# Patient Record
Sex: FEMALE | Race: WHITE | NOT HISPANIC OR LATINO | Employment: FULL TIME | URBAN - METROPOLITAN AREA
[De-identification: names, ages, dates, MRNs, and addresses within clinical notes are randomized per-mention and may not be internally consistent; named-entity substitution may affect disease eponyms.]

---

## 2018-01-12 NOTE — MISCELLANEOUS
Message  We have received a few refill requests for pt, however pt is due to come in the office  Will have Dr  send a refill to the pharmacy but needs a f/u apt  LMOM x2 Thanks CF      Active Problems    1  Esophageal reflux (530 81) (K21 9)    Current Meds   1  Pantoprazole Sodium 40 MG Oral Tablet Delayed Release; TAKE 1 TABLET TWICE   DAILY 30 MINUTES BEFORE BREAKFAST AND DINNER; Therapy: [id number] to (Lani Monet)  Requested for: [id number];  Last   Rx:[id number] Ordered    Plan  Esophageal reflux    · Pantoprazole Sodium 40 MG Oral Tablet Delayed Release; TAKE 1 TABLET  TWICE DAILY 30 MINUTES BEFORE BREAKFAST AND DINNER    Signatures   Electronically signed by : Jorge Cassidy, ; Mar 14 2016 11:08AM EST                       (Author)

## 2021-07-11 ENCOUNTER — HOSPITAL ENCOUNTER (EMERGENCY)
Facility: HOSPITAL | Age: 71
Discharge: HOME | End: 2021-07-11
Attending: EMERGENCY MEDICINE
Payer: COMMERCIAL

## 2021-07-11 ENCOUNTER — APPOINTMENT (EMERGENCY)
Dept: RADIOLOGY | Facility: HOSPITAL | Age: 71
End: 2021-07-11
Payer: COMMERCIAL

## 2021-07-11 VITALS
RESPIRATION RATE: 18 BRPM | HEART RATE: 88 BPM | WEIGHT: 184 LBS | OXYGEN SATURATION: 98 % | SYSTOLIC BLOOD PRESSURE: 136 MMHG | TEMPERATURE: 97.8 F | DIASTOLIC BLOOD PRESSURE: 63 MMHG

## 2021-07-11 DIAGNOSIS — R10.32 LEFT LOWER QUADRANT ABDOMINAL PAIN: Primary | ICD-10-CM

## 2021-07-11 LAB
ALBUMIN SERPL BCP-MCNC: 2.6 G/DL (ref 3.5–5)
ALP SERPL-CCNC: 115 U/L (ref 46–116)
ALT SERPL W P-5'-P-CCNC: 22 U/L (ref 12–78)
ANION GAP SERPL CALCULATED.3IONS-SCNC: 11 MMOL/L (ref 4–13)
APTT PPP: 44 SECONDS (ref 23–37)
AST SERPL W P-5'-P-CCNC: 64 U/L (ref 5–45)
BACTERIA UR QL AUTO: ABNORMAL /HPF
BASOPHILS # BLD AUTO: 0.01 THOUSANDS/ΜL (ref 0–0.1)
BASOPHILS NFR BLD AUTO: 0 % (ref 0–1)
BILIRUB SERPL-MCNC: 0.22 MG/DL (ref 0.2–1)
BILIRUB UR QL STRIP: NEGATIVE
BUN SERPL-MCNC: 13 MG/DL (ref 5–25)
CALCIUM ALBUM COR SERPL-MCNC: 9.4 MG/DL (ref 8.3–10.1)
CALCIUM SERPL-MCNC: 8.3 MG/DL (ref 8.3–10.1)
CHLORIDE SERPL-SCNC: 103 MMOL/L (ref 100–108)
CLARITY UR: CLEAR
CO2 SERPL-SCNC: 25 MMOL/L (ref 21–32)
COLOR UR: ABNORMAL
CREAT SERPL-MCNC: 0.64 MG/DL (ref 0.6–1.3)
EOSINOPHIL # BLD AUTO: 0.12 THOUSAND/ΜL (ref 0–0.61)
EOSINOPHIL NFR BLD AUTO: 2 % (ref 0–6)
ERYTHROCYTE [DISTWIDTH] IN BLOOD BY AUTOMATED COUNT: 12.5 % (ref 11.6–15.1)
GFR SERPL CREATININE-BSD FRML MDRD: 91 ML/MIN/1.73SQ M
GLUCOSE SERPL-MCNC: 116 MG/DL (ref 65–140)
GLUCOSE UR STRIP-MCNC: NEGATIVE MG/DL
HCT VFR BLD AUTO: 29.7 % (ref 34.8–46.1)
HGB BLD-MCNC: 9.1 G/DL (ref 11.5–15.4)
HGB UR QL STRIP.AUTO: NEGATIVE
IMM GRANULOCYTES # BLD AUTO: 0.01 THOUSAND/UL (ref 0–0.2)
IMM GRANULOCYTES NFR BLD AUTO: 0 % (ref 0–2)
INR PPP: 1.15 (ref 0.84–1.19)
KETONES UR STRIP-MCNC: NEGATIVE MG/DL
LEUKOCYTE ESTERASE UR QL STRIP: ABNORMAL
LIPASE SERPL-CCNC: 113 U/L (ref 73–393)
LYMPHOCYTES # BLD AUTO: 1.06 THOUSANDS/ΜL (ref 0.6–4.47)
LYMPHOCYTES NFR BLD AUTO: 18 % (ref 14–44)
MCH RBC QN AUTO: 26.1 PG (ref 26.8–34.3)
MCHC RBC AUTO-ENTMCNC: 30.6 G/DL (ref 31.4–37.4)
MCV RBC AUTO: 85 FL (ref 82–98)
MONOCYTES # BLD AUTO: 0.75 THOUSAND/ΜL (ref 0.17–1.22)
MONOCYTES NFR BLD AUTO: 13 % (ref 4–12)
NEUTROPHILS # BLD AUTO: 3.88 THOUSANDS/ΜL (ref 1.85–7.62)
NEUTS SEG NFR BLD AUTO: 67 % (ref 43–75)
NITRITE UR QL STRIP: NEGATIVE
NON-SQ EPI CELLS URNS QL MICRO: ABNORMAL /HPF
NRBC BLD AUTO-RTO: 0 /100 WBCS
PH UR STRIP.AUTO: 6.5 [PH]
PLATELET # BLD AUTO: 386 THOUSANDS/UL (ref 149–390)
PMV BLD AUTO: 9.7 FL (ref 8.9–12.7)
POTASSIUM SERPL-SCNC: 3.4 MMOL/L (ref 3.5–5.3)
PROT SERPL-MCNC: 6.9 G/DL (ref 6.4–8.2)
PROT UR STRIP-MCNC: NEGATIVE MG/DL
PROTHROMBIN TIME: 14.6 SECONDS (ref 11.6–14.5)
RBC # BLD AUTO: 3.48 MILLION/UL (ref 3.81–5.12)
RBC #/AREA URNS AUTO: ABNORMAL /HPF
SODIUM SERPL-SCNC: 139 MMOL/L (ref 136–145)
SP GR UR STRIP.AUTO: 1.02 (ref 1–1.03)
UROBILINOGEN UR QL STRIP.AUTO: 1 E.U./DL
WBC # BLD AUTO: 5.83 THOUSAND/UL (ref 4.31–10.16)
WBC #/AREA URNS AUTO: ABNORMAL /HPF

## 2021-07-11 PROCEDURE — 85025 COMPLETE CBC W/AUTO DIFF WBC: CPT | Performed by: EMERGENCY MEDICINE

## 2021-07-11 PROCEDURE — G1004 CDSM NDSC: HCPCS

## 2021-07-11 PROCEDURE — 74177 CT ABD & PELVIS W/CONTRAST: CPT

## 2021-07-11 PROCEDURE — 96361 HYDRATE IV INFUSION ADD-ON: CPT

## 2021-07-11 PROCEDURE — 99284 EMERGENCY DEPT VISIT MOD MDM: CPT | Performed by: EMERGENCY MEDICINE

## 2021-07-11 PROCEDURE — 85730 THROMBOPLASTIN TIME PARTIAL: CPT | Performed by: EMERGENCY MEDICINE

## 2021-07-11 PROCEDURE — 81001 URINALYSIS AUTO W/SCOPE: CPT | Performed by: EMERGENCY MEDICINE

## 2021-07-11 PROCEDURE — 80053 COMPREHEN METABOLIC PANEL: CPT | Performed by: EMERGENCY MEDICINE

## 2021-07-11 PROCEDURE — 96374 THER/PROPH/DIAG INJ IV PUSH: CPT

## 2021-07-11 PROCEDURE — 85610 PROTHROMBIN TIME: CPT | Performed by: EMERGENCY MEDICINE

## 2021-07-11 PROCEDURE — 83690 ASSAY OF LIPASE: CPT | Performed by: EMERGENCY MEDICINE

## 2021-07-11 PROCEDURE — 99284 EMERGENCY DEPT VISIT MOD MDM: CPT

## 2021-07-11 PROCEDURE — 36415 COLL VENOUS BLD VENIPUNCTURE: CPT | Performed by: EMERGENCY MEDICINE

## 2021-07-11 RX ORDER — AMLODIPINE BESYLATE AND BENAZEPRIL HYDROCHLORIDE 10; 20 MG/1; MG/1
1 CAPSULE ORAL DAILY
COMMUNITY

## 2021-07-11 RX ORDER — BUTALBITAL, ASPIRIN, AND CAFFEINE 50; 325; 40 MG/1; MG/1; MG/1
1 CAPSULE ORAL EVERY 4 HOURS PRN
COMMUNITY
End: 2022-06-28 | Stop reason: SDUPTHER

## 2021-07-11 RX ORDER — KETOROLAC TROMETHAMINE 30 MG/ML
15 INJECTION, SOLUTION INTRAMUSCULAR; INTRAVENOUS ONCE
Status: COMPLETED | OUTPATIENT
Start: 2021-07-11 | End: 2021-07-11

## 2021-07-11 RX ORDER — PRAVASTATIN SODIUM 10 MG
10 TABLET ORAL DAILY
COMMUNITY

## 2021-07-11 RX ORDER — ALBUTEROL SULFATE 90 UG/1
2 AEROSOL, METERED RESPIRATORY (INHALATION) EVERY 6 HOURS PRN
COMMUNITY
End: 2022-07-15 | Stop reason: SDUPTHER

## 2021-07-11 RX ORDER — KETOROLAC TROMETHAMINE 10 MG/1
10 TABLET, FILM COATED ORAL EVERY 6 HOURS PRN
Qty: 6 TABLET | Refills: 0 | Status: SHIPPED | OUTPATIENT
Start: 2021-07-11 | End: 2021-07-22 | Stop reason: ALTCHOICE

## 2021-07-11 RX ORDER — OMEPRAZOLE 20 MG/1
20 CAPSULE, DELAYED RELEASE ORAL 2 TIMES DAILY
COMMUNITY

## 2021-07-11 RX ADMIN — KETOROLAC TROMETHAMINE 15 MG: 30 INJECTION, SOLUTION INTRAMUSCULAR at 23:01

## 2021-07-11 RX ADMIN — SODIUM CHLORIDE 500 ML: 0.9 INJECTION, SOLUTION INTRAVENOUS at 19:56

## 2021-07-11 RX ADMIN — IOHEXOL 100 ML: 350 INJECTION, SOLUTION INTRAVENOUS at 21:07

## 2021-07-12 NOTE — ED PROVIDER NOTES
History  Chief Complaint   Patient presents with    Groin Pain     L groin pain for about a week  worse tonight   worse with standing or movement no urinary or bowel problems     73-year-old female presents the ED with left groin pain for about 1 week which is worse tonight  Patient states it is worse with standing and movement and she has use cream on her abdomen underneath the skin fold but when I look that was not irritated  No fevers no chills no nausea vomiting or diarrhea she states she is urinating and moving her bowels well  Patient is awake and alert      History provided by:  Patient   used: No        Prior to Admission Medications   Prescriptions Last Dose Informant Patient Reported? Taking? AMLODIPINE BENZOATE PO   Yes Yes   Sig: Take by mouth   albuterol (PROVENTIL HFA,VENTOLIN HFA) 90 mcg/act inhaler   Yes Yes   Sig: Inhale 2 puffs every 6 (six) hours as needed for wheezing   amLODIPine-benazepril (LOTREL) 10-20 MG per capsule   Yes Yes   Sig: Take 1 capsule by mouth daily   butalbital-aspirin-caffeine (FIORINAL) -40 mg per capsule   Yes Yes   Sig: Take 1 capsule by mouth every 4 (four) hours as needed for headaches   fluticasone (FLOVENT DISKUS) 50 MCG/BLIST diskus inhaler   Yes Yes   Sig: Inhale 1 puff 2 (two) times a day Rinse mouth after use  omeprazole (PriLOSEC) 20 mg delayed release capsule   Yes Yes   Sig: Take 20 mg by mouth 2 (two) times a day   pravastatin (PRAVACHOL) 10 mg tablet   Yes Yes   Sig: Take 10 mg by mouth daily      Facility-Administered Medications: None       Past Medical History:   Diagnosis Date    Acid reflux     Asthma     Hypercholesteremia     Hypertension     Migraine        Past Surgical History:   Procedure Laterality Date    GALLBLADDER SURGERY         History reviewed  No pertinent family history  I have reviewed and agree with the history as documented      E-Cigarette/Vaping    E-Cigarette Use Never User E-Cigarette/Vaping Substances     Social History     Tobacco Use    Smoking status: Never Smoker    Smokeless tobacco: Never Used   Vaping Use    Vaping Use: Never used   Substance Use Topics    Alcohol use: Yes     Comment: socially    Drug use: Not Currently       Review of Systems   Constitutional: Negative for activity change, chills, diaphoresis and fever  HENT: Negative for congestion, ear pain, nosebleeds, sore throat, trouble swallowing and voice change  Eyes: Negative for pain, discharge and redness  Respiratory: Negative for apnea, cough, choking, shortness of breath, wheezing and stridor  Cardiovascular: Negative for chest pain and palpitations  Gastrointestinal: Positive for abdominal pain  Negative for abdominal distention, constipation, diarrhea, nausea and vomiting  Endocrine: Negative for polydipsia  Genitourinary: Negative for difficulty urinating, dysuria, flank pain, frequency, hematuria and urgency  Musculoskeletal: Negative for back pain, gait problem, joint swelling, myalgias, neck pain and neck stiffness  Skin: Negative for pallor and rash  Neurological: Negative for dizziness, tremors, syncope, speech difficulty, weakness, numbness and headaches  Hematological: Negative for adenopathy  Psychiatric/Behavioral: Negative for confusion, hallucinations, self-injury and suicidal ideas  The patient is not nervous/anxious  Physical Exam  Physical Exam  Vitals and nursing note reviewed  Constitutional:       General: She is not in acute distress  Appearance: She is well-developed  She is not diaphoretic  HENT:      Head: Normocephalic and atraumatic  Right Ear: External ear normal       Left Ear: External ear normal       Nose: Nose normal    Eyes:      Conjunctiva/sclera: Conjunctivae normal       Pupils: Pupils are equal, round, and reactive to light  Cardiovascular:      Rate and Rhythm: Normal rate and regular rhythm        Heart sounds: Normal heart sounds  Pulmonary:      Effort: Pulmonary effort is normal       Breath sounds: Normal breath sounds  Abdominal:      General: Bowel sounds are normal       Palpations: Abdomen is soft  Tenderness: There is abdominal tenderness  Musculoskeletal:         General: Normal range of motion  Cervical back: Normal range of motion and neck supple  Skin:     General: Skin is warm and dry  Neurological:      Mental Status: She is alert and oriented to person, place, and time  Deep Tendon Reflexes: Reflexes are normal and symmetric  Psychiatric:         Behavior: Behavior is cooperative           Vital Signs  ED Triage Vitals [07/11/21 1923]   Temperature Pulse Respirations Blood Pressure SpO2   98 1 °F (36 7 °C) 97 20 145/67 97 %      Temp Source Heart Rate Source Patient Position - Orthostatic VS BP Location FiO2 (%)   Tympanic Monitor Sitting Right arm --      Pain Score       5           Vitals:    07/11/21 1923 07/11/21 2217   BP: 145/67 136/63   Pulse: 97 88   Patient Position - Orthostatic VS: Sitting Lying         Visual Acuity      ED Medications  Medications   sodium chloride 0 9 % bolus 500 mL (0 mL Intravenous Stopped 7/11/21 2215)   iohexol (OMNIPAQUE) 350 MG/ML injection (SINGLE-DOSE) 100 mL (100 mL Intravenous Given 7/11/21 2107)   ketorolac (TORADOL) injection 15 mg (15 mg Intravenous Given 7/11/21 2301)       Diagnostic Studies  Results Reviewed     Procedure Component Value Units Date/Time    Urine Microscopic [[id number]]  (Abnormal) Collected: 07/11/21 2215    Lab Status: Final result Specimen: Urine, Clean Catch Updated: 07/11/21 2226     RBC, UA 0-1 /hpf      WBC, UA 4-10 /hpf      Epithelial Cells Occasional /hpf      Bacteria, UA Occasional /hpf     UA w Reflex to Microscopic w Reflex to Culture [[id number]]  (Abnormal) Collected: 07/11/21 2215    Lab Status: Final result Specimen: Urine, Clean Catch Updated: 07/11/21 2220     Color, UA Light Yellow     Clarity, UA Clear     Specific [place], UA 1 020     pH, UA 6 5     Leukocytes, UA Trace     Nitrite, UA Negative     Protein, UA Negative mg/dl      Glucose, UA Negative mg/dl      Ketones, UA Negative mg/dl      Urobilinogen, UA 1 0 E U /dl      Bilirubin, UA Negative     Blood, UA Negative    Comprehensive metabolic panel [[id number]]  (Abnormal) Collected: 07/11/21 1957    Lab Status: Final result Specimen: Blood from Arm, Left Updated: 07/11/21 2017     Sodium 139 mmol/L      Potassium 3 4 mmol/L      Chloride 103 mmol/L      CO2 25 mmol/L      ANION GAP 11 mmol/L      BUN 13 mg/dL      Creatinine 0 64 mg/dL      Glucose 116 mg/dL      Calcium 8 3 mg/dL      Corrected Calcium 9 4 mg/dL      AST 64 U/L      ALT 22 U/L      Alkaline Phosphatase 115 U/L      Total Protein 6 9 g/dL      Albumin 2 6 g/dL      Total Bilirubin 0 22 mg/dL      eGFR 91 ml/min/1 73sq m     Narrative:      [facility] guidelines for Chronic Kidney Disease (CKD):     Stage 1 with normal or high GFR (GFR > 90 mL/min/1 73 square meters)    Stage 2 Mild CKD (GFR = 60-89 mL/min/1 73 square meters)    Stage 3A Moderate CKD (GFR = 45-59 mL/min/1 73 square meters)    Stage 3B Moderate CKD (GFR = 30-44 mL/min/1 73 square meters)    Stage 4 Severe CKD (GFR = 15-29 mL/min/1 73 square meters)    Stage 5 End Stage CKD (GFR <15 mL/min/1 73 square meters)  Note: GFR calculation is accurate only with a steady state creatinine    Lipase [[id number]]  (Normal) Collected: 07/11/21 1957    Lab Status: Final result Specimen: Blood from Arm, Left Updated: 07/11/21 2017     Lipase 113 u/L     Protime-INR [[id number]]  (Abnormal) Collected: 07/11/21 1957    Lab Status: Final result Specimen: Blood from Arm, Left Updated: 07/11/21 2013     Protime 14 6 seconds      INR 1 15    APTT [[id number]]  (Abnormal) Collected: 07/11/21 1957    Lab Status: Final result Specimen: Blood from Arm, Left Updated: 07/11/21 2013     PTT 44 seconds     CBC and differential [[id number]]  (Abnormal) Collected: 07/11/21 1957    Lab Status: Final result Specimen: Blood from Arm, Left Updated: 07/11/21 2001     WBC 5 83 Thousand/uL      RBC 3 48 Million/uL      Hemoglobin 9 1 g/dL      Hematocrit 29 7 %      MCV 85 fL      MCH 26 1 pg      MCHC 30 6 g/dL      RDW 12 5 %      MPV 9 7 fL      Platelets 408 Thousands/uL      nRBC 0 /100 WBCs      Neutrophils Relative 67 %      Immat GRANS % 0 %      Lymphocytes Relative 18 %      Monocytes Relative 13 %      Eosinophils Relative 2 %      Basophils Relative 0 %      Neutrophils Absolute 3 88 Thousands/µL      Immature Grans Absolute 0 01 Thousand/uL      Lymphocytes Absolute 1 06 Thousands/µL      Monocytes Absolute 0 75 Thousand/µL      Eosinophils Absolute 0 12 Thousand/µL      Basophils Absolute 0 01 Thousands/µL                  CT abdomen pelvis with contrast   Final Result by Merari Marie MD (07/11 2128)      1   5 9 x 2 7 x 4 8 cm left adnexal mass likely due to ovarian cancer with associated moderate ascites and marked nodularity and large soft tissue density within the omentum and peritoneal deposits in keeping with carcinomatosis  2   Small left pleural effusion  3   Moderate hiatal hernia  The study was marked in [facility] for immediate notification  Workstation performed: [id number]                    Procedures  Procedures         ED Course                             SBIRT 20yo+      Most Recent Value   SBIRT (24 yo +)   In order to provide better care to our patients, we are screening all of our patients for alcohol and drug use  Would it be okay to ask you these screening questions? No Filed at: 07/11/2021 2000                    MDM  Number of Diagnoses or Management Options  Left lower quadrant abdominal pain  Diagnosis management comments: I had a long discussion with the patient about the possibility of ovarian cancer with metastasis    She understands the severity of the CT scan and gave me the number of her OBGYN which I did call the service and the physician on-call did state that she would have the office call the patient tomorrow morning and they will get her in to [facility] early this week  I relayed this to the patient so if they do not call she can notify them  Disposition  Final diagnoses:   Left lower quadrant abdominal pain     Time reflects when diagnosis was documented in both MDM as applicable and the Disposition within this note     Time User Action Codes Description Comment    7/11/2021 10:52 PM Asher Subramanian Add [R10 32] Left lower quadrant abdominal pain       ED Disposition     ED Disposition Condition Date/Time Comment    Discharge Stable Sun Jul 11, 2021 10:52 PM Jaron Menon discharge to home/self care  Follow-up Information     Follow up With Specialties Details Why Contact Info Additional [address] Emergency Department Emergency Medicine Schedule an appointment as soon as possible for a visit  As needed [address] [address] [address] [address] Emergency Department, [facility], [address]          Patient's Medications   Discharge Prescriptions    No medications on file     No discharge procedures on file      PDMP Review     None          ED Provider  Electronically Signed by           Haley Rock DO  07/11/21 0960

## 2021-07-13 ENCOUNTER — TELEPHONE (OUTPATIENT)
Dept: HEMATOLOGY ONCOLOGY | Facility: CLINIC | Age: 71
End: 2021-07-13

## 2021-07-13 NOTE — TELEPHONE ENCOUNTER
New Patient Request   Patient Details:     Awilda Arreaga      1950      [id number]      Reason for Appointment   Who is calling to schedule? Patient   If not Patient, what is their name? Tonia Domínguez    What is the diagnosis? Left lower quadrant abdominal pain   Who is the referring doctor? Dr Laurie Melissa are you scheduling with ? [address] Number to call back on? If calling from the [facility], use the Nurse number  Cell PH: [phone]   Miscellaneous Information: Dr Lima Perry  is at [facility]: [phone]  Pt would like to see Dr Pablo Castano   *Radha was asked to make an appt as soon as possible*   Please advise the patient, a new patient  will be calling them back within 1 business day

## 2021-07-15 ENCOUNTER — TELEPHONE (OUTPATIENT)
Dept: SURGICAL ONCOLOGY | Facility: CLINIC | Age: 71
End: 2021-07-15

## 2021-07-15 NOTE — TELEPHONE ENCOUNTER
New Patient Encounter    New Patient Intake Form   Patient Details:  Grace Finney  1950  [id number]    Background Information:  [facility] [place] starts by opening a telephone encounter and gathering the following information   Who is calling to schedule? If not self, relationship to patient? Patient   Referring Provider ED   What is the diagnosis? Adnexal mass   Is this Cancer or Non-Cancer? Non-Cancer   Is this diagnosis confirmed? No   When was the diagnosis? 7/2021   Is there a confirmed diagnosis from a biopsy/tissue reviewed by pathology? No   Were outside slides requested? No   Is patient aware of diagnosis? yes   Is there a personal history and what kind? 35 yrs ago right ovary & fallopian tube removed -non cancerous   Is there a family history and what kind? Yes  Dad had lung  Mom had colon   Reason for visit? New Diagnosis   Have you had any imaging or labs done? If so: when, where? yes  [facility]   Are records in [facility]? yes   If patient has a prior history of cancer were old records obtained? NA   Was the patient told to bring a disk? No   Does the patient smoke or Vape? No   If yes, how many packs or cartridges per day? Scheduling Information:   Preferred [place]:  Any     Are there any dates/time the patient cannot be seen? Miscellaneous:   After completing the above information, please route to Financial Counselor and the appropriate Nurse Navigator for review  [Follow-Up Visit] : a follow-up visit for [CLL] : chronic lymphocytic leukemia

## 2021-07-16 PROBLEM — C78.6 PERITONEAL CARCINOMATOSIS (HCC): Status: ACTIVE | Noted: 2021-07-16

## 2021-07-16 PROBLEM — I10 HYPERTENSION: Status: ACTIVE | Noted: 2021-07-16

## 2021-07-16 PROBLEM — J45.909 ASTHMA: Status: ACTIVE | Noted: 2021-07-16

## 2021-07-16 NOTE — TELEPHONE ENCOUNTER
Spoke with Malachi Floyd from [address] [place] [address]  Call ref# [id number]  Malachi Floyd stated that patient is covered under a PPO plan, and is able to cross into [place]  Practice confirmed to be in network

## 2021-07-16 NOTE — H&P (VIEW-ONLY)
Assessment/Plan:    Problem List Items Addressed This Visit        Respiratory    Asthma       Cardiovascular and Mediastinum    Hypertension       Other    Peritoneal carcinomatosis ([facility] [address] ) - Primary     72yo with new pelvic mass concerning for gyn cancer presents for consultation  I have reviewed CT images which show ascites, omental cake and peritoneal nodularity  We reviewed that the most likely diagnosis given the above findings is ovarian cancer  We discussed the two approaches to treatment for her disease will include a combination of surgery and chemotherapy  Given her extent of disease, especially with the potential for involvement of multiple portions of bowel and peritoneum, we discussed the potential for neoadjuvant chemotherapy with carboplatin and paclitaxel for approximately 3 cycles with the hope of shrinking her tumor prior to go to the operating room for a debulking surgery with the goal of removal of all gross disease  We reviewed that randomized trials in this setting have shown no difference in survival if proceeding with neoadjuvant chemotherapy compared to primary debulking surgery  We reviewed that chemotherapy would not be started until pathology confirmed a gynecologic malignancy  However, we also reviewed her health and excellent performance status make her a good candidate for an upfront debulking surgery  Because imaging is not perfect at assessing true disease extent and patients who have a debulking to no gross residual disease upfront tend to do best in terms of overall survival, we also discussed the role of diagnostic laparoscopy followed by debulking surgery if optimal debulking is feasible at that time  If optimal resection not feasible, will perform biopsy and begin neoadjuvant chemo on diagnosis  Pt agrees to proceed to OR at this time     Surgical risks were reviewed with the patient including infection, blood loss, transfusion, damage to other organs and possible need for additional procedures including bowel or bladder surgery  I reviewed in detail the risk of bowel and urinary tract injury  Intraoperative and wound infection was reviewed  Postoperative recovery was reviewed, including the risk of venous thrombosis, bowel adhesions, ileus and incisional hernia formation  The patient was given time to ask pertinent questions and would like to proceed  Questions answered  Reviewed postoperative expectations and instructions, including pelvic rest for 8 weeks, no heavy lifting for 4 weeks  PATs  PCP clearance               Relevant Medications    oxyCODONE (ROXICODONE) 5 mg immediate release tablet    polyethylene glycol (MiraLax) 17 GM/SCOOP powder    Other Relevant Orders    Case request operating room: LAPAROSCOPY DIAGNOSTIC (Completed)    Type and screen    Comprehensive metabolic panel    CBC and Platelet    HEMOGLOBIN A1C W/ EAG ESTIMATION        EKG 12 lead    XR chest pa & lateral    Cancer related pain     Oxycodone rx sent to pharmacy                    CHIEF COMPLAINT: pelvic mass    Oncology Problem:  Cancer Staging  No matching staging information was found for the patient  Previous therapy:  Oncology History    No history exists  Most recent imaging:  CT abdomen pelvis with contrast  Narrative: CT ABDOMEN AND PELVIS WITH IV CONTRAST    INDICATION:   LLQ abdominal pain, diverticulitis suspected    COMPARISON:  None  TECHNIQUE:  CT examination of the abdomen and pelvis was performed  Axial, sagittal, and coronal 2D reformatted images were created from the source data and submitted for interpretation  Radiation dose length product (DLP) for this visit:  1294 mGy-cm   This examination, like all CT scans performed in the [facility], was performed utilizing techniques to minimize radiation dose exposure, including the use of iterative   reconstruction and automated exposure control      IV Contrast:  100 mL of iohexol (OMNIPAQUE)  was administered intravenously without immediate adverse reaction  Enteric Contrast:  Enteric contrast was not administered  FINDINGS:    ABDOMEN    LOWER CHEST:  Small left pleural effusion with adjacent compressive atelectasis  LIVER/BILIARY TREE:  Unremarkable  GALLBLADDER:  Gallbladder is surgically absent  SPLEEN:  Unremarkable  PANCREAS:  Unremarkable  ADRENAL GLANDS:  Unremarkable  KIDNEYS/URETERS:  Unremarkable  No hydronephrosis  STOMACH AND BOWEL:  Moderate hiatal hernia  No bowel obstruction  APPENDIX:  A normal appendix was visualized  ABDOMINOPELVIC CAVITY:  There is moderate ascites  There is marked nodularity and large soft tissue density within the omentum in keeping with carcinomatosis  Metastatic deposits are also seen along the peritoneum in the paracolic gutters and within   the pelvis  No pneumoperitoneum  No lymphadenopathy  VESSELS:  Unremarkable for patient's age  PELVIS    REPRODUCTIVE ORGANS:  There is a 5 9 x 2 7 x 4 8 cm left adnexal mass (series 2, image 66)  The uterus appears nodular which may demonstrate small fibroids versus metastatic deposits  URINARY BLADDER:  Unremarkable  ABDOMINAL WALL/INGUINAL REGIONS:  Unremarkable  OSSEOUS STRUCTURES:  No acute fracture or destructive osseous lesion  Impression: 1   5 9 x 2 7 x 4 8 cm left adnexal mass likely due to ovarian cancer with associated moderate ascites and marked nodularity and large soft tissue density within the omentum and peritoneal deposits in keeping with carcinomatosis  2   Small left pleural effusion  3   Moderate hiatal hernia  The study was marked in [facility] for immediate notification  Workstation performed: [id number]        Patient ID: Bebo Beltran is a 80 y o  female  70yo with new pelvic mass concerning for gyn cancer presents for consultation  Pt was seen in the ED for LLQ pain starting beginning of July  Worse with movement    Patient reports new onset abdominal pain the last few weeks  She denied any other symptoms prior to this  She now is experiencing decrease in appetite, abdominal bloating, persistent left lower quadrant pain  She denies any bowel or urinary symptoms  She has a family history of colon cancer in her mother that was resected without adjuvant therapy  She also reports she had a history of a right benign ovarian cyst for which she underwent oophorectomy over 30 years ago  Review of Systems   Constitutional: Positive for appetite change and fatigue  Negative for chills and fever  Respiratory: Negative for chest tightness and shortness of breath  Gastrointestinal: Positive for abdominal distention and abdominal pain  Negative for constipation, diarrhea and nausea  Genitourinary: Negative for difficulty urinating, flank pain, frequency, urgency, vaginal bleeding, vaginal discharge and vaginal pain  Musculoskeletal: Negative for back pain, joint swelling and myalgias  Skin: Negative for rash  Neurological: Negative for dizziness, light-headedness, numbness and headaches  Psychiatric/Behavioral: The patient is not nervous/anxious  Current Outpatient Medications   Medication Sig Dispense Refill    albuterol (PROVENTIL HFA,VENTOLIN HFA) 90 mcg/act inhaler Inhale 2 puffs every 6 (six) hours as needed for wheezing      AMLODIPINE BENZOATE PO Take by mouth      amLODIPine-benazepril (LOTREL) 10-20 MG per capsule Take 1 capsule by mouth daily      butalbital-aspirin-caffeine (FIORINAL) -40 mg per capsule Take 1 capsule by mouth every 4 (four) hours as needed for headaches      fluticasone (FLOVENT DISKUS) 50 MCG/BLIST diskus inhaler Inhale 1 puff 2 (two) times a day Rinse mouth after use        ketorolac (TORADOL) 10 mg tablet Take 1 tablet (10 mg total) by mouth every 6 (six) hours as needed for moderate pain 6 tablet 0    omeprazole (PriLOSEC) 20 mg delayed release capsule Take 20 mg by mouth 2 (two) times a day      pravastatin (PRAVACHOL) 10 mg tablet Take 10 mg by mouth daily      oxyCODONE (ROXICODONE) 5 mg immediate release tablet Take 1 tablet (5 mg total) by mouth every 4 (four) hours as needed for moderate pain for up to 10 daysMax Daily Amount: 30 mg 15 tablet 0    polyethylene glycol (MiraLax) 17 GM/SCOOP powder Mix with 64 oz Gatorade, begin 4 PM day before surgery per bowel prep instructions  238 g 0     No current facility-administered medications for this visit  Allergies   Allergen Reactions    Erythromycin Nausea Only    Morphine Headache       Past Medical History:   Diagnosis Date    Acid reflux     Asthma     Hypercholesteremia     Hypertension     Migraine        Past Surgical History:   Procedure Laterality Date    GALLBLADDER SURGERY      RIGHT OOPHORECTOMY  1986       OB History    No obstetric history on file  History reviewed  No pertinent family history  The following portions of the patient's history were reviewed and updated as appropriate: allergies, current medications, past family history, past medical history, past social history, past surgical history and problem list       Objective:    Blood pressure 122/84, pulse 98, temperature 98 8 °F (37 1 °C), resp  rate 18, height 4' 11" (1 499 m), weight 82 1 kg (181 lb)  Body mass index is 36 56 kg/m²  Physical Exam  HENT:      Head: Normocephalic and atraumatic  Cardiovascular:      Rate and Rhythm: Normal rate and regular rhythm  Heart sounds: Normal heart sounds  Pulmonary:      Effort: Pulmonary effort is normal       Breath sounds: Normal breath sounds  Abdominal:      General: There is distension  Palpations: Abdomen is soft  There is mass  Comments: Omental cake palpable   Genitourinary:     Comments: The external female genitalia is normal  The bartholin's, uretheral and skenes glands are normal  The urethral meatus is normal (midline with no lesions)   Anus without fissure or lesion  Speculum exam reveals vagina without lesion or discharge  Cervix is normal appearing without visible lesions  Scant blood in vault  No significant cystocele or rectocele noted  Bimanual exam notes a uterus with mobility and posterior tenderness and fullness  Bladder is without fullness, mass or tenderness  Musculoskeletal:         General: Normal range of motion  Cervical back: Normal range of motion  Skin:     General: Skin is warm and dry  Neurological:      Mental Status: She is alert and oriented to person, place, and time             No results found for:   Lab Results   Component Value Date    WBC 5 83 07/11/2021    HGB 9 1 (L) 07/11/2021    HCT 29 7 (L) 07/11/2021    MCV 85 07/11/2021     07/11/2021     Lab Results   Component Value Date    K 3 4 (L) 07/11/2021     07/11/2021    CO2 25 07/11/2021    BUN 13 07/11/2021    CREATININE 0 64 07/11/2021    CALCIUM 8 3 07/11/2021    CORRECTEDCA 9 4 07/11/2021    AST 64 (H) 07/11/2021    ALT 22 07/11/2021    ALKPHOS 115 07/11/2021    EGFR 91 07/11/2021        Trend:  No results found for:

## 2021-07-16 NOTE — PROGRESS NOTES
Assessment/Plan:    Problem List Items Addressed This Visit        Respiratory    Asthma       Cardiovascular and Mediastinum    Hypertension       Other    Peritoneal carcinomatosis ([facility] [address] ) - Primary     72yo with new pelvic mass concerning for gyn cancer presents for consultation  I have reviewed CT images which show ascites, omental cake and peritoneal nodularity  We reviewed that the most likely diagnosis given the above findings is ovarian cancer  We discussed the two approaches to treatment for her disease will include a combination of surgery and chemotherapy  Given her extent of disease, especially with the potential for involvement of multiple portions of bowel and peritoneum, we discussed the potential for neoadjuvant chemotherapy with carboplatin and paclitaxel for approximately 3 cycles with the hope of shrinking her tumor prior to go to the operating room for a debulking surgery with the goal of removal of all gross disease  We reviewed that randomized trials in this setting have shown no difference in survival if proceeding with neoadjuvant chemotherapy compared to primary debulking surgery  We reviewed that chemotherapy would not be started until pathology confirmed a gynecologic malignancy  However, we also reviewed her health and excellent performance status make her a good candidate for an upfront debulking surgery  Because imaging is not perfect at assessing true disease extent and patients who have a debulking to no gross residual disease upfront tend to do best in terms of overall survival, we also discussed the role of diagnostic laparoscopy followed by debulking surgery if optimal debulking is feasible at that time  If optimal resection not feasible, will perform biopsy and begin neoadjuvant chemo on diagnosis  Pt agrees to proceed to OR at this time     Surgical risks were reviewed with the patient including infection, blood loss, transfusion, damage to other organs and possible need for additional procedures including bowel or bladder surgery  I reviewed in detail the risk of bowel and urinary tract injury  Intraoperative and wound infection was reviewed  Postoperative recovery was reviewed, including the risk of venous thrombosis, bowel adhesions, ileus and incisional hernia formation  The patient was given time to ask pertinent questions and would like to proceed  Questions answered  Reviewed postoperative expectations and instructions, including pelvic rest for 8 weeks, no heavy lifting for 4 weeks  PATs  PCP clearance               Relevant Medications    oxyCODONE (ROXICODONE) 5 mg immediate release tablet    polyethylene glycol (MiraLax) 17 GM/SCOOP powder    Other Relevant Orders    Case request operating room: LAPAROSCOPY DIAGNOSTIC (Completed)    Type and screen    Comprehensive metabolic panel    CBC and Platelet    HEMOGLOBIN A1C W/ EAG ESTIMATION        EKG 12 lead    XR chest pa & lateral    Cancer related pain     Oxycodone rx sent to pharmacy                    CHIEF COMPLAINT: pelvic mass    Oncology Problem:  Cancer Staging  No matching staging information was found for the patient  Previous therapy:  Oncology History    No history exists  Most recent imaging:  CT abdomen pelvis with contrast  Narrative: CT ABDOMEN AND PELVIS WITH IV CONTRAST    INDICATION:   LLQ abdominal pain, diverticulitis suspected    COMPARISON:  None  TECHNIQUE:  CT examination of the abdomen and pelvis was performed  Axial, sagittal, and coronal 2D reformatted images were created from the source data and submitted for interpretation  Radiation dose length product (DLP) for this visit:  1294 mGy-cm   This examination, like all CT scans performed in the [facility], was performed utilizing techniques to minimize radiation dose exposure, including the use of iterative   reconstruction and automated exposure control      IV Contrast:  100 mL of iohexol (OMNIPAQUE)  was administered intravenously without immediate adverse reaction  Enteric Contrast:  Enteric contrast was not administered  FINDINGS:    ABDOMEN    LOWER CHEST:  Small left pleural effusion with adjacent compressive atelectasis  LIVER/BILIARY TREE:  Unremarkable  GALLBLADDER:  Gallbladder is surgically absent  SPLEEN:  Unremarkable  PANCREAS:  Unremarkable  ADRENAL GLANDS:  Unremarkable  KIDNEYS/URETERS:  Unremarkable  No hydronephrosis  STOMACH AND BOWEL:  Moderate hiatal hernia  No bowel obstruction  APPENDIX:  A normal appendix was visualized  ABDOMINOPELVIC CAVITY:  There is moderate ascites  There is marked nodularity and large soft tissue density within the omentum in keeping with carcinomatosis  Metastatic deposits are also seen along the peritoneum in the paracolic gutters and within   the pelvis  No pneumoperitoneum  No lymphadenopathy  VESSELS:  Unremarkable for patient's age  PELVIS    REPRODUCTIVE ORGANS:  There is a 5 9 x 2 7 x 4 8 cm left adnexal mass (series 2, image 66)  The uterus appears nodular which may demonstrate small fibroids versus metastatic deposits  URINARY BLADDER:  Unremarkable  ABDOMINAL WALL/INGUINAL REGIONS:  Unremarkable  OSSEOUS STRUCTURES:  No acute fracture or destructive osseous lesion  Impression: 1   5 9 x 2 7 x 4 8 cm left adnexal mass likely due to ovarian cancer with associated moderate ascites and marked nodularity and large soft tissue density within the omentum and peritoneal deposits in keeping with carcinomatosis  2   Small left pleural effusion  3   Moderate hiatal hernia  The study was marked in [facility] for immediate notification  Workstation performed: [id number]        Patient ID: John Ramirez is a 80 y o  female  68yo with new pelvic mass concerning for gyn cancer presents for consultation  Pt was seen in the ED for LLQ pain starting beginning of July  Worse with movement    Patient reports new onset abdominal pain the last few weeks  She denied any other symptoms prior to this  She now is experiencing decrease in appetite, abdominal bloating, persistent left lower quadrant pain  She denies any bowel or urinary symptoms  She has a family history of colon cancer in her mother that was resected without adjuvant therapy  She also reports she had a history of a right benign ovarian cyst for which she underwent oophorectomy over 30 years ago  Review of Systems   Constitutional: Positive for appetite change and fatigue  Negative for chills and fever  Respiratory: Negative for chest tightness and shortness of breath  Gastrointestinal: Positive for abdominal distention and abdominal pain  Negative for constipation, diarrhea and nausea  Genitourinary: Negative for difficulty urinating, flank pain, frequency, urgency, vaginal bleeding, vaginal discharge and vaginal pain  Musculoskeletal: Negative for back pain, joint swelling and myalgias  Skin: Negative for rash  Neurological: Negative for dizziness, light-headedness, numbness and headaches  Psychiatric/Behavioral: The patient is not nervous/anxious  Current Outpatient Medications   Medication Sig Dispense Refill    albuterol (PROVENTIL HFA,VENTOLIN HFA) 90 mcg/act inhaler Inhale 2 puffs every 6 (six) hours as needed for wheezing      AMLODIPINE BENZOATE PO Take by mouth      amLODIPine-benazepril (LOTREL) 10-20 MG per capsule Take 1 capsule by mouth daily      butalbital-aspirin-caffeine (FIORINAL) -40 mg per capsule Take 1 capsule by mouth every 4 (four) hours as needed for headaches      fluticasone (FLOVENT DISKUS) 50 MCG/BLIST diskus inhaler Inhale 1 puff 2 (two) times a day Rinse mouth after use        ketorolac (TORADOL) 10 mg tablet Take 1 tablet (10 mg total) by mouth every 6 (six) hours as needed for moderate pain 6 tablet 0    omeprazole (PriLOSEC) 20 mg delayed release capsule Take 20 mg by mouth 2 (two) times a day      pravastatin (PRAVACHOL) 10 mg tablet Take 10 mg by mouth daily      oxyCODONE (ROXICODONE) 5 mg immediate release tablet Take 1 tablet (5 mg total) by mouth every 4 (four) hours as needed for moderate pain for up to 10 daysMax Daily Amount: 30 mg 15 tablet 0    polyethylene glycol (MiraLax) 17 GM/SCOOP powder Mix with 64 oz Gatorade, begin 4 PM day before surgery per bowel prep instructions  238 g 0     No current facility-administered medications for this visit  Allergies   Allergen Reactions    Erythromycin Nausea Only    Morphine Headache       Past Medical History:   Diagnosis Date    Acid reflux     Asthma     Hypercholesteremia     Hypertension     Migraine        Past Surgical History:   Procedure Laterality Date    GALLBLADDER SURGERY      RIGHT OOPHORECTOMY  1986       OB History    No obstetric history on file  History reviewed  No pertinent family history  The following portions of the patient's history were reviewed and updated as appropriate: allergies, current medications, past family history, past medical history, past social history, past surgical history and problem list       Objective:    Blood pressure 122/84, pulse 98, temperature 98 8 °F (37 1 °C), resp  rate 18, height 4' 11" (1 499 m), weight 82 1 kg (181 lb)  Body mass index is 36 56 kg/m²  Physical Exam  HENT:      Head: Normocephalic and atraumatic  Cardiovascular:      Rate and Rhythm: Normal rate and regular rhythm  Heart sounds: Normal heart sounds  Pulmonary:      Effort: Pulmonary effort is normal       Breath sounds: Normal breath sounds  Abdominal:      General: There is distension  Palpations: Abdomen is soft  There is mass  Comments: Omental cake palpable   Genitourinary:     Comments: The external female genitalia is normal  The bartholin's, uretheral and skenes glands are normal  The urethral meatus is normal (midline with no lesions)   Anus without fissure or lesion  Speculum exam reveals vagina without lesion or discharge  Cervix is normal appearing without visible lesions  Scant blood in vault  No significant cystocele or rectocele noted  Bimanual exam notes a uterus with mobility and posterior tenderness and fullness  Bladder is without fullness, mass or tenderness  Musculoskeletal:         General: Normal range of motion  Cervical back: Normal range of motion  Skin:     General: Skin is warm and dry  Neurological:      Mental Status: She is alert and oriented to person, place, and time             No results found for:   Lab Results   Component Value Date    WBC 5 83 07/11/2021    HGB 9 1 (L) 07/11/2021    HCT 29 7 (L) 07/11/2021    MCV 85 07/11/2021     07/11/2021     Lab Results   Component Value Date    K 3 4 (L) 07/11/2021     07/11/2021    CO2 25 07/11/2021    BUN 13 07/11/2021    CREATININE 0 64 07/11/2021    CALCIUM 8 3 07/11/2021    CORRECTEDCA 9 4 07/11/2021    AST 64 (H) 07/11/2021    ALT 22 07/11/2021    ALKPHOS 115 07/11/2021    EGFR 91 07/11/2021        Trend:  No results found for:

## 2021-07-19 ENCOUNTER — CONSULT (OUTPATIENT)
Dept: GYNECOLOGIC ONCOLOGY | Facility: CLINIC | Age: 71
End: 2021-07-19
Payer: COMMERCIAL

## 2021-07-19 VITALS
HEART RATE: 98 BPM | HEIGHT: 59 IN | DIASTOLIC BLOOD PRESSURE: 84 MMHG | TEMPERATURE: 98.8 F | WEIGHT: 181 LBS | RESPIRATION RATE: 18 BRPM | SYSTOLIC BLOOD PRESSURE: 122 MMHG | BODY MASS INDEX: 36.49 KG/M2

## 2021-07-19 DIAGNOSIS — I10 ESSENTIAL HYPERTENSION: ICD-10-CM

## 2021-07-19 DIAGNOSIS — C78.6 PERITONEAL CARCINOMATOSIS (HCC): Primary | ICD-10-CM

## 2021-07-19 DIAGNOSIS — J45.20 MILD INTERMITTENT ASTHMA WITHOUT COMPLICATION: ICD-10-CM

## 2021-07-19 DIAGNOSIS — G89.3 CANCER RELATED PAIN: ICD-10-CM

## 2021-07-19 PROBLEM — G43.909 MIGRAINE: Status: ACTIVE | Noted: 2021-07-19

## 2021-07-19 PROCEDURE — 99244 OFF/OP CNSLTJ NEW/EST MOD 40: CPT | Performed by: OBSTETRICS & GYNECOLOGY

## 2021-07-19 RX ORDER — OXYCODONE HYDROCHLORIDE 5 MG/1
5 TABLET ORAL EVERY 4 HOURS PRN
Qty: 15 TABLET | Refills: 0 | Status: SHIPPED | OUTPATIENT
Start: 2021-07-19 | End: 2021-07-29

## 2021-07-19 RX ORDER — POLYETHYLENE GLYCOL 3350 17 G/17G
POWDER, FOR SOLUTION ORAL
Qty: 238 G | Refills: 0 | Status: SHIPPED | OUTPATIENT
Start: 2021-07-19

## 2021-07-19 RX ORDER — HEPARIN SODIUM 5000 [USP'U]/ML
5000 INJECTION, SOLUTION INTRAVENOUS; SUBCUTANEOUS
Status: CANCELLED | OUTPATIENT
Start: 2021-07-20 | End: 2021-07-21

## 2021-07-19 RX ORDER — GABAPENTIN 100 MG/1
100 CAPSULE ORAL ONCE
Status: CANCELLED | OUTPATIENT
Start: 2021-07-19 | End: 2021-07-19

## 2021-07-19 RX ORDER — CEFAZOLIN SODIUM 2 G/50ML
2000 SOLUTION INTRAVENOUS ONCE
Status: CANCELLED | OUTPATIENT
Start: 2021-07-19 | End: 2021-07-19

## 2021-07-19 RX ORDER — ACETAMINOPHEN 325 MG/1
975 TABLET ORAL ONCE
Status: CANCELLED | OUTPATIENT
Start: 2021-07-19 | End: 2021-07-19

## 2021-07-19 NOTE — ASSESSMENT & PLAN NOTE
68yo with new pelvic mass concerning for gyn cancer presents for consultation  I have reviewed CT images which show ascites, omental cake and peritoneal nodularity  We reviewed that the most likely diagnosis given the above findings is ovarian cancer  We discussed the two approaches to treatment for her disease will include a combination of surgery and chemotherapy  Given her extent of disease, especially with the potential for involvement of multiple portions of bowel and peritoneum, we discussed the potential for neoadjuvant chemotherapy with carboplatin and paclitaxel for approximately 3 cycles with the hope of shrinking her tumor prior to go to the operating room for a debulking surgery with the goal of removal of all gross disease  We reviewed that randomized trials in this setting have shown no difference in survival if proceeding with neoadjuvant chemotherapy compared to primary debulking surgery  We reviewed that chemotherapy would not be started until pathology confirmed a gynecologic malignancy  However, we also reviewed her health and excellent performance status make her a good candidate for an upfront debulking surgery  Because imaging is not perfect at assessing true disease extent and patients who have a debulking to no gross residual disease upfront tend to do best in terms of overall survival, we also discussed the role of diagnostic laparoscopy followed by debulking surgery if optimal debulking is feasible at that time  If optimal resection not feasible, will perform biopsy and begin neoadjuvant chemo on diagnosis  Pt agrees to proceed to OR at this time  Surgical risks were reviewed with the patient including infection, blood loss, transfusion, damage to other organs and possible need for additional procedures including bowel or bladder surgery  I reviewed in detail the risk of bowel and urinary tract injury  Intraoperative and wound infection was reviewed   Postoperative recovery was reviewed, including the risk of venous thrombosis, bowel adhesions, ileus and incisional hernia formation  The patient was given time to ask pertinent questions and would like to proceed  Questions answered  Reviewed postoperative expectations and instructions, including pelvic rest for 8 weeks, no heavy lifting for 4 weeks      PATs  PCP clearance

## 2021-07-20 ENCOUNTER — LAB REQUISITION (OUTPATIENT)
Dept: LAB | Facility: HOSPITAL | Age: 71
End: 2021-07-20
Payer: COMMERCIAL

## 2021-07-20 ENCOUNTER — HOSPITAL ENCOUNTER (OUTPATIENT)
Dept: RADIOLOGY | Facility: HOSPITAL | Age: 71
Discharge: HOME | End: 2021-07-20
Payer: COMMERCIAL

## 2021-07-20 ENCOUNTER — LAB (OUTPATIENT)
Dept: LAB | Facility: HOSPITAL | Age: 71
End: 2021-07-20
Payer: COMMERCIAL

## 2021-07-20 DIAGNOSIS — C78.6 PERITONEAL CARCINOMATOSIS (HCC): ICD-10-CM

## 2021-07-20 DIAGNOSIS — C78.6 SECONDARY MALIGNANT NEOPLASM OF RETROPERITONEUM AND PERITONEUM (HCC): ICD-10-CM

## 2021-07-20 LAB
ABO GROUP BLD: NORMAL
ALBUMIN SERPL BCP-MCNC: 2.6 G/DL (ref 3.5–5)
ALP SERPL-CCNC: 123 U/L (ref 46–116)
ALT SERPL W P-5'-P-CCNC: 24 U/L (ref 12–78)
ANION GAP SERPL CALCULATED.3IONS-SCNC: 12 MMOL/L (ref 4–13)
AST SERPL W P-5'-P-CCNC: 74 U/L (ref 5–45)
BILIRUB SERPL-MCNC: 0.3 MG/DL (ref 0.2–1)
BLD GP AB SCN SERPL QL: NEGATIVE
BUN SERPL-MCNC: 13 MG/DL (ref 5–25)
CALCIUM ALBUM COR SERPL-MCNC: 9.8 MG/DL (ref 8.3–10.1)
CALCIUM SERPL-MCNC: 8.7 MG/DL (ref 8.3–10.1)
CANCER AG125 SERPL-ACNC: 543.7 U/ML (ref 0–30)
CHLORIDE SERPL-SCNC: 97 MMOL/L (ref 100–108)
CO2 SERPL-SCNC: 27 MMOL/L (ref 21–32)
CREAT SERPL-MCNC: 0.63 MG/DL (ref 0.6–1.3)
ERYTHROCYTE [DISTWIDTH] IN BLOOD BY AUTOMATED COUNT: 12.6 % (ref 11.6–15.1)
EST. AVERAGE GLUCOSE BLD GHB EST-MCNC: 123 MG/DL
GFR SERPL CREATININE-BSD FRML MDRD: 91 ML/MIN/1.73SQ M
GLUCOSE P FAST SERPL-MCNC: 102 MG/DL (ref 65–99)
HBA1C MFR BLD: 5.9 %
HCT VFR BLD AUTO: 30.9 % (ref 34.8–46.1)
HGB BLD-MCNC: 9.5 G/DL (ref 11.5–15.4)
MCH RBC QN AUTO: 25.8 PG (ref 26.8–34.3)
MCHC RBC AUTO-ENTMCNC: 30.7 G/DL (ref 31.4–37.4)
MCV RBC AUTO: 84 FL (ref 82–98)
PLATELET # BLD AUTO: 491 THOUSANDS/UL (ref 149–390)
PMV BLD AUTO: 10.5 FL (ref 8.9–12.7)
POTASSIUM SERPL-SCNC: 3.6 MMOL/L (ref 3.5–5.3)
PROT SERPL-MCNC: 7.8 G/DL (ref 6.4–8.2)
RBC # BLD AUTO: 3.68 MILLION/UL (ref 3.81–5.12)
RH BLD: POSITIVE
SODIUM SERPL-SCNC: 136 MMOL/L (ref 136–145)
SPECIMEN EXPIRATION DATE: NORMAL
WBC # BLD AUTO: 7.9 THOUSAND/UL (ref 4.31–10.16)

## 2021-07-20 PROCEDURE — 71046 X-RAY EXAM CHEST 2 VIEWS: CPT

## 2021-07-20 PROCEDURE — 86901 BLOOD TYPING SEROLOGIC RH(D): CPT | Performed by: OBSTETRICS & GYNECOLOGY

## 2021-07-20 PROCEDURE — 85027 COMPLETE CBC AUTOMATED: CPT

## 2021-07-20 PROCEDURE — 83036 HEMOGLOBIN GLYCOSYLATED A1C: CPT

## 2021-07-20 PROCEDURE — 86850 RBC ANTIBODY SCREEN: CPT | Performed by: OBSTETRICS & GYNECOLOGY

## 2021-07-20 PROCEDURE — 86304 IMMUNOASSAY TUMOR CA 125: CPT

## 2021-07-20 PROCEDURE — 86900 BLOOD TYPING SEROLOGIC ABO: CPT | Performed by: OBSTETRICS & GYNECOLOGY

## 2021-07-20 PROCEDURE — 80053 COMPREHEN METABOLIC PANEL: CPT

## 2021-07-20 PROCEDURE — 36415 COLL VENOUS BLD VENIPUNCTURE: CPT

## 2021-07-21 ENCOUNTER — APPOINTMENT (OUTPATIENT)
Dept: LAB | Facility: HOSPITAL | Age: 71
End: 2021-07-21
Payer: COMMERCIAL

## 2021-07-21 DIAGNOSIS — C78.6 PERITONEAL CARCINOMATOSIS (HCC): ICD-10-CM

## 2021-07-21 PROCEDURE — 93005 ELECTROCARDIOGRAM TRACING: CPT

## 2021-07-22 ENCOUNTER — OFFICE VISIT (OUTPATIENT)
Dept: URGENT CARE | Facility: CLINIC | Age: 71
End: 2021-07-22
Payer: COMMERCIAL

## 2021-07-22 VITALS
BODY MASS INDEX: 36.29 KG/M2 | WEIGHT: 180 LBS | DIASTOLIC BLOOD PRESSURE: 70 MMHG | RESPIRATION RATE: 18 BRPM | HEIGHT: 59 IN | SYSTOLIC BLOOD PRESSURE: 132 MMHG | TEMPERATURE: 100.5 F | HEART RATE: 98 BPM

## 2021-07-22 DIAGNOSIS — J20.9 ACUTE BRONCHITIS, UNSPECIFIED ORGANISM: Primary | ICD-10-CM

## 2021-07-22 PROCEDURE — 99213 OFFICE O/P EST LOW 20 MIN: CPT | Performed by: PHYSICIAN ASSISTANT

## 2021-07-22 RX ORDER — BENZONATATE 200 MG/1
200 CAPSULE ORAL 3 TIMES DAILY PRN
Qty: 20 CAPSULE | Refills: 0 | Status: SHIPPED | OUTPATIENT
Start: 2021-07-22 | End: 2022-07-15 | Stop reason: SDUPTHER

## 2021-07-22 NOTE — PROGRESS NOTES
[address] [facility]        NAME: Byron Young is a 80 y o  female  : 1950    MRN: [id number]  DATE: 2021  TIME: 11:47 AM    Assessment and Plan   Acute bronchitis, unspecified organism [J20 9]  1  Acute bronchitis, unspecified organism  cefuroxime (CEFTIN) 250 mg/5 mL suspension    benzonatate (TESSALON) 200 MG capsule         Patient Instructions     Patient Instructions   Take antibiotic as prescribed and cough drops as needed for suspected bronchitis  Follow up with PCP  Return or be seen in ER with any progressing or worsening symptoms including SOB, fever, lightheaded or dizziness  Follow up with PCP in 3-5 days  Proceed to  ER if symptoms worsen  Chief Complaint     Chief Complaint   Patient presents with    Cold Like Symptoms     W COUGH FOR 1 WEEK         History of Present Illness       Patient is a 70yo F presenting today with cough and congestion x 1 week  Patient notes a long standing history of bronchitis in which she has been treated with antibiotics in past  She presents today with progressively worsening dry cough, post nasal drip, and occasional wheezing  She admits to a history of asthma in which she has a PRN inhaler, she notes she has had to use the inhaler a couple times over last week, moderate resolution of symptoms  She denies SOB, chest tightness, lightheaded/dizziness, fever, chills or rash  Review of Systems   Review of Systems   Constitutional: Negative for chills and fever  HENT: Positive for postnasal drip  Negative for ear pain, sinus pressure, sinus pain and sore throat  Eyes: Negative for pain  Respiratory: Positive for cough and wheezing (intermittent expiratory wheezing, resolves with use of PRN inhaler)  Negative for chest tightness and shortness of breath  Cardiovascular: Negative for chest pain  Gastrointestinal: Negative for abdominal pain  Musculoskeletal: Negative for arthralgias and myalgias  Skin: Negative for rash  Neurological: Negative for dizziness, syncope, light-headedness and headaches  Current Medications       Current Outpatient Medications:     albuterol (PROVENTIL HFA,VENTOLIN HFA) 90 mcg/act inhaler, Inhale 2 puffs every 6 (six) hours as needed for wheezing, Disp: , Rfl:     AMLODIPINE BENZOATE PO, Take by mouth, Disp: , Rfl:     amLODIPine-benazepril (LOTREL) 10-20 MG per capsule, Take 1 capsule by mouth daily, Disp: , Rfl:     benzonatate (TESSALON) 200 MG capsule, Take 1 capsule (200 mg total) by mouth 3 (three) times a day as needed for cough, Disp: 20 capsule, Rfl: 0    butalbital-aspirin-caffeine (FIORINAL) -40 mg per capsule, Take 1 capsule by mouth every 4 (four) hours as needed for headaches, Disp: , Rfl:     cefuroxime (CEFTIN) 250 mg/5 mL suspension, Take 5 mL (250 mg total) by mouth 2 (two) times a day for 10 days, Disp: 100 mL, Rfl: 0    fluticasone (FLOVENT DISKUS) 50 MCG/BLIST diskus inhaler, Inhale 1 puff 2 (two) times a day Rinse mouth after use , Disp: , Rfl:     omeprazole (PriLOSEC) 20 mg delayed release capsule, Take 20 mg by mouth 2 (two) times a day, Disp: , Rfl:     oxyCODONE (ROXICODONE) 5 mg immediate release tablet, Take 1 tablet (5 mg total) by mouth every 4 (four) hours as needed for moderate pain for up to 10 daysMax Daily Amount: 30 mg, Disp: 15 tablet, Rfl: 0    polyethylene glycol (MiraLax) 17 GM/SCOOP powder, Mix with 64 oz Gatorade, begin 4 PM day before surgery per bowel prep instructions  , Disp: 238 g, Rfl: 0    pravastatin (PRAVACHOL) 10 mg tablet, Take 10 mg by mouth daily, Disp: , Rfl:     Current Allergies     Allergies as of 07/22/2021 - Reviewed 07/22/2021   Allergen Reaction Noted    Erythromycin Nausea Only 07/11/2021    Morphine Headache 07/11/2021            The following portions of the patient's history were reviewed and updated as appropriate: allergies, current medications, past family history, past medical history, past social history, past surgical history and problem list      Past Medical History:   Diagnosis Date    Acid reflux     Asthma     Hypercholesteremia     Hypertension     Migraine        Past Surgical History:   Procedure Laterality Date    GALLBLADDER SURGERY      RIGHT OOPHORECTOMY  1986       No family history on file  Medications have been verified  Objective   /70   Pulse 98   Temp 100 5 °F (38 1 °C)   Resp 18   Ht 4' 11" (1 499 m)   Wt 81 6 kg (180 lb)   BMI 36 36 kg/m²        Physical Exam     Physical Exam  Vitals reviewed  Constitutional:       General: She is not in acute distress  Appearance: Normal appearance  HENT:      Head: Normocephalic and atraumatic  Right Ear: Tympanic membrane, ear canal and external ear normal       Left Ear: Tympanic membrane, ear canal and external ear normal       Nose: Congestion present  Mouth/Throat:      Comments: Mild erythema and cobblestoning of posterior oropharynx, consistent with post nasal drip  No tonsillar swelling, exudate, or uvular deviation  Eyes:      Conjunctiva/sclera: Conjunctivae normal       Pupils: Pupils are equal, round, and reactive to light  Cardiovascular:      Rate and Rhythm: Normal rate and regular rhythm  Pulses: Normal pulses  Heart sounds: Normal heart sounds  Pulmonary:      Comments: Normal effort of breathing, no respiratory distress  Mild diffuse rhonchi of posterior lung fields bilaterally, moderate resolution upon coughing  Musculoskeletal:      Cervical back: Normal range of motion  No tenderness  Lymphadenopathy:      Cervical: No cervical adenopathy  Skin:     General: Skin is warm and dry  Capillary Refill: Capillary refill takes less than 2 seconds  Neurological:      General: No focal deficit present  Mental Status: She is alert and oriented to person, place, and time

## 2021-07-22 NOTE — PATIENT INSTRUCTIONS
Take antibiotic as prescribed and cough drops as needed for suspected bronchitis  Follow up with PCP  Return or be seen in ER with any progressing or worsening symptoms including SOB, fever, lightheaded or dizziness

## 2021-07-23 LAB
ATRIAL RATE: 0 BPM
ATRIAL RATE: 95 BPM
P AXIS: 32 DEGREES
PR INTERVAL: 136 MS
QRS AXIS: 0 DEGREES
QRS AXIS: 65 DEGREES
QRSD INTERVAL: 0 MS
QRSD INTERVAL: 68 MS
QT INTERVAL: 0 MS
QT INTERVAL: 338 MS
QTC INTERVAL: 0 MS
QTC INTERVAL: 424 MS
T WAVE AXIS: 0 DEGREES
T WAVE AXIS: 54 DEGREES
VENTRICULAR RATE: 0 BPM
VENTRICULAR RATE: 95 BPM

## 2021-07-23 PROCEDURE — 93010 ELECTROCARDIOGRAM REPORT: CPT | Performed by: INTERNAL MEDICINE

## 2021-07-26 ENCOUNTER — TELEPHONE (OUTPATIENT)
Dept: GYNECOLOGIC ONCOLOGY | Facility: CLINIC | Age: 71
End: 2021-07-26

## 2021-07-26 NOTE — TELEPHONE ENCOUNTER
Patient could not see PCP today for clearance    she needs a call back  She wanted to know if early next week will be too late? Patient can be reached at [phone]

## 2021-07-27 ENCOUNTER — TELEPHONE (OUTPATIENT)
Dept: GYNECOLOGIC ONCOLOGY | Facility: CLINIC | Age: 71
End: 2021-07-27

## 2021-07-30 ENCOUNTER — ANESTHESIA EVENT (OUTPATIENT)
Dept: PERIOP | Facility: HOSPITAL | Age: 71
DRG: 329 | End: 2021-07-30
Payer: COMMERCIAL

## 2021-07-30 RX ORDER — FLUTICASONE PROPIONATE 50 MCG
1 SPRAY, SUSPENSION (ML) NASAL DAILY
COMMUNITY

## 2021-07-30 RX ORDER — COVID-19 ANTIGEN TEST
220 KIT MISCELLANEOUS DAILY
COMMUNITY

## 2021-07-30 NOTE — PRE-PROCEDURE INSTRUCTIONS
Pre-Surgery Instructions:   Medication Instructions    albuterol (PROVENTIL HFA,VENTOLIN HFA) 90 mcg/act inhaler Instructed patient per Anesthesia Guidelines  Take as directed    amLODIPine-benazepril (LOTREL) 10-20 MG per capsule Instructed patient per Anesthesia Guidelines  Do not take morning of surgery    butalbital-aspirin-caffeine (FIORINAL) -40 mg per capsule Instructed patient per Anesthesia Guidelines  Take morning of surgery with sip water if needed    cefuroxime (CEFTIN) 250 mg/5 mL suspension Therapy completed by 8/1    fluticasone (FLONASE) 50 mcg/act nasal spray take as directed    Naproxen Sodium (Aleve) 220 MG CAPS Instructed patient per Anesthesia Guidelines  Stop 8/3    omeprazole (PriLOSEC) 20 mg delayed release capsule Instructed patient per Anesthesia Guidelines  Take morning of surgery with sip water     polyethylene glycol (MiraLax) 17 GM/SCOOP powder Instructed patient per Anesthesia Guidelines  Take day prior to surgery as directed   pravastatin (PRAVACHOL) 10 mg tablet Instructed patient per Anesthesia Guidelines  Takes nightly-take as directed   Covid screening negative as per patient  Fully vaccinated  Reviewed pre op medicine and showering instructions with patient via phone call, verbalizes understanding  Advised patient to stop taking vitamins,herbal meds, ASA pre op as of 7/30 but may take Tylenol products if needed  Advised to stop taking Aleve 8/3  Advised NPO after MN (8/5) and ASC will call (8/5) with surgical scheduled time  Pt verbalized understanding  Patient aware of 2 carb drinks to be taken 8/5 and will be given 1 carb drink in hospital as per surgeon's instructions  Pt aware of bowel cleansing  Pt not interested in referral to sleep medicine

## 2021-08-03 ENCOUNTER — TELEPHONE (OUTPATIENT)
Dept: GYNECOLOGIC ONCOLOGY | Facility: CLINIC | Age: 71
End: 2021-08-03

## 2021-08-03 NOTE — TELEPHONE ENCOUNTER
Patient's  dropped by office and gave disability and family leave act papers  Emailed a copy to [employer]

## 2021-08-06 ENCOUNTER — ANESTHESIA (OUTPATIENT)
Dept: PERIOP | Facility: HOSPITAL | Age: 71
DRG: 329 | End: 2021-08-06
Payer: COMMERCIAL

## 2021-08-06 ENCOUNTER — HOSPITAL ENCOUNTER (INPATIENT)
Facility: HOSPITAL | Age: 71
LOS: 35 days | Discharge: HOME HEALTH | DRG: 329 | End: 2021-09-10
Attending: OBSTETRICS & GYNECOLOGY | Admitting: OBSTETRICS & GYNECOLOGY
Payer: COMMERCIAL

## 2021-08-06 ENCOUNTER — APPOINTMENT (INPATIENT)
Dept: RADIOLOGY | Facility: HOSPITAL | Age: 71
DRG: 329 | End: 2021-08-06
Payer: COMMERCIAL

## 2021-08-06 DIAGNOSIS — K65.1 ABSCESS OF ABDOMINAL CAVITY (HCC): ICD-10-CM

## 2021-08-06 DIAGNOSIS — Z98.890 S/P BLADDER REPAIR: ICD-10-CM

## 2021-08-06 DIAGNOSIS — Z90.81 THROMBOCYTOSIS AFTER SPLENECTOMY: ICD-10-CM

## 2021-08-06 DIAGNOSIS — R09.02 HYPOXEMIA: ICD-10-CM

## 2021-08-06 DIAGNOSIS — K86.89 PANCREATIC FLUID LEAK: ICD-10-CM

## 2021-08-06 DIAGNOSIS — D72.829 LEUKOCYTOSIS, UNSPECIFIED TYPE: ICD-10-CM

## 2021-08-06 DIAGNOSIS — C56.9 MALIGNANT NEOPLASM OF OVARY, UNSPECIFIED LATERALITY (HCC): ICD-10-CM

## 2021-08-06 DIAGNOSIS — C80.1: ICD-10-CM

## 2021-08-06 DIAGNOSIS — Z84.2: ICD-10-CM

## 2021-08-06 DIAGNOSIS — Z90.49 STATUS POST SMALL BOWEL RESECTION: ICD-10-CM

## 2021-08-06 DIAGNOSIS — C78.6 PERITONEAL CARCINOMATOSIS (HCC): Primary | ICD-10-CM

## 2021-08-06 DIAGNOSIS — D75.838 THROMBOCYTOSIS AFTER SPLENECTOMY: ICD-10-CM

## 2021-08-06 DIAGNOSIS — E87.1 HYPONATREMIA: ICD-10-CM

## 2021-08-06 DIAGNOSIS — R58 BLEEDING: ICD-10-CM

## 2021-08-06 LAB
ABO GROUP BLD BPU: NORMAL
ABO GROUP BLD BPU: NORMAL
ABO GROUP BLD: NORMAL
ANION GAP SERPL CALCULATED.3IONS-SCNC: 8 MMOL/L (ref 4–13)
APTT PPP: 31 SECONDS (ref 23–37)
BASE EXCESS BLDA CALC-SCNC: -11 MMOL/L (ref -2–3)
BASE EXCESS BLDA CALC-SCNC: -3 MMOL/L (ref -2–3)
BASE EXCESS BLDA CALC-SCNC: -4 MMOL/L (ref -2–3)
BASE EXCESS BLDA CALC-SCNC: -5.7 MMOL/L
BASOPHILS # BLD AUTO: 0.01 THOUSANDS/ΜL (ref 0–0.1)
BASOPHILS NFR BLD AUTO: 0 % (ref 0–1)
BPU ID: NORMAL
BPU ID: NORMAL
BUN SERPL-MCNC: 7 MG/DL (ref 5–25)
CA-I BLD-SCNC: 0.96 MMOL/L (ref 1.12–1.32)
CA-I BLD-SCNC: 1 MMOL/L (ref 1.12–1.32)
CA-I BLD-SCNC: 1.2 MMOL/L (ref 1.12–1.32)
CALCIUM SERPL-MCNC: 7.6 MG/DL (ref 8.3–10.1)
CHLORIDE SERPL-SCNC: 111 MMOL/L (ref 100–108)
CO2 SERPL-SCNC: 19 MMOL/L (ref 21–32)
CREAT SERPL-MCNC: 0.39 MG/DL (ref 0.6–1.3)
CROSSMATCH: NORMAL
CROSSMATCH: NORMAL
EOSINOPHIL # BLD AUTO: 0 THOUSAND/ΜL (ref 0–0.61)
EOSINOPHIL NFR BLD AUTO: 0 % (ref 0–6)
ERYTHROCYTE [DISTWIDTH] IN BLOOD BY AUTOMATED COUNT: 14.1 % (ref 11.6–15.1)
GFR SERPL CREATININE-BSD FRML MDRD: 107 ML/MIN/1.73SQ M
GLUCOSE SERPL-MCNC: 102 MG/DL (ref 65–140)
GLUCOSE SERPL-MCNC: 156 MG/DL (ref 65–140)
GLUCOSE SERPL-MCNC: 160 MG/DL (ref 65–140)
GLUCOSE SERPL-MCNC: 201 MG/DL (ref 65–140)
GLUCOSE SERPL-MCNC: 228 MG/DL (ref 65–140)
GLUCOSE SERPL-MCNC: 243 MG/DL (ref 65–140)
HCO3 BLDA-SCNC: 14.2 MMOL/L (ref 22–28)
HCO3 BLDA-SCNC: 19.4 MMOL/L (ref 22–28)
HCO3 BLDA-SCNC: 20.2 MMOL/L (ref 22–28)
HCO3 BLDA-SCNC: 21.6 MMOL/L (ref 22–28)
HCT VFR BLD AUTO: 29.2 % (ref 34.8–46.1)
HCT VFR BLD CALC: 17 % (ref 34.8–46.1)
HCT VFR BLD CALC: 25 % (ref 34.8–46.1)
HCT VFR BLD CALC: 26 % (ref 34.8–46.1)
HGB BLD-MCNC: 9.7 G/DL (ref 11.5–15.4)
HGB BLDA-MCNC: 5.8 G/DL (ref 11.5–15.4)
HGB BLDA-MCNC: 8.5 G/DL (ref 11.5–15.4)
HGB BLDA-MCNC: 8.8 G/DL (ref 11.5–15.4)
HOROWITZ INDEX BLDA+IHG-RTO: 80 MM[HG]
IMM GRANULOCYTES # BLD AUTO: 0.13 THOUSAND/UL (ref 0–0.2)
IMM GRANULOCYTES NFR BLD AUTO: 1 % (ref 0–2)
INR PPP: 1.47 (ref 0.84–1.19)
LACTATE SERPL-SCNC: 1.5 MMOL/L (ref 0.5–2)
LYMPHOCYTES # BLD AUTO: 0.71 THOUSANDS/ΜL (ref 0.6–4.47)
LYMPHOCYTES NFR BLD AUTO: 4 % (ref 14–44)
MCH RBC QN AUTO: 28.4 PG (ref 26.8–34.3)
MCHC RBC AUTO-ENTMCNC: 33.2 G/DL (ref 31.4–37.4)
MCV RBC AUTO: 86 FL (ref 82–98)
MONOCYTES # BLD AUTO: 1.13 THOUSAND/ΜL (ref 0.17–1.22)
MONOCYTES NFR BLD AUTO: 7 % (ref 4–12)
NEUTROPHILS # BLD AUTO: 15.42 THOUSANDS/ΜL (ref 1.85–7.62)
NEUTS SEG NFR BLD AUTO: 88 % (ref 43–75)
NRBC BLD AUTO-RTO: 0 /100 WBCS
O2 CT BLDA-SCNC: 15 ML/DL (ref 16–23)
OXYHGB MFR BLDA: 97.6 % (ref 94–97)
PCO2 BLD: 15 MMOL/L (ref 21–32)
PCO2 BLD: 21 MMOL/L (ref 21–32)
PCO2 BLD: 23 MMOL/L (ref 21–32)
PCO2 BLD: 28.6 MM HG (ref 36–44)
PCO2 BLD: 33.7 MM HG (ref 36–44)
PCO2 BLD: 36.3 MM HG (ref 36–44)
PCO2 BLDA: 36.4 MM HG (ref 36–44)
PEEP RESPIRATORY: 6 CM[H2O]
PH BLD: 7.3 [PH] (ref 7.35–7.45)
PH BLD: 7.38 [PH] (ref 7.35–7.45)
PH BLD: 7.38 [PH] (ref 7.35–7.45)
PH BLDA: 7.34 [PH] (ref 7.35–7.45)
PLATELET # BLD AUTO: 182 THOUSANDS/UL (ref 149–390)
PMV BLD AUTO: 9.9 FL (ref 8.9–12.7)
PO2 BLD: 113 MM HG (ref 75–129)
PO2 BLD: 84 MM HG (ref 75–129)
PO2 BLD: 92 MM HG (ref 75–129)
PO2 BLDA: 249.5 MM HG (ref 75–129)
POTASSIUM BLD-SCNC: 2.7 MMOL/L (ref 3.5–5.3)
POTASSIUM BLD-SCNC: 2.9 MMOL/L (ref 3.5–5.3)
POTASSIUM BLD-SCNC: 3.8 MMOL/L (ref 3.5–5.3)
POTASSIUM SERPL-SCNC: 3.9 MMOL/L (ref 3.5–5.3)
PROTHROMBIN TIME: 17.8 SECONDS (ref 11.6–14.5)
RBC # BLD AUTO: 3.41 MILLION/UL (ref 3.81–5.12)
RH BLD: POSITIVE
SAO2 % BLD FROM PO2: 96 % (ref 60–85)
SAO2 % BLD FROM PO2: 97 % (ref 60–85)
SAO2 % BLD FROM PO2: 98 % (ref 60–85)
SODIUM BLD-SCNC: 138 MMOL/L (ref 136–145)
SODIUM BLD-SCNC: 141 MMOL/L (ref 136–145)
SODIUM BLD-SCNC: 143 MMOL/L (ref 136–145)
SODIUM SERPL-SCNC: 138 MMOL/L (ref 136–145)
SPECIMEN SOURCE: ABNORMAL
UNIT DISPENSE STATUS: NORMAL
UNIT DISPENSE STATUS: NORMAL
UNIT PRODUCT CODE: NORMAL
UNIT PRODUCT CODE: NORMAL
UNIT PRODUCT VOLUME: 350 ML
UNIT PRODUCT VOLUME: 350 ML
UNIT RH: NORMAL
UNIT RH: NORMAL
VENT AC: 14
VENT- AC: AC
VT SETTING VENT: 420 ML
WBC # BLD AUTO: 17.4 THOUSAND/UL (ref 4.31–10.16)

## 2021-08-06 PROCEDURE — 80048 BASIC METABOLIC PNL TOTAL CA: CPT | Performed by: SURGERY

## 2021-08-06 PROCEDURE — 88307 TISSUE EXAM BY PATHOLOGIST: CPT | Performed by: PATHOLOGY

## 2021-08-06 PROCEDURE — 82947 ASSAY GLUCOSE BLOOD QUANT: CPT

## 2021-08-06 PROCEDURE — P9017 PLASMA 1 DONOR FRZ W/IN 8 HR: HCPCS

## 2021-08-06 PROCEDURE — 88305 TISSUE EXAM BY PATHOLOGIST: CPT | Performed by: PATHOLOGY

## 2021-08-06 PROCEDURE — 84295 ASSAY OF SERUM SODIUM: CPT

## 2021-08-06 PROCEDURE — 82330 ASSAY OF CALCIUM: CPT

## 2021-08-06 PROCEDURE — 82948 REAGENT STRIP/BLOOD GLUCOSE: CPT

## 2021-08-06 PROCEDURE — 88342 IMHCHEM/IMCYTCHM 1ST ANTB: CPT | Performed by: PATHOLOGY

## 2021-08-06 PROCEDURE — 88302 TISSUE EXAM BY PATHOLOGIST: CPT | Performed by: PATHOLOGY

## 2021-08-06 PROCEDURE — 85730 THROMBOPLASTIN TIME PARTIAL: CPT | Performed by: SURGERY

## 2021-08-06 PROCEDURE — 38102 REMOVAL OF SPLEEN TOTAL: CPT | Performed by: OBSTETRICS & GYNECOLOGY

## 2021-08-06 PROCEDURE — 86920 COMPATIBILITY TEST SPIN: CPT

## 2021-08-06 PROCEDURE — 0WJG4ZZ INSPECTION OF PERITONEAL CAVITY, PERCUTANEOUS ENDOSCOPIC APPROACH: ICD-10-PCS | Performed by: OBSTETRICS & GYNECOLOGY

## 2021-08-06 PROCEDURE — 88361 TUMOR IMMUNOHISTOCHEM/COMPUT: CPT | Performed by: PATHOLOGY

## 2021-08-06 PROCEDURE — P9016 RBC LEUKOCYTES REDUCED: HCPCS

## 2021-08-06 PROCEDURE — 88112 CYTOPATH CELL ENHANCE TECH: CPT | Performed by: PATHOLOGY

## 2021-08-06 PROCEDURE — 82805 BLOOD GASES W/O2 SATURATION: CPT | Performed by: SURGERY

## 2021-08-06 PROCEDURE — 84132 ASSAY OF SERUM POTASSIUM: CPT

## 2021-08-06 PROCEDURE — 0UT70ZZ RESECTION OF BILATERAL FALLOPIAN TUBES, OPEN APPROACH: ICD-10-PCS | Performed by: OBSTETRICS & GYNECOLOGY

## 2021-08-06 PROCEDURE — 85025 COMPLETE CBC W/AUTO DIFF WBC: CPT | Performed by: SURGERY

## 2021-08-06 PROCEDURE — 0UT20ZZ RESECTION OF BILATERAL OVARIES, OPEN APPROACH: ICD-10-PCS | Performed by: OBSTETRICS & GYNECOLOGY

## 2021-08-06 PROCEDURE — 07TP0ZZ RESECTION OF SPLEEN, OPEN APPROACH: ICD-10-PCS | Performed by: OBSTETRICS & GYNECOLOGY

## 2021-08-06 PROCEDURE — 0BBT0ZZ EXCISION OF DIAPHRAGM, OPEN APPROACH: ICD-10-PCS | Performed by: OBSTETRICS & GYNECOLOGY

## 2021-08-06 PROCEDURE — 49900 REPAIR OF ABDOMINAL WALL: CPT | Performed by: SURGERY

## 2021-08-06 PROCEDURE — 44140 PARTIAL REMOVAL OF COLON: CPT | Performed by: OBSTETRICS & GYNECOLOGY

## 2021-08-06 PROCEDURE — 0UT90ZZ RESECTION OF UTERUS, OPEN APPROACH: ICD-10-PCS | Performed by: OBSTETRICS & GYNECOLOGY

## 2021-08-06 PROCEDURE — 88331 PATH CONSLTJ SURG 1 BLK 1SPC: CPT | Performed by: PATHOLOGY

## 2021-08-06 PROCEDURE — 71045 X-RAY EXAM CHEST 1 VIEW: CPT

## 2021-08-06 PROCEDURE — 0W3P0ZZ CONTROL BLEEDING IN GASTROINTESTINAL TRACT, OPEN APPROACH: ICD-10-PCS | Performed by: SURGERY

## 2021-08-06 PROCEDURE — 0DTJ0ZZ RESECTION OF APPENDIX, OPEN APPROACH: ICD-10-PCS | Performed by: OBSTETRICS & GYNECOLOGY

## 2021-08-06 PROCEDURE — 85610 PROTHROMBIN TIME: CPT | Performed by: SURGERY

## 2021-08-06 PROCEDURE — 0DTU0ZZ RESECTION OF OMENTUM, OPEN APPROACH: ICD-10-PCS | Performed by: OBSTETRICS & GYNECOLOGY

## 2021-08-06 PROCEDURE — 94760 N-INVAS EAR/PLS OXIMETRY 1: CPT

## 2021-08-06 PROCEDURE — 30233K1 TRANSFUSION OF NONAUTOLOGOUS FROZEN PLASMA INTO PERIPHERAL VEIN, PERCUTANEOUS APPROACH: ICD-10-PCS | Performed by: ANESTHESIOLOGY

## 2021-08-06 PROCEDURE — 0DBN0ZZ EXCISION OF SIGMOID COLON, OPEN APPROACH: ICD-10-PCS | Performed by: OBSTETRICS & GYNECOLOGY

## 2021-08-06 PROCEDURE — 99291 CRITICAL CARE FIRST HOUR: CPT | Performed by: SURGERY

## 2021-08-06 PROCEDURE — 83605 ASSAY OF LACTIC ACID: CPT | Performed by: SURGERY

## 2021-08-06 PROCEDURE — 0DB80ZZ EXCISION OF SMALL INTESTINE, OPEN APPROACH: ICD-10-PCS | Performed by: OBSTETRICS & GYNECOLOGY

## 2021-08-06 PROCEDURE — 82803 BLOOD GASES ANY COMBINATION: CPT

## 2021-08-06 PROCEDURE — 88309 TISSUE EXAM BY PATHOLOGIST: CPT | Performed by: PATHOLOGY

## 2021-08-06 PROCEDURE — 99024 POSTOP FOLLOW-UP VISIT: CPT | Performed by: PHYSICIAN ASSISTANT

## 2021-08-06 PROCEDURE — 44955 APPENDECTOMY ADD-ON: CPT | Performed by: OBSTETRICS & GYNECOLOGY

## 2021-08-06 PROCEDURE — 94760 N-INVAS EAR/PLS OXIMETRY 1: CPT | Performed by: SOCIAL WORKER

## 2021-08-06 PROCEDURE — 85014 HEMATOCRIT: CPT

## 2021-08-06 PROCEDURE — 94002 VENT MGMT INPAT INIT DAY: CPT | Performed by: SOCIAL WORKER

## 2021-08-06 PROCEDURE — 58953 TAH RAD DISSECT FOR DEBULK: CPT | Performed by: OBSTETRICS & GYNECOLOGY

## 2021-08-06 PROCEDURE — 88341 IMHCHEM/IMCYTCHM EA ADD ANTB: CPT | Performed by: PATHOLOGY

## 2021-08-06 PROCEDURE — 44120 REMOVAL OF SMALL INTESTINE: CPT | Performed by: OBSTETRICS & GYNECOLOGY

## 2021-08-06 PROCEDURE — 44139 MOBILIZATION OF COLON: CPT | Performed by: OBSTETRICS & GYNECOLOGY

## 2021-08-06 PROCEDURE — 0TQB0ZZ REPAIR BLADDER, OPEN APPROACH: ICD-10-PCS | Performed by: OBSTETRICS & GYNECOLOGY

## 2021-08-06 RX ORDER — ALBUTEROL SULFATE 2.5 MG/3ML
2.5 SOLUTION RESPIRATORY (INHALATION) EVERY 6 HOURS PRN
Status: DISCONTINUED | OUTPATIENT
Start: 2021-08-06 | End: 2021-08-11

## 2021-08-06 RX ORDER — DEXAMETHASONE SODIUM PHOSPHATE 10 MG/ML
INJECTION, SOLUTION INTRAMUSCULAR; INTRAVENOUS AS NEEDED
Status: DISCONTINUED | OUTPATIENT
Start: 2021-08-06 | End: 2021-08-06

## 2021-08-06 RX ORDER — HYDROMORPHONE HCL 110MG/55ML
PATIENT CONTROLLED ANALGESIA SYRINGE INTRAVENOUS AS NEEDED
Status: DISCONTINUED | OUTPATIENT
Start: 2021-08-06 | End: 2021-08-06

## 2021-08-06 RX ORDER — MAGNESIUM HYDROXIDE 1200 MG/15ML
LIQUID ORAL AS NEEDED
Status: DISCONTINUED | OUTPATIENT
Start: 2021-08-06 | End: 2021-08-06 | Stop reason: HOSPADM

## 2021-08-06 RX ORDER — FENTANYL CITRATE-0.9 % NACL/PF 10 MCG/ML
50 PLASTIC BAG, INJECTION (ML) INTRAVENOUS CONTINUOUS
Status: DISCONTINUED | OUTPATIENT
Start: 2021-08-06 | End: 2021-08-07

## 2021-08-06 RX ORDER — FENTANYL CITRATE 50 UG/ML
INJECTION, SOLUTION INTRAMUSCULAR; INTRAVENOUS AS NEEDED
Status: DISCONTINUED | OUTPATIENT
Start: 2021-08-06 | End: 2021-08-06

## 2021-08-06 RX ORDER — SODIUM CHLORIDE, SODIUM LACTATE, POTASSIUM CHLORIDE, CALCIUM CHLORIDE 600; 310; 30; 20 MG/100ML; MG/100ML; MG/100ML; MG/100ML
INJECTION, SOLUTION INTRAVENOUS CONTINUOUS PRN
Status: DISCONTINUED | OUTPATIENT
Start: 2021-08-06 | End: 2021-08-06

## 2021-08-06 RX ORDER — CHLORHEXIDINE GLUCONATE 0.12 MG/ML
15 RINSE ORAL EVERY 12 HOURS SCHEDULED
Status: DISCONTINUED | OUTPATIENT
Start: 2021-08-06 | End: 2021-08-07

## 2021-08-06 RX ORDER — HYDROMORPHONE HCL IN WATER/PF 6 MG/30 ML
0.2 PATIENT CONTROLLED ANALGESIA SYRINGE INTRAVENOUS
Status: DISCONTINUED | OUTPATIENT
Start: 2021-08-06 | End: 2021-08-06

## 2021-08-06 RX ORDER — CALCIUM CHLORIDE 100 MG/ML
INJECTION INTRAVENOUS; INTRAVENTRICULAR AS NEEDED
Status: DISCONTINUED | OUTPATIENT
Start: 2021-08-06 | End: 2021-08-06

## 2021-08-06 RX ORDER — FENTANYL CITRATE/PF 50 MCG/ML
25 SYRINGE (ML) INJECTION
Status: DISCONTINUED | OUTPATIENT
Start: 2021-08-06 | End: 2021-08-06

## 2021-08-06 RX ORDER — CEFAZOLIN SODIUM 2 G/50ML
2000 SOLUTION INTRAVENOUS ONCE
Status: COMPLETED | OUTPATIENT
Start: 2021-08-06 | End: 2021-08-06

## 2021-08-06 RX ORDER — PROPOFOL 10 MG/ML
INJECTION, EMULSION INTRAVENOUS AS NEEDED
Status: DISCONTINUED | OUTPATIENT
Start: 2021-08-06 | End: 2021-08-06

## 2021-08-06 RX ORDER — LIDOCAINE HYDROCHLORIDE AND EPINEPHRINE 15; 5 MG/ML; UG/ML
INJECTION, SOLUTION EPIDURAL
Status: DISCONTINUED | OUTPATIENT
Start: 2021-08-06 | End: 2021-08-07

## 2021-08-06 RX ORDER — ALBUMIN, HUMAN INJ 5% 5 %
SOLUTION INTRAVENOUS CONTINUOUS PRN
Status: DISCONTINUED | OUTPATIENT
Start: 2021-08-06 | End: 2021-08-06

## 2021-08-06 RX ORDER — MIDAZOLAM HYDROCHLORIDE 2 MG/2ML
INJECTION, SOLUTION INTRAMUSCULAR; INTRAVENOUS AS NEEDED
Status: DISCONTINUED | OUTPATIENT
Start: 2021-08-06 | End: 2021-08-06

## 2021-08-06 RX ORDER — SODIUM CHLORIDE, SODIUM LACTATE, POTASSIUM CHLORIDE, CALCIUM CHLORIDE 600; 310; 30; 20 MG/100ML; MG/100ML; MG/100ML; MG/100ML
50 INJECTION, SOLUTION INTRAVENOUS CONTINUOUS
Status: DISCONTINUED | OUTPATIENT
Start: 2021-08-06 | End: 2021-08-06

## 2021-08-06 RX ORDER — ACETAMINOPHEN 325 MG/1
975 TABLET ORAL ONCE
Status: COMPLETED | OUTPATIENT
Start: 2021-08-06 | End: 2021-08-06

## 2021-08-06 RX ORDER — ONDANSETRON 2 MG/ML
4 INJECTION INTRAMUSCULAR; INTRAVENOUS EVERY 6 HOURS PRN
Status: DISCONTINUED | OUTPATIENT
Start: 2021-08-06 | End: 2021-09-10 | Stop reason: HOSPADM

## 2021-08-06 RX ORDER — PROPOFOL 10 MG/ML
5-50 INJECTION, EMULSION INTRAVENOUS
Status: DISCONTINUED | OUTPATIENT
Start: 2021-08-06 | End: 2021-08-07

## 2021-08-06 RX ORDER — LIDOCAINE HYDROCHLORIDE 10 MG/ML
0.5 INJECTION, SOLUTION EPIDURAL; INFILTRATION; INTRACAUDAL; PERINEURAL ONCE AS NEEDED
Status: COMPLETED | OUTPATIENT
Start: 2021-08-06 | End: 2021-08-06

## 2021-08-06 RX ORDER — PRAVASTATIN SODIUM 10 MG
10 TABLET ORAL
Status: DISCONTINUED | OUTPATIENT
Start: 2021-08-07 | End: 2021-09-10 | Stop reason: HOSPADM

## 2021-08-06 RX ORDER — SODIUM CHLORIDE, SODIUM GLUCONATE, SODIUM ACETATE, POTASSIUM CHLORIDE, MAGNESIUM CHLORIDE, SODIUM PHOSPHATE, DIBASIC, AND POTASSIUM PHOSPHATE .53; .5; .37; .037; .03; .012; .00082 G/100ML; G/100ML; G/100ML; G/100ML; G/100ML; G/100ML; G/100ML
500 INJECTION, SOLUTION INTRAVENOUS ONCE
Status: COMPLETED | OUTPATIENT
Start: 2021-08-06 | End: 2021-08-07

## 2021-08-06 RX ORDER — HEPARIN SODIUM 5000 [USP'U]/ML
5000 INJECTION, SOLUTION INTRAVENOUS; SUBCUTANEOUS EVERY 8 HOURS SCHEDULED
Status: DISCONTINUED | OUTPATIENT
Start: 2021-08-06 | End: 2021-08-10

## 2021-08-06 RX ORDER — HYDROMORPHONE HCL/PF 1 MG/ML
0.5 SYRINGE (ML) INJECTION EVERY 2 HOUR PRN
Status: DISCONTINUED | OUTPATIENT
Start: 2021-08-06 | End: 2021-09-10 | Stop reason: HOSPADM

## 2021-08-06 RX ORDER — PANTOPRAZOLE SODIUM 40 MG/1
40 INJECTION, POWDER, FOR SOLUTION INTRAVENOUS
Status: DISCONTINUED | OUTPATIENT
Start: 2021-08-07 | End: 2021-08-11 | Stop reason: SDUPTHER

## 2021-08-06 RX ORDER — GABAPENTIN 100 MG/1
100 CAPSULE ORAL ONCE
Status: COMPLETED | OUTPATIENT
Start: 2021-08-06 | End: 2021-08-06

## 2021-08-06 RX ORDER — SODIUM CHLORIDE, SODIUM GLUCONATE, SODIUM ACETATE, POTASSIUM CHLORIDE, MAGNESIUM CHLORIDE, SODIUM PHOSPHATE, DIBASIC, AND POTASSIUM PHOSPHATE .53; .5; .37; .037; .03; .012; .00082 G/100ML; G/100ML; G/100ML; G/100ML; G/100ML; G/100ML; G/100ML
75 INJECTION, SOLUTION INTRAVENOUS CONTINUOUS
Status: DISCONTINUED | OUTPATIENT
Start: 2021-08-06 | End: 2021-08-11

## 2021-08-06 RX ORDER — SODIUM CHLORIDE 9 MG/ML
INJECTION, SOLUTION INTRAVENOUS CONTINUOUS PRN
Status: DISCONTINUED | OUTPATIENT
Start: 2021-08-06 | End: 2021-08-06

## 2021-08-06 RX ORDER — ROCURONIUM BROMIDE 10 MG/ML
INJECTION, SOLUTION INTRAVENOUS AS NEEDED
Status: DISCONTINUED | OUTPATIENT
Start: 2021-08-06 | End: 2021-08-06

## 2021-08-06 RX ORDER — HEPARIN SODIUM 5000 [USP'U]/ML
5000 INJECTION, SOLUTION INTRAVENOUS; SUBCUTANEOUS
Status: COMPLETED | OUTPATIENT
Start: 2021-08-06 | End: 2021-08-06

## 2021-08-06 RX ADMIN — SODIUM CHLORIDE, SODIUM GLUCONATE, SODIUM ACETATE, POTASSIUM CHLORIDE, MAGNESIUM CHLORIDE, SODIUM PHOSPHATE, DIBASIC, AND POTASSIUM PHOSPHATE 500 ML: .53; .5; .37; .037; .03; .012; .00082 INJECTION, SOLUTION INTRAVENOUS at 22:20

## 2021-08-06 RX ADMIN — SODIUM CHLORIDE, SODIUM GLUCONATE, SODIUM ACETATE, POTASSIUM CHLORIDE, MAGNESIUM CHLORIDE, SODIUM PHOSPHATE, DIBASIC, AND POTASSIUM PHOSPHATE 125 ML/HR: .53; .5; .37; .037; .03; .012; .00082 INJECTION, SOLUTION INTRAVENOUS at 22:55

## 2021-08-06 RX ADMIN — CEFAZOLIN SODIUM 2000 MG: 2 SOLUTION INTRAVENOUS at 15:20

## 2021-08-06 RX ADMIN — CHLORHEXIDINE GLUCONATE 15 ML: 1.2 SOLUTION ORAL at 21:31

## 2021-08-06 RX ADMIN — MIDAZOLAM 2 MG: 1 INJECTION INTRAMUSCULAR; INTRAVENOUS at 16:16

## 2021-08-06 RX ADMIN — HEPARIN SODIUM 5000 UNITS: 5000 INJECTION INTRAVENOUS; SUBCUTANEOUS at 12:15

## 2021-08-06 RX ADMIN — SODIUM CHLORIDE, SODIUM LACTATE, POTASSIUM CHLORIDE, AND CALCIUM CHLORIDE: .6; .31; .03; .02 INJECTION, SOLUTION INTRAVENOUS at 11:16

## 2021-08-06 RX ADMIN — FENTANYL CITRATE 100 MCG: 50 INJECTION INTRAMUSCULAR; INTRAVENOUS at 11:22

## 2021-08-06 RX ADMIN — ROCURONIUM BROMIDE 20 MG: 50 INJECTION, SOLUTION INTRAVENOUS at 12:40

## 2021-08-06 RX ADMIN — ALBUMIN (HUMAN): 12.5 INJECTION, SOLUTION INTRAVENOUS at 12:15

## 2021-08-06 RX ADMIN — MIDAZOLAM 2 MG: 1 INJECTION INTRAMUSCULAR; INTRAVENOUS at 11:05

## 2021-08-06 RX ADMIN — PHENYLEPHRINE HYDROCHLORIDE 20 MCG/MIN: 10 INJECTION INTRAVENOUS at 12:43

## 2021-08-06 RX ADMIN — LIDOCAINE HYDROCHLORIDE 0.5 ML: 10 INJECTION, SOLUTION EPIDURAL; INFILTRATION; INTRACAUDAL; PERINEURAL at 10:42

## 2021-08-06 RX ADMIN — INSULIN LISPRO 1 UNITS: 100 INJECTION, SOLUTION INTRAVENOUS; SUBCUTANEOUS at 23:30

## 2021-08-06 RX ADMIN — ROCURONIUM BROMIDE 20 MG: 50 INJECTION, SOLUTION INTRAVENOUS at 16:51

## 2021-08-06 RX ADMIN — CALCIUM CHLORIDE 0.5 G: 100 INJECTION INTRAVENOUS; INTRAVENTRICULAR at 12:34

## 2021-08-06 RX ADMIN — SODIUM CHLORIDE, SODIUM LACTATE, POTASSIUM CHLORIDE, AND CALCIUM CHLORIDE: .6; .31; .03; .02 INJECTION, SOLUTION INTRAVENOUS at 17:35

## 2021-08-06 RX ADMIN — SODIUM CHLORIDE: 0.9 INJECTION, SOLUTION INTRAVENOUS at 12:31

## 2021-08-06 RX ADMIN — SODIUM CHLORIDE: 0.9 INJECTION, SOLUTION INTRAVENOUS at 14:47

## 2021-08-06 RX ADMIN — Medication 500 MG: at 15:51

## 2021-08-06 RX ADMIN — ALBUMIN (HUMAN): 12.5 INJECTION, SOLUTION INTRAVENOUS at 16:44

## 2021-08-06 RX ADMIN — ROCURONIUM BROMIDE 30 MG: 50 INJECTION, SOLUTION INTRAVENOUS at 15:44

## 2021-08-06 RX ADMIN — SODIUM CHLORIDE: 0.9 INJECTION, SOLUTION INTRAVENOUS at 13:38

## 2021-08-06 RX ADMIN — ROCURONIUM BROMIDE 10 MG: 50 INJECTION, SOLUTION INTRAVENOUS at 14:45

## 2021-08-06 RX ADMIN — SODIUM CHLORIDE, SODIUM LACTATE, POTASSIUM CHLORIDE, AND CALCIUM CHLORIDE: .6; .31; .03; .02 INJECTION, SOLUTION INTRAVENOUS at 14:35

## 2021-08-06 RX ADMIN — PROPOFOL 30 MCG/KG/MIN: 10 INJECTION, EMULSION INTRAVENOUS at 23:23

## 2021-08-06 RX ADMIN — HEPARIN SODIUM 5000 UNITS: 5000 INJECTION INTRAVENOUS; SUBCUTANEOUS at 21:31

## 2021-08-06 RX ADMIN — SODIUM CHLORIDE, SODIUM GLUCONATE, SODIUM ACETATE, POTASSIUM CHLORIDE, MAGNESIUM CHLORIDE, SODIUM PHOSPHATE, DIBASIC, AND POTASSIUM PHOSPHATE 125 ML/HR: .53; .5; .37; .037; .03; .012; .00082 INJECTION, SOLUTION INTRAVENOUS at 19:35

## 2021-08-06 RX ADMIN — NOREPINEPHRINE BITARTRATE 4 MCG/KG/MIN: 1 INJECTION, SOLUTION, CONCENTRATE INTRAVENOUS at 13:56

## 2021-08-06 RX ADMIN — HYDROMORPHONE HYDROCHLORIDE 0.5 MG: 1 INJECTION, SOLUTION INTRAMUSCULAR; INTRAVENOUS; SUBCUTANEOUS at 23:19

## 2021-08-06 RX ADMIN — ACETAMINOPHEN 975 MG: 325 TABLET, FILM COATED ORAL at 09:50

## 2021-08-06 RX ADMIN — HYDROMORPHONE HYDROCHLORIDE 0.5 MG: 1 INJECTION, SOLUTION INTRAMUSCULAR; INTRAVENOUS; SUBCUTANEOUS at 20:11

## 2021-08-06 RX ADMIN — SODIUM CHLORIDE, SODIUM LACTATE, POTASSIUM CHLORIDE, AND CALCIUM CHLORIDE: .6; .31; .03; .02 INJECTION, SOLUTION INTRAVENOUS at 12:25

## 2021-08-06 RX ADMIN — PROPOFOL 140 MG: 10 INJECTION, EMULSION INTRAVENOUS at 11:22

## 2021-08-06 RX ADMIN — CEFAZOLIN SODIUM 2000 MG: 2 SOLUTION INTRAVENOUS at 11:20

## 2021-08-06 RX ADMIN — GABAPENTIN 100 MG: 100 CAPSULE ORAL at 09:50

## 2021-08-06 RX ADMIN — HYDROMORPHONE HYDROCHLORIDE 0.5 MG: 2 INJECTION, SOLUTION INTRAMUSCULAR; INTRAVENOUS; SUBCUTANEOUS at 11:35

## 2021-08-06 RX ADMIN — Medication 50 MCG/HR: at 19:52

## 2021-08-06 RX ADMIN — SODIUM CHLORIDE: 0.9 INJECTION, SOLUTION INTRAVENOUS at 12:30

## 2021-08-06 RX ADMIN — DEXAMETHASONE SODIUM PHOSPHATE 10 MG: 10 INJECTION, SOLUTION INTRAMUSCULAR; INTRAVENOUS at 11:37

## 2021-08-06 RX ADMIN — ALBUMIN (HUMAN): 12.5 INJECTION, SOLUTION INTRAVENOUS at 12:18

## 2021-08-06 RX ADMIN — NOREPINEPHRINE BITARTRATE 10 MCG/MIN: 1 INJECTION, SOLUTION, CONCENTRATE INTRAVENOUS at 20:42

## 2021-08-06 RX ADMIN — HYDROMORPHONE HYDROCHLORIDE 0.5 MG: 2 INJECTION, SOLUTION INTRAMUSCULAR; INTRAVENOUS; SUBCUTANEOUS at 15:10

## 2021-08-06 RX ADMIN — SODIUM CHLORIDE: 0.9 INJECTION, SOLUTION INTRAVENOUS at 15:12

## 2021-08-06 RX ADMIN — SODIUM CHLORIDE, SODIUM LACTATE, POTASSIUM CHLORIDE, AND CALCIUM CHLORIDE: .6; .31; .03; .02 INJECTION, SOLUTION INTRAVENOUS at 15:39

## 2021-08-06 RX ADMIN — CALCIUM CHLORIDE 0.5 G: 100 INJECTION INTRAVENOUS; INTRAVENTRICULAR at 12:43

## 2021-08-06 RX ADMIN — ROCURONIUM BROMIDE 50 MG: 50 INJECTION, SOLUTION INTRAVENOUS at 11:23

## 2021-08-06 RX ADMIN — CALCIUM CHLORIDE 0.5 G: 100 INJECTION INTRAVENOUS; INTRAVENTRICULAR at 14:46

## 2021-08-06 RX ADMIN — CALCIUM CHLORIDE 0.5 G: 100 INJECTION INTRAVENOUS; INTRAVENTRICULAR at 15:00

## 2021-08-06 RX ADMIN — ALBUMIN (HUMAN): 12.5 INJECTION, SOLUTION INTRAVENOUS at 12:12

## 2021-08-06 RX ADMIN — SODIUM CHLORIDE: 0.9 INJECTION, SOLUTION INTRAVENOUS at 11:29

## 2021-08-06 RX ADMIN — ROCURONIUM BROMIDE 20 MG: 50 INJECTION, SOLUTION INTRAVENOUS at 13:46

## 2021-08-06 NOTE — OP NOTE
OPERATIVE REPORT  PATIENT NAME: Alfonso Kraus    :  1950  MRN: [id number]  Pt Location: [facility] OR ROOM 06    SURGERY DATE: 2021    Surgeon(s) and Role:     * Joan German MD - Primary     * Juan Jose Mojica DO - Assisting     * King Estrella MD - Assisting    Preop Diagnosis:  Peritoneal carcinomatosis ([address] ) [C78 6]    Post-Op Diagnosis Codes:     * Peritoneal carcinomatosis ([address] ) [C78 6]    Procedure(s) (LRB):  LAPAROSCOPY DIAGNOSTIC (N/A)  LAPAROTOMY EXPLORATORY; OVER SEW PANCREAS TAIL (N/A)  HYSTERECTOMY LAPAROSCOPIC TOTAL ([address]), BILATERAL SALPINGO-OOPHORECTOMY, TUMOR DEBULKING (N/A)    Specimen(s):  ID Type Source Tests Collected by Time Destination   1 : pelvic fluid Washing Pelvic Washing NON-GYNECOLOGIC CYTOLOGY Joan German MD 2021 1156    2 : and spleen Tissue Omentum TISSUE EXAM Joan German MD 2021 1231    3 : nodule Tissue Large Intestine, Transverse Colon TISSUE EXAM Joan German MD 2021 2804    4 : PARA-COLIC GUTTER Tissue Abdominal TISSUE EXAM Joan German MD 2021 1247    5 :  Tissue Large Intestine, Hepatic Flexure TISSUE EXAM Joan German MD 2021 1247    6 : LESSER CURVATURE Tissue Stomach TISSUE EXAM Joan German MD 2021 1251    7 : FALCIFORM LIGAMENT Tissue Abdominal TISSUE EXAM Joan German MD 2021 1257    8 : LEFT Tissue Diaphragm TISSUE EXAM Joan German MD 2021 1303    9 : RIGHT Tissue Diaphragm TISSUE EXAM Joan German MD 2021 1313        Estimated Blood Loss:   10ml    Drains:  Urethral Catheter Non-latex 16 Fr  (Active)   Number of days: 0       Anesthesia Type:   General    Operative Indications:  Control hemorrhage pancreatic tail      Operative Findings:  Bleeding along stable and of pancreatic tail  Controlled with 3-0 silk running suture  Complications:   None    Procedure and Technique:  Called intraoperatively for consultation by primary surgeon    Found to have 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 for tumor debulking surgery  Concern for continued bleeding from pancreatic tail  No visible pancreatic duct identified  Bleeding from long entire length the staple line  3-0 silk suture was placed and run along the entire length of the staple line to reinforcing control hemorrhage  This area was then packed the retroperitoneum and larger curvature of the stomach were inspected  No bleeding was noted from any short gastric arteries  The the pack was removed the staple line was oversewn was hemostatic  A pack was placed here well further dissection was carried out by the primary surgeon  Please see primary surgeon note for remainder of operative procedure  I performed my portion of procedure    Patient Disposition:  Remained in OR for further debulking surgery      SIGNATURE: Ernestina Bentley DO  DATE: August 6, 2021  TIME: 1:24 PM

## 2021-08-06 NOTE — ANESTHESIA POSTPROCEDURE EVALUATION
Post-Op Assessment Note    CV Status:  Stable  Pain scale: sedated  Post-procedure mental status: sedated  Hydration Status:  Stable   PONV Controlled:  None   Airway Patency:  Patent  Airway: intubated      Post Op Vitals Reviewed: Yes      Staff: CRNA     Post-op block assessment: catheter intact      No complications documented      BP   93/66   Temp      Pulse  94   Resp   14   SpO2 100 % (08/06/21 1840)

## 2021-08-06 NOTE — ANESTHESIA PROCEDURE NOTES
Epidural Block    Patient location during procedure: pre-op  Start time: 8/6/2021 11:10 AM  Reason for block: at surgeon's request and post-op pain management  Staffing  Performed: anesthesiologist   Anesthesiologist: Joni Campbell MD  Preanesthetic Checklist  Completed: patient identified, IV checked, risks and benefits discussed, surgical consent, monitors and equipment checked, pre-op evaluation and timeout performed  Epidural  Patient position: sitting  Prep: Betadine, site prepped and draped and swab x3  Patient monitoring: heart rate, continuous pulse ox and frequent blood pressure checks  Approach: midline  Location: thoracic  Injection technique: BRISEYDA air  Needle  Needle type: Tuohy   Needle gauge: 18 G  Catheter type: side hole  Catheter size: 20 G  Catheter at skin depth: 8 cm  Catheter securement method: clear occlusive dressing  Test dose: negative  Assessment  Number of attempts: 1negative aspiration for CSF, negative aspiration for heme and no paresthesia on injection  patient tolerated the procedure well with no immediate complications

## 2021-08-06 NOTE — INTERVAL H&P NOTE
H&P reviewed  After examining the patient I find no changes in the patients condition since the H&P had been written      Vitals:    08/06/21 0951   BP: 146/72   Pulse: 102   Resp: 16   Temp: 98 6 °F (37 °C)   SpO2: 94%

## 2021-08-06 NOTE — ANESTHESIA PROCEDURE NOTES
Arterial Line Insertion  Performed by: Jean-Paul Self MD  Authorized by: Jean-Paul Self MD   Consent: Verbal consent obtained  Written consent obtained  Risks and benefits: risks, benefits and alternatives were discussed  Consent given by: patient  Patient understanding: patient states understanding of the procedure being performed  Patient consent: the patient's understanding of the procedure matches consent given  Required items: required blood products, implants, devices, and special equipment available  Patient identity confirmed: arm band  Preparation: Patient was prepped and draped in the usual sterile fashion  Indications: multiple ABGs and hemodynamic monitoring  Orientation:  Right  Location: radial arteryMedication group details: ga    Procedure Details:  Needle gauge: 20  Seldinger technique: Seldinger technique used  Number of attempts: 1    Post-procedure:  Post-procedure: dressing applied  Waveform: good waveform and waveform confirmed  Patient tolerance: patient tolerated the procedure well with no immediate complications  Comments: Post-induction

## 2021-08-06 NOTE — OP NOTE
OPERATIVE REPORT  PATIENT NAME: Poncho Griffith    :  1950  MRN: [id number]  Pt Location: [facility] OR ROOM 06    SURGERY DATE: 2021    Surgeon(s) and Role:     * Casilda Brittle, MD - Primary     * Nicolette Perea MD - Assisting     * Omer Hong MD - Belén Cortes DO - Maegan Quintanilla MD - Assisting     * Zamzam Gipson MD - Assisting    Preop Diagnosis:  Peritoneal carcinomatosis [facility]) [C78 6]    Post-Op Diagnosis Codes:     * Peritoneal carcinomatosis ([facility] [address] ) [C78 6]    Procedure(s) (LRB):  LAPAROSCOPY DIAGNOSTIC (N/A)  LAPAROTOMY EXPLORATORY; OVER SEW PANCREAS TAIL (N/A)  ENBLOCK HYSTERECTOMY, BILATERAL SALPINGO-OOPHORECTOMY, SIGMOIDECTOMY WITH LOW RECTAL REANASTAMOSIS, BLADDER PERITONEAL STRIPPING AND CYSTOTOMY REPAIR, DIAPHRAGM STRIPPING, SMALL BOWEL RESECTION WITH RE-ANASTAMOSIS (N/A)  RADICAL OMENTECTOMY (N/A)  SPLENECTOMY (N/A)  APPENDECTOMY (N/A)    Specimen(s):  ID Type Source Tests Collected by Time Destination   1 : pelvic fluid Washing Pelvic Washing NON-GYNECOLOGIC CYTOLOGY Casilda Brittle, MD 2021 11:56 AM    2 : and spleen Tissue Omentum TISSUE EXAM Casilda Brittle, MD 2021 12:31 PM    3 : nodule Tissue Large Intestine, Transverse Colon TISSUE EXAM Casilda Brittle, MD 2021 12:46 PM    4 : PARA-COLIC GUTTER Tissue Abdominal TISSUE EXAM Casilda Brittle, MD 2021 12:47 PM    5 :  Tissue Large Intestine, Hepatic Flexure TISSUE EXAM Casilda Brittle, MD 2021 12:47 PM    6 : Rolley Mark TISSUE EXAM Casilda Brittle, MD 2021 12:51 PM    7 : FALCIFORM LIGAMENT Tissue Abdominal TISSUE EXAM Casilda Brittle, MD 2021 12:57 PM    8 : LEFT Tissue Diaphragm TISSUE EXAM Casilda Brittle, MD 2021  1:03 PM    9 : RIGHT Tissue Diaphragm TISSUE EXAM Casilda Brittle, MD 2021  1:13 PM    10 : bladder Tissue Peritoneum TISSUE EXAM Casilda Brittle, MD 2021  1:53 PM    11 : with cervix, sigmoid and posterior cul-de-sac Tissue Uterus w/Bilateral Ovaries and Fallopian Tubes TISSUE EXAM Ester Oquendo MD 8/6/2021  2:20 PM    12 : pelvic anterior wall Tissue Abdominal TISSUE EXAM Ester Oquendo MD 8/6/2021  2:36 PM    13 : LEFT PARA-COLIC GUTTER Tissue Abdominal TISSUE EXAM Ester Oquendo MD 8/6/2021  2:54 PM    14 :  Tissue Appendix TISSUE EXAM Ester Oquendo MD 8/6/2021  3:03 PM    15 : SMALL BOWEL TUMOR Tissue Small Bowel, NOS TISSUE EXAM Ester Oquendo MD 8/6/2021  3:04 PM    16 : small bowel Tissue Small Bowel, NOS TISSUE EXAM Ester Oquendo MD 8/6/2021  3:22 PM    17 : right gerota fascia Tissue Kidney, Right TISSUE EXAM Ester Oquendo MD 8/6/2021  3:39 PM        Estimated Blood Loss:   1600 mL    Drains:  Closed/Suction Drain LLQ 19 Fr  (Active)   Number of days: 0       Closed/Suction Drain RLQ Bulb 19 Fr  (Active)   Number of days: 0       Urethral Catheter Non-latex 16 Fr  (Active)   Number of days: 0       Anesthesia Type:   General    Operative Indications:  Peritoneal carcinomatosis ([address] ) [C78 6]    Operative findings  On diagnostic laparoscopy there was Minimal ascites is noted, large omental cake as well as sigmoid implants  Diaphragm appears clean  There was no peritoneal nodularity noted in decision was made to proceed with ex lap  On exploratory laparotomy a 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 was present  Spleen with surface nodularity  Bilateral diaphragms with approximately 1 cm nodules at the posterior aspect  Falciform ligament with tumor extending into liver  4 cm mass along the introitus fascia extending into the perirenal fat  Appendix with tumor at the base  Minimal nodules throughout the small bowel except a 4 cm mass which was adherent to the omentum requiring resection  Small uterus was noted densely adherent to the bladder with tumor nodularity with an obliterated posterior cul-de-sac and tumor along the lower sigmoid  1 cm cystotomy was created during resection and repaired primarily    Multiple air leak test were noted to be negative as well as a colonoscopy performed by dr Erick Howe with intact anastomosis  The anastomosis was noted to be tension free with healthy-appearing edges  There was small residual disease noted at the monalisa hepatis measuring less than 1 cm, no other gross residual disease was noted  Complications:   None    Procedure and Technique: On the day of surgery the patient and her family were met in the pre-operative area, the procedure and consents were reviewed and all questions were answered  She was then taken to the operating room where general anesthesia was found to be adequate  A safety time out was performed with all members of the team present, confirming the patient and the procedure  Preoperative antibiotics and subcutaneous heparin were administered, and intermittent compression devices were placed  The vagina and perineum were prepped with betadine, sterile drapes were placed, and a irving catheter placed in the bladder draining to gravity      A 5mm umbilical incision/stab incision in the umbilicus was made and the Veress needle was used to insufflate the abdomen to 15mmHg using CO2 gas  Opening pressure was 2 mmHg  A 5mm laparoscopic port was placed under direct visualization into the abdomen  Atraumatic entry was confirmed  Survey of the abdomen and pelvis was undertaken with the above findings  The patient was placed in Trendelenburg position  Given minimal nodularity noted and only pelvic disease, decision was made to proceed with debulking  A vertical skin incision was made with the scalpel and carried down to the fascia with bovie cautery  The fascia was excised and extended to the xphyoid  The peritoneum was identified and entered sharply, then extended taking care to avoid the bladder  Exploration of the abdomen and pelvis revealed the above noted findings       A Book Anthony retractor was placed and the bowels were carefully packed away with moist sponges  Attention was paid to avoid the injury to neurovascular components or surrounding bowel  Adhesions of the small bowel to the omentum were released  Adhesions then of the omental cake to the transverse colon were taken down using Metzenbaum scissor  The omentum was reflected cephalad and the lesser sac was entered taking care not to damage the mesocolon  The omentum was then cauterized and divided laterally to medially using the EnSeal device  The gastrosplenic ligament was opened through an avascular area and then the short gastric vessels were ligated and divided with EnSeal  During dissection of the splenic attachments care was taken to stay close to the spleen in order toavoid injury to the greater curvature of the stomach, the pancreas and the adrenal gland  The spleen was then gently and progressively retracted medially and the peritoneal attachments were divided proceeding from the inferior pole to the superior pole and then dividing the splenorenal ligament  Once all of the splenic attachments had been divided the splenic hilum could be addressed  The splenic vessels were identified and skeletonized  The splenic artery and vein were divided using a vascular load articulating stapler  Omentum and spleen within passed off to pathology  It was noted at this time that the distal portion of the pancreas was close to the staple line and Dr Fabian Lao was consulted for inspection  He also assisted in fulguration of the falciform attachment to the liver  The falciform ligament was divided at base using an EnSeal device and carried up over the liver for mobilization  Falciform was then divided using the EnSeal off the anterior abdominal wall and sent to pathology  The lesser curvature of the stomach was noted to have small implants which were resected and ablated using argon beam were were feasible  There is small residual disease within the monalisa hepatis was not able to be removed  Bilateral diaphragm stripping was performed  The right coronary and triangular ligament were divided  The liver was fully mobilized  The diaphragm peritoneum was dissected from the diaphragm muscle were tumor was present  The base of the lesions were ablated using argon beam      Large mass along gerotas fascia was grasped with a [place] clamp and elevated  Peritoneum was incised and mass was carefully dissected off of the kidney including some of the sejal renal fat  Attention was then turned to the pelvis  T he procedure was completed bilaterally in identical fashion except where noted otherwise  Juany clamps were placed in the bilateral cornuae of the uterus  The retroperitoneum was opened along the pelvic side wall to reveal the psoas and external iliacs  The gonadal vessels were identified and elevated above and away from the ureter which was identified retroperitoneally  The gonadal vessels were isolated, clamped, cut and ligated x 2 for good hemostasis  Right round ligament was coagulated and divided  Superior vesical artery was identified  Right paravesical space was developed bluntly medial to the superior vesical artery  Posteriorly, the peritoneal edge above the ureter was grasped  The right pararectal space was developed while retracting the right ureter medially and developing avascular space between the ureter and the rectum  Ureters were then dissected to the level of the uterine artery  Anteriorly, the Bladder flap was created and dissected below the cervico-vaginal junction  Bladder peritoneum containing all gross tumor was removed by carefully dissected the bladder away from the diseased peritoneum  A in inherent 1 cm bladder cystotomy was created during dissection  This was repaired in 2 layers in a running fashion  The sigmoid was then moblized by dividing the white line of Toldt which fully mobilized the tumor and medialized the sigmoid colon   At the base of the mesentery, the ureter was identified and this was swept laterally  Once the tumor was mobile, the proximal sigmoid was divided with a single firing of a HOLDEN blue load stapler  Next, at the base of the mesentery, the OLEG was identified and dissected free  This was divided between clamps and tied with 0 Vicryl ties  Dissection was carried out over the presacral space and avascular space  The lateral ligaments were taken down using electrocautery  The bilateral uterine vessels were then divided using an EnSeal with hemostasis bilaterally  Descending bites were taken bilaterally of the cardinal ligaments in a similar fashion with cautery  Once below the level of the external cervical os, anterior colpotomy was made  Cautery was used to divide remaining vaginal mucosa  Recto-vaginal septum was divided  Rectum was cleared of mesenteric fat  Once below the level of the tumor a contour TIA stapler was fired across the distal rectum  Specimen was handed off the field en bloc  Further dissection along the white line of Toldt was carried out up to the previously dissected splenic flexure  It was noted that the  Sigmoid reached the rectal stump without tension  Appendectomy was performed  The appendix was then cauterized along the mesoappendix to the level of the vasculature which was then cauterized using the enseal  At the level of the cecum a single firing of the HOLDEN was completed with hemostasis  The bowel was then run from the terminal ileum to the ligament of treitz and larger nodules were removed using metzenbaum scissors and smaller lesions ablated using argon beam      Portion of small bowel that was adherent to the omentum was noted to have large lesion  Once the small bowel was fully mobilized the small bowel so that the distal and proximal limbs were adequately freed of surrounding adhesions, a side-to-side functional   end-to-end anastomosis was created   The involved portion of proximal ileum was resected with two firings of the HOLDEN blue load stapler  The attached mesentery was resected with the enseal device  The specimen was handed off and sent to pathology for permanent evaluation  The antimesenteric corners of the distal and proximal bowel segments were excised to allow the forks of the HOLDEN 75 blue load stapler to be inserted, one into each lumen  To ensure maximal stomal size, the forks were fully inserted  The instrument halves were joined, the bowel ends were aligned, the antimesenteric bowel walls were compressed and the instrument fired  Staple lines were offset and a TA 60 blue loaded stapler was placed across, closing the common enterotomy site  This specimen was also passed off the field  Staple line was oversewning using 2-0 vicryl  A small defect in the mesentery from were the segment of bowel was resected was noted and closed with 2-0 Vicryl sutures  Bowel was noted to be pink and well perfused, hemostatic, and not on tension  Abdomen was then copiously irrigated  Upper abdomen was found to be hemostatic  On the proximal end of the sigmoid, pericolic fat and mesentery immediately around the bowel wall was freed up using electrocautery  The pursestring device was then placed across the colon  The bowel was then opened with allis clamps EEA anvil was then placed within the bowel and the pursestring was tied  EEA device was inserted into the rectal stump and it was noted that the previously fired contour miss fired in no staple line was present  Rectal walls were grasped with William clamps and a running PDS was used to stabilize the serosa and mucosa  And new contour device was placed and a single fire was noted intact  The EEA device was then inserted into the rectal stump and  anastomasis was created  The doughnuts were then examined and found to be complete x2  The anastomosis was under no tension and was then leak tested under water and there was a posterior leak noted    Given previous misfiring Dr Jacobo was called to assist in examining the anastomotic site    Anastamosis was reenforced with 3-0 PDS interrupted sutures along the staple line  Colonoscopy was performed an air leak test was noted to be negative  Donny drain was placed in the pelvis and splenic bed  All sponges and instruments were removed from the abdomen and pelvis  The bowels were then returned to their normal anatomic position  Gowns and gloves were changed and wound closure tray was utilized  The fascia was then closed with #1 PDS in a running fashion  The subcutaneous fat was reapproximated with 3-0 Vicryl  The skin was closed using 2-0 stratafix and sterile dressings were applied  The patient tolerated the procedure well  All sponge, needle and instrument counts were correct x2  Given significant fluid shifts and products given decision was made for patient to remain intubated overnight  I was present for the entire procedure    Patient Disposition:  intubated and hemodynamically stable      SIGNATURE: Velia Galloway MD  DATE: August 6, 2021  TIME: 5:44 PM

## 2021-08-06 NOTE — PROGRESS NOTES
Progress Note - Surgical Critical Care   Susan Maya 80 y o  female MRN: [id number]  Unit/Bed#: [facility] 610-50 Encounter: [id number]    Chief Complaint: Ovarian carcinomatosis    HPI/24hr events:  Patient is a healthy 60-year-old female with asthma, HTN, no significant past medical history who presented with abdominal pain to emergency department last month  She was found to have CT findings suspicious for carcinomatosis  She was worked up by the Gynecologic Oncology team, and was diagnosed with likely ovarian cancer  Today, she underwent elective open debridement  The surgery consisted of total hysterectomy, bilateral salpingectomy, left oophorectomy (right ovary removed 35 years ago for cyst), sigmoidectomy with low rectal anastomosis (initially failed leak test prompting intraop colorectal surgery consultation with over-sewing and colonoscopy), small-bowel resection with anastomosis, diaphragm debridement, bladder debridement with cystotomy over-sewing, omentectomy, splenectomy, appendectomy, over-sewing of pancreas tail  Per sign-out from gynecologic oncology team, patient received 8 L of crystalloid, 1 L of albumin, 4 units of packed red blood cells, 2 units of FFP intraoperatively  Patient cannot of the operating room on norepinephrine drip at 18, which was weaned down to 8 within the 1st hour of ICU admission  Patient came out of the operating room intubated, with an arterial line, NGT epidural, 2 abdominal OSCAR drains, Paulino, no central line      Assessment: POD 0 s/p extensive gyn onc debridement, intubated on decreasing pressors    Plan:     Neuro:   Sedation: Propofol at 40  o SAT tonight, hopefully shut prop off   Analgesia: Epidural capped tonight per anesthesia  o APS will see tomorrow and likely start epidural  o Fent gtt @ 50 tonight + PRN dilaudid   Delirium precautions: Sleep hygiene, daily CAM-ICU assessment    CV:   Last lactate: Checking now, will trend if elevated   Hypotension  o Likely due to anesthesia, blood loss  o Wean norepi  o Check endpoints now   HTN  o Home meds when appropriate  o PRN antihypertensives once hypotension resolves     Pulm:   Intubated due to extent of surgery  o ABG now  o Ween vent (14/420/80/8 now)  o Goal pressure support by tomorrow morning and extubate on rounds   Asthma  o PRN albuterol nebs  o Home fluticasone neb when extubated     GI:   Stress ulcer prophylaxis: PPI IV   Bowel regimen: Not indicated, s/p enteric anastomosis x2   S/p SBR with anastomosis, sigmoidectomy with anastomosis  o NPO with NGT now  o Diet advancement per primary team  o Await return of bowel function     FEN:   F: Isolyte 125   E: Check labs and replete PRN   N: NPO now   Monitor daily BMP     :   S/p extensive pelvic debridement including PROMISE/BSO, cystotomy oversew  o Paulino x7d per gyn onc (stays until at least )      ID:   S/p splenectomy  o Spleen vacs this admission   Monitor WBC & temp     Heme:   S/p 1 6L EBL, 4 RBC, 2 FFP intraop  o CBC now   DVT prophylaxis: SCD, SQH   Monitor daily CBC    Endo:   No active issues    Msk/Skin:   Abdominal surgical incision  o Monitor    LDA:  ETT hopefully comes out tomorrow  Paulino until at least   A-line to be removed once off pressors  NGT per primary team  OSCAR drains per primary team    Disposition:  SICU tonight, hopefully downgrade tomorrow if doing very well    Vitals  Trends:  Temp:Afebrile  HR:   BP: MAP 90 at 7pm postop  SpO2: 100% on 80% / PEEP 8  RR: Not overbreathing vent    Vitals:    21 0951 21 1016 21 1840 21 1900   BP: 146/72      Pulse: 102   88   Resp: 16   14   Temp: 98 6 °F (37 °C)      TempSrc: Temporal      SpO2: 94%  100% 100%   Weight:  77 1 kg (170 lb)     Height:  4' 11" (1 499 m)       Arterial Line BP: 98/88  Arterial Line MAP (mmHg): 90 mmHg    Temperature:   Temp (24hrs), Av 6 °F (37 °C), Min:98 6 °F (37 °C), Max:98 6 °F (37 °C)    Current: Temperature: 98 6 °F (37 °C)    Non-Invasive/Invasive Ventilation Settings:  Respiratory    Lab Data (Last 4 hours)    None         O2/Vent Data (Last 4 hours)      08/06 1840           Vent Mode AC/VC       Resp Rate (BPM) (BPM) 14       Vt (mL) (mL) 420       FIO2 (%) (%) 100       PEEP (cmH2O) (cmH2O) 6       Patient safety screen outcome: Failed       MV 5 91                   Intake and Outputs:       Diet Orders   (From admission, onward)             Start     Ordered    08/06/21 1906  Diet NPO  Diet effective now     Question Answer Comment   Diet Type NPO    RD to adjust diet per protocol? No        08/06/21 1905                  I/O       08/05 0701 - 08/06 0700 08/06 0701 - 08/07 0700    I V  (mL/kg)  8000 (103 8)    Blood  1900    IV Piggyback  850    Total Intake(mL/kg)  10604 (139 4)    Urine (mL/kg/hr)  600    Drains  140    Blood  1600    Total Output  2340    Net  +8410                I/O last 24 hours: In: 15036 [I V :8000; Blood:1900; IV Piggyback:850]  Out: 8992 [Urine:600; Drains:140; Blood:1600]     UOP: 600 total today     Labs:   Results from last 7 days   Lab Units 08/06/21  1419 08/06/21  1329 08/06/21  1231   I STAT HEMOGLOBIN g/dl 8 8* 8 5* 5 8*      Results from last 7 days   Lab Units 08/06/21  1419 08/06/21  1329 08/06/21  1231   CO2, I-STAT mmol/L 15* 23 21   GLUCOSE, ISTAT mg/dl 160* 156* 102              Results from last 7 days   Lab Units 08/06/21  1419 08/06/21  1329 08/06/21  1231   GLUCOSE, ISTAT mg/dl 160* 156* 102       No results found for: PHART, BCA5LEL, PO2ART, XNY2AYN, Z2NAJUHB, BEART, SOURCE                Micro:        Imaging:  I have personally reviewed pertinent reports  XR chest pa & lateral    Result Date: 7/23/2021  Impression: Small pleural effusions  Workstation performed: [id number]       Physical Exam:  Physical Exam  Constitutional:       General: She is not in acute distress  HENT:      Head: Normocephalic and atraumatic        Right Ear: External ear normal       Left Ear: External ear normal       Nose: Nose normal       Mouth/Throat:      Mouth: Mucous membranes are moist       Pharynx: Oropharynx is clear  Eyes:      General:         Right eye: No discharge  Left eye: No discharge  Extraocular Movements: Extraocular movements intact  Conjunctiva/sclera: Conjunctivae normal       Pupils: Pupils are equal, round, and reactive to light  Cardiovascular:      Rate and Rhythm: Normal rate  Pulses: Normal pulses  Heart sounds: Normal heart sounds  Pulmonary:      Effort: No respiratory distress  Comments: Breathing comfortably on vent    Abdominal:      General: Abdomen is flat  There is no distension  Tenderness: There is abdominal tenderness (Mild, although very sedated on propofol)  There is no guarding or rebound  Comments: Abdominal wound dressed without ST, OSCAR drains bloody x2   Musculoskeletal:         General: No swelling, tenderness or signs of injury  Cervical back: Normal range of motion and neck supple  No rigidity or tenderness  Skin:     Coloration: Skin is not jaundiced  Findings: No lesion  Neurological:      Comments: Minimal response to painful stimuli, sedated on prop at 40           ---------------------------------------------------------------------------  ICU CORE MEASURES    Prophylaxis   VTE Pharmacologic Prophylaxis: Heparin  VTE Mechanical Prophylaxis: sequential compression device  Stress Ulcer Prophylaxis: Pantoprazole IV     ABCDE Protocol (if indicated)  Plan to perform spontaneous awakening trial today? Yes  Plan to perform spontaneous breathing trial today? Yes  CAM-ICU: Negative  Obvious barriers to extubation?  no    Invasive Devices Review  Invasive Devices     Peripheral Intravenous Line            Peripheral IV 08/06/21 Dorsal (posterior) <1 day    Peripheral IV 08/06/21 Right Arm <1 day          Epidural Line            Epidural Catheter 08/06/21 <1 day          Arterial Line            Arterial Line 08/06/21 Right Radial <1 day          Drain            Closed/Suction Drain LLQ 19 Fr  <1 day    Closed/Suction Drain RLQ Bulb 19 Fr  <1 day    Urethral Catheter Non-latex 16 Fr  <1 day          Airway            ETT  Cuffed;Oral 7 mm <1 day                 Can any invasive devices be discontinued today? No (provide explanation): Will plan for extubation tomorrow  ---------------------------------------------------------------------------    EKG:     Weights:   IBW (Ideal Body Weight): 43 2 kg    Body mass index is 34 34 kg/m²  Weight (last 2 days)     Date/Time   Weight    08/06/21 1016   77 1 (170)              Allergies: Allergies   Allergen Reactions    Erythromycin Nausea Only    Morphine Headache       Medications:   Scheduled Meds:   Current Facility-Administered Medications   Medication Dose Route Frequency Provider Last Rate    heparin (porcine)  5,000 Units Subcutaneous [facility] Chris Ma MD      HYDROmorphone  0 5 mg Intravenous Q2H PRN Jose Goddard MD      multi-electrolyte  125 mL/hr Intravenous Continuous Chris Ma MD      ondansetron  4 mg Intravenous Q6H PRN Chris Ma MD      ropivacaine 0 1% and fentaNYL 2 mcg/mL   Epidural Continuous Jose Goddard MD       Continuous Infusions: multi-electrolyte, 125 mL/hr  ropivacaine 0 1% and fentaNYL 2 mcg/mL,       PRN Meds: HYDROmorphone, 0 5 mg, Q2H PRN  ondansetron, 4 mg, Q6H PRN        Counseling / Coordination of Care  Total time spent today 25 minutes  Greater than 50% of total time was spent with the patient and / or family counseling and / or coordination of care  A description of the counseling / coordination of care: Chart review, examining the patient, discussing care plan with the ICU team and consultants, coordinating with nurses/staff     Code Status: No Order     Portions of the record may have been created with voice recognition software    Occasional wrong word or "sound a like" substitutions may have occurred due to the inherent limitations of voice recognition software  Read the chart carefully and recognize, using context, where substitutions have occurred       Ileana Tolentino MD

## 2021-08-06 NOTE — ANESTHESIA PREPROCEDURE EVALUATION
Procedure:  LAPAROSCOPY DIAGNOSTIC (N/A Abdomen)  LAPAROTOMY EXPLORATORY (N/A Abdomen)  HYSTERECTOMY LAPAROSCOPIC TOTAL (LTH), BILATERAL SALPINGO-OOPHORECTOMY, TUMOR DEBULKING (N/A Abdomen)    Relevant Problems   CARDIO   (+) Hypertension      PULMONARY   (+) Asthma      hgb 9 5, plt 491  Cr 0 63  A+, antibody NEG       Anesthesia Plan  ASA Score- 3     Anesthesia Type- general with ASA Monitors  Additional Monitors: arterial line  Airway Plan: ETT  Comment: General anesthesia, endotracheal tube; standard ASA monitors  Risks and benefits discussed with patient; patient consented and agrees to proceed  I saw and evaluated the patient  If seen with CRNA, we have discussed the anesthetic plan and I am in agreement that the plan is appropriate for the patient  Low thoracic epidural for post-operative pain control, requested by surgeon  Risks discussed, including bleeding, infection, PDPH, neurological compromise; no blood thinners, no known bleeding disorder  Plan Factors-    Chart reviewed  Existing labs reviewed  Induction- intravenous  Postoperative Plan- Plan for postoperative opioid use  Planned trial extubation    Informed Consent- Anesthetic plan and risks discussed with patient  I personally reviewed this patient with the CRNA  Discussed and agreed on the Anesthesia Plan with the CRNA  Lucia Leija

## 2021-08-07 LAB
ABO GROUP BLD BPU: NORMAL
ALBUMIN SERPL BCP-MCNC: 1.8 G/DL (ref 3.5–5)
ALP SERPL-CCNC: 45 U/L (ref 46–116)
ALT SERPL W P-5'-P-CCNC: 26 U/L (ref 12–78)
ANION GAP SERPL CALCULATED.3IONS-SCNC: 5 MMOL/L (ref 4–13)
APTT PPP: 30 SECONDS (ref 23–37)
AST SERPL W P-5'-P-CCNC: 46 U/L (ref 5–45)
BILIRUB SERPL-MCNC: 1.22 MG/DL (ref 0.2–1)
BPU ID: NORMAL
BUN SERPL-MCNC: 7 MG/DL (ref 5–25)
CA-I BLD-SCNC: 1.02 MMOL/L (ref 1.12–1.32)
CALCIUM ALBUM COR SERPL-MCNC: 9.4 MG/DL (ref 8.3–10.1)
CALCIUM SERPL-MCNC: 7.6 MG/DL (ref 8.3–10.1)
CHLORIDE SERPL-SCNC: 111 MMOL/L (ref 100–108)
CO2 SERPL-SCNC: 21 MMOL/L (ref 21–32)
CREAT SERPL-MCNC: 0.5 MG/DL (ref 0.6–1.3)
CROSSMATCH: NORMAL
ERYTHROCYTE [DISTWIDTH] IN BLOOD BY AUTOMATED COUNT: 14.5 % (ref 11.6–15.1)
GFR SERPL CREATININE-BSD FRML MDRD: 98 ML/MIN/1.73SQ M
GLUCOSE SERPL-MCNC: 139 MG/DL (ref 65–140)
GLUCOSE SERPL-MCNC: 147 MG/DL (ref 65–140)
GLUCOSE SERPL-MCNC: 172 MG/DL (ref 65–140)
GLUCOSE SERPL-MCNC: 174 MG/DL (ref 65–140)
HCT VFR BLD AUTO: 28.8 % (ref 34.8–46.1)
HGB BLD-MCNC: 9.8 G/DL (ref 11.5–15.4)
INR PPP: 1.23 (ref 0.84–1.19)
MAGNESIUM SERPL-MCNC: 1.3 MG/DL (ref 1.6–2.6)
MCH RBC QN AUTO: 28.7 PG (ref 26.8–34.3)
MCHC RBC AUTO-ENTMCNC: 34 G/DL (ref 31.4–37.4)
MCV RBC AUTO: 85 FL (ref 82–98)
PHOSPHATE SERPL-MCNC: 3.5 MG/DL (ref 2.3–4.1)
PLATELET # BLD AUTO: 224 THOUSANDS/UL (ref 149–390)
PMV BLD AUTO: 11 FL (ref 8.9–12.7)
POTASSIUM SERPL-SCNC: 4.3 MMOL/L (ref 3.5–5.3)
PROT SERPL-MCNC: 4.6 G/DL (ref 6.4–8.2)
PROTHROMBIN TIME: 15.5 SECONDS (ref 11.6–14.5)
RBC # BLD AUTO: 3.41 MILLION/UL (ref 3.81–5.12)
SODIUM SERPL-SCNC: 137 MMOL/L (ref 136–145)
UNIT DISPENSE STATUS: NORMAL
UNIT PRODUCT CODE: NORMAL
UNIT PRODUCT VOLUME: 192 ML
UNIT PRODUCT VOLUME: 280 ML
UNIT PRODUCT VOLUME: 300 ML
UNIT PRODUCT VOLUME: 350 ML
UNIT RH: NORMAL
WBC # BLD AUTO: 27.32 THOUSAND/UL (ref 4.31–10.16)

## 2021-08-07 PROCEDURE — 84100 ASSAY OF PHOSPHORUS: CPT | Performed by: OBSTETRICS & GYNECOLOGY

## 2021-08-07 PROCEDURE — 82330 ASSAY OF CALCIUM: CPT | Performed by: PHYSICIAN ASSISTANT

## 2021-08-07 PROCEDURE — 80053 COMPREHEN METABOLIC PANEL: CPT | Performed by: OBSTETRICS & GYNECOLOGY

## 2021-08-07 PROCEDURE — 83735 ASSAY OF MAGNESIUM: CPT | Performed by: OBSTETRICS & GYNECOLOGY

## 2021-08-07 PROCEDURE — 99291 CRITICAL CARE FIRST HOUR: CPT | Performed by: STUDENT IN AN ORGANIZED HEALTH CARE EDUCATION/TRAINING PROGRAM

## 2021-08-07 PROCEDURE — 94760 N-INVAS EAR/PLS OXIMETRY 1: CPT

## 2021-08-07 PROCEDURE — 90648 HIB PRP-T VACCINE 4 DOSE IM: CPT | Performed by: PHYSICIAN ASSISTANT

## 2021-08-07 PROCEDURE — 99024 POSTOP FOLLOW-UP VISIT: CPT | Performed by: OBSTETRICS & GYNECOLOGY

## 2021-08-07 PROCEDURE — 85730 THROMBOPLASTIN TIME PARTIAL: CPT | Performed by: OBSTETRICS & GYNECOLOGY

## 2021-08-07 PROCEDURE — 85610 PROTHROMBIN TIME: CPT | Performed by: OBSTETRICS & GYNECOLOGY

## 2021-08-07 PROCEDURE — 90734 MENACWYD/MENACWYCRM VACC IM: CPT | Performed by: PHYSICIAN ASSISTANT

## 2021-08-07 PROCEDURE — C9113 INJ PANTOPRAZOLE SODIUM, VIA: HCPCS | Performed by: SURGERY

## 2021-08-07 PROCEDURE — 82948 REAGENT STRIP/BLOOD GLUCOSE: CPT

## 2021-08-07 PROCEDURE — 85027 COMPLETE CBC AUTOMATED: CPT | Performed by: SURGERY

## 2021-08-07 PROCEDURE — 94003 VENT MGMT INPAT SUBQ DAY: CPT

## 2021-08-07 RX ORDER — MAGNESIUM SULFATE HEPTAHYDRATE 40 MG/ML
2 INJECTION, SOLUTION INTRAVENOUS ONCE
Status: COMPLETED | OUTPATIENT
Start: 2021-08-07 | End: 2021-08-07

## 2021-08-07 RX ORDER — ALBUMIN, HUMAN INJ 5% 5 %
12.5 SOLUTION INTRAVENOUS ONCE
Status: COMPLETED | OUTPATIENT
Start: 2021-08-07 | End: 2021-08-07

## 2021-08-07 RX ORDER — CALCIUM GLUCONATE 20 MG/ML
2 INJECTION, SOLUTION INTRAVENOUS ONCE
Status: COMPLETED | OUTPATIENT
Start: 2021-08-07 | End: 2021-08-07

## 2021-08-07 RX ORDER — SODIUM CHLORIDE, SODIUM GLUCONATE, SODIUM ACETATE, POTASSIUM CHLORIDE, MAGNESIUM CHLORIDE, SODIUM PHOSPHATE, DIBASIC, AND POTASSIUM PHOSPHATE .53; .5; .37; .037; .03; .012; .00082 G/100ML; G/100ML; G/100ML; G/100ML; G/100ML; G/100ML; G/100ML
1000 INJECTION, SOLUTION INTRAVENOUS ONCE
Status: COMPLETED | OUTPATIENT
Start: 2021-08-07 | End: 2021-08-07

## 2021-08-07 RX ORDER — HYDROMORPHONE HCL/PF 1 MG/ML
1 SYRINGE (ML) INJECTION ONCE
Status: DISCONTINUED | OUTPATIENT
Start: 2021-08-07 | End: 2021-08-07

## 2021-08-07 RX ADMIN — HYDROMORPHONE HYDROCHLORIDE 0.5 MG: 1 INJECTION, SOLUTION INTRAMUSCULAR; INTRAVENOUS; SUBCUTANEOUS at 07:19

## 2021-08-07 RX ADMIN — HYDROMORPHONE HYDROCHLORIDE 0.5 MG: 1 INJECTION, SOLUTION INTRAMUSCULAR; INTRAVENOUS; SUBCUTANEOUS at 16:21

## 2021-08-07 RX ADMIN — CALCIUM GLUCONATE 2 G: 20 INJECTION, SOLUTION INTRAVENOUS at 10:17

## 2021-08-07 RX ADMIN — SODIUM CHLORIDE, SODIUM GLUCONATE, SODIUM ACETATE, POTASSIUM CHLORIDE, MAGNESIUM CHLORIDE, SODIUM PHOSPHATE, DIBASIC, AND POTASSIUM PHOSPHATE 125 ML/HR: .53; .5; .37; .037; .03; .012; .00082 INJECTION, SOLUTION INTRAVENOUS at 13:56

## 2021-08-07 RX ADMIN — HAEMOPHILUS B POLYSACCHARIDE CONJUGATE VACCINE FOR INJ 0.5 ML: RECON SOLN at 22:07

## 2021-08-07 RX ADMIN — CALCIUM GLUCONATE 2 G: 20 INJECTION, SOLUTION INTRAVENOUS at 11:30

## 2021-08-07 RX ADMIN — HYDROMORPHONE HYDROCHLORIDE 0.5 MG: 1 INJECTION, SOLUTION INTRAMUSCULAR; INTRAVENOUS; SUBCUTANEOUS at 23:45

## 2021-08-07 RX ADMIN — NOREPINEPHRINE BITARTRATE 10 MCG/MIN: 1 INJECTION, SOLUTION, CONCENTRATE INTRAVENOUS at 03:35

## 2021-08-07 RX ADMIN — CHLORHEXIDINE GLUCONATE 15 ML: 1.2 SOLUTION ORAL at 09:32

## 2021-08-07 RX ADMIN — PANTOPRAZOLE SODIUM 40 MG: 40 INJECTION, POWDER, FOR SOLUTION INTRAVENOUS at 09:32

## 2021-08-07 RX ADMIN — PROPOFOL 30 MCG/KG/MIN: 10 INJECTION, EMULSION INTRAVENOUS at 06:19

## 2021-08-07 RX ADMIN — NEISSERIA MENINGITIDIS GROUP A CAPSULAR POLYSACCHARIDE DIPHTHERIA TOXOID CONJUGATE ANTIGEN, NEISSERIA MENINGITIDIS GROUP C CAPSULAR POLYSACCHARIDE DIPHTHERIA TOXOID CONJUGATE ANTIGEN, NEISSERIA MENINGITIDIS GROUP Y CAPSULAR POLYSACCHARIDE DIPHTHERIA TOXOID CONJUGATE ANTIGEN, AND NEISSERIA MENINGITIDIS GROUP W-135 CAPSULAR POLYSACCHARIDE DIPHTHERIA TOXOID CONJUGATE ANTIGEN 0.5 ML: 4; 4; 4; 4 INJECTION, SOLUTION INTRAMUSCULAR at 22:10

## 2021-08-07 RX ADMIN — HEPARIN SODIUM 5000 UNITS: 5000 INJECTION INTRAVENOUS; SUBCUTANEOUS at 22:13

## 2021-08-07 RX ADMIN — HYDROMORPHONE HYDROCHLORIDE 0.5 MG: 1 INJECTION, SOLUTION INTRAMUSCULAR; INTRAVENOUS; SUBCUTANEOUS at 11:23

## 2021-08-07 RX ADMIN — Medication: at 12:15

## 2021-08-07 RX ADMIN — SODIUM CHLORIDE, SODIUM GLUCONATE, SODIUM ACETATE, POTASSIUM CHLORIDE, MAGNESIUM CHLORIDE, SODIUM PHOSPHATE, DIBASIC, AND POTASSIUM PHOSPHATE 125 ML/HR: .53; .5; .37; .037; .03; .012; .00082 INJECTION, SOLUTION INTRAVENOUS at 22:17

## 2021-08-07 RX ADMIN — MAGNESIUM SULFATE HEPTAHYDRATE 2 G: 40 INJECTION, SOLUTION INTRAVENOUS at 05:38

## 2021-08-07 RX ADMIN — HEPARIN SODIUM 5000 UNITS: 5000 INJECTION INTRAVENOUS; SUBCUTANEOUS at 05:37

## 2021-08-07 RX ADMIN — Medication 50 MCG/HR: at 09:33

## 2021-08-07 RX ADMIN — NOREPINEPHRINE BITARTRATE 8 MCG/MIN: 1 INJECTION, SOLUTION, CONCENTRATE INTRAVENOUS at 10:11

## 2021-08-07 RX ADMIN — INSULIN LISPRO 1 UNITS: 100 INJECTION, SOLUTION INTRAVENOUS; SUBCUTANEOUS at 05:42

## 2021-08-07 RX ADMIN — ALBUMIN (HUMAN) 12.5 G: 12.5 INJECTION, SOLUTION INTRAVENOUS at 04:21

## 2021-08-07 RX ADMIN — SODIUM CHLORIDE, SODIUM GLUCONATE, SODIUM ACETATE, POTASSIUM CHLORIDE, MAGNESIUM CHLORIDE, SODIUM PHOSPHATE, DIBASIC, AND POTASSIUM PHOSPHATE 125 ML/HR: .53; .5; .37; .037; .03; .012; .00082 INJECTION, SOLUTION INTRAVENOUS at 06:18

## 2021-08-07 RX ADMIN — HYDROMORPHONE HYDROCHLORIDE 0.5 MG: 1 INJECTION, SOLUTION INTRAMUSCULAR; INTRAVENOUS; SUBCUTANEOUS at 20:23

## 2021-08-07 RX ADMIN — SODIUM CHLORIDE, SODIUM GLUCONATE, SODIUM ACETATE, POTASSIUM CHLORIDE, MAGNESIUM CHLORIDE, SODIUM PHOSPHATE, DIBASIC, AND POTASSIUM PHOSPHATE 1000 ML: .53; .5; .37; .037; .03; .012; .00082 INJECTION, SOLUTION INTRAVENOUS at 05:37

## 2021-08-07 RX ADMIN — HEPARIN SODIUM 5000 UNITS: 5000 INJECTION INTRAVENOUS; SUBCUTANEOUS at 13:55

## 2021-08-07 NOTE — PROGRESS NOTES
Gyn Onc POC:     Patient intubated and presently unarousable but stable  While on Propofol and Fentanyl her BP running a little soft with good MAPs while also on Levophed  Exam: BP 98/57   Pulse 86   Temp 98 6 °F (37 °C) (Temporal)   Resp 13   Ht 4' 11" (1 499 m)   Wt 77 1 kg (170 lb)   SpO2 100%   BMI 34 34 kg/m²      Lungs- intubated with ventilator, breath sounds audible bilaterally with some rhonchi  Heart- regular rate & rhythm, S1 and S2, no murmurs     Abdomen- flat, nondistended, few to rare BS, abdominal dressing intact, right OSCAR with high serosanguinous output, left OSCAR drain with less output  -  Irving catheter intact and draining person clear urine  Extrem- athrombic SCD's intact and now running  IMP/ PLAN-  S/P LAPAROSCOPY DIAGNOSTIC (N/A)  LAPAROTOMY EXPLORATORY; OVER SEW PANCREAS TAIL (N/A)  ENBLOCK HYSTERECTOMY, BILATERAL SALPINGO-OOPHORECTOMY, SIGMOIDECTOMY WITH LOW RECTAL REANASTAMOSIS, BLADDER PERITONEAL STRIPPING AND CYSTOTOMY REPAIR, DIAPHRAGM STRIPPING, SMALL BOWEL RESECTION WITH RE-ANASTAMOSIS (N/A)  RADICAL OMENTECTOMY (N/A)  SPLENECTOMY (N/A)  APPENDECTOMY (N/A) for peritoneal carcinomatosis  Continue Surgical critical care per ICU, wean off ventilator per protocol, monitor OSCAR output and labs as ordered, epidural for pain, NGT in place, irving intact/ draining and to be left in place for 7 days post-op        [facility]  8/6/21  Patient encounter [id number]

## 2021-08-07 NOTE — RESPIRATORY THERAPY NOTE
RT Ventilator Management Note  Sammie Tipton 80 y o  female MRN: [id number]  Unit/Bed#: [facility] 516-01 Encounter: [id number]      Daily Screen       8/6/2021  1840 8/7/2021  0748          Patient safety screen outcome[de-identified]  Failed  Passed      Not Ready for Weaning due to[de-identified]  Underline problem not resolved  --      Spont breathing trial % for 30 min:  --  Yes              Physical Exam:   Assessment Type: Assess only  General Appearance: Sleeping  Respiratory Pattern: Normal  Chest Assessment: Chest expansion symmetrical  Bilateral Breath Sounds: Clear  Cough: None  Suction: ET Tube      Resp Comments: Pt continues to wean on CPAP/PS per the wean to extubate and she is tolerating this well  Pt has clear breath sounds, no prn tx needed at this time  Will continue to monitor per resp protocol

## 2021-08-07 NOTE — OP NOTE
Operative Note:    Diagnosis: Stapled Anastomotic Air Leak    Surgeon: Morelia Gutierres MD    Procedure: assess anastomotic defect with seromuscular suture repair, Colonoscopic anastomotic evaluation    Received Colorectal Consult to assess stapled coloproctostomy after misfired stapling episode with subsequent failed air insufflation bubble test     Upon inspection of anastomosis, no obvious anastomotic defect was initially appreciated however a leak was ultimately evident when the rectum was insufflated with air having bubbles egressing from left posterior staple line    Using 3-0 PDS suture, several interrupted seromuscular sutures were applied to the left posterior aspect of the anastomosis  Thereafter, using the colonoscope the anastomosis was checked by insufflating the segment with air while the anastomosis was  inundated with sterile water  No further air leak was evident  The colonoscope was removed  At this point the procedure was returned to Dr Etheleen Schwab

## 2021-08-07 NOTE — PROGRESS NOTES
Daily Progress Note - Critical Care   Jaron Menon 80 y o  female MRN: [id number]  Unit/Bed#: [facility] 516-01 Encounter: [id number]  ----------------------------------------------------------------------------------------  HPI/24hr events:  · Patient underwent laparotomy with over-sew pancreatic tail, emblock hysterectomy, bilateral salpingo-oophorectomy, sigmoidectomy with low rectal reanastomosis, bladder peritoneal stripping and cystostomy repair, diaphragm stripping, small-bowel resection with reanastomosis, radical omentectomy, splenectomy, and appendectomy yesterday  · The patient received 250 cc albumin 1 5 L intravenous fluids postoperatively  · Patient remained intubated overnight; patient remains on propofol for sedation resulting in sedation related hypotension  ---------------------------------------------------------------------------------------  Review of Systems   Unable to perform ROS: Intubated     Review of systems was reviewed and negative unless stated above in HPI/24-hour events   ---------------------------------------------------------------------------------------  Assessment and Plan:    Neuro:    Diagnosis:  Sedation and analgesia  o Plan:  Current sedation regimen  o Patient has epidural in place; cap that this time; plan for anesthesia to start today or per other anesthesia recommendations  o APS is following   Diagnosis:  Delirium precautions  o Plan:  Maintain regular sleep-wake cycle  o Cam ICU      CV:    Diagnosis:  Hypotension  o Plan:  Likely in the setting of major surgery, blood loss, and sedation regimen  o Continue to wean Levophed as applicable; can consider Precedex in light of hypotension  o Lactic cleared; most recent base excess -5 7  o Continue monitoring and points as applicable   Diagnosis:  History of hypertension  o Plan:  Home medications when appropriate  o Continue monitoring blood pressure and rhythm on telemetry      Pulm:   Diagnosis:  Intubation  o Plan: Ventilator settings  o Continue weaning ventilator settings as appropriate; goal is pressure support and extubation if indicated  o Most recent ABG pH 7 344, pCO2 36 4, pO2 249 5, HCO3 19 4   Diagnosis:  Asthma  o Plan:  P r n  Albuterol nebs when extubated  o Home fluticasone nebulizer when extubated      GI:    Diagnosis:  Recent exploratory and diagnostic laparotomy  o Plan:  The patient is postop day 1 from a diagnostic laparotomy consisting of the following:  - Over so pancreatic tail  - Enblock hysterectomy  - Bilateral salpingo-oophorectomy  - Sigmoidectomy with low rectal reanastomosis  - Bladder peritoneal stripping and cystostomy repair  - Diaphragm stripping  - Small-bowel resection with reanastomosis  - Radical omentectomy  - Splenectomy  - Appendectomy for peritoneal carcinomatosis  o Stress ulcer prophylaxis with intravenous pantoprazole  o Bowel regimen not indicated at this time  o Appreciate surgical recommendations; currently NPO with nasogastric tube  o Diet advancement per primary team  o Await return of bowel function  o Continue monitoring serial abdominal examinations  o Post splenectomy vaccinations when tolerated      :    Diagnosis: S/P PORMISE-BSO and Cystostomy Oversew  o Plan:   Paulino x7 days per gynecology Oncology   Diagnosis: Renal Function  o Plan:  Creatinine is stable at 0 5  o Patient made 1 L of urine yesterday but was net positive 10 L status post OR      F/E/N:    Plan:  Replete electrolytes as indicated   Patient received 250 cc albumin 1 5 L of intravenous fluids yesterday postoperatively   Isolate at 125 cc/hour   NPO at this time   Calcium supplementation given recent blood infusions      Heme/Onc:    Diagnosis:  Postoperative anemia  o Plan:  Estimated blood loss is 1 6 L yesterday  o Patient received 4 units red blood cells and 2 units FFP intraoperatively  o Hemoglobin currently stable at 9 8; continue monitoring   Diagnosis:  DVT prophylaxis  o Plan:  SCDs and subcutaneous heparin       Endo:    Diagnosis:  Hyperglycemia  o Plan:  Patient's blood glucose has been consistently in the 200s; patient initiated on insulin sliding scale algorithm 1 yesterday  o Continue monitoring blood glucose is; if patient remains hyperglycemic greater than 180, will escalate insulin regimen  o Maintain blood glucose between 140s and 180s      ID:    Diagnosis:  Leukocytosis  o Plan:  27 32 from 17 4; likely in the setting of recent abdominal surgery  o Patient remains afebrile at this time; continue monitoring for signs of infection  o Continue monitoring white blood cell count and fever curve  o Maintain off antibiotics for now   Diagnosis:  Status post splenectomy  o Plan:  Patient will require splenectomy vaccinations on this admission      MSK/Skin:   o Local wound care  o Turning and repositioning frequently  o Monitor for signs of skin breakdown  o PT OT when applicable    No  Disposition: Continue Critical Care   Code Status: No Order  ---------------------------------------------------------------------------------------  ICU CORE MEASURES    Prophylaxis   VTE Pharmacologic Prophylaxis: Heparin  VTE Mechanical Prophylaxis: sequential compression device  Stress Ulcer Prophylaxis: Pantoprazole IV     Invasive Devices Review  Invasive Devices     Peripheral Intravenous Line            Peripheral IV 08/06/21 Left Wrist 1 day    Peripheral IV 08/06/21 Right Arm 1 day          Epidural Line            Epidural Catheter 08/06/21 1 day          Arterial Line            Arterial Line 08/06/21 Right Radial 1 day          Drain            Urethral Catheter Non-latex 16 Fr  1 day    Closed/Suction Drain LLQ 19 Fr  <1 day    Closed/Suction Drain RLQ Bulb 19 Fr  <1 day              OBJECTIVE    Vitals   Vitals:    08/07/21 0748 08/07/21 0800 08/07/21 0900 08/07/21 1000   BP:  99/55 105/57 92/54   Pulse:  82 82 82   Resp:  (!) 11 12 12   Temp:       TempSrc:       SpO2: 96% 95% 95% 95% Weight:       Height:         No data recorded  Current: Temperature: 98 6 °F (37 °C)      Respiratory:  SpO2: SpO2: 95 %, SpO2 Activity: SpO2 Activity: At Rest, SpO2 Device: O2 Device: Nasal cannula, Capnography:    Nasal Cannula O2 Flow Rate (L/min): 6 L/min    Invasive/non-invasive ventilation settings   Respiratory    Lab Data (Last 4 hours)    None         O2/Vent Data (Last 4 hours)    None                Physical Exam  Vitals and nursing note reviewed  Constitutional:       General: She is not in acute distress  Appearance: She is obese  She is ill-appearing and toxic-appearing  She is not diaphoretic  HENT:      Head: Normocephalic and atraumatic  Nose: Nose normal       Mouth/Throat:      Mouth: Mucous membranes are moist    Eyes:      General: No scleral icterus  Right eye: No discharge  Left eye: No discharge  Extraocular Movements: Extraocular movements intact  Conjunctiva/sclera: Conjunctivae normal       Comments: Patient tracked me throughout the room with her eyes   Cardiovascular:      Rate and Rhythm: Normal rate and regular rhythm  Pulses: Normal pulses  Heart sounds: Normal heart sounds  No murmur heard  No friction rub  No gallop  Comments: 2+ radial and DP pulses  Pulmonary:      Effort: Pulmonary effort is normal  No respiratory distress  Breath sounds: Normal breath sounds  No stridor  No wheezing, rhonchi or rales  Comments: Mechanical breath sounds bilaterally  Chest:      Chest wall: No tenderness  Abdominal:      Tenderness: There is no right CVA tenderness or left CVA tenderness  Comments: Incision and dressing clean, dry, and intact  Anterior abdominal drains in place    Musculoskeletal:      Right lower leg: No edema  Left lower leg: No edema  Skin:     General: Skin is warm and dry  Capillary Refill: Capillary refill takes less than 2 seconds  Coloration: Skin is not jaundiced or pale  Neurological:      General: No focal deficit present        Comments: Patient followed commands in bilateral upper and lower extremities without difficulty  No stroke-like symptoms          Laboratory and Diagnostics:  Results from last 7 days   Lab Units 08/07/21 0422 08/06/21 1936 08/06/21 1419 08/06/21  1329 08/06/21  1231   WBC Thousand/uL 27 32* 17 40*  --   --   --    HEMOGLOBIN g/dL 9 8* 9 7*  --   --   --    I STAT HEMOGLOBIN g/dl  --   --  8 8* 8 5* 5 8*   HEMATOCRIT % 28 8* 29 2*  --   --   --    HEMATOCRIT, ISTAT %  --   --  26* 25* 17*   PLATELETS Thousands/uL 224 182  --   --   --    NEUTROS PCT %  --  88*  --   --   --    MONOS PCT %  --  7  --   --   --      Results from last 7 days   Lab Units 08/07/21 0422 08/06/21 1936 08/06/21 1419 08/06/21  1329 08/06/21  1231   SODIUM mmol/L 137 138  --   --   --    POTASSIUM mmol/L 4 3 3 9  --   --   --    CHLORIDE mmol/L 111* 111*  --   --   --    CO2 mmol/L 21 19*  --   --   --    CO2, I-STAT mmol/L  --   --  15* 23 21   ANION GAP mmol/L 5 8  --   --   --    BUN mg/dL 7 7  --   --   --    CREATININE mg/dL 0 50* 0 39*  --   --   --    CALCIUM mg/dL 7 6* 7 6*  --   --   --    GLUCOSE RANDOM mg/dL 172* 243*  --   --   --    ALT U/L 26  --   --   --   --    AST U/L 46*  --   --   --   --    ALK PHOS U/L 45*  --   --   --   --    ALBUMIN g/dL 1 8*  --   --   --   --    TOTAL BILIRUBIN mg/dL 1 22*  --   --   --   --      Results from last 7 days   Lab Units 08/07/21 0422   MAGNESIUM mg/dL 1 3*   PHOSPHORUS mg/dL 3 5      Results from last 7 days   Lab Units 08/07/21 0454 08/06/21 1936   INR  1 23* 1 47*   PTT seconds 30 31          Results from last 7 days   Lab Units 08/06/21  1936   LACTIC ACID mmol/L 1 5     ABG:  Results from last 7 days   Lab Units 08/06/21  1937   PH ART  7 344*   PCO2 ART mm Hg 36 4   PO2 ART mm Hg 249 5*   HCO3 ART mmol/L 19 4*   BASE EXC ART mmol/L -5 7   ABG SOURCE  Line, Arterial     VBG:  Results from last 7 days   Lab Units 08/06/21  1937   ABG SOURCE  Line, Arterial           Micro        Imaging:  I have personally reviewed pertinent reports  Intake and Output  I/O       08/05 0701 - 08/06 0700 08/06 0701 - 08/07 0700    I V  (mL/kg)  20296 8 (139 8)    Blood  1900    IV Piggyback  1150    Total Intake(mL/kg)  85155 8 (179 4)    Urine (mL/kg/hr)  1085    Drains  930    Blood  1600    Total Output  3615    Net  +41855 8                Height and Weights   Height: 4' 11" (149 9 cm)  IBW (Ideal Body Weight): 43 2 kg  Body mass index is 34 34 kg/m²  Weight (last 2 days)     Date/Time   Weight    08/06/21 1016   77 1 (170)                Nutrition       Diet Orders   (From admission, onward)             Start     Ordered    08/06/21 1906  Diet NPO  Diet effective now     Question Answer Comment   Diet Type NPO    RD to adjust diet per protocol?  No        08/06/21 1905              Active Medications  Scheduled Meds:  Current Facility-Administered Medications   Medication Dose Route Frequency Provider Last Rate    albuterol  2 5 mg Nebulization Q6H PRN Keyona Bush MD      calcium gluconate  2 g Intravenous Once Juan Reddy PA-C 2 g (08/07/21 1130)    heparin (porcine)  5,000 Units Subcutaneous [facility] Cristina Rascon MD      HYDROmorphone  0 5 mg Intravenous Q2H PRN Dmitriy Cruz MD      insulin lispro  1-5 Units Subcutaneous Q6H [address] Keyona Bush MD      multi-electrolyte  125 mL/hr Intravenous Continuous Cristina Rascon  mL/hr (08/07/21 0618)    norepinephrine  1-30 mcg/min Intravenous Titrated Keyona Bush MD 2 mcg/min (08/07/21 1158)    ondansetron  4 mg Intravenous Q6H PRN Cristina Rascon MD      pantoprazole  40 mg Intravenous Q24H [address] Keyona Bush MD      pravastatin  10 mg Oral Daily With Dinner Lazarus Glenn, PA-C       Continuous Infusions:  multi-electrolyte, 125 mL/hr, Last Rate: 125 mL/hr (08/07/21 0618)  norepinephrine, 1-30 mcg/min, Last Rate: 2 mcg/min (08/07/21 1158)      PRN Meds:   albuterol, 2 5 mg, Q6H PRN  HYDROmorphone, 0 5 mg, Q2H PRN  ondansetron, 4 mg, Q6H PRN        Allergies   Allergies   Allergen Reactions    Erythromycin Nausea Only    Morphine Headache     ---------------------------------------------------------------------------------------  Advance Directive and Living Will:      Power of :    POLST:    ---------------------------------------------------------------------------------------  Care Time Delivered:   Upon my evaluation, this patient had a high probability of imminent or life-threatening deterioration due to recent major surgery , which required my direct attention, intervention, and personal management  I have personally provided 20 minutes (2243 to 0645) of critical care time, exclusive of procedures, teaching, family meetings, and any prior time recorded by providers other than myself  Eddy Chilel PA-C      Portions of the record may have been created with voice recognition software  Occasional wrong word or "sound a like" substitutions may have occurred due to the inherent limitations of voice recognition software    Read the chart carefully and recognize, using context, where substitutions have occurred

## 2021-08-07 NOTE — RESPIRATORY THERAPY NOTE
RT Protocol Note  Bebo Beltran 80 y o  female MRN: [id number]  Unit/Bed#: [facility] 516-01 Encounter: [id number]    Assessment    Principal Problem:    [facility]      Home Pulmonary Medications:  Albuterol inhaler PRN  Flovent inhaler         Past Medical History:   Diagnosis Date    Acid reflux     Asthma     Cancer ([facility] )     ovarian, peritoneal carcinomatosis    GERD (gastroesophageal reflux disease)     Hypercholesteremia     Hypertension     Migraine      Social History     Socioeconomic History    Marital status: /Civil Union     Spouse name: None    Number of children: None    Years of education: None    Highest education level: None   Occupational History    None   Tobacco Use    Smoking status: Never Smoker    Smokeless tobacco: Never Used   Vaping Use    Vaping Use: Never used   Substance and Sexual Activity    Alcohol use: Yes     Comment: socially    Drug use: Not Currently    Sexual activity: Not Currently   Other Topics Concern    None   Social History Narrative    None     Social Determinants of Health     Financial Resource Strain:     Difficulty of Paying Living Expenses:    Food Insecurity:     Worried About Running Out of Food in the Last Year:     Ran Out of Food in the Last Year:    Transportation Needs:     Lack of Transportation (Medical):      Lack of Transportation (Non-Medical):    Physical Activity:     Days of Exercise per Week:     Minutes of Exercise per Session:    Stress:     Feeling of Stress :    Social Connections:     Frequency of Communication with Friends and Family:     Frequency of Social Gatherings with Friends and Family:     Attends Nondenominational Services:     Active Member of Clubs or Organizations:     Attends Club or Organization Meetings:     Marital Status:    Intimate Partner Violence:     Fear of Current or Ex-Partner:     Emotionally Abused:     Physically Abused:     Sexually Abused:        Subjective Objective    Physical Exam:   Assessment Type: Assess only  General Appearance: Sedated  Respiratory Pattern: Normal  Chest Assessment: Chest expansion symmetrical, Trachea deviates left  Bilateral Breath Sounds: Clear  Cough: None  Suction: ET Tube    Vitals:  Blood pressure 146/72, pulse 88, temperature 98 6 °F (37 °C), temperature source Temporal, resp  rate 14, height 4' 11" (1 499 m), weight 77 1 kg (170 lb), SpO2 98 %  Results from last 7 days   Lab Units 08/06/21  1937   PH ART  7 344*   PCO2 ART mm Hg 36 4   PO2 ART mm Hg 249 5*   HCO3 ART mmol/L 19 4*   BASE EXC ART mmol/L -5 7   O2 CONTENT ART mL/dL 15 0*   O2 HGB, ARTERIAL % 97 6*   ABG SOURCE  Line, Arterial       Imaging and other studies: I have personally reviewed pertinent reports  Plan    Respiratory Plan: Vent/NIV/HFNC (intubated)        Resp Comments: pt arrived intubated and placed on 840 ventilator  pt on 14/420/100/6 of peep  pt sating 100% will wean as able

## 2021-08-07 NOTE — PLAN OF CARE
Problem: MOBILITY - ADULT  Goal: Maintain or return to baseline ADL function  Description: INTERVENTIONS:  -  Assess patient's ability to carry out ADLs; assess patient's baseline for ADL function and identify physical deficits which impact ability to perform ADLs (bathing, care of mouth/teeth, toileting, grooming, dressing, etc )  - Assess/evaluate cause of self-care deficits   - Assess range of motion  - Assess patient's mobility; develop plan if impaired  - Assess patient's need for assistive devices and provide as appropriate  - Encourage maximum independence but intervene and supervise when necessary  - Involve family in performance of ADLs  - Assess for home care needs following discharge   - Consider OT consult to assist with ADL evaluation and planning for discharge  - Provide patient education as appropriate  Outcome: Progressing  Goal: Maintains/Returns to pre admission functional level  Description: INTERVENTIONS:  - Perform BMAT or MOVE assessment daily    - Set and communicate daily mobility goal to care team and patient/family/caregiver     - Collaborate with rehabilitation services on mobility goals if consulted  Problem: MOBILITY - ADULT  Goal: Maintain or return to baseline ADL function  Description: INTERVENTIONS:  -  Assess patient's ability to carry out ADLs; assess patient's baseline for ADL function and identify physical deficits which impact ability to perform ADLs (bathing, care of mouth/teeth, toileting, grooming, dressing, etc )  - Assess/evaluate cause of self-care deficits   - Assess range of motion  - Assess patient's mobility; develop plan if impaired  - Assess patient's need for assistive devices and provide as appropriate  - Encourage maximum independence but intervene and supervise when necessary  - Involve family in performance of ADLs  - Assess for home care needs following discharge   - Consider OT consult to assist with ADL evaluation and planning for discharge  - Provide patient education as appropriate  8/6/2021 2123 by Delilah Swanson RN  Outcome: Progressing  8/6/2021 2122 by Delilah Swanson RN  Outcome: Progressing  Goal: Maintains/Returns to pre admission functional level  Description: INTERVENTIONS:  - Perform BMAT or MOVE assessment daily    - Set and communicate daily mobility goal to care team and patient/family/caregiver     - Collaborate with rehabilitation services on mobility goals if consulted- Out of bed for toileting  - Record patient progress and toleration of activity level   8/6/2021 2123 by Delilah Swanson RN  Outcome: Progressing  8/6/2021 2122 by Delilah Swanson RN  Outcome: Progressing     Problem: Potential for Falls  Goal: Patient will remain free of falls  Description: INTERVENTIONS:  - Educate patient/family on patient safety including physical limitations  - Instruct patient to call for assistance with activity   - Consult OT/PT to assist with strengthening/mobility   - Keep Call bell within reach  - Keep bed low and locked with side rails adjusted as appropriate  - Keep care items and personal belongings within reach  - Initiate and maintain comfort rounds  - Make Fall Risk Sign visible to staff  - Apply yellow socks and bracelet for high fall risk patients  - Consider moving patient to room near nurses station  8/6/2021 2123 by Delilah Swanson RN  Outcome: Progressing  8/6/2021 2122 by Delilah Swanson RN  Outcome: Progressing     Problem: CARDIOVASCULAR - ADULT  Goal: Maintains optimal cardiac output and hemodynamic stability  Description: INTERVENTIONS:  - Monitor I/O, vital signs and rhythm  - Monitor for S/S and trends of decreased cardiac output  - Administer and titrate ordered vasoactive medications to optimize hemodynamic stability  - Assess quality of pulses, skin color and temperature  - Assess for signs of decreased coronary artery perfusion  - Instruct patient to report change in severity of symptoms  8/6/2021 2123 by Delilah Swanson RN  Outcome: Progressing  8/6/2021 2122 by Ariadne Willis RN  Outcome: Progressing  Goal: Absence of cardiac dysrhythmias or at baseline rhythm  Description: INTERVENTIONS:  - Continuous cardiac monitoring, vital signs, obtain 12 lead EKG if ordered  - Administer antiarrhythmic and heart rate control medications as ordered  - Monitor electrolytes and administer replacement therapy as ordered  8/6/2021 2123 by Ariadne Willis RN  Outcome: Progressing  8/6/2021 2122 by Ariadne Willis RN  Outcome: Progressing     Problem: GASTROINTESTINAL - ADULT  Goal: Minimal or absence of nausea and/or vomiting  Description: INTERVENTIONS:  - Administer IV fluids if ordered to ensure adequate hydration  - Maintain NPO status until nausea and vomiting are resolved  - Nasogastric tube if ordered  - Administer ordered antiemetic medications as needed  - Provide nonpharmacologic comfort measures as appropriate  - Advance diet as tolerated, if ordered  - Consider nutrition services referral to assist patient with adequate nutrition and appropriate food choices  8/6/2021 2123 by Ariadne Willis RN  Outcome: Progressing  8/6/2021 2122 by Ariadne Willis RN  Outcome: Progressing  Goal: Maintains or returns to baseline bowel function  Description: INTERVENTIONS:  - Assess bowel function  - Encourage oral fluids to ensure adequate hydration  - Administer IV fluids if ordered to ensure adequate hydration  - Administer ordered medications as needed  - Encourage mobilization and activity  - Consider nutritional services referral to assist patient with adequate nutrition and appropriate food choices  8/6/2021 2123 by Ariadne Willis RN  Outcome: Progressing  8/6/2021 2122 by Ariadne Willis RN  Outcome: Progressing  Goal: Maintains adequate nutritional intake  Description: INTERVENTIONS:  - Monitor percentage of each meal consumed  - Identify factors contributing to decreased intake, treat as appropriate  - Assist with meals as needed  - Monitor I&O, weight, and lab values if indicated  - Obtain nutrition services referral as needed  8/6/2021 2123 by Mirela Mccoy RN  Outcome: Progressing  8/6/2021 2122 by Mirela Mccoy RN  Outcome: Progressing  Goal: Establish and maintain optimal ostomy function  Description: INTERVENTIONS:  - Assess bowel function  - Encourage oral fluids to ensure adequate hydration  - Administer IV fluids if ordered to ensure adequate hydration   - Administer ordered medications as needed  - Encourage mobilization and activity  - Nutrition services referral to assist patient with appropriate food choices  - Assess stoma site  - Consider wound care consult   8/6/2021 2123 by Mirela Mccoy RN  Outcome: Progressing  8/6/2021 2122 by Mirela Mccoy RN  Outcome: Progressing  Goal: Oral mucous membranes remain intact  Description: INTERVENTIONS  - Assess oral mucosa and hygiene practices  - Implement preventative oral hygiene regimen  - Implement oral medicated treatments as ordered  - Initiate Nutrition services referral as needed  8/6/2021 2123 by Mirela Mccoy RN  Outcome: Progressing  8/6/2021 2122 by Mirela Mccoy RN  Outcome: Progressing     Problem: METABOLIC, FLUID AND ELECTROLYTES - ADULT  Goal: Electrolytes maintained within normal limits  Description: INTERVENTIONS:  - Monitor labs and assess patient for signs and symptoms of electrolyte imbalances  - Administer electrolyte replacement as ordered  - Monitor response to electrolyte replacements, including repeat lab results as appropriate  - Instruct patient on fluid and nutrition as appropriate  8/6/2021 2123 by Mirela Mccoy RN  Outcome: Progressing  8/6/2021 2122 by Mirela Mccoy RN  Outcome: Progressing  Goal: Fluid balance maintained  Description: INTERVENTIONS:  - Monitor labs   - Monitor I/O and WT  - Instruct patient on fluid and nutrition as appropriate  - Assess for signs & symptoms of volume excess or deficit  8/6/2021 2123 by Mirela Mccoy RN  Outcome: Progressing  8/6/2021 2122 by Micaela Dillon Flori Perez RN  Outcome: Progressing  Goal: Glucose maintained within target range  Description: INTERVENTIONS:  - Monitor Blood Glucose as ordered  - Assess for signs and symptoms of hyperglycemia and hypoglycemia  - Administer ordered medications to maintain glucose within target range  - Assess nutritional intake and initiate nutrition service referral as needed  8/6/2021 2123 by Arsh Live RN  Outcome: Progressing  8/6/2021 2122 by Arsh Live RN  Outcome: Progressing     Problem: HEMATOLOGIC - ADULT  Goal: Maintains hematologic stability  Description: INTERVENTIONS  - Assess for signs and symptoms of bleeding or hemorrhage  - Monitor labs  - Administer supportive blood products/factors as ordered and appropriate  8/6/2021 2123 by Arsh Live RN  Outcome: Progressing  8/6/2021 2122 by Arsh Live RN  Outcome: Progressing     Problem: SAFETY,RESTRAINT: NV/NON-SELF DESTRUCTIVE BEHAVIOR  Goal: Remains free of harm/injury (restraint for non violent/non self-detsructive behavior)  Description: INTERVENTIONS:  - Instruct patient/family regarding restraint use   - Assess and monitor physiologic and psychological status   - Provide interventions and comfort measures to meet assessed patient needs   - Identify and implement measures to help patient regain control  - Assess readiness for release of restraint   Outcome: Progressing  Goal: Returns to optimal restraint-free functioning  Description: INTERVENTIONS:  - Assess the patient's behavior and symptoms that indicate continued need for restraint  - Identify and implement measures to help patient regain control  - Assess readiness for release of restraint   Outcome: Progressing     - Out of bed for toileting  - Record patient progress and toleration of activity level   Outcome: Progressing     Problem: Potential for Falls  Goal: Patient will remain free of falls  Description: INTERVENTIONS:  - Educate patient/family on patient safety including physical limitations  - Instruct patient to call for assistance with activity   - Consult OT/PT to assist with strengthening/mobility   - Keep Call bell within reach  - Keep bed low and locked with side rails adjusted as appropriate  - Keep care items and personal belongings within reach  - Initiate and maintain comfort rounds  - Make Fall Risk Sign visible to staff  - Apply yellow socks and bracelet for high fall risk patients  - Consider moving patient to room near nurses station  Outcome: Progressing     Problem: CARDIOVASCULAR - ADULT  Goal: Maintains optimal cardiac output and hemodynamic stability  Description: INTERVENTIONS:  - Monitor I/O, vital signs and rhythm  - Monitor for S/S and trends of decreased cardiac output  - Administer and titrate ordered vasoactive medications to optimize hemodynamic stability  - Assess quality of pulses, skin color and temperature  - Assess for signs of decreased coronary artery perfusion  - Instruct patient to report change in severity of symptoms  Outcome: Progressing  Goal: Absence of cardiac dysrhythmias or at baseline rhythm  Description: INTERVENTIONS:  - Continuous cardiac monitoring, vital signs, obtain 12 lead EKG if ordered  - Administer antiarrhythmic and heart rate control medications as ordered  - Monitor electrolytes and administer replacement therapy as ordered  Outcome: Progressing     Problem: GASTROINTESTINAL - ADULT  Goal: Minimal or absence of nausea and/or vomiting  Description: INTERVENTIONS:  - Administer IV fluids if ordered to ensure adequate hydration  - Maintain NPO status until nausea and vomiting are resolved  - Nasogastric tube if ordered  - Administer ordered antiemetic medications as needed  - Provide nonpharmacologic comfort measures as appropriate  - Advance diet as tolerated, if ordered  - Consider nutrition services referral to assist patient with adequate nutrition and appropriate food choices  Outcome: Progressing  Goal: Maintains or returns to baseline bowel function  Description: INTERVENTIONS:  - Assess bowel function  - Encourage oral fluids to ensure adequate hydration  - Administer IV fluids if ordered to ensure adequate hydration  - Administer ordered medications as needed  - Encourage mobilization and activity  - Consider nutritional services referral to assist patient with adequate nutrition and appropriate food choices  Outcome: Progressing  Goal: Maintains adequate nutritional intake  Description: INTERVENTIONS:  - Monitor percentage of each meal consumed  - Identify factors contributing to decreased intake, treat as appropriate  - Assist with meals as needed  - Monitor I&O, weight, and lab values if indicated  - Obtain nutrition services referral as needed  Outcome: Progressing  Goal: Establish and maintain optimal ostomy function  Description: INTERVENTIONS:  - Assess bowel function  - Encourage oral fluids to ensure adequate hydration  - Administer IV fluids if ordered to ensure adequate hydration   - Administer ordered medications as needed  - Encourage mobilization and activity  - Nutrition services referral to assist patient with appropriate food choices  - Assess stoma site  - Consider wound care consult   Outcome: Progressing  Goal: Oral mucous membranes remain intact  Description: INTERVENTIONS  - Assess oral mucosa and hygiene practices  - Implement preventative oral hygiene regimen  - Implement oral medicated treatments as ordered  - Initiate Nutrition services referral as needed  Outcome: Progressing     Problem: METABOLIC, FLUID AND ELECTROLYTES - ADULT  Goal: Electrolytes maintained within normal limits  Description: INTERVENTIONS:  - Monitor labs and assess patient for signs and symptoms of electrolyte imbalances  - Administer electrolyte replacement as ordered  - Monitor response to electrolyte replacements, including repeat lab results as appropriate  - Instruct patient on fluid and nutrition as appropriate  Outcome: Progressing  Goal: Fluid balance maintained  Description: INTERVENTIONS:  - Monitor labs   - Monitor I/O and WT  - Instruct patient on fluid and nutrition as appropriate  - Assess for signs & symptoms of volume excess or deficit  Outcome: Progressing  Goal: Glucose maintained within target range  Description: INTERVENTIONS:  - Monitor Blood Glucose as ordered  - Assess for signs and symptoms of hyperglycemia and hypoglycemia  - Administer ordered medications to maintain glucose within target range  - Assess nutritional intake and initiate nutrition service referral as needed  Outcome: Progressing     Problem: HEMATOLOGIC - ADULT  Goal: Maintains hematologic stability  Description: INTERVENTIONS  - Assess for signs and symptoms of bleeding or hemorrhage  - Monitor labs  - Administer supportive blood products/factors as ordered and appropriate  Outcome: Progressing

## 2021-08-07 NOTE — PROGRESS NOTES
Progress Note - General Surgery   Carlito Cordova 80 y o  female MRN: [id number]  Unit/Bed#: [facility] 516-01 Encounter: [id number]    Assessment:  PATIENT IS STABLE HER LYSED IN INTUBATED POSTOP DAY 1 STATUS POST DIAGNOSTIC LAPAROSCOPY EXPLORATORY LAPAROTOMY, ON BLOCK HYSTERECTOMY BILATERAL SALPINGO-OOPHORECTOMY SIGMOIDECTOMY WITH LOW RECTAL REANASTOMOSIS AND OVER-SEWING BLADDER PERITONEAL STRIPPING CYSTOTOMY AND REPAIR DIAPHRAGM SCRIPT BEING SMALL-BOWEL RESECTION WITH REANASTOMOSIS RADICAL OMENTECTOMY SPLENECTOMY APPENDECTOMY    Plan:  PATIENT IS STABLE POSTOPERATIVELY  BLOOD COUNTS REMAIN STABLE VITAL SIGNS REMAINED STABLE CONTINUE CARDIOPULMONARY SUPPORT AS PER ICU WEAN VENTILATOR END CONTINUE REMAINDER OF PRESENT THERAPY  Subjective/Objective   Chief Complaint:  POSTOP DAY 1 DEBULKING    Subjective:  PATIENT IS SEDATED AND VENTILATED      Blood pressure 122/62, pulse 86, temperature 98 6 °F (37 °C), temperature source Temporal, resp  rate 13, height 4' 11" (1 499 m), weight 77 1 kg (170 lb), SpO2 96 %  ,Body mass index is 34 34 kg/m²        Intake/Output Summary (Last 24 hours) at 8/7/2021 0944  Last data filed at 8/7/2021 0601  Gross per 24 hour   Intake 62637 77 ml   Output 3615 ml   Net 86149 77 ml       Invasive Devices     Peripheral Intravenous Line            Peripheral IV 08/06/21 Left Wrist <1 day    Peripheral IV 08/06/21 Right Arm <1 day          Epidural Line            Epidural Catheter 08/06/21 <1 day          Arterial Line            Arterial Line 08/06/21 Right Radial <1 day          Drain            Closed/Suction Drain LLQ 19 Fr  <1 day    Closed/Suction Drain RLQ Bulb 19 Fr  <1 day    Urethral Catheter Non-latex 16 Fr  <1 day          Airway            ETT  Cuffed;Oral 7 mm <1 day                Physical Exam: /62   Pulse 86   Temp 98 6 °F (37 °C) (Temporal)   Resp 13   Ht 4' 11" (1 499 m)   Wt 77 1 kg (170 lb)   SpO2 96%   BMI 34 34 kg/m²     General Appearance:    SEDATED PARALYZED, appears stated age   Head:    Normocephalic, without obvious abnormality, atraumatic                   Neck:   Supple, symmetrical, trachea midline, no adenopathy;     thyroid:  no enlargement/tenderness/nodules; no carotid    bruit or JVD   Back:     Symmetric, no curvature, ROM normal, no CVA tenderness   Lungs:     Clear to auscultation bilaterally, respirations unlabored   Chest Wall:    No tenderness or deformity    Heart:    Regular rate and rhythm, S1 and S2 normal, no murmur, rub   or gallop       Abdomen:     Soft, non-tender, bowel sounds active all four quadrants,     no masses, no organomegaly INCISION CLEAN DRY INTACT           Extremities:   Extremities normal, atraumatic, no cyanosis or edema   Pulses:   2+ and symmetric all extremities   Skin:   Skin color, texture, turgor normal, no rashes or lesions   Lymph nodes:   Cervical, supraclavicular, and axillary nodes normal   Neurologic:   CNII-XII intact, normal strength, sensation and reflexes     throughout       Lab, Imaging and other studies:  CBC:   Lab Results   Component Value Date    WBC 27 32 (H) 08/07/2021    HGB 9 8 (L) 08/07/2021    HCT 28 8 (L) 08/07/2021    MCV 85 08/07/2021     08/07/2021    MCH 28 7 08/07/2021    MCHC 34 0 08/07/2021    RDW 14 5 08/07/2021    MPV 11 0 08/07/2021    NRBC 0 08/06/2021   , CMP:   Lab Results   Component Value Date    SODIUM 137 08/07/2021    K 4 3 08/07/2021     (H) 08/07/2021    CO2 21 08/07/2021    CO2 15 (L) 08/06/2021    BUN 7 08/07/2021    CREATININE 0 50 (L) 08/07/2021    GLUCOSE 160 (H) 08/06/2021    CALCIUM 7 6 (L) 08/07/2021    AST 46 (H) 08/07/2021    ALT 26 08/07/2021    ALKPHOS 45 (L) 08/07/2021    EGFR 98 08/07/2021     VTE Pharmacologic Prophylaxis: Heparin  VTE Mechanical Prophylaxis: sequential compression device

## 2021-08-07 NOTE — CONSULTS
Epidural Follow-up Note - Acute Pain Service    John Ramirez 80 y o  female MRN: [id number]  Unit/Bed#: [facility] 385-55 Encounter: [id number]      Assessment:   Principal Problem:    Peritoneal carcinomatosis ([address] )      John Ramirez is a 80y o  year old female POD1 s/p ex lap, en-bloc hysterectomy, BSO, sigmoidectomy with low rectal anastomosis, SBR, radical omentectomy, splenectomy, appendectomy, oversewing of tail of pancreas, repair of cystotomy, bilateral peritoneal stripping    Pt remained intubated overnight requiring vasopressor support with levophed  This morning, she has since been extubated, remains on low dose levo  Asked by ICU team to start pt's epidural as she is having severe pain post extubation  Will start epidural at low dose  Can titrate up as tolerated  Plan:  Analgesia:  - Continue Thoracic epidural infusion of Ropivacaine 0 1% with fentanyl 2 mcg/mL at 5/4/15/3  - Continue Dilaudid 0 5 mg IV q2hr PRN for breakthrough pain  - Anticipate epidural removal and transition to primarily PO regimen 3-4 days    Bowel Regimen:  - Bowel regimen when appropriate from surgical perspective    APS will continue to follow  Please contact Acute Pain Service - [facility] via [employer] from [phone] with additional questions or concerns  See [email or web address] or [facility] for additional contacts and after hours information      Pain History  Current pain location(s): abdomen  Pain Scale:   10/10  Quality: sharp  24 hour history: as above    PCEA use: none  Opioid requirement previous 24 hours: fentanyl drip, 2mg dilaudid    Meds/Allergies   all current active meds have been reviewed and current meds:   Current Facility-Administered Medications   Medication Dose Route Frequency    albuterol inhalation solution 2 5 mg  2 5 mg Nebulization Q6H PRN    heparin (porcine) subcutaneous injection 5,000 Units  5,000 Units Subcutaneous Q8H [address]    HYDROmorphone (DILAUDID) injection 0 5 mg  0 5 mg Intravenous Q2H PRN    insulin lispro (HumaLOG) 100 units/mL subcutaneous injection 1-5 Units  1-5 Units Subcutaneous Q6H [address]    multi-electrolyte (PLASMALYTE-A/ISOLYTE-S PH 7 4) IV solution  125 mL/hr Intravenous Continuous    norepinephrine (LEVOPHED) 4 mg (STANDARD CONCENTRATION) IV in sodium chloride 0 9% 250 mL  1-30 mcg/min Intravenous Titrated    ondansetron (ZOFRAN) injection 4 mg  4 mg Intravenous Q6H PRN    pantoprazole (PROTONIX) injection 40 mg  40 mg Intravenous Q24H ANA MARIA    pravastatin (PRAVACHOL) tablet 10 mg  10 mg Oral Daily With Dinner    ropivacaine 0 1% and fentaNYL 2 mcg/mL PCEA   Epidural Continuous       Allergies   Allergen Reactions    Erythromycin Nausea Only    Morphine Headache       Objective     HR:  [] 82  Resp:  [11-25] 12  BP: ()/(47-77) 92/54  Arterial Line BP: ()/(44-88) 68/56    Physical Exam  Vitals reviewed  Constitutional:       General: She is in acute distress  Appearance: Normal appearance  HENT:      Head: Normocephalic  Mouth/Throat:      Mouth: Mucous membranes are moist    Eyes:      Extraocular Movements: Extraocular movements intact  Cardiovascular:      Rate and Rhythm: Normal rate  Pulmonary:      Effort: Pulmonary effort is normal  No respiratory distress  Musculoskeletal:         General: Normal range of motion  Skin:     General: Skin is warm  Neurological:      General: No focal deficit present  Mental Status: She is alert and oriented to person, place, and time     Psychiatric:         Mood and Affect: Mood normal          Behavior: Behavior normal        Epidural: Site clean/dry/intact, no surrounding erythema/edema/induration, infusion functioning appropriately    Lab Results:   Results from last 7 days   Lab Units 08/07/21  0422   WBC Thousand/uL 27 32*   HEMOGLOBIN g/dL 9 8*   HEMATOCRIT % 28 8*   PLATELETS Thousands/uL 224      Results from last 7 days   Lab Units 08/07/21  0422 08/06/21  1419   POTASSIUM mmol/L 4 3  --    CHLORIDE mmol/L 111*  --    CO2 mmol/L 21  --    CO2, I-STAT mmol/L  --  15*   BUN mg/dL 7  --    CREATININE mg/dL 0 50*  --    CALCIUM mg/dL 7 6*  --    ALK PHOS U/L 45*  --    ALT U/L 26  --    AST U/L 46*  --    GLUCOSE, ISTAT mg/dl  --  160*      Results from last 7 days   Lab Units 08/07/21  0454   PTT seconds 30   INR  1 23*       Imaging Studies: I have personally reviewed pertinent reports  EKG, Pathology, and Other Studies: I have personally reviewed pertinent reports  Counseling / Coordination of Care  Total floor / unit time spent today 20 minutes  Greater than 50% of total time was spent with the patient and / or family counseling and / or coordination of care  A description of the counseling / coordination of care: focused H&P, epidural management, communication with primary team    Please note that the APS provides consultative services regarding pain management only  With the exception of ketamine, peripheral nerve catheters, and epidural infusions (and except when indicated), final decisions regarding starting or changing doses of analgesic medications are at the discretion of the consulting service  Off hours consultation and/or medication management is generally not available      Gayatri Silva MD  Acute Pain Service

## 2021-08-08 PROBLEM — D72.829 LEUKOCYTOSIS: Status: ACTIVE | Noted: 2021-08-08

## 2021-08-08 PROBLEM — D62 ACUTE BLOOD LOSS ANEMIA: Status: ACTIVE | Noted: 2021-08-08

## 2021-08-08 LAB
ANION GAP SERPL CALCULATED.3IONS-SCNC: 5 MMOL/L (ref 4–13)
BASOPHILS # BLD AUTO: 0.05 THOUSANDS/ΜL (ref 0–0.1)
BASOPHILS NFR BLD AUTO: 0 % (ref 0–1)
BUN SERPL-MCNC: 9 MG/DL (ref 5–25)
CA-I BLD-SCNC: 1.09 MMOL/L (ref 1.12–1.32)
CALCIUM SERPL-MCNC: 7.9 MG/DL (ref 8.3–10.1)
CHLORIDE SERPL-SCNC: 109 MMOL/L (ref 100–108)
CO2 SERPL-SCNC: 26 MMOL/L (ref 21–32)
CREAT SERPL-MCNC: 0.39 MG/DL (ref 0.6–1.3)
EOSINOPHIL # BLD AUTO: 0 THOUSAND/ΜL (ref 0–0.61)
EOSINOPHIL NFR BLD AUTO: 0 % (ref 0–6)
ERYTHROCYTE [DISTWIDTH] IN BLOOD BY AUTOMATED COUNT: 15.7 % (ref 11.6–15.1)
GFR SERPL CREATININE-BSD FRML MDRD: 107 ML/MIN/1.73SQ M
GLUCOSE SERPL-MCNC: 111 MG/DL (ref 65–140)
GLUCOSE SERPL-MCNC: 121 MG/DL (ref 65–140)
GLUCOSE SERPL-MCNC: 129 MG/DL (ref 65–140)
GLUCOSE SERPL-MCNC: 137 MG/DL (ref 65–140)
GLUCOSE SERPL-MCNC: 139 MG/DL (ref 65–140)
HCT VFR BLD AUTO: 22.3 % (ref 34.8–46.1)
HGB BLD-MCNC: 6.9 G/DL (ref 11.5–15.4)
HGB BLD-MCNC: 7.5 G/DL (ref 11.5–15.4)
HGB BLD-MCNC: 8.5 G/DL (ref 11.5–15.4)
IMM GRANULOCYTES # BLD AUTO: 0.33 THOUSAND/UL (ref 0–0.2)
IMM GRANULOCYTES NFR BLD AUTO: 1 % (ref 0–2)
LYMPHOCYTES # BLD AUTO: 1.24 THOUSANDS/ΜL (ref 0.6–4.47)
LYMPHOCYTES NFR BLD AUTO: 4 % (ref 14–44)
MAGNESIUM SERPL-MCNC: 1.9 MG/DL (ref 1.6–2.6)
MCH RBC QN AUTO: 28.8 PG (ref 26.8–34.3)
MCHC RBC AUTO-ENTMCNC: 33.6 G/DL (ref 31.4–37.4)
MCV RBC AUTO: 86 FL (ref 82–98)
MONOCYTES # BLD AUTO: 2.68 THOUSAND/ΜL (ref 0.17–1.22)
MONOCYTES NFR BLD AUTO: 8 % (ref 4–12)
NEUTROPHILS # BLD AUTO: 28.98 THOUSANDS/ΜL (ref 1.85–7.62)
NEUTS SEG NFR BLD AUTO: 87 % (ref 43–75)
NRBC BLD AUTO-RTO: 0 /100 WBCS
PHOSPHATE SERPL-MCNC: 2.2 MG/DL (ref 2.3–4.1)
PLATELET # BLD AUTO: 268 THOUSANDS/UL (ref 149–390)
PMV BLD AUTO: 10.7 FL (ref 8.9–12.7)
POTASSIUM SERPL-SCNC: 4 MMOL/L (ref 3.5–5.3)
RBC # BLD AUTO: 2.6 MILLION/UL (ref 3.81–5.12)
SODIUM SERPL-SCNC: 140 MMOL/L (ref 136–145)
WBC # BLD AUTO: 33.28 THOUSAND/UL (ref 4.31–10.16)

## 2021-08-08 PROCEDURE — 85025 COMPLETE CBC W/AUTO DIFF WBC: CPT | Performed by: PHYSICIAN ASSISTANT

## 2021-08-08 PROCEDURE — 80048 BASIC METABOLIC PNL TOTAL CA: CPT | Performed by: PHYSICIAN ASSISTANT

## 2021-08-08 PROCEDURE — 30233N1 TRANSFUSION OF NONAUTOLOGOUS RED BLOOD CELLS INTO PERIPHERAL VEIN, PERCUTANEOUS APPROACH: ICD-10-PCS | Performed by: STUDENT IN AN ORGANIZED HEALTH CARE EDUCATION/TRAINING PROGRAM

## 2021-08-08 PROCEDURE — 85018 HEMOGLOBIN: CPT | Performed by: STUDENT IN AN ORGANIZED HEALTH CARE EDUCATION/TRAINING PROGRAM

## 2021-08-08 PROCEDURE — 84100 ASSAY OF PHOSPHORUS: CPT | Performed by: PHYSICIAN ASSISTANT

## 2021-08-08 PROCEDURE — 94760 N-INVAS EAR/PLS OXIMETRY 1: CPT

## 2021-08-08 PROCEDURE — 82330 ASSAY OF CALCIUM: CPT | Performed by: PHYSICIAN ASSISTANT

## 2021-08-08 PROCEDURE — P9016 RBC LEUKOCYTES REDUCED: HCPCS

## 2021-08-08 PROCEDURE — 99232 SBSQ HOSP IP/OBS MODERATE 35: CPT | Performed by: ANESTHESIOLOGY

## 2021-08-08 PROCEDURE — 83735 ASSAY OF MAGNESIUM: CPT | Performed by: PHYSICIAN ASSISTANT

## 2021-08-08 PROCEDURE — 82948 REAGENT STRIP/BLOOD GLUCOSE: CPT

## 2021-08-08 PROCEDURE — 99291 CRITICAL CARE FIRST HOUR: CPT | Performed by: STUDENT IN AN ORGANIZED HEALTH CARE EDUCATION/TRAINING PROGRAM

## 2021-08-08 PROCEDURE — 94640 AIRWAY INHALATION TREATMENT: CPT

## 2021-08-08 PROCEDURE — 99024 POSTOP FOLLOW-UP VISIT: CPT | Performed by: OBSTETRICS & GYNECOLOGY

## 2021-08-08 PROCEDURE — C9113 INJ PANTOPRAZOLE SODIUM, VIA: HCPCS | Performed by: SURGERY

## 2021-08-08 RX ORDER — MAGNESIUM SULFATE HEPTAHYDRATE 40 MG/ML
2 INJECTION, SOLUTION INTRAVENOUS ONCE
Status: COMPLETED | OUTPATIENT
Start: 2021-08-08 | End: 2021-08-08

## 2021-08-08 RX ORDER — CALCIUM GLUCONATE 20 MG/ML
2 INJECTION, SOLUTION INTRAVENOUS ONCE
Status: COMPLETED | OUTPATIENT
Start: 2021-08-08 | End: 2021-08-08

## 2021-08-08 RX ADMIN — SODIUM CHLORIDE, SODIUM GLUCONATE, SODIUM ACETATE, POTASSIUM CHLORIDE, MAGNESIUM CHLORIDE, SODIUM PHOSPHATE, DIBASIC, AND POTASSIUM PHOSPHATE 75 ML/HR: .53; .5; .37; .037; .03; .012; .00082 INJECTION, SOLUTION INTRAVENOUS at 19:24

## 2021-08-08 RX ADMIN — HEPARIN SODIUM 5000 UNITS: 5000 INJECTION INTRAVENOUS; SUBCUTANEOUS at 21:19

## 2021-08-08 RX ADMIN — HYDROMORPHONE HYDROCHLORIDE 0.5 MG: 1 INJECTION, SOLUTION INTRAMUSCULAR; INTRAVENOUS; SUBCUTANEOUS at 05:09

## 2021-08-08 RX ADMIN — MAGNESIUM SULFATE HEPTAHYDRATE 2 G: 40 INJECTION, SOLUTION INTRAVENOUS at 09:51

## 2021-08-08 RX ADMIN — HEPARIN SODIUM 5000 UNITS: 5000 INJECTION INTRAVENOUS; SUBCUTANEOUS at 06:09

## 2021-08-08 RX ADMIN — HYDROMORPHONE HYDROCHLORIDE 0.5 MG: 1 INJECTION, SOLUTION INTRAMUSCULAR; INTRAVENOUS; SUBCUTANEOUS at 09:51

## 2021-08-08 RX ADMIN — FENTANYL CITRATE: 50 INJECTION INTRAMUSCULAR; INTRAVENOUS at 14:16

## 2021-08-08 RX ADMIN — MAGNESIUM SULFATE HEPTAHYDRATE 2 G: 40 INJECTION, SOLUTION INTRAVENOUS at 06:16

## 2021-08-08 RX ADMIN — PANTOPRAZOLE SODIUM 40 MG: 40 INJECTION, POWDER, FOR SOLUTION INTRAVENOUS at 09:52

## 2021-08-08 RX ADMIN — CALCIUM GLUCONATE 2 G: 20 INJECTION, SOLUTION INTRAVENOUS at 09:51

## 2021-08-08 RX ADMIN — SODIUM CHLORIDE, SODIUM GLUCONATE, SODIUM ACETATE, POTASSIUM CHLORIDE, MAGNESIUM CHLORIDE, SODIUM PHOSPHATE, DIBASIC, AND POTASSIUM PHOSPHATE 125 ML/HR: .53; .5; .37; .037; .03; .012; .00082 INJECTION, SOLUTION INTRAVENOUS at 06:09

## 2021-08-08 RX ADMIN — ALBUTEROL SULFATE 2.5 MG: 2.5 SOLUTION RESPIRATORY (INHALATION) at 08:24

## 2021-08-08 NOTE — UTILIZATION REVIEW
Initial Clinical Review    Elective OP surgical procedure ---> CONVERT TO INPATIENT  Age/Sex: 80 y o  female  Surgery Date: 8/6/2021  Surgeon(s) and Role:     * Melonie Garcia MD - Primary     * Park Ariza DO - Assisting     * Yvonne Jones MD - Assisting  Procedure:   LAPAROSCOPY DIAGNOSTIC (N/A)  LAPAROTOMY EXPLORATORY; OVER SEW PANCREAS TAIL (N/A)  ENBLOCK HYSTERECTOMY, BILATERAL SALPINGO-OOPHORECTOMY, SIGMOIDECTOMY WITH LOW RECTAL REANASTAMOSIS, BLADDER PERITONEAL STRIPPING AND CYSTOTOMY REPAIR, DIAPHRAGM STRIPPING, SMALL BOWEL RESECTION WITH RE-ANASTAMOSIS (N/A)  RADICAL OMENTECTOMY (N/A)  SPLENECTOMY (N/A)  APPENDECTOMY (N/A)     Anesthesia: General    Operative Findings: On diagnostic laparoscopy there was Minimal ascites is noted, large omental cake as well as sigmoid implants  Diaphragm appears clean  There was no peritoneal nodularity noted in decision was made to proceed with ex lap  On exploratory laparotomy a 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 was present  Spleen with surface nodularity  Bilateral diaphragms with approximately 1 cm nodules at the posterior aspect  Falciform ligament with tumor extending into liver  4 cm mass along the introitus fascia extending into the perirenal fat  Appendix with tumor at the base  Minimal nodules throughout the small bowel except a 4 cm mass which was adherent to the omentum requiring resection  Small uterus was noted densely adherent to the bladder with tumor nodularity with an obliterated posterior cul-de-sac and tumor along the lower sigmoid  1 cm cystotomy was created during resection and repaired primarily  Multiple air leak test were noted to be negative as well as a colonoscopy performed by dr Yajaira Jean with intact anastomosis  The anastomosis was noted to be tension free with healthy-appearing edges    There was small residual disease noted at the monalisa hepatis measuring less than 1 cm, no other gross residual disease was noted  Marifer Dennison DO:  Procedure and Technique:  Called intraoperatively for consultation by primary surgeon  Found to have 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 for tumor debulking surgery          POD#1 Progress Note:  In SICU POD #1 s/p diag lap converted to ex-lap, en-bloc hysterectomy, BSO, sigmoidectomy with low rectal anastomosis, SBR, radical omentectomy, splenectomy, appendectomy, oversewing of tail of pancreas, repair of cystotomy, bilateral peritoneal stripping  Pt received both crystalloid and colloid overnight for volume resuscitation  When seen this am she is intubated, on levophed 6mcg/min  She awakes and follows commands despite sedative infusions  Epidural in place  Continue SICU level care -- IVF's  SCDs/SQH  Npo  PT/OT    Admission Orders: Date/Time/Statement:   Admission Orders (From admission, onward)     Ordered        08/06/21 1905  Inpatient Admission  Once                   Orders Placed This Encounter   Procedures    Inpatient Admission     Standing Status:   Standing     Number of Occurrences:   1     Order Specific Question:   Level of Care     Answer:   Critical Care [15]     Order Specific Question:   Estimated length of stay     Answer:   More than 2 Midnights     Order Specific Question:   Certification     Answer:   I certify that inpatient services are medically necessary for this patient for a duration of greater than two midnights  See H&P and MD Progress Notes for additional information about the patient's course of treatment       Vital Signs:   /Time  Temp  Pulse  Resp  BP  MAP (mmHg)  Arterial Line BP  MAP  SpO2  Calculated FIO2 (%) - Nasal Cannula  Nasal Cannula O2 Flow Rate (L/min)  O2 Device   08/07/21 2200  --  112Abnormal   24Abnormal   117/59  84  106/60  78 mmHg  96 %  --  --  --   08/07/21 2100  --  106Abnormal   19  115/58  85  100/56  72 mmHg  96 %  --  --  --   08/07/21 2000  --  104  24Abnormal   108/53  69  -- --  95 %  28  2 L/min  Nasal cannula   08/07/21 1800  --  98  19  107/60  85  98/54  70 mmHg  95 %  --  --  --   08/07/21 1200  --  106Abnormal   21  117/60  78  94/60  76 mmHg  95 %  --  --  --   08/07/21 1117  --  --  --  --  --  --  --  --  44  6 L/min  Nasal cannula   08/07/21 0800  --  82  11Abnormal   99/55  69  74/60  68 mmHg  95 %  --  --  --   08/07/21 0600  --  84  13  117/58  83  122/58  82 mmHg  97 %  --  --  --   08/07/21 0500  --  102  18  122/77  98  98/74  86 mmHg  97 %  --  --  --   08/07/21 0400  --  80  14  82/52Abnormal   62  78/66  72 mmHg  99 %  --  --  --   08/07/21 0349  --  --  --  --  --  --  --  99 %  --  --  --   08/07/21 0300  --  82  13  83/47Abnormal   54  76/66  72 mmHg  98 %  --  --  --   08/07/21 0200  --  78  13  67/47Abnormal   53  64/54  60 mmHg  99 %  --  --  --   08/07/21 0000  --  78  12  83/61Abnormal   67  86/56  68 mmHg  100 %  --  --  --   08/06/21 2200  --  82  14  83/60Abnormal   73  90/76  84 mmHg  100 %  --  --  --   08/06/21 2100  --  84  13  87/57Abnormal   66  90/56  70 mmHg  99 %  --  --  --   08/06/21 2020  --  96  25Abnormal   103/55  65  80/52  66 mmHg  99 %  --  --  --   08/06/21 1906  --  --  --  --  --  --  --  100 %  --  --  Ventilator   08/06/21 1900  --  88  14  --  --  98/88  90 mmHg  100 %  --  --  --   08/06/21 0951  98 6 °F (37 °C)  102  16  146/72  --  --  --  94 %  --  --  None (Room air       Pertinent Labs/Diagnostic Test Results:   CXR 8/7 -- Moderate left effusion and left base atelectasis         Results from last 7 days   Lab Units 08/07/21  0422 08/06/21  1936 08/06/21  1419 08/06/21  1329   WBC Thousand/uL 27 32* 17 40*  --   --    HEMOGLOBIN g/dL 9 8* 9 7*  --   --    I STAT HEMOGLOBIN g/dl  --   --  8 8* 8 5*   HEMATOCRIT % 28 8* 29 2*  --   --    HEMATOCRIT, ISTAT %  --   --  26* 25*   PLATELETS Thousands/uL 224 182  --   --    NEUTROS ABS Thousands/µL  --  15 42*  --   --      Results from last 7 days   Lab Units 08/07/21  0959 08/07/21  0422 08/06/21  1936 08/06/21  1419 08/06/21  1329 08/06/21  1231   SODIUM mmol/L  --  137 138  --   --   --    POTASSIUM mmol/L  --  4 3 3 9  --   --   --    CHLORIDE mmol/L  --  111* 111*  --   --   --    CO2 mmol/L  --  21 19*  --   --   --    CO2, I-STAT mmol/L  --   --   --  15* 23 21   ANION GAP mmol/L  --  5 8  --   --   --    BUN mg/dL  --  7 7  --   --   --    CREATININE mg/dL  --  0 50* 0 39*  --   --   --    EGFR ml/min/1 73sq m  --  98 107  --   --   --    CALCIUM mg/dL  --  7 6* 7 6*  --   --   --    CALCIUM, IONIZED mmol/L 1 02*  --   --   --   --   --    CALCIUM, IONIZED, ISTAT mmol/L  --   --   --  0 96* 1 20 1 00*   MAGNESIUM mg/dL  --  1 3*  --   --   --   --    PHOSPHORUS mg/dL  --  3 5  --   --   --   --      Results from last 7 days   Lab Units 08/07/21 0422   AST U/L 46*   ALT U/L 26   ALK PHOS U/L 45*   TOTAL PROTEIN g/dL 4 6*   ALBUMIN g/dL 1 8*   TOTAL BILIRUBIN mg/dL 1 22*     Results from last 7 days   Lab Units 08/07/21  1743 08/07/21  1334 08/07/21  0541 08/06/21  2326 08/06/21 2014   POC GLUCOSE mg/dl 139 147* 174* 201* 228*     Results from last 7 days   Lab Units 08/07/21  0422 08/06/21  1936   GLUCOSE RANDOM mg/dL 172* 243*     Results from last 7 days   Lab Units 08/06/21 1937   PH ART  7 344*   PCO2 ART mm Hg 36 4   PO2 ART mm Hg 249 5*   HCO3 ART mmol/L 19 4*   BASE EXC ART mmol/L -5 7   O2 CONTENT ART mL/dL 15 0*   O2 HGB, ARTERIAL % 97 6*   ABG SOURCE  Line, Arterial     Results from last 7 days   Lab Units 08/06/21  1419 08/06/21  1329 08/06/21  1231   I STAT BASE EXC mmol/L -11* -3* -4*   I STAT O2 SAT % 98* 96* 97*   ISTAT PH ART  7 305* 7 383 7 385   I STAT ART PCO2 mm HG 28 6* 36 3 33 7*   I STAT ART PO2 mm  0 84 0 92 0   I STAT ART HCO3 mmol/L 14 2* 21 6* 20 2*     Results from last 7 days   Lab Units 08/07/21  0454 08/06/21  1936   PROTIME seconds 15 5* 17 8*   INR  1 23* 1 47*   PTT seconds 30 31     Results from last 7 days   Lab Units 08/06/21 1936 LACTIC ACID mmol/L 1 5           Scheduled Medications:  heparin (porcine), 5,000 Units, Subcutaneous, Q8H [address]  insulin lispro, 1-5 Units, Subcutaneous, Q6H ANA MARIA  pantoprazole, 40 mg, Intravenous, Q24H ANA MARIA  pravastatin, 10 mg, Oral, Daily With Dinner  sodium phosphate, 21 mmol, Intravenous, Once    Continuous IV Infusions:  multi-electrolyte, 75 mL/hr, Intravenous, Continuous  ropivacaine 0 1% and fentaNYL 2 mcg/mL, , Epidural, Continuous    PRN Meds:  albuterol, 2 5 mg, Nebulization, Q6H PRN  HYDROmorphone, 0 5 mg, Intravenous, Q2H PRN 8/6 x2, 8/7 x5  ondansetron, 4 mg, Intravenous, Q6H PRN  pneumococcal 13-valent conjugate vaccine, 0 5 mL, Intramuscular, Prior to discharge        Network Utilization Review Department  ATTENTION: Please call with any questions or concerns to [phone] and carefully listen to the prompts so that you are directed to the right person  All voicemails are confidential   [facility] all requests for admission clinical reviews, approved or denied determinations and any other requests to dedicated fax number below belonging to the campus where the patient is receiving treatment   List of dedicated fax numbers for the Facilities:  [address] DENIALS (Administrative/Medical Necessity) [phone]   [address] (Maternity/NICU/Pediatrics) [phone]   [address] [place] [address] [address] [place] [address] [address] [place] [place] [address] [address] [place] [address] Harjinder S[address] [place] [address] [phone]

## 2021-08-08 NOTE — PROGRESS NOTES
Daily Progress Note - Critical Care   Kayli Felder 80 y o  female MRN: [id number]  Unit/Bed#: [facility] 516-01 Encounter: [id number]        ----------------------------------------------------------------------------------------  HPI/24hr events:   · Extubated yesterday  · Weaned off vasopressors  ---------------------------------------------------------------------------------------  SUBJECTIVE  "Everything hurts"    Review of Systems   Constitutional: Positive for fatigue  Respiratory: Negative for shortness of breath  Difficulty taking deep breath   Cardiovascular: Negative for chest pain and leg swelling  Gastrointestinal: Positive for abdominal pain  Negative for nausea  Review of systems was reviewed and negative unless stated above in HPI/24-hour events   ---------------------------------------------------------------------------------------  Assessment and Plan:    Neuro: Acute pain   Sleep/wake cycle regulation   CAM-ICU BID   Neuro checks q4h   APS following  o Ropivacaine/Fentanyl PCEA in place  o Dilaudid 0 5 mg IV q2h PRN (6 doses/24h)    CV: Hypotension - resolved, Hx HTN, HLD   Pravastatin when able to take PO   Maintain MAP >65   Home antihypertensives currently on hold - restart when appropriate    Pulm: Asthma, acute hypoxic pulmonary insuffiency   Intubated for OR, extubated 8/7   Currently on 2L NC   Maintain SpO2 >92%   PRN albuterol   Continue pulmonary hygiene  Incentive spirometer q1h while awake, encourage coughing and deep breathing   Upright positioning    GI: Peritoneal carcinomatosis POD #2 s/p diag lap to ex-lap, en-bloc hysterectomy, BSO, sigmoidectomy with low rectal anastomosis, SBR, radical omentectomy, splenectomy, appendectomy, oversewing of tail of pancreas, bilateral peritoneal stripping; GERD   Monitor OSCAR drain output   Serial abdominal exams   NGT/NPO - advance per primary team   Stress ulcer prophylaxis: Protonix 40 mg IV daily    : S/p PROMISE/BSO, cystostomy over sow   Paulino to remain in place for 7d   Strict q2h I/O monitoring   Continue to follow renal function tests    F/E/N:    Maintenance fluids: Isolyte 125 ml/hr - decrease to 75 ml/hr   Replete electrolytes with as needed to maintain K >4 0, Mag >2 0, Phos >3 0   Nutrition: NPO, consider ice chips/clears    Heme/Onc: Acute blood loss anemia   Received 4 PRBCs, 2 FFP intraoperatively   Transfuse for hemoglobin <7 0   VTE prophylaxis: SQH, SCD's to BLE    Endo/Rheum: Hyperglycemia   SSI algorithm 1 with q6h accuchecks   Adjust insulin regimen as needed to maintain goal -180    ID: S/p splenectomy   Needs post-splenectomy vaccines prior to discharge    Continue to monitor fever and WBC curve     MSK/Skin:    Consult PT/OT   Reposition q2h, eliminate pressure points while in bed   Close skin surveillance       Patient does not meet criteria for ICU Follow-up Clinic; referral has not been made  Disposition: Transfer to Stepdown Level 2, d/c arterial line  Code Status: No Order  ---------------------------------------------------------------------------------------  ICU CORE MEASURES    Prophylaxis   VTE Pharmacologic Prophylaxis: Heparin  VTE Mechanical Prophylaxis: sequential compression device  Stress Ulcer Prophylaxis: Pantoprazole IV     Invasive Devices Review  Invasive Devices     Peripheral Intravenous Line            Peripheral IV 08/06/21 Left Wrist 1 day    Peripheral IV 08/06/21 Right Arm 1 day          Epidural Line            Epidural Catheter 08/06/21 1 day          Arterial Line            Arterial Line 08/06/21 Right Radial 1 day          Drain            NG/OG/Enteral Tube Nasogastric Left nares 2 days    Closed/Suction Drain LLQ 19 Fr  1 day    Closed/Suction Drain RLQ Bulb 19 Fr  1 day    Urethral Catheter Non-latex 16 Fr  1 day              Can any invasive devices be discontinued today?  Yes - discontinue arterial line  ---------------------------------------------------------------------------------------  OBJECTIVE    Vitals   Vitals:    21 2200 21 2300 21 0000 21 0100   BP: 117/59 103/59 114/53 109/57   Pulse: (!) 112 (!) 108 (!) 116 (!) 110   Resp: (!) 24 22 (!) 23 18   Temp:       TempSrc:       SpO2: 96% 96% 93% 94%   Weight:       Height:         Temp (24hrs), Av 1 °F (37 3 °C), Min:99 1 °F (37 3 °C), Max:99 1 °F (37 3 °C)  Current: Temperature: 99 1 °F (37 3 °C)    Respiratory:  SpO2: SpO2: 94 %, SpO2 Device: O2 Device: Nasal cannula  Nasal Cannula O2 Flow Rate (L/min): 2 L/min    Physical Exam  Constitutional:       General: She is not in acute distress  HENT:      Head: Normocephalic and atraumatic  Nose:      Comments: NGT in place     Mouth/Throat:      Mouth: Mucous membranes are dry  Cardiovascular:      Rate and Rhythm: Regular rhythm  Tachycardia present  Abdominal:      General: Bowel sounds are normal  There is distension  Tenderness: There is generalized abdominal tenderness (mild)  Comments: Midline abdominal dressing in place  OSCAR drain x 2 with serosanguinous drainage   Genitourinary:     Comments: + irving  Musculoskeletal:      Right lower le+ Pitting Edema present  Left lower le+ Pitting Edema present  Skin:     General: Skin is warm and dry  Capillary Refill: Capillary refill takes less than 2 seconds  Neurological:      General: No focal deficit present  Mental Status: She is alert and oriented to person, place, and time           Laboratory and Diagnostics:  Results from last 7 days   Lab Units 21  0425 21  0422 21  1936 21  1419 21  1329 21  1231   WBC Thousand/uL 33 28* 27 32* 17 40*  --   --   --    HEMOGLOBIN g/dL 7 5* 9 8* 9 7*  --   --   --    I STAT HEMOGLOBIN g/dl  --   --   --  8 8* 8 5* 5 8*   HEMATOCRIT % 22 3* 28 8* 29 2*  --   --   --    HEMATOCRIT, ISTAT %  --   --   --  26* 25* 17*   PLATELETS Thousands/uL 268 224 182  --   --   --    NEUTROS PCT % 87*  --  88*  --   --   --    MONOS PCT % 8  --  7  --   --   --      Results from last 7 days   Lab Units 08/08/21 0425 08/07/21  0422 08/06/21  1936 08/06/21  1419 08/06/21  1329 08/06/21  1231   SODIUM mmol/L 140 137 138  --   --   --    POTASSIUM mmol/L 4 0 4 3 3 9  --   --   --    CHLORIDE mmol/L 109* 111* 111*  --   --   --    CO2 mmol/L 26 21 19*  --   --   --    CO2, I-STAT mmol/L  --   --   --  15* 23 21   ANION GAP mmol/L 5 5 8  --   --   --    BUN mg/dL 9 7 7  --   --   --    CREATININE mg/dL 0 39* 0 50* 0 39*  --   --   --    CALCIUM mg/dL 7 9* 7 6* 7 6*  --   --   --    GLUCOSE RANDOM mg/dL 129 172* 243*  --   --   --    ALT U/L  --  26  --   --   --   --    AST U/L  --  46*  --   --   --   --    ALK PHOS U/L  --  45*  --   --   --   --    ALBUMIN g/dL  --  1 8*  --   --   --   --    TOTAL BILIRUBIN mg/dL  --  1 22*  --   --   --   --      Results from last 7 days   Lab Units 08/08/21 0425 08/07/21  0422   MAGNESIUM mg/dL 1 9 1 3*   PHOSPHORUS mg/dL 2 2* 3 5      Imaging:  I have personally reviewed pertinent reports  and I have personally reviewed pertinent films in PACS    Intake and Output  I/O       08/06 0701 - 08/07 0700 08/07 0701 - 08/08 0700    I V  (mL/kg) 02655 8 (139 8) 2912 3 (37 8)    Blood 1900     NG/GT  0    IV Piggyback 1150 200    Total Intake(mL/kg) 45398 8 (179 4) 3112 3 (40 4)    Urine (mL/kg/hr) 1085 1205 (0 7)    Emesis/NG output  0    Drains 930 435    Blood 1600     Total Output 3615 1640    Net +29533 8 +1472 3              UOP: 50 ml/hr     Height and Weights   Height: 4' 11" (149 9 cm)  IBW (Ideal Body Weight): 43 2 kg  Body mass index is 34 34 kg/m²    Weight (last 2 days)     Date/Time   Weight    08/06/21 1016   77 1 (170)                Nutrition       Diet Orders   (From admission, onward)             Start     Ordered    08/06/21 1906  Diet NPO  Diet effective now     Question Answer Comment Diet Type NPO    RD to adjust diet per protocol?  No        08/06/21 1905              Active Medications  Scheduled Meds:  Current Facility-Administered Medications   Medication Dose Route Frequency Provider Last Rate    albuterol  2 5 mg Nebulization Q6H PRN Faizan Lane MD      heparin (porcine)  5,000 Units Subcutaneous [facility] Matt Calderón MD      HYDROmorphone  0 5 mg Intravenous Q2H PRN Chace Merchant MD      insulin lispro  1-5 Units Subcutaneous Q6H [facility] Faizan Lane MD      magnesium sulfate  2 g Intravenous Once Riley Chan PA-C      multi-electrolyte  125 mL/hr Intravenous Continuous Matt Calderón  mL/hr (08/07/21 2217)    norepinephrine  1-30 mcg/min Intravenous Titrated Faizan Lane MD Stopped (08/07/21 1615)    ondansetron  4 mg Intravenous Q6H PRN Matt Calderón MD      pantoprazole  40 mg Intravenous Q24H [facility] Faizan Lane MD      pneumococcal 13-valent conjugate vaccine  0 5 mL Intramuscular Prior to discharge Loreto Wade PA-C      pravastatin  10 mg Oral Daily With [employer]      ropivacaine 0 1% and fentaNYL 2 mcg/mL   Epidural Continuous Addison Duncan MD      sodium phosphate  21 mmol Intravenous Once Riley Chan PA-C       Continuous Infusions:  multi-electrolyte, 125 mL/hr, Last Rate: 125 mL/hr (08/07/21 2217)  norepinephrine, 1-30 mcg/min, Last Rate: Stopped (08/07/21 1615)  ropivacaine 0 1% and fentaNYL 2 mcg/mL,       PRN Meds:   albuterol, 2 5 mg, Q6H PRN  HYDROmorphone, 0 5 mg, Q2H PRN  ondansetron, 4 mg, Q6H PRN  pneumococcal 13-valent conjugate vaccine, 0 5 mL, Prior to discharge        Allergies   Allergies   Allergen Reactions    Erythromycin Nausea Only    Morphine Headache     ---------------------------------------------------------------------------------------  Advance Directive and Living Will:      Power of :    POLST: ---------------------------------------------------------------------------------------  Care Time Delivered:   No Critical Care time spent     Orcrys Briseno PA-C      Portions of the record may have been created with voice recognition software  Occasional wrong word or "sound a like" substitutions may have occurred due to the inherent limitations of voice recognition software    Read the chart carefully and recognize, using context, where substitutions have occurred

## 2021-08-08 NOTE — CASE MANAGEMENT
PT IS NOT A 30 DAYS READMISSION  PT IS NOT A BUNDLE  CM met with pt to discuss DCP and cm role  Pt lives with spouse in a ranch home with no antonio  Prior to admission pt was independent with ambulation and with ADLS  No prior hx of VNA  Pt's preference for pharmacy is [facility]  Pt has no POA/LW documents  Denies any hx of drugs/ETOh or inpt psych stays  CM reviewed d/c planning process including the following: identifying help at home, patient preference for d/c planning needs, Discharge Lounge, [facility] Meds to Bed program, availability of treatment team to discuss questions or concerns patient and/or family may have regarding understanding medications and recognizing signs and symptoms once discharged  CM also encouraged patient to follow up with all recommended appointments after discharge  Patient advised of importance for patient and family to participate in managing patients medical well being

## 2021-08-08 NOTE — PROGRESS NOTES
Progress Note - Acute Pain Service    Tiana Meier 80 y o  female MRN: [id number]  Unit/Bed#: [facility] 516-01 Encounter: [id number]      Assessment:   Principal Problem:    Peritoneal carcinomatosis ([address] )  Active Problems:    Asthma    Hypertension    Cancer related pain    Acute blood loss anemia    Leukocytosis    79 yo femal POD #2 s/p ex lap, hysterectomy, placement of epidural  Her hypotension resolved overnight and she had significant pain  Her epidural rate was increased this AM and she has had significant relief  She has only used the PCEA since  Epidural site assessed and is fine  Plan:   1  Continue epidural at new rate 10/5/10/3   2  Continue dilaudid 0 5 mg IV q2h PRN breakthrough  3  Anticipate epidural continuing as the NG tube is still in place  APS will continue to follow; please contact APS ( btwn [phone]) with any further questions    Pain History  Current pain location(s): abdomen  Pain Scale:   5/10  Quality: burning  24 hour history: resolved hypotension    Opioid requirement previous 24 hours: 6 doses of dilaudid 0 5 mg IV    Meds/Allergies   all current active meds have been reviewed    Allergies   Allergen Reactions    Erythromycin Nausea Only    Morphine Headache       Objective     Temp:  [98 6 °F (37 °C)-99 1 °F (37 3 °C)] 98 8 °F (37 1 °C)  HR:  [] 104  Resp:  [18-26] 23  BP: (103-120)/(53-67) 113/54  Arterial Line BP: ()/(46-82) 92/46    Physical Exam  Constitutional:       Appearance: Normal appearance  HENT:      Head: Normocephalic  Nose:      Comments: NG tube in situ     Mouth/Throat:      Mouth: Mucous membranes are moist    Pulmonary:      Effort: Pulmonary effort is normal    Abdominal:      General: Abdomen is flat  Musculoskeletal:      Cervical back: Normal range of motion  Skin:     General: Skin is warm  Neurological:      General: No focal deficit present  Mental Status: She is alert     Psychiatric:         Mood and Affect: Mood normal          Behavior: Behavior normal          Thought Content: Thought content normal          Judgment: Judgment normal          Lab Results:   Results from last 7 days   Lab Units 08/08/21  1241 08/08/21  0425   WBC Thousand/uL  --  33 28*   HEMOGLOBIN g/dL 6 9* 7 5*   HEMATOCRIT %  --  22 3*   PLATELETS Thousands/uL  --  268      Results from last 7 days   Lab Units 08/08/21  0425 08/07/21  0422 08/06/21  1419   POTASSIUM mmol/L 4 0 4 3  --    CHLORIDE mmol/L 109* 111*  --    CO2 mmol/L 26 21  --    CO2, I-STAT mmol/L  --   --  15*   BUN mg/dL 9 7  --    CREATININE mg/dL 0 39* 0 50*  --    CALCIUM mg/dL 7 9* 7 6*  --    ALK PHOS U/L  --  45*  --    ALT U/L  --  26  --    AST U/L  --  46*  --    GLUCOSE, ISTAT mg/dl  --   --  160*       Imaging Studies: I have personally reviewed pertinent reports  EKG, Pathology, and Other Studies: I have personally reviewed pertinent reports  Counseling / Coordination of Care  Total floor / unit time spent today 20 minutes  Greater than 50% of total time was spent with the patient and / or family counseling and / or coordination of care  A description of the counseling / coordination of care: Expectations of post operative pain      Gladys Toledo MD

## 2021-08-08 NOTE — PROGRESS NOTES
Progress Note - General Surgery   Tom Blackwell 80 y o  female MRN: [id number]  Unit/Bed#: [facility] 163-00 Encounter: [id number]    Assessment:  Patient is postop from primary ovarian cancer debulking status post diagnostic laparoscopy, exploratory laparotomy, on block hysterectomy bilateral salpingo-oophorectomy sigmoid ectomy with low rectal reanastomosis and over-sewing of bladder peritoneal stripping, cystotomy, and diaphragm stripping small bowel resection and reanastomosis radical omentectomy splenectomy appendectomy    Patient is now postoperative day 2  She is off pressors  Her pain is better controlled with epidural    Plan:  One  Ovarian cancer  Patient is status post optimal debulking  Await final pathology reports then likely plan chemotherapy    2  Postoperative care  Patient's hemoglobin remained stable  Her pain is well controlled with epidural will adjust  Continue NG tube will start sips and ice chips for comfort  Continue the Paulino catheter for 2 weeks secondary to cystotomy  Consider out of ICU    Subjective/Objective   Chief Complaint:  Postoperative day 2 primary ovarian cancer debulking    Subjective:  Patient is alert interactive and understanding of surgery and postoperative care  She had pain last night which was improved with initiation of her epidural   This is again to be adjusted this morning  She feels very dry and thirsty  Blood pressure 120/65, pulse (!) 108, temperature 98 8 °F (37 1 °C), temperature source Oral, resp  rate (!) 26, height 4' 11" (1 499 m), weight 77 1 kg (170 lb), SpO2 95 %  ,Body mass index is 34 34 kg/m²        Intake/Output Summary (Last 24 hours) at 8/8/2021 1045  Last data filed at 8/8/2021 0935  Gross per 24 hour   Intake 3462 46 ml   Output 1630 ml   Net 1832 46 ml       Invasive Devices     Peripheral Intravenous Line            Peripheral IV 08/06/21 Left Wrist 2 days    Peripheral IV 08/06/21 Right Arm 1 day          Epidural Line Epidural Catheter 08/06/21 1 day          Arterial Line            Arterial Line 08/06/21 Right Radial 1 day          Drain            NG/OG/Enteral Tube Nasogastric Left nares 2 days    Closed/Suction Drain LLQ 19 Fr  1 day    Closed/Suction Drain RLQ Bulb 19 Fr  1 day    Urethral Catheter Non-latex 16 Fr  1 day                Physical Exam: /65   Pulse (!) 108   Temp 98 8 °F (37 1 °C) (Oral)   Resp (!) 26   Ht 4' 11" (1 499 m)   Wt 77 1 kg (170 lb)   SpO2 95%   BMI 34 34 kg/m²     General Appearance:    Alert, cooperative, no distress, appears stated age   Head:    Normocephalic, without obvious abnormality, atraumatic   Eyes:    PERRL, conjunctiva/corneas clear, EOM's intact, fundi     benign, both eyes   Ears:    Normal TM's and external ear canals, both ears   Nose:   Nares normal, septum midline, mucosa normal, no drainage    or sinus tenderness   Throat:   Lips, mucosa, and tongue normal; teeth and gums normal   Neck:   Supple, symmetrical, trachea midline, no adenopathy;     thyroid:  no enlargement/tenderness/nodules; no carotid    bruit or JVD   Back:     Symmetric, no curvature, ROM normal, no CVA tenderness   Lungs:     Clear to auscultation bilaterally, respirations unlabored   Chest Wall:    No tenderness or deformity    Heart:    Regular rate and rhythm, S1 and S2 normal, no murmur, rub   or gallop       Abdomen:     Soft, non-tender, bowel sounds active all four quadrants,     no masses, no organomegaly incision clean dry intact           Extremities:   Extremities normal, atraumatic, no cyanosis or edema   Pulses:   2+ and symmetric all extremities   Skin:   Skin color, texture, turgor normal, no rashes or lesions   Lymph nodes:   Cervical, supraclavicular, and axillary nodes normal   Neurologic:   CNII-XII intact, normal strength, sensation and reflexes     throughout       Lab, Imaging and other studies:  CBC:   Lab Results   Component Value Date    WBC 33 28 (HH) 08/08/2021    HGB 7 5 (L) 08/08/2021    HCT 22 3 (L) 08/08/2021    MCV 86 08/08/2021     08/08/2021    MCH 28 8 08/08/2021    MCHC 33 6 08/08/2021    RDW 15 7 (H) 08/08/2021    MPV 10 7 08/08/2021    NRBC 0 08/08/2021   , CMP:   Lab Results   Component Value Date    SODIUM 140 08/08/2021    K 4 0 08/08/2021     (H) 08/08/2021    CO2 26 08/08/2021    BUN 9 08/08/2021    CREATININE 0 39 (L) 08/08/2021    CALCIUM 7 9 (L) 08/08/2021    EGFR 107 08/08/2021     VTE Pharmacologic Prophylaxis: Heparin  VTE Mechanical Prophylaxis: sequential compression device

## 2021-08-09 LAB
ABO GROUP BLD BPU: NORMAL
ANION GAP SERPL CALCULATED.3IONS-SCNC: 6 MMOL/L (ref 4–13)
BASE EXCESS BLDA CALC-SCNC: -5 MMOL/L (ref -2–3)
BASOPHILS # BLD AUTO: 0.04 THOUSANDS/ΜL (ref 0–0.1)
BASOPHILS NFR BLD AUTO: 0 % (ref 0–1)
BPU ID: NORMAL
BUN SERPL-MCNC: 8 MG/DL (ref 5–25)
CA-I BLD-SCNC: 1.36 MMOL/L (ref 1.12–1.32)
CALCIUM SERPL-MCNC: 8.2 MG/DL (ref 8.3–10.1)
CHLORIDE SERPL-SCNC: 106 MMOL/L (ref 100–108)
CO2 SERPL-SCNC: 29 MMOL/L (ref 21–32)
CREAT SERPL-MCNC: 0.25 MG/DL (ref 0.6–1.3)
CROSSMATCH: NORMAL
EOSINOPHIL # BLD AUTO: 0.08 THOUSAND/ΜL (ref 0–0.61)
EOSINOPHIL NFR BLD AUTO: 0 % (ref 0–6)
ERYTHROCYTE [DISTWIDTH] IN BLOOD BY AUTOMATED COUNT: 15.5 % (ref 11.6–15.1)
FIO2 GAS DIL.REBREATH: 60 L
GFR SERPL CREATININE-BSD FRML MDRD: 124 ML/MIN/1.73SQ M
GLUCOSE SERPL-MCNC: 195 MG/DL (ref 65–140)
GLUCOSE SERPL-MCNC: 77 MG/DL (ref 65–140)
GLUCOSE SERPL-MCNC: 83 MG/DL (ref 65–140)
GLUCOSE SERPL-MCNC: 88 MG/DL (ref 65–140)
GLUCOSE SERPL-MCNC: 93 MG/DL (ref 65–140)
GLUCOSE SERPL-MCNC: 96 MG/DL (ref 65–140)
HCO3 BLDA-SCNC: 20.6 MMOL/L (ref 22–28)
HCT VFR BLD AUTO: 25.4 % (ref 34.8–46.1)
HCT VFR BLD CALC: 27 % (ref 34.8–46.1)
HGB BLD-MCNC: 8.3 G/DL (ref 11.5–15.4)
HGB BLDA-MCNC: 9.2 G/DL (ref 11.5–15.4)
IMM GRANULOCYTES # BLD AUTO: 0.37 THOUSAND/UL (ref 0–0.2)
IMM GRANULOCYTES NFR BLD AUTO: 1 % (ref 0–2)
LYMPHOCYTES # BLD AUTO: 1.18 THOUSANDS/ΜL (ref 0.6–4.47)
LYMPHOCYTES NFR BLD AUTO: 4 % (ref 14–44)
MAGNESIUM SERPL-MCNC: 2.1 MG/DL (ref 1.6–2.6)
MCH RBC QN AUTO: 28.7 PG (ref 26.8–34.3)
MCHC RBC AUTO-ENTMCNC: 32.7 G/DL (ref 31.4–37.4)
MCV RBC AUTO: 88 FL (ref 82–98)
MONOCYTES # BLD AUTO: 2.02 THOUSAND/ΜL (ref 0.17–1.22)
MONOCYTES NFR BLD AUTO: 7 % (ref 4–12)
NEUTROPHILS # BLD AUTO: 24.66 THOUSANDS/ΜL (ref 1.85–7.62)
NEUTS SEG NFR BLD AUTO: 88 % (ref 43–75)
NRBC BLD AUTO-RTO: 0 /100 WBCS
PCO2 BLD: 117 MM HG
PCO2 BLD: 22 MMOL/L (ref 21–32)
PCO2 BLD: 37.3 MM HG (ref 36–44)
PCO2 BLDA: 35.7 MM HG
PH BLD: 7.35 [PH] (ref 7.35–7.45)
PH BLD: 7.36 [PH]
PHOSPHATE SERPL-MCNC: 2.3 MG/DL (ref 2.3–4.1)
PLATELET # BLD AUTO: 345 THOUSANDS/UL (ref 149–390)
PMV BLD AUTO: 10.4 FL (ref 8.9–12.7)
PO2 BLD: 124 MM HG (ref 75–129)
POTASSIUM BLD-SCNC: 3.9 MMOL/L (ref 3.5–5.3)
POTASSIUM SERPL-SCNC: 3.3 MMOL/L (ref 3.5–5.3)
RBC # BLD AUTO: 2.89 MILLION/UL (ref 3.81–5.12)
SAO2 % BLD FROM PO2: 99 % (ref 60–85)
SODIUM BLD-SCNC: 137 MMOL/L (ref 136–145)
SODIUM SERPL-SCNC: 141 MMOL/L (ref 136–145)
SPECIMEN SOURCE: ABNORMAL
UNIT DISPENSE STATUS: NORMAL
UNIT PRODUCT CODE: NORMAL
UNIT PRODUCT VOLUME: 350 ML
UNIT RH: NORMAL
WBC # BLD AUTO: 28.35 THOUSAND/UL (ref 4.31–10.16)

## 2021-08-09 PROCEDURE — 99232 SBSQ HOSP IP/OBS MODERATE 35: CPT | Performed by: ANESTHESIOLOGY

## 2021-08-09 PROCEDURE — 97163 PT EVAL HIGH COMPLEX 45 MIN: CPT

## 2021-08-09 PROCEDURE — C9113 INJ PANTOPRAZOLE SODIUM, VIA: HCPCS | Performed by: SURGERY

## 2021-08-09 PROCEDURE — 94760 N-INVAS EAR/PLS OXIMETRY 1: CPT

## 2021-08-09 PROCEDURE — 83735 ASSAY OF MAGNESIUM: CPT | Performed by: PHYSICIAN ASSISTANT

## 2021-08-09 PROCEDURE — 99232 SBSQ HOSP IP/OBS MODERATE 35: CPT | Performed by: SURGERY

## 2021-08-09 PROCEDURE — 84100 ASSAY OF PHOSPHORUS: CPT | Performed by: PHYSICIAN ASSISTANT

## 2021-08-09 PROCEDURE — 99024 POSTOP FOLLOW-UP VISIT: CPT | Performed by: OBSTETRICS & GYNECOLOGY

## 2021-08-09 PROCEDURE — 80048 BASIC METABOLIC PNL TOTAL CA: CPT | Performed by: PHYSICIAN ASSISTANT

## 2021-08-09 PROCEDURE — 97167 OT EVAL HIGH COMPLEX 60 MIN: CPT

## 2021-08-09 PROCEDURE — 82948 REAGENT STRIP/BLOOD GLUCOSE: CPT

## 2021-08-09 PROCEDURE — 85025 COMPLETE CBC W/AUTO DIFF WBC: CPT | Performed by: PHYSICIAN ASSISTANT

## 2021-08-09 PROCEDURE — 94668 MNPJ CHEST WALL SBSQ: CPT

## 2021-08-09 RX ORDER — POTASSIUM CHLORIDE 14.9 MG/ML
40 INJECTION INTRAVENOUS
Status: COMPLETED | OUTPATIENT
Start: 2021-08-09 | End: 2021-08-09

## 2021-08-09 RX ADMIN — POTASSIUM PHOSPHATE, MONOBASIC AND POTASSIUM PHOSPHATE, DIBASIC 21 MMOL: 224; 236 INJECTION, SOLUTION, CONCENTRATE INTRAVENOUS at 08:29

## 2021-08-09 RX ADMIN — POTASSIUM CHLORIDE 20 MEQ: 14.9 INJECTION, SOLUTION INTRAVENOUS at 08:21

## 2021-08-09 RX ADMIN — Medication 1 SPRAY: at 01:57

## 2021-08-09 RX ADMIN — PANTOPRAZOLE SODIUM 40 MG: 40 INJECTION, POWDER, FOR SOLUTION INTRAVENOUS at 08:22

## 2021-08-09 RX ADMIN — FENTANYL CITRATE: 50 INJECTION INTRAMUSCULAR; INTRAVENOUS at 16:16

## 2021-08-09 RX ADMIN — SODIUM CHLORIDE, SODIUM GLUCONATE, SODIUM ACETATE, POTASSIUM CHLORIDE, MAGNESIUM CHLORIDE, SODIUM PHOSPHATE, DIBASIC, AND POTASSIUM PHOSPHATE 75 ML/HR: .53; .5; .37; .037; .03; .012; .00082 INJECTION, SOLUTION INTRAVENOUS at 22:29

## 2021-08-09 RX ADMIN — HEPARIN SODIUM 5000 UNITS: 5000 INJECTION INTRAVENOUS; SUBCUTANEOUS at 22:25

## 2021-08-09 RX ADMIN — HEPARIN SODIUM 5000 UNITS: 5000 INJECTION INTRAVENOUS; SUBCUTANEOUS at 14:18

## 2021-08-09 RX ADMIN — POTASSIUM CHLORIDE 20 MEQ: 14.9 INJECTION, SOLUTION INTRAVENOUS at 11:14

## 2021-08-09 RX ADMIN — FENTANYL CITRATE: 50 INJECTION INTRAMUSCULAR; INTRAVENOUS at 03:27

## 2021-08-09 RX ADMIN — HEPARIN SODIUM 5000 UNITS: 5000 INJECTION INTRAVENOUS; SUBCUTANEOUS at 05:47

## 2021-08-09 NOTE — PLAN OF CARE
Problem: OCCUPATIONAL THERAPY ADULT  Goal: Performs self-care activities at highest level of function for planned discharge setting  See evaluation for individualized goals  Description: Treatment Interventions: ADL retraining, Functional transfer training, UE strengthening/ROM, Endurance training, Patient/family training, Equipment evaluation/education, Compensatory technique education, Activityengagement, Energy conservation          See flowsheet documentation for full assessment, interventions and recommendations  Note: Limitation: Decreased ADL status, Decreased Safe judgement during ADL, Decreased endurance, Decreased high-level ADLs  Prognosis: Good  Assessment: Pt is a 79 yo female seen for OT eval s/p adm to [facility] on 8/6/21 dx'd w/ peritoneal carcinomatosis  Pt now s/p lap to ex-lap, en-bloc hysterectomy, BSO, sigmoidectomy with low rectal anastomosis, SBR, radical omentectomy, splenectomy, appendectomy, oversewing of tail of pancreas, bilateral peritoneal stripping 8/6/21  Pt  has a past medical history of Acid reflux, Asthma, Cancer ([facility] [address] ), GERD (gastroesophageal reflux disease), Hypercholesteremia, Hypertension, and Migraine  Pt with active OT orders and activity as tolerated orders  Pt works FT and resides w/ spouse in [place] w/ 0STE  Pt's spouse will be home to provide A as needed  Pt was I w/ ADLS and IADLS, drove, & required no use of DME PTA  Pt is currently demonstrating the following occupational deficits: Min A UB bathing/dressing, Mod A LB bathing/dressing and toileting, Mod A bed mobility, Min A functional transfers and mobility EOB>chair w/ RW  These deficits that are impacting pt's baseline areas of occupation are a result of the following impairments: pain, endurance, activity tolerance, functional mobility, forward functional reach, balance, functional standing tolerance, unsupportive home environment, decreased I w/ ADLS/IADLS and strength   The following Occupational Performance Areas to address include: grooming, bathing/shower, toilet hygiene, dressing, health maintenance, functional mobility and clothing management  Based on the aforementioned OT evaluation, functional performance deficits, and assessments, pt has been identified as a high complexity evaluation  Recommend home with family support and home OT pending progress upon D/C  The patient's raw score on the AM-PAC Daily Activity inpatient short form is 16, standardized score is 35 96, less than 39 4  Patients at this level are likely to benefit from discharge to post-acute rehabilitation services  Please refer to the recommendation of the Occupational Therapist for safe discharge planning   Pt to continue to benefit from acute immediate OT services to address the following goals 3-5x/week to  w/in 7-10 days:      OT Discharge Recommendation: Home with home health rehabilitation (home OT pending progress)  OT - OK to Discharge: Yes (when medically cleared)

## 2021-08-09 NOTE — PROGRESS NOTES
Gyn Oncology Progress note   Carlito Cordova 80 y o  female MRN: [id number]  Unit/Bed#: [facility] 516-01 Encounter: [id number]    Assessment: 80 y o  POD# 3 from Primary ovarian cancer debulking status post diagnostic laparoscopy, exploratory laparotomy, on block hysterectomy bilateral salpingo-oophorectomy sigmoid ectomy with low rectal reanastomosis and over-sewing of bladder peritoneal stripping, cystotomy, and diaphragm stripping small bowel resection and reanastomosis radical omentectomy splenectomy appendectomy    Plan:    1) Postsurgical Management after primary debulking due to adenocarcinoma, unknown primary   - Hgb 8 3 today   Yesterday 6 9--> 1 u RBC  - Urinary output 0 8 cc/kg/h  - Vasopressors  discontinued   - NGT output 300 cc, clear, pink fluid, denies nausea  - OSCAR drain bilateral 210 cc clear serosanguinous fluid   - Toleration ice chips/no passing gas yet  - No peritoneal irritative signs  - Surgical incision without peripheral irritation or oozing   - Pending final  pathology reports     2) Pain control   - Epidural , dilaudid BT pain     3) DVT prophylaxis   - SCDs, heparin      4) Acute loss anemia   - S/P 4 U RBC + 2 FFP intraop, Hg yesterday 6 9, received 1 u RBC  - HR  100s  -160/60-70     5) S/p  splenectomy  - Pending  pneumococcal 13 valent vaccine   - S/P HI and menigoccoccal vaccine  - Afebrile     6) Hx HTN , HLD  - Holding home medication     7) Hx Asthma  - Extubated 8/7, saturations >92% with NC 4 L    8) S/p cystostomy  - Irving to be in place for 2 weeks        Subjective:    Carlito Cordova has no current complaints  Pain is well controlled  Patient has a irving in place  She is not ambulating  Patient is not currently passing flatus and has had no bowel movement  She is tolerating ice chips, and denies nausea or vomitting  Patient denies fever, chills, chest pain, shortness of breath, or calf tenderness       /68   Pulse 104   Temp 99 2 °F (37 3 °C) (Oral)   Resp 17  4' 11" (1 499 m)   Wt 77 1 kg (170 lb)   SpO2 96%   BMI 34 34 kg/m²     I/O last 3 completed shifts: In: 5344 1 [I V :4644 1; Blood:350; IV Piggyback:350]  Out: 3827 [Urine:2030; Emesis/NG output:400; Drains:665]  I/O this shift: In: 741 3 [I V :741 3]  Out: 355 [Urine:325; Drains:30]    Lab Results   Component Value Date    WBC 28 35 (H) 08/09/2021    HGB 8 3 (L) 08/09/2021    HCT 25 4 (L) 08/09/2021    MCV 88 08/09/2021     08/09/2021       Lab Results   Component Value Date    GLUCOSE 160 (H) 08/06/2021    CALCIUM 7 9 (L) 08/08/2021    K 4 0 08/08/2021    CO2 26 08/08/2021     (H) 08/08/2021    BUN 9 08/08/2021    CREATININE 0 39 (L) 08/08/2021           Physical Exam  Constitutional:       Appearance: She is well-developed  HENT:      Head: Normocephalic and atraumatic  Comments: NGT in place , on suction   Eyes:      Pupils: Pupils are equal, round, and reactive to light  Cardiovascular:      Rate and Rhythm: Normal rate  Pulmonary:      Effort: Pulmonary effort is normal    Abdominal:      Palpations: Abdomen is soft  Comments: Surgical incision covered, dressing is dry, incision with adequate healing, no ozzing or peripheral irritation  Bilateral OSCAR drains functional with serosanguinous fluid  No irritative peritoneal signs  Tenderness with deep palpation but without peritoneal signs  Genitourinary:     Vagina: Normal    Musculoskeletal:      Cervical back: Normal range of motion  Neurological:      Mental Status: She is alert and oriented to person, place, and time           Kane Dutton MD  8/9/2021  6:34 AM

## 2021-08-09 NOTE — OCCUPATIONAL THERAPY NOTE
Occupational Therapy Evaluation      Julien Blackburncesar    8/9/2021    Principal Problem:    Peritoneal carcinomatosis ([facility] [address] )  Active Problems:    Asthma    Hypertension    Cancer related pain    Acute blood loss anemia    Leukocytosis      Past Medical History:   Diagnosis Date    Acid reflux     Asthma     [facility]     ovarian, peritoneal carcinomatosis    GERD (gastroesophageal reflux disease)     Hypercholesteremia     Hypertension     Migraine        Past Surgical History:   Procedure Laterality Date    APPENDECTOMY N/A 8/6/2021    Procedure: APPENDECTOMY;  Surgeon: Mecca Grullon MD;  [facility];  Service: Gynecology Oncology    CHOLECYSTECTOMY      COLONOSCOPY      GALLBLADDER SURGERY      HYSTERECTOMY N/A 8/6/2021    Procedure: ENBLOCK HYSTERECTOMY, BILATERAL SALPINGO-OOPHORECTOMY, SIGMOIDECTOMY WITH LOW RECTAL REANASTAMOSIS, BLADDER PERITONEAL STRIPPING AND CYSTOTOMY REPAIR, DIAPHRAGM STRIPPING, SMALL BOWEL RESECTION WITH RE-ANASTAMOSIS;  Surgeon: Mecca Grullon MD;  [facility];  Service: Gynecology Oncology    LAPAROTOMY N/A 8/6/2021    Procedure: LAPAROTOMY EXPLORATORY; OVER SEW PANCREAS TAIL;  Surgeon: Mecca Grullon MD;  [facility];  Service: Gynecology Oncology    OMENTECTOMY N/A 8/6/2021    Procedure: RADICAL OMENTECTOMY;  Surgeon: Mecca Grullon MD;  [facility];  Service: Gynecology Oncology    MT LAP,DIAGNOSTIC ABDOMEN N/A 8/6/2021    Procedure: LAPAROSCOPY DIAGNOSTIC;  Surgeon: Mecca Grullon MD;  [facility];  Service: Gynecology Oncology    RIGHT OOPHORECTOMY  1986    SPLENECTOMY, TOTAL N/A 8/6/2021    Procedure: SPLENECTOMY;  Surgeon: Mecca Grullon MD;  [facility];  Service: Gynecology Oncology    TONSILLECTOMY          08/09/21 1058   OT Last Visit   OT Visit Date 08/09/21   Note Type   Note type Evaluation   Restrictions/Precautions   Weight Bearing Precautions Per Order No   Other Precautions Cognitive; Chair Alarm; Bed Alarm;Multiple lines;Telemetry;O2;Fall Risk;Pain  (PCA epidural; x2 OSCAR drains; 6L NC O2)   Pain Assessment   Pain Assessment Tool 0-10   Pain Score 5   Pain Location/Orientation Location: Abdomen   Effect of Pain on Daily Activities limits participation in meaningful daily activities   Patient's Stated Pain Goal No pain   Hospital Pain Intervention(s) Repositioned; Ambulation/increased activity; Emotional support   Home Living   Type of [address] One level;Performs ADLs on one level  (0STE)   Bathroom Shower/Tub Walk-in shower  (and tub/shower)   Bathroom Toilet Standard   Bathroom Equipment   (denies DME)   [address]   (denies DME)   Prior Function   Level of [place] Independent with ADLs and functional mobility   Lives With Spouse   Receives Help From Family   ADL Assistance Independent   IADLs Independent   Falls in the last 6 months 0   Vocational Full time employment   Lifestyle   Autonomy Pt reports being IND w/ all ADLS and IADLS; (+) drives PTA   Reciprocal Relationships Pt lives w/ spouse who is taking time off of work to be home to provide A as needed   Service to Others Pt works FT at a  at [employer]   Intrinsic Gratification Pt reports enjoying working   Psychosocial   Psychosocial ([address]) WDL   ADL   Where Assessed Chair   Eating Assistance 7  3 \[facility] [place] [facility]\"" 5  [address] 4  Minimal Assistance   LB [address] 3  Moderate Assistance   [address] 4  [address] 3  Moderate Assistance   [address]  3  Moderate Assistance   Bed Mobility   Supine to Sit 4  Minimal assistance   Additional items Assist x 1; Increased time required;LE management;Verbal cues; Bedrails;HOB elevated   Sit to Supine Unable to assess   Additional Comments Pt laying supine in bed upon OT arrival  Pt seated OOB in chair w/ all needs in reach s/p OT session  Transfers   Sit to Stand 4  Minimal assistance   Additional items Assist x 1; Increased time required;Verbal cues;Armrests   Stand to Sit 4  Minimal assistance   Additional items Assist x 1; Increased time required;Verbal cues;Armrests   Additional Comments Transfers w/ RW  VC required for safety and hand placement  Functional Mobility   Functional Mobility 4  Minimal assistance   Additional Comments Pt demonstrated ability to take few steps EOB>chair w/ RW at Min A level  Additional items Rolling walker   Balance   Static Sitting Fair   Dynamic Sitting Fair -   Static Standing Poor +   Dynamic Standing Poor +   Ambulatory Poor +   Activity Tolerance   Activity Tolerance Patient limited by fatigue;Patient limited by pain   Medical Staff Made Aware PT Felix Hiral; Pt seen as a co-eval due to the patient's co-morbidities, clinically unstable presentation, and present impairments which are a regression from the patient's baseline  Nurse Made Aware RN cleared Pt for OT session   RUE Assessment   RUE Assessment WFL   LUE Assessment   LUE Assessment WFL   Hand Function   Gross Motor Coordination Functional   Fine Motor Coordination Functional   Sensation   Light Touch No apparent deficits   Cognition   Overall Cognitive Status WFL   Arousal/Participation Alert; Cooperative   Attention Within functional limits   Orientation Level Oriented X4   Memory Within functional limits   Following Commands Follows all commands and directions without difficulty   Comments Pt is pleasant and cooperative to participate in therapy this day  Assessment   Limitation Decreased ADL status; Decreased Safe judgement during ADL;Decreased endurance;Decreased high-level ADLs   Prognosis Good   Assessment Pt is a 79 yo female seen for OT eval s/p adm to [facility] on 8/6/21 dx'd w/ peritoneal carcinomatosis   Pt now s/p lap to ex-lap, en-bloc hysterectomy, BSO, sigmoidectomy with low rectal anastomosis, SBR, radical omentectomy, splenectomy, appendectomy, oversewing of tail of pancreas, bilateral peritoneal stripping 21  Pt  has a past medical history of Acid reflux, Asthma, Cancer ([address] ), GERD (gastroesophageal reflux disease), Hypercholesteremia, Hypertension, and Migraine  Pt with active OT orders and activity as tolerated orders  Pt works FT and resides w/ spouse in [facility] w/ 0STE  Pt's spouse will be home to provide A as needed  Pt was I w/ ADLS and IADLS, drove, & required no use of DME PTA  Pt is currently demonstrating the following occupational deficits: Min A UB bathing/dressing, Mod A LB bathing/dressing and toileting, Mod A bed mobility, Min A functional transfers and mobility EOB>chair w/ RW  These deficits that are impacting pt's baseline areas of occupation are a result of the following impairments: pain, endurance, activity tolerance, functional mobility, forward functional reach, balance, functional standing tolerance, unsupportive home environment, decreased I w/ ADLS/IADLS and strength  The following Occupational Performance Areas to address include: grooming, bathing/shower, toilet hygiene, dressing, health maintenance, functional mobility and clothing management  Based on the aforementioned OT evaluation, functional performance deficits, and assessments, pt has been identified as a high complexity evaluation  Recommend home with family support and home OT pending progress upon D/C  The patient's raw score on the AM-PAC Daily Activity inpatient short form is 16, standardized score is 35 96, less than 39 4  Patients at this level are likely to benefit from discharge to post-acute rehabilitation services  Please refer to the recommendation of the Occupational Therapist for safe discharge planning   Pt to continue to benefit from acute immediate OT services to address the following goals 3-5x/week to  w/in 7-10 days:    Goals   Patient Goals To decrease pain   LTG Time Frame 7-10   Long Term Goal #1 Refer to goals below   Plan   Treatment Interventions ADL retraining;Functional transfer training;UE strengthening/ROM; Endurance training;Patient/family training;Equipment evaluation/education; Compensatory technique education; Activityengagement; Energy conservation   Goal Expiration Date 08/19/21   OT Frequency 3-5x/wk   Recommendation   OT Discharge Recommendation Home with home health rehabilitation  (home OT pending progress)   OT - OK to Discharge Yes  (when medically cleared)   AM-PAC Daily Activity Inpatient   Lower Body Dressing 2   Bathing 2   Toileting 2   Upper Body Dressing 3   Grooming 3   Eating 4   Daily Activity Raw Score 16   Daily Activity Standardized Score (Calc for Raw Score >=11) 35 96   AM-PAC Applied Cognition Inpatient   Following a Speech/Presentation 4   Understanding Ordinary Conversation 4   Taking Medications 4   Remembering Where Things Are Placed or Put Away 4   Remembering List of 4-5 Errands 4   Taking Care of Complicated Tasks 4   Applied Cognition Raw Score 24   Applied Cognition Standardized Score 62 21   Modified Henderson Scale   Modified Henderson Scale 4         GOALS    1) Pt will improve activity tolerance to G for min 30 min txment sessions for increase engagement in functional tasks    2) Pt will complete UB/LB dressing/self care w/ mod I using adaptive device and DME as needed    3) Pt will complete bathing w/ Mod I w/ use of AE and DME as needed    4) Pt will complete toileting w/ mod I w/ G hygiene/thoroughness using DME as needed    5) Pt will improve functional transfers to Mod I on/off all surfaces using DME as needed w/ G balance/safety     6) Pt will improve functional mobility during ADL/IADL/leisure tasks to Mod I using DME as needed w/ G balance/safety     7) Pt will participate in simulated IADL management task to increase independence to Mod I w/ G safety and endurance    8) Pt will be attentive 100% of the time during ongoing cognitive assessment w/ G participation to assist w/ safe d/c planning/recommendations    9) Pt will demonstrate G carryover of pt/caregiver education and training as appropriate w/o cues w/ good tolerance to increase safety during functional tasks    10) Pt will demonstrate 100% carryover of energy conservation techniques t/o functional I/ADL/leisure tasks w/o cues s/p skilled education to increase endurance during functional tasks             Taz Yap MS, OTR/L

## 2021-08-09 NOTE — PROGRESS NOTES
Daily Progress Note - Critical Care   Ed Fletcher 80 y o  female MRN: [id number]  Unit/Bed#: [facility] 516-01 Encounter: [id number]        ----------------------------------------------------------------------------------------  HPI/24hr events:   · PCEA rate adjusted by APS and pain better controlled  · Received 1 PRBCs for Hgb 6 9 with appropriate response   ---------------------------------------------------------------------------------------  SUBJECTIVE  "I'm doing okay"  Pain better controlled  Has not passed flatus    Review of Systems   Respiratory: Positive for cough  Negative for shortness of breath  Cardiovascular: Negative for chest pain  Gastrointestinal: Positive for constipation  Negative for abdominal pain and nausea  Review of systems was reviewed and negative unless stated above in HPI/24-hour events   ---------------------------------------------------------------------------------------  Assessment and Plan:    Neuro: Acute pain  · Sleep/wake cycle regulation  · CAM-ICU BID  · Neuro checks q4h  · APS following  ? Ropivacaine/Fentanyl PCEA in place  ? Dilaudid 0 5 mg IV q2h PRN (1 dose/24h)     CV: Hypotension - resolved, Hx HTN, HLD  · Pravastatin when able to take PO  · Maintain MAP >65  · Home amlodipine-benazepril 10/20 currently on hold - restart when appropriate     Pulm: Asthma, acute hypoxic pulmonary insuffiency  · Intubated for OR, extubated 8/7  · Currently on 4L NC  · Maintain SpO2 >92%  · PRN albuterol  · Continue pulmonary hygiene  Incentive spirometer q1h while awake, encourage coughing and deep breathing   Upright positioning     GI: Peritoneal carcinomatosis POD #2 s/p diag lap to ex-lap, en-bloc hysterectomy, BSO, sigmoidectomy with low rectal anastomosis, SBR, radical omentectomy, splenectomy, appendectomy, oversewing of tail of pancreas, bilateral peritoneal stripping; GERD  · Monitor OSCAR drain output  · Serial abdominal exams  · NGT/NPO - advance per primary team  · Protonix 40 mg IV daily     : S/p PROMISE/BSO, cystostomy over sow  · Paulino to remain in place for 7d  · Strict q2h I/O monitoring  · Continue to follow renal function tests     F/E/N:   · Maintenance fluids: Isolyte 75 ml/hr  · Replete electrolytes with as needed to maintain K >4 0, Mag >2 0, Phos >3 0  · Nutrition: NPO with ice chips, consider advancing to clears     Heme/Onc: Ovarian cancer, Acute blood loss anemia  · Received 4 PRBCs, 2 FFP intraoperatively  · 8/8 1 PRBCs  · Transfuse for hemoglobin <7 0  · VTE prophylaxis: SQH, SCD's to BLE  · Awaiting pathology report to plan chemotherapy     Endo/Rheum: Hyperglycemia  · SSI algorithm 1 with q6h accuchecks - no coverage needed  · Adjust insulin regimen as needed to maintain goal -180     ID: S/p splenectomy, leukocytosis  · Received HIB/Tetanus/Meningococcal post-splenectomy vaccines  · Continue to monitor fever and WBC curve      MSK/Skin:   · Consult PT/OT, OOB to chair  · Reposition q2h, eliminate pressure points while in bed  · Close skin surveillance     Disposition: Transfer to Stepdown Level 2  Code Status: No Order  ---------------------------------------------------------------------------------------  ICU CORE MEASURES    Prophylaxis   VTE Pharmacologic Prophylaxis: Heparin  VTE Mechanical Prophylaxis: sequential compression device  Stress Ulcer Prophylaxis: Pantoprazole IV     Invasive Devices Review  Invasive Devices     Peripheral Intravenous Line            Peripheral IV 08/06/21 Left Wrist 2 days          Epidural Line            Epidural Catheter 08/06/21 2 days          Drain            NG/OG/Enteral Tube Nasogastric Left nares 3 days    Closed/Suction Drain LLQ 19 Fr  2 days    Closed/Suction Drain RLQ Bulb 19 Fr  2 days    Urethral Catheter Non-latex 16 Fr  2 days              Can any invasive devices be discontinued today? No  ---------------------------------------------------------------------------------------  OBJECTIVE    Vitals   Vitals:    21 1900 21 0135 21 0200   BP: 135/61 122/84 168/76 125/68   Pulse: 102 102 (!) 110 104   Resp: 19 22 (!) 23 17   Temp:       TempSrc:       SpO2: 94% 93% 94% 96%   Weight:       Height:         Temp (24hrs), Av 9 °F (37 2 °C), Min:98 6 °F (37 °C), Max:99 2 °F (37 3 °C)  Current: Temperature: 99 2 °F (37 3 °C)    Respiratory:  SpO2: SpO2: 96 %, SpO2 Device: O2 Device: Nasal cannula  Nasal Cannula O2 Flow Rate (L/min): 4 L/min    Physical Exam  HENT:      Head: Atraumatic  Nose:      Comments: NGT     Mouth/Throat:      Mouth: Mucous membranes are dry  Cardiovascular:      Rate and Rhythm: Regular rhythm  Tachycardia present  Pulses:           Radial pulses are 2+ on the right side and 2+ on the left side  Dorsalis pedis pulses are 2+ on the right side and 2+ on the left side  Pulmonary:      Breath sounds: Decreased breath sounds (bases bilaterally) present  No wheezing, rhonchi or rales  Abdominal:      General: Bowel sounds are decreased  There is distension  Palpations: Abdomen is soft  Tenderness: There is no abdominal tenderness  Comments: Midline abdominal dressing in place  OSCAR drain x 2 with serosanguinous drainage   Genitourinary:     Comments: + irving  Musculoskeletal:      Right lower le+ Pitting Edema present  Left lower le+ Pitting Edema present  Skin:     General: Skin is warm and dry  Capillary Refill: Capillary refill takes less than 2 seconds  Neurological:      General: No focal deficit present  Mental Status: She is alert and oriented to person, place, and time          Laboratory and Diagnostics:  Results from last 7 days   Lab Units 21  1241 21  0425 21  0422 21  1936 21  1419 21  1329 21  1231   WBC Thousand/uL  -- --  33 28* 27 32* 17 40*  --   --   --    HEMOGLOBIN g/dL 8 5* 6 9* 7 5* 9 8* 9 7*  --   --   --    I STAT HEMOGLOBIN g/dl  --   --   --   --   --  8 8* 8 5* 5 8*   HEMATOCRIT %  --   --  22 3* 28 8* 29 2*  --   --   --    HEMATOCRIT, ISTAT %  --   --   --   --   --  26* 25* 17*   PLATELETS Thousands/uL  --   --  268 224 182  --   --   --    NEUTROS PCT %  --   --  87*  --  88*  --   --   --    MONOS PCT %  --   --  8  --  7  --   --   --      Results from last 7 days   Lab Units 08/08/21 0425 08/07/21  0422 08/06/21  1936 08/06/21  1419 08/06/21  1329 08/06/21  1231   SODIUM mmol/L 140 137 138  --   --   --    POTASSIUM mmol/L 4 0 4 3 3 9  --   --   --    CHLORIDE mmol/L 109* 111* 111*  --   --   --    CO2 mmol/L 26 21 19*  --   --   --    CO2, I-STAT mmol/L  --   --   --  15* 23 21   ANION GAP mmol/L 5 5 8  --   --   --    BUN mg/dL 9 7 7  --   --   --    CREATININE mg/dL 0 39* 0 50* 0 39*  --   --   --    CALCIUM mg/dL 7 9* 7 6* 7 6*  --   --   --    GLUCOSE RANDOM mg/dL 129 172* 243*  --   --   --    ALT U/L  --  26  --   --   --   --    AST U/L  --  46*  --   --   --   --    ALK PHOS U/L  --  45*  --   --   --   --    ALBUMIN g/dL  --  1 8*  --   --   --   --    TOTAL BILIRUBIN mg/dL  --  1 22*  --   --   --   --      Results from last 7 days   Lab Units 08/08/21 0425 08/07/21 0422   MAGNESIUM mg/dL 1 9 1 3*   PHOSPHORUS mg/dL 2 2* 3 5     Intake and Output  I/O       08/07 0701 - 08/08 0700 08/08 0701 - 08/09 0700    I V  (mL/kg) 3485 3 (45 2) 1900 1 (24 6)    Blood  350    NG/GT 0 0    IV Piggyback 200 150    Total Intake(mL/kg) 3685 3 (47 8) 2400 1 (31 1)    Urine (mL/kg/hr) 1305 (0 7) 1050 (0 6)    Emesis/NG output 100 300    Drains 485 210    Total Output 1890 1560    Net +1795 3 +840 1                UOP: 50 ml/hr     Height and Weights   Height: 4' 11" (149 9 cm)  IBW (Ideal Body Weight): 43 2 kg  Body mass index is 34 34 kg/m²    Weight (last 2 days)     None            Nutrition       Diet Orders (From admission, onward)             Start     Ordered    08/06/21 1906  Diet NPO  Diet effective now     Question Answer Comment   Diet Type NPO    RD to adjust diet per protocol?  No        08/06/21 1905              Active Medications  Scheduled Meds:  Current Facility-Administered Medications   Medication Dose Route Frequency Provider Last Rate    albuterol  2 5 mg Nebulization Q6H PRN Maye Bae MD      heparin (porcine)  5,000 Units Subcutaneous [facility] Zamzam Gipson MD      HYDROmorphone  0 5 mg Intravenous Q2H PRN Shiva Florence MD      insulin lispro  1-5 Units Subcutaneous Q6H [address] Maye Bae MD      multi-electrolyte  75 mL/hr Intravenous Continuous Jeralene Terry Cerni, DO 75 mL/hr (08/08/21 1924)    ondansetron  4 mg Intravenous Q6H PRN Zamzam Gipson MD      pantoprazole  40 mg Intravenous Q24H [address] Maye Bae MD      phenol  1 spray Mouth/Throat Q2H PRN Olivia Appiah PA-C      pneumococcal 13-valent conjugate vaccine  0 5 mL Intramuscular Prior to discharge Cheryle Barns, PA-C      pravastatin  10 mg Oral Daily With [employer]      ropivacaine 0 1% and fentaNYL 2 mcg/mL   Epidural Continuous Bandar Samuel MD      sodium phosphate  21 mmol Intravenous Once Olivia Appiah PA-C Stopped (08/08/21 7250)     Continuous Infusions:  multi-electrolyte, 75 mL/hr, Last Rate: 75 mL/hr (08/08/21 1924)  ropivacaine 0 1% and fentaNYL 2 mcg/mL,       PRN Meds:   albuterol, 2 5 mg, Q6H PRN  HYDROmorphone, 0 5 mg, Q2H PRN  ondansetron, 4 mg, Q6H PRN  phenol, 1 spray, Q2H PRN  pneumococcal 13-valent conjugate vaccine, 0 5 mL, Prior to discharge        Allergies   Allergies   Allergen Reactions    Erythromycin Nausea Only    Morphine Headache     ---------------------------------------------------------------------------------------  Advance Directive and Living Will:      Power of :    POLST: ---------------------------------------------------------------------------------------  Care Time Delivered:   No Critical Care time spent     Dolmee Blanton PA-C      Portions of the record may have been created with voice recognition software  Occasional wrong word or "sound a like" substitutions may have occurred due to the inherent limitations of voice recognition software    Read the chart carefully and recognize, using context, where substitutions have occurred

## 2021-08-09 NOTE — PROGRESS NOTES
Attempted to visit patient  Patient was not available for visit at this time       08/09/21 1500   Clinical Encounter Type   Visited With Patient not available   Crisis Visit Critical Care

## 2021-08-09 NOTE — PLAN OF CARE
Problem: PHYSICAL THERAPY ADULT  Goal: Performs mobility at highest level of function for planned discharge setting  See evaluation for individualized goals  Description: Treatment/Interventions: Functional transfer training, LE strengthening/ROM, Therapeutic exercise, Endurance training, Patient/family training, Equipment eval/education, Bed mobility, Gait training, Spoke to nursing  Equipment Recommended: Mariam Severino       See flowsheet documentation for full assessment, interventions and recommendations  Note: Prognosis: Good  Problem List: Decreased strength, Decreased endurance, Impaired balance, Decreased mobility, Pain  Assessment: Pt seen for high complexity PT evaluation due to decrease in functional mobility status compared to baseline  Pt with active PT eval/treat and OOB orders at this time  Pt is a 80 y o  F who presented to [facility] with peritoneal carcinomatosis on 8/6/21  Pt is s/p "diagnostic laparoscopy, exploratory laparotomy, on block hysterectomy bilateral salpingo-oophorectomy sigmoid ectomy with low rectal reanastomosis and over-sewing of bladder peritoneal stripping, cystotomy, and diaphragm stripping small bowel resection and reanastomosis radical omentectomy splenectomy appendectomy"  Pt  has a past medical history of Acid reflux, Asthma, Cancer ([facility] [address] ), GERD (gastroesophageal reflux disease), Hypercholesteremia, Hypertension, and Migraine  Pt resides with spouse in [facility] with 0STE  Pt presents with decreased strength, balance, endurance, pain that contribute to limitations in bed mobility, functional transfers, functional mobility  Pt requires Min A for all mobility at this time  Pt left upright in bedside chair with all needs in reach  Pt will benefit from skilled therapy in order to address current impairments and functional limitations  PT to follow pt and recommending HHPT pending progress    The patient's AM-PAC Basic Mobility Inpatient Short Form Raw Score is 18, Standardized Score is 41 05  A standardized score less than 42 9 suggests the patient may benefit from discharge to post-acute rehabilitation services  Please also refer to the recommendation of the Physical Therapist for safe discharge planning  Anticipate pt to progress to PT  Barriers to Discharge: Inaccessible home environment        PT Discharge Recommendation: Home with home health rehabilitation (pending progress)     PT - OK to Discharge: No (pending progress)    See flowsheet documentation for full assessment

## 2021-08-09 NOTE — PHYSICAL THERAPY NOTE
Physical Therapy Evaluation     Patient's Name: Tiana Meier    Admitting Diagnosis  Peritoneal carcinomatosis [facility]) [C78 6]    Problem List  Patient Active Problem List   Diagnosis    [facility]    Asthma    Hypertension    Migraine    Cancer related pain    Acute blood loss anemia    Leukocytosis       Past Medical History  Past Medical History:   Diagnosis Date    Acid reflux     Asthma     Cancer ([facility] )     ovarian, peritoneal carcinomatosis    GERD (gastroesophageal reflux disease)     Hypercholesteremia     Hypertension     Migraine        Past Surgical History  Past Surgical History:   Procedure Laterality Date    CHOLECYSTECTOMY      COLONOSCOPY      GALLBLADDER SURGERY      RIGHT OOPHORECTOMY  1986    TONSILLECTOMY            08/09/21 1042   PT Last Visit   PT Visit Date 08/09/21   Note Type   Note type Evaluation   Pain Assessment   Pain Assessment Tool 0-10   Pain Score 5   Pain Location/Orientation Location: Abdomen   Home Living   Type of [address] [place] [address] One level  (0STE)   Bathroom Shower/Tub Walk-in shower  (and tub)   Bathroom Toilet Standard   Bathroom Equipment   (denies)   Home Equipment   (denies)   Prior Function   Level of [place] Independent with ADLs and functional mobility   Lives With Spouse   ADL Assistance Independent   IADLs Independent   Falls in the last 6 months 0   Vocational Full time employment   Comments +  Spouse works, but planning to take time off on pt DC  Locals family able to assist    Restrictions/Precautions   Weight Bearing Precautions Per Order No   Other Precautions Multiple lines;Telemetry; Fall Risk;Pain;O2  (epidural, OSCAR drains x2, 6L O2 NC)   General   Family/Caregiver Present No   Cognition   Overall Cognitive Status WFL   Arousal/Participation Alert   Orientation Level Oriented X4   Memory Within functional limits   Following Commands Follows all commands and directions without difficulty   RLE Assessment   RLE Assessment   (functionally 4-/5)   LLE Assessment   LLE Assessment   (functionally 4-/5)   Bed Mobility   Supine to Sit 4  Minimal assistance   Additional items Assist x 1;HOB elevated; Increased time required;LE management;Verbal cues   Additional Comments Supine in bed upon PT arrival   Pt left upright in bedside chair with all needs in reach  Transfers   Sit to Stand 4  Minimal assistance   Additional items Assist x 1; Increased time required;Verbal cues   Stand to Sit 4  Minimal assistance   Additional items Assist x 1; Increased time required;Verbal cues   Additional Comments Transfers with RW   VC for hand placement and safety  Ambulation/Elevation   Gait pattern Excessively slow; Short stride; Foward flexed;Decreased foot clearance  (guarded 2/2 pain)   Gait Assistance 4  Minimal assist   Additional items Assist x 1;Verbal cues; Tactile cues   Assistive Device Rolling walker   Distance 3 ft from bed to chair  (limited by pain and some dizziness)   Balance   Static Sitting Fair   Dynamic Sitting Fair   Static Standing Fair -   Dynamic Standing Fair -   Ambulatory Fair -   Endurance Deficit   Endurance Deficit Yes   Endurance Deficit Description pain, fatigue, SOB   Activity Tolerance   Activity Tolerance Patient limited by fatigue;Patient limited by pain   Medical Staff [address]   Nurse Made Aware RN cleared pt to be seen by PT   Assessment   Prognosis Good   Problem List Decreased strength;Decreased endurance; Impaired balance;Decreased mobility;Pain   Assessment Pt seen for high complexity PT evaluation due to decrease in functional mobility status compared to baseline  Pt with active PT eval/treat and OOB orders at this time  Pt is a 80 y o  F who presented to [facility] with peritoneal carcinomatosis on 8/6/21    Pt is s/p "diagnostic laparoscopy, exploratory laparotomy, on block hysterectomy bilateral salpingo-oophorectomy sigmoid ectomy with low rectal reanastomosis and over-sewing of bladder peritoneal stripping, cystotomy, and diaphragm stripping small bowel resection and reanastomosis radical omentectomy splenectomy appendectomy"  Pt  has a past medical history of Acid reflux, Asthma, Cancer ([address] ), GERD (gastroesophageal reflux disease), Hypercholesteremia, Hypertension, and Migraine  Pt resides with spouse in [facility] with 0STE  Pt presents with decreased strength, balance, endurance, pain that contribute to limitations in bed mobility, functional transfers, functional mobility  Pt requires Min A for all mobility at this time  Pt left upright in bedside chair with all needs in reach  Pt will benefit from skilled therapy in order to address current impairments and functional limitations  PT to follow pt and recommending HHPT pending progress  The patient's AM-PAC Basic Mobility Inpatient Short Form Raw Score is 18, Standardized Score is 41 05  A standardized score less than 42 9 suggests the patient may benefit from discharge to post-acute rehabilitation services  Please also refer to the recommendation of the Physical Therapist for safe discharge planning  Anticipate pt to progress to HHPT  Barriers to Discharge Inaccessible home environment   Goals   Patient Goals to have less pain   STG Expiration Date 08/23/21   Short Term Goal #1 1  Pt will demonstrate ability to perform all aspects of bed mobility with I in order to increase independence and decrease burden on caregivers  2  Pt will demonstrate ability to perform functional transfers with Mod I in order to increase independence and decrease burden on caregivers  3  Pt will demonstrate ability to ambulate 200 ft with least restrictive AD with Mod I in order to return to mobility safely  4  Pt will demonstrate ability to negotiate 1 curb steps with/without HR and Mod I in order to return to household/community mobility safely  5  Pt will demonstrate improved  balance by one grade order to decrease risk of falls  6  Pt will increase b/l LE strength by 1 grade in order to increase ease of functional mobility and transfers  Plan   Treatment/Interventions Functional transfer training;LE strengthening/ROM; Therapeutic exercise; Endurance training;Patient/family training;Equipment eval/education; Bed mobility;Gait training;Spoke to nursing   PT Frequency Other (Comment)  (3-5x/wk)   Recommendation   PT Discharge Recommendation Home with home health rehabilitation  (pending progress)   Equipment Recommended [facility] Recommended Wheeled walker   Change/add to Eligible?  No   PT - OK to Discharge No  (pending progress)   AM-PAC Basic Mobility Inpatient   Turning in Bed Without Bedrails 3   Lying on Back to Sitting on Edge of Flat Bed 3   Moving Bed to Chair 3   Standing Up From Chair 3   Walk in Room 3   Climb 3-5 Stairs 3   Basic Mobility Inpatient Raw Score 18   Basic Mobility Standardized Score 41 05   Modified Paron Scale   Modified Saleem Scale 4         Mel Alvares, PT, DPT

## 2021-08-09 NOTE — UTILIZATION REVIEW
Inpatient Admission Authorization Request   NOTIFICATION OF INPATIENT ADMISSION/INPATIENT AUTHORIZATION REQUEST   SERVICING FACILITY:   [facility]  Address: [address] [place] [address], [address] [place] [address]  Tax ID: [id number]  NPI: [id number]  Place of Service: Inpatient [address] [facility] Code: 24     ATTENDING PROVIDER:  Attending Name and NPI#: James West [[id number]]  Address: [address] [place] [address], [address] [place] [id number]  Phone: [phone]     UTILIZATION REVIEW CONTACT:  Joann Powell Utilization   Network Utilization Review Department  Phone: [phone]  Fax: [phone]  Email: [email or web address]     PHYSICIAN ADVISORY SERVICES:  FOR CVFT-QA-RCWM REVIEW - MEDICAL NECESSITY DENIAL  Phone: [phone]  Fax: [phone]  Email: [email or web address]     TYPE OF REQUEST:  Inpatient Status     ADMISSION INFORMATION:  ADMISSION DATE/TIME: 8/6/21  7:05 PM  PATIENT DIAGNOSIS CODE/DESCRIPTION:  Peritoneal carcinomatosis ([facility] [address] ) [C78 6]  DISCHARGE DATE/TIME: No discharge date for patient encounter  DISCHARGE DISPOSITION (IF DISCHARGED): Home/Self Care     IMPORTANT INFORMATION:  Please contact the Joann Powell directly with any questions or concerns regarding this request  Department voicemails are confidential     Send requests for admission clinical reviews, concurrent reviews, approvals, and administrative denials due to lack of clinical to fax [phone]  [facility]     Emergency Department     Faculty Attestation    I performed a history and physical examination of the patient and discussed management with the resident. I have reviewed and agree with the residents findings including all diagnostic interpretations, and treatment plans as written. Any areas of disagreement are noted on the chart. I was personally present for the key portions of any procedures. I have documented in the chart those procedures where I was not present during the key portions. I have reviewed the emergency nurses triage note. I agree with the chief complaint, past medical history, past surgical history, allergies, medications, social and family history as documented unless otherwise noted below. Documentation of the HPI, Physical Exam and Medical Decision Making performed by glenn is based on my personal performance of the HPI, PE and MDM. For Physician Assistant/ Nurse Practitioner cases/documentation I have personally evaluated this patient and have completed at least one if not all key elements of the E/M (history, physical exam, and MDM). Additional findings are as noted. 64 yo M c/o suicidal ideation, pt denies having plan to harm himself, pt denies cp or sob,   Denies ingestion, or self injury,  no injury, no fever,no vomiting,   PE Vital signs stable, flat affect, no cervical tenderness crepitus or deformity, chest nontender, abdomen nontender, no distention, no rigidity, extremities x4 atraumatic good muscle tone,    Will be transferred to [facility],     EKG Interpretation    Interpreted by me      CRITICAL CARE: There was a high probability of clinically significant/life threatening deterioration in this patient's condition which required my urgent intervention. Total critical care time was 0 minutes. This excludes any time for separately reportable procedures.        Jana 24, DO  03/26/21 [address], DO  03/26/21 5721

## 2021-08-09 NOTE — PROGRESS NOTES
Epidural Follow-up Note - Acute Pain Service    Kayli Felder 80 y o  female MRN: [id number]  Unit/Bed#: [facility] 163-44 Encounter: [id number]      Assessment:   Principal Problem:    Peritoneal carcinomatosis ([address] )  Active Problems:    Asthma    Hypertension    Cancer related pain    Acute blood loss anemia    Leukocytosis      Kayli Felder is a 80y o  year old female with primary ovarian cancer POD#3 s/p exploratory laparotomy, hysterectomy, BSO, sigmoid colectomy, splenectomy, appendectomy, and radical omentectomy  Patient has a thoracic epidural in place which continues to function well with some sparing at the left upper margin of her incision  We discussed a small increase in her basal rate in an attempt to cover this area  As she is using her PCEA button somewhat frequently we discussed this conservative adjustment given the mid thoracic level of her epidural insertion and possibility of lumbar involvement with overly aggressive uptitration  Plan:  Analgesia:  - Increase Thoracic epidural infusion to Ropivacaine 0 1% with fentanyl 2 mcg/mL to 12 mL/hr continuous with 5 mL demand dose [id number] max 3 doses/hr  - Continue Dilaudid 0 5 mg IV q2hr PRN for breakthrough pain  - Anticipate epidural removal and transition to primarily PO regimen possibly tomorrow with anticipated NGT removal this morning    Bowel Regimen:  - Bowel regimen when appropriate from surgical perspective    APS will continue to follow  Please contact Acute Pain Service - [facility] via [employer] from [phone] with additional questions or concerns  See Jax or Umm for additional contacts and after hours information  Pain History  Current pain location(s): Diffuse abdominal tenderness, worst in LUQ  Pain Scale:  6/10  Quality: Sore  24 hour history: Slept intermittently overnight, waking occasionally to press PCEA button  Feels gas moving but has not passed flatus or BM yet  NGT remains in place though with plan to remove   Paulino catheter in place and draining  Denies N/V  Denies pruritis    PCEA use: 39/39  Opioid requirement previous 24 hours: None    Meds/Allergies   all current active meds have been reviewed    Allergies   Allergen Reactions    Erythromycin Nausea Only    Morphine Headache       Objective     Temp:  [98 7 °F (37 1 °C)-99 2 °F (37 3 °C)] 98 7 °F (37 1 °C)  HR:  [] 98  Resp:  [14-30] 25  BP: (108-168)/(50-84) 138/69    Physical Exam  Vitals and nursing note reviewed  Constitutional:       General: She is not in acute distress  Appearance: Normal appearance  She is obese  HENT:      Mouth/Throat:      Mouth: Mucous membranes are dry  Eyes:      Pupils: Pupils are equal, round, and reactive to light  Cardiovascular:      Rate and Rhythm: Normal rate  Pulmonary:      Effort: Pulmonary effort is normal  No respiratory distress  Abdominal:      General: Abdomen is flat  There is no distension  Tenderness: There is abdominal tenderness  There is no guarding  Musculoskeletal:      Comments: Bilateral LE motor intact   Skin:     General: Skin is warm and dry  Neurological:      Mental Status: She is alert and oriented to person, place, and time  Mental status is at baseline     Psychiatric:         Mood and Affect: Mood normal          Behavior: Behavior normal      Epidural: Site clean/dry/intact, no surrounding erythema/edema/induration, infusion functioning appropriately    Lab Results:   Results from last 7 days   Lab Units 08/09/21  0603   WBC Thousand/uL 28 35*   HEMOGLOBIN g/dL 8 3*   HEMATOCRIT % 25 4*   PLATELETS Thousands/uL 345      Results from last 7 days   Lab Units 08/09/21  0603 08/07/21  0422 08/06/21  1557   POTASSIUM mmol/L 3 3* 4 3  --    CHLORIDE mmol/L 106 111*  --    CO2 mmol/L 29 21  --    CO2, I-STAT mmol/L  --   --  22   BUN mg/dL 8 7  --    CREATININE mg/dL 0 25* 0 50*  --    CALCIUM mg/dL 8 2* 7 6*  --    ALK PHOS U/L  --  45*  --    ALT U/L  --  26  --    AST U/L  --  46*  -- GLUCOSE, ISTAT mg/dl  --   --  195*      Results from last 7 days   Lab Units 08/07/21  0454   PTT seconds 30   INR  1 23*       Imaging Studies: I have personally reviewed pertinent reports  EKG, Pathology, and Other Studies: I have personally reviewed pertinent reports  Counseling / Coordination of Care  Total floor / unit time spent today 20 minutes  Greater than 50% of total time was spent with the patient and / or family counseling and / or coordination of care  Please note that the APS provides consultative services regarding pain management only  With the exception of ketamine, peripheral nerve catheters, and epidural infusions (and except when indicated), final decisions regarding starting or changing doses of analgesic medications are at the discretion of the consulting service  Off hours consultation and/or medication management is generally not available      Juan Alberto Braun MD  Acute Pain Service

## 2021-08-10 ENCOUNTER — APPOINTMENT (INPATIENT)
Dept: RADIOLOGY | Facility: HOSPITAL | Age: 71
DRG: 329 | End: 2021-08-10
Payer: COMMERCIAL

## 2021-08-10 PROBLEM — E43 SEVERE PROTEIN-CALORIE MALNUTRITION (HCC): Status: ACTIVE | Noted: 2021-08-10

## 2021-08-10 LAB
ANION GAP SERPL CALCULATED.3IONS-SCNC: 10 MMOL/L (ref 4–13)
BASOPHILS # BLD AUTO: 0.03 THOUSANDS/ΜL (ref 0–0.1)
BASOPHILS NFR BLD AUTO: 0 % (ref 0–1)
BUN SERPL-MCNC: 7 MG/DL (ref 5–25)
CALCIUM SERPL-MCNC: 8.2 MG/DL (ref 8.3–10.1)
CHLORIDE SERPL-SCNC: 105 MMOL/L (ref 100–108)
CO2 SERPL-SCNC: 25 MMOL/L (ref 21–32)
CREAT SERPL-MCNC: 0.22 MG/DL (ref 0.6–1.3)
EOSINOPHIL # BLD AUTO: 0.21 THOUSAND/ΜL (ref 0–0.61)
EOSINOPHIL NFR BLD AUTO: 1 % (ref 0–6)
ERYTHROCYTE [DISTWIDTH] IN BLOOD BY AUTOMATED COUNT: 15.9 % (ref 11.6–15.1)
GFR SERPL CREATININE-BSD FRML MDRD: 129 ML/MIN/1.73SQ M
GLUCOSE SERPL-MCNC: 146 MG/DL (ref 65–140)
GLUCOSE SERPL-MCNC: 193 MG/DL (ref 65–140)
GLUCOSE SERPL-MCNC: 73 MG/DL (ref 65–140)
GLUCOSE SERPL-MCNC: 77 MG/DL (ref 65–140)
GLUCOSE SERPL-MCNC: 78 MG/DL (ref 65–140)
GLUCOSE SERPL-MCNC: 83 MG/DL (ref 65–140)
HCT VFR BLD AUTO: 26.8 % (ref 34.8–46.1)
HGB BLD-MCNC: 8.7 G/DL (ref 11.5–15.4)
IMM GRANULOCYTES # BLD AUTO: 0.21 THOUSAND/UL (ref 0–0.2)
IMM GRANULOCYTES NFR BLD AUTO: 1 % (ref 0–2)
LYMPHOCYTES # BLD AUTO: 1.13 THOUSANDS/ΜL (ref 0.6–4.47)
LYMPHOCYTES NFR BLD AUTO: 5 % (ref 14–44)
MAGNESIUM SERPL-MCNC: 1.9 MG/DL (ref 1.6–2.6)
MCH RBC QN AUTO: 28.6 PG (ref 26.8–34.3)
MCHC RBC AUTO-ENTMCNC: 32.5 G/DL (ref 31.4–37.4)
MCV RBC AUTO: 88 FL (ref 82–98)
MONOCYTES # BLD AUTO: 1.87 THOUSAND/ΜL (ref 0.17–1.22)
MONOCYTES NFR BLD AUTO: 9 % (ref 4–12)
NEUTROPHILS # BLD AUTO: 18.66 THOUSANDS/ΜL (ref 1.85–7.62)
NEUTS SEG NFR BLD AUTO: 84 % (ref 43–75)
NRBC BLD AUTO-RTO: 1 /100 WBCS
PHOSPHATE SERPL-MCNC: 2.6 MG/DL (ref 2.3–4.1)
PLATELET # BLD AUTO: 468 THOUSANDS/UL (ref 149–390)
PMV BLD AUTO: 10.7 FL (ref 8.9–12.7)
POTASSIUM SERPL-SCNC: 3 MMOL/L (ref 3.5–5.3)
RBC # BLD AUTO: 3.04 MILLION/UL (ref 3.81–5.12)
SODIUM SERPL-SCNC: 140 MMOL/L (ref 136–145)
WBC # BLD AUTO: 22.11 THOUSAND/UL (ref 4.31–10.16)

## 2021-08-10 PROCEDURE — 71250 CT THORAX DX C-: CPT

## 2021-08-10 PROCEDURE — G1004 CDSM NDSC: HCPCS

## 2021-08-10 PROCEDURE — 82948 REAGENT STRIP/BLOOD GLUCOSE: CPT

## 2021-08-10 PROCEDURE — 94760 N-INVAS EAR/PLS OXIMETRY 1: CPT

## 2021-08-10 PROCEDURE — 83735 ASSAY OF MAGNESIUM: CPT | Performed by: OBSTETRICS & GYNECOLOGY

## 2021-08-10 PROCEDURE — 71046 X-RAY EXAM CHEST 2 VIEWS: CPT

## 2021-08-10 PROCEDURE — 80048 BASIC METABOLIC PNL TOTAL CA: CPT | Performed by: OBSTETRICS & GYNECOLOGY

## 2021-08-10 PROCEDURE — 99024 POSTOP FOLLOW-UP VISIT: CPT | Performed by: OBSTETRICS & GYNECOLOGY

## 2021-08-10 PROCEDURE — 84100 ASSAY OF PHOSPHORUS: CPT | Performed by: OBSTETRICS & GYNECOLOGY

## 2021-08-10 PROCEDURE — 85025 COMPLETE CBC W/AUTO DIFF WBC: CPT | Performed by: OBSTETRICS & GYNECOLOGY

## 2021-08-10 PROCEDURE — C9113 INJ PANTOPRAZOLE SODIUM, VIA: HCPCS | Performed by: PHYSICIAN ASSISTANT

## 2021-08-10 RX ORDER — POTASSIUM CHLORIDE 14.9 MG/ML
20 INJECTION INTRAVENOUS
Status: COMPLETED | OUTPATIENT
Start: 2021-08-10 | End: 2021-08-10

## 2021-08-10 RX ORDER — OXYCODONE HYDROCHLORIDE 5 MG/1
5 TABLET ORAL EVERY 4 HOURS PRN
Status: DISCONTINUED | OUTPATIENT
Start: 2021-08-10 | End: 2021-08-23

## 2021-08-10 RX ORDER — OXYCODONE HYDROCHLORIDE 10 MG/1
10 TABLET ORAL EVERY 4 HOURS PRN
Status: DISCONTINUED | OUTPATIENT
Start: 2021-08-10 | End: 2021-08-24

## 2021-08-10 RX ORDER — ACETAMINOPHEN 325 MG/1
975 TABLET ORAL EVERY 6 HOURS PRN
Status: DISCONTINUED | OUTPATIENT
Start: 2021-08-10 | End: 2021-08-11

## 2021-08-10 RX ORDER — FUROSEMIDE 10 MG/ML
10 INJECTION INTRAMUSCULAR; INTRAVENOUS ONCE
Status: COMPLETED | OUTPATIENT
Start: 2021-08-10 | End: 2021-08-10

## 2021-08-10 RX ORDER — HEPARIN SODIUM 5000 [USP'U]/ML
5000 INJECTION, SOLUTION INTRAVENOUS; SUBCUTANEOUS EVERY 8 HOURS SCHEDULED
Status: DISCONTINUED | OUTPATIENT
Start: 2021-08-10 | End: 2021-08-12

## 2021-08-10 RX ADMIN — POTASSIUM CHLORIDE 20 MEQ: 14.9 INJECTION, SOLUTION INTRAVENOUS at 11:57

## 2021-08-10 RX ADMIN — PRAVASTATIN SODIUM 10 MG: 10 TABLET ORAL at 17:40

## 2021-08-10 RX ADMIN — POTASSIUM CHLORIDE 20 MEQ: 14.9 INJECTION, SOLUTION INTRAVENOUS at 08:33

## 2021-08-10 RX ADMIN — FENTANYL CITRATE: 50 INJECTION INTRAMUSCULAR; INTRAVENOUS at 04:46

## 2021-08-10 RX ADMIN — OXYCODONE HYDROCHLORIDE 10 MG: 10 TABLET ORAL at 17:40

## 2021-08-10 RX ADMIN — PANTOPRAZOLE SODIUM 40 MG: 40 INJECTION, POWDER, FOR SOLUTION INTRAVENOUS at 08:34

## 2021-08-10 RX ADMIN — OXYCODONE HYDROCHLORIDE 10 MG: 10 TABLET ORAL at 23:24

## 2021-08-10 RX ADMIN — HEPARIN SODIUM 5000 UNITS: 5000 INJECTION INTRAVENOUS; SUBCUTANEOUS at 17:41

## 2021-08-10 RX ADMIN — HYDROMORPHONE HYDROCHLORIDE 0.5 MG: 1 INJECTION, SOLUTION INTRAMUSCULAR; INTRAVENOUS; SUBCUTANEOUS at 18:37

## 2021-08-10 RX ADMIN — FUROSEMIDE 10 MG: 10 INJECTION, SOLUTION INTRAMUSCULAR; INTRAVENOUS at 20:46

## 2021-08-10 RX ADMIN — HYDROMORPHONE HYDROCHLORIDE 0.5 MG: 1 INJECTION, SOLUTION INTRAMUSCULAR; INTRAVENOUS; SUBCUTANEOUS at 05:25

## 2021-08-10 RX ADMIN — HYDROMORPHONE HYDROCHLORIDE 0.5 MG: 1 INJECTION, SOLUTION INTRAMUSCULAR; INTRAVENOUS; SUBCUTANEOUS at 00:10

## 2021-08-10 RX ADMIN — HEPARIN SODIUM 5000 UNITS: 5000 INJECTION INTRAVENOUS; SUBCUTANEOUS at 05:25

## 2021-08-10 RX ADMIN — INSULIN LISPRO 1 UNITS: 100 INJECTION, SOLUTION INTRAVENOUS; SUBCUTANEOUS at 18:30

## 2021-08-10 NOTE — PROGRESS NOTES
Epidural Follow-up Note - Acute Pain Service    Orquidea Lopez 80 y o  female MRN: [id number]  Unit/Bed#: [facility] 528-01 Encounter: [id number]      Assessment:   Principal Problem:    Peritoneal carcinomatosis ([address] )  Active Problems:    Asthma    Hypertension    Cancer related pain    Acute blood loss anemia    Leukocytosis      Orquidea Lopez is a 80y o  year old female with primary ovarian cancer POD#3 s/p exploratory laparotomy, hysterectomy, BSO, sigmoid colectomy, splenectomy, appendectomy, and radical omentectomy  Patient has a thoracic epidural in place which is working well for her pain  Pt's NGT is now removed and she is tolerating sips of clears without n/v  Discussed with primary team, will remove epidural this afternoon and start oral pain medications  Plan:  Analgesia:  - Remove Thoracic epidural at 1430 pm with tip intact  Lab Results   Component Value Date     (H) 08/10/2021     SQH 8/10 at 530am  - Continue Dilaudid 0 5 mg IV q2hr PRN for breakthrough pain  - Add oxycodone 5/10mg q4hr prn mod/severe pain  - Would schedule PO tylenol      Bowel Regimen:  - Bowel regimen when appropriate from surgical perspective    APS will continue to follow  Please contact Acute Pain Service - [facility] via [employer] from [phone] with additional questions or concerns  See Jax or Umm for additional contacts and after hours information  Pain History  Current pain location(s): none  Pain Scale: none  Quality: none  24 hour history: occasional episode of sharp abdominal pain but this am on interview, having minimal pain, can roll to epidural evaluation without issue  Denies N/V  Denies pruritis   Denies LE weakness    PCEA use: mod  Opioid requirement previous 24 hours: IV dilaudid 0 5mg    Meds/Allergies   all current active meds have been reviewed    Allergies   Allergen Reactions    Erythromycin Nausea Only    Morphine Headache       Objective     Temp:  [98 5 °F (36 9 °C)-100 6 °F (38 1 °C)] 100 6 °F (38 1 °C)  HR:  [] 112  Resp:  [18-25] 18  BP: (142)/(82) 142/82    Physical Exam  Vitals and nursing note reviewed  Constitutional:       General: She is not in acute distress  Appearance: Normal appearance  HENT:      Head: Normocephalic  Nose:      Comments: NC in place     Mouth/Throat:      Mouth: Mucous membranes are moist    Eyes:      Extraocular Movements: Extraocular movements intact  Cardiovascular:      Rate and Rhythm: Normal rate  Pulmonary:      Effort: Pulmonary effort is normal  No respiratory distress  Abdominal:      General: Abdomen is flat  Musculoskeletal:         General: Normal range of motion  Comments: Bilateral LE motor intact   Skin:     General: Skin is warm and dry  Neurological:      General: No focal deficit present  Mental Status: She is alert and oriented to person, place, and time  Mental status is at baseline  Psychiatric:         Mood and Affect: Mood normal          Behavior: Behavior normal      Epidural: Site clean/dry/intact, no surrounding erythema/edema/induration, infusion functioning appropriately  Removed with tip intact    Lab Results:   Results from last 7 days   Lab Units 08/10/21  0452   WBC Thousand/uL 22 11*   HEMOGLOBIN g/dL 8 7*   HEMATOCRIT % 26 8*   PLATELETS Thousands/uL 468*      Results from last 7 days   Lab Units 08/10/21  0452 08/07/21  0422 08/06/21  1557   POTASSIUM mmol/L 3 0* 4 3  --    CHLORIDE mmol/L 105 111*  --    CO2 mmol/L 25 21  --    CO2, I-STAT mmol/L  --   --  22   BUN mg/dL 7 7  --    CREATININE mg/dL 0 22* 0 50*  --    CALCIUM mg/dL 8 2* 7 6*  --    ALK PHOS U/L  --  45*  --    ALT U/L  --  26  --    AST U/L  --  46*  --    GLUCOSE, ISTAT mg/dl  --   --  195*      Results from last 7 days   Lab Units 08/07/21  0454   PTT seconds 30   INR  1 23*       Imaging Studies: I have personally reviewed pertinent reports  EKG, Pathology, and Other Studies: I have personally reviewed pertinent reports  Counseling / Coordination of Care  Total floor / unit time spent today 20 minutes  Greater than 50% of total time was spent with the patient and / or family counseling and / or coordination of care  Please note that the APS provides consultative services regarding pain management only  With the exception of ketamine, peripheral nerve catheters, and epidural infusions (and except when indicated), final decisions regarding starting or changing doses of analgesic medications are at the discretion of the consulting service  Off hours consultation and/or medication management is generally not available      Britney Dumont MD  Acute Pain Service

## 2021-08-10 NOTE — PHYSICAL THERAPY NOTE
PHYSICAL THERAPY CANCELLATION NOTE    ATTEMPTED TO SEE PATIENT  OFF FLOOR AT XRAY, WILL CONTINUE TO FOLLOW PER PT POC AS APPROPRIATE      Blanche Gale, PT

## 2021-08-10 NOTE — PROGRESS NOTES
Gyn Oncology Progress note   Caty Carvalho 80 y o  female MRN: [id number]  Unit/Bed#: [facility] 528-01 Encounter: [id number]    Assessment: 80 y o  POD# 4 from Primary ovarian cancer debulking status post diagnostic laparoscopy, exploratory laparotomy, on block hysterectomy bilateral salpingo-oophorectomy sigmoid ectomy with low rectal reanastomosis and over-sewing of bladder peritoneal stripping, cystotomy, and diaphragm stripping small bowel resection and reanastomosis radical omentectomy splenectomy appendectomy     Plan:     1) Postsurgical Management after primary debulking due to adenocarcinoma, unknown primary   - Hgb 8 7 today   Stable   - Urinary output 1 3 cc/kg/h  - Vasopressors  discontinued   - NGT removed yesterday, denies nausea, tolerating ice chips ans sips of water  - OSCAR drain bilateral 125 cc clear serosanguinous fluid   - No passing gas yet  - No peritoneal irritative signs  - Surgical incision without peripheral irritation or oozing   - up to chair  - Pending final  pathology reports     2) Pain control   - Epidural , dilaudid BT pain (requiered x2 last night)     3) DVT prophylaxis   - SCDs, heparin      4) Acute loss anemia   - S/P 4 U RBC + 2 FFP intraop, Hg 8/8 6 9, received 1 u RBC, 8 3--> 8 7   - HR  110s  -150/60-70     5) S/p  splenectomy  - Pending  pneumococcal 13 valent vaccine   - S/P HI and menigoccoccal vaccine  - Afebrile      6) Hx HTN , HLD  - Holding home medication      7) Hx Asthma  - Extubated 8/7, saturations >92% with NC 4 L  - albuterol PRN  - incentive spirometry      8) S/p cystostomy  - Irving to be in place for 2 weeks     9) Hypokalemia   - 3 0 today, in replacement             Subjective:    Caty Carvalho pain is stable , little more than before but not excrutiating pain, she was on the chair yesterday for 12h   Patient is currently voiding with irving in place  She is not ambulating  Patient is not currently passing flatus and has had no bowel movement   She is tolerating PO, just sips for now, and denies nausea or vomitting  Patient denies fever, chills, chest pain, shortness of breath, or calf tenderness  /69   Pulse (!) 113   Temp 98 5 °F (36 9 °C)   Resp (!) 25   Ht 4' 11" (1 499 m)   Wt 77 1 kg (170 lb)   SpO2 95%   BMI 34 34 kg/m²     I/O last 3 completed shifts: In: 4447 2 [I V :3497 2; Blood:350; IV [id number]:700]  Out: 6410 [Urine:2520; Emesis/NG output:320; Drains:305]  I/O this shift: In: 982 4 [I V :982 4]  Out: 8197 [Urine:1325; Drains:50]    Lab Results   Component Value Date    WBC 22 11 (H) 08/10/2021    HGB 8 7 (L) 08/10/2021    HCT 26 8 (L) 08/10/2021    MCV 88 08/10/2021     (H) 08/10/2021       Lab Results   Component Value Date    GLUCOSE 195 (H) 08/06/2021    CALCIUM 8 2 (L) 08/10/2021    K 3 0 (L) 08/10/2021    CO2 25 08/10/2021     08/10/2021    BUN 7 08/10/2021    CREATININE 0 22 (L) 08/10/2021           Physical Exam  Constitutional:       Appearance: She is well-developed  HENT:      Head: Normocephalic and atraumatic  Eyes:      Conjunctiva/sclera: Conjunctivae normal       Pupils: Pupils are equal, round, and reactive to light  Cardiovascular:      Rate and Rhythm: Normal rate and regular rhythm  Heart sounds: Normal heart sounds  Pulmonary:      Effort: Pulmonary effort is normal       Breath sounds: Normal breath sounds  Abdominal:      General: Bowel sounds are normal       Palpations: Abdomen is soft  Comments: Surgical incision covered, no irritative signs, no evidence of oozing  No peritoneal irritative signs, bilateral OSCAR tuber functional  Serosanguinous fluid  Genitourinary:     Vagina: Normal    Musculoskeletal:         General: Normal range of motion  Cervical back: Normal range of motion and neck supple  Skin:     General: Skin is warm  Neurological:      Mental Status: She is alert and oriented to person, place, and time             King Estrella MD  8/10/2021  6:49 AM

## 2021-08-11 LAB
ALBUMIN SERPL BCP-MCNC: 1.3 G/DL (ref 3.5–5)
ALP SERPL-CCNC: 132 U/L (ref 46–116)
ALT SERPL W P-5'-P-CCNC: 16 U/L (ref 12–78)
ANION GAP SERPL CALCULATED.3IONS-SCNC: 6 MMOL/L (ref 4–13)
AST SERPL W P-5'-P-CCNC: 28 U/L (ref 5–45)
BASOPHILS # BLD AUTO: 0.04 THOUSANDS/ΜL (ref 0–0.1)
BASOPHILS NFR BLD AUTO: 0 % (ref 0–1)
BILIRUB SERPL-MCNC: 0.74 MG/DL (ref 0.2–1)
BUN SERPL-MCNC: 5 MG/DL (ref 5–25)
CALCIUM ALBUM COR SERPL-MCNC: 9.7 MG/DL (ref 8.3–10.1)
CALCIUM SERPL-MCNC: 7.5 MG/DL (ref 8.3–10.1)
CHLORIDE SERPL-SCNC: 100 MMOL/L (ref 100–108)
CO2 SERPL-SCNC: 32 MMOL/L (ref 21–32)
CREAT SERPL-MCNC: 0.34 MG/DL (ref 0.6–1.3)
EOSINOPHIL # BLD AUTO: 0.48 THOUSAND/ΜL (ref 0–0.61)
EOSINOPHIL NFR BLD AUTO: 3 % (ref 0–6)
ERYTHROCYTE [DISTWIDTH] IN BLOOD BY AUTOMATED COUNT: 15.8 % (ref 11.6–15.1)
GFR SERPL CREATININE-BSD FRML MDRD: 112 ML/MIN/1.73SQ M
GLUCOSE SERPL-MCNC: 111 MG/DL (ref 65–140)
GLUCOSE SERPL-MCNC: 127 MG/DL (ref 65–140)
GLUCOSE SERPL-MCNC: 157 MG/DL (ref 65–140)
GLUCOSE SERPL-MCNC: 169 MG/DL (ref 65–140)
GLUCOSE SERPL-MCNC: 221 MG/DL (ref 65–140)
HCT VFR BLD AUTO: 25 % (ref 34.8–46.1)
HGB BLD-MCNC: 8 G/DL (ref 11.5–15.4)
IMM GRANULOCYTES # BLD AUTO: 0.34 THOUSAND/UL (ref 0–0.2)
IMM GRANULOCYTES NFR BLD AUTO: 2 % (ref 0–2)
LYMPHOCYTES # BLD AUTO: 1.79 THOUSANDS/ΜL (ref 0.6–4.47)
LYMPHOCYTES NFR BLD AUTO: 10 % (ref 14–44)
MCH RBC QN AUTO: 28.1 PG (ref 26.8–34.3)
MCHC RBC AUTO-ENTMCNC: 32 G/DL (ref 31.4–37.4)
MCV RBC AUTO: 88 FL (ref 82–98)
MONOCYTES # BLD AUTO: 1.8 THOUSAND/ΜL (ref 0.17–1.22)
MONOCYTES NFR BLD AUTO: 10 % (ref 4–12)
NEUTROPHILS # BLD AUTO: 13.65 THOUSANDS/ΜL (ref 1.85–7.62)
NEUTS SEG NFR BLD AUTO: 75 % (ref 43–75)
NRBC BLD AUTO-RTO: 2 /100 WBCS
PLATELET # BLD AUTO: 492 THOUSANDS/UL (ref 149–390)
PMV BLD AUTO: 10.6 FL (ref 8.9–12.7)
POTASSIUM SERPL-SCNC: 2.8 MMOL/L (ref 3.5–5.3)
PROT SERPL-MCNC: 5.3 G/DL (ref 6.4–8.2)
RBC # BLD AUTO: 2.85 MILLION/UL (ref 3.81–5.12)
SODIUM SERPL-SCNC: 138 MMOL/L (ref 136–145)
WBC # BLD AUTO: 18.1 THOUSAND/UL (ref 4.31–10.16)

## 2021-08-11 PROCEDURE — 82948 REAGENT STRIP/BLOOD GLUCOSE: CPT

## 2021-08-11 PROCEDURE — 99024 POSTOP FOLLOW-UP VISIT: CPT | Performed by: OBSTETRICS & GYNECOLOGY

## 2021-08-11 PROCEDURE — 80053 COMPREHEN METABOLIC PANEL: CPT | Performed by: OBSTETRICS & GYNECOLOGY

## 2021-08-11 PROCEDURE — 99222 1ST HOSP IP/OBS MODERATE 55: CPT | Performed by: INTERNAL MEDICINE

## 2021-08-11 PROCEDURE — 85025 COMPLETE CBC W/AUTO DIFF WBC: CPT | Performed by: OBSTETRICS & GYNECOLOGY

## 2021-08-11 RX ORDER — ALBUTEROL SULFATE 90 UG/1
2 AEROSOL, METERED RESPIRATORY (INHALATION) EVERY 4 HOURS PRN
Status: DISCONTINUED | OUTPATIENT
Start: 2021-08-11 | End: 2021-09-10 | Stop reason: HOSPADM

## 2021-08-11 RX ORDER — ACETAMINOPHEN 325 MG/1
975 TABLET ORAL EVERY 6 HOURS SCHEDULED
Status: DISCONTINUED | OUTPATIENT
Start: 2021-08-11 | End: 2021-08-17

## 2021-08-11 RX ORDER — PANTOPRAZOLE SODIUM 40 MG/1
40 TABLET, DELAYED RELEASE ORAL
Status: DISCONTINUED | OUTPATIENT
Start: 2021-08-11 | End: 2021-09-10 | Stop reason: HOSPADM

## 2021-08-11 RX ORDER — POTASSIUM CHLORIDE 20 MEQ/1
40 TABLET, EXTENDED RELEASE ORAL EVERY 4 HOURS
Status: COMPLETED | OUTPATIENT
Start: 2021-08-11 | End: 2021-08-11

## 2021-08-11 RX ADMIN — PANTOPRAZOLE SODIUM 40 MG: 40 TABLET, DELAYED RELEASE ORAL at 08:55

## 2021-08-11 RX ADMIN — OXYCODONE HYDROCHLORIDE 5 MG: 5 TABLET ORAL at 04:21

## 2021-08-11 RX ADMIN — POTASSIUM CHLORIDE 40 MEQ: 1500 TABLET, EXTENDED RELEASE ORAL at 12:03

## 2021-08-11 RX ADMIN — HEPARIN SODIUM 5000 UNITS: 5000 INJECTION INTRAVENOUS; SUBCUTANEOUS at 21:20

## 2021-08-11 RX ADMIN — ACETAMINOPHEN 975 MG: 325 TABLET, FILM COATED ORAL at 17:13

## 2021-08-11 RX ADMIN — OXYCODONE HYDROCHLORIDE 10 MG: 10 TABLET ORAL at 17:45

## 2021-08-11 RX ADMIN — ACETAMINOPHEN 975 MG: 325 TABLET, FILM COATED ORAL at 23:03

## 2021-08-11 RX ADMIN — POTASSIUM CHLORIDE 40 MEQ: 1500 TABLET, EXTENDED RELEASE ORAL at 08:55

## 2021-08-11 RX ADMIN — HEPARIN SODIUM 5000 UNITS: 5000 INJECTION INTRAVENOUS; SUBCUTANEOUS at 05:55

## 2021-08-11 RX ADMIN — INSULIN LISPRO 1 UNITS: 100 INJECTION, SOLUTION INTRAVENOUS; SUBCUTANEOUS at 12:09

## 2021-08-11 RX ADMIN — HYDROMORPHONE HYDROCHLORIDE 0.5 MG: 1 INJECTION, SOLUTION INTRAMUSCULAR; INTRAVENOUS; SUBCUTANEOUS at 07:40

## 2021-08-11 RX ADMIN — INSULIN LISPRO 1 UNITS: 100 INJECTION, SOLUTION INTRAVENOUS; SUBCUTANEOUS at 21:20

## 2021-08-11 RX ADMIN — HEPARIN SODIUM 5000 UNITS: 5000 INJECTION INTRAVENOUS; SUBCUTANEOUS at 15:42

## 2021-08-11 RX ADMIN — OXYCODONE HYDROCHLORIDE 10 MG: 10 TABLET ORAL at 23:03

## 2021-08-11 RX ADMIN — SODIUM CHLORIDE, SODIUM GLUCONATE, SODIUM ACETATE, POTASSIUM CHLORIDE, MAGNESIUM CHLORIDE, SODIUM PHOSPHATE, DIBASIC, AND POTASSIUM PHOSPHATE 75 ML/HR: .53; .5; .37; .037; .03; .012; .00082 INJECTION, SOLUTION INTRAVENOUS at 01:52

## 2021-08-11 RX ADMIN — INSULIN LISPRO 1 UNITS: 100 INJECTION, SOLUTION INTRAVENOUS; SUBCUTANEOUS at 17:14

## 2021-08-11 RX ADMIN — POTASSIUM CHLORIDE 40 MEQ: 1500 TABLET, EXTENDED RELEASE ORAL at 15:42

## 2021-08-11 RX ADMIN — OXYCODONE HYDROCHLORIDE 10 MG: 10 TABLET ORAL at 12:03

## 2021-08-11 RX ADMIN — PRAVASTATIN SODIUM 10 MG: 10 TABLET ORAL at 15:42

## 2021-08-11 NOTE — RESPIRATORY THERAPY NOTE
resp care      08/11/21 0731   Respiratory Assessment   Assessment Type Assess only   General Appearance Alert; Awake   Respiratory Pattern Normal   Chest Assessment Chest expansion symmetrical   Bilateral Breath Sounds Clear   Cough None   Resp Comments pt takes albuterol mdi prn at home, no indication for prn udn tx, breath sounds clear bilat, no sob/distress, udn prn d/c'd, changed to an albuterol mdi q 4prn for sob/wheezing, d/c'd resp protocol   O2 Device nc

## 2021-08-11 NOTE — CONSULTS
PULMONOLOGY CONSULT NOTE     Name: Carlito Cordova   Age & Sex: 80 y o  female   MRN: [id number]  Unit/Bed#: [facility] 528-01   Encounter: [id number]    Reason for consultation: acute hypoxemia/abnormal CT    Requesting physician: Dr Dusty Branham MD    Assessment:     1  Acute hypoxemia secondary to large b/l pleural effusions  - Patient is POD5 extensive elective surgical procedures for peritoneal carcinomatosis, including hysterectomy, oophorectomy, b/l diaphragm stripping, SBR, splenectomy, appendectomy and sigmoidectomy  - intubated on 8/6, extubated on 8/7 and placed on 2L NC  - O2 needs increased to 6L and now back down to 4L w/ O2 sats in the 80s to low 90s  - had fever of 100 6F and desated to 89% on 8/10  - Imaging was performed, showed new large b/l pleural effusions, scattered airspaces w/i the lungs and LLL bronchus obstruction  - afebrile since, surgical sites appears grossly clean w/ absent oozing  - b/l OSCAR drains draining serosang fluid  - acute hypoxemia most likely secondary to atelectasis caused by large b/l pleural effusions   - despite hypoxemia, tachycardia, fever on 8/10, there is low suspicion for PE/DVT since patient is now asymptomatic, ambulating and on anticoagulation  - low suspicion for infection due to decreasing leukocytosis, clean surgical sites and lines, and patient continues to be afebrile    2  Abnormal CT chest showing LLL obstruction, large b/l pleural effusions  - patient is s/p extensive surgery for peritoneal carcinomatosis   - previous CXR showed absent pleural effusions  - not likely to be malignant, however cannot completely r/o in the setting of surgery and peritoneal carcinomatosis/ovarian cancer    3   Peritoneal carcinomatosis  - in the setting of ovarian cancer found on CTAP 7/11/2021,  on 7/20/2021 was 543 7  - 8/6/2021 had extensive debulking surgery  - POD5    Plan:  - thoracentesis for pleural effusion drainage, send for cytology, Light's criteria, culture and diff  - continue supplemental O2 to maintain O2 sats > 90%  - continue incentive spirometry  - albuterol PRN  - continue DVT prophylaxis  - monitor vitals    History of Present Illness   HPI:  Cami Henriquez is a 80 y o  female who was consulted for worsening hypoxemia/abnormal CT chest   Patient presented to the ED on 7/11 due to L groin pain for one week  CT imaging showed ascites, omental cake, peritoneal nodularity and 5 9 x 2 7 x 4 8cm L adnexal mass  OBGYN was consulted and determine patient had likely ovarian cancer   performed on 7/20/2021 was elevated at 543 7  On 8/6/2021 patient underwent elective debulking for peritoneal carcinomatosis  This included total hysterectomy, bilateral salpingectomy, left oophorectomy (R ovary removed 35 years ago for cyst), sigmoidectomy with low rectal anastomosis (initially failed leak test prompting intraop colorectal surgery consultation with over-sewing and colonoscopy), SBR w/  anastomosis, b/l diaphragm stripping/debridement, bladder debridement with cystotomy over-sewing, omentectomy, splenectomy, appendectomy, over-sewing of pancreas tail  Patient was admitted to SICU s/p surgery  She was extubated on 8/7 and was placed on 2L of NC, which increased to 6L and now 4L of O2  Attempts to wean her off of O2 were made, however patient would desat to 80s and would be placed back on oxygen again  She was transferred to the ICU to the floor on 8/9/2021 on 4L of O2 while sating at low 90s  Patient had a fever of 100 6F and O2 sat 89% on 8/10  CXR performed on 8/10 which showed moderate bilateral pleural effusions, new compared to prior CXR, and CT chest 8/10, which showed scattered airspace opacities within the lungs, obstruction of the LLL bronchus, and large b/l pleural effusions  She was given one dose of Lasix 10mg on 8/10/2021  Patient is POD5 today   She showed no signs of volume overload and has been making urine output via irving catheter placed in since her surgery on 8/6  She was having bowel movement this AM  OSCAR drains are draining serosanguinous and surgical sites appear clean w/o oozing or dehiscence  Had some difficulty in taking a deep breathe  Denied any chest pain or palpitations  Denied any UE or LE pain  Denied any fevers or chills  Review of systems:  12 point review of systems was completed and was otherwise negative except as listed in HPI      Historical Information   Past Medical History:   Diagnosis Date    Acid reflux     Asthma     Cancer ([facility] [address] )     ovarian, peritoneal carcinomatosis    GERD (gastroesophageal reflux disease)     Hypercholesteremia     Hypertension     Migraine      Past Surgical History:   Procedure Laterality Date    APPENDECTOMY N/A 8/6/2021    Procedure: APPENDECTOMY;  Surgeon: Juan Romero MD;  [facility];  Service: Gynecology Oncology    CHOLECYSTECTOMY      COLONOSCOPY      GALLBLADDER SURGERY      HYSTERECTOMY N/A 8/6/2021    Procedure: ENBLOCK HYSTERECTOMY, BILATERAL SALPINGO-OOPHORECTOMY, SIGMOIDECTOMY WITH LOW RECTAL REANASTAMOSIS, BLADDER PERITONEAL STRIPPING AND CYSTOTOMY REPAIR, DIAPHRAGM STRIPPING, SMALL BOWEL RESECTION WITH RE-ANASTAMOSIS;  Surgeon: Juan Romero MD;  [facility];  Service: Gynecology Oncology    LAPAROTOMY N/A 8/6/2021    Procedure: LAPAROTOMY EXPLORATORY; OVER SEW PANCREAS TAIL;  Surgeon: Juan Romero MD;  [facility];  Service: Gynecology Oncology    OMENTECTOMY N/A 8/6/2021    Procedure: RADICAL OMENTECTOMY;  Surgeon: Juan Romero MD;  [facility];  Service: Gynecology Oncology    PA LAP,DIAGNOSTIC ABDOMEN N/A 8/6/2021    Procedure: LAPAROSCOPY DIAGNOSTIC;  Surgeon: Juan Romero MD;  [facility];  Service: Gynecology Oncology    RIGHT OOPHORECTOMY  1986    SPLENECTOMY, TOTAL N/A 8/6/2021    Procedure: SPLENECTOMY;  Surgeon: Juan Romero MD;  [facility];  Service: Gynecology Oncology    TONSILLECTOMY History reviewed  No pertinent family history      Occupational History:  in [employer] History:     Lives at [address] care of her daughter's two dogs most of the week  Never smoker  No drug use  Social drinker    Meds/Allergies   Current Facility-Administered Medications   Medication Dose Route Frequency    acetaminophen (TYLENOL) tablet 975 mg  975 mg Oral Q6H PRN    albuterol (PROVENTIL HFA,VENTOLIN HFA) inhaler 2 puff  2 puff Inhalation Q4H PRN    heparin (porcine) subcutaneous injection 5,000 Units  5,000 Units Subcutaneous Q8H [address]    HYDROmorphone (DILAUDID) injection 0 5 mg  0 5 mg Intravenous Q2H PRN    insulin lispro (HumaLOG) 100 units/mL subcutaneous injection 1-5 Units  1-5 Units Subcutaneous Q6H [address]    ondansetron (ZOFRAN) injection 4 mg  4 mg Intravenous Q6H PRN    oxyCODONE (ROXICODONE) immediate release tablet 10 mg  10 mg Oral Q4H PRN    oxyCODONE (ROXICODONE) IR tablet 5 mg  5 mg Oral Q4H PRN    pantoprazole (PROTONIX) EC tablet 40 mg  40 mg Oral Early Morning    phenol (CHLORASEPTIC) 1 4 % mucosal liquid 1 spray  1 spray Mouth/Throat Q2H PRN    pneumococcal 13-valent conjugate vaccine (PREVNAR-13) IM injection 0 5 mL  0 5 mL Intramuscular Prior to discharge    potassium chloride (K-DUR,KLOR-CON) CR tablet 40 mEq  40 mEq Oral Q4H    pravastatin (PRAVACHOL) tablet 10 mg  10 mg Oral Daily With Dinner     Medications Prior to Admission   Medication    albuterol (PROVENTIL HFA,VENTOLIN HFA) 90 mcg/act inhaler    amLODIPine-benazepril (LOTREL) 10-20 MG per capsule    benzonatate (TESSALON) 200 MG capsule    butalbital-aspirin-caffeine (FIORINAL) -40 mg per capsule    fluticasone (FLONASE) 50 mcg/act nasal spray    Naproxen Sodium (Aleve) 220 MG CAPS    omeprazole (PriLOSEC) 20 mg delayed release capsule    polyethylene glycol (MiraLax) 17 GM/SCOOP powder    pravastatin (PRAVACHOL) 10 mg tablet    AMLODIPINE BENZOATE PO    fluticasone (FLOVENT DISKUS) 50 MCG/BLIST diskus inhaler     Allergies   Allergen Reactions    Erythromycin Nausea Only    Morphine Headache       Vitals: Blood pressure 159/72, pulse 99, temperature 98 7 °F (37 1 °C), temperature source Oral, resp  rate 17, height 4' 11" (1 499 m), weight 77 1 kg (170 lb), SpO2 90 %  , on 4L of NC, Body mass index is 34 34 kg/m²  Intake/Output Summary (Last 24 hours) at 8/11/2021 0920  Last data filed at 8/11/2021 0612  Gross per 24 hour   Intake 1851 25 ml   Output 565 ml   Net 1286 25 ml       Physical Exam  Constitutional:       Appearance: Normal appearance  HENT:      Head: Normocephalic and atraumatic  Right Ear: External ear normal       Left Ear: External ear normal       Nose: Nose normal       Mouth/Throat:      Pharynx: Oropharynx is clear  Eyes:      Extraocular Movements: Extraocular movements intact  Conjunctiva/sclera: Conjunctivae normal    Cardiovascular:      Rate and Rhythm: Normal rate and regular rhythm  Heart sounds: Normal heart sounds  Pulmonary:      Effort: Pulmonary effort is normal  No accessory muscle usage or retractions  Breath sounds: Examination of the right-lower field reveals decreased breath sounds  Examination of the left-lower field reveals decreased breath sounds  Decreased breath sounds present  Abdominal:      General: A surgical scar is present  Musculoskeletal:         General: Normal range of motion  Right upper arm: No edema  Left upper arm: No edema  Cervical back: Normal range of motion  Right lower leg: No edema  Left lower leg: No edema  Skin:     General: Skin is warm  Findings: No erythema or rash  Neurological:      General: No focal deficit present  Mental Status: She is alert  Mental status is at baseline  Psychiatric:         Mood and Affect: Mood normal          Behavior: Behavior normal          Thought Content:  Thought content normal  Judgment: Judgment normal        Labs: I have personally reviewed pertinent lab results    Laboratory and Diagnostics  Results from last 7 days   Lab Units 08/11/21  0457 08/10/21  0452 08/09/21  0603 08/08/21  2010 08/08/21  1241 08/08/21 0425 08/07/21 0422 08/06/21 1936 08/06/21  1557   WBC Thousand/uL 18 10* 22 11* 28 35*  --   --  33 28* 27 32* 17 40*  --    HEMOGLOBIN g/dL 8 0* 8 7* 8 3* 8 5* 6 9* 7 5* 9 8* 9 7*  --    I STAT HEMOGLOBIN g/dl  --   --   --   --   --   --   --   --  9 2*   HEMATOCRIT % 25 0* 26 8* 25 4*  --   --  22 3* 28 8* 29 2*  --    HEMATOCRIT, ISTAT %  --   --   --   --   --   --   --   --  27*   PLATELETS Thousands/uL 492* 468* 345  --   --  268 224 182  --    NEUTROS PCT % 75 84* 88*  --   --  87*  --  88*  --    MONOS PCT % 10 9 7  --   --  8  --  7  --      Results from last 7 days   Lab Units 08/11/21  0457 08/10/21  0452 08/09/21  0603 08/08/21  0425 08/07/21  0422 08/06/21  1936 08/06/21  1557   SODIUM mmol/L 138 140 141 140 137 138  --    POTASSIUM mmol/L 2 8* 3 0* 3 3* 4 0 4 3 3 9  --    CHLORIDE mmol/L 100 105 106 109* 111* 111*  --    CO2 mmol/L 32 25 29 26 21 19*  --    CO2, I-STAT mmol/L  --   --   --   --   --   --  22   ANION GAP mmol/L 6 10 6 5 5 8  --    BUN mg/dL 5 7 8 9 7 7  --    CREATININE mg/dL 0 34* 0 22* 0 25* 0 39* 0 50* 0 39*  --    CALCIUM mg/dL 7 5* 8 2* 8 2* 7 9* 7 6* 7 6*  --    GLUCOSE RANDOM mg/dL 111 73 88 129 172* 243*  --    ALT U/L 16  --   --   --  26  --   --    AST U/L 28  --   --   --  46*  --   --    ALK PHOS U/L 132*  --   --   --  45*  --   --    ALBUMIN g/dL 1 3*  --   --   --  1 8*  --   --    TOTAL BILIRUBIN mg/dL 0 74  --   --   --  1 22*  --   --      Results from last 7 days   Lab Units 08/10/21  0452 08/09/21  0603 08/08/21  0425 08/07/21  0422   MAGNESIUM mg/dL 1 9 2 1 1 9 1 3*   PHOSPHORUS mg/dL 2 6 2 3 2 2* 3 5      Results from last 7 days   Lab Units 08/07/21  0454 08/06/21  1936   INR  1 23* 1 47*   PTT seconds 30 31          Results from last 7 days   Lab Units 08/06/21  1936   LACTIC ACID mmol/L 1 5                         Results from last 7 days   Lab Units 08/06/21  1937   PH ART  7 344*   PCO2 ART mm Hg 36 4   PO2 ART mm Hg 249 5*   HCO3 ART mmol/L 19 4*   BASE EXC ART mmol/L -5 7   ABG SOURCE  Line, Arterial       Imaging and other studies: I have personally reviewed pertinent reports  Pulmonary function testing: N/A    EKG, Pathology, and Other Studies: I have personally reviewed pertinent reports  Code Status: No Order    VTE Pharmacologic Prophylaxis: Heparin  VTE Mechanical Prophylaxis: N/A    Disclaimer: Portions of the record may have been created with voice recognition software  Occasional wrong word or "sound a like" substitutions may have occurred due to the inherent limitations of voice recognition software  Careful consideration should be taken to recognize, using context, where substitutions have occurred      [id number] [facility] Student  Pulmonary/Critical Care  [facility]

## 2021-08-11 NOTE — PROGRESS NOTES
The pantoprazole has / have been converted to Oral per [facility] IV-to-PO Auto-Conversion Protocol for Adults as approved by the Pharmacy and Therapeutics Committee  The patient met all eligible criteria:  3 Age = 26years old   2) Received at least one dose of the IV form   3) Receiving at least one other scheduled oral/enteral medication   4) Tolerating an oral/enteral diet   and did not have any exclusions:   1) Critical care patient   2) Active GI bleed (IF assessing H2RAs or PPIs)   3) Continuous tube feeding (IF assessing cipro, doxycycline, levofloxacin, minocycline, rifampin, or voriconazole)   4) Receiving PO vancomycin (IF assessing metronidazole)   5) Persistent nausea and/or vomiting   6) Ileus or gastrointestinal obstruction   7) Nish/nasogastric tube set for continuous suction   8) Specific order not to automatically convert to PO (in the order's comments or if discussed in the most recent Infectious Disease or primary team's progress notes)     Thanks  Louisa Camarillo, Pharmacist

## 2021-08-11 NOTE — PLAN OF CARE
Problem: MOBILITY - ADULT  Goal: Maintain or return to baseline ADL function  Description: INTERVENTIONS:  -  Assess patient's ability to carry out ADLs; assess patient's baseline for ADL function and identify physical deficits which impact ability to perform ADLs (bathing, care of mouth/teeth, toileting, grooming, dressing, etc )  - Assess/evaluate cause of self-care deficits   - Assess range of motion  - Assess patient's mobility; develop plan if impaired  - Assess patient's need for assistive devices and provide as appropriate  - Encourage maximum independence but intervene and supervise when necessary  - Involve family in performance of ADLs  - Assess for home care needs following discharge   - Consider OT consult to assist with ADL evaluation and planning for discharge  - Provide patient education as appropriate  Outcome: Progressing  Goal: Maintains/Returns to pre admission functional level  Description: INTERVENTIONS:  - Perform BMAT or MOVE assessment daily    - Set and communicate daily mobility goal to care team and patient/family/caregiver  - Collaborate with rehabilitation services on mobility goals if consulted  - Perform Range of Motion  times a day  - Reposition patient every  hours    - Dangle patient  times a day  - Stand patient  times a day  - Ambulate patient  times a day  - Out of bed to chair  times a day   - Out of bed for meals  times a day  - Out of bed for toileting  - Record patient progress and toleration of activity level   Outcome: Progressing     Problem: Potential for Falls  Goal: Patient will remain free of falls  Description: INTERVENTIONS:  - Educate patient/family on patient safety including physical limitations  - Instruct patient to call for assistance with activity   - Consult OT/PT to assist with strengthening/mobility   - Keep Call bell within reach  - Keep bed low and locked with side rails adjusted as appropriate  - Keep care items and personal belongings within reach  - Initiate and maintain comfort rounds  - Make Fall Risk Sign visible to staff  - Offer Toileting every  Hours, in advance of need  - Initiate/Maintain alarm  - Obtain necessary fall risk management equipment:  - Apply yellow socks and bracelet for high fall risk patients  - Consider moving patient to room near nurses station  Outcome: Progressing     Problem: CARDIOVASCULAR - ADULT  Goal: Maintains optimal cardiac output and hemodynamic stability  Description: INTERVENTIONS:  - Monitor I/O, vital signs and rhythm  - Monitor for S/S and trends of decreased cardiac output  - Administer and titrate ordered vasoactive medications to optimize hemodynamic stability  - Assess quality of pulses, skin color and temperature  - Assess for signs of decreased coronary artery perfusion  - Instruct patient to report change in severity of symptoms  Outcome: Progressing  Goal: Absence of cardiac dysrhythmias or at baseline rhythm  Description: INTERVENTIONS:  - Continuous cardiac monitoring, vital signs, obtain 12 lead EKG if ordered  - Administer antiarrhythmic and heart rate control medications as ordered  - Monitor electrolytes and administer replacement therapy as ordered  Outcome: Progressing     Problem: GASTROINTESTINAL - ADULT  Goal: Minimal or absence of nausea and/or vomiting  Description: INTERVENTIONS:  - Administer IV fluids if ordered to ensure adequate hydration  - Maintain NPO status until nausea and vomiting are resolved  - Nasogastric tube if ordered  - Administer ordered antiemetic medications as needed  - Provide nonpharmacologic comfort measures as appropriate  - Advance diet as tolerated, if ordered  - Consider nutrition services referral to assist patient with adequate nutrition and appropriate food choices  Outcome: Progressing  Goal: Maintains or returns to baseline bowel function  Description: INTERVENTIONS:  - Assess bowel function  - Encourage oral fluids to ensure adequate hydration  - Administer IV fluids if ordered to ensure adequate hydration  - Administer ordered medications as needed  - Encourage mobilization and activity  - Consider nutritional services referral to assist patient with adequate nutrition and appropriate food choices  Outcome: Progressing  Goal: Maintains adequate nutritional intake  Description: INTERVENTIONS:  - Monitor percentage of each meal consumed  - Identify factors contributing to decreased intake, treat as appropriate  - Assist with meals as needed  - Monitor I&O, weight, and lab values if indicated  - Obtain nutrition services referral as needed  Outcome: Progressing  Goal: Establish and maintain optimal ostomy function  Description: INTERVENTIONS:  - Assess bowel function  - Encourage oral fluids to ensure adequate hydration  - Administer IV fluids if ordered to ensure adequate hydration   - Administer ordered medications as needed  - Encourage mobilization and activity  - Nutrition services referral to assist patient with appropriate food choices  - Assess stoma site  - Consider wound care consult   Outcome: Progressing  Goal: Oral mucous membranes remain intact  Description: INTERVENTIONS  - Assess oral mucosa and hygiene practices  - Implement preventative oral hygiene regimen  - Implement oral medicated treatments as ordered  - Initiate Nutrition services referral as needed  Outcome: Progressing     Problem: METABOLIC, FLUID AND ELECTROLYTES - ADULT  Goal: Electrolytes maintained within normal limits  Description: INTERVENTIONS:  - Monitor labs and assess patient for signs and symptoms of electrolyte imbalances  - Administer electrolyte replacement as ordered  - Monitor response to electrolyte replacements, including repeat lab results as appropriate  - Instruct patient on fluid and nutrition as appropriate  Outcome: Progressing  Goal: Fluid balance maintained  Description: INTERVENTIONS:  - Monitor labs   - Monitor I/O and WT  - Instruct patient on fluid and nutrition as appropriate  - Assess for signs & symptoms of volume excess or deficit  Outcome: Progressing  Goal: Glucose maintained within target range  Description: INTERVENTIONS:  - Monitor Blood Glucose as ordered  - Assess for signs and symptoms of hyperglycemia and hypoglycemia  - Administer ordered medications to maintain glucose within target range  - Assess nutritional intake and initiate nutrition service referral as needed  Outcome: Progressing     Problem: HEMATOLOGIC - ADULT  Goal: Maintains hematologic stability  Description: INTERVENTIONS  - Assess for signs and symptoms of bleeding or hemorrhage  - Monitor labs  - Administer supportive blood products/factors as ordered and appropriate  Outcome: Progressing     Problem: SAFETY,RESTRAINT: NV/NON-SELF DESTRUCTIVE BEHAVIOR  Goal: Remains free of harm/injury (restraint for non violent/non self-detsructive behavior)  Description: INTERVENTIONS:  - Instruct patient/family regarding restraint use   - Assess and monitor physiologic and psychological status   - Provide interventions and comfort measures to meet assessed patient needs   - Identify and implement measures to help patient regain control  - Assess readiness for release of restraint   Outcome: Progressing  Goal: Returns to optimal restraint-free functioning  Description: INTERVENTIONS:  - Assess the patient's behavior and symptoms that indicate continued need for restraint  - Identify and implement measures to help patient regain control  - Assess readiness for release of restraint   Outcome: Progressing     Problem: Prexisting or High Potential for Compromised Skin Integrity  Goal: Skin integrity is maintained or improved  Description: INTERVENTIONS:  - Identify patients at risk for skin breakdown  - Assess and monitor skin integrity  - Assess and monitor nutrition and hydration status  - Monitor labs   - Assess for incontinence   - Turn and reposition patient  - Assist with mobility/ambulation  - Relieve pressure over bony prominences  - Avoid friction and shearing  - Provide appropriate hygiene as needed including keeping skin clean and dry  - Evaluate need for skin moisturizer/barrier cream  - Collaborate with interdisciplinary team   - Patient/family teaching  - Consider wound care consult   Outcome: Progressing     Problem: Nutrition/Hydration-ADULT  Goal: Nutrient/Hydration intake appropriate for improving, restoring or maintaining nutritional needs  Description: Monitor and assess patient's nutrition/hydration status for malnutrition  Collaborate with interdisciplinary team and initiate plan and interventions as ordered  Monitor patient's weight and dietary intake as ordered or per policy  Utilize nutrition screening tool and intervene as necessary  Determine patient's food preferences and provide high-protein, high-caloric foods as appropriate       INTERVENTIONS:  - Monitor oral intake, urinary output, labs, and treatment plans  - Assess nutrition and hydration status and recommend course of action  - Evaluate amount of meals eaten  - Assist patient with eating if necessary   - Allow adequate time for meals  - Recommend/ encourage appropriate diets, oral nutritional supplements, and vitamin/mineral supplements  - Order, calculate, and assess calorie counts as needed  - Recommend, monitor, and adjust tube feedings and TPN/PPN based on assessed needs  - Assess need for intravenous fluids  - Provide specific nutrition/hydration education as appropriate  - Include patient/family/caregiver in decisions related to nutrition  Outcome: Progressing

## 2021-08-11 NOTE — PROGRESS NOTES
Gyn Oncology Progress note   Grace Finney 80 y o  female MRN: [id number]  Unit/Bed#: [facility] 528-01 Encounter: [id number]    Assessment: 80 y o   POD# 5 from Primary ovarian cancer debulking status post diagnostic laparoscopy, exploratory laparotomy, on block hysterectomy bilateral salpingo-oophorectomy sigmoid ectomy with low rectal reanastomosis and over-sewing of bladder peritoneal stripping, cystotomy, and diaphragm stripping small bowel resection and reanastomosis radical omentectomy splenectomy appendectomy     Plan:     1) Postsurgical Management after primary debulking due to adenocarcinoma, unknown primary   - Hgb 8 0 today   Stable   - Urinary output 1 3 cc/kg/h  - Tolerating liquid diet on clear ensure  - 5 bowel movement yesterday, no hematochezia    - OSCAR drain bilateral 30 cc   clear serosanguinous fluid on right drain, turbid yellowish fluid on left   - No peritoneal irritative signs  - Surgical incision without peripheral irritation or oozing   - Up to chair  - Pending final  pathology reports     2) Pain control   - Epidural removed yesteray, tylenol , bubba 5/10, dilaudid BT pain      3) DVT prophylaxis   - SCDs, heparin      4) Acute loss anemia   - S/P 4 U RBC + 2 FFP intraop, Hg 8/8 6 9-->  1 u RBC, 8 3--> 8 0 today     - HR  110s   -150/60-70     5) S/p  splenectomy  - Pending  pneumococcal 13 valent vaccine   - S/P HI and menigoccoccal vaccine  - WBC trending down     6) Hx HTN , HLD  - Consider restarting home medication today      7) S/p cystostomy  - Paulino to be in place for 2 weeks      8) Hypokalemia   - 3 0 Yesterday     S/p 36 Meq , pending labs this am     10) Hypoxemia/bilateral pleural efusions/left lower bronchus consolidation Vs Atelectasis/ Hx Asthma :   - O2 saturation on room air 82% Last night, RR 20  - Titrate oxygen for nasal canula for O2 saturations above 90%, Currently on 4 liters  - CRX yesterday with bilateral opacity, s/p lasix 10 mg IV  - IV fluids discontinued   - Chest CT scan 8/11 Obstruction of the left lower lobe bronchus  Consolidation versus atelectasis in the lung bases    Scattered airspace opacities within the lungs which may represent infection   Bilateral large pleural effusions  - Incentive spirometry   - WBC trending down since splenectomy, today 18k  No evidence of neutrophilia   Afebrile  Isolated fever yesterday 7 am  No new fevers  - Albuterol PRN       Subjective:    Fay Reyes has mild abdominal soreness mainly at level of left upper quadrant  Pain is well controlled  Patient is currently voiding on foly  She is not ambulating  Patient is currently passing flatus and has had bowel movement  She is tolerating PO, and denies nausea or vomitting  Patient denies fever, chills, chest pain, shortness of breath, or calf tenderness  /90   Pulse 104   Temp 99 2 °F (37 3 °C)   Resp 20   Ht 4' 11" (1 499 m)   Wt 77 1 kg (170 lb)   SpO2 (!) 82%   BMI 34 34 kg/m²     I/O last 3 completed shifts: In: 2677 8 [P O :100; I V :2127 8; IV Piggyback:450]  Out: 2333 [TCBMN:3739; Drains:155]  I/O this shift:  In: 1526 3 [I V :1526 3]  Out: -     Lab Results   Component Value Date    WBC 18 10 (H) 08/11/2021    HGB 8 0 (L) 08/11/2021    HCT 25 0 (L) 08/11/2021    MCV 88 08/11/2021     (H) 08/11/2021       Lab Results   Component Value Date    GLUCOSE 195 (H) 08/06/2021    CALCIUM 8 2 (L) 08/10/2021    K 3 0 (L) 08/10/2021    CO2 25 08/10/2021     08/10/2021    BUN 7 08/10/2021    CREATININE 0 22 (L) 08/10/2021           Physical Exam  Constitutional:       Appearance: She is well-developed  HENT:      Head: Normocephalic and atraumatic  Eyes:      Conjunctiva/sclera: Conjunctivae normal       Pupils: Pupils are equal, round, and reactive to light  Cardiovascular:      Rate and Rhythm: Normal rate and regular rhythm  Heart sounds: Normal heart sounds     Pulmonary:      Effort: Pulmonary effort is normal       Breath sounds: Normal breath sounds  Abdominal:      General: Bowel sounds are normal       Palpations: Abdomen is soft  Comments: OSCAR drain bilateral 30 cc   clear serosanguinous fluid on right drain, turbid yellowish fluid on left  Drain   Surgical incision clean, no peripheral erythema     Genitourinary:     Vagina: Normal    Musculoskeletal:         General: Normal range of motion  Cervical back: Normal range of motion and neck supple  Skin:     General: Skin is warm  Neurological:      Mental Status: She is alert and oriented to person, place, and time             Magaly Siddiqui MD  8/11/2021  5:58 AM

## 2021-08-11 NOTE — RESTORATIVE TECHNICIAN NOTE
Restorative Technician Note      Patient Name: Carlito Cordova     Note Type: Mobility  Patient Position Upon Consult: Seated edge of bed  Activity Performed: Ambulated  Assistive Device: Standard walker  Patient Position at End of Consult: Bedside chair

## 2021-08-11 NOTE — PROGRESS NOTES
Epidural Follow-up Note - Acute Pain Service    Judit Ritchie 80 y o  female MRN: [id number]  Unit/Bed#: [facility] 528-01 Encounter: [id number]      Assessment:   Principal Problem:    Peritoneal carcinomatosis ([facility] [address] )  Active Problems:    Asthma    Hypertension    Cancer related pain    Acute blood loss anemia    Leukocytosis    Severe protein-calorie malnutrition ([facility] [address] )      Judit Ritchie is a 80y o  year old female with primary ovarian cancer POD#3 s/p exploratory laparotomy, hysterectomy, BSO, sigmoid colectomy, splenectomy, appendectomy, and radical omentectomy  Pt's thoracic epidural was removed yesterday and she was transitioned to IV/PO pain medications  Pt states that they do work well for her pain however sometimes she falls behind and it's hard to catch up  Discussed with pt that since the orals take some time to take effect, she should request them before her pain becomes significant  Plan:  Analgesia:  - Continue Dilaudid 0 5 mg IV q2hr PRN for breakthrough pain, oxycodone 5/10mg q4hr prn mod/severe pain  - Would schedule PO tylenol      Bowel Regimen:  - Bowel regimen when appropriate from surgical perspective    APS will sign off at this time  Thank you for the consult  All opioids and other analgesics to be written at discretion of primary team  Please contact Acute Pain Service - [facility] via [employer] from [phone] with additional questions or concerns  See Jax or Umm for additional contacts and after hours information  Pain History  Current pain location(s): none  Pain Scale: none  Quality: none  24 hour history: pain is controlled when she receives a dose of her oral pain medications before her pain level has become too elevated  No n/v, moving her bowels  CT chest overnight showed BL pleural effusions  pulm consulted and plans on a thora          Opioid requirement previous 24 hours: IV dilaudid 0 5mg, oxycodone 25mg    Meds/Allergies   all current active meds have been reviewed    Allergies   Allergen Reactions    Erythromycin Nausea Only    Morphine Headache       Objective     Temp:  [98 7 °F (37 1 °C)-99 4 °F (37 4 °C)] 98 7 °F (37 1 °C)  HR:  [] 99  Resp:  [17-20] 17  BP: (142-166)/(72-90) 159/72    Physical Exam  Vitals reviewed  Constitutional:       General: She is not in acute distress  Appearance: Normal appearance  Comments: OOB, sitting up   HENT:      Head: Normocephalic  Nose:      Comments: NC in place     Mouth/Throat:      Mouth: Mucous membranes are moist    Eyes:      Extraocular Movements: Extraocular movements intact  Cardiovascular:      Rate and Rhythm: Normal rate  Pulmonary:      Effort: Pulmonary effort is normal  No respiratory distress  Abdominal:      General: Abdomen is flat  Musculoskeletal:      Comments: Bilateral LE motor intact   Skin:     General: Skin is warm and dry  Neurological:      General: No focal deficit present  Mental Status: She is alert and oriented to person, place, and time  Psychiatric:         Mood and Affect: Mood normal          Behavior: Behavior normal          Lab Results:   Results from last 7 days   Lab Units 08/11/21 0457   WBC Thousand/uL 18 10*   HEMOGLOBIN g/dL 8 0*   HEMATOCRIT % 25 0*   PLATELETS Thousands/uL 492*      Results from last 7 days   Lab Units 08/11/21  0457 08/06/21  1936 08/06/21  1557   POTASSIUM mmol/L 2 8*  --   --    CHLORIDE mmol/L 100  --   --    CO2 mmol/L 32  --   --    CO2, I-STAT mmol/L  --   --  22   BUN mg/dL 5  --   --    CREATININE mg/dL 0 34*  --   --    CALCIUM mg/dL 7 5*  --   --    ALK PHOS U/L 132*   < >  --    ALT U/L 16   < >  --    AST U/L 28   < >  --    GLUCOSE, ISTAT mg/dl  --   --  195*    < > = values in this interval not displayed  Results from last 7 days   Lab Units 08/07/21  0454   PTT seconds 30   INR  1 23*       Imaging Studies: I have personally reviewed pertinent reports      EKG, Pathology, and Other Studies: I have personally reviewed pertinent reports  Counseling / Coordination of Care  Total floor / unit time spent today 15 minutes  Greater than 50% of total time was spent with the patient and / or family counseling and / or coordination of care  Please note that the APS provides consultative services regarding pain management only  With the exception of ketamine, peripheral nerve catheters, and epidural infusions (and except when indicated), final decisions regarding starting or changing doses of analgesic medications are at the discretion of the consulting service  Off hours consultation and/or medication management is generally not available      Angelica Jiménez MD  Acute Pain Service

## 2021-08-12 LAB
ALBUMIN SERPL BCP-MCNC: 1.2 G/DL (ref 3.5–5)
ALP SERPL-CCNC: 173 U/L (ref 46–116)
ALT SERPL W P-5'-P-CCNC: 16 U/L (ref 12–78)
AMYLASE FLD QL: NORMAL U/L
ANION GAP SERPL CALCULATED.3IONS-SCNC: 5 MMOL/L (ref 4–13)
AST SERPL W P-5'-P-CCNC: 32 U/L (ref 5–45)
BASOPHILS # BLD AUTO: 0.06 THOUSANDS/ΜL (ref 0–0.1)
BASOPHILS NFR BLD AUTO: 0 % (ref 0–1)
BILIRUB SERPL-MCNC: 0.61 MG/DL (ref 0.2–1)
BUN SERPL-MCNC: 5 MG/DL (ref 5–25)
CALCIUM ALBUM COR SERPL-MCNC: 9.8 MG/DL (ref 8.3–10.1)
CALCIUM SERPL-MCNC: 7.6 MG/DL (ref 8.3–10.1)
CHLORIDE SERPL-SCNC: 101 MMOL/L (ref 100–108)
CO2 SERPL-SCNC: 32 MMOL/L (ref 21–32)
CREAT SERPL-MCNC: 0.21 MG/DL (ref 0.6–1.3)
EOSINOPHIL # BLD AUTO: 1.26 THOUSAND/ΜL (ref 0–0.61)
EOSINOPHIL NFR BLD AUTO: 7 % (ref 0–6)
ERYTHROCYTE [DISTWIDTH] IN BLOOD BY AUTOMATED COUNT: 15.9 % (ref 11.6–15.1)
GFR SERPL CREATININE-BSD FRML MDRD: 131 ML/MIN/1.73SQ M
GLUCOSE SERPL-MCNC: 114 MG/DL (ref 65–140)
GLUCOSE SERPL-MCNC: 118 MG/DL (ref 65–140)
GLUCOSE SERPL-MCNC: 132 MG/DL (ref 65–140)
GLUCOSE SERPL-MCNC: 152 MG/DL (ref 65–140)
GLUCOSE SERPL-MCNC: 83 MG/DL (ref 65–140)
HCT VFR BLD AUTO: 23.7 % (ref 34.8–46.1)
HGB BLD-MCNC: 7.5 G/DL (ref 11.5–15.4)
IMM GRANULOCYTES # BLD AUTO: 0.42 THOUSAND/UL (ref 0–0.2)
IMM GRANULOCYTES NFR BLD AUTO: 2 % (ref 0–2)
LYMPHOCYTES # BLD AUTO: 2.09 THOUSANDS/ΜL (ref 0.6–4.47)
LYMPHOCYTES NFR BLD AUTO: 12 % (ref 14–44)
MAGNESIUM SERPL-MCNC: 1.6 MG/DL (ref 1.6–2.6)
MCH RBC QN AUTO: 28.1 PG (ref 26.8–34.3)
MCHC RBC AUTO-ENTMCNC: 31.6 G/DL (ref 31.4–37.4)
MCV RBC AUTO: 89 FL (ref 82–98)
MONOCYTES # BLD AUTO: 1.63 THOUSAND/ΜL (ref 0.17–1.22)
MONOCYTES NFR BLD AUTO: 9 % (ref 4–12)
NEUTROPHILS # BLD AUTO: 12.65 THOUSANDS/ΜL (ref 1.85–7.62)
NEUTS SEG NFR BLD AUTO: 70 % (ref 43–75)
NRBC BLD AUTO-RTO: 1 /100 WBCS
PLATELET # BLD AUTO: 557 THOUSANDS/UL (ref 149–390)
PMV BLD AUTO: 10.9 FL (ref 8.9–12.7)
POTASSIUM SERPL-SCNC: 3.1 MMOL/L (ref 3.5–5.3)
PROT SERPL-MCNC: 4.9 G/DL (ref 6.4–8.2)
RBC # BLD AUTO: 2.67 MILLION/UL (ref 3.81–5.12)
SODIUM SERPL-SCNC: 138 MMOL/L (ref 136–145)
WBC # BLD AUTO: 18.11 THOUSAND/UL (ref 4.31–10.16)

## 2021-08-12 PROCEDURE — 82948 REAGENT STRIP/BLOOD GLUCOSE: CPT

## 2021-08-12 PROCEDURE — 82150 ASSAY OF AMYLASE: CPT | Performed by: OBSTETRICS & GYNECOLOGY

## 2021-08-12 PROCEDURE — 99232 SBSQ HOSP IP/OBS MODERATE 35: CPT | Performed by: INTERNAL MEDICINE

## 2021-08-12 PROCEDURE — 80053 COMPREHEN METABOLIC PANEL: CPT | Performed by: OBSTETRICS & GYNECOLOGY

## 2021-08-12 PROCEDURE — 85025 COMPLETE CBC W/AUTO DIFF WBC: CPT | Performed by: OBSTETRICS & GYNECOLOGY

## 2021-08-12 PROCEDURE — 83735 ASSAY OF MAGNESIUM: CPT | Performed by: OBSTETRICS & GYNECOLOGY

## 2021-08-12 PROCEDURE — 99024 POSTOP FOLLOW-UP VISIT: CPT | Performed by: OBSTETRICS & GYNECOLOGY

## 2021-08-12 PROCEDURE — NC001 PR NO CHARGE: Performed by: INTERNAL MEDICINE

## 2021-08-12 RX ORDER — LISINOPRIL 20 MG/1
20 TABLET ORAL DAILY
Status: DISCONTINUED | OUTPATIENT
Start: 2021-08-13 | End: 2021-08-14

## 2021-08-12 RX ORDER — AMLODIPINE BESYLATE 10 MG/1
10 TABLET ORAL DAILY
Status: DISCONTINUED | OUTPATIENT
Start: 2021-08-13 | End: 2021-09-10 | Stop reason: HOSPADM

## 2021-08-12 RX ORDER — POTASSIUM CHLORIDE 20 MEQ/1
40 TABLET, EXTENDED RELEASE ORAL EVERY 4 HOURS
Status: COMPLETED | OUTPATIENT
Start: 2021-08-12 | End: 2021-08-12

## 2021-08-12 RX ADMIN — OXYCODONE HYDROCHLORIDE 5 MG: 5 TABLET ORAL at 22:08

## 2021-08-12 RX ADMIN — HYDROMORPHONE HYDROCHLORIDE 0.5 MG: 1 INJECTION, SOLUTION INTRAMUSCULAR; INTRAVENOUS; SUBCUTANEOUS at 08:35

## 2021-08-12 RX ADMIN — ACETAMINOPHEN 975 MG: 325 TABLET, FILM COATED ORAL at 05:37

## 2021-08-12 RX ADMIN — HYDROMORPHONE HYDROCHLORIDE 0.5 MG: 1 INJECTION, SOLUTION INTRAMUSCULAR; INTRAVENOUS; SUBCUTANEOUS at 18:06

## 2021-08-12 RX ADMIN — POTASSIUM CHLORIDE 40 MEQ: 1500 TABLET, EXTENDED RELEASE ORAL at 08:12

## 2021-08-12 RX ADMIN — INSULIN LISPRO 1 UNITS: 100 INJECTION, SOLUTION INTRAVENOUS; SUBCUTANEOUS at 18:09

## 2021-08-12 RX ADMIN — PANTOPRAZOLE SODIUM 40 MG: 40 TABLET, DELAYED RELEASE ORAL at 05:38

## 2021-08-12 RX ADMIN — ENOXAPARIN SODIUM 40 MG: 40 INJECTION SUBCUTANEOUS at 11:41

## 2021-08-12 RX ADMIN — PRAVASTATIN SODIUM 10 MG: 10 TABLET ORAL at 18:06

## 2021-08-12 RX ADMIN — OXYCODONE HYDROCHLORIDE 10 MG: 10 TABLET ORAL at 05:38

## 2021-08-12 RX ADMIN — ACETAMINOPHEN 975 MG: 325 TABLET, FILM COATED ORAL at 11:41

## 2021-08-12 RX ADMIN — POTASSIUM CHLORIDE 40 MEQ: 1500 TABLET, EXTENDED RELEASE ORAL at 14:31

## 2021-08-12 RX ADMIN — HEPARIN SODIUM 5000 UNITS: 5000 INJECTION INTRAVENOUS; SUBCUTANEOUS at 05:38

## 2021-08-12 RX ADMIN — OXYCODONE HYDROCHLORIDE 10 MG: 10 TABLET ORAL at 14:29

## 2021-08-12 RX ADMIN — ACETAMINOPHEN 975 MG: 325 TABLET, FILM COATED ORAL at 18:06

## 2021-08-12 RX ADMIN — POTASSIUM CHLORIDE 40 MEQ: 1500 TABLET, EXTENDED RELEASE ORAL at 11:41

## 2021-08-12 NOTE — PROGRESS NOTES
PULMONOLOGY PROGRESS NOTE     Name: Poncho Griffith   Age & Sex: 80 y o  female   MRN: [id number]  Unit/Bed#: [facility] 528-01   Encounter: [id number]    PATIENT INFORMATION     Name: Poncho Griffith   Age & Sex: 80 y o  female   MRN: [id number]  Hospital Stay Days: 6    ASSESSMENT/PLAN     Principal Problem:    Peritoneal carcinomatosis [facility])  Active Problems:    Asthma    Hypertension    Cancer related pain    Acute blood loss anemia    Leukocytosis    Severe protein-calorie malnutrition ([address] )    Assessment:  1  Acute hypoxic respiratory failure secondary to large b/l pleural effusions  - Patient is POD5 extensive elective surgical procedures for peritoneal carcinomatosis, including hysterectomy, oophorectomy, b/l diaphragm stripping, SBR, splenectomy, appendectomy and sigmoidectomy  - intubated on 8/6, extubated on 8/7 and placed on 2L NC  - O2 needs increased to 6L and now back down to 4L w/ O2 sats in the 80s to low 90s  - had fever of 100 6F and desated to 89% on 8/10  - Imaging was performed, showed new large b/l pleural effusions, scattered airspaces w/i the lungs and LLL bronchus obstruction  - afebrile since, surgical sites appears grossly clean w/ absent oozing  - b/l OSCAR drains draining serosang fluid  - acute hypoxemia most likely secondary to atelectasis caused by large b/l pleural effusions   - despite hypoxemia, tachycardia, fever on 8/10, there is low suspicion for PE/DVT since patient is now asymptomatic, ambulating and on anticoagulation  - low suspicion for infection due to decreasing leukocytosis, clean surgical sites and lines, and patient continues to be afebrile     2   Abnormal CT chest showing LLL obstruction, large b/l pleural effusions, atelectasis  - patient is s/p extensive surgery for peritoneal carcinomatosis   - previous CXR showed absent pleural effusions  - not likely to be malignant, however cannot completely r/o in the setting of surgery and peritoneal carcinomatosis/ovarian cancer     3  Peritoneal carcinomatosis  - in the setting of ovarian cancer found on CTAP 2021,  on 2021 was 543 7  - 2021 had extensive debulking surgery  - POD6     Plan:  - Lasix once electrolyte abnormalities are improved   - K+ and Mg+2 supplements   - consider thoracentesis if not diuresising well  - continue supplemental O2 to maintain O2 sats > 88%  - continue incentive spirometry  - albuterol PRN  - continue DVT prophylaxis (lovenox)   - monitor vitals       Disposition: continue inpatient care    SUBJECTIVE     Patient had no acute events OVN  POD6  Patient is feeling well this morning  She is now sating in mid 90s on 3L NC  She has been using her incentive spirometry, went up from 750 to 1000  She noticed some mild swelling in b/l LE, which was not there yesterday  She walked around the hallway yesterday, and plans to walk more today  She continues to have BM  OSCAR drains are draining serosang, abdominal incision shows no signs of dehiscence, although patient mentioned some mild d/c  Denied any fevers or chills  Denied any chest pain, chest palpitations  Denied SOB or increased WOB  OBJECTIVE     Vitals:    21 0656 21 1546 21 2346 21 0713   BP: 159/72 157/76 157/79 149/71   BP Location: Left arm Left arm  Left arm   Pulse: 99 98  91   Resp: 17 18 20 17   Temp: 98 7 °F (37 1 °C) 97 9 °F (36 6 °C) 98 6 °F (37 °C) 98 5 °F (36 9 °C)   TempSrc: Oral Oral  Oral   SpO2: 90% 90%  94%   Weight:       Height:          Temperature:   Temp (24hrs), Av 3 °F (36 8 °C), Min:97 9 °F (36 6 °C), Max:98 6 °F (37 °C)    Temperature: 98 5 °F (36 9 °C)  Intake & Output:  I/O       08/10 07 -  0700  07 -  0700  07 -  0700    P  O  100      I V  (mL/kg) 1851 3 (24)      IV Piggyback       Total Intake(mL/kg) 1951 3 (25 3)      Urine (mL/kg/hr) 1750 (0 9) 1150 (0 6)     Emesis/NG output       Drains 65 60     Total Output 1815 1210     Net +136 3 -1210 Weights:   IBW (Ideal Body Weight): 43 2 kg    Body mass index is 34 34 kg/m²  Weight (last 2 days)     None        Physical Exam  Constitutional:       Appearance: Normal appearance  HENT:      Head: Normocephalic and atraumatic  Right Ear: External ear normal       Left Ear: External ear normal       Nose: Nose normal       Mouth/Throat:      Pharynx: Oropharynx is clear  Eyes:      Extraocular Movements: Extraocular movements intact  Conjunctiva/sclera: Conjunctivae normal    Cardiovascular:      Rate and Rhythm: Normal rate and regular rhythm  Pulses: Normal pulses  Heart sounds: Normal heart sounds  Pulmonary:      Effort: Pulmonary effort is normal       Breath sounds: Examination of the left-lower field reveals decreased breath sounds  Decreased breath sounds present  Abdominal:      General: Bowel sounds are normal    Musculoskeletal:      Cervical back: Normal range of motion  Right lower le+ Edema present  Left lower le+ Edema present  Skin:     General: Skin is warm  Neurological:      General: No focal deficit present  Mental Status: She is alert  Psychiatric:         Mood and Affect: Mood normal          Behavior: Behavior normal          Thought Content: Thought content normal          Judgment: Judgment normal        LABORATORY DATA     Labs: I have personally reviewed pertinent reports    Results from last 7 days   Lab Units 08/12/21  0448 08/11/21  0457 08/10/21  0452   WBC Thousand/uL 18 11* 18 10* 22 11*   HEMOGLOBIN g/dL 7 5* 8 0* 8 7*   HEMATOCRIT % 23 7* 25 0* 26 8*   PLATELETS Thousands/uL 557* 492* 468*   NEUTROS PCT % 70 75 84*   MONOS PCT % 9 10 9      Results from last 7 days   Lab Units 21  0457 08/10/21  0452 21  0422 21  1557 21  1419 21  1419 21  1329   POTASSIUM mmol/L 3 1* 2 8* 3 0* 4 3  --    < >  --   --    CHLORIDE mmol/L 101 100 105 111*  --    < >  --   -- CO2 mmol/L 32 32 25 21  --    < >  --   --    CO2, I-STAT mmol/L  --   --   --   --  22  --  15* 23   BUN mg/dL 5 5 7 7  --    < >  --   --    CREATININE mg/dL 0 21* 0 34* 0 22* 0 50*  --    < >  --   --    CALCIUM mg/dL 7 6* 7 5* 8 2* 7 6*  --    < >  --   --    ALK PHOS U/L 173* 132*  --  45*  --   --   --   --    ALT U/L 16 16  --  26  --   --   --   --    AST U/L 32 28  --  46*  --   --   --   --    GLUCOSE, ISTAT mg/dl  --   --   --   --  195*  --  160* 156*    < > = values in this interval not displayed  Results from last 7 days   Lab Units 08/12/21  0448 08/10/21  0452 08/09/21  0603   MAGNESIUM mg/dL 1 6 1 9 2 1     Results from last 7 days   Lab Units 08/10/21  0452 08/09/21  0603 08/08/21  0425   PHOSPHORUS mg/dL 2 6 2 3 2 2*      Results from last 7 days   Lab Units 08/07/21  0454 08/06/21  1936   INR  1 23* 1 47*   PTT seconds 30 31     Results from last 7 days   Lab Units 08/06/21  1936   LACTIC ACID mmol/L 1 5             ABG:   Results from last 7 days   Lab Units 08/06/21  1937   PH ART  7 344*   PCO2 ART mm Hg 36 4   PO2 ART mm Hg 249 5*   HCO3 ART mmol/L 19 4*   BASE EXC ART mmol/L -5 7   ABG SOURCE  Line, Arterial       IMAGING & DIAGNOSTIC TESTING     Radiology Results: I have personally reviewed pertinent reports  XR chest pa & lateral    Result Date: 7/23/2021  Impression: Small pleural effusions  Workstation performed: [id number]     Other Diagnostic Testing: I have personally reviewed pertinent reports      ACTIVE MEDICATIONS     Current Facility-Administered Medications   Medication Dose Route Frequency    acetaminophen (TYLENOL) tablet 975 mg  975 mg Oral Q6H [facility]    albuterol (PROVENTIL HFA,VENTOLIN HFA) inhaler 2 puff  2 puff Inhalation Q4H PRN    enoxaparin (LOVENOX) subcutaneous injection 40 mg  40 mg Subcutaneous Q24H ANA MARIA    HYDROmorphone (DILAUDID) injection 0 5 mg  0 5 mg Intravenous Q2H PRN    insulin lispro (HumaLOG) 100 units/mL subcutaneous injection 1-5 Units  1-5 Units Subcutaneous HS    insulin lispro (HumaLOG) 100 units/mL subcutaneous injection 1-5 Units  1-5 Units Subcutaneous TID AC    ondansetron (ZOFRAN) injection 4 mg  4 mg Intravenous Q6H PRN    oxyCODONE (ROXICODONE) immediate release tablet 10 mg  10 mg Oral Q4H PRN    oxyCODONE (ROXICODONE) IR tablet 5 mg  5 mg Oral Q4H PRN    pantoprazole (PROTONIX) EC tablet 40 mg  40 mg Oral Early Morning    phenol (CHLORASEPTIC) 1 4 % mucosal liquid 1 spray  1 spray Mouth/Throat Q2H PRN    pneumococcal 13-valent conjugate vaccine (PREVNAR-13) IM injection 0 5 mL  0 5 mL Intramuscular Prior to discharge    potassium chloride (K-DUR,KLOR-CON) CR tablet 40 mEq  40 mEq Oral Q4H    pravastatin (PRAVACHOL) tablet 10 mg  10 mg Oral Daily With Dinner       VTE Pharmacologic Prophylaxis: Heparin  VTE Mechanical Prophylaxis: N/A      Disclaimer: Portions of the record may have been created with voice recognition software  Occasional wrong word or "sound a like" substitutions may have occurred due to the inherent limitations of voice recognition software  Careful consideration should be taken to recognize, using context, where substitutions have occurred      [address] [facility] Student  Pulmonary/Critical Care  [facility] Pulmonary & Critical Care [facility]

## 2021-08-12 NOTE — RESTORATIVE TECHNICIAN NOTE
Restorative Technician Note      Patient Name: Massimo Alejandro     Note Type: Mobility  Activity Performed: Ambulated  Assistive Device: Standard walker  Patient Position at End of Consult: Supine; All needs within reach

## 2021-08-12 NOTE — PROGRESS NOTES
Gyn Oncology Progress note   Cami Henriquez 80 y o  female MRN: [id number]  Unit/Bed#: [facility] 528-01 Encounter: [id number]    Assessment: 80 y o   POD#6 from Primary ovarian cancer debulking status post diagnostic laparoscopy, exploratory laparotomy, on block hysterectomy bilateral salpingo-oophorectomy sigmoid ectomy with low rectal reanastomosis and over-sewing of bladder peritoneal stripping, cystotomy, and diaphragm stripping small bowel resection and reanastomosis radical omentectomy splenectomy appendectomy     Plan:     1) Postsurgical Management after primary debulking due to adenocarcinoma, unknown primary   - Hgb 8 0 yesterday,7 5 today, continue to trend, will transfuse if below 7  - Urinary output 0 6 cc/kg/h  - Tolerating liquid diet and clear ensure  - 5 bowel movement yesterday, no hematochezia    - Bilateral OSCAR drains, RLQ 20 cc and LUQ 0 cc, clear serosanguinous fluid on right drain, turbid grey fluid on the left drain --> will send OSCAR to lab  - No peritoneal irritative signs  - Surgical incision without peripheral irritation, some oozing from the lower end with an abdominal pad in place  - Up to chair and down the hallway  - Pending final pathology reports     2) Pain control   - S/p epidural, tylenol scheduled , bubba 5/10 and dilaudid prn      3) DVT prophylaxis   - SCDs, heparin      4) Acute loss anemia   - S/P 4 U RBC + 2 FFP intraop, Hg 8/8 6 9-->  1 u RBC, 8 3--> 8 0 --> 7 5   - HR  90s    BP 150s/70s     5) S/p  splenectomy  - Pending  pneumococcal 13 valent vaccine   - S/P HI and menigoccoccal vaccine  - WBC trending down     6) Hx HTN , HLD  - Consider restarting home medication today, Lotrel 10-20mg qd   - BP 150s/70s overnight    7) S/p cystostomy  - Paulino to remainin place for 2 weeks      8) Hypokalemia   - 3 0 --> 2 8, s/p 40 Meq x3 yesterday --> 3 1 --> will re order 40 meq x3 today    10) Acute hypoxic respiratory failure and mild intermittent asthma    - O2 saturation 90% overnight on 4L NC, RR 20  - CRX yesterday with bilateral opacity, s/p lasix 10 mg IV  - IV fluids discontinued   - Chest CT scan 8/11 Obstruction of the left lower lobe bronchus  Consolidation versus atelectasis in the lung bases    Scattered airspace opacities within the lungs which may represent infection   Bilateral large pleural effusions  - Pulmonology consulted, appreciate recs:   · Titrate O2 to keep SpO2 > 88%  · Aggressive IS, OOB-Chair, flutter valve  · Once electrolytes are repleted, attempt further diuresis   · She desires to avoid thoracentesis if possible, will monitor closely  · If not improving, consider thoracentesis/bronchoscopy but hopeful to avoid this  · PRN PATRIC, resume flovent at d/c  - WBC trending down since splenectomy, today 18k  No evidence of neutrophilia   Afebrile  Isolated fever yesterday 7 am  No new fevers  Subjective:    Diana Kiserist is doing well this morning  She complains of some oozing at the incision but denies any pain  She continues to pass gas and have bowel movements  She thinks that they are starting to harden, from her prior liquid diarrhea  She ambulated down the hallway yesterday and plans on walking more today  She continues to void via her irving  She is tolerating PO and is craving scrambled eggs  She denies any nausea or vomitting  Patient denies fever, chills, chest pain, shortness of breath, or calf tenderness  /79   Pulse 98   Temp 98 6 °F (37 °C)   Resp 20   Ht 4' 11" (1 499 m)   Wt 77 1 kg (170 lb)   SpO2 90%   BMI 34 34 kg/m²     I/O last 3 completed shifts: In: 1951 3 [P O :100;  I V :1851 3]  Out: 2385 [Urine:2300; Drains:85]  I/O this shift:  In: -   Out: 300 [Urine:300]    Lab Results   Component Value Date    WBC 18 10 (H) 08/11/2021    HGB 8 0 (L) 08/11/2021    HCT 25 0 (L) 08/11/2021    MCV 88 08/11/2021     (H) 08/11/2021       Lab Results   Component Value Date    GLUCOSE 195 (H) 08/06/2021    CALCIUM 7 5 (L) 08/11/2021    K 2 8 (L) 08/11/2021    CO2 32 08/11/2021     08/11/2021    BUN 5 08/11/2021    CREATININE 0 34 (L) 08/11/2021           Physical Exam  Constitutional:       Appearance: She is well-developed  HENT:      Head: Normocephalic and atraumatic  Eyes:      Conjunctiva/sclera: Conjunctivae normal       Pupils: Pupils are equal, round, and reactive to light  Cardiovascular:      Rate and Rhythm: Normal rate and regular rhythm  Heart sounds: Normal heart sounds  Pulmonary:      Effort: Pulmonary effort is normal       Breath sounds: Normal breath sounds  Abdominal:      General: There is no distension  Palpations: Abdomen is soft  Tenderness: There is no abdominal tenderness  There is no guarding  Comments: Bilateral OSCAR drains in place, right serosanguinous and left turbid grey  Surgical incision without any erythema, some oozing from the lowest end of the incision, abdominal pad in place     Genitourinary:     Vagina: Normal    Musculoskeletal:         General: Normal range of motion  Cervical back: Normal range of motion and neck supple  Skin:     General: Skin is warm  Neurological:      Mental Status: She is alert and oriented to person, place, and time             Daija Ceballos MD  8/12/2021  6:14 AM

## 2021-08-13 ENCOUNTER — TELEPHONE (OUTPATIENT)
Dept: HEMATOLOGY ONCOLOGY | Facility: CLINIC | Age: 71
End: 2021-08-13

## 2021-08-13 ENCOUNTER — TELEPHONE (OUTPATIENT)
Dept: GYNECOLOGIC ONCOLOGY | Facility: CLINIC | Age: 71
End: 2021-08-13

## 2021-08-13 ENCOUNTER — APPOINTMENT (INPATIENT)
Dept: RADIOLOGY | Facility: HOSPITAL | Age: 71
DRG: 329 | End: 2021-08-13
Payer: COMMERCIAL

## 2021-08-13 LAB
ALBUMIN SERPL BCP-MCNC: 1.5 G/DL (ref 3.5–5)
ALP SERPL-CCNC: 307 U/L (ref 46–116)
ALT SERPL W P-5'-P-CCNC: 26 U/L (ref 12–78)
AMYLASE FLD QL: 30 U/L
ANION GAP SERPL CALCULATED.3IONS-SCNC: 5 MMOL/L (ref 4–13)
ANISOCYTOSIS BLD QL SMEAR: PRESENT
AST SERPL W P-5'-P-CCNC: 47 U/L (ref 5–45)
BASOPHILS # BLD MANUAL: 0.39 THOUSAND/UL (ref 0–0.1)
BASOPHILS NFR MAR MANUAL: 2 % (ref 0–1)
BILIRUB SERPL-MCNC: 0.8 MG/DL (ref 0.2–1)
BUN SERPL-MCNC: 3 MG/DL (ref 5–25)
CALCIUM ALBUM COR SERPL-MCNC: 10.5 MG/DL (ref 8.3–10.1)
CALCIUM SERPL-MCNC: 8.5 MG/DL (ref 8.3–10.1)
CHLORIDE SERPL-SCNC: 99 MMOL/L (ref 100–108)
CO2 SERPL-SCNC: 29 MMOL/L (ref 21–32)
CREAT SERPL-MCNC: 0.27 MG/DL (ref 0.6–1.3)
EOSINOPHIL # BLD MANUAL: 1.57 THOUSAND/UL (ref 0–0.4)
EOSINOPHIL NFR BLD MANUAL: 8 % (ref 0–6)
ERYTHROCYTE [DISTWIDTH] IN BLOOD BY AUTOMATED COUNT: 16.4 % (ref 11.6–15.1)
GFR SERPL CREATININE-BSD FRML MDRD: 120 ML/MIN/1.73SQ M
GLUCOSE SERPL-MCNC: 128 MG/DL (ref 65–140)
GLUCOSE SERPL-MCNC: 135 MG/DL (ref 65–140)
GLUCOSE SERPL-MCNC: 86 MG/DL (ref 65–140)
GLUCOSE SERPL-MCNC: 92 MG/DL (ref 65–140)
HCT VFR BLD AUTO: 30.2 % (ref 34.8–46.1)
HGB BLD-MCNC: 9.5 G/DL (ref 11.5–15.4)
LYMPHOCYTES # BLD AUTO: 1.18 THOUSAND/UL (ref 0.6–4.47)
LYMPHOCYTES # BLD AUTO: 6 % (ref 14–44)
MAGNESIUM SERPL-MCNC: 1.5 MG/DL (ref 1.6–2.6)
MCH RBC QN AUTO: 27.9 PG (ref 26.8–34.3)
MCHC RBC AUTO-ENTMCNC: 31.5 G/DL (ref 31.4–37.4)
MCV RBC AUTO: 89 FL (ref 82–98)
MONOCYTES # BLD AUTO: 0.79 THOUSAND/UL (ref 0–1.22)
MONOCYTES NFR BLD: 4 % (ref 4–12)
MYELOCYTES NFR BLD MANUAL: 1 % (ref 0–1)
NEUTROPHILS # BLD MANUAL: 15.51 THOUSAND/UL (ref 1.85–7.62)
NEUTS BAND NFR BLD MANUAL: 2 % (ref 0–8)
NEUTS SEG NFR BLD AUTO: 77 % (ref 43–75)
NRBC BLD AUTO-RTO: 2 /100 WBCS
NRBC BLD AUTO-RTO: 3 /100 WBC (ref 0–2)
PLATELET # BLD AUTO: 817 THOUSANDS/UL (ref 149–390)
PLATELET BLD QL SMEAR: ABNORMAL
PMV BLD AUTO: 10.6 FL (ref 8.9–12.7)
POIKILOCYTOSIS BLD QL SMEAR: PRESENT
POLYCHROMASIA BLD QL SMEAR: PRESENT
POTASSIUM SERPL-SCNC: 3.8 MMOL/L (ref 3.5–5.3)
PROT SERPL-MCNC: 6.4 G/DL (ref 6.4–8.2)
RBC # BLD AUTO: 3.4 MILLION/UL (ref 3.81–5.12)
RBC MORPH BLD: PRESENT
SODIUM SERPL-SCNC: 133 MMOL/L (ref 136–145)
WBC # BLD AUTO: 19.63 THOUSAND/UL (ref 4.31–10.16)

## 2021-08-13 PROCEDURE — 80053 COMPREHEN METABOLIC PANEL: CPT | Performed by: OBSTETRICS & GYNECOLOGY

## 2021-08-13 PROCEDURE — 71046 X-RAY EXAM CHEST 2 VIEWS: CPT

## 2021-08-13 PROCEDURE — 82948 REAGENT STRIP/BLOOD GLUCOSE: CPT

## 2021-08-13 PROCEDURE — 97530 THERAPEUTIC ACTIVITIES: CPT

## 2021-08-13 PROCEDURE — 82150 ASSAY OF AMYLASE: CPT | Performed by: OBSTETRICS & GYNECOLOGY

## 2021-08-13 PROCEDURE — 99232 SBSQ HOSP IP/OBS MODERATE 35: CPT | Performed by: INTERNAL MEDICINE

## 2021-08-13 PROCEDURE — 83735 ASSAY OF MAGNESIUM: CPT | Performed by: OBSTETRICS & GYNECOLOGY

## 2021-08-13 PROCEDURE — 85007 BL SMEAR W/DIFF WBC COUNT: CPT | Performed by: OBSTETRICS & GYNECOLOGY

## 2021-08-13 PROCEDURE — 85027 COMPLETE CBC AUTOMATED: CPT | Performed by: OBSTETRICS & GYNECOLOGY

## 2021-08-13 PROCEDURE — 97116 GAIT TRAINING THERAPY: CPT

## 2021-08-13 PROCEDURE — 99024 POSTOP FOLLOW-UP VISIT: CPT | Performed by: OBSTETRICS & GYNECOLOGY

## 2021-08-13 RX ORDER — ASPIRIN 81 MG/1
81 TABLET ORAL DAILY
Status: DISCONTINUED | OUTPATIENT
Start: 2021-08-13 | End: 2021-09-01

## 2021-08-13 RX ORDER — MAGNESIUM SULFATE HEPTAHYDRATE 40 MG/ML
2 INJECTION, SOLUTION INTRAVENOUS ONCE
Status: COMPLETED | OUTPATIENT
Start: 2021-08-13 | End: 2021-08-13

## 2021-08-13 RX ORDER — POTASSIUM CHLORIDE 20 MEQ/1
40 TABLET, EXTENDED RELEASE ORAL ONCE
Status: COMPLETED | OUTPATIENT
Start: 2021-08-13 | End: 2021-08-13

## 2021-08-13 RX ORDER — AMLODIPINE BESYLATE 10 MG/1
10 TABLET ORAL DAILY
Status: DISCONTINUED | OUTPATIENT
Start: 2021-08-13 | End: 2021-08-14

## 2021-08-13 RX ORDER — FUROSEMIDE 10 MG/ML
20 INJECTION INTRAMUSCULAR; INTRAVENOUS ONCE
Status: COMPLETED | OUTPATIENT
Start: 2021-08-13 | End: 2021-08-13

## 2021-08-13 RX ADMIN — HYDROMORPHONE HYDROCHLORIDE 0.5 MG: 1 INJECTION, SOLUTION INTRAMUSCULAR; INTRAVENOUS; SUBCUTANEOUS at 22:55

## 2021-08-13 RX ADMIN — PANTOPRAZOLE SODIUM 40 MG: 40 TABLET, DELAYED RELEASE ORAL at 05:56

## 2021-08-13 RX ADMIN — OXYCODONE HYDROCHLORIDE 5 MG: 5 TABLET ORAL at 02:42

## 2021-08-13 RX ADMIN — MAGNESIUM SULFATE HEPTAHYDRATE 2 G: 40 INJECTION, SOLUTION INTRAVENOUS at 14:36

## 2021-08-13 RX ADMIN — LISINOPRIL 20 MG: 20 TABLET ORAL at 08:11

## 2021-08-13 RX ADMIN — MAGNESIUM OXIDE TAB 400 MG (241.3 MG ELEMENTAL MG) 400 MG: 400 (241.3 MG) TAB at 11:56

## 2021-08-13 RX ADMIN — POTASSIUM CHLORIDE 40 MEQ: 1500 TABLET, EXTENDED RELEASE ORAL at 14:36

## 2021-08-13 RX ADMIN — ACETAMINOPHEN 975 MG: 325 TABLET, FILM COATED ORAL at 11:53

## 2021-08-13 RX ADMIN — AMLODIPINE BESYLATE 10 MG: 10 TABLET ORAL at 08:11

## 2021-08-13 RX ADMIN — ASPIRIN 81 MG: 81 TABLET, COATED ORAL at 14:36

## 2021-08-13 RX ADMIN — MAGNESIUM OXIDE TAB 400 MG (241.3 MG ELEMENTAL MG) 400 MG: 400 (241.3 MG) TAB at 08:11

## 2021-08-13 RX ADMIN — OXYCODONE HYDROCHLORIDE 10 MG: 10 TABLET ORAL at 11:54

## 2021-08-13 RX ADMIN — PRAVASTATIN SODIUM 10 MG: 10 TABLET ORAL at 17:17

## 2021-08-13 RX ADMIN — ACETAMINOPHEN 975 MG: 325 TABLET, FILM COATED ORAL at 05:55

## 2021-08-13 RX ADMIN — ENOXAPARIN SODIUM 40 MG: 40 INJECTION SUBCUTANEOUS at 08:11

## 2021-08-13 RX ADMIN — OXYCODONE HYDROCHLORIDE 10 MG: 10 TABLET ORAL at 07:39

## 2021-08-13 RX ADMIN — ACETAMINOPHEN 975 MG: 325 TABLET, FILM COATED ORAL at 17:15

## 2021-08-13 RX ADMIN — FUROSEMIDE 20 MG: 10 INJECTION, SOLUTION INTRAMUSCULAR; INTRAVENOUS at 14:36

## 2021-08-13 RX ADMIN — OXYCODONE HYDROCHLORIDE 10 MG: 10 TABLET ORAL at 19:31

## 2021-08-13 NOTE — PHYSICAL THERAPY NOTE
PHYSICAL THERAPY NOTE          Patient Name: Cami NG Date: 8/13/2021 08/13/21 1107   PT Last Visit   PT Visit Date 08/13/21   Note Type   Note Type Treatment   Pain Assessment   Pain Assessment Tool 0-10   Pain Score 8   Pain Location/Orientation Location: Abdomen   Restrictions/Precautions   Weight Bearing Precautions Per Order No   Other Precautions Chair Alarm; Bed Alarm;Multiple lines; Fall Risk;Pain   General   Chart Reviewed Yes   Response to Previous Treatment Patient with no complaints from previous session  Family/Caregiver Present No   Cognition   Overall Cognitive Status WFL   Arousal/Participation Alert   Attention Within functional limits   Orientation Level Oriented X4   Memory Decreased recall of precautions   Following Commands Follows multistep commands with increased time or repetition   Subjective   Subjective Pt requires some education and encouragement to participate  Bed Mobility   Additional Comments OOB in chair upon PT arrival   Pt left upright in bedside chair with all needs in reach  Transfers   Sit to Stand 5  Supervision   Stand to Sit 5  Supervision   Additional Comments Transfers with RW   VC for hand placement and safety  Ambulation/Elevation   Gait pattern Excessively slow; Short stride; Foward flexed   Gait Assistance 5  Supervision   Assistive Device Rolling walker   Distance 100 ft x 2   Balance   Static Sitting Fair +   Dynamic Sitting Fair   Static Standing Fair -   Dynamic Standing Fair -   Ambulatory Fair -   Endurance Deficit   Endurance Deficit Yes   Endurance Deficit Description pain, fatigue   Activity Tolerance   Activity Tolerance Patient limited by fatigue;Patient limited by pain   Nurse Made Aware RN cleared pt to be seen by PT   Assessment   Prognosis Good   Problem List Decreased endurance; Impaired balance;Decreased mobility   Assessment Pt seen for PT treatment session with focus on functional transfers, functional mobility    Pt making good progress toward goals this session  Pt able to perform transfers and ambulate household+ distances without hands on assist   Pt with no LOB during ambulation  Pt continues to present deconditioned compared to baseline but is functioning at a level safe for DC home  Pt left upright in bedside chair with all needs in reach  Pt denies any concerns regarding mobility upon DC  PT to DC pt as pt has no further acute skilled PT needs and recommending HHPT once medically cleared  The patient's AM-PAC Basic Mobility Inpatient Short Form Raw Score is 20, Standardized Score is 43 99  A standardized score greater than 42 9 suggests the patient may benefit from discharge to home  Please also refer to the recommendation of the Physical Therapist for safe discharge planning  Barriers to Discharge None   Goals   Patient Goals to go home   Plan   Progress Discontinue PT   Recommendation   PT Discharge Recommendation Home with home health rehabilitation   Equipment Recommended [place] walker   Change/add to [employer]?  No   PT - OK to Discharge Yes  (once medically cleared)   AM-PAC Basic Mobility Inpatient   Turning in Bed Without Bedrails 4   Lying on Back to Sitting on Edge of Flat Bed 4   Moving Bed to Chair 3   Standing Up From Chair 3   Walk in Room 3   Climb 3-5 Stairs 3   Basic Mobility Inpatient Raw Score 20   Basic Mobility Standardized Score 43 99     Felix Alvares, PT, DPT

## 2021-08-13 NOTE — CASE MANAGEMENT
A post acute care recommendation was made by your care team for Ashutosh 79  Discussed Freedom of Choice with patient  Pt requested cm follow-up this afternoon when her  is present  Cm met with pt and she requested referrals to any accepting agencies  Referrals sent in [facility] and cm to f/u with response

## 2021-08-13 NOTE — TELEPHONE ENCOUNTER
Patient returned call regarding fmla and [place] disability   Patient can be reached at [phone]

## 2021-08-13 NOTE — PROGRESS NOTES
PULMONOLOGY PROGRESS NOTE     Name: Lars Samuel   Age & Sex: 80 y o  female   MRN: [id number]  Unit/Bed#: [facility] 528-01   Encounter: [id number]    PATIENT INFORMATION     Name: Lars Samuel   Age & Sex: 80 y o  female   MRN: [id number]  Hospital Stay Days: 7    ASSESSMENT/PLAN     Principal Problem:    Peritoneal carcinomatosis [facility])  Active Problems:    Asthma    Hypertension    Cancer related pain    Acute blood loss anemia    Leukocytosis    Severe protein-calorie malnutrition (HCC)    Assessment  1  Acute hypoxic respiratory failure secondary to large b/l pleural effusions  - Patient is s/p extensive elective surgical procedures for peritoneal carcinomatosis secondary to ovarian cancer, including hysterectomy, oophorectomy, b/l diaphragm stripping, SBR, splenectomy, appendectomy and sigmoidectomy  - intubated on 8/6, extubated on 8/7 and placed on 2L NC  - O2 needs increased to 6L and now back down to 4L w/ O2 sats in the 80s to low 90s     - had fever of 100 6F and desated to 89% on 8/10  - Imaging was performed, showed new large b/l pleural effusions, scattered airspaces w/i the lungs and LLL bronchus obstruction    - afebrile since, surgical sites appears grossly clean w/ absent oozing  - b/l OSCAR drains draining serosang fluid  - acute hypoxemia most likely secondary to atelectasis caused by large b/l pleural effusions    - patient is clinically improving and is now breathing well on room air  - despite hypoxemia, tachycardia, fever on 8/10, there is low suspicion for PE/DVT since patient is now asymptomatic, ambulating and on anticoagulation  - low suspicion for infection due to decreasing leukocytosis, clean surgical sites and lines, and patient continues to be afebrile     2   Abnormal CT chest showing LLL obstruction, large b/l pleural effusions, atelectasis  - patient is s/p extensive surgery for peritoneal carcinomatosis   - previous CXR showed absent pleural effusions  - not likely to be malignant, patient is clinically improving and no longer using O2     3  Peritoneal carcinomatosis  - in the setting of ovarian cancer found on CTAP 2021,  on 2021 was 543 7  - 2021 had extensive debulking surgery  - 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, cytology showed malignant epithelioid cells compatible w/ high grade carcinoma     Plan:  - Lasix once electrolyte abnormalities are improved              - replete Mg+2              - consider thoracentesis if not diuresising well  - continue supplemental O2 to maintain O2 sats > 88%  - continue incentive spirometry  - albuterol PRN  - continue DVT prophylaxis (lovenox)   - monitor vitals    Disposition: d/c to acute rehab    SUBJECTIVE     POD7  Patient seen and examined  No acute events OVN  She is no longer on O2 and sating in the low 90s  She has no acute complaints  She said that she was given a water pill earlier this morning  She has been ambulating well  Denied any SOB, chest pain or palpitations  OBJECTIVE     Vitals:    21 1850 21 2024 21 2318 21 0736   BP:   152/77 152/76   BP Location:   Left arm    Pulse: 99  96 99   Resp:   18    Temp: 98 7 °F (37 1 °C)  98 8 °F (37 1 °C) 99 2 °F (37 3 °C)   TempSrc:   Oral    SpO2: 94% 92% 94% 92%   Weight:       Height:          Temperature:   Temp (24hrs), Av 7 °F (37 1 °C), Min:98 1 °F (36 7 °C), Max:99 2 °F (37 3 °C)    Temperature: 99 2 °F (37 3 °C)  Intake & Output:  I/O        07 -  0700  07 -  0700  07 -  0700    P  O   120     I V  (mL/kg)       Total Intake(mL/kg)  120 (1 6)     Urine (mL/kg/hr) 1150 (0 6) 1025 (0 6) 1050 (9 7)    Drains 60 65 45    Total Output 1210 1090 1095    Net -1210 -970 -1098               Weights:   IBW (Ideal Body Weight): 43 2 kg    Body mass index is 34 34 kg/m²  Weight (last 2 days)     None        Physical Exam  Vitals reviewed  Constitutional:       General: She is not in acute distress  Appearance: She is well-developed  HENT:      Head: Normocephalic and atraumatic  Nose: Nose normal       Mouth/Throat:      Pharynx: Oropharynx is clear  Eyes:      Extraocular Movements: Extraocular movements intact  Conjunctiva/sclera: Conjunctivae normal    Cardiovascular:      Rate and Rhythm: Normal rate and regular rhythm  Heart sounds: Normal heart sounds  No murmur heard  Pulmonary:      Effort: Pulmonary effort is normal  No respiratory distress  Breath sounds: Wheezing (R middle lobe) present  Abdominal:      General: Bowel sounds are normal       Tenderness: There is abdominal tenderness  Musculoskeletal:         General: Normal range of motion  Cervical back: Normal range of motion  Right lower leg: Edema (+1) present  Left lower leg: Edema (+1) present  Skin:     General: Skin is warm and dry  Neurological:      General: No focal deficit present  Mental Status: She is alert  Mental status is at baseline  Psychiatric:         Mood and Affect: Mood normal          Behavior: Behavior normal          Thought Content: Thought content normal          Judgment: Judgment normal        LABORATORY DATA     Labs: I have personally reviewed pertinent reports    Results from last 7 days   Lab Units 08/13/21  0620 08/12/21  0448 08/11/21  0457 08/10/21  0452   WBC Thousand/uL 19 63* 18 11* 18 10* 22 11*   HEMOGLOBIN g/dL 9 5* 7 5* 8 0* 8 7*   HEMATOCRIT % 30 2* 23 7* 25 0* 26 8*   PLATELETS Thousands/uL 817* 557* 492* 468*   NEUTROS PCT %  --  70 75 84*   MONOS PCT %  --  9 10 9   MONO PCT % 4  --   --   --       Results from last 7 days   Lab Units 08/13/21  0620 08/12/21 0448 08/11/21 0457 08/06/21  1936 08/06/21  1557 08/06/21  1419 08/06/21  1419   POTASSIUM mmol/L 3 8 3 1* 2 8*  --   --    < >  --    CHLORIDE mmol/L 99* 101 100  --   --    < >  --    CO2 mmol/L 29 32 32  --   --    < >  --    CO2, I-STAT mmol/L  --   --   --   --  22  --  15*   BUN mg/dL 3* 5 5  --   --    < >  --    CREATININE mg/dL 0 27* 0 21* 0 34*  --   --    < >  --    CALCIUM mg/dL 8 5 7 6* 7 5*  --   --    < >  --    ALK PHOS U/L 307* 173* 132*   < >  --   --   --    ALT U/L 26 16 16   < >  --   --   --    AST U/L 47* 32 28   < >  --   --   --    GLUCOSE, ISTAT mg/dl  --   --   --   --  195*  --  160*    < > = values in this interval not displayed  Results from last 7 days   Lab Units 08/13/21  0620 08/12/21  0448 08/10/21  0452   MAGNESIUM mg/dL 1 5* 1 6 1 9     Results from last 7 days   Lab Units 08/10/21  0452 08/09/21  0603 08/08/21  0425   PHOSPHORUS mg/dL 2 6 2 3 2 2*      Results from last 7 days   Lab Units 08/07/21  0454 08/06/21  1936   INR  1 23* 1 47*   PTT seconds 30 31     Results from last 7 days   Lab Units 08/06/21  1936   LACTIC ACID mmol/L 1 5             ABG:   Results from last 7 days   Lab Units 08/06/21  1937   PH ART  7 344*   PCO2 ART mm Hg 36 4   PO2 ART mm Hg 249 5*   HCO3 ART mmol/L 19 4*   BASE EXC ART mmol/L -5 7   ABG SOURCE  Line, Arterial       Tissue exam 8/6/2021: high grade serous carcinoma  Cytology (pelvic washing) 8/6/2021: malignant epithelioid cells compatible w/ high grade carcinoma     IMAGING & DIAGNOSTIC TESTING     Radiology Results: I have personally reviewed pertinent reports  XR chest pa & lateral    Result Date: 7/23/2021  Impression: Small pleural effusions  Workstation performed: [id number]     Other Diagnostic Testing: I have personally reviewed pertinent reports      ACTIVE MEDICATIONS     Current Facility-Administered Medications   Medication Dose Route Frequency    acetaminophen (TYLENOL) tablet 975 mg  975 mg Oral Q6H [address]    albuterol (PROVENTIL HFA,VENTOLIN HFA) inhaler 2 puff  2 puff Inhalation Q4H PRN    amLODIPine (NORVASC) tablet 10 mg  10 mg Oral Daily    And    lisinopril (ZESTRIL) tablet 20 mg  20 mg Oral Daily    amLODIPine (NORVASC) tablet 10 mg  10 mg Oral Daily    aspirin (ECOTRIN LOW STRENGTH) EC tablet 81 mg  81 mg Oral Daily    enoxaparin (LOVENOX) subcutaneous injection 40 mg  40 mg Subcutaneous Q24H ANA MARIA    furosemide (LASIX) injection 20 mg  20 mg Intravenous Once    HYDROmorphone (DILAUDID) injection 0 5 mg  0 5 mg Intravenous Q2H PRN    insulin lispro (HumaLOG) 100 units/mL subcutaneous injection 1-5 Units  1-5 Units Subcutaneous HS    insulin lispro (HumaLOG) 100 units/mL subcutaneous injection 1-5 Units  1-5 Units Subcutaneous TID AC    magnesium oxide (MAG-OX) tablet 400 mg  400 mg Oral BID    magnesium sulfate 2 g/50 mL IVPB (premix) 2 g  2 g Intravenous Once    ondansetron (ZOFRAN) injection 4 mg  4 mg Intravenous Q6H PRN    oxyCODONE (ROXICODONE) immediate release tablet 10 mg  10 mg Oral Q4H PRN    oxyCODONE (ROXICODONE) IR tablet 5 mg  5 mg Oral Q4H PRN    pantoprazole (PROTONIX) EC tablet 40 mg  40 mg Oral Early Morning    phenol (CHLORASEPTIC) 1 4 % mucosal liquid 1 spray  1 spray Mouth/Throat Q2H PRN    pneumococcal 13-valent conjugate vaccine (PREVNAR-13) IM injection 0 5 mL  0 5 mL Intramuscular Prior to discharge    potassium chloride (K-DUR,KLOR-CON) CR tablet 40 mEq  40 mEq Oral Once    pravastatin (PRAVACHOL) tablet 10 mg  10 mg Oral Daily With Dinner       VTE Pharmacologic Prophylaxis: Enoxaparin (Lovenox)  VTE Mechanical Prophylaxis: reason for no mechanical VTE prophylaxis patient is ambulating    Disclaimer: Portions of the record may have been created with voice recognition software  Occasional wrong word or "sound a like" substitutions may have occurred due to the inherent limitations of voice recognition software  Careful consideration should be taken to recognize, using context, where substitutions have occurred      [address] [facility]  Pulmonary/Critical Care  [facility]

## 2021-08-13 NOTE — UTILIZATION REVIEW
Continued Stay Review    Date: 8/13                          Current Patient Class: inpatient  Current Level of Care: med surg    HPI:71 y o  female initially admitted on 8/6/21 as inpatient due to exploratory laparotomy with tumor debulking/extensive other procedures as listed in the initial review  Assessment/Plan: 8/13 POD#7  abd OSCAR drains in place, irving draining yellow urine  tr BLE edema  abd soft, incision CDI  Platelets continue to rise 2nd to splenectomy  Adding baby asa  bilat postop pleural effusions are likely reactive, repeat CXR today shows bilat pleural effusions, additional IV lasix given  Bowel function resumed, tolerating regular diet, ambulating  Will need VNA for drain and irving care, which must remain in place for another week  Per pulm: on room air, feels improved, no accessory muscle use, lungs diminished at bases  Has no cough, sputum, or signs of pleurisy       Vital Signs:   Date/Time  Temp  Pulse  Resp  BP  MAP (mmHg)  SpO2  Calculated FIO2 (%) - Nasal Cannula  Nasal Cannula O2 Flow Rate (L/min)  O2 Device  Patient Position - Orthostatic VS   08/13/21 15:13:32  98 4 °F (36 9 °C)  94  14  106/61  76  92 %  --  --  --  --   08/13/21 0900  --  --  --  --  --  --  28  2 L/min  Nasal cannula  --   08/13/21 07:36:50  99 2 °F (37 3 °C)  99  --  152/76  101  92 %  --  --  --  --   08/12/21 23:18:56  98 8 °F (37 1 °C)  96  18  152/77  102  94 %  --  --  --  Lying   08/12/21 2024  --  --  --  --  --  92 %  28  2 L/min  Nasal cannula  --   08/12/21 18:50:37  98 7 °F (37 1 °C)  99  --  --  --  94 %  --  --  --  --   08/12/21 1500  98 1 °F (36 7 °C)  86  14  141/69  93  97 %  --  --  --  --   08/12/21 07:13:39  98 5 °F (36 9 °C)  91  17  149/71  97  94 %  --  --  Nasal cannula  Lying   08/11/21 23:46:55  98 6 °F (37 °C)  --  20  157/79  105  --  --  --  --  --   08/11/21 1944  --  --  --  --  --  --  36  4 L/min  Nasal cannula  --   08/11/21 15:46:40  97 9 °F (36 6 °C)  98  18  157/76  103  90 % --  --  --  Lying   08/11/21 06:56:42  98 7 °F (37 1 °C)  99  17  159/72  101  90 %  --  --  Nasal cannula  Lying   08/11/21 0600  --  --  --  --  --  91 %  36  4 L/min  Nasal cannula  --         Pertinent Labs/Diagnostic Results:        XR chest pa & lateral   Final Result by Ariel Garcia MD (08/13 1241)      Bilateral pleural effusions, small on the right and moderate on the left, decreased in size since 8/10/2021  Atelectasis and/or consolidation in the left lower lobe cannot be excluded  Workstation performed: [id number]         CT chest wo contrast   Final Result by Brittney Coles DO (08/11 5850)      Obstruction of the left lower lobe bronchus  Consolidation versus atelectasis in the lung bases  Scattered airspace opacities within the lungs which may represent infection  Recommend short-term follow-up chest scan and 3 months to evaluate for resolution  Bilateral large pleural effusions  Drainage catheter in the left upper quadrant by the fluid and inflammatory changes  Workstation performed: [id number]         XR chest pa & lateral   Final Result by Armida Sands MD (08/11 1102)      Moderate bilateral pleural effusions, new compared to prior chest x-ray  Workstation performed: [id number]         XR chest portable   Final Result by Sohail Lozada MD (08/07 0752)      Moderate left effusion and left base atelectasis  ET tube 4 cm above the valentin                    Workstation performed: [id number]             Results from last 7 days   Lab Units 08/13/21  0620 08/12/21  0448 08/11/21  0457 08/10/21  0452 08/09/21  0603   WBC Thousand/uL 19 63* 18 11* 18 10* 22 11* 28 35*   HEMOGLOBIN g/dL 9 5* 7 5* 8 0* 8 7* 8 3*   HEMATOCRIT % 30 2* 23 7* 25 0* 26 8* 25 4*   PLATELETS Thousands/uL 817* 557* 492* 468* 345   NEUTROS ABS Thousands/µL  --  12 65* 13 65* 18 66* 24 66*   BANDS PCT % 2  --   --   --   --          Results from last 7 days   Lab Units 08/13/21  0620 08/12/21  0448 08/11/21  0457 08/10/21  0452 08/09/21  0603 08/08/21  0425 08/07/21  0959 08/07/21  0422   SODIUM mmol/L 133* 138 138 140 141 140  --  137   POTASSIUM mmol/L 3 8 3 1* 2 8* 3 0* 3 3* 4 0  --  4 3   CHLORIDE mmol/L 99* 101 100 105 106 109*  --  111*   CO2 mmol/L 29 32 32 25 29 26  --  21   ANION GAP mmol/L 5 5 6 10 6 5  --  5   BUN mg/dL 3* 5 5 7 8 9  --  7   CREATININE mg/dL 0 27* 0 21* 0 34* 0 22* 0 25* 0 39*  --  0 50*   EGFR ml/min/1 73sq m 120 131 112 129 124 107  --  98   CALCIUM mg/dL 8 5 7 6* 7 5* 8 2* 8 2* 7 9*  --  7 6*   CALCIUM, IONIZED mmol/L  --   --   --   --   --  1 09* 1 02*  --    MAGNESIUM mg/dL 1 5* 1 6  --  1 9 2 1 1 9  --  1 3*   PHOSPHORUS mg/dL  --   --   --  2 6 2 3 2 2*  --  3 5     Results from last 7 days   Lab Units 08/13/21  0620 08/12/21  0448 08/11/21  0457 08/07/21  0422   AST U/L 47* 32 28 46*   ALT U/L 26 16 16 26   ALK PHOS U/L 307* 173* 132* 45*   TOTAL PROTEIN g/dL 6 4 4 9* 5 3* 4 6*   ALBUMIN g/dL 1 5* 1 2* 1 3* 1 8*   TOTAL BILIRUBIN mg/dL 0 80 0 61 0 74 1 22*     Results from last 7 days   Lab Units 08/13/21  1043 08/13/21  0614 08/12/21  2129 08/12/21  1634 08/12/21  1118 08/12/21  0553 08/11/21  2102 08/11/21  1611 08/11/21  1208 08/11/21  0553 08/10/21  2326 08/10/21  1826   POC GLUCOSE mg/dl 128 92 118 152* 132 114 221* 157* 169* 127 146* 193*     Results from last 7 days   Lab Units 08/13/21  0620 08/12/21  0448 08/11/21  0457 08/10/21  0452 08/09/21  0603 08/08/21  0425 08/07/21  0422 08/06/21 1936   GLUCOSE RANDOM mg/dL 86 83 111 73 88 129 172* 243*     Results from last 7 days   Lab Units 08/06/21 1937   PH ART  7 344*   PCO2 ART mm Hg 36 4   PO2 ART mm Hg 249 5*   HCO3 ART mmol/L 19 4*   BASE EXC ART mmol/L -5 7   O2 CONTENT ART mL/dL 15 0*   O2 HGB, ARTERIAL % 97 6*   ABG SOURCE  Line, Arterial     Results from last 7 days   Lab Units 08/07/21  0454 08/06/21 1936   PROTIME seconds 15 5* 17 8*   INR  1 23* 1 47*   PTT seconds 30 31             Results from last 7 days   Lab Units 08/06/21  1936   LACTIC ACID mmol/L 1 5       Medications:   Scheduled Medications:  acetaminophen, 975 mg, Oral, Q6H [facility]  amLODIPine, 10 mg, Oral, Daily   And  lisinopril, 20 mg, Oral, Daily  amLODIPine, 10 mg, Oral, Daily  aspirin, 81 mg, Oral, Daily  enoxaparin, 40 mg, Subcutaneous, Q24H ANA MARIA  insulin lispro, 1-5 Units, Subcutaneous, HS  insulin lispro, 1-5 Units, Subcutaneous, TID AC  magnesium oxide, 400 mg, Oral, BID  magnesium sulfate, 2 g, Intravenous, Once  pantoprazole, 40 mg, Oral, Early Morning  pravastatin, 10 mg, Oral, Daily With Dinner  IV lasix x 1 8/10, x 1 8/13  heparin (porcine) subcutaneous injection 5,000 Units   Dose: 5,000 Units  Freq: Every 8 hours scheduled Route: SC  Start: 08/10/21 1715 End: 08/12/21 1030    PO kdur tid    Continuous IV Infusions:multi-electrolyte (PLASMALYTE-A/ISOLYTE-S PH 7 4) IV solution   Rate: 75 mL/hr Dose: 75 mL/hr  Freq: Continuous Route: IV  Last Dose: Stopped (08/11/21 0612)  Start: 08/06/21 1915 End: 08/11/21 0605      Epidural dc on 8/10    PRN Meds:  albuterol, 2 puff, Inhalation, Q4H PRN  HYDROmorphone, 0 5 mg, Intravenous, Q2H PRN x 3 on 8/10, x 1 8/11, x 2 8/12  ondansetron, 4 mg, Intravenous, Q6H PRN  oxyCODONE, 10 mg, Oral, Q4H PRN using 2-3 daily doses  oxyCODONE, 5 mg, Oral, Q4H PRN using 1 daily dose  phenol, 1 spray, Mouth/Throat, Q2H PRN  pneumococcal 13-valent conjugate vaccine, 0 5 mL, Intramuscular, Prior to discharge    Discharge Plan: TBD    Network Utilization Review Department  ATTENTION: Please call with any questions or concerns to [phone] and carefully listen to the prompts so that you are directed to the right person  All voicemails are confidential   [employer] all requests for admission clinical reviews, approved or denied determinations and any other requests to dedicated fax number below belonging to the campus where the patient is receiving treatment   List of dedicated fax numbers for the Facilities:  FACILITY NAME UR FAX NUMBER   ADMISSION DENIALS (Administrative/Medical Necessity) [phone]   [address]  (Maternity/NICU/Pediatrics) [address] [facility],[address]win [address] [id number] [address] [facility] [address] [phone]

## 2021-08-13 NOTE — PROGRESS NOTES
Gyn Oncology Progress note   Robert Cardona 80 y o  female MRN: [id number]  Unit/Bed#: [facility] 528-01 Encounter: [id number]    Assessment: 80 y o   POD#7 from Primary ovarian cancer debulking status post diagnostic laparoscopy, exploratory laparotomy, on block hysterectomy bilateral salpingo-oophorectomy sigmoid ectomy with low rectal reanastomosis and over-sewing of bladder peritoneal stripping, cystotomy, and diaphragm stripping small bowel resection and reanastomosis radical omentectomy splenectomy appendectomy     Plan:     1) Postsurgical Management after primary debulking due to adenocarcinoma, unknown primary   - Hgb 8 0 --> 7 5 --> 9 5; continue to trend, will transfuse if below 7  - Urinary output 0 55 cc/kg/h  - Tolerating regular diet  - Bilateral OSCAR drains, RLQ 15 cc and LUQ 50 cc overnight, clear serosanguinous fluid on right drain, turbid grey fluid on the left drain   - No peritoneal irritative signs  - Surgical incision without peripheral irritation, some minimal oozing from the lower end with an abdominal pad in place, improved from yesterday  - Up to chair and down the hallway  - 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, Stage 3    2) Pancreatic leak  - LUQ OSCAR drain with amylase >13,000  - Maintain drain in place for at least a month  - Will plan for imaging to rule out any loculated areas     3) Pain control   - S/p epidural, tylenol scheduled , bubba 5/10 and dilaudid prn      4) DVT prophylaxis   - SCDs, Lovenox (transitioned from Heparin yesterday)     5) Acute loss anemia   - S/P 4 U RBC + 2 FFP intraop, Hg 8/8 6 9-->  1 u RBC, 8 3--> 8 0 --> 7 5 --> 9 5     6) S/p  splenectomy  - Pending  pneumococcal 13 valent vaccine   - S/P HI and menigoccoccal vaccine  - WBC trending down     7) Hx HTN , HLD  - Consider restarting home medication today, Lotrel 10-20mg qd   - BP 1540s-150s/70s overnight    8) S/p cystostomy  - Paulino to remainin place for 2 weeks      9) Hypokalemia   - 3 0 --> 2 8, s/p 40 Meq x3 --> 3 1 --> s/p 40 Meq x3 --> 3 8    10) Acute hypoxic respiratory failure and mild intermittent asthma    - O2 saturation 90% overnight on 4L NC, RR 20  - CRX yesterday with bilateral opacity, s/p lasix 10 mg IV  - IV fluids discontinued   - Chest CT scan 8/11 Obstruction of the left lower lobe bronchus  Consolidation versus atelectasis in the lung bases    Scattered airspace opacities within the lungs which may represent infection   Bilateral large pleural effusions  - Pulmonology consulted, appreciate recs:   · Titrate O2 to keep SpO2 > 88%  · Aggressive IS, OOB-Chair, flutter valve  · Once electrolytes are repleted, attempt further diuresis   · She desires to avoid thoracentesis if possible, will monitor closely  · If not improving, consider thoracentesis/bronchoscopy but hopeful to avoid this  · PRN PATRIC, resume flovent at d/c  - Will replete magnesium and plan to diurese today  - Oxygen turned off this AM and pt satting 93%, will continue to monitor closely  - WBC trending down since splenectomy, today 18k, stable from yesterday  Afebrile  Asx  Subjective:    Massimo Alejandro is doing well this morning  She reports some discomfort at the incision and took some Tylenol recently for it  She is tolerating a regular diet and is very happy she finally ate the scrambled eggs she was craving  She denies nausea or vomiting  She had a bowel movement yesterday  It was formed and soft  She has not had one yet today  She continues to void via irving and continues to walk down the grover  Patient denies fever, chills, chest pain, shortness of breath, or calf tenderness  /77 (BP Location: Left arm)   Pulse 96   Temp 98 8 °F (37 1 °C) (Oral)   Resp 18   Ht 4' 11" (1 499 m)   Wt 77 1 kg (170 lb)   SpO2 94%   BMI 34 34 kg/m²     I/O last 3 completed shifts:   In: 120 [P O :120]  Out: 1805 [Urine:1700; Drains:105]  I/O this shift:  In: -   Out: 495 [Urine:475; Drains:20]    Lab Results Component Value Date    WBC 18 11 (H) 08/12/2021    HGB 7 5 (L) 08/12/2021    HCT 23 7 (L) 08/12/2021    MCV 89 08/12/2021     (H) 08/12/2021       Lab Results   Component Value Date    GLUCOSE 195 (H) 08/06/2021    CALCIUM 7 6 (L) 08/12/2021    K 3 1 (L) 08/12/2021    CO2 32 08/12/2021     08/12/2021    BUN 5 08/12/2021    CREATININE 0 21 (L) 08/12/2021           Physical Exam  Constitutional:       Appearance: She is well-developed  Comments: NC in place   HENT:      Head: Normocephalic and atraumatic  Eyes:      Conjunctiva/sclera: Conjunctivae normal       Pupils: Pupils are equal, round, and reactive to light  Cardiovascular:      Rate and Rhythm: Normal rate and regular rhythm  Heart sounds: Normal heart sounds  Pulmonary:      Effort: Pulmonary effort is normal       Breath sounds: Normal breath sounds  Abdominal:      General: There is no distension  Palpations: Abdomen is soft  Tenderness: There is no abdominal tenderness  There is no guarding  Comments: Bilateral OSCAR drains in place, right serosanguinous and left turbid grey  Surgical incision without any erythema, mild oozing from the lowest end of the incision, abdominal pad in place  Genitourinary:     Vagina: Normal    Musculoskeletal:         General: Normal range of motion  Cervical back: Normal range of motion and neck supple  Comments: Bilateral pitting edema in LE   Skin:     General: Skin is warm  Neurological:      Mental Status: She is alert and oriented to person, place, and time             Wei Villa MD  8/13/2021  6:10 AM

## 2021-08-13 NOTE — PLAN OF CARE
Problem: PHYSICAL THERAPY ADULT  Goal: Performs mobility at highest level of function for planned discharge setting  See evaluation for individualized goals  Description: Treatment/Interventions: Functional transfer training, LE strengthening/ROM, Therapeutic exercise, Endurance training, Patient/family training, Equipment eval/education, Bed mobility, Gait training, Spoke to nursing  Equipment Recommended: Harvey Phoenix       See flowsheet documentation for full assessment, interventions and recommendations  Outcome: Adequate for Discharge  Note: Prognosis: Good  Problem List: Decreased endurance, Impaired balance, Decreased mobility  Assessment: Pt seen for PT treatment session with focus on functional transfers, functional mobility  Pt making good progress toward goals this session  Pt able to perform transfers and ambulate household+ distances without hands on assist   Pt with no LOB during ambulation  Pt continues to present deconditioned compared to baseline but is functioning at a level safe for DC home  Pt left upright in bedside chair with all needs in reach  Pt denies any concerns regarding mobility upon DC  PT to DC pt as pt has no further acute skilled PT needs and recommending HHPT once medically cleared  The patient's AM-PAC Basic Mobility Inpatient Short Form Raw Score is 20, Standardized Score is 43 99  A standardized score greater than 42 9 suggests the patient may benefit from discharge to home  Please also refer to the recommendation of the Physical Therapist for safe discharge planning  Barriers to Discharge: None        PT Discharge Recommendation: Home with home health rehabilitation     PT - OK to Discharge: Yes (once medically cleared)    See flowsheet documentation for full assessment

## 2021-08-14 LAB
ANION GAP SERPL CALCULATED.3IONS-SCNC: 4 MMOL/L (ref 4–13)
BUN SERPL-MCNC: 7 MG/DL (ref 5–25)
CALCIUM SERPL-MCNC: 7.8 MG/DL (ref 8.3–10.1)
CHLORIDE SERPL-SCNC: 99 MMOL/L (ref 100–108)
CO2 SERPL-SCNC: 29 MMOL/L (ref 21–32)
CREAT SERPL-MCNC: 0.4 MG/DL (ref 0.6–1.3)
ERYTHROCYTE [DISTWIDTH] IN BLOOD BY AUTOMATED COUNT: 17.2 % (ref 11.6–15.1)
GFR SERPL CREATININE-BSD FRML MDRD: 106 ML/MIN/1.73SQ M
GLUCOSE SERPL-MCNC: 118 MG/DL (ref 65–140)
GLUCOSE SERPL-MCNC: 119 MG/DL (ref 65–140)
GLUCOSE SERPL-MCNC: 119 MG/DL (ref 65–140)
GLUCOSE SERPL-MCNC: 122 MG/DL (ref 65–140)
GLUCOSE SERPL-MCNC: 125 MG/DL (ref 65–140)
GLUCOSE SERPL-MCNC: 137 MG/DL (ref 65–140)
HCT VFR BLD AUTO: 24.2 % (ref 34.8–46.1)
HGB BLD-MCNC: 7.7 G/DL (ref 11.5–15.4)
MAGNESIUM SERPL-MCNC: 1.8 MG/DL (ref 1.6–2.6)
MCH RBC QN AUTO: 28 PG (ref 26.8–34.3)
MCHC RBC AUTO-ENTMCNC: 31.8 G/DL (ref 31.4–37.4)
MCV RBC AUTO: 88 FL (ref 82–98)
NRBC BLD AUTO-RTO: 3 /100 WBCS
PLATELET # BLD AUTO: 772 THOUSANDS/UL (ref 149–390)
PMV BLD AUTO: 10 FL (ref 8.9–12.7)
POTASSIUM SERPL-SCNC: 3.7 MMOL/L (ref 3.5–5.3)
RBC # BLD AUTO: 2.75 MILLION/UL (ref 3.81–5.12)
SODIUM SERPL-SCNC: 132 MMOL/L (ref 136–145)
WBC # BLD AUTO: 23.85 THOUSAND/UL (ref 4.31–10.16)

## 2021-08-14 PROCEDURE — 82948 REAGENT STRIP/BLOOD GLUCOSE: CPT

## 2021-08-14 PROCEDURE — 85027 COMPLETE CBC AUTOMATED: CPT | Performed by: OBSTETRICS & GYNECOLOGY

## 2021-08-14 PROCEDURE — 80048 BASIC METABOLIC PNL TOTAL CA: CPT | Performed by: OBSTETRICS & GYNECOLOGY

## 2021-08-14 PROCEDURE — 99024 POSTOP FOLLOW-UP VISIT: CPT | Performed by: OBSTETRICS & GYNECOLOGY

## 2021-08-14 PROCEDURE — 83735 ASSAY OF MAGNESIUM: CPT | Performed by: OBSTETRICS & GYNECOLOGY

## 2021-08-14 RX ORDER — POTASSIUM CHLORIDE 20 MEQ/1
40 TABLET, EXTENDED RELEASE ORAL ONCE
Status: COMPLETED | OUTPATIENT
Start: 2021-08-14 | End: 2021-08-14

## 2021-08-14 RX ORDER — AMLODIPINE BESYLATE 10 MG/1
10 TABLET ORAL DAILY
Status: DISCONTINUED | OUTPATIENT
Start: 2021-08-15 | End: 2021-08-14

## 2021-08-14 RX ORDER — MAGNESIUM SULFATE HEPTAHYDRATE 40 MG/ML
2 INJECTION, SOLUTION INTRAVENOUS ONCE
Status: COMPLETED | OUTPATIENT
Start: 2021-08-14 | End: 2021-08-14

## 2021-08-14 RX ADMIN — OXYCODONE HYDROCHLORIDE 10 MG: 10 TABLET ORAL at 15:19

## 2021-08-14 RX ADMIN — ACETAMINOPHEN 975 MG: 325 TABLET, FILM COATED ORAL at 23:55

## 2021-08-14 RX ADMIN — AMLODIPINE BESYLATE 10 MG: 10 TABLET ORAL at 08:42

## 2021-08-14 RX ADMIN — ASPIRIN 81 MG: 81 TABLET, COATED ORAL at 08:42

## 2021-08-14 RX ADMIN — OXYCODONE HYDROCHLORIDE 10 MG: 10 TABLET ORAL at 20:47

## 2021-08-14 RX ADMIN — ACETAMINOPHEN 975 MG: 325 TABLET, FILM COATED ORAL at 17:24

## 2021-08-14 RX ADMIN — ACETAMINOPHEN 975 MG: 325 TABLET, FILM COATED ORAL at 00:01

## 2021-08-14 RX ADMIN — MAGNESIUM OXIDE TAB 400 MG (241.3 MG ELEMENTAL MG) 400 MG: 400 (241.3 MG) TAB at 11:48

## 2021-08-14 RX ADMIN — LISINOPRIL 20 MG: 20 TABLET ORAL at 08:42

## 2021-08-14 RX ADMIN — OXYCODONE HYDROCHLORIDE 10 MG: 10 TABLET ORAL at 09:27

## 2021-08-14 RX ADMIN — ENOXAPARIN SODIUM 40 MG: 40 INJECTION SUBCUTANEOUS at 08:42

## 2021-08-14 RX ADMIN — OXYCODONE HYDROCHLORIDE 5 MG: 5 TABLET ORAL at 05:32

## 2021-08-14 RX ADMIN — PRAVASTATIN SODIUM 10 MG: 10 TABLET ORAL at 17:24

## 2021-08-14 RX ADMIN — ACETAMINOPHEN 975 MG: 325 TABLET, FILM COATED ORAL at 05:31

## 2021-08-14 RX ADMIN — ACETAMINOPHEN 975 MG: 325 TABLET, FILM COATED ORAL at 11:48

## 2021-08-14 RX ADMIN — MAGNESIUM SULFATE HEPTAHYDRATE 2 G: 40 INJECTION, SOLUTION INTRAVENOUS at 09:27

## 2021-08-14 RX ADMIN — PANTOPRAZOLE SODIUM 40 MG: 40 TABLET, DELAYED RELEASE ORAL at 05:33

## 2021-08-14 RX ADMIN — MAGNESIUM OXIDE TAB 400 MG (241.3 MG ELEMENTAL MG) 400 MG: 400 (241.3 MG) TAB at 08:42

## 2021-08-14 RX ADMIN — POTASSIUM CHLORIDE 40 MEQ: 1500 TABLET, EXTENDED RELEASE ORAL at 09:27

## 2021-08-14 NOTE — PROGRESS NOTES
Progress Note - Gynecologic Oncology   Kerry Banks 80 y o  female MRN: [id number]  Unit/Bed#: [facility] 528-01 Encounter: [id number]    Assessment / Plan:    80-year-old postoperative day 8 status post extensive tumor debulking surgery  1  Anemia secondary to acute blood loss from surgery  Hemoglobin fluctuating  Today at 7 8 grams/deciliter  No evidence of symptomatic anemia or ongoing blood loss  Continue to monitor  2  Thrombocytosis, leukocytosis likely secondary to splenectomy  Baby aspirin added yesterday by Dr Florentino Peralta  Continue to monitor  3  Bilateral pleural effusions likely postoperative/reactive in nature  No longer requiring oxygen supplementation  92% on room air  Continue to monitor  Appreciate Pulmonary Service expertise  They had considered to continue diuresis  I would await further recommendations  However, her creatinine has trended up some today and I see no further indication for active diuresis at this time  Patient is receiving no additional IV fluid/fluid support  4  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  Continue drainage  Continue left upper quadrant and pelvic drains  Consider removal of pelvic drain prior to discharge  5  Bowel function has recovered  She is on a regular diet  She is ambulating  PT has assessed her and she can go home with home physical therapy  6  Case management consult for VNA for drain and Paulino catheter evaluation  She will continue the Paulino catheter for an additional 7 days  Will set up cystogram and removal as outpatient  7  Hypokalemia/hypomagnesemia:  Finally resolved  Will supplement daily as needed to a magnesium goal of 2 and potassium goal of 4      8  Up trend in creatinine, no clinical evidence of RAYMOND    However, given plan for possible ongoing diuresis, etc and normal blood pressures I will discontinue lisinopril and put hold parameters for amlodipine so it 1B administered with blood pressures less than 105 mmHg systolic        Subjective/Objective       Subjective:  Some discomfort at left upper quadrant drain site  Otherwise no significant pain  Tolerates regular diet  Normal bowel function  Voiding through Paulino  Objective:     Blood pressure 125/62, pulse 104, temperature 99 °F (37 2 °C), temperature source Oral, resp  rate 16, height 4' 11" (1 499 m), weight 77 1 kg (170 lb), SpO2 91 %  ,Body mass index is 34 34 kg/m²  Intake/Output Summary (Last 24 hours) at 8/14/2021 0902  Last data filed at 8/13/2021 2306  Gross per 24 hour   Intake --   Output 1740 ml   Net -1740 ml       Invasive Devices     Peripheral Intravenous Line            Peripheral IV 08/13/21 Left Antecubital 1 day          Drain            Closed/Suction Drain LLQ 19 Fr  7 days    Closed/Suction Drain RLQ Bulb 19 Fr  7 days    Urethral Catheter Non-latex 16 Fr  7 days                Physical Exam: /62 (BP Location: Left arm)   Pulse 104   Temp 99 °F (37 2 °C) (Oral)   Resp 16   Ht 4' 11" (1 499 m)   Wt 77 1 kg (170 lb)   SpO2 91%   BMI 34 34 kg/m²     General Appearance:    Alert, cooperative, no distress, appears stated age   Lungs:     Clear to auscultation bilaterally, breath sounds decreased at bilateral bases    Heart:    Regular rate and rhythm, S1 and S2 normal, no murmur, rub   or gallop   Abdomen:     Appropriately tender  Incision clean, dry and intact  Left upper quadrant drain site with minimal erythema  Left upper quadrant drain with cloudy yellowish abundant output  Right lower quadrant pelvic drain with scant serous output  Bowel sounds present throughout     Extremities:   Extremities normal, atraumatic, no cyanosis or edema        Recent Results (from the past 24 hour(s))   Fingerstick Glucose (POCT)    Collection Time: 08/13/21 10:43 AM   Result Value Ref Range    POC Glucose 128 65 - 140 mg/dl   Amylase, body fluid    Collection Time: 08/13/21  3:37 PM   Result Value Ref Range    Amylase, Fluid 30 U/L   Fingerstick Glucose (POCT)    Collection Time: 08/13/21  4:46 PM   Result Value Ref Range    POC Glucose 135 65 - 140 mg/dl   Fingerstick Glucose (POCT)    Collection Time: 08/13/21 10:57 PM   Result Value Ref Range    POC Glucose 122 65 - 140 mg/dl   Fingerstick Glucose (POCT)    Collection Time: 08/14/21  6:54 AM   Result Value Ref Range    POC Glucose 125 65 - 140 mg/dl   CBC and differential    Collection Time: 08/14/21  7:18 AM   Result Value Ref Range    WBC 23 85 (H) 4 31 - 10 16 Thousand/uL    RBC 2 75 (L) 3 81 - 5 12 Million/uL    Hemoglobin 7 7 (L) 11 5 - 15 4 g/dL    Hematocrit 24 2 (L) 34 8 - 46 1 %    MCV 88 82 - 98 fL    MCH 28 0 26 8 - 34 3 pg    MCHC 31 8 31 4 - 37 4 g/dL    RDW 17 2 (H) 11 6 - 15 1 %    MPV 10 0 8 9 - 12 7 fL    Platelets 530 (H) 721 - 390 Thousands/uL    nRBC 3 /100 WBCs   Magnesium    Collection Time: 08/14/21  7:18 AM   Result Value Ref Range    Magnesium 1 8 1 6 - 2 6 mg/dL   Basic metabolic panel    Collection Time: 08/14/21  7:18 AM   Result Value Ref Range    Sodium 132 (L) 136 - 145 mmol/L    Potassium 3 7 3 5 - 5 3 mmol/L    Chloride 99 (L) 100 - 108 mmol/L    CO2 29 21 - 32 mmol/L    ANION GAP 4 4 - 13 mmol/L    BUN 7 5 - 25 mg/dL    Creatinine 0 40 (L) 0 60 - 1 30 mg/dL    Glucose 119 65 - 140 mg/dL    Calcium 7 8 (L) 8 3 - 10 1 mg/dL    eGFR 106 ml/min/1 73sq m       Lab, Imaging and other studies:I have personally reviewed pertinent lab results      VTE Pharmacologic Prophylaxis: Enoxaparin (Lovenox)     Bhumi Lou MD, Tana Yanez  8/14/2021  9:07 AM

## 2021-08-15 LAB
ANION GAP SERPL CALCULATED.3IONS-SCNC: 9 MMOL/L (ref 4–13)
BUN SERPL-MCNC: 9 MG/DL (ref 5–25)
CALCIUM SERPL-MCNC: 7.9 MG/DL (ref 8.3–10.1)
CHLORIDE SERPL-SCNC: 98 MMOL/L (ref 100–108)
CO2 SERPL-SCNC: 24 MMOL/L (ref 21–32)
CREAT SERPL-MCNC: 0.56 MG/DL (ref 0.6–1.3)
ERYTHROCYTE [DISTWIDTH] IN BLOOD BY AUTOMATED COUNT: 17.6 % (ref 11.6–15.1)
GFR SERPL CREATININE-BSD FRML MDRD: 95 ML/MIN/1.73SQ M
GLUCOSE SERPL-MCNC: 102 MG/DL (ref 65–140)
GLUCOSE SERPL-MCNC: 121 MG/DL (ref 65–140)
GLUCOSE SERPL-MCNC: 125 MG/DL (ref 65–140)
GLUCOSE SERPL-MCNC: 137 MG/DL (ref 65–140)
GLUCOSE SERPL-MCNC: 148 MG/DL (ref 65–140)
HCT VFR BLD AUTO: 27.8 % (ref 34.8–46.1)
HGB BLD-MCNC: 8.9 G/DL (ref 11.5–15.4)
MAGNESIUM SERPL-MCNC: 2 MG/DL (ref 1.6–2.6)
MCH RBC QN AUTO: 28.2 PG (ref 26.8–34.3)
MCHC RBC AUTO-ENTMCNC: 32 G/DL (ref 31.4–37.4)
MCV RBC AUTO: 88 FL (ref 82–98)
PLATELET # BLD AUTO: 975 THOUSANDS/UL (ref 149–390)
PMV BLD AUTO: 9.8 FL (ref 8.9–12.7)
POTASSIUM SERPL-SCNC: 4.1 MMOL/L (ref 3.5–5.3)
RBC # BLD AUTO: 3.16 MILLION/UL (ref 3.81–5.12)
SODIUM SERPL-SCNC: 131 MMOL/L (ref 136–145)
WBC # BLD AUTO: 23.79 THOUSAND/UL (ref 4.31–10.16)

## 2021-08-15 PROCEDURE — 80048 BASIC METABOLIC PNL TOTAL CA: CPT | Performed by: OBSTETRICS & GYNECOLOGY

## 2021-08-15 PROCEDURE — 82948 REAGENT STRIP/BLOOD GLUCOSE: CPT

## 2021-08-15 PROCEDURE — 83735 ASSAY OF MAGNESIUM: CPT | Performed by: OBSTETRICS & GYNECOLOGY

## 2021-08-15 PROCEDURE — 99024 POSTOP FOLLOW-UP VISIT: CPT | Performed by: OBSTETRICS & GYNECOLOGY

## 2021-08-15 PROCEDURE — 85027 COMPLETE CBC AUTOMATED: CPT | Performed by: OBSTETRICS & GYNECOLOGY

## 2021-08-15 RX ADMIN — OXYCODONE HYDROCHLORIDE 5 MG: 5 TABLET ORAL at 02:04

## 2021-08-15 RX ADMIN — ACETAMINOPHEN 975 MG: 325 TABLET, FILM COATED ORAL at 11:45

## 2021-08-15 RX ADMIN — ENOXAPARIN SODIUM 40 MG: 40 INJECTION SUBCUTANEOUS at 08:22

## 2021-08-15 RX ADMIN — ASPIRIN 81 MG: 81 TABLET, COATED ORAL at 08:22

## 2021-08-15 RX ADMIN — MAGNESIUM OXIDE TAB 400 MG (241.3 MG ELEMENTAL MG) 400 MG: 400 (241.3 MG) TAB at 08:22

## 2021-08-15 RX ADMIN — ACETAMINOPHEN 975 MG: 325 TABLET, FILM COATED ORAL at 06:34

## 2021-08-15 RX ADMIN — MAGNESIUM OXIDE TAB 400 MG (241.3 MG ELEMENTAL MG) 400 MG: 400 (241.3 MG) TAB at 11:46

## 2021-08-15 RX ADMIN — AMLODIPINE BESYLATE 10 MG: 10 TABLET ORAL at 08:22

## 2021-08-15 RX ADMIN — PANTOPRAZOLE SODIUM 40 MG: 40 TABLET, DELAYED RELEASE ORAL at 06:35

## 2021-08-15 RX ADMIN — PRAVASTATIN SODIUM 10 MG: 10 TABLET ORAL at 16:02

## 2021-08-15 RX ADMIN — ACETAMINOPHEN 975 MG: 325 TABLET, FILM COATED ORAL at 16:03

## 2021-08-15 RX ADMIN — OXYCODONE HYDROCHLORIDE 5 MG: 5 TABLET ORAL at 13:44

## 2021-08-15 RX ADMIN — OXYCODONE HYDROCHLORIDE 10 MG: 10 TABLET ORAL at 08:33

## 2021-08-15 RX ADMIN — OXYCODONE HYDROCHLORIDE 5 MG: 5 TABLET ORAL at 20:11

## 2021-08-15 RX ADMIN — ACETAMINOPHEN 975 MG: 325 TABLET, FILM COATED ORAL at 23:47

## 2021-08-15 NOTE — PROGRESS NOTES
Progress Note - Gynecologic Oncology   Ed Fletcher 80 y o  female MRN: [id number]  Unit/Bed#: [facility] 528-01 Encounter: [id number]    Assessment / Plan:    Assessment / Plan:     80year-old postoperative day 9 status post extensive tumor debulking surgery      1  Anemia secondary to acute blood loss from surgery   Hemoglobin fluctuating  will check a m  Labs  Patient is asymptomatic      2  Thrombocytosis, leukocytosis likely secondary to splenectomy  a m  Labs ordered  Patient on baby aspirin        3  Bilateral pleural effusions likely postoperative/reactive in nature   No longer requiring oxygen supplementation  92% on room air  Continue to monitor  status post diuresis      4  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   Continue drainage  Continue left upper quadrant and pelvic drains  Now with more pain in left upper quadrant  Check laboratory results from this morning  If worsens or pain does not resolve over the course of a consider CT scan to rule out fluid collection      5  Bowel function has recovered  Bernardo Haywood is on a regular diet   She is ambulating   PT has assessed her and she can go home with home physical therapy  Anticipate discharge home tomorrow for been other changes in medical condition      6  Case management consult for VNA for drain and Paulino catheter evaluation   She will continue the Paulino catheter until postoperative day 10-14   Will set up cystogram and removal as outpatient      7  Hypokalemia/hypomagnesemia:  A m  Labs pending      8  Up trend in creatinine, no clinical evidence of RAYMOND  Blood pressure not requiring antihypertensives at this time  Continue to hold ACE inhibitors  Check a m  Labs and consider resuming Ace inhibitors at the time of discharge           Subjective/Objective       Subjective:  Complains of worsening pain and the left upper quadrant/left hemiabdomen  Denies nausea vomiting  Normal appetite    Normal bowel function  Paulino catheter in place  Ambulating  Denies dyspnea, cough  Objective: *    Blood pressure 127/69, pulse 97, temperature 98 7 °F (37 1 °C), temperature source Oral, resp  rate 20, height 4' 11" (1 499 m), weight 77 1 kg (170 lb), SpO2 92 %  ,Body mass index is 34 34 kg/m²  Intake/Output Summary (Last 24 hours) at 8/15/2021 0806  Last data filed at 8/14/2021 2100  Gross per 24 hour   Intake 118 ml   Output 1240 ml   Net -1122 ml       Invasive Devices     Peripheral Intravenous Line            Peripheral IV 08/13/21 Left Antecubital 2 days          Drain            Closed/Suction Drain LLQ 19 Fr  8 days    Closed/Suction Drain RLQ Bulb 19 Fr  8 days    Urethral Catheter Non-latex 16 Fr  8 days                Physical Exam: /69   Pulse 97   Temp 98 7 °F (37 1 °C) (Oral)   Resp 20   Ht 4' 11" (1 499 m)   Wt 77 1 kg (170 lb)   SpO2 92%   BMI 34 34 kg/m²     General Appearance:    Alert, cooperative, no distress, appears stated age   Lungs:     Clear to auscultation bilaterally, breath sounds decreased at bases, respirations unlabored    Heart:    Regular rate and rhythm, S1 and S2 normal, no murmur, rub   or gallop   Abdomen:     Incision is clean, dry and intact  Minimal erythema around left OSCAR drain remains stable  Left OSCAR with cloudy output  Pelvic OSCAR with serous output  No evidence of wound dehiscence or drainage  No wound erythema  Bowel sounds present throughout     Extremities:   Extremities normal, atraumatic, no cyanosis or edema       Recent Results (from the past 24 hour(s))   Fingerstick Glucose (POCT)    Collection Time: 08/14/21 10:40 AM   Result Value Ref Range    POC Glucose 137 65 - 140 mg/dl   Fingerstick Glucose (POCT)    Collection Time: 08/14/21  4:29 PM   Result Value Ref Range    POC Glucose 119 65 - 140 mg/dl   Fingerstick Glucose (POCT)    Collection Time: 08/14/21  9:37 PM   Result Value Ref Range    POC Glucose 118 65 - 140 mg/dl   Fingerstick Glucose (POCT)    Collection Time: 08/15/21  6:50 AM   Result Value Ref Range    POC Glucose 102 65 - 140 mg/dl     Lab, Imaging and other studies:I have personally reviewed pertinent lab results      VTE Pharmacologic Prophylaxis: Enoxaparin (Lovenox)     Deepti Crawford MD, [place], [facility]  8/15/2021  8:11 AM

## 2021-08-15 NOTE — PROGRESS NOTES
Pt transferred from chair to bed, meds given, no complaints at this time, will continue to monitor pt

## 2021-08-16 ENCOUNTER — APPOINTMENT (INPATIENT)
Dept: RADIOLOGY | Facility: HOSPITAL | Age: 71
DRG: 329 | End: 2021-08-16
Payer: COMMERCIAL

## 2021-08-16 PROBLEM — C80.1: Status: ACTIVE | Noted: 2021-08-16

## 2021-08-16 PROBLEM — Z84.2 FHX: TAH-BSO (TOTAL ABDOMINAL HYSTERECTOMY AND BILATERAL SALPINGO-OOPHORECTOMY): Status: ACTIVE | Noted: 2021-08-16

## 2021-08-16 PROBLEM — Z98.890 S/P BLADDER REPAIR: Status: ACTIVE | Noted: 2021-08-16

## 2021-08-16 PROBLEM — Z90.49 STATUS POST SMALL BOWEL RESECTION: Status: ACTIVE | Noted: 2021-08-16

## 2021-08-16 PROBLEM — K86.89 PANCREATIC FLUID LEAK: Status: ACTIVE | Noted: 2021-08-16

## 2021-08-16 PROBLEM — Z90.81 S/P SPLENECTOMY: Status: ACTIVE | Noted: 2021-08-16

## 2021-08-16 LAB
ABO GROUP BLD: NORMAL
ANION GAP SERPL CALCULATED.3IONS-SCNC: 8 MMOL/L (ref 4–13)
ANISOCYTOSIS BLD QL SMEAR: PRESENT
BASOPHILS # BLD MANUAL: 0 THOUSAND/UL (ref 0–0.1)
BASOPHILS NFR MAR MANUAL: 0 % (ref 0–1)
BLD GP AB SCN SERPL QL: NEGATIVE
BUN SERPL-MCNC: 8 MG/DL (ref 5–25)
CALCIUM SERPL-MCNC: 7.9 MG/DL (ref 8.3–10.1)
CHLORIDE SERPL-SCNC: 98 MMOL/L (ref 100–108)
CO2 SERPL-SCNC: 24 MMOL/L (ref 21–32)
CREAT SERPL-MCNC: 0.38 MG/DL (ref 0.6–1.3)
EOSINOPHIL # BLD MANUAL: 0.57 THOUSAND/UL (ref 0–0.4)
EOSINOPHIL NFR BLD MANUAL: 3 % (ref 0–6)
ERYTHROCYTE [DISTWIDTH] IN BLOOD BY AUTOMATED COUNT: 17.9 % (ref 11.6–15.1)
GFR SERPL CREATININE-BSD FRML MDRD: 108 ML/MIN/1.73SQ M
GIANT PLATELETS BLD QL SMEAR: PRESENT
GLUCOSE SERPL-MCNC: 104 MG/DL (ref 65–140)
GLUCOSE SERPL-MCNC: 114 MG/DL (ref 65–140)
GLUCOSE SERPL-MCNC: 117 MG/DL (ref 65–140)
GLUCOSE SERPL-MCNC: 119 MG/DL (ref 65–140)
GLUCOSE SERPL-MCNC: 120 MG/DL (ref 65–140)
HCT VFR BLD AUTO: 23 % (ref 34.8–46.1)
HGB BLD-MCNC: 7.3 G/DL (ref 11.5–15.4)
LYMPHOCYTES # BLD AUTO: 0.76 THOUSAND/UL (ref 0.6–4.47)
LYMPHOCYTES # BLD AUTO: 4 % (ref 14–44)
MACROCYTES BLD QL AUTO: PRESENT
MCH RBC QN AUTO: 28.1 PG (ref 26.8–34.3)
MCHC RBC AUTO-ENTMCNC: 31.7 G/DL (ref 31.4–37.4)
MCV RBC AUTO: 89 FL (ref 82–98)
METAMYELOCYTES NFR BLD MANUAL: 1 % (ref 0–1)
MONOCYTES # BLD AUTO: 1.14 THOUSAND/UL (ref 0–1.22)
MONOCYTES NFR BLD: 6 % (ref 4–12)
MYELOCYTES NFR BLD MANUAL: 1 % (ref 0–1)
NEUTROPHILS # BLD MANUAL: 15.35 THOUSAND/UL (ref 1.85–7.62)
NEUTS BAND NFR BLD MANUAL: 4 % (ref 0–8)
NEUTS SEG NFR BLD AUTO: 77 % (ref 43–75)
NRBC BLD AUTO-RTO: 3 /100 WBCS
NRBC BLD AUTO-RTO: 6 /100 WBC (ref 0–2)
PLASMA CELLS NFR BLD: 1 % (ref 0–0)
PLATELET # BLD AUTO: 956 THOUSANDS/UL (ref 149–390)
PLATELET BLD QL SMEAR: ABNORMAL
PMV BLD AUTO: 9.9 FL (ref 8.9–12.7)
POLYCHROMASIA BLD QL SMEAR: PRESENT
POTASSIUM SERPL-SCNC: 3.9 MMOL/L (ref 3.5–5.3)
RBC # BLD AUTO: 2.6 MILLION/UL (ref 3.81–5.12)
RBC MORPH BLD: PRESENT
RH BLD: POSITIVE
SMUDGE CELLS BLD QL SMEAR: PRESENT
SODIUM SERPL-SCNC: 130 MMOL/L (ref 136–145)
SPECIMEN EXPIRATION DATE: NORMAL
STOMATOCYTES BLD QL SMEAR: PRESENT
VARIANT LYMPHS # BLD AUTO: 3 %
WBC # BLD AUTO: 18.95 THOUSAND/UL (ref 4.31–10.16)

## 2021-08-16 PROCEDURE — 86850 RBC ANTIBODY SCREEN: CPT | Performed by: OBSTETRICS & GYNECOLOGY

## 2021-08-16 PROCEDURE — 74177 CT ABD & PELVIS W/CONTRAST: CPT

## 2021-08-16 PROCEDURE — 71260 CT THORAX DX C+: CPT

## 2021-08-16 PROCEDURE — P9016 RBC LEUKOCYTES REDUCED: HCPCS

## 2021-08-16 PROCEDURE — G1004 CDSM NDSC: HCPCS

## 2021-08-16 PROCEDURE — 86900 BLOOD TYPING SEROLOGIC ABO: CPT | Performed by: OBSTETRICS & GYNECOLOGY

## 2021-08-16 PROCEDURE — 99024 POSTOP FOLLOW-UP VISIT: CPT | Performed by: OBSTETRICS & GYNECOLOGY

## 2021-08-16 PROCEDURE — 85027 COMPLETE CBC AUTOMATED: CPT | Performed by: OBSTETRICS & GYNECOLOGY

## 2021-08-16 PROCEDURE — 85007 BL SMEAR W/DIFF WBC COUNT: CPT | Performed by: OBSTETRICS & GYNECOLOGY

## 2021-08-16 PROCEDURE — 80048 BASIC METABOLIC PNL TOTAL CA: CPT | Performed by: OBSTETRICS & GYNECOLOGY

## 2021-08-16 PROCEDURE — 86923 COMPATIBILITY TEST ELECTRIC: CPT

## 2021-08-16 PROCEDURE — 82948 REAGENT STRIP/BLOOD GLUCOSE: CPT

## 2021-08-16 PROCEDURE — 86901 BLOOD TYPING SEROLOGIC RH(D): CPT | Performed by: OBSTETRICS & GYNECOLOGY

## 2021-08-16 RX ORDER — IBUPROFEN 600 MG/1
600 TABLET ORAL EVERY 6 HOURS PRN
Status: DISCONTINUED | OUTPATIENT
Start: 2021-08-16 | End: 2021-09-01

## 2021-08-16 RX ADMIN — IBUPROFEN 600 MG: 600 TABLET, FILM COATED ORAL at 16:29

## 2021-08-16 RX ADMIN — HYDROMORPHONE HYDROCHLORIDE 0.5 MG: 1 INJECTION, SOLUTION INTRAMUSCULAR; INTRAVENOUS; SUBCUTANEOUS at 09:29

## 2021-08-16 RX ADMIN — ACETAMINOPHEN 975 MG: 325 TABLET, FILM COATED ORAL at 17:32

## 2021-08-16 RX ADMIN — MAGNESIUM OXIDE TAB 400 MG (241.3 MG ELEMENTAL MG) 400 MG: 400 (241.3 MG) TAB at 14:58

## 2021-08-16 RX ADMIN — OXYCODONE HYDROCHLORIDE 10 MG: 10 TABLET ORAL at 02:45

## 2021-08-16 RX ADMIN — PRAVASTATIN SODIUM 10 MG: 10 TABLET ORAL at 16:29

## 2021-08-16 RX ADMIN — ENOXAPARIN SODIUM 40 MG: 40 INJECTION SUBCUTANEOUS at 09:29

## 2021-08-16 RX ADMIN — PANTOPRAZOLE SODIUM 40 MG: 40 TABLET, DELAYED RELEASE ORAL at 06:28

## 2021-08-16 RX ADMIN — AMLODIPINE BESYLATE 10 MG: 10 TABLET ORAL at 09:29

## 2021-08-16 RX ADMIN — ACETAMINOPHEN 975 MG: 325 TABLET, FILM COATED ORAL at 14:58

## 2021-08-16 RX ADMIN — ASPIRIN 81 MG: 81 TABLET, COATED ORAL at 09:30

## 2021-08-16 RX ADMIN — OXYCODONE HYDROCHLORIDE 10 MG: 10 TABLET ORAL at 09:29

## 2021-08-16 RX ADMIN — OXYCODONE HYDROCHLORIDE 5 MG: 5 TABLET ORAL at 22:18

## 2021-08-16 RX ADMIN — OXYCODONE HYDROCHLORIDE 10 MG: 10 TABLET ORAL at 16:29

## 2021-08-16 RX ADMIN — IOHEXOL 100 ML: 350 INJECTION, SOLUTION INTRAVENOUS at 18:52

## 2021-08-16 RX ADMIN — MAGNESIUM OXIDE TAB 400 MG (241.3 MG ELEMENTAL MG) 400 MG: 400 (241.3 MG) TAB at 09:29

## 2021-08-16 RX ADMIN — ACETAMINOPHEN 975 MG: 325 TABLET, FILM COATED ORAL at 23:30

## 2021-08-16 NOTE — QUICK NOTE
Right lower drain has been removed without complication  25 cc of serosanguinous fluid on drain by moment of removal  Patient tolerated without complication  In addition we have signed consent fr one 1 RBCs today      Shanique Citizen, MD

## 2021-08-16 NOTE — PROGRESS NOTES
Gyn Oncology Progress note   Fay Reyes 80 y o  female MRN: [id number]  Unit/Bed#: [facility] 528-01 Encounter: [id number]    Assessment: 80 y o   POD#10 from Primary ovarian cancer debulking status post diagnostic laparoscopy, exploratory laparotomy, on block hysterectomy bilateral salpingo-oophorectomy sigmoid ectomy with low rectal reanastomosis and over-sewing of bladder peritoneal stripping, cystotomy, and diaphragm stripping small bowel resection and reanastomosis radical omentectomy splenectomy appendectomy     Plan:     1) Postsurgical Management after primary debulking due to high grade ovarian serous carcinoma, Stage 3  - Hgb 8 9; continue to trend, will transfuse if below 7  - Urinary output 1 1 cc/kg/h  - Tolerating regular diet/ passing gas and with normal bowel function   - Bilateral OSCAR drains, 70 cc in last 24 h clear serosanguinous fluid on right drain, turbid milky fluid on the left drain   - No peritoneal irritative signs  - Surgical incision without peripheral irritation  - Up to chair and down the hallway  - 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, Stage 3     2) 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   - LUQ OSCAR drain with amylase >13,000  - Maintain drain in place for at least a month  - Case management following for VNA for drain and irving      3) Pain control   - S/p epidural, tylenol scheduled , bubba 5/10 and dilaudid prn      4) DVT prophylaxis   - SCDs, Lovenox (transitioned from Heparin yesterday)     5) Acute loss anemia   - S/P 5 U RBC + 2 FFP intraop, last Hgb yesterday 8 9  -Patient is asymptomatic      6) S/p  splenectomy  - Pending  pneumococcal 13 valent vaccine   - S/P HI and menigoccoccal vaccine  - WBC trending down, 23 79 yesteray      7) Hx HTN , HLD  - on amlodipine and pravastatin  - BP normoatensive     8) S/p cystostomy  - Irving to remainin place for 2 weeks      9) Hypokalemia, hypomagnesemia  (resolved)  - K 4 1 today, Mg 2 0     10) Acute hypoxic respiratory failure and mild intermittent asthma    - Chest CT scan 8/11 Obstruction of the left lower lobe bronchus   Consolidation versus atelectasis in the lung bases  Scattered airspace opacities within the lungs which may represent infection   Bilateral large pleural effusions  - S/P diuresis   - O2 Saturations a room air 94-95%    11) Trombocytosis,  Leukocytosis   - WBC 23 79, plat 975  - On aspirine  - Possible in relation to splenectomy  - Afebrile, normal vital signs   - Pendin labs this am       Subjective:    Byron Young has no current complaints  Pain is well controlled  Patient is currently with a irving in place  She is ambulating  Patient is currently passing flatus and has had bowel movement  She is tolerating PO, and denies nausea or vomitting  Patient denies fever, chills, chest pain, shortness of breath, or calf tenderness  /86   Pulse 97   Temp 98 7 °F (37 1 °C)   Resp 18   Ht 4' 11" (1 499 m)   Wt 77 1 kg (170 lb)   SpO2 94%   BMI 34 34 kg/m²     I/O last 3 completed shifts: In: 236 [P O :236]  Out: 2290 [Urine:2250; Drains:40]  I/O this shift: In: 480 [P O :480]  Out: 1820 [Urine:1750; Drains:70]    Lab Results   Component Value Date    WBC 23 79 (H) 08/15/2021    HGB 8 9 (L) 08/15/2021    HCT 27 8 (L) 08/15/2021    MCV 88 08/15/2021     (H) 08/15/2021       Lab Results   Component Value Date    GLUCOSE 195 (H) 08/06/2021    CALCIUM 7 9 (L) 08/15/2021    K 4 1 08/15/2021    CO2 24 08/15/2021    CL 98 (L) 08/15/2021    BUN 9 08/15/2021    CREATININE 0 56 (L) 08/15/2021           Physical Exam  Constitutional:       Appearance: She is well-developed  HENT:      Head: Normocephalic and atraumatic  Eyes:      Pupils: Pupils are equal, round, and reactive to light  Cardiovascular:      Rate and Rhythm: Normal rate  Pulmonary:      Effort: Pulmonary effort is normal    Abdominal:      Palpations: Abdomen is soft        Comments: No peritoneal irritative signs, bilateral OSCAR drains functional  tubid white discharge on the left, clear serosanguinous on the left    Genitourinary:     Vagina: Normal    Musculoskeletal:      Cervical back: Normal range of motion  Neurological:      Mental Status: She is alert and oriented to person, place, and time             Kane Dutton MD  8/16/2021  6:38 AM

## 2021-08-17 ENCOUNTER — TELEPHONE (OUTPATIENT)
Dept: HEMATOLOGY ONCOLOGY | Facility: CLINIC | Age: 71
End: 2021-08-17

## 2021-08-17 ENCOUNTER — APPOINTMENT (INPATIENT)
Dept: RADIOLOGY | Facility: HOSPITAL | Age: 71
DRG: 329 | End: 2021-08-17
Payer: COMMERCIAL

## 2021-08-17 LAB
ABO GROUP BLD BPU: NORMAL
ANION GAP SERPL CALCULATED.3IONS-SCNC: 8 MMOL/L (ref 4–13)
BACTERIA UR QL AUTO: NORMAL /HPF
BILIRUB UR QL STRIP: NEGATIVE
BPU ID: NORMAL
BUN SERPL-MCNC: 10 MG/DL (ref 5–25)
CALCIUM SERPL-MCNC: 7.9 MG/DL (ref 8.3–10.1)
CHLORIDE SERPL-SCNC: 101 MMOL/L (ref 100–108)
CLARITY UR: CLEAR
CO2 SERPL-SCNC: 22 MMOL/L (ref 21–32)
COLOR UR: YELLOW
CREAT SERPL-MCNC: 0.42 MG/DL (ref 0.6–1.3)
CROSSMATCH: NORMAL
ERYTHROCYTE [DISTWIDTH] IN BLOOD BY AUTOMATED COUNT: 17.4 % (ref 11.6–15.1)
GFR SERPL CREATININE-BSD FRML MDRD: 104 ML/MIN/1.73SQ M
GLUCOSE SERPL-MCNC: 113 MG/DL (ref 65–140)
GLUCOSE SERPL-MCNC: 117 MG/DL (ref 65–140)
GLUCOSE SERPL-MCNC: 123 MG/DL (ref 65–140)
GLUCOSE SERPL-MCNC: 134 MG/DL (ref 65–140)
GLUCOSE SERPL-MCNC: 158 MG/DL (ref 65–140)
GLUCOSE UR STRIP-MCNC: NEGATIVE MG/DL
HCT VFR BLD AUTO: 27.8 % (ref 34.8–46.1)
HGB BLD-MCNC: 8.9 G/DL (ref 11.5–15.4)
HGB UR QL STRIP.AUTO: NEGATIVE
HYALINE CASTS #/AREA URNS LPF: NORMAL /LPF
KETONES UR STRIP-MCNC: NEGATIVE MG/DL
LACTATE SERPL-SCNC: 1.1 MMOL/L (ref 0.5–2)
LACTATE SERPL-SCNC: 2 MMOL/L (ref 0.5–2)
LACTATE SERPL-SCNC: 2.8 MMOL/L (ref 0.5–2)
LEUKOCYTE ESTERASE UR QL STRIP: ABNORMAL
MAGNESIUM SERPL-MCNC: 1.9 MG/DL (ref 1.6–2.6)
MCH RBC QN AUTO: 28.3 PG (ref 26.8–34.3)
MCHC RBC AUTO-ENTMCNC: 32 G/DL (ref 31.4–37.4)
MCV RBC AUTO: 88 FL (ref 82–98)
NITRITE UR QL STRIP: NEGATIVE
NON-SQ EPI CELLS URNS QL MICRO: NORMAL /HPF
NRBC BLD AUTO-RTO: 3 /100 WBCS
PH UR STRIP.AUTO: 6.5 [PH]
PLATELET # BLD AUTO: 958 THOUSANDS/UL (ref 149–390)
PMV BLD AUTO: 10.7 FL (ref 8.9–12.7)
POTASSIUM SERPL-SCNC: 4 MMOL/L (ref 3.5–5.3)
PROCALCITONIN SERPL-MCNC: 1.01 NG/ML
PROT UR STRIP-MCNC: NEGATIVE MG/DL
RBC # BLD AUTO: 3.15 MILLION/UL (ref 3.81–5.12)
RBC #/AREA URNS AUTO: NORMAL /HPF
SODIUM SERPL-SCNC: 131 MMOL/L (ref 136–145)
SP GR UR STRIP.AUTO: 1.02 (ref 1–1.03)
UNIT DISPENSE STATUS: NORMAL
UNIT PRODUCT CODE: NORMAL
UNIT PRODUCT VOLUME: 350 ML
UNIT RH: NORMAL
UROBILINOGEN UR QL STRIP.AUTO: 1 E.U./DL
WBC # BLD AUTO: 15.73 THOUSAND/UL (ref 4.31–10.16)
WBC #/AREA URNS AUTO: NORMAL /HPF

## 2021-08-17 PROCEDURE — 99024 POSTOP FOLLOW-UP VISIT: CPT | Performed by: OBSTETRICS & GYNECOLOGY

## 2021-08-17 PROCEDURE — 83735 ASSAY OF MAGNESIUM: CPT | Performed by: OBSTETRICS & GYNECOLOGY

## 2021-08-17 PROCEDURE — 71046 X-RAY EXAM CHEST 2 VIEWS: CPT

## 2021-08-17 PROCEDURE — NC001 PR NO CHARGE: Performed by: PHYSICIAN ASSISTANT

## 2021-08-17 PROCEDURE — 82948 REAGENT STRIP/BLOOD GLUCOSE: CPT

## 2021-08-17 PROCEDURE — 85027 COMPLETE CBC AUTOMATED: CPT | Performed by: OBSTETRICS & GYNECOLOGY

## 2021-08-17 PROCEDURE — 83605 ASSAY OF LACTIC ACID: CPT | Performed by: OBSTETRICS & GYNECOLOGY

## 2021-08-17 PROCEDURE — 84145 PROCALCITONIN (PCT): CPT | Performed by: OBSTETRICS & GYNECOLOGY

## 2021-08-17 PROCEDURE — 81001 URINALYSIS AUTO W/SCOPE: CPT | Performed by: OBSTETRICS & GYNECOLOGY

## 2021-08-17 PROCEDURE — 87040 BLOOD CULTURE FOR BACTERIA: CPT | Performed by: OBSTETRICS & GYNECOLOGY

## 2021-08-17 PROCEDURE — 80048 BASIC METABOLIC PNL TOTAL CA: CPT | Performed by: OBSTETRICS & GYNECOLOGY

## 2021-08-17 RX ORDER — SODIUM CHLORIDE, SODIUM LACTATE, POTASSIUM CHLORIDE, CALCIUM CHLORIDE 600; 310; 30; 20 MG/100ML; MG/100ML; MG/100ML; MG/100ML
150 INJECTION, SOLUTION INTRAVENOUS CONTINUOUS
Status: DISCONTINUED | OUTPATIENT
Start: 2021-08-17 | End: 2021-08-19

## 2021-08-17 RX ORDER — ACETAMINOPHEN 325 MG/1
975 TABLET ORAL EVERY 6 HOURS PRN
Status: DISCONTINUED | OUTPATIENT
Start: 2021-08-17 | End: 2021-08-28

## 2021-08-17 RX ADMIN — OXYCODONE HYDROCHLORIDE 10 MG: 10 TABLET ORAL at 02:49

## 2021-08-17 RX ADMIN — IBUPROFEN 600 MG: 600 TABLET, FILM COATED ORAL at 14:35

## 2021-08-17 RX ADMIN — METRONIDAZOLE 500 MG: 500 INJECTION, SOLUTION INTRAVENOUS at 15:05

## 2021-08-17 RX ADMIN — MAGNESIUM OXIDE TAB 400 MG (241.3 MG ELEMENTAL MG) 400 MG: 400 (241.3 MG) TAB at 07:27

## 2021-08-17 RX ADMIN — ACETAMINOPHEN 975 MG: 325 TABLET, FILM COATED ORAL at 06:27

## 2021-08-17 RX ADMIN — OXYCODONE HYDROCHLORIDE 10 MG: 10 TABLET ORAL at 07:27

## 2021-08-17 RX ADMIN — PANTOPRAZOLE SODIUM 40 MG: 40 TABLET, DELAYED RELEASE ORAL at 06:27

## 2021-08-17 RX ADMIN — ENOXAPARIN SODIUM 40 MG: 40 INJECTION SUBCUTANEOUS at 09:23

## 2021-08-17 RX ADMIN — AMLODIPINE BESYLATE 10 MG: 10 TABLET ORAL at 09:23

## 2021-08-17 RX ADMIN — ASPIRIN 81 MG: 81 TABLET, COATED ORAL at 09:23

## 2021-08-17 RX ADMIN — MAGNESIUM OXIDE TAB 400 MG (241.3 MG ELEMENTAL MG) 400 MG: 400 (241.3 MG) TAB at 12:23

## 2021-08-17 RX ADMIN — CEFEPIME HYDROCHLORIDE 2000 MG: 2 INJECTION, POWDER, FOR SOLUTION INTRAVENOUS at 16:48

## 2021-08-17 RX ADMIN — SODIUM CHLORIDE, SODIUM LACTATE, POTASSIUM CHLORIDE, AND CALCIUM CHLORIDE 150 ML/HR: .6; .31; .03; .02 INJECTION, SOLUTION INTRAVENOUS at 21:31

## 2021-08-17 RX ADMIN — OXYCODONE HYDROCHLORIDE 10 MG: 10 TABLET ORAL at 16:51

## 2021-08-17 RX ADMIN — OXYCODONE HYDROCHLORIDE 10 MG: 10 TABLET ORAL at 21:14

## 2021-08-17 RX ADMIN — PRAVASTATIN SODIUM 10 MG: 10 TABLET ORAL at 16:51

## 2021-08-17 RX ADMIN — SODIUM CHLORIDE, SODIUM LACTATE, POTASSIUM CHLORIDE, AND CALCIUM CHLORIDE 150 ML/HR: .6; .31; .03; .02 INJECTION, SOLUTION INTRAVENOUS at 15:00

## 2021-08-17 RX ADMIN — OXYCODONE HYDROCHLORIDE 5 MG: 5 TABLET ORAL at 12:25

## 2021-08-17 RX ADMIN — METRONIDAZOLE 500 MG: 500 INJECTION, SOLUTION INTRAVENOUS at 23:28

## 2021-08-17 NOTE — CASE MANAGEMENT
Informed by Dr Tony Severino that patient is anticipated for discharge home later today  She will send prescription for Eliquis to [place], [place]  She will dc with drain and irving according to her RN  Sent ECIN message to [facility] and they will make [facility] visit on 8/18/21  Met with patient to discuss discharge  She request her  be called with update and MD was informed

## 2021-08-17 NOTE — CASE MANAGEMENT
Informed by Dr Laz Lester that patient is not cleared for dc today due to temp and sepsis workup  Sent ECIN message to [facility]  MD called [facility], [place] and Eliquis needs precert through patient's insurance

## 2021-08-17 NOTE — QUICK NOTE
Patient in postoperative day 11 after primary ovarian cancer debulking,   febrile 102 4 , /66 RR 18 with o2 Saturations 91% on room air  Patient is asymptomatic  Abdomen is soft without irritative peritoneal signs  We have started the sepsis protocol , in addition to cefepime and flagyl  Will continue close follow up of vital signs and labs      Pending urine culture  Blood culture  Lactate   Procalcitonin  CXR  Labs in AM   Continue fluid hydration    Will follow up results     Vitals:    08/17/21 0733 08/17/21 0739 08/17/21 1429 08/17/21 1532   BP: 133/71 133/71 138/66 118/56   BP Location: Right arm      Pulse: 95 93 (!) 110 98   Resp: 18  18 18   Temp: 98 °F (36 7 °C)  (!) 102 4 °F (39 1 °C) 99 9 °F (37 7 °C)   TempSrc: Oral      SpO2: 94% 95% 91% 92%   Weight:       Height:           Dr Lilian Grande MD

## 2021-08-17 NOTE — TELEMEDICINE
e-Consult (IPC)  - Interventional Radiology  Holly Orourke 80 y o  female MRN: [id number]  Unit/Bed#: [facility] 528-01 Encounter: [id number]    IR has been consulted to evaluate the patient, determine the appropriate procedure, and whether or not a procedure can and should be performed regarding the care of Holly Orourke  We were consulted by gyn onc concerning peripancreatic fluid collection, and to possibly perform a drain placement/thoracentesis if medically appropriate for the patient  IP Consult to IR  Consult performed by: Naveed Villarreal PA-C  Consult ordered by: Sita Dunbar MD        08/17/21      Assessment/Recommendation:     80year old female POD 11 s/p ovarian cancer debulking surgery , splenectomy with partial removal of pancreatic tail, surgical drain placed  Patient with fever 102 4F this afternoon    - will plan for LUQ drain placement tomorrow  - drain will most likely be long term (months)  - please keep npo after midnight  - patient would also benefit from thoracentesis  Will plan for left thoracentesis, most likely to follow her drain placement  - Dr Mason Diego discussed with Dr Jose Raul Hooper  Would appreciate if IR remove LUQ surgical drain tomorrow      Total time spent in review of data, discussion with requesting provider and rendering advice was 30 minutes       Patient or appropriate family member was verbally informed by gyn onc of this consultative service on their behalf to provide more timely access to specialty care in lieu of an in person consultation  Verbal consent was obtained  Thank you for allowing Interventional Radiology to participate in the care of Holly Orourke  Please don't hesitate to call or TigerText us with any questions       Naveed Villarreal PA-C

## 2021-08-17 NOTE — PROGRESS NOTES
Gyn Oncology Progress note   Lucy Oms 80 y o  female MRN: [id number]  Unit/Bed#: [facility] 528-01 Encounter: [id number]    Assessment: 80 y o   POD#11 from Primary ovarian cancer debulking status post diagnostic laparoscopy, exploratory laparotomy, on block hysterectomy bilateral salpingo-oophorectomy sigmoid ectomy with low rectal reanastomosis and over-sewing of bladder peritoneal stripping, cystotomy, and diaphragm stripping small bowel resection and reanastomosis radical omentectomy splenectomy appendectomy     Plan:     1) Postsurgical Management after primary debulking due to high grade ovarian serous carcinoma, Stage 3  - Hgb 7 3 yesterday, s/p 1u pRBC, f/u am labs   - Urinary output 1 5 cc/kg/h  - Tolerating regular diet/ passing gas and with normal bowel function   - LUQ drain, output in last 24h is 85cc  - Up to chair and down the hallway  - 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, Stage 3  - Disposition: home PT     2) 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   - LUQ OSCAR drain with amylase >13,000  - Maintain drain in place for at least a month  - Case management following for VNA for drain and irving      3) Pain control   - S/p epidural, tylenol scheduled , bubba 5/10 and dilaudid prn      4) DVT prophylaxis   - SCDs, Lovenox (transitioned from Heparin)     5) Acute loss anemia   - S/P 5 U RBC + 2 FFP intraop, Hgb 7 3 yesterday, s/p 1u pRBC, f/u AM CBC   -Patient is asymptomatic      6) S/p  splenectomy  - Pending  pneumococcal 13 valent vaccine   - S/P HI and menigoccoccal vaccine  - WBC trending down, 23 79 yesteray      7) Hx HTN , HLD  - on amlodipine and pravastatin  - BP normoatensive     8) S/p cystostomy  - Irving to remainin place for 2 weeks      9) Hypokalemia, hypomagnesemia  (resolved), hyponatremia  - K 3 9, magnesium 2 0   - Na 130, continue to trend     10) Acute hypoxic respiratory failure and mild intermittent asthma    - Chest CT scan 8/11 Obstruction of the left lower lobe bronchus   Consolidation versus atelectasis in the lung bases  Scattered airspace opacities within the lungs which may represent infection   Bilateral large pleural effusions  - CT scan 8/16 continues to show bilateral effusions, consider more diuresis today pending AM electrolytes  - S/P diuresis   - O2 Saturations a room air 95-96%    11) Trombocytosis, Leukocytosis, Febrile  - WBC 23, plat 956K  - On aspirin  - Possibly in relation to splenectomy  - 101 5 temp yesterday @1417, afebrile since  - CT continues to show bilateral effusions, new lesions in liver suspicious for metastases, peritonitis, fluid surrounding the pancreatic tail and mildly dilated small bowel loops related to postoperative ileus  - No concern for UTI at this time  - Continue to follow temperature closely today     Subjective:    John Ramirez is currently using the bathroom  She reports feeling well overall  She continues suzie tolerate PO and have bowel movements  She is able to ambulate  She has no dysuria and has the irving in place  She received a unit of pRBC last night  She had one temperature of 101 5 yesterday and would like to know if the Tylenol she is regularly taking is masking her fever  Otherwise, she feels well and does not have any complaints  /65   Pulse 83   Temp 98 4 °F (36 9 °C) (Oral)   Resp 21   Ht 4' 11" (1 499 m)   Wt 77 1 kg (170 lb)   SpO2 96%   BMI 34 34 kg/m²     I/O last 3 completed shifts: In: 5204 [P O :1078]  Out: 3400 [Urine:3250; Drains:150]  I/O this shift:   In: 704 3 [Blood:704 3]  Out: 2305 [Urine:2300; Drains:5]    Lab Results   Component Value Date    WBC 18 95 (H) 08/16/2021    HGB 7 3 (L) 08/16/2021    HCT 23 0 (L) 08/16/2021    MCV 89 08/16/2021     (H) 08/16/2021       Lab Results   Component Value Date    GLUCOSE 195 (H) 08/06/2021    CALCIUM 7 9 (L) 08/16/2021    K 3 9 08/16/2021    CO2 24 08/16/2021    CL 98 (L) 08/16/2021    BUN 8 08/16/2021    CREATININE 0 38 (L) 08/16/2021           Physical Exam  Constitutional:       Appearance: She is well-developed  Comments: Patient sitting on the toilet   HENT:      Head: Normocephalic and atraumatic  Eyes:      Extraocular Movements: Extraocular movements intact  Pulmonary:      Effort: Pulmonary effort is normal    Genitourinary:     Vagina: Normal    Musculoskeletal:      Cervical back: Normal range of motion  Neurological:      Mental Status: She is alert and oriented to person, place, and time              Matt Calderón MD  8/17/2021  6:27 AM

## 2021-08-17 NOTE — TELEPHONE ENCOUNTER
[facility] pharmacy in [address] calling regarding patient Loretta Nacho [address] Du can be reached at [phone] patient see Dr Campos Peter

## 2021-08-18 ENCOUNTER — APPOINTMENT (INPATIENT)
Dept: RADIOLOGY | Facility: HOSPITAL | Age: 71
DRG: 329 | End: 2021-08-18
Payer: COMMERCIAL

## 2021-08-18 LAB
AMYLASE FLD QL: 160 U/L
ANION GAP SERPL CALCULATED.3IONS-SCNC: 6 MMOL/L (ref 4–13)
BUN SERPL-MCNC: 7 MG/DL (ref 5–25)
CALCIUM SERPL-MCNC: 7.7 MG/DL (ref 8.3–10.1)
CHLORIDE SERPL-SCNC: 102 MMOL/L (ref 100–108)
CO2 SERPL-SCNC: 26 MMOL/L (ref 21–32)
CREAT SERPL-MCNC: 0.33 MG/DL (ref 0.6–1.3)
EOSINOPHIL NFR FLD MANUAL: 5 %
ERYTHROCYTE [DISTWIDTH] IN BLOOD BY AUTOMATED COUNT: 17.6 % (ref 11.6–15.1)
GFR SERPL CREATININE-BSD FRML MDRD: 113 ML/MIN/1.73SQ M
GLUCOSE FLD-MCNC: 95 MG/DL
GLUCOSE SERPL-MCNC: 100 MG/DL (ref 65–140)
GLUCOSE SERPL-MCNC: 158 MG/DL (ref 65–140)
GLUCOSE SERPL-MCNC: 171 MG/DL (ref 65–140)
GLUCOSE SERPL-MCNC: 92 MG/DL (ref 65–140)
GLUCOSE SERPL-MCNC: 99 MG/DL (ref 65–140)
HCT VFR BLD AUTO: 25.6 % (ref 34.8–46.1)
HGB BLD-MCNC: 8.2 G/DL (ref 11.5–15.4)
LDH FLD L TO P-CCNC: 249 U/L
LYMPHOCYTES NFR BLD AUTO: 60 %
MAGNESIUM SERPL-MCNC: 1.8 MG/DL (ref 1.6–2.6)
MCH RBC QN AUTO: 28.7 PG (ref 26.8–34.3)
MCHC RBC AUTO-ENTMCNC: 32 G/DL (ref 31.4–37.4)
MCV RBC AUTO: 90 FL (ref 82–98)
MONO+MESO NFR FLD MANUAL: 1 %
MONOCYTES NFR BLD AUTO: 1 %
MONONUC CELLS NFR FLD MANUAL: 0 %
NEUTS SEG NFR BLD AUTO: 33 %
NRBC BLD AUTO-RTO: 3 /100 WBCS
PH BODY FLUID: 8
PLATELET # BLD AUTO: 1021 THOUSANDS/UL (ref 149–390)
PMV BLD AUTO: 10.8 FL (ref 8.9–12.7)
POTASSIUM SERPL-SCNC: 3.6 MMOL/L (ref 3.5–5.3)
PROCALCITONIN SERPL-MCNC: 1.12 NG/ML
PROT FLD-MCNC: 2.5 G/DL
RBC # BLD AUTO: 2.86 MILLION/UL (ref 3.81–5.12)
SODIUM SERPL-SCNC: 134 MMOL/L (ref 136–145)
TOTAL CELLS COUNTED SPEC: 100
WBC # BLD AUTO: 13.76 THOUSAND/UL (ref 4.31–10.16)
WBC # FLD MANUAL: 941 /UL

## 2021-08-18 PROCEDURE — 80048 BASIC METABOLIC PNL TOTAL CA: CPT | Performed by: OBSTETRICS & GYNECOLOGY

## 2021-08-18 PROCEDURE — 88112 CYTOPATH CELL ENHANCE TECH: CPT | Performed by: PATHOLOGY

## 2021-08-18 PROCEDURE — 87186 SC STD MICRODIL/AGAR DIL: CPT | Performed by: INTERNAL MEDICINE

## 2021-08-18 PROCEDURE — 87075 CULTR BACTERIA EXCEPT BLOOD: CPT | Performed by: INTERNAL MEDICINE

## 2021-08-18 PROCEDURE — 84157 ASSAY OF PROTEIN OTHER: CPT | Performed by: PHYSICIAN ASSISTANT

## 2021-08-18 PROCEDURE — 49406 IMAGE CATH FLUID PERI/RETRO: CPT

## 2021-08-18 PROCEDURE — 87205 SMEAR GRAM STAIN: CPT | Performed by: PHYSICIAN ASSISTANT

## 2021-08-18 PROCEDURE — 87205 SMEAR GRAM STAIN: CPT | Performed by: INTERNAL MEDICINE

## 2021-08-18 PROCEDURE — 32555 ASPIRATE PLEURA W/ IMAGING: CPT

## 2021-08-18 PROCEDURE — 88305 TISSUE EXAM BY PATHOLOGIST: CPT | Performed by: PATHOLOGY

## 2021-08-18 PROCEDURE — 8E0WXBG COMPUTER ASSISTED PROCEDURE OF TRUNK REGION, WITH COMPUTERIZED TOMOGRAPHY: ICD-10-PCS | Performed by: INTERNAL MEDICINE

## 2021-08-18 PROCEDURE — 82150 ASSAY OF AMYLASE: CPT | Performed by: PHYSICIAN ASSISTANT

## 2021-08-18 PROCEDURE — 85027 COMPLETE CBC AUTOMATED: CPT | Performed by: OBSTETRICS & GYNECOLOGY

## 2021-08-18 PROCEDURE — 0W9G30Z DRAINAGE OF PERITONEAL CAVITY WITH DRAINAGE DEVICE, PERCUTANEOUS APPROACH: ICD-10-PCS | Performed by: INTERNAL MEDICINE

## 2021-08-18 PROCEDURE — 87185 SC STD ENZYME DETCJ PER NZM: CPT | Performed by: INTERNAL MEDICINE

## 2021-08-18 PROCEDURE — 74430 CONTRAST X-RAY BLADDER: CPT

## 2021-08-18 PROCEDURE — C1729 CATH, DRAINAGE: HCPCS

## 2021-08-18 PROCEDURE — 32555 ASPIRATE PLEURA W/ IMAGING: CPT | Performed by: PHYSICIAN ASSISTANT

## 2021-08-18 PROCEDURE — 82948 REAGENT STRIP/BLOOD GLUCOSE: CPT

## 2021-08-18 PROCEDURE — 84145 PROCALCITONIN (PCT): CPT | Performed by: OBSTETRICS & GYNECOLOGY

## 2021-08-18 PROCEDURE — 99152 MOD SED SAME PHYS/QHP 5/>YRS: CPT

## 2021-08-18 PROCEDURE — 71045 X-RAY EXAM CHEST 1 VIEW: CPT

## 2021-08-18 PROCEDURE — 87070 CULTURE OTHR SPECIMN AEROBIC: CPT | Performed by: INTERNAL MEDICINE

## 2021-08-18 PROCEDURE — 99152 MOD SED SAME PHYS/QHP 5/>YRS: CPT | Performed by: INTERNAL MEDICINE

## 2021-08-18 PROCEDURE — 49406 IMAGE CATH FLUID PERI/RETRO: CPT | Performed by: INTERNAL MEDICINE

## 2021-08-18 PROCEDURE — 0W9B3ZZ DRAINAGE OF LEFT PLEURAL CAVITY, PERCUTANEOUS APPROACH: ICD-10-PCS | Performed by: INTERNAL MEDICINE

## 2021-08-18 PROCEDURE — 82945 GLUCOSE OTHER FLUID: CPT | Performed by: PHYSICIAN ASSISTANT

## 2021-08-18 PROCEDURE — C1769 GUIDE WIRE: HCPCS

## 2021-08-18 PROCEDURE — 83986 ASSAY PH BODY FLUID NOS: CPT | Performed by: PHYSICIAN ASSISTANT

## 2021-08-18 PROCEDURE — 93005 ELECTROCARDIOGRAM TRACING: CPT

## 2021-08-18 PROCEDURE — 99024 POSTOP FOLLOW-UP VISIT: CPT | Performed by: OBSTETRICS & GYNECOLOGY

## 2021-08-18 PROCEDURE — 87076 CULTURE ANAEROBE IDENT EACH: CPT | Performed by: INTERNAL MEDICINE

## 2021-08-18 PROCEDURE — 87077 CULTURE AEROBIC IDENTIFY: CPT | Performed by: INTERNAL MEDICINE

## 2021-08-18 PROCEDURE — 99153 MOD SED SAME PHYS/QHP EA: CPT

## 2021-08-18 PROCEDURE — 89051 BODY FLUID CELL COUNT: CPT | Performed by: PHYSICIAN ASSISTANT

## 2021-08-18 PROCEDURE — 83615 LACTATE (LD) (LDH) ENZYME: CPT | Performed by: PHYSICIAN ASSISTANT

## 2021-08-18 PROCEDURE — 87070 CULTURE OTHR SPECIMN AEROBIC: CPT | Performed by: PHYSICIAN ASSISTANT

## 2021-08-18 PROCEDURE — 83735 ASSAY OF MAGNESIUM: CPT | Performed by: OBSTETRICS & GYNECOLOGY

## 2021-08-18 RX ORDER — MIDAZOLAM HYDROCHLORIDE 2 MG/2ML
INJECTION, SOLUTION INTRAMUSCULAR; INTRAVENOUS CODE/TRAUMA/SEDATION MEDICATION
Status: COMPLETED | OUTPATIENT
Start: 2021-08-18 | End: 2021-08-18

## 2021-08-18 RX ORDER — FENTANYL CITRATE 50 UG/ML
INJECTION, SOLUTION INTRAMUSCULAR; INTRAVENOUS CODE/TRAUMA/SEDATION MEDICATION
Status: COMPLETED | OUTPATIENT
Start: 2021-08-18 | End: 2021-08-18

## 2021-08-18 RX ORDER — LIDOCAINE WITH 8.4% SOD BICARB 0.9%(10ML)
SYRINGE (ML) INJECTION CODE/TRAUMA/SEDATION MEDICATION
Status: COMPLETED | OUTPATIENT
Start: 2021-08-18 | End: 2021-08-18

## 2021-08-18 RX ORDER — SODIUM CHLORIDE 9 MG/ML
10 INJECTION INTRAVENOUS DAILY
Qty: 300 ML | Refills: 3 | Status: SHIPPED | OUTPATIENT
Start: 2021-08-18 | End: 2021-08-19 | Stop reason: HOSPADM

## 2021-08-18 RX ORDER — HYDROMORPHONE HCL/PF 1 MG/ML
0.5 SYRINGE (ML) INJECTION ONCE
Status: DISCONTINUED | OUTPATIENT
Start: 2021-08-18 | End: 2021-08-25

## 2021-08-18 RX ORDER — SODIUM CHLORIDE 9 MG/ML
500 INJECTION INTRAVENOUS
Status: DISCONTINUED | OUTPATIENT
Start: 2021-08-18 | End: 2021-09-10 | Stop reason: HOSPADM

## 2021-08-18 RX ADMIN — OXYCODONE HYDROCHLORIDE 5 MG: 5 TABLET ORAL at 01:35

## 2021-08-18 RX ADMIN — OXYCODONE HYDROCHLORIDE 5 MG: 5 TABLET ORAL at 05:47

## 2021-08-18 RX ADMIN — IBUPROFEN 600 MG: 600 TABLET, FILM COATED ORAL at 23:45

## 2021-08-18 RX ADMIN — FENTANYL CITRATE 50 MCG: 50 INJECTION INTRAMUSCULAR; INTRAVENOUS at 08:43

## 2021-08-18 RX ADMIN — CEFEPIME HYDROCHLORIDE 2000 MG: 2 INJECTION, POWDER, FOR SOLUTION INTRAVENOUS at 15:28

## 2021-08-18 RX ADMIN — FENTANYL CITRATE 50 MCG: 50 INJECTION INTRAMUSCULAR; INTRAVENOUS at 08:31

## 2021-08-18 RX ADMIN — METRONIDAZOLE 500 MG: 500 INJECTION, SOLUTION INTRAVENOUS at 06:47

## 2021-08-18 RX ADMIN — HYDROMORPHONE HYDROCHLORIDE 0.5 MG: 1 INJECTION, SOLUTION INTRAMUSCULAR; INTRAVENOUS; SUBCUTANEOUS at 11:47

## 2021-08-18 RX ADMIN — MIDAZOLAM 1 MG: 1 INJECTION INTRAMUSCULAR; INTRAVENOUS at 08:58

## 2021-08-18 RX ADMIN — METRONIDAZOLE 500 MG: 500 INJECTION, SOLUTION INTRAVENOUS at 22:44

## 2021-08-18 RX ADMIN — ASPIRIN 81 MG: 81 TABLET, COATED ORAL at 12:03

## 2021-08-18 RX ADMIN — AMLODIPINE BESYLATE 10 MG: 10 TABLET ORAL at 12:03

## 2021-08-18 RX ADMIN — OXYCODONE HYDROCHLORIDE 10 MG: 10 TABLET ORAL at 23:26

## 2021-08-18 RX ADMIN — CEFEPIME HYDROCHLORIDE 2000 MG: 2 INJECTION, POWDER, FOR SOLUTION INTRAVENOUS at 04:50

## 2021-08-18 RX ADMIN — INSULIN LISPRO 1 UNITS: 100 INJECTION, SOLUTION INTRAVENOUS; SUBCUTANEOUS at 22:45

## 2021-08-18 RX ADMIN — SODIUM CHLORIDE, SODIUM LACTATE, POTASSIUM CHLORIDE, AND CALCIUM CHLORIDE 150 ML/HR: .6; .31; .03; .02 INJECTION, SOLUTION INTRAVENOUS at 18:28

## 2021-08-18 RX ADMIN — SODIUM CHLORIDE, SODIUM LACTATE, POTASSIUM CHLORIDE, AND CALCIUM CHLORIDE 150 ML/HR: .6; .31; .03; .02 INJECTION, SOLUTION INTRAVENOUS at 04:13

## 2021-08-18 RX ADMIN — FENTANYL CITRATE 50 MCG: 50 INJECTION INTRAMUSCULAR; INTRAVENOUS at 08:58

## 2021-08-18 RX ADMIN — IBUPROFEN 600 MG: 600 TABLET, FILM COATED ORAL at 03:25

## 2021-08-18 RX ADMIN — PANTOPRAZOLE SODIUM 40 MG: 40 TABLET, DELAYED RELEASE ORAL at 05:47

## 2021-08-18 RX ADMIN — METRONIDAZOLE 500 MG: 500 INJECTION, SOLUTION INTRAVENOUS at 14:19

## 2021-08-18 RX ADMIN — IOHEXOL 100 ML: 350 INJECTION, SOLUTION INTRAVENOUS at 11:44

## 2021-08-18 RX ADMIN — Medication 10 ML: at 08:45

## 2021-08-18 RX ADMIN — ENOXAPARIN SODIUM 40 MG: 40 INJECTION SUBCUTANEOUS at 12:04

## 2021-08-18 RX ADMIN — OXYCODONE HYDROCHLORIDE 5 MG: 5 TABLET ORAL at 19:22

## 2021-08-18 RX ADMIN — Medication 10 ML: at 09:52

## 2021-08-18 RX ADMIN — PRAVASTATIN SODIUM 10 MG: 10 TABLET ORAL at 17:00

## 2021-08-18 RX ADMIN — MAGNESIUM OXIDE TAB 400 MG (241.3 MG ELEMENTAL MG) 400 MG: 400 (241.3 MG) TAB at 12:03

## 2021-08-18 RX ADMIN — MIDAZOLAM 1 MG: 1 INJECTION INTRAMUSCULAR; INTRAVENOUS at 08:43

## 2021-08-18 RX ADMIN — OXYCODONE HYDROCHLORIDE 10 MG: 10 TABLET ORAL at 11:32

## 2021-08-18 RX ADMIN — INSULIN LISPRO 1 UNITS: 100 INJECTION, SOLUTION INTRAVENOUS; SUBCUTANEOUS at 17:01

## 2021-08-18 RX ADMIN — OXYCODONE HYDROCHLORIDE 5 MG: 5 TABLET ORAL at 15:23

## 2021-08-18 RX ADMIN — MIDAZOLAM 1 MG: 1 INJECTION INTRAMUSCULAR; INTRAVENOUS at 08:31

## 2021-08-18 NOTE — SEDATION DOCUMENTATION
Left thoracentesis performed by Tiffanie Nicolas PA-C  230ml of serosanguinous fluid removed  Tolerated well by patient, VSS

## 2021-08-18 NOTE — BRIEF OP NOTE (RAD/CATH)
Left IR THORACENTESIS Procedure Note    PATIENT NAME: Shelby Barrientos  : 1950  MRN: [id number]    Pre-op Diagnosis:   1  Peritoneal carcinomatosis ([address] )    2  Hypoxemia    3  Pancreatic fluid leak      Post-op Diagnosis:   1  Peritoneal carcinomatosis ([address] )    2  Hypoxemia    3  Pancreatic fluid leak        Provider:  Tiffanie Crespo PA-C    Supervising physician:  Dr Rupal Marsh    No qualified resident was available, Resident is only observing    Estimated Blood Loss: minimal    Findings: patient with moderate left pleural effusion  First attempt met with resistance with debris clogging catheter  Approximately 50cc rigoberto pleural effusion with debris able to be removed  Second attempt also with minimal output due to clogging of catheter  Light colored debris floating in pleural effusion that was able to be aspirated  Third more lateral puncture with serosanguinous fluid removed, also with poor return of fluid  Total 230cc pleural effusion removed    Specimens: sent    Complications:  Patient with left spasmatic chest discomfort after thoracentesis  O2 sats were stable 95%  Sent for CXR without evidence of pneumothorax       Anesthesia: local    Tiffanie Crespo PA-C     Date: 2021  Time: 1:20 PM

## 2021-08-18 NOTE — SEDATION DOCUMENTATION
Drainage tube placed to left flank by Dr Rufino Rebolledo without complications  Tolerated well by patient, VSS throughout  50ml of milky drainage removed, specimens sent to lab

## 2021-08-18 NOTE — QUICK NOTE
Patient evaluated after nursing called describing intense pain after our procedure    Patient now states that the majority of her pain is resolved and rates it a 5/10  Described as a spasming pain    Resolved after 10 mg oxycodone and 1 mg IV Dilaudid    O2 sat the time evaluation 95% on 2 L, previously 90%  Persistent tachycardia although patient is asymptomatic  Last documented blood pressure 150/84 which is the same as time resident notified of patient increased pain    Patient appears recovered and not acutely in pain    Susan Rankin MD  OBGYN PGY-4  8/18/2021 1:51 PM

## 2021-08-18 NOTE — BRIEF OP NOTE (RAD/CATH)
INTERVENTIONAL RADIOLOGY PROCEDURE NOTE    Date: 8/18/2021    Procedure: LAPAROSCOPY DIAGNOSTIC (N/A Abdomen)  LAPAROTOMY EXPLORATORY; OVER SEW PANCREAS TAIL (N/A Abdomen)  ENBLOCK HYSTERECTOMY, BILATERAL SALPINGO-OOPHORECTOMY, SIGMOIDECTOMY WITH LOW RECTAL REANASTAMOSIS, BLADDER PERITONEAL STRIPPING AND CYSTOTOMY REPAIR, DIAPHRAGM STRIPPING, SMALL BOWEL RESECTION WITH RE-ANASTAMOSIS (N/A Abdomen)  RADICAL OMENTECTOMY (N/A Abdomen)  SPLENECTOMY (N/A Abdomen)  APPENDECTOMY (N/A Abdomen)    Preoperative diagnosis:   1  Peritoneal carcinomatosis ([address] )    2  Hypoxemia         Postoperative diagnosis: Same  Surgeon: Charu Ferrell MD     Assistant: None  No qualified resident was available  Blood loss: minimal    Specimens: purulent aspirate obtained for analysis  Findings: left abdominal fluid collection  CT guided 10F drain placement  Complications: None immediate      Anesthesia: conscious sedation

## 2021-08-18 NOTE — OCCUPATIONAL THERAPY NOTE
Occupational Therapy Cancellation Note       08/18/21 0755   Note Type   Note Type Treatment   Cancel Reasons Patient off floor/test       Orders received and chart reviewed  OT attempted to see, however Pt currently off of the unit at   Will continue to follow to be seen for OT treatment as appropriate/when medically cleared         Antonieta Chisholm MS, OTR/L

## 2021-08-18 NOTE — PROGRESS NOTES
Gyn Oncology Progress note   Judit Ritchie 80 y o  female MRN: [id number]  Unit/Bed#: [facility] 528-01 Encounter: [id number]    Assessment: 80 y o   POD#12 from primary ovarian cancer debulking status post diagnostic laparoscopy, exploratory laparotomy, on block hysterectomy bilateral salpingo-oophorectomy sigmoid ectomy with low rectal reanastomosis and over-sewing of bladder peritoneal stripping, cystotomy, and diaphragm stripping small bowel resection and reanastomosis radical omentectomy splenectomy appendectomy now with pancreatic leak     Plan:     1) Postsurgical Management after primary debulking due to high grade ovarian serous carcinoma, Stage 3  - Hgb 8 3 after 1u pRBC, f/u am labs   - Urinary output 1 1 cc/kg/h  - Tolerating regular diet/ passing gas and with normal bowel function, currently NPO for drain exchange   - LUQ drain, output in last 24h is 75cc  - Up to chair and down the hallway  - 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, Stage 3  - Disposition: home PT     2) 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   - LUQ OSCAR drain with amylase >13,000  - Maintain drain in place for at least a month  - Case management following for VNA for drain and irving   - IR to exchange drain today for pyretic control     3) Pain control   - S/p epidural, tylenol scheduled , bubba 5/10 and dilaudid prn      4) DVT prophylaxis   - SCDs, Lovenox (transitioned from Heparin)     5) Acute loss anemia   - S/P total 5 U RBC + 2 FFP intraop,   -Patient is asymptomatic      6) S/p  splenectomy  - Pending  pneumococcal 13 valent vaccine on discharge  - S/P HI and menigoccoccal vaccine  - WBC trending down, 23 79->15 7      7) Hx HTN , HLD  - on amlodipine and pravastatin  - BP normotensive     8) S/p cystostomy  - Irving to remainin place for 2 more days     9) Hypokalemia, hypomagnesemia  (resolved), hyponatremia  - K 3 9, magnesium 2 0   - Na 131, continue to trend     10) Acute hypoxic respiratory failure and mild intermittent asthma    - Chest CT scan 8/11 Obstruction of the left lower lobe bronchus   Consolidation versus atelectasis in the lung bases  Scattered airspace opacities within the lungs which may represent infection   Bilateral large pleural effusions  - CT scan 8/16 continues to show bilateral effusions, for thoracocentesis  - S/P diuresis   - O2 Saturations a room air 95-96%    11) Post op fever  - On aspirin  - Tmax 102 4 yesterday, last fever 101 6 at 3 am  - CT continues to show bilateral effusions, new lesions in liver suspicious for metastases, peritonitis, fluid surrounding the pancreatic tail and mildly dilated small bowel loops related to postoperative ileus  - Blood cultures drawn yesterday in process    Subjective:    Susan Maya is currently without complaint  She does feel restricted by her multiple V lines  She reports feeling well overall  She continues to tolerate PO and have bowel movements  She is able to ambulate  She has no dysuria and has the irving in place  She is NPO for IR today  /81   Pulse (!) 108   Temp 98 6 °F (37 °C) (Oral)   Resp 17   Ht 4' 11" (1 499 m)   Wt 77 1 kg (170 lb)   SpO2 93%   BMI 34 34 kg/m²     I/O last 3 completed shifts: In: 1420 3 [P O :716; Blood:704 3]  Out: 3905 [Urine:3790; Drains:115]  I/O this shift:  In: 1977 5 [I V :1977 5]  Out: 795 [Urine:750; Drains:45]    Lab Results   Component Value Date    WBC 15 73 (H) 08/17/2021    HGB 8 9 (L) 08/17/2021    HCT 27 8 (L) 08/17/2021    MCV 88 08/17/2021     (H) 08/17/2021       Lab Results   Component Value Date    GLUCOSE 195 (H) 08/06/2021    CALCIUM 7 9 (L) 08/17/2021    K 4 0 08/17/2021    CO2 22 08/17/2021     08/17/2021    BUN 10 08/17/2021    CREATININE 0 42 (L) 08/17/2021           Physical Exam  Constitutional:       General: She is not in acute distress  Appearance: She is well-developed  She is not diaphoretic  HENT:      Head: Normocephalic and atraumatic  Eyes:      Pupils: Pupils are equal, round, and reactive to light  Neck:      Trachea: No tracheal deviation  Cardiovascular:      Rate and Rhythm: Normal rate and regular rhythm  Heart sounds: Normal heart sounds  No murmur heard  No friction rub  No gallop  Pulmonary:      Effort: Pulmonary effort is normal  No respiratory distress  Breath sounds: Normal breath sounds  No stridor  No wheezing or rales  Abdominal:      General: Bowel sounds are normal  There is no distension  Palpations: Abdomen is soft  There is no mass  Tenderness: There is no abdominal tenderness  There is no guarding or rebound  Musculoskeletal:         General: No tenderness or deformity  Normal range of motion  Cervical back: Normal range of motion  Skin:     General: Skin is warm and dry  Coloration: Skin is not pale  Findings: No erythema or rash  Neurological:      Mental Status: She is alert and oriented to person, place, and time        Coordination: Coordination normal    Psychiatric:         Mood and Affect: Mood normal             Shannen Watts MD  8/18/2021  6:32 AM

## 2021-08-19 LAB
ANION GAP SERPL CALCULATED.3IONS-SCNC: 3 MMOL/L (ref 4–13)
ATRIAL RATE: 118 BPM
BASOPHILS # BLD AUTO: 0.04 THOUSANDS/ΜL (ref 0–0.1)
BASOPHILS NFR BLD AUTO: 0 % (ref 0–1)
BUN SERPL-MCNC: 7 MG/DL (ref 5–25)
CALCIUM SERPL-MCNC: 7.6 MG/DL (ref 8.3–10.1)
CHLORIDE SERPL-SCNC: 102 MMOL/L (ref 100–108)
CO2 SERPL-SCNC: 27 MMOL/L (ref 21–32)
CREAT SERPL-MCNC: 0.29 MG/DL (ref 0.6–1.3)
EOSINOPHIL # BLD AUTO: 0.14 THOUSAND/ΜL (ref 0–0.61)
EOSINOPHIL NFR BLD AUTO: 1 % (ref 0–6)
ERYTHROCYTE [DISTWIDTH] IN BLOOD BY AUTOMATED COUNT: 16.9 % (ref 11.6–15.1)
GFR SERPL CREATININE-BSD FRML MDRD: 118 ML/MIN/1.73SQ M
GLUCOSE SERPL-MCNC: 129 MG/DL (ref 65–140)
GLUCOSE SERPL-MCNC: 154 MG/DL (ref 65–140)
GLUCOSE SERPL-MCNC: 161 MG/DL (ref 65–140)
HCT VFR BLD AUTO: 24.3 % (ref 34.8–46.1)
HGB BLD-MCNC: 7.7 G/DL (ref 11.5–15.4)
IMM GRANULOCYTES # BLD AUTO: 0.33 THOUSAND/UL (ref 0–0.2)
IMM GRANULOCYTES NFR BLD AUTO: 2 % (ref 0–2)
LYMPHOCYTES # BLD AUTO: 1.83 THOUSANDS/ΜL (ref 0.6–4.47)
LYMPHOCYTES NFR BLD AUTO: 10 % (ref 14–44)
MCH RBC QN AUTO: 27.8 PG (ref 26.8–34.3)
MCHC RBC AUTO-ENTMCNC: 31.7 G/DL (ref 31.4–37.4)
MCV RBC AUTO: 88 FL (ref 82–98)
MONOCYTES # BLD AUTO: 1.53 THOUSAND/ΜL (ref 0.17–1.22)
MONOCYTES NFR BLD AUTO: 9 % (ref 4–12)
NEUTROPHILS # BLD AUTO: 13.76 THOUSANDS/ΜL (ref 1.85–7.62)
NEUTS SEG NFR BLD AUTO: 78 % (ref 43–75)
NRBC BLD AUTO-RTO: 1 /100 WBCS
P AXIS: 23 DEGREES
PLATELET # BLD AUTO: 1094 THOUSANDS/UL (ref 149–390)
PMV BLD AUTO: 9.3 FL (ref 8.9–12.7)
POTASSIUM SERPL-SCNC: 3.2 MMOL/L (ref 3.5–5.3)
PR INTERVAL: 134 MS
QRS AXIS: 35 DEGREES
QRSD INTERVAL: 68 MS
QT INTERVAL: 300 MS
QTC INTERVAL: 420 MS
RBC # BLD AUTO: 2.77 MILLION/UL (ref 3.81–5.12)
SODIUM SERPL-SCNC: 132 MMOL/L (ref 136–145)
T WAVE AXIS: 48 DEGREES
VENTRICULAR RATE: 118 BPM
WBC # BLD AUTO: 17.63 THOUSAND/UL (ref 4.31–10.16)

## 2021-08-19 PROCEDURE — 93010 ELECTROCARDIOGRAM REPORT: CPT | Performed by: INTERNAL MEDICINE

## 2021-08-19 PROCEDURE — NC001 PR NO CHARGE: Performed by: PHYSICIAN ASSISTANT

## 2021-08-19 PROCEDURE — 82948 REAGENT STRIP/BLOOD GLUCOSE: CPT

## 2021-08-19 PROCEDURE — 85025 COMPLETE CBC W/AUTO DIFF WBC: CPT | Performed by: OBSTETRICS & GYNECOLOGY

## 2021-08-19 PROCEDURE — 99024 POSTOP FOLLOW-UP VISIT: CPT | Performed by: OBSTETRICS & GYNECOLOGY

## 2021-08-19 PROCEDURE — 80048 BASIC METABOLIC PNL TOTAL CA: CPT | Performed by: OBSTETRICS & GYNECOLOGY

## 2021-08-19 RX ORDER — POTASSIUM CHLORIDE 20 MEQ/1
20 TABLET, EXTENDED RELEASE ORAL 2 TIMES DAILY
Status: COMPLETED | OUTPATIENT
Start: 2021-08-19 | End: 2021-08-19

## 2021-08-19 RX ORDER — POTASSIUM CHLORIDE 20 MEQ/1
20 TABLET, EXTENDED RELEASE ORAL 2 TIMES DAILY
Status: DISCONTINUED | OUTPATIENT
Start: 2021-08-19 | End: 2021-08-19

## 2021-08-19 RX ORDER — SODIUM CHLORIDE 9 MG/ML
10 INJECTION INTRAVENOUS DAILY
Qty: 300 ML | Refills: 3 | Status: SHIPPED | OUTPATIENT
Start: 2021-08-19 | End: 2022-04-26

## 2021-08-19 RX ADMIN — POTASSIUM CHLORIDE 20 MEQ: 1500 TABLET, EXTENDED RELEASE ORAL at 17:40

## 2021-08-19 RX ADMIN — METRONIDAZOLE 500 MG: 500 INJECTION, SOLUTION INTRAVENOUS at 06:28

## 2021-08-19 RX ADMIN — INSULIN LISPRO 1 UNITS: 100 INJECTION, SOLUTION INTRAVENOUS; SUBCUTANEOUS at 13:19

## 2021-08-19 RX ADMIN — METRONIDAZOLE 500 MG: 500 INJECTION, SOLUTION INTRAVENOUS at 14:28

## 2021-08-19 RX ADMIN — OXYCODONE HYDROCHLORIDE 10 MG: 10 TABLET ORAL at 23:09

## 2021-08-19 RX ADMIN — CEFEPIME HYDROCHLORIDE 2000 MG: 2 INJECTION, POWDER, FOR SOLUTION INTRAVENOUS at 05:33

## 2021-08-19 RX ADMIN — INSULIN LISPRO 1 UNITS: 100 INJECTION, SOLUTION INTRAVENOUS; SUBCUTANEOUS at 09:29

## 2021-08-19 RX ADMIN — AMLODIPINE BESYLATE 10 MG: 10 TABLET ORAL at 09:28

## 2021-08-19 RX ADMIN — OXYCODONE HYDROCHLORIDE 10 MG: 10 TABLET ORAL at 18:54

## 2021-08-19 RX ADMIN — IBUPROFEN 600 MG: 600 TABLET, FILM COATED ORAL at 17:40

## 2021-08-19 RX ADMIN — CEFEPIME HYDROCHLORIDE 2000 MG: 2 INJECTION, POWDER, FOR SOLUTION INTRAVENOUS at 17:39

## 2021-08-19 RX ADMIN — MAGNESIUM OXIDE TAB 400 MG (241.3 MG ELEMENTAL MG) 400 MG: 400 (241.3 MG) TAB at 09:28

## 2021-08-19 RX ADMIN — PRAVASTATIN SODIUM 10 MG: 10 TABLET ORAL at 17:39

## 2021-08-19 RX ADMIN — ENOXAPARIN SODIUM 40 MG: 40 INJECTION SUBCUTANEOUS at 09:29

## 2021-08-19 RX ADMIN — PANTOPRAZOLE SODIUM 40 MG: 40 TABLET, DELAYED RELEASE ORAL at 05:31

## 2021-08-19 RX ADMIN — OXYCODONE HYDROCHLORIDE 10 MG: 10 TABLET ORAL at 09:30

## 2021-08-19 RX ADMIN — METRONIDAZOLE 500 MG: 500 INJECTION, SOLUTION INTRAVENOUS at 22:14

## 2021-08-19 RX ADMIN — MAGNESIUM OXIDE TAB 400 MG (241.3 MG ELEMENTAL MG) 400 MG: 400 (241.3 MG) TAB at 13:19

## 2021-08-19 RX ADMIN — OXYCODONE HYDROCHLORIDE 10 MG: 10 TABLET ORAL at 13:30

## 2021-08-19 RX ADMIN — ASPIRIN 81 MG: 81 TABLET, COATED ORAL at 09:28

## 2021-08-19 RX ADMIN — OXYCODONE HYDROCHLORIDE 10 MG: 10 TABLET ORAL at 05:31

## 2021-08-19 RX ADMIN — POTASSIUM CHLORIDE 20 MEQ: 1500 TABLET, EXTENDED RELEASE ORAL at 13:18

## 2021-08-19 NOTE — NURSING NOTE
Patient with temp of 100  Ibuprofen given  Recheck 100 8  Notified Dr Mariposa Burrows, will monitor for now

## 2021-08-19 NOTE — PROGRESS NOTES
Gyn Oncology Progress note   Poncho Nacho 80 y o  female MRN: [id number]  Unit/Bed#: [facility] 305-01 Encounter: [id number]    Assessment: 80 y o   POD#13 from primary ovarian cancer debulking status post diagnostic laparoscopy, exploratory laparotomy, on block hysterectomy bilateral salpingo-oophorectomy sigmoid ectomy with low rectal reanastomosis and over-sewing of bladder peritoneal stripping, cystotomy, and diaphragm stripping small bowel resection and reanastomosis radical omentectomy splenectomy appendectomy now with pancreatic leak     Plan:     1) Postsurgical Management after primary debulking due to high grade ovarian serous carcinoma, Stage 3   - Voiding without complication  - Tolerating regular diet/ passing gas and with normal bowel function  - LUQ drain, output in last 24h is 165cc  - s/p second drain placement yesterday by IR  Purulent milky looking material    - Abdomen is soft no peritoneal irritative signs   - Up to chair and down the hallway  - 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, Stage 3     2) Loculated left upper quadrant fluid collection/Fever,  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  - LUQ OSCAR drain with amylase >13,000  - CT 8/16:   Loculated left upper quadrant fluid collection measuring 5 1 x 4 3 x 5 6 cm with areas of rim enhancement and foci of gas  The percutaneous drainage catheter courses through this collection  - S/p second drain placement yesterday by IR, functional  - last fever at 2326 last nigth 102 3, , trending down from 140s  Normotensive  - Continue cefepime + flagyl   - Cultures : Blood culture 9/17/21 Negative, Drain culture gram with gram negative rods  Pending culture final report    - WBC trending down, 23 79->13 76 yesterday-->17 63 today   - Case management already set VNA for drain management at home   Maintain drain in place for at least a month      3) Pain control   - S/p epidural, tylenol scheduled , bubba 5/10 and dilaudid prn      4) DVT prophylaxis   - SCDs, Lovenox      5) Acute loss anemia   - S/P total 5 U RBC + 2 FFP intraop,   - Patient is asymptomatic     - Hgb 8 3 yesterday 7 7 this am     6) S/p  splenectomy/tromobocytosis   - Pending  pneumococcal 13 valent vaccine on discharge  - S/P HI and menigoccoccal vaccine  - WBC trending down, 23 79->13 76  - Platelets 5 411--> 1793 today  on aspirin      7) Hx HTN , HLD  - on amlodipine and pravastatin  - BP normotensive     8) S/p cystostomy  - normal cystogram yesterday, irving removed and currently voiding     9)  Acute hypoxic respiratory failure and mild intermittent asthma    - Chest CT scan 8/11 Obstruction of the left lower lobe bronchus   Consolidation versus atelectasis in the lung bases  Scattered airspace opacities within the lungs which may represent infection   Bilateral large pleural effusions  - S/P diuresis   - S/P  Thoracocentesis 150 cc yesterday by IR  - Preliminar culture of fluid negative, pending final report   - O2 Saturations a room air 90-95%    10) Hypokalemia  - Will replete today  - K 3 2         Subjective:    Anthony Casiano has no current complaints  Pain is well controlled  Patient is currently voiding  She is ambulating  Patient is currently passing flatus and has had bowel movement  She is tolerating PO, and denies nausea or vomitting  Patient denies fever, chills, chest pain, shortness of breath, or calf tenderness  /55 (BP Location: Right arm)   Pulse (!) 117   Temp 99 7 °F (37 6 °C) (Oral)   Resp 20   Ht 4' 11" (1 499 m)   Wt 77 1 kg (170 lb)   SpO2 90%   BMI 34 34 kg/m²     I/O last 3 completed shifts: In: 2227 5 [P O :200; I V :1977 5; IV Piggyback:50]  Out: 4365 [UORVF:6497; Drains:210;  Other:230]  I/O this shift:  In: -   Out: 75 [Drains:75]    Lab Results   Component Value Date    WBC 13 76 (H) 08/18/2021    HGB 8 2 (L) 08/18/2021    HCT 25 6 (L) 08/18/2021    MCV 90 08/18/2021    PLT 1,021 ([place]) 08/18/2021       Lab Results   Component Value Date    GLUCOSE 195 (H) 08/06/2021    CALCIUM 7 7 (L) 08/18/2021    K 3 6 08/18/2021    CO2 26 08/18/2021     08/18/2021    BUN 7 08/18/2021    CREATININE 0 33 (L) 08/18/2021           Physical Exam  Constitutional:       Appearance: She is well-developed  HENT:      Head: Normocephalic and atraumatic  Eyes:      Pupils: Pupils are equal, round, and reactive to light  Cardiovascular:      Rate and Rhythm: Normal rate  Pulmonary:      Effort: Pulmonary effort is normal    Abdominal:      Palpations: Abdomen is soft  Comments: LUQ drains functional, milky yellowish drainage  No peritoneal irritative signs, incision with adequate healing    Genitourinary:     Vagina: Normal    Musculoskeletal:      Cervical back: Normal range of motion  Neurological:      Mental Status: She is alert and oriented to person, place, and time             Lilia Martínez MD  8/19/2021  7:12 AM

## 2021-08-19 NOTE — PROGRESS NOTES
Progress Note -Interventional Radiology MARTIN Irvin Eder 80 y o  female MRN: [id number]  Unit/Bed#: [facility] 305-01 Encounter: [id number]    Assessment:    80year old female s/p ovarian cancer debulking surgery , splenectomy with partial removal of pancreatic tail, surgical drain placed  S/p IR LUQ peripancreatic 10Fr drain, left thoracentesis    IR drain output since placement: 115cc purulent tan fluid    Plan:    - discussed in detail with patient and  care of drain  - will plan for routine 2 week tube check for patient  - drain will most likely stay in for at least 1 month  Will leave up to discretion of gyn onc on timing of removal  Appreciate communication once patient is discharged for further planing of drain  - encouraged patient to keep record of drain output while at home  - prescription for NS flushes sent to [facility]      Patient Active Problem List   Diagnosis    [facility]    Asthma    Hypertension    Migraine    Cancer related pain    Acute blood loss anemia    Leukocytosis    Severe protein-calorie malnutrition ([facility]    Encounter for ablation of malignant neoplasm with goal of debulking/cytoreduction ([facility] )    FHx: PROMISE-BSO (total abdominal hysterectomy and bilateral salpingo-oophorectomy)    S/P bladder repair    Status post small bowel resection    Pancreatic fluid leak          Subjective: Patient with sever chest pain after thoracentesis yesterday  Pain has much improved, still with muscle tightness  Discussed maintenance of drain with patient and   Currently growing gram negative rods    Objective:    Vitals:  /61 (BP Location: Right arm)   Pulse (!) 111   Temp 100 °F (37 8 °C) (Oral)   Resp 21   Ht 4' 11" (1 499 m)   Wt 77 1 kg (170 lb)   SpO2 90%   BMI 34 34 kg/m²   Body mass index is 34 34 kg/m²    Weight (last 2 days)     None          I/Os:    Intake/Output Summary (Last 24 hours) at 8/19/2021 1633  Last data filed at 8/19/2021 0800  Gross per 24 hour   Intake 490 ml   Output 915 ml   Net -425 ml       Invasive Devices     Peripheral Intravenous Line            Peripheral IV 08/19/21 Right;Ventral (anterior) Forearm <1 day          Drain            Closed/Suction Drain LLQ 19 Fr  12 days    Closed/Suction Drain Left;Posterior Back Bulb 10 Fr  1 day                Physical Exam:  General appearance: alert and oriented, in no acute distress  Lungs: clear to auscultation bilaterally  Heart: regular rate and rhythm, S1, S2 normal, no murmur, click, rub or gallop  Abdomen: healing midline incision, soft, non tender to palpation, 2x LUQ drains with purulent output  Skin: ir drain insertion site c/d/i                Lab Results and Cultures:   CBC with diff:   Lab Results   Component Value Date    WBC 17 63 (H) 08/19/2021    HGB 7 7 (L) 08/19/2021    HCT 24 3 (L) 08/19/2021    MCV 88 08/19/2021    PLT 1,094 (HH) 08/19/2021    MCH 27 8 08/19/2021    MCHC 31 7 08/19/2021    RDW 16 9 (H) 08/19/2021    MPV 9 3 08/19/2021    NRBC 1 08/19/2021      BMP/CMP:  Lab Results   Component Value Date    K 3 2 (L) 08/19/2021     08/19/2021    CO2 27 08/19/2021    CO2 22 08/06/2021    BUN 7 08/19/2021    CREATININE 0 29 (L) 08/19/2021    GLUCOSE 195 (H) 08/06/2021    CALCIUM 7 6 (L) 08/19/2021    AST 47 (H) 08/13/2021    ALT 26 08/13/2021    ALKPHOS 307 (H) 08/13/2021    EGFR 118 08/19/2021   ,     Coags:   Lab Results   Component Value Date    PTT 30 08/07/2021    INR 1 23 (H) 08/07/2021   ,          Lipid Panel: No results found for: CHOL  No results found for: HDL  No results found for: HDL  No results found for: LDLCALC  No results found for: TRIG    HgbA1c:   Lab Results   Component Value Date    HGBA1C 5 9 (H) 07/20/2021       Blood Culture:   Lab Results   Component Value Date    BLOODCX No Growth at 24 hrs  08/17/2021   ,   Urinalysis:   Lab Results   Component Value Date    COLORU Yellow 08/17/2021    CLARITYU Clear 08/17/2021 SPECGRAV 1 025 08/17/2021    PHUR 6 5 08/17/2021    LEUKOCYTESUR Small (A) 08/17/2021    NITRITE Negative 08/17/2021    GLUCOSEU Negative 08/17/2021    KETONESU Negative 08/17/2021    BILIRUBINUR Negative 08/17/2021    BLOODU Negative 08/17/2021   ,   Urine Culture: No results found for: URINECX,   Wound Culure:  No results found for: WOUNDCULT      Thank you for allowing me to participate in the care of Cami Henriquez  Please don't hesitate to call, text, email, or TigerText with any questions  This text is generated with voice recognition software  There may be translation, syntax,  or grammatical errors  If you have any questions, please contact the dictating provider      Saima Bush PA-C

## 2021-08-20 ENCOUNTER — APPOINTMENT (INPATIENT)
Dept: RADIOLOGY | Facility: HOSPITAL | Age: 71
DRG: 329 | End: 2021-08-20
Payer: COMMERCIAL

## 2021-08-20 ENCOUNTER — DOCUMENTATION (OUTPATIENT)
Dept: GYNECOLOGIC ONCOLOGY | Facility: CLINIC | Age: 71
End: 2021-08-20

## 2021-08-20 LAB
ANION GAP SERPL CALCULATED.3IONS-SCNC: 3 MMOL/L (ref 4–13)
BACTERIA SPEC ANAEROBE CULT: ABNORMAL
BASOPHILS # BLD AUTO: 0.06 THOUSANDS/ΜL (ref 0–0.1)
BASOPHILS NFR BLD AUTO: 0 % (ref 0–1)
BUN SERPL-MCNC: 6 MG/DL (ref 5–25)
CALCIUM SERPL-MCNC: 7.9 MG/DL (ref 8.3–10.1)
CHLORIDE SERPL-SCNC: 99 MMOL/L (ref 100–108)
CO2 SERPL-SCNC: 28 MMOL/L (ref 21–32)
CREAT SERPL-MCNC: 0.4 MG/DL (ref 0.6–1.3)
EOSINOPHIL # BLD AUTO: 0.23 THOUSAND/ΜL (ref 0–0.61)
EOSINOPHIL NFR BLD AUTO: 1 % (ref 0–6)
ERYTHROCYTE [DISTWIDTH] IN BLOOD BY AUTOMATED COUNT: 17.2 % (ref 11.6–15.1)
GFR SERPL CREATININE-BSD FRML MDRD: 106 ML/MIN/1.73SQ M
GLUCOSE SERPL-MCNC: 124 MG/DL (ref 65–140)
HCT VFR BLD AUTO: 25.5 % (ref 34.8–46.1)
HGB BLD-MCNC: 8.1 G/DL (ref 11.5–15.4)
IMM GRANULOCYTES # BLD AUTO: 0.23 THOUSAND/UL (ref 0–0.2)
IMM GRANULOCYTES NFR BLD AUTO: 1 % (ref 0–2)
LYMPHOCYTES # BLD AUTO: 1.75 THOUSANDS/ΜL (ref 0.6–4.47)
LYMPHOCYTES NFR BLD AUTO: 10 % (ref 14–44)
MCH RBC QN AUTO: 28.3 PG (ref 26.8–34.3)
MCHC RBC AUTO-ENTMCNC: 31.8 G/DL (ref 31.4–37.4)
MCV RBC AUTO: 89 FL (ref 82–98)
MONOCYTES # BLD AUTO: 1.52 THOUSAND/ΜL (ref 0.17–1.22)
MONOCYTES NFR BLD AUTO: 9 % (ref 4–12)
NEUTROPHILS # BLD AUTO: 13.96 THOUSANDS/ΜL (ref 1.85–7.62)
NEUTS SEG NFR BLD AUTO: 79 % (ref 43–75)
NRBC BLD AUTO-RTO: 1 /100 WBCS
PLATELET # BLD AUTO: 1186 THOUSANDS/UL (ref 149–390)
PMV BLD AUTO: 9.3 FL (ref 8.9–12.7)
POTASSIUM SERPL-SCNC: 3.4 MMOL/L (ref 3.5–5.3)
RBC # BLD AUTO: 2.86 MILLION/UL (ref 3.81–5.12)
SODIUM SERPL-SCNC: 130 MMOL/L (ref 136–145)
WBC # BLD AUTO: 17.75 THOUSAND/UL (ref 4.31–10.16)

## 2021-08-20 PROCEDURE — 71045 X-RAY EXAM CHEST 1 VIEW: CPT

## 2021-08-20 PROCEDURE — 99222 1ST HOSP IP/OBS MODERATE 55: CPT | Performed by: INTERNAL MEDICINE

## 2021-08-20 PROCEDURE — 74177 CT ABD & PELVIS W/CONTRAST: CPT

## 2021-08-20 PROCEDURE — G1004 CDSM NDSC: HCPCS

## 2021-08-20 PROCEDURE — 85025 COMPLETE CBC W/AUTO DIFF WBC: CPT | Performed by: OBSTETRICS & GYNECOLOGY

## 2021-08-20 PROCEDURE — 80048 BASIC METABOLIC PNL TOTAL CA: CPT | Performed by: OBSTETRICS & GYNECOLOGY

## 2021-08-20 PROCEDURE — 99024 POSTOP FOLLOW-UP VISIT: CPT | Performed by: OBSTETRICS & GYNECOLOGY

## 2021-08-20 PROCEDURE — 99255 IP/OBS CONSLTJ NEW/EST HI 80: CPT | Performed by: STUDENT IN AN ORGANIZED HEALTH CARE EDUCATION/TRAINING PROGRAM

## 2021-08-20 RX ORDER — FUROSEMIDE 10 MG/ML
20 INJECTION INTRAMUSCULAR; INTRAVENOUS ONCE
Status: COMPLETED | OUTPATIENT
Start: 2021-08-20 | End: 2021-08-20

## 2021-08-20 RX ORDER — POTASSIUM CHLORIDE 20 MEQ/1
20 TABLET, EXTENDED RELEASE ORAL 2 TIMES DAILY
Status: COMPLETED | OUTPATIENT
Start: 2021-08-20 | End: 2021-08-21

## 2021-08-20 RX ORDER — METRONIDAZOLE 500 MG/1
500 TABLET ORAL EVERY 8 HOURS SCHEDULED
Status: DISCONTINUED | OUTPATIENT
Start: 2021-08-20 | End: 2021-08-23

## 2021-08-20 RX ADMIN — MAGNESIUM OXIDE TAB 400 MG (241.3 MG ELEMENTAL MG) 400 MG: 400 (241.3 MG) TAB at 11:47

## 2021-08-20 RX ADMIN — POTASSIUM CHLORIDE 20 MEQ: 1500 TABLET, EXTENDED RELEASE ORAL at 17:49

## 2021-08-20 RX ADMIN — METRONIDAZOLE 500 MG: 500 INJECTION, SOLUTION INTRAVENOUS at 15:00

## 2021-08-20 RX ADMIN — ENOXAPARIN SODIUM 40 MG: 40 INJECTION SUBCUTANEOUS at 09:35

## 2021-08-20 RX ADMIN — OXYCODONE HYDROCHLORIDE 10 MG: 10 TABLET ORAL at 16:11

## 2021-08-20 RX ADMIN — HYDROMORPHONE HYDROCHLORIDE 0.5 MG: 1 INJECTION, SOLUTION INTRAMUSCULAR; INTRAVENOUS; SUBCUTANEOUS at 13:06

## 2021-08-20 RX ADMIN — PANTOPRAZOLE SODIUM 40 MG: 40 TABLET, DELAYED RELEASE ORAL at 06:32

## 2021-08-20 RX ADMIN — METRONIDAZOLE 500 MG: 500 TABLET ORAL at 21:05

## 2021-08-20 RX ADMIN — PRAVASTATIN SODIUM 10 MG: 10 TABLET ORAL at 16:11

## 2021-08-20 RX ADMIN — AMLODIPINE BESYLATE 10 MG: 10 TABLET ORAL at 09:35

## 2021-08-20 RX ADMIN — OXYCODONE HYDROCHLORIDE 10 MG: 10 TABLET ORAL at 04:03

## 2021-08-20 RX ADMIN — METRONIDAZOLE 500 MG: 500 INJECTION, SOLUTION INTRAVENOUS at 06:32

## 2021-08-20 RX ADMIN — FUROSEMIDE 20 MG: 10 INJECTION, SOLUTION INTRAMUSCULAR; INTRAVENOUS at 17:49

## 2021-08-20 RX ADMIN — HYDROMORPHONE HYDROCHLORIDE 0.5 MG: 1 INJECTION, SOLUTION INTRAMUSCULAR; INTRAVENOUS; SUBCUTANEOUS at 21:05

## 2021-08-20 RX ADMIN — DAPTOMYCIN 475 MG: 500 INJECTION, POWDER, LYOPHILIZED, FOR SOLUTION INTRAVENOUS at 21:05

## 2021-08-20 RX ADMIN — ASPIRIN 81 MG: 81 TABLET, COATED ORAL at 09:35

## 2021-08-20 RX ADMIN — OXYCODONE HYDROCHLORIDE 10 MG: 10 TABLET ORAL at 09:35

## 2021-08-20 RX ADMIN — IOHEXOL 100 ML: 350 INJECTION, SOLUTION INTRAVENOUS at 13:36

## 2021-08-20 RX ADMIN — CEFEPIME HYDROCHLORIDE 2000 MG: 2 INJECTION, POWDER, FOR SOLUTION INTRAVENOUS at 16:07

## 2021-08-20 RX ADMIN — CEFEPIME HYDROCHLORIDE 2000 MG: 2 INJECTION, POWDER, FOR SOLUTION INTRAVENOUS at 04:03

## 2021-08-20 RX ADMIN — IBUPROFEN 600 MG: 600 TABLET, FILM COATED ORAL at 14:18

## 2021-08-20 RX ADMIN — MAGNESIUM OXIDE TAB 400 MG (241.3 MG ELEMENTAL MG) 400 MG: 400 (241.3 MG) TAB at 09:35

## 2021-08-20 NOTE — PROGRESS NOTES
Gyn Oncology Progress note   Awilda Arreaga 80 y o  female MRN: [id number]  Unit/Bed#: [facility] 305-01 Encounter: [id number]    Assessment: 80 y o   POD#14 from primary ovarian cancer debulking status post diagnostic laparoscopy, exploratory laparotomy, on block hysterectomy bilateral salpingo-oophorectomy sigmoid ectomy with low rectal reanastomosis and over-sewing of bladder peritoneal stripping, cystotomy, and diaphragm stripping small bowel resection and reanastomosis radical omentectomy splenectomy appendectomy now with pancreatic leak     Plan:     1) Postsurgical Management after primary debulking due to high grade ovarian serous carcinoma, Stage 3   - Voiding without complication  - Tolerating regular diet/ passing gas and with normal bowel function  - LUQ drain X2, output in last 24h is 100cc, Purulent milky looking material    - Abdomen is soft no peritoneal irritative signs   - Up to chair and down the hallway  - 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, Stage 3     2) Loculated left upper quadrant fluid collection/Fever,  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  - LUQ OSCAR drain with amylase >13,000  - CT 8/16:   Loculated left upper quadrant fluid collection measuring 5 1 x 4 3 x 5 6 cm with areas of rim enhancement and foci of gas   The percutaneous drainage catheter courses through this collection  - S/p second drain placement 8/18 by IR, functional  - last fever at 1853 last nigth 100 8, HR 90, trending down from 140s  Normotensive  - Continue cefepime + flagyl   - Cultures : Blood culture 9/17/21 Negative, Drain culture gram with gram negative rods  Pending culture final report    - WBC trending down, 23 79->13 76 ->17 63,1775 today  - Case management already set VNA for drain management at home and eliquis   Maintain drain in place for at least a month        3) Pain control   - S/p epidural, tylenol scheduled , bubba 5/10 and dilaudid prn      4) DVT prophylaxis   - SCDs, Lovenox      5) Acute loss anemia   - S/P total 5 U RBC + 2 FFP intraop,   - Patient is asymptomatic     - Hgb 7 7 yesterday, 8 1 this am      6) S/p  splenectomy/tromobocytosis   - Pending  pneumococcal 13 valent vaccine on discharge  - S/P HI and menigoccoccal vaccine  - WBC trending down, 23 79->13 76  - Platelets 4114 yesterday ,today 1186  on aspirin      7) Hx HTN , HLD  - on amlodipine and pravastatin  - BP normotensive     8) S/p cystostomy  - normal cystogram yesterday, irving removed and currently voiding     9)  Acute hypoxic respiratory failure and mild intermittent asthma    - Chest CT scan 8/11 Obstruction of the left lower lobe bronchus   Consolidation versus atelectasis in the lung bases  Scattered airspace opacities within the lungs which may represent infection   Bilateral large pleural effusions  - S/P diuresis   - S/P  Thoracocentesis 150 cc by IR  - Preliminar culture of fluid negative, pending final report   - O2 Saturations a room air 90-95%     10) Hypokalemia  - s/p repletion   - K 3 2        Subjective:    Alfonso Kraus has no current complaints  Pain is well controlled  Patient is currently voiding  She is ambulating  Patient is currently passing flatus and has had bowel movement  She is tolerating PO, and denies nausea or vomitting  Patient denies fever, chills, chest pain, shortness of breath, or calf tenderness  /60 (BP Location: Right arm)   Pulse 90   Temp 98 2 °F (36 8 °C) (Oral)   Resp 19   Ht 4' 11" (1 499 m)   Wt 77 1 kg (170 lb)   SpO2 94%   BMI 34 34 kg/m²     I/O last 3 completed shifts: In: 56 [P O :440; IV Piggyback:50]  Out: 8456 [MTQGY:3757; Drains:240;  Other:230]  I/O this shift:  In: 150 [P O :150]  Out: 575 [Urine:550; Drains:25]    Lab Results   Component Value Date    WBC 17 63 (H) 08/19/2021    HGB 7 7 (L) 08/19/2021    HCT 24 3 (L) 08/19/2021    MCV 88 08/19/2021    PLT 1,094 (HH) 08/19/2021       Lab Results   Component Value Date GLUCOSE 195 (H) 08/06/2021    CALCIUM 7 6 (L) 08/19/2021    K 3 2 (L) 08/19/2021    CO2 27 08/19/2021     08/19/2021    BUN 7 08/19/2021    CREATININE 0 29 (L) 08/19/2021           Physical Exam  Constitutional:       Appearance: She is well-developed  HENT:      Head: Normocephalic and atraumatic  Eyes:      Conjunctiva/sclera: Conjunctivae normal       Pupils: Pupils are equal, round, and reactive to light  Cardiovascular:      Rate and Rhythm: Normal rate and regular rhythm  Heart sounds: Normal heart sounds  Pulmonary:      Effort: Pulmonary effort is normal       Breath sounds: Normal breath sounds  Abdominal:      General: Bowel sounds are normal       Palpations: Abdomen is soft  Comments: Small area of erythema in lower area of incision, LUQ drains bilateral functional   Purulent, milky discharge    Genitourinary:     Vagina: Normal    Musculoskeletal:         General: Normal range of motion  Cervical back: Normal range of motion and neck supple  Skin:     General: Skin is warm  Neurological:      Mental Status: She is alert and oriented to person, place, and time             Shari Dupree MD  8/20/2021  6:48 AM

## 2021-08-20 NOTE — CONSULTS
Medical Oncology/Hematology Consult Note  Poncho Griffith, female, 80 y o , 1950,  PPHP 305/PPHP [id number], [id number]       Reason for consultation: Persistent thrombocytosis in setting of recent splenectomy  Assessment and Plan:  1  Thrombocytosis   New this admission ; suspected likely reactive in the setting of recent splenectomy (as part of primary ovarian debulking procedure) and potential intraabdominal infection  o Currently, patient on ASA (81mg, PO, daily) and Lovenox (40mg, SC, [id number])   Per chart review  o Plt on admission: 182  o Plt started increasing steadily starting on 08/10/2021 (4 days after splenectomy)  - 468 (8/10), 492, 557, 817, 772, 975, 956, 1021, 1094 (8/19)  o Currently, Plt 1186 (8/20)   Patient s/p transfusion X2 units of pRBC (08/08/21, 08/16/21) for treatment of acute blood loss anemia following surgery   Manual Differential/PHLEBS (08/16/2021): showed presence of smudge cells, polychromasia, and elevated platelet estimate   Patient has had intermittent fever and has an abdominal collection of fluid suspicious for infection  Plan:   Agree with CT A/P scheduled today   Suspected multifactorial in setting of underlying malignancy, recent surgical splenectomy, and suspected intraabdominal infection   No treatment currently needed  Recommend continue to monitor platelet count ; will expect platelets to return to new baseline normal (<600K) within approximately 1 month  2  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  3  Peritoneal Carcinomatosis   Patient was seen by Christine Villalobos on 07/19/2021 for evaluation of a pelvic mass  o CT A/P w contrast (07/11/2021): revealed 5 9 x 2 7 x 4 8 cm left adnexal mass likely due to ovarian cancer with associated moderate ascites and marked nodularity and large soft tissue density within the omentum and peritoneal deposits in keeping with carcinomatosis     Follows Gyn Onc  o POD #14 s/p primary ovarian cancer debulking status (08/06/2021) ; multiple transfusions required due to acute blood loss anemia  - Pelvic Washing (08/06/2021): showed malignant epithelioid cells compatible with high grade carcinoma  - Tissue Exam (08/06/2021): wide spread high grade serous adenocarcinoma   Positive for: PAX8, WT-1, ER, CA, CK7, Pancytokeratin, and P16   Nuclear P53 is diffusely overexpressed   Ki-67 labeling index ~60%  o Plans for chemo with Carboplatin and Paclitaxel (approximately 3 cycles to reduce tumor burden prior to surgery)    Plan:   Continue management per Gyn-Onc        History of present illness:  Ms Nicole Cunha (Patty) is a 80year old  female with past medical history of HTN, asthma, GERD, and newly diagnosed ovarian cancer s/p total hysterectomy and bilateral salpingo-oophorectomy (08/06/2021)  Patient is POD #14 s/p ex lap, en-bloc hysterectomy, BSO, sigmoidectomy with low rectal anastomosis, SBR, radical omentectomy, splenectomy, appendectomy, oversewing of tail of pancreas, repair of cystotomy, bilateral peritoneal stripping  Patient started developing thrombocytosis four days post op -- was suspected secondary to splenectomy  Hematology was consulted as thrombocytosis is progressing despite treatment with ASA (81mg, PO, daily) and Lovenox (40mg, SC, daily)  Patient has drainage tube in place for the abdominal cavity that is draining purulent, milky looking material in last 24h  Interval History:  Patient states she overall feels poorly today as her abdominal pain has been poorly controlled  However, she relates this to her delayed pain medications as her IV line had to be fixed this morning  Patient states she is eating a regular diet and tolerating meals without nausea or vomiting  She notes she is having flatulence, bowel movements, and urinating without difficulty  Patient notes she continues to have intermittent fevers with profuse sweating   She denies chest pain, shortness of breath, dyspnea, dysuria, urinary urgency, and increased urinary frequency  Review of Systems:   Review of Systems   Constitutional: Positive for activity change, chills, diaphoresis and fatigue  Negative for appetite change and unexpected weight change  HENT: Negative for congestion, rhinorrhea, sinus pressure, sinus pain and sore throat  Eyes: Negative for photophobia and visual disturbance  Respiratory: Negative for chest tightness and shortness of breath  Cardiovascular: Negative for chest pain and leg swelling  Gastrointestinal: Positive for abdominal pain  Negative for constipation, diarrhea, nausea and vomiting  Genitourinary: Negative for difficulty urinating, dysuria, frequency and urgency  Musculoskeletal: Negative for arthralgias and myalgias  Skin: Negative for color change and rash  Neurological: Negative for weakness, light-headedness, numbness and headaches  Hematological: Does not bruise/bleed easily  Psychiatric/Behavioral: Negative for confusion and sleep disturbance  The patient is not nervous/anxious          Past Medical History:   Diagnosis Date    Acid reflux     Asthma     Cancer ([facility] [address] )     ovarian, peritoneal carcinomatosis    GERD (gastroesophageal reflux disease)     Hypercholesteremia     Hypertension     Migraine        Past Surgical History:   Procedure Laterality Date    APPENDECTOMY N/A 8/6/2021    Procedure: APPENDECTOMY;  Surgeon: Colonel Michell MD;  [facility];  Service: Gynecology Oncology    CHOLECYSTECTOMY      COLONOSCOPY      FL CYSTOGRAM  8/18/2021    GALLBLADDER SURGERY      HYSTERECTOMY N/A 8/6/2021    Procedure: ENBLOCK HYSTERECTOMY, BILATERAL SALPINGO-OOPHORECTOMY, SIGMOIDECTOMY WITH LOW RECTAL REANASTAMOSIS, BLADDER PERITONEAL STRIPPING AND CYSTOTOMY REPAIR, DIAPHRAGM STRIPPING, SMALL BOWEL RESECTION WITH RE-ANASTAMOSIS;  Surgeon: Colonel Michell MD;  [facility];  Service: Gynecology Oncology    IR DRAINAGE TUBE PLACEMENT 8/18/2021    IR THORACENTESIS  8/18/2021    LAPAROTOMY N/A 8/6/2021    Procedure: LAPAROTOMY EXPLORATORY; OVER SEW PANCREAS TAIL;  Surgeon: Melonie Garcia MD;  [facility];  Service: Gynecology Oncology    OMENTECTOMY N/A 8/6/2021    Procedure: RADICAL OMENTECTOMY;  Surgeon: Melonie Garcia MD;  [facility];  Service: Gynecology Oncology    PA LAP,DIAGNOSTIC ABDOMEN N/A 8/6/2021    Procedure: LAPAROSCOPY DIAGNOSTIC;  Surgeon: Melonie Garcia MD;  [facility];  Service: Gynecology Oncology    RIGHT OOPHORECTOMY  1986    SPLENECTOMY, TOTAL N/A 8/6/2021    Procedure: SPLENECTOMY;  Surgeon: Melonie Garcia MD;  [facility];  Service: Gynecology Oncology    TONSILLECTOMY         History reviewed  No pertinent family history  Social History     Socioeconomic History    Marital status: /Civil Union     Spouse name: None    Number of children: None    Years of education: None    Highest education level: None   Occupational History    None   Tobacco Use    Smoking status: Never Smoker    Smokeless tobacco: Never Used   Vaping Use    Vaping Use: Never used   Substance and Sexual Activity    Alcohol use: Yes     Comment: socially    Drug use: Not Currently    Sexual activity: Not Currently   Other Topics Concern    None   Social History Narrative    None     Social Determinants of Health     Financial Resource Strain:     Difficulty of Paying Living Expenses:    Food Insecurity:     Worried About Running Out of Food in the Last Year:     Ran Out of Food in the Last Year:    Transportation Needs:     Lack of Transportation (Medical):      Lack of Transportation (Non-Medical):    Physical Activity:     Days of Exercise per Week:     Minutes of Exercise per Session:    Stress:     Feeling of Stress :    Social Connections:     Frequency of Communication with Friends and Family:     Frequency of Social Gatherings with Friends and Family:     Attends Judaism Services:     Active Member of Clubs or Organizations:     Attends Club or Organization Meetings:     Marital Status:    Intimate Partner Violence:     Fear of Current or Ex-Partner:     Emotionally Abused:     Physically Abused:     Sexually Abused:          Current Facility-Administered Medications:     acetaminophen (TYLENOL) tablet 975 mg, 975 mg, Oral, Q6H PRN, Char Ibanez MD    albuterol (PROVENTIL HFA,VENTOLIN HFA) inhaler 2 puff, 2 puff, Inhalation, Q4H PRN, Melonie Garcia MD    amLODIPine (NORVASC) tablet 10 mg, 10 mg, Oral, Daily, 10 mg at 08/20/21 0935 **AND** [DISCONTINUED] lisinopril (ZESTRIL) tablet 20 mg, 20 mg, Oral, Daily, Yvonne Jones MD, 20 mg at 08/14/21 2392    aspirin (ECOTRIN LOW STRENGTH) EC tablet 81 mg, 81 mg, Oral, Daily, Janell Sauer MD, 81 mg at 08/20/21 0935    cefepime (MAXIPIME) 2,000 mg in dextrose 5 % 50 mL IVPB, 2,000 mg, Intravenous, Q12H, Yvonne Jones MD, Last Rate: 100 mL/hr at 08/20/21 0403, 2,000 mg at 08/20/21 0403    enoxaparin (LOVENOX) subcutaneous injection 40 mg, 40 mg, Subcutaneous, Q24H [facility], Char Ibanez MD, 40 mg at 08/20/21 0935    HYDROmorphone (DILAUDID) injection 0 5 mg, 0 5 mg, Intravenous, Q2H PRN, Angelia Jeans Cross, PA-C, 0 5 mg at 08/18/21 1147    HYDROmorphone (DILAUDID) injection 0 5 mg, 0 5 mg, Intravenous, Once, Fredo Sotelo MD    ibuprofen (MOTRIN) tablet 600 mg, 600 mg, Oral, Q6H PRN, Fredo Sotelo MD, 600 mg at 08/19/21 1740    magnesium oxide (MAG-OX) tablet 400 mg, 400 mg, Oral, BID, Char Ibanez MD, 400 mg at 08/20/21 1147    metroNIDAZOLE (FLAGYL) IVPB (premix) 500 mg 100 mL, 500 mg, Intravenous, Q8H, Yvonne Jones MD, Last Rate: 200 mL/hr at 08/20/21 4826, 500 mg at 08/20/21 1748    ondansetron (ZOFRAN) injection 4 mg, 4 mg, Intravenous, Q6H PRN, Aleyda Rosenthal PA-C    oxyCODONE (ROXICODONE) immediate release tablet 10 mg, 10 mg, Oral, Q4H PRN, Yvonne Jones MD, 10 mg at 08/20/21 0935    oxyCODONE (ROXICODONE) IR tablet 5 mg, 5 mg, Oral, Q4H PRN, Arabella Simpson MD, 5 mg at 08/18/21 1922    pantoprazole (PROTONIX) EC tablet 40 mg, 40 mg, Oral, Early Morning, Cristiano Terry MD, 40 mg at 08/20/21 4758    phenol (CHLORASEPTIC) 1 4 % mucosal liquid 1 spray, 1 spray, Mouth/Throat, Q2H PRN, Devaughn Wallace PA-C, 1 spray at 08/09/21 0157    pneumococcal 13-valent conjugate vaccine (PREVNAR-13) IM injection 0 5 mL, 0 5 mL, Intramuscular, Prior to discharge, Devaughn Wallace PA-C    pravastatin (PRAVACHOL) tablet 10 mg, 10 mg, Oral, Daily With Dinner, Devaughn Wallace PA-C, 10 mg at 08/19/21 1739    sodium chloride (PF) 0 9 % injection 500 mL, 500 mL, Intracatheter, Once in imaging, Rosemary Gardner MD    Medications Prior to Admission   Medication    albuterol (PROVENTIL HFA,VENTOLIN HFA) 90 mcg/act inhaler    amLODIPine-benazepril (LOTREL) 10-20 MG per capsule    benzonatate (TESSALON) 200 MG capsule    butalbital-aspirin-caffeine (FIORINAL) -40 mg per capsule    fluticasone (FLONASE) 50 mcg/act nasal spray    Naproxen Sodium (Aleve) 220 MG CAPS    omeprazole (PriLOSEC) 20 mg delayed release capsule    polyethylene glycol (MiraLax) 17 GM/SCOOP powder    pravastatin (PRAVACHOL) 10 mg tablet    AMLODIPINE BENZOATE PO    fluticasone (FLOVENT DISKUS) 50 MCG/BLIST diskus inhaler       Allergies   Allergen Reactions    Erythromycin Nausea Only    Morphine Headache         Physical Exam:    /65 (BP Location: Right arm)   Pulse (!) 111   Temp 100 3 °F (37 9 °C) (Axillary)   Resp 15   Ht 4' 11" (1 499 m)   Wt 77 1 kg (170 lb)   SpO2 90%   BMI 34 34 kg/m²     Physical Exam  Constitutional:       Appearance: Normal appearance  HENT:      Head: Normocephalic and atraumatic  Mouth/Throat:      Mouth: Mucous membranes are moist       Pharynx: Oropharynx is clear  Eyes:      Extraocular Movements: Extraocular movements intact        Conjunctiva/sclera: Conjunctivae normal       Pupils: Pupils are equal, round, and reactive to light  Cardiovascular:      Rate and Rhythm: Normal rate and regular rhythm  Pulses: Normal pulses  Heart sounds: Normal heart sounds  No murmur heard  No friction rub  No gallop  Pulmonary:      Effort: Pulmonary effort is normal  No respiratory distress  Breath sounds: Normal breath sounds  No wheezing, rhonchi or rales  Abdominal:      General: Bowel sounds are normal       Palpations: Abdomen is soft  Comments: Surgical incision intact and without drainage/exudation  Minimal surrounding erythema  Tenderness to light palpation  Musculoskeletal:         General: No swelling or tenderness  Normal range of motion  Cervical back: Normal range of motion and neck supple  Skin:     General: Skin is warm and dry  Neurological:      General: No focal deficit present  Mental Status: She is alert and oriented to person, place, and time  Psychiatric:         Mood and Affect: Mood normal          Behavior: Behavior normal          Thought Content:  Thought content normal          Judgment: Judgment normal          Recent Results (from the past 48 hour(s))   Fingerstick Glucose (POCT)    Collection Time: 08/18/21  4:33 PM   Result Value Ref Range    POC Glucose 171 (H) 65 - 140 mg/dl   ECG 12 lead    Collection Time: 08/18/21  5:16 PM   Result Value Ref Range    Ventricular Rate 118 BPM    Atrial Rate 118 BPM    AR Interval 134 ms    QRSD Interval 68 ms    QT Interval 300 ms    QTC Interval 420 ms    P Glidden 23 degrees    QRS Axis 35 degrees    T Wave Axis 48 degrees   Fingerstick Glucose (POCT)    Collection Time: 08/18/21  9:09 PM   Result Value Ref Range    POC Glucose 158 (H) 65 - 140 mg/dl   Fingerstick Glucose (POCT)    Collection Time: 08/19/21  6:58 AM   Result Value Ref Range    POC Glucose 161 (H) 65 - 140 mg/dl   CBC and differential    Collection Time: 08/19/21  8:48 AM   Result Value Ref Range WBC 17 63 (H) 4 31 - 10 16 Thousand/uL    RBC 2 77 (L) 3 81 - 5 12 Million/uL    Hemoglobin 7 7 (L) 11 5 - 15 4 g/dL    Hematocrit 24 3 (L) 34 8 - 46 1 %    MCV 88 82 - 98 fL    MCH 27 8 26 8 - 34 3 pg    MCHC 31 7 31 4 - 37 4 g/dL    RDW 16 9 (H) 11 6 - 15 1 %    MPV 9 3 8 9 - 12 7 fL    Platelets 7,984 (HH) 149 - 390 Thousands/uL    nRBC 1 /100 WBCs    Neutrophils Relative 78 (H) 43 - 75 %    Immat GRANS % 2 0 - 2 %    Lymphocytes Relative 10 (L) 14 - 44 %    Monocytes Relative 9 4 - 12 %    Eosinophils Relative 1 0 - 6 %    Basophils Relative 0 0 - 1 %    Neutrophils Absolute 13 76 (H) 1 85 - 7 62 Thousands/µL    Immature Grans Absolute 0 33 (H) 0 00 - 0 20 Thousand/uL    Lymphocytes Absolute 1 83 0 60 - 4 47 Thousands/µL    Monocytes Absolute 1 53 (H) 0 17 - 1 22 Thousand/µL    Eosinophils Absolute 0 14 0 00 - 0 61 Thousand/µL    Basophils Absolute 0 04 0 00 - 0 10 Thousands/µL   Basic metabolic panel    Collection Time: 08/19/21  8:48 AM   Result Value Ref Range    Sodium 132 (L) 136 - 145 mmol/L    Potassium 3 2 (L) 3 5 - 5 3 mmol/L    Chloride 102 100 - 108 mmol/L    CO2 27 21 - 32 mmol/L    ANION GAP 3 (L) 4 - 13 mmol/L    BUN 7 5 - 25 mg/dL    Creatinine 0 29 (L) 0 60 - 1 30 mg/dL    Glucose 129 65 - 140 mg/dL    Calcium 7 6 (L) 8 3 - 10 1 mg/dL    eGFR 118 ml/min/1 73sq m   Fingerstick Glucose (POCT)    Collection Time: 08/19/21 10:23 AM   Result Value Ref Range    POC Glucose 154 (H) 65 - 140 mg/dl   CBC and differential    Collection Time: 08/20/21  9:54 AM   Result Value Ref Range    WBC 17 75 (H) 4 31 - 10 16 Thousand/uL    RBC 2 86 (L) 3 81 - 5 12 Million/uL    Hemoglobin 8 1 (L) 11 5 - 15 4 g/dL    Hematocrit 25 5 (L) 34 8 - 46 1 %    MCV 89 82 - 98 fL    MCH 28 3 26 8 - 34 3 pg    MCHC 31 8 31 4 - 37 4 g/dL    RDW 17 2 (H) 11 6 - 15 1 %    MPV 9 3 8 9 - 12 7 fL    Platelets 6,561 (HH) 149 - 390 Thousands/uL    nRBC 1 /100 WBCs    Neutrophils Relative 79 (H) 43 - 75 %    Immat GRANS % 1 0 - 2 % Lymphocytes Relative 10 (L) 14 - 44 %    Monocytes Relative 9 4 - 12 %    Eosinophils Relative 1 0 - 6 %    Basophils Relative 0 0 - 1 %    Neutrophils Absolute 13 96 (H) 1 85 - 7 62 Thousands/µL    Immature Grans Absolute 0 23 (H) 0 00 - 0 20 Thousand/uL    Lymphocytes Absolute 1 75 0 60 - 4 47 Thousands/µL    Monocytes Absolute 1 52 (H) 0 17 - 1 22 Thousand/µL    Eosinophils Absolute 0 23 0 00 - 0 61 Thousand/µL    Basophils Absolute 0 06 0 00 - 0 10 Thousands/µL   Basic metabolic panel    Collection Time: 08/20/21  9:54 AM   Result Value Ref Range    Sodium 130 (L) 136 - 145 mmol/L    Potassium 3 4 (L) 3 5 - 5 3 mmol/L    Chloride 99 (L) 100 - 108 mmol/L    CO2 28 21 - 32 mmol/L    ANION GAP 3 (L) 4 - 13 mmol/L    BUN 6 5 - 25 mg/dL    Creatinine 0 40 (L) 0 60 - 1 30 mg/dL    Glucose 124 65 - 140 mg/dL    Calcium 7 9 (L) 8 3 - 10 1 mg/dL    eGFR 106 ml/min/1 73sq m       XR chest portable    Result Date: 8/7/2021  Narrative: CHEST INDICATION:   ETT placement  COMPARISON:  Chest radiograph from 7/20/2021 and abdomen CT from 7/11/2021  EXAM PERFORMED/VIEWS:  XR CHEST PORTABLE FINDINGS:  ET tube 4 cm above the valentin  NG tube in stomach  Cardiomediastinal silhouette appears unremarkable  Moderate left effusion and left base atelectasis  No pneumothorax  Osseous structures appear within normal limits for patient age  Impression: Moderate left effusion and left base atelectasis  ET tube 4 cm above the valentin  Workstation performed: [id number]     XR chest pa & lateral    Result Date: 8/18/2021  Narrative: CHEST INDICATION:   fever  COMPARISON:  8/16/2021 EXAM PERFORMED/VIEWS:  XR CHEST PA & LATERAL FINDINGS: Cardiomediastinal silhouette appears unremarkable  No acute infiltrates  Unchanged bilateral pleural effusions, left greater than right    Osseous structures appear within normal limits for patient age  Percutaneous drainage catheter in the left upper quadrant noted  Impression: No acute findings  Unchanged bilateral pleural effusions  Workstation performed: [id number]     XR chest pa & lateral    Result Date: 8/13/2021  Narrative: CHEST INDICATION:   Bilateral effusions sp surgery  COMPARISON:  Chest radiograph and CT of the chest, both from August 10, 2021 EXAM PERFORMED/VIEWS:  XR CHEST PA & LATERAL FINDINGS: Cardiomediastinal silhouette appears unremarkable  Bilateral pleural effusions, small on the right and moderate in size on the left  The latter obscures the base of the left lower lobe  Elsewhere, lungs clear  No evidence of pneumothorax  Osseous structures appear within normal limits for patient age  Impression: Bilateral pleural effusions, small on the right and moderate on the left, decreased in size since 8/10/2021  Atelectasis and/or consolidation in the left lower lobe cannot be excluded  Workstation performed: [id number]     XR chest pa & lateral    Result Date: 8/11/2021  Narrative: CHEST INDICATION:   FEVER AND LOW O2 SATURATIONS  COMPARISON:  Chest x-ray from 8/6/2021  EXAM PERFORMED/VIEWS:  XR CHEST PA & LATERAL FINDINGS: Cardiomediastinal silhouette appears unremarkable  The lungs are clear  Moderate bilateral pleural effusions, new compared to prior chest x-ray  No pneumothorax  Osseous structures appear within normal limits for patient age  Impression: Moderate bilateral pleural effusions, new compared to prior chest x-ray  Workstation performed: [id number]     CT chest wo contrast    Result Date: 8/11/2021  Narrative: CT CHEST WITHOUT IV CONTRAST INDICATION:   Shortness of breath SOB, LOW SATURATIONS , CANCER  COMPARISON:  None  TECHNIQUE: CT examination of the chest was performed without intravenous contrast   Axial, sagittal, and coronal 2D reformatted images were created from the source data and submitted for interpretation  Radiation dose length product (DLP) for this visit:  425 6 mGy-cm     This examination, like all CT scans performed in the [facility], was performed utilizing techniques to minimize radiation dose exposure, including the use of iterative reconstruction and automated exposure control  FINDINGS: LUNGS:  The trachea is patent  There is obstruction of the left lower lobe bronchus  Scattered airspace opacities within the lungs are visualized  Atelectasis versus consolidation within the lung bases is seen  PLEURA:  Bilateral large pleural effusions are noted  HEART/GREAT VESSELS:  Unremarkable for patient's age  MEDIASTINUM AND SABRINA:  Unremarkable  CHEST WALL AND LOWER NECK:   Unremarkable  VISUALIZED STRUCTURES IN THE UPPER ABDOMEN:  Status post cholecystectomy  Drainage catheter is seen in the left upper quadrant  Inflammatory changes and free fluid in the left upper quadrant are seen  OSSEOUS STRUCTURES:  No acute fracture or destructive osseous lesion  Impression: Obstruction of the left lower lobe bronchus  Consolidation versus atelectasis in the lung bases  Scattered airspace opacities within the lungs which may represent infection  Recommend short-term follow-up chest scan and 3 months to evaluate for resolution  Bilateral large pleural effusions  Drainage catheter in the left upper quadrant by the fluid and inflammatory changes  Workstation performed: [id number]     IR IN-Patient Thoracentesis    Result Date: 8/19/2021  Narrative: Ultrasound-guided thoracentesis Clinical History: Recent ovarian ca debulking surgery including diaphragm stripping  Technique: The patient was brought to the interventional radiology area and placed in the sitting position on the side of a stretcher  The left chest was then examined with ultrasound and the skin was marked  The skin was then prepped, and draped in usual sterile fashion  Lidocaine was administered to the skin and a small skin incision was made  Under direct ultrasound guidance, a 5 [employer] Zeynep Yueh multisidehole catheter was advanced into the pleural space   Fluid  did not aspirate easily and resistance was met by debris clogging catheter  Approximately 50cc rigoberto fluid with debris was removed  Second attempt was made with minimal output and clogging of catheter  Third attempt made with serosanguinous output, also  with poor return of fluid  Total 230cc pleural effusion removed  Patient did have moderate left chest pain with spasms after procedure  Chest xray was obtained without evidence of pneumothorax  Impression: Impression: 1  Left thoracentesis yielding 230cc serosanguinous pleural fluid removed  Signed, performed and dictated by Cj Watt PA-C under the supervision of Dr Liset Newsome  Workstation performed: [id number]     FL cystogram    Result Date: 8/18/2021  Narrative: CYSTOGRAM INDICATION:   S/p rad hyst and bladder repair  COMPARISON:  CT 8/16/2021 IMAGES:  22 FLUOROSCOPY TIME:   2 min 9 sec FINDINGS: The urinary bladder was filled with contrast material in a retrograde fashion  Approximately 200 was given  The bladder is smooth in contour and distends normally  There are no filling defects  No vesicoureteral reflux identified  After voiding, there is normal emptying of the urinary bladder  No extravasation of contrast was visualized  Impression: No contrast extravasation visualized from the bladder  Workstation performed: [id number]     XR chest 1 view    Result Date: 8/19/2021  Narrative: CHEST INDICATION:   post thoracentesis pain  COMPARISON:  8/17/2021 EXAM PERFORMED/VIEWS:  XR CHEST 1 VIEW  The frontal view was performed utilizing dual energy radiographic technique  FINDINGS: Cardiomediastinal silhouette appears unremarkable  There is a stable small left pleural effusion and trace right pleural effusion  There has been interval placement of a locking loop catheter overlying the left base  There is no pneumothorax  Osseous structures appear within normal limits for patient age       Impression: Status post left basilar locking loop catheter placement with stable small left or pleural effusion  No pneumothorax  Workstation performed: [id number]     CT chest abdomen pelvis w contrast    Result Date: 8/16/2021  Narrative: CT CHEST, ABDOMEN AND PELVIS WITH IV CONTRAST INDICATION: Postoperative fever  COMPARISON:  CT abdomen and pelvis 7/11/2021  CT chest 8/10/2021  TECHNIQUE: CT examination of the chest, abdomen and pelvis was performed  Axial, sagittal, and coronal 2D reformatted images were created from the source data and submitted for interpretation  Radiation dose length product (DLP) for this visit:  1496 68 mGy-cm   This examination, like all CT scans performed in the [facility], was performed utilizing techniques to minimize radiation dose exposure, including the use of iterative reconstruction and automated exposure control  IV Contrast:  100 mL of iohexol (OMNIPAQUE) Enteric contrast was not administered  FINDINGS: CHEST LUNGS:  Consolidation/atelectasis in the lower lobes greater on the left, decreased from prior  Central airways are patent  PLEURA:  Bilateral pleural effusions large on the left and moderate on the right appear similar to slightly decreased  HEART/GREAT VESSELS:  Unremarkable for patient's age  MEDIASTINUM AND SABRINA:  Fluid noted within the esophagus  CHEST WALL AND VISUALIZED LOWER NECK:  Unremarkable  ABDOMEN LIVER/BILIARY TREE:  Multiple ill-defined hypoattenuating liver lesions for example measuring 1 3 cm in segment 6 (series 2 image 59), new since CT 7/11/2021, suspicious for metastases  No biliary dilatation  GALLBLADDER:  Post cholecystectomy  SPLEEN:  Post splenectomy  PANCREAS:  There is fluid surrounding the pancreatic tail  ADRENAL GLANDS:  Unremarkable  KIDNEYS/URETERS:  Unremarkable  No hydronephrosis  STOMACH AND BOWEL: Some mildly dilated small bowel loops may be due to postoperative ileus  Fecalized stool is seen within distal small bowel loops, correlate for constipation    Postsurgical changes in the rectum and right abdominal small bowel loop  APPENDIX:  Prior appendectomy  ABDOMINOPELVIC CAVITY: Small volume of ascites  Loculated left upper quadrant fluid collection measuring 5 1 x 4 3 x 5 6 cm (AP x TR x CC) (series 2 image 48, series 601 image 81) with areas of rim enhancement and foci of gas  The percutaneous drainage  catheter courses through this collection  Few foci of gas also noted in the right abdomen anteriorly  Mild peritoneal thickening suggests peritonitis  Post omentectomy  No lymphadenopathy  VESSELS:  No abdominal aortic aneurysm  Aortoiliac atherosclerotic changes  PELVIS REPRODUCTIVE ORGANS:  Post hysterectomy and bilateral salpingo-oophorectomy  URINARY BLADDER:  Collapsed around a Paulino catheter  Air within the bladder may be due to instrumentation  ABDOMINAL WALL/INGUINAL REGIONS:  Postsurgical changes in the anterior abdominal wall  Left anterior abdominal approach percutaneous drainage catheter with the tip in the left upper quadrant  Diffuse subcutaneous soft tissue edema  OSSEOUS STRUCTURES:  No acute fracture or destructive osseous lesion  Degenerative changes of the spine  Impression: CHEST: 1  Consolidation/atelectasis in the lower lobes greater on the left, decreased from prior  2   Bilateral pleural effusions large on the left and moderate on the right appear similar to slightly decreased  ABDOMEN AND PELVIS: 1   Multiple new small ill-defined hypoattenuating liver lesions suspicious for metastases  2   Mild peritoneal enhancement suggests peritonitis  Percutaneous drainage catheter courses through a loculated left upper quadrant fluid collection measuring 5 1 x 4 3 x 5 6 cm with areas of rim enhancement and foci of gas  Few foci of gas also noted  in the right abdomen anteriorly near the area of small bowel postsurgical changes, while this could be residual postsurgical cannot exclude leak  Recommend continued follow-up   3   Fluid surrounding the pancreatic tail, correlate with serum lipase  4   Some mildly dilated small bowel loops may be related to postoperative ileus  Fecalized stool is seen within distal small bowel loops, correlate for constipation  The study was marked in [facility] for immediate notification  Workstation performed: [id number]     IR drainage tube placement    Result Date: 8/18/2021  Narrative: PROCEDURE: CT-guided abscess drainage and catheter placement STAFF: ANA Armstrong  CONTRAST: None  DOSE LENGTH PRODUCT: 1178 mGy-cm  This examination, like all CT scans performed in the [facility], was performed utilizing techniques to minimize radiation dose exposure, including the use of iterative reconstruction and automated exposure control  NUMBER OF IMAGES: Multiple  COMPLICATIONS: None  MEDICATIONS: Moderate sedation with fentanyl and versed per nursing protocol was monitored by radiology nursing staff  Monitoring of conscious sedation totaled 40 minutes  INDICATION: Indication PROCEDURE: Using CT guidance, a needle was inserted into the left abdominal collection  A wire was advanced and coiled in the collection  Following tract dilation, a 10 Lithuanian drainage catheter was advanced and secured using sutures  60 mL of grossly purulent fluid  was removed and sent for culture and sensitivity  The catheter was attached to gravity drainage  FINDINGS: Successful CT-guided 10-Lithuanian drain placement within a left abdominal collection  Purulent aspirate obtained  Sample sent for analysis  Impression: CT-guided abscess drainage catheter placement  PLAN: Monitor output  Keep to bag drainage  Flush daily gently with 5 mL saline  Workstation performed: [id number]         Labs and pertinent reports reviewed  Disclaimer: This document was prepared using Sp[employer] Direct technology   If a word or phrase is confusing, or does not make sense, this is likely due to recognition error which was not discovered during the providers review  If you believe an error has occurred, please contact me for trisha? cation

## 2021-08-20 NOTE — DISCHARGE SUMMARY
Discharge Summary   Byron Young MRN: [id number]  [facility] Encounter: [id number]      Admission Date: 8/6/2021     Discharge Date: 9/10/21    Attending: Kimberly Rodriguez MD    Principal Diagnosis: Peritoneal carcinomatosis [facility]) [C78 6]    Consultants:  Infectious disease  Medical Oncology/Hematology  Acute Pain Service  Pulmonology     Procedures:   8/6/2021: Diagnostic laparoscopy, exploratory laparotomy, pacreatic tail over sew, en bloc hysterctomy, bilateral salpingoopphorectomy, sigmoidectomy,  Low rectal reanastamosis, peritoneal stripping, cystotomy repair, small bowel resection with reanastomosis, radical omentectomy, splenectomy, and appendectomy    8/18/2021: Left IR thoracentesis     Hospital course: Patient was admitted to ICU following stated surgery complicated by pancreatic tail hemorrhage and low rectal reanastomosis that requaried intraoperative consultation to General Surgery and Colorectal surgery respectively  She was extubated on POD  She was weaned form pressors and had good pain control with her epidural by POD 2  She received a transfusion total of 5 units PRBCs and 2 units of FFp intraoperatively and in the immediate postoperative period  On POD 3 her upper OSCAR fluid was tested and she was found to have a pancreatic leak  NGT was discontinued on POD 3 and she was transferred from ICU to [facility] surg  She subsequently tolerated sips of clears  Epidural was removed POD 4  She was noted to have bowel function on POD 5, however had a rising leukocytosis and required supplemental oxygen  Pulmonology was consulted and the patient was diuresed to treat her acute respiratory failure and electrolytes were repleted  POD 7 low dose aspirin was added for her post splenectomy thrombocytosis  POD 10 patient was preparing for discharge when she spiked a fever overnight requiring workup and her lower OSCAR drain was removed    She continued to spike fevers with no localizing symptoms or findings  IR placed a second drain in the pancreatic leak bed and thoracentesis which did not resolve the fevers  While awaiting pan cultures, cefepime and flagyl were continued  Imaging repeating on POD 14 (8/20) showing decrease in pancreatic abscess size  ID consulted 8/20; daptomycin was added to regimen of cefepime and flagyl  Intermittent fevers continued  Heme/Onc consulted 8/20 for post-splenectomy thrombocytosis; no interventions were recommended at that time other than trending labs  Intermittent fevers noted to continue  Antibiotic regimen changed to Unasyn and ciprofloxacin on 8/23  COVID test on 8/24 was negative  Lovenox dosing was changed to therapeutic range to treat possible septic pelvic thrombophlebitis  Repeat imaging 8/26 - persistent fever, slight enlargement of LUQ collection  Doppler negt  8/27 - Drain upsized 50 cc out     On 8/29, patient had profuse bleeding into her drains requiring blood transfusion  This was thought to be secondary to drain repositioning while on therapeutic lovenox  She was observed in the ICU and ultimately transferred back to the floor  Patient remained afebrile for several days until 9/8 when she had another low grade fever  Repeat CT showed pancreatic collection was significantly decreased but atelectasis remained  Blood cultures were drawn and WBC was noted to be normal  Patient was not restarted on antibiotics and was ultimately discharged due to thought that fevers were not infectious in nature  Patient was discharged home with VNA services for wound management and drain care  Port placement was scheduled to be done by IR outpatient with chemotherapy following port placement      Lab Results:   Lab Results   Component Value Date    WBC 17 74 (H) 08/23/2021    HGB 8 1 (L) 08/23/2021    HCT 25 8 (L) 08/23/2021    MCV 89 08/23/2021    PLT 1,183 (HH) 08/23/2021     Lab Results   Component Value Date    GLUCOSE 195 (H) 08/06/2021    CALCIUM 8 3 08/23/2021    K 3 4 (L) 08/23/2021    CO2 29 08/23/2021    CL 95 (L) 08/23/2021    BUN 7 08/23/2021    CREATININE 0 43 (L) 08/23/2021     Lab Results   Component Value Date/Time    POCGLU 154 (H) 08/19/2021 10:23 AM    POCGLU 161 (H) 08/19/2021 06:58 AM    POCGLU 158 (H) 08/18/2021 09:09 PM    POCGLU 171 (H) 08/18/2021 04:33 PM    POCGLU 92 08/18/2021 12:10 PM     Lab Results   Component Value Date    PTT 30 08/07/2021     Lab Results   Component Value Date    INR 1 23 (H) 08/07/2021    INR 1 47 (H) 08/06/2021    INR 1 15 07/11/2021    PROTIME 15 5 (H) 08/07/2021    PROTIME 17 8 (H) 08/06/2021    PROTIME 14 6 (H) 26/83/0063       Complications: none apparent    Condition at discharge: stable    Discharge instructions/Information to patient and family:   See After Visit Summary for information provided to patient and family  Provisions for Follow-Up Care:  See After Visit Summary for information related to follow-up care and any pertinent home health orders  Disposition: See After Visit Summary for discharge disposition information  Planned Readmission: Yes, due to nature of disease    Discharge Medications: For a complete list of the patient's medications, please refer to her med rec      Tre Salas MD  8/24/2021  12:08 PM

## 2021-08-20 NOTE — UTILIZATION REVIEW
Continued Stay Review    Date: 08-20-21                         Current Patient Class: inpatient  Current Level of Care: M/S    HPI:71 y o  female initially admitted on 08-06-21     Assessment/Plan: POD#14 from primary ovarian cancer debulking status post diagnostic laparoscopy, exploratory laparotomy, on block hysterectomy bilateral salpingo-oophorectomy sigmoid ectomy with low rectal reanastomosis and over-sewing of bladder peritoneal stripping, cystotomy, and diaphragm stripping small bowel resection and reanastomosis radical omentectomy splenectomy appendectomy now with pancreatic leak  LUQ drain X2, output in last 24h is 100cc, Purulent milky looking material  Cultures : Blood culture 9/17/21 Negative, Drain culture gram with gram negative rods   Pending culture final report  continue on double antibiotic   CT 8/16:   Loculated left upper quadrant fluid collection measuring 5 1 x 4 3 x 5 6 cm with areas of rim enhancement and foci of gas   The percutaneous drainage catheter courses through this collection  - S/p second drain placement 8/18 by IR, functional  Small area of erythema in lower area of incision, LUQ drains bilateral functional   Purulent, milky discharge      Vital Signs:   Date/Time  Temp  Pulse  Resp  BP  MAP (mmHg)  SpO2  Calculated FIO2 (%) - Nasal Cannula  Nasal Cannula O2 Flow Rate (L/min)  O2 Device  Patient Position - Orthostatic VS   08/20/21 1116  100 3 °F (37 9 °C)  --  --  --  --  --  --  --  --  --   08/20/21 1115  100 2 °F (37 9 °C)  --  --  --  --  --  --  --  --  --   08/20/21 0700  98 3 °F (36 8 °C)  111Abnormal   15  140/65  94  90 %  --  --  None (Room air)  Lying   08/19/21 2200  98 2 °F (36 8 °C)  90  19  118/60  83  94 %  --  --  None (Room air)  Sitting   08/19/21 1853  100 8 °F (38 2 °C)Abnormal   --  --  --  --  --  --  --  --  --   08/19/21 1449  100 °F (37 8 °C)  111Abnormal   21  125/61  86  90 %  --  --  None (Room air)  Sitting   08/19/21 0701  97 9 °F (36 6 °C)  104  16 139/63  91  96 %  --  --  None (Room air)  Sitting   08/19/21 0100  99 7 °F (37 6 °C)  --  --  --  --  --  --  --  --  --   08/18/21 2326  102 3 °F (39 1 °C)Abnormal   117Abnormal   20  113/55  76  90 %  --  --  None (Room air)  Lying   08/18/21 11:58:02  99 3 °F (37 4 °C)  140Abnormal   --  150/84  106  97 %  --  --  --  --   08/18/21 11:38:51  98 9 °F (37 2 °C)  148Abnormal   20  153/99  117  95 %  --  --  --           Pertinent Labs/Diagnostic Results:       Results from last 7 days   Lab Units 08/20/21  0954 08/19/21  0848 08/18/21  0547 08/17/21  0954 08/16/21  0959   WBC Thousand/uL 17 75* 17 63* 13 76* 15 73* 18 95*   HEMOGLOBIN g/dL 8 1* 7 7* 8 2* 8 9* 7 3*   HEMATOCRIT % 25 5* 24 3* 25 6* 27 8* 23 0*   PLATELETS Thousands/uL 1,186* 1,094* 1,021* 958* 956*   NEUTROS ABS Thousands/µL 13 96* 13 76*  --   --   --    BANDS PCT %  --   --   --   --  4         Results from last 7 days   Lab Units 08/20/21  0954 08/19/21  0848 08/18/21  0716 08/17/21  0954 08/16/21  0959 08/15/21  0954 08/14/21  0718   SODIUM mmol/L 130* 132* 134* 131* 130* 131* 132*   POTASSIUM mmol/L 3 4* 3 2* 3 6 4 0 3 9 4 1 3 7   CHLORIDE mmol/L 99* 102 102 101 98* 98* 99*   CO2 mmol/L 28 27 26 22 24 24 29   ANION GAP mmol/L 3* 3* 6 8 8 9 4   BUN mg/dL 6 7 7 10 8 9 7   CREATININE mg/dL 0 40* 0 29* 0 33* 0 42* 0 38* 0 56* 0 40*   EGFR ml/min/1 73sq m 106 118 113 104 108 95 106   CALCIUM mg/dL 7 9* 7 6* 7 7* 7 9* 7 9* 7 9* 7 8*   MAGNESIUM mg/dL  --   --  1 8 1 9  --  2 0 1 8         Results from last 7 days   Lab Units 08/19/21  1023 08/19/21  0658 08/18/21  2109 08/18/21  1633 08/18/21  1210 08/18/21  0630 08/17/21  2119 08/17/21  1625 08/17/21  1209 08/17/21  0627 08/16/21  2053 08/16/21  1545   POC GLUCOSE mg/dl 154* 161* 158* 171* 92 100 123 134 117 113 114 119     Results from last 7 days   Lab Units 08/20/21  0954 08/19/21  0848 08/18/21  0716 08/17/21  0954 08/16/21  0959 08/15/21  0954 08/14/21  0718   GLUCOSE RANDOM mg/dL 124 129 99 158* 104 148* 119       Results from last 7 days   Lab Units 08/18/21  0523 08/17/21  1537   PROCALCITONIN ng/ml 1 12* 1 01*     Results from last 7 days   Lab Units 08/17/21  2236 08/17/21  2016 08/17/21  1537   LACTIC ACID mmol/L 1 1 2 0 2 8*     Results from last 7 days   Lab Units 08/17/21  1544   CLARITY UA  Clear   COLOR UA  Yellow   SPEC GRAV UA  1 025   PH UA  6 5   GLUCOSE UA mg/dl Negative   KETONES UA mg/dl Negative   BLOOD UA  Negative   PROTEIN UA mg/dl Negative   NITRITE UA  Negative   BILIRUBIN UA  Negative   UROBILINOGEN UA E U /dl 1 0   LEUKOCYTES UA  Small*   WBC UA /hpf 2-4   RBC UA /hpf None Seen   BACTERIA UA /hpf None Seen   EPITHELIAL CELLS WET PREP /hpf None Seen       Results from last 7 days   Lab Units 08/18/21  1038 08/18/21  0904 08/17/21  1536 08/17/21  1535   BLOOD CULTURE   --   --  No Growth at 48 hrs  No Growth at 48 hrs     GRAM STAIN RESULT  No Polys or Bacteria seen Rare Polys*  2+ Gram negative rods*  --   --    BODY FLUID CULTURE, STERILE  No growth 2+ Growth of Klebsiella oxytoca*  1+ Growth of Enterococcus faecium*  --   --      Results from last 7 days   Lab Units 08/18/21  1038   TOTAL COUNTED  100   WBC FLUID /ul 941             Medications:   Scheduled Medications:  amLODIPine, 10 mg, Oral, Daily  aspirin, 81 mg, Oral, Daily  cefepime, 2,000 mg, Intravenous, Q12H  enoxaparin, 40 mg, Subcutaneous, Q24H ANA MARIA  HYDROmorphone, 0 5 mg, Intravenous, Once  magnesium oxide, 400 mg, Oral, BID  metroNIDAZOLE, 500 mg, Intravenous, Q8H  pantoprazole, 40 mg, Oral, Early Morning  pravastatin, 10 mg, Oral, Daily With Dinner      Continuous IV Infusions:     PRN Meds:  acetaminophen, 975 mg, Oral, Q6H PRN  albuterol, 2 puff, Inhalation, Q4H PRN  HYDROmorphone, 0 5 mg, Intravenous, Q2H PRN  ibuprofen, 600 mg, Oral, Q6H PRN  ondansetron, 4 mg, Intravenous, Q6H PRN  oxyCODONE, 10 mg, Oral, Q4H PRN  oxyCODONE, 5 mg, Oral, Q4H PRN  phenol, 1 spray, Mouth/Throat, Q2H PRN  pneumococcal 13-valent conjugate vaccine, 0 5 mL, Intramuscular, Prior to discharge  sodium chloride (PF), 500 mL, Intracatheter, Once in imaging        Discharge Plan: TBD    Network Utilization Review Department  ATTENTION: Please call with any questions or concerns to [phone] and carefully listen to the prompts so that you are directed to the right person  All voicemails are confidential   Ethan Calender all requests for admission clinical reviews, approved or denied determinations and any other requests to dedicated fax number below belonging to the campus where the patient is receiving treatment   List of dedicated fax numbers for the Facilities:  [address] DENIALS (Administrative/Medical Necessity) [phone]   [address] (Maternity/NICU/Pediatrics) [phone] [address] [place] [address] [address] [place] [address] [address] [facility] [place] [address] [phone]

## 2021-08-20 NOTE — OCCUPATIONAL THERAPY NOTE
Occupational Therapy RF        Patient Name: Holly Orourke  Today's Date: 8/20/2021 08/20/21 1000   OT Last Visit   OT Visit Date 08/20/21   Note Type   Note Type Treatment   Cancel Reasons Other   Assessment   Assessment Attempted to see pt  Pt refusing at this time reporting she was in too much pain to participate in therapy  OT will continue to follow to see as able       Santa Gutierrez MS, OTR/L

## 2021-08-20 NOTE — PROGRESS NOTES
Case was presented at the multidisciplinary Gynecologic Tumor Conference on 8/20/21 and the consensus recommendation was: systemic therapy, genetic testing, genomic testing

## 2021-08-20 NOTE — CONSULTS
Consultation - Infectious Disease   Judit Ritchie 80 y o  female MRN: [id number]  Unit/Bed#: [facility] 305-01 Encounter: [id number]      IMPRESSION & RECOMMENDATIONS:     1  Abdominal abscess  Patient developed RUQ abscess after complex abdominal surgery and pancreatic leak  8/18, s/p IR drain placement with aspiration of 60cc purulent fluid  Culture is growing Klebsiella oxytoca, Enterococcus faecium, and Bacteroides fragilis  Repeat CT A/P shows improvement in the size of the collection   -continue cefepime and metronidazole   -add Daptomycin 8mg/kg IV q24hr for coverage of E  Faecium  This organism is often vancomycin resistant   -will follow up final cultures and adjust antibiotics accordingly    2  Sepsis-fevers, leukocytosis  Secondary to #1    -continue antibiotics as above   -will follow cultures   -recheck LFTs tomorrow, check lipase    3  Pancreatic leak  Surgical complication  Abdominal drain in place  4  Ovarian cancer  8/6 status post ex lap, en-bloc hysterectomy, bilateral salpingo-oophorectomy, sigmoidectomy with low rectal anastomosis, SBR, radical omentectomy, splenectomy, appendectomy, oversewing of tail of pancreas, repair of cystotomy, bilateral peritoneal stripping  Surgery was complicated by pancreatic tail hemorrhage and low rectal reanastomosis  Imaging shows liver metastases   -care per Gyn onc    5  Thrombocytosis  Reactive due to splenectomy, which is expected  On aspirin and lovenox    6   Status post splenectomy  Received HiB, meningococcal quadrivalent vaccine   -needs PCV13 prior to discharge     I have discussed the above management plan in detail with the primary service    I have performed an extensive review of the medical records in Epic including review of the notes, radiographs, and laboratory results     HISTORY OF PRESENT ILLNESS:  Reason for Consult: fever, intraabdominal abscess  HPI: Judit Ritchie is a 80y o  year old female with recently diagnosed ovarian cancer who was admitted 8/6/21 for total hysterectomy and bilateral salpingo-oophorectomy  8/6, the patient underwent ex lap, en-bloc hysterectomy, bilateral salpingo-oophorectomy, sigmoidectomy with low rectal anastomosis, SBR, radical omentectomy, splenectomy, appendectomy, oversewing of tail of pancreas, repair of cystotomy, bilateral peritoneal stripping  Surgery was complicated by pancreatic tail hemorrhage and low rectal reanastomosis that required intraoperative consultation to General Surgery and Colorectal surgery  She was admitted to the ICU post-op for monitoring  On POD #3 her upper OSCAR fluid was tested revealed elevated amylase consistent with a pancreatic leak  8/13, the patient had a rising leukocytosis to 23 85  8/16, she developed fevers to 101 5  CT C/A/P was performed, which showed bilateral pleural effusions, peritonitis, a loculated left upper quadrant fluid collection measuring 5 1 x 4 3 x 5 6 cm with areas of rim enhancement and foci of gas, and fluid surrounding the pancreatic tail  Cefepime and metronidazole were started  8/18, IR placed a drain in the collection, with aspiration of 60cc purulent fluid  Culture is growing Klebsiella oxytoca, Enterococcus faecium, and Bacteroides fragilis  A left  was also performed yielding 230cc serosanguinous pleural fluid (exudative effusion, cultures are negative)  The patient continues to have leukocytosis to 17 and fevers to 102 5  ID is consulted for further management  On evaluation, the patient appeared flushed but in no distress  She states he can feel the fevers; she gets flushed and sweaty  The patient has abdominal pain, but it is controlled with pain medication  She denies shortness of breath, chest pain, diarrhea  REVIEW OF SYSTEMS:  A complete review of systems is negative other than that noted in the HPI      PAST MEDICAL HISTORY:  Past Medical History:   Diagnosis Date    Acid reflux     Asthma     Cancer ([facility] [address] )     ovarian, peritoneal carcinomatosis    GERD (gastroesophageal reflux disease)     Hypercholesteremia     Hypertension     Migraine      Past Surgical History:   Procedure Laterality Date    APPENDECTOMY N/A 8/6/2021    Procedure: APPENDECTOMY;  Surgeon: Dustin Zavaleta MD;  [facility];  Service: Gynecology Oncology    CHOLECYSTECTOMY      COLONOSCOPY      FL CYSTOGRAM  8/18/2021    GALLBLADDER SURGERY      HYSTERECTOMY N/A 8/6/2021    Procedure: ENBLOCK HYSTERECTOMY, BILATERAL SALPINGO-OOPHORECTOMY, SIGMOIDECTOMY WITH LOW RECTAL REANASTAMOSIS, BLADDER PERITONEAL STRIPPING AND CYSTOTOMY REPAIR, DIAPHRAGM STRIPPING, SMALL BOWEL RESECTION WITH RE-ANASTAMOSIS;  Surgeon: Dustin Zavaleta MD;  [facility];  Service: Gynecology Oncology    IR DRAINAGE TUBE PLACEMENT  8/18/2021    IR THORACENTESIS  8/18/2021    LAPAROTOMY N/A 8/6/2021    Procedure: LAPAROTOMY EXPLORATORY; OVER SEW PANCREAS TAIL;  Surgeon: Dustin Zavaleta MD;  [facility];  Service: Gynecology Oncology    OMENTECTOMY N/A 8/6/2021    Procedure: RADICAL OMENTECTOMY;  Surgeon: Dustin Zavaleta MD;  [facility];  Service: Gynecology Oncology    MT LAP,DIAGNOSTIC ABDOMEN N/A 8/6/2021    Procedure: LAPAROSCOPY DIAGNOSTIC;  Surgeon: Dustin Zavaleta MD;  [facility];  Service: Gynecology Oncology    RIGHT OOPHORECTOMY  1986    SPLENECTOMY, TOTAL N/A 8/6/2021    Procedure: SPLENECTOMY;  Surgeon: Dustin Zavaleta MD;  [facility];  Service: Gynecology Oncology    TONSILLECTOMY         FAMILY HISTORY:  Non-contributory    SOCIAL HISTORY:  Social History   Social History     Substance and Sexual Activity   Alcohol Use Yes    Comment: socially     Social History     Substance and Sexual Activity   Drug Use Not Currently     Social History     Tobacco Use   Smoking Status Never Smoker   Smokeless Tobacco Never Used       ALLERGIES:  Allergies   Allergen Reactions    Erythromycin Nausea Only    Morphine Headache MEDICATIONS:  All current active medications have been reviewed  PHYSICAL EXAM:  Temp:  [98 2 °F (36 8 °C)-102 5 °F (39 2 °C)] 99 3 °F (37 4 °C)  HR:  [] 116  Resp:  [15-20] 20  BP: (118-140)/(58-65) 118/65  SpO2:  [90 %-95 %] 95 %  Temp (24hrs), Av 2 °F (37 9 °C), Min:98 2 °F (36 8 °C), Max:102 5 °F (39 2 °C)  Current: Temperature: 99 3 °F (37 4 °C)    Intake/Output Summary (Last 24 hours) at 2021 1733  Last data filed at 2021 1700  Gross per 24 hour   Intake 990 ml   Output 950 ml   Net 40 ml       General Appearance:  Appearing well, nontoxic, and in no distress   Head:  Normocephalic, without obvious abnormality, atraumatic   Eyes:  Conjunctiva pink and sclera anicteric, both eyes   Nose: Nares normal, mucosa normal, no drainage   Throat: Oropharynx moist without lesions   Neck: Supple, symmetrical, no adenopathy, no tenderness/mass/nodules   Back:   Symmetric, no curvature, ROM normal, no CVA tenderness   Lungs:   Clear to auscultation bilaterally, respirations unlabored   Chest Wall:  No tenderness or deformity   Heart:  RRR; no murmur, rub or gallop   Abdomen:   2 RUQ OSCAR drains with purulent fluid   Extremities: No cyanosis, clubbing or edema   Skin: No rashes or lesions  Abdominal midline scar, no erythema or drainage    Lymph nodes: Cervical, supraclavicular nodes normal   Neurologic: Alert and oriented times 3       LABS, IMAGING, & OTHER STUDIES:  Lab Results:  I have personally reviewed pertinent labs    Results from last 7 days   Lab Units 21  0954 21  0848 21  0547   WBC Thousand/uL 17 75* 17 63* 13 76*   HEMOGLOBIN g/dL 8 1* 7 7* 8 2*   PLATELETS Thousands/uL 1,186* 1,094* 1,021*     Results from last 7 days   Lab Units 21  0954 21  0848 21  0716   SODIUM mmol/L 130* 132* 134*   POTASSIUM mmol/L 3 4* 3 2* 3 6   CHLORIDE mmol/L 99* 102 102   CO2 mmol/L 28 27 26   BUN mg/dL 6 7 7   CREATININE mg/dL 0 40* 0 29* 0 33*   EGFR ml/min/1 73sq m 106 118 113   CALCIUM mg/dL 7 9* 7 6* 7 7*     Results from last 7 days   Lab Units 08/18/21  1038 08/18/21  0904 08/17/21  1536 08/17/21  1535   BLOOD CULTURE   --   --  No Growth at 48 hrs  No Growth at 48 hrs  GRAM STAIN RESULT  No Polys or Bacteria seen Rare Polys*  2+ Gram negative rods*  --   --    BODY FLUID CULTURE, STERILE  No growth 2+ Growth of Klebsiella oxytoca*  1+ Growth of Enterococcus faecium*  --   --      Results from last 7 days   Lab Units 08/18/21  0523 08/17/21  1537   PROCALCITONIN ng/ml 1 12* 1 01*                   Imaging Studies:   I have personally reviewed pertinent imaging study reports and images in PACS  Other Studies:   I have personally reviewed pertinent reports  CT A/P 8/20/21:  1   Decreased size of left upper quadrant gas and fluid containing collection status post locking loop catheter placement  2   Moderate left pleural effusion with new dependent high density and small locule of gas  This is likely related to interval thoracentesis  The effusion demonstrates increased lateral component  Small right effusion is stable  3   Stable tract of gas extending into the presacral space just above the rectal anastomosis  This may be a normal postoperative appearance of an end-to-side anastomosis no walled off leak is not excluded  Correlation with surgical history recommended  4   Liver lesions again seen suspicious for metastasis  Soft tissue along the posterior liver capsule likely represents metastatic implants

## 2021-08-21 LAB
ALBUMIN SERPL BCP-MCNC: 1.3 G/DL (ref 3.5–5)
ALP SERPL-CCNC: 488 U/L (ref 46–116)
ALT SERPL W P-5'-P-CCNC: 14 U/L (ref 12–78)
ANION GAP SERPL CALCULATED.3IONS-SCNC: 6 MMOL/L (ref 4–13)
AST SERPL W P-5'-P-CCNC: 19 U/L (ref 5–45)
BACTERIA SPEC BFLD CULT: NO GROWTH
BASOPHILS # BLD AUTO: 0.08 THOUSANDS/ΜL (ref 0–0.1)
BASOPHILS NFR BLD AUTO: 1 % (ref 0–1)
BILIRUB SERPL-MCNC: 0.36 MG/DL (ref 0.2–1)
BUN SERPL-MCNC: 6 MG/DL (ref 5–25)
CALCIUM ALBUM COR SERPL-MCNC: 9.9 MG/DL (ref 8.3–10.1)
CALCIUM SERPL-MCNC: 7.7 MG/DL (ref 8.3–10.1)
CHLORIDE SERPL-SCNC: 96 MMOL/L (ref 100–108)
CO2 SERPL-SCNC: 27 MMOL/L (ref 21–32)
CREAT SERPL-MCNC: 0.35 MG/DL (ref 0.6–1.3)
EOSINOPHIL # BLD AUTO: 0.12 THOUSAND/ΜL (ref 0–0.61)
EOSINOPHIL NFR BLD AUTO: 1 % (ref 0–6)
ERYTHROCYTE [DISTWIDTH] IN BLOOD BY AUTOMATED COUNT: 17.1 % (ref 11.6–15.1)
GFR SERPL CREATININE-BSD FRML MDRD: 111 ML/MIN/1.73SQ M
GLUCOSE SERPL-MCNC: 134 MG/DL (ref 65–140)
GRAM STN SPEC: NORMAL
HCT VFR BLD AUTO: 24.1 % (ref 34.8–46.1)
HGB BLD-MCNC: 7.7 G/DL (ref 11.5–15.4)
IMM GRANULOCYTES # BLD AUTO: 0.24 THOUSAND/UL (ref 0–0.2)
IMM GRANULOCYTES NFR BLD AUTO: 2 % (ref 0–2)
LIPASE SERPL-CCNC: 211 U/L (ref 73–393)
LYMPHOCYTES # BLD AUTO: 1.62 THOUSANDS/ΜL (ref 0.6–4.47)
LYMPHOCYTES NFR BLD AUTO: 10 % (ref 14–44)
MCH RBC QN AUTO: 28.1 PG (ref 26.8–34.3)
MCHC RBC AUTO-ENTMCNC: 32 G/DL (ref 31.4–37.4)
MCV RBC AUTO: 88 FL (ref 82–98)
MONOCYTES # BLD AUTO: 1.9 THOUSAND/ΜL (ref 0.17–1.22)
MONOCYTES NFR BLD AUTO: 12 % (ref 4–12)
NEUTROPHILS # BLD AUTO: 12.33 THOUSANDS/ΜL (ref 1.85–7.62)
NEUTS SEG NFR BLD AUTO: 74 % (ref 43–75)
NRBC BLD AUTO-RTO: 1 /100 WBCS
PLATELET # BLD AUTO: 1063 THOUSANDS/UL (ref 149–390)
PMV BLD AUTO: 9 FL (ref 8.9–12.7)
POTASSIUM SERPL-SCNC: 3.4 MMOL/L (ref 3.5–5.3)
PROT SERPL-MCNC: 5.9 G/DL (ref 6.4–8.2)
RBC # BLD AUTO: 2.74 MILLION/UL (ref 3.81–5.12)
SODIUM SERPL-SCNC: 129 MMOL/L (ref 136–145)
WBC # BLD AUTO: 16.29 THOUSAND/UL (ref 4.31–10.16)

## 2021-08-21 PROCEDURE — 99024 POSTOP FOLLOW-UP VISIT: CPT | Performed by: OBSTETRICS & GYNECOLOGY

## 2021-08-21 PROCEDURE — 80053 COMPREHEN METABOLIC PANEL: CPT | Performed by: STUDENT IN AN ORGANIZED HEALTH CARE EDUCATION/TRAINING PROGRAM

## 2021-08-21 PROCEDURE — 83690 ASSAY OF LIPASE: CPT | Performed by: STUDENT IN AN ORGANIZED HEALTH CARE EDUCATION/TRAINING PROGRAM

## 2021-08-21 PROCEDURE — 85025 COMPLETE CBC W/AUTO DIFF WBC: CPT | Performed by: OBSTETRICS & GYNECOLOGY

## 2021-08-21 RX ORDER — POTASSIUM CHLORIDE 20 MEQ/1
40 TABLET, EXTENDED RELEASE ORAL
Status: DISPENSED | OUTPATIENT
Start: 2021-08-21 | End: 2021-08-21

## 2021-08-21 RX ADMIN — METRONIDAZOLE 500 MG: 500 TABLET ORAL at 21:00

## 2021-08-21 RX ADMIN — ENOXAPARIN SODIUM 40 MG: 40 INJECTION SUBCUTANEOUS at 08:07

## 2021-08-21 RX ADMIN — PRAVASTATIN SODIUM 10 MG: 10 TABLET ORAL at 16:49

## 2021-08-21 RX ADMIN — METRONIDAZOLE 500 MG: 500 TABLET ORAL at 05:13

## 2021-08-21 RX ADMIN — ASPIRIN 81 MG: 81 TABLET, COATED ORAL at 08:06

## 2021-08-21 RX ADMIN — OXYCODONE HYDROCHLORIDE 10 MG: 10 TABLET ORAL at 12:46

## 2021-08-21 RX ADMIN — POTASSIUM CHLORIDE 20 MEQ: 1500 TABLET, EXTENDED RELEASE ORAL at 08:06

## 2021-08-21 RX ADMIN — AMLODIPINE BESYLATE 10 MG: 10 TABLET ORAL at 08:06

## 2021-08-21 RX ADMIN — POTASSIUM CHLORIDE 40 MEQ: 1500 TABLET, EXTENDED RELEASE ORAL at 12:44

## 2021-08-21 RX ADMIN — METRONIDAZOLE 500 MG: 500 TABLET ORAL at 14:12

## 2021-08-21 RX ADMIN — MAGNESIUM OXIDE TAB 400 MG (241.3 MG ELEMENTAL MG) 400 MG: 400 (241.3 MG) TAB at 08:06

## 2021-08-21 RX ADMIN — OXYCODONE HYDROCHLORIDE 10 MG: 10 TABLET ORAL at 17:14

## 2021-08-21 RX ADMIN — PANTOPRAZOLE SODIUM 40 MG: 40 TABLET, DELAYED RELEASE ORAL at 05:13

## 2021-08-21 RX ADMIN — MAGNESIUM OXIDE TAB 400 MG (241.3 MG ELEMENTAL MG) 400 MG: 400 (241.3 MG) TAB at 12:44

## 2021-08-21 RX ADMIN — DAPTOMYCIN 475 MG: 500 INJECTION, POWDER, LYOPHILIZED, FOR SOLUTION INTRAVENOUS at 19:52

## 2021-08-21 RX ADMIN — OXYCODONE HYDROCHLORIDE 10 MG: 10 TABLET ORAL at 08:07

## 2021-08-21 RX ADMIN — CEFEPIME HYDROCHLORIDE 2000 MG: 2 INJECTION, POWDER, FOR SOLUTION INTRAVENOUS at 16:49

## 2021-08-21 RX ADMIN — ACETAMINOPHEN 975 MG: 325 TABLET, FILM COATED ORAL at 08:06

## 2021-08-21 RX ADMIN — CEFEPIME HYDROCHLORIDE 2000 MG: 2 INJECTION, POWDER, FOR SOLUTION INTRAVENOUS at 05:14

## 2021-08-21 RX ADMIN — OXYCODONE HYDROCHLORIDE 10 MG: 10 TABLET ORAL at 02:45

## 2021-08-21 NOTE — PROGRESS NOTES
Progress Note - Gynecologic Oncology   Albaro Cough 80 y o  female MRN: [id number]  Unit/Bed#: [facility] 305-01 Encounter: [id number]    Assessment/Plan  POD#15 s/p primary debulking     Pancreatic leak with abscess formation: IR drain placed 8/19  Cx resulted - appreciate ID input  Repeat CT given persistent fevers with improvement in collection  Febrile again at 7am but WBC down trended this am       Hypokalemia: continue to replete  Will check mag       Acute respiratory failure:  S/p thora  Persistent pleural effusion on left  Encourage IS       Acute blood loss anemia: s/p multiple transfusions  Continue to trend     Splenectomy with reactive thromobocytosis: will need vaccinations prior to discharge  Thrombocytosis stable this am        DVT ppx: continue lovenox post procedure  To be discharged on elliquis         Subjective/Objective     Pt reports feeling well overall  Tolerating regular diet  +OOB  +bm/flatus  Pain controlled  Blood pressure 126/61, pulse 104, temperature (!) 101 1 °F (38 4 °C), temperature source Oral, resp  rate 19, height 4' 11" (1 499 m), weight 77 1 kg (170 lb), SpO2 96 %  ,Body mass index is 34 34 kg/m²        Intake/Output Summary (Last 24 hours) at 8/21/2021 2210  Last data filed at 8/20/2021 2300  Gross per 24 hour   Intake 840 ml   Output 1035 ml   Net -195 ml       Invasive Devices     Peripheral Intravenous Line            Peripheral IV 08/19/21 Right;Ventral (anterior) Forearm 1 day          Drain            Closed/Suction Drain LLQ 19 Fr  14 days    Closed/Suction Drain Left;Posterior Back Bulb 10 Fr  3 days                Physical Exam: /61 (BP Location: Right arm)   Pulse 104   Temp (!) 101 1 °F (38 4 °C) (Oral)   Resp 19   Ht 4' 11" (1 499 m)   Wt 77 1 kg (170 lb)   SpO2 96%   BMI 34 34 kg/m²     General Appearance:    Alert, cooperative, no distress, appears stated age   Head:    Normocephalic, without obvious abnormality, atraumatic   Eyes:     Ears:     Nose: Nares normal, septum midline, mucosa normal, no drainage    or sinus tenderness   Throat:   Lips, mucosa, and tongue normal; teeth and gums normal   Neck:   Supple, symmetrical, trachea midline, no adenopathy;     thyroid:    Back:     Symmetric, no curvature, ROM normal, no CVA tenderness   Lungs:     Clear to auscultation bilaterally, respirations unlabored   Chest Wall:    No tenderness or deformity    Heart:    Regular rate and rhythm,    Abdomen:     Soft, non-tender, y   Extremities:   Extremities normal, atraumatic, no cyanosis or edema   Skin:   Skin color, texture, turgor normal, no rashes or lesions   Neurologic:   CNII-XII grossly intact, grossly normal strength, sensation                  Recent Results (from the past 24 hour(s))   CBC and differential    Collection Time: 08/20/21  9:54 AM   Result Value Ref Range    WBC 17 75 (H) 4 31 - 10 16 Thousand/uL    RBC 2 86 (L) 3 81 - 5 12 Million/uL    Hemoglobin 8 1 (L) 11 5 - 15 4 g/dL    Hematocrit 25 5 (L) 34 8 - 46 1 %    MCV 89 82 - 98 fL    MCH 28 3 26 8 - 34 3 pg    MCHC 31 8 31 4 - 37 4 g/dL    RDW 17 2 (H) 11 6 - 15 1 %    MPV 9 3 8 9 - 12 7 fL    Platelets 8,221 (HH) 149 - 390 Thousands/uL    nRBC 1 /100 WBCs    Neutrophils Relative 79 (H) 43 - 75 %    Immat GRANS % 1 0 - 2 %    Lymphocytes Relative 10 (L) 14 - 44 %    Monocytes Relative 9 4 - 12 %    Eosinophils Relative 1 0 - 6 %    Basophils Relative 0 0 - 1 %    Neutrophils Absolute 13 96 (H) 1 85 - 7 62 Thousands/µL    Immature Grans Absolute 0 23 (H) 0 00 - 0 20 Thousand/uL    Lymphocytes Absolute 1 75 0 60 - 4 47 Thousands/µL    Monocytes Absolute 1 52 (H) 0 17 - 1 22 Thousand/µL    Eosinophils Absolute 0 23 0 00 - 0 61 Thousand/µL    Basophils Absolute 0 06 0 00 - 0 10 Thousands/µL   Basic metabolic panel    Collection Time: 08/20/21  9:54 AM   Result Value Ref Range    Sodium 130 (L) 136 - 145 mmol/L    Potassium 3 4 (L) 3 5 - 5 3 mmol/L    Chloride 99 (L) 100 - 108 mmol/L    CO2 28 21 - 32 mmol/L    ANION GAP 3 (L) 4 - 13 mmol/L    BUN 6 5 - 25 mg/dL    Creatinine 0 40 (L) 0 60 - 1 30 mg/dL    Glucose 124 65 - 140 mg/dL    Calcium 7 9 (L) 8 3 - 10 1 mg/dL    eGFR 106 ml/min/1 73sq m   Lipase    Collection Time: 08/21/21  6:47 AM   Result Value Ref Range    Lipase 211 73 - 393 u/L   Comprehensive metabolic panel    Collection Time: 08/21/21  6:47 AM   Result Value Ref Range    Sodium 129 (L) 136 - 145 mmol/L    Potassium 3 4 (L) 3 5 - 5 3 mmol/L    Chloride 96 (L) 100 - 108 mmol/L    CO2 27 21 - 32 mmol/L    ANION GAP 6 4 - 13 mmol/L    BUN 6 5 - 25 mg/dL    Creatinine 0 35 (L) 0 60 - 1 30 mg/dL    Glucose 134 65 - 140 mg/dL    Calcium 7 7 (L) 8 3 - 10 1 mg/dL    Corrected Calcium 9 9 8 3 - 10 1 mg/dL    AST 19 5 - 45 U/L    ALT 14 12 - 78 U/L    Alkaline Phosphatase 488 (H) 46 - 116 U/L    Total Protein 5 9 (L) 6 4 - 8 2 g/dL    Albumin 1 3 (L) 3 5 - 5 0 g/dL    Total Bilirubin 0 36 0 20 - 1 00 mg/dL    eGFR 111 ml/min/1 73sq m   CBC and differential    Collection Time: 08/21/21  6:54 AM   Result Value Ref Range    WBC 16 29 (H) 4 31 - 10 16 Thousand/uL    RBC 2 74 (L) 3 81 - 5 12 Million/uL    Hemoglobin 7 7 (L) 11 5 - 15 4 g/dL    Hematocrit 24 1 (L) 34 8 - 46 1 %    MCV 88 82 - 98 fL    MCH 28 1 26 8 - 34 3 pg    MCHC 32 0 31 4 - 37 4 g/dL    RDW 17 1 (H) 11 6 - 15 1 %    MPV 9 0 8 9 - 12 7 fL    Platelets 5,785 (HH) 149 - 390 Thousands/uL    nRBC 1 /100 WBCs    Neutrophils Relative 74 43 - 75 %    Immat GRANS % 2 0 - 2 %    Lymphocytes Relative 10 (L) 14 - 44 %    Monocytes Relative 12 4 - 12 %    Eosinophils Relative 1 0 - 6 %    Basophils Relative 1 0 - 1 %    Neutrophils Absolute 12 33 (H) 1 85 - 7 62 Thousands/µL    Immature Grans Absolute 0 24 (H) 0 00 - 0 20 Thousand/uL    Lymphocytes Absolute 1 62 0 60 - 4 47 Thousands/µL    Monocytes Absolute 1 90 (H) 0 17 - 1 22 Thousand/µL    Eosinophils Absolute 0 12 0 00 - 0 61 Thousand/µL    Basophils Absolute 0 08 0 00 - 0 10 Thousands/µL ]    Lab, Imaging and other studies:I have personally reviewed pertinent lab results      VTE Pharmacologic Prophylaxis: Enoxaparin (Lovenox)  VTE Mechanical Prophylaxis: sequential compression device

## 2021-08-21 NOTE — PLAN OF CARE
Problem: MOBILITY - ADULT  Goal: Maintain or return to baseline ADL function  Description: INTERVENTIONS:  -  Assess patient's ability to carry out ADLs; assess patient's baseline for ADL function and identify physical deficits which impact ability to perform ADLs (bathing, care of mouth/teeth, toileting, grooming, dressing, etc )  - Assess/evaluate cause of self-care deficits   - Assess range of motion  - Assess patient's mobility; develop plan if impaired  - Assess patient's need for assistive devices and provide as appropriate  - Encourage maximum independence but intervene and supervise when necessary  - Involve family in performance of ADLs  - Assess for home care needs following discharge   - Consider OT consult to assist with ADL evaluation and planning for discharge  - Provide patient education as appropriate  Outcome: Progressing  Goal: Maintains/Returns to pre admission functional level  Description: INTERVENTIONS:  - Perform BMAT or MOVE assessment daily    - Set and communicate daily mobility goal to care team and patient/family/caregiver  - Collaborate with rehabilitation services on mobility goals if consulted  - Perform Range of Motion times a day  - Reposition patient every  hours    - Dangle patient  times a day  - Stand patient times a day  - Ambulate patient  times a day  - Out of bed to chair times a day   - Out of bed for meals imes a day  - Out of bed for toileting  - Record patient progress and toleration of activity level   Outcome: Progressing

## 2021-08-22 LAB
ANION GAP SERPL CALCULATED.3IONS-SCNC: 5 MMOL/L (ref 4–13)
BACTERIA BLD CULT: NORMAL
BACTERIA BLD CULT: NORMAL
BACTERIA SPEC BFLD CULT: ABNORMAL
BACTERIA SPEC BFLD CULT: ABNORMAL
BASOPHILS # BLD AUTO: 0.09 THOUSANDS/ΜL (ref 0–0.1)
BASOPHILS NFR BLD AUTO: 1 % (ref 0–1)
BUN SERPL-MCNC: 7 MG/DL (ref 5–25)
CALCIUM SERPL-MCNC: 7.8 MG/DL (ref 8.3–10.1)
CHLORIDE SERPL-SCNC: 98 MMOL/L (ref 100–108)
CO2 SERPL-SCNC: 27 MMOL/L (ref 21–32)
CREAT SERPL-MCNC: 0.43 MG/DL (ref 0.6–1.3)
EOSINOPHIL # BLD AUTO: 0.17 THOUSAND/ΜL (ref 0–0.61)
EOSINOPHIL NFR BLD AUTO: 1 % (ref 0–6)
ERYTHROCYTE [DISTWIDTH] IN BLOOD BY AUTOMATED COUNT: 17.5 % (ref 11.6–15.1)
GFR SERPL CREATININE-BSD FRML MDRD: 103 ML/MIN/1.73SQ M
GLUCOSE SERPL-MCNC: 149 MG/DL (ref 65–140)
GRAM STN SPEC: ABNORMAL
GRAM STN SPEC: ABNORMAL
HCT VFR BLD AUTO: 23.5 % (ref 34.8–46.1)
HGB BLD-MCNC: 7.5 G/DL (ref 11.5–15.4)
IMM GRANULOCYTES # BLD AUTO: 0.32 THOUSAND/UL (ref 0–0.2)
IMM GRANULOCYTES NFR BLD AUTO: 2 % (ref 0–2)
LYMPHOCYTES # BLD AUTO: 1.71 THOUSANDS/ΜL (ref 0.6–4.47)
LYMPHOCYTES NFR BLD AUTO: 9 % (ref 14–44)
MAGNESIUM SERPL-MCNC: 1.7 MG/DL (ref 1.6–2.6)
MCH RBC QN AUTO: 28.4 PG (ref 26.8–34.3)
MCHC RBC AUTO-ENTMCNC: 31.9 G/DL (ref 31.4–37.4)
MCV RBC AUTO: 89 FL (ref 82–98)
MONOCYTES # BLD AUTO: 2.45 THOUSAND/ΜL (ref 0.17–1.22)
MONOCYTES NFR BLD AUTO: 13 % (ref 4–12)
NEUTROPHILS # BLD AUTO: 13.52 THOUSANDS/ΜL (ref 1.85–7.62)
NEUTS SEG NFR BLD AUTO: 74 % (ref 43–75)
NRBC BLD AUTO-RTO: 1 /100 WBCS
PLATELET # BLD AUTO: 1132 THOUSANDS/UL (ref 149–390)
PMV BLD AUTO: 9.5 FL (ref 8.9–12.7)
POTASSIUM SERPL-SCNC: 3.7 MMOL/L (ref 3.5–5.3)
RBC # BLD AUTO: 2.64 MILLION/UL (ref 3.81–5.12)
SODIUM SERPL-SCNC: 130 MMOL/L (ref 136–145)
WBC # BLD AUTO: 18.26 THOUSAND/UL (ref 4.31–10.16)

## 2021-08-22 PROCEDURE — 80048 BASIC METABOLIC PNL TOTAL CA: CPT | Performed by: OBSTETRICS & GYNECOLOGY

## 2021-08-22 PROCEDURE — 99024 POSTOP FOLLOW-UP VISIT: CPT | Performed by: OBSTETRICS & GYNECOLOGY

## 2021-08-22 PROCEDURE — 85025 COMPLETE CBC W/AUTO DIFF WBC: CPT | Performed by: OBSTETRICS & GYNECOLOGY

## 2021-08-22 PROCEDURE — 99024 POSTOP FOLLOW-UP VISIT: CPT | Performed by: PHYSICIAN ASSISTANT

## 2021-08-22 PROCEDURE — 83735 ASSAY OF MAGNESIUM: CPT | Performed by: OBSTETRICS & GYNECOLOGY

## 2021-08-22 RX ADMIN — CEFEPIME HYDROCHLORIDE 2000 MG: 2 INJECTION, POWDER, FOR SOLUTION INTRAVENOUS at 04:55

## 2021-08-22 RX ADMIN — ACETAMINOPHEN 975 MG: 325 TABLET, FILM COATED ORAL at 00:17

## 2021-08-22 RX ADMIN — OXYCODONE HYDROCHLORIDE 10 MG: 10 TABLET ORAL at 13:45

## 2021-08-22 RX ADMIN — METRONIDAZOLE 500 MG: 500 TABLET ORAL at 05:00

## 2021-08-22 RX ADMIN — OXYCODONE HYDROCHLORIDE 10 MG: 10 TABLET ORAL at 18:25

## 2021-08-22 RX ADMIN — CEFEPIME HYDROCHLORIDE 2000 MG: 2 INJECTION, POWDER, FOR SOLUTION INTRAVENOUS at 17:27

## 2021-08-22 RX ADMIN — DAPTOMYCIN 475 MG: 500 INJECTION, POWDER, LYOPHILIZED, FOR SOLUTION INTRAVENOUS at 21:26

## 2021-08-22 RX ADMIN — ENOXAPARIN SODIUM 40 MG: 40 INJECTION SUBCUTANEOUS at 08:51

## 2021-08-22 RX ADMIN — MAGNESIUM OXIDE TAB 400 MG (241.3 MG ELEMENTAL MG) 400 MG: 400 (241.3 MG) TAB at 13:45

## 2021-08-22 RX ADMIN — MAGNESIUM OXIDE TAB 400 MG (241.3 MG ELEMENTAL MG) 400 MG: 400 (241.3 MG) TAB at 08:51

## 2021-08-22 RX ADMIN — OXYCODONE HYDROCHLORIDE 10 MG: 10 TABLET ORAL at 08:56

## 2021-08-22 RX ADMIN — OXYCODONE HYDROCHLORIDE 10 MG: 10 TABLET ORAL at 04:55

## 2021-08-22 RX ADMIN — METRONIDAZOLE 500 MG: 500 TABLET ORAL at 21:26

## 2021-08-22 RX ADMIN — AMLODIPINE BESYLATE 10 MG: 10 TABLET ORAL at 08:51

## 2021-08-22 RX ADMIN — PANTOPRAZOLE SODIUM 40 MG: 40 TABLET, DELAYED RELEASE ORAL at 05:00

## 2021-08-22 RX ADMIN — METRONIDAZOLE 500 MG: 500 TABLET ORAL at 13:45

## 2021-08-22 RX ADMIN — OXYCODONE HYDROCHLORIDE 10 MG: 10 TABLET ORAL at 00:20

## 2021-08-22 RX ADMIN — PRAVASTATIN SODIUM 10 MG: 10 TABLET ORAL at 17:24

## 2021-08-22 RX ADMIN — ASPIRIN 81 MG: 81 TABLET, COATED ORAL at 08:51

## 2021-08-22 NOTE — PROGRESS NOTES
Progress Note - Gynecologic Oncology   Shelby Barrientos 80 y o  female MRN: [id number]  Unit/Bed#: [facility] 305-01 Encounter: [id number]    Assessment/Plan  POD#16 s/p primary debulking     Pancreatic leak with abscess formation: IR drain placed 8/19  Cx resulted - appreciate ID input  Repeat CT given persistent fevers with improvement in collection  Continues to be febrile with persistent elevation in WBC  Daptomycin added yesterday          Acute respiratory failure:  S/p thora  Persistent pleural effusion on left  Encourage IS  Off O2 today       Acute blood loss anemia: s/p multiple transfusions  Continue to trend     Splenectomy with reactive thromobocytosis: will need vaccinations prior to discharge  Monitor thombocytosis        DVT ppx: continue lovenox post procedure  To be discharged on elliquis         Subjective/Objective     Patient reports feeling well again this morning  She had another fever around midnight which subsided  She has been out of bed and tolerating a regular diet with continued bowel movements  Her pain is controlled with oral pain meds  Blood pressure 116/61, pulse 101, temperature 99 2 °F (37 3 °C), temperature source Oral, resp  rate 18, height 4' 11" (1 499 m), weight 77 1 kg (170 lb), SpO2 94 %  ,Body mass index is 34 34 kg/m²        Intake/Output Summary (Last 24 hours) at 8/22/2021 0919  Last data filed at 8/22/2021 0201  Gross per 24 hour   Intake --   Output 525 ml   Net -525 ml       Invasive Devices     Peripheral Intravenous Line            Peripheral IV 08/21/21 Proximal;Right;Ventral (anterior) Forearm <1 day          Drain            Closed/Suction Drain LLQ 19 Fr  15 days    Closed/Suction Drain Left;Posterior Back Bulb 10 Fr  3 days                Physical Exam: /61 (BP Location: Right arm)   Pulse 101   Temp 99 2 °F (37 3 °C) (Oral)   Resp 18   Ht 4' 11" (1 499 m)   Wt 77 1 kg (170 lb)   SpO2 94%   BMI 34 34 kg/m²     General Appearance:    Alert, cooperative, no distress, appears stated age   Head:    Normocephalic, without obvious abnormality, atraumatic   Eyes:     Ears:     Nose:   Nares normal, septum midline, mucosa normal, no drainage    or sinus tenderness   Throat:   Lips, mucosa, and tongue normal; teeth and gums normal   Neck:   Supple, symmetrical, trachea midline, no adenopathy;     thyroid:    Back:     Symmetric, no curvature, ROM normal, no CVA tenderness   Lungs:     Clear to auscultation bilaterally, respirations unlabored   Chest Wall:    No tenderness or deformity    Heart:    Regular rate and rhythm,    Abdomen:     Soft, non-tender, milky/purulent drainage in suction bulbs    Extremities:   Extremities normal, atraumatic, no cyanosis or edema   Skin:   Skin color, texture, turgor normal, no rashes or lesions   Neurologic:   CNII-XII grossly intact, grossly normal strength, sensation                  Recent Results (from the past 24 hour(s))   Basic metabolic panel    Collection Time: 08/22/21  5:00 AM   Result Value Ref Range    Sodium 130 (L) 136 - 145 mmol/L    Potassium 3 7 3 5 - 5 3 mmol/L    Chloride 98 (L) 100 - 108 mmol/L    CO2 27 21 - 32 mmol/L    ANION GAP 5 4 - 13 mmol/L    BUN 7 5 - 25 mg/dL    Creatinine 0 43 (L) 0 60 - 1 30 mg/dL    Glucose 149 (H) 65 - 140 mg/dL    Calcium 7 8 (L) 8 3 - 10 1 mg/dL    eGFR 103 ml/min/1 73sq m   Magnesium    Collection Time: 08/22/21  5:00 AM   Result Value Ref Range    Magnesium 1 7 1 6 - 2 6 mg/dL   CBC and differential    Collection Time: 08/22/21  5:00 AM   Result Value Ref Range    WBC 18 26 (H) 4 31 - 10 16 Thousand/uL    RBC 2 64 (L) 3 81 - 5 12 Million/uL    Hemoglobin 7 5 (L) 11 5 - 15 4 g/dL    Hematocrit 23 5 (L) 34 8 - 46 1 %    MCV 89 82 - 98 fL    MCH 28 4 26 8 - 34 3 pg    MCHC 31 9 31 4 - 37 4 g/dL    RDW 17 5 (H) 11 6 - 15 1 %    MPV 9 5 8 9 - 12 7 fL    Platelets 6,355 (HH) 149 - 390 Thousands/uL    nRBC 1 /100 WBCs    Neutrophils Relative 74 43 - 75 %    Immat GRANS % 2 0 - 2 % Lymphocytes Relative 9 (L) 14 - 44 %    Monocytes Relative 13 (H) 4 - 12 %    Eosinophils Relative 1 0 - 6 %    Basophils Relative 1 0 - 1 %    Neutrophils Absolute 13 52 (H) 1 85 - 7 62 Thousands/µL    Immature Grans Absolute 0 32 (H) 0 00 - 0 20 Thousand/uL    Lymphocytes Absolute 1 71 0 60 - 4 47 Thousands/µL    Monocytes Absolute 2 45 (H) 0 17 - 1 22 Thousand/µL    Eosinophils Absolute 0 17 0 00 - 0 61 Thousand/µL    Basophils Absolute 0 09 0 00 - 0 10 Thousands/µL   ]    Lab, Imaging and other studies:I have personally reviewed pertinent lab results      VTE Pharmacologic Prophylaxis: Enoxaparin (Lovenox)  VTE Mechanical Prophylaxis: sequential compression device

## 2021-08-22 NOTE — PROGRESS NOTES
Called by nursing tonight because she noticed patient's LUQ OSCAR bulb was losing its suction and appeared to have migrated out slightly with possible detachment of her drain stay suture  When I arrived and inspected her drain, I found that the original drain stay suture was slightly buried beneath the skin surface and covered with an exudative-type covering  The tube had been advance about 1 cm and regained adequate suction again  In order to insure that the drain does not migrate out again, I placed another stay suture on the other side of the OSCAR drain LUQ using sterile technique, betadine as prep, and a 3-0 nylon suture  Patient tolerated all well and Monica Covarrubias, her nurse, was present for the entire procedure  Thicker, yellowish, fluid is draining slightly through the tubing into the OSCAR bulb        Dejah Jeyson [facility]  8/22/21  Patient encounter [phone]

## 2021-08-23 LAB
ANION GAP SERPL CALCULATED.3IONS-SCNC: 4 MMOL/L (ref 4–13)
BASOPHILS # BLD AUTO: 0.08 THOUSANDS/ΜL (ref 0–0.1)
BASOPHILS NFR BLD AUTO: 1 % (ref 0–1)
BUN SERPL-MCNC: 7 MG/DL (ref 5–25)
CALCIUM SERPL-MCNC: 8.3 MG/DL (ref 8.3–10.1)
CHLORIDE SERPL-SCNC: 95 MMOL/L (ref 100–108)
CO2 SERPL-SCNC: 29 MMOL/L (ref 21–32)
CREAT SERPL-MCNC: 0.43 MG/DL (ref 0.6–1.3)
EOSINOPHIL # BLD AUTO: 0.28 THOUSAND/ΜL (ref 0–0.61)
EOSINOPHIL NFR BLD AUTO: 2 % (ref 0–6)
ERYTHROCYTE [DISTWIDTH] IN BLOOD BY AUTOMATED COUNT: 17.7 % (ref 11.6–15.1)
GFR SERPL CREATININE-BSD FRML MDRD: 103 ML/MIN/1.73SQ M
GLUCOSE SERPL-MCNC: 114 MG/DL (ref 65–140)
HCT VFR BLD AUTO: 25.8 % (ref 34.8–46.1)
HGB BLD-MCNC: 8.1 G/DL (ref 11.5–15.4)
IMM GRANULOCYTES # BLD AUTO: 0.37 THOUSAND/UL (ref 0–0.2)
IMM GRANULOCYTES NFR BLD AUTO: 2 % (ref 0–2)
LYMPHOCYTES # BLD AUTO: 1.91 THOUSANDS/ΜL (ref 0.6–4.47)
LYMPHOCYTES NFR BLD AUTO: 11 % (ref 14–44)
MCH RBC QN AUTO: 27.9 PG (ref 26.8–34.3)
MCHC RBC AUTO-ENTMCNC: 31.4 G/DL (ref 31.4–37.4)
MCV RBC AUTO: 89 FL (ref 82–98)
MONOCYTES # BLD AUTO: 2.02 THOUSAND/ΜL (ref 0.17–1.22)
MONOCYTES NFR BLD AUTO: 11 % (ref 4–12)
NEUTROPHILS # BLD AUTO: 13.08 THOUSANDS/ΜL (ref 1.85–7.62)
NEUTS SEG NFR BLD AUTO: 73 % (ref 43–75)
NRBC BLD AUTO-RTO: 1 /100 WBCS
PLATELET # BLD AUTO: 1183 THOUSANDS/UL (ref 149–390)
PMV BLD AUTO: 9.1 FL (ref 8.9–12.7)
POTASSIUM SERPL-SCNC: 3.4 MMOL/L (ref 3.5–5.3)
RBC # BLD AUTO: 2.9 MILLION/UL (ref 3.81–5.12)
SODIUM SERPL-SCNC: 128 MMOL/L (ref 136–145)
WBC # BLD AUTO: 17.74 THOUSAND/UL (ref 4.31–10.16)

## 2021-08-23 PROCEDURE — 87077 CULTURE AEROBIC IDENTIFY: CPT | Performed by: OBSTETRICS & GYNECOLOGY

## 2021-08-23 PROCEDURE — 87205 SMEAR GRAM STAIN: CPT | Performed by: OBSTETRICS & GYNECOLOGY

## 2021-08-23 PROCEDURE — 87186 SC STD MICRODIL/AGAR DIL: CPT | Performed by: OBSTETRICS & GYNECOLOGY

## 2021-08-23 PROCEDURE — 80048 BASIC METABOLIC PNL TOTAL CA: CPT | Performed by: OBSTETRICS & GYNECOLOGY

## 2021-08-23 PROCEDURE — 87075 CULTR BACTERIA EXCEPT BLOOD: CPT | Performed by: OBSTETRICS & GYNECOLOGY

## 2021-08-23 PROCEDURE — 99024 POSTOP FOLLOW-UP VISIT: CPT | Performed by: OBSTETRICS & GYNECOLOGY

## 2021-08-23 PROCEDURE — 87040 BLOOD CULTURE FOR BACTERIA: CPT | Performed by: STUDENT IN AN ORGANIZED HEALTH CARE EDUCATION/TRAINING PROGRAM

## 2021-08-23 PROCEDURE — 99232 SBSQ HOSP IP/OBS MODERATE 35: CPT | Performed by: STUDENT IN AN ORGANIZED HEALTH CARE EDUCATION/TRAINING PROGRAM

## 2021-08-23 PROCEDURE — 85025 COMPLETE CBC W/AUTO DIFF WBC: CPT | Performed by: OBSTETRICS & GYNECOLOGY

## 2021-08-23 PROCEDURE — 87070 CULTURE OTHR SPECIMN AEROBIC: CPT | Performed by: OBSTETRICS & GYNECOLOGY

## 2021-08-23 RX ORDER — OXYCODONE HYDROCHLORIDE 5 MG/1
5 TABLET ORAL EVERY 4 HOURS
Status: DISCONTINUED | OUTPATIENT
Start: 2021-08-23 | End: 2021-09-01

## 2021-08-23 RX ORDER — POTASSIUM CHLORIDE 20 MEQ/1
20 TABLET, EXTENDED RELEASE ORAL ONCE
Status: COMPLETED | OUTPATIENT
Start: 2021-08-23 | End: 2021-08-23

## 2021-08-23 RX ORDER — CIPROFLOXACIN 2 MG/ML
400 INJECTION, SOLUTION INTRAVENOUS EVERY 12 HOURS
Status: DISCONTINUED | OUTPATIENT
Start: 2021-08-23 | End: 2021-08-26

## 2021-08-23 RX ADMIN — ASPIRIN 81 MG: 81 TABLET, COATED ORAL at 08:15

## 2021-08-23 RX ADMIN — OXYCODONE HYDROCHLORIDE 5 MG: 5 TABLET ORAL at 08:36

## 2021-08-23 RX ADMIN — ENOXAPARIN SODIUM 40 MG: 40 INJECTION SUBCUTANEOUS at 08:15

## 2021-08-23 RX ADMIN — POTASSIUM CHLORIDE 20 MEQ: 1500 TABLET, EXTENDED RELEASE ORAL at 06:38

## 2021-08-23 RX ADMIN — OXYCODONE HYDROCHLORIDE 10 MG: 10 TABLET ORAL at 00:31

## 2021-08-23 RX ADMIN — OXYCODONE HYDROCHLORIDE 5 MG: 5 TABLET ORAL at 21:13

## 2021-08-23 RX ADMIN — PRAVASTATIN SODIUM 10 MG: 10 TABLET ORAL at 16:38

## 2021-08-23 RX ADMIN — OXYCODONE HYDROCHLORIDE 5 MG: 5 TABLET ORAL at 16:38

## 2021-08-23 RX ADMIN — OXYCODONE HYDROCHLORIDE 5 MG: 5 TABLET ORAL at 12:57

## 2021-08-23 RX ADMIN — MAGNESIUM OXIDE TAB 400 MG (241.3 MG ELEMENTAL MG) 400 MG: 400 (241.3 MG) TAB at 08:15

## 2021-08-23 RX ADMIN — PANTOPRAZOLE SODIUM 40 MG: 40 TABLET, DELAYED RELEASE ORAL at 05:00

## 2021-08-23 RX ADMIN — SODIUM CHLORIDE 3 G: 9 INJECTION, SOLUTION INTRAVENOUS at 21:13

## 2021-08-23 RX ADMIN — AMLODIPINE BESYLATE 10 MG: 10 TABLET ORAL at 08:15

## 2021-08-23 RX ADMIN — CEFEPIME HYDROCHLORIDE 2000 MG: 2 INJECTION, POWDER, FOR SOLUTION INTRAVENOUS at 04:39

## 2021-08-23 RX ADMIN — OXYCODONE HYDROCHLORIDE 10 MG: 10 TABLET ORAL at 04:39

## 2021-08-23 RX ADMIN — MAGNESIUM OXIDE TAB 400 MG (241.3 MG ELEMENTAL MG) 400 MG: 400 (241.3 MG) TAB at 12:57

## 2021-08-23 RX ADMIN — CIPROFLOXACIN 400 MG: 2 INJECTION, SOLUTION INTRAVENOUS at 21:13

## 2021-08-23 RX ADMIN — ACETAMINOPHEN 975 MG: 325 TABLET, FILM COATED ORAL at 00:30

## 2021-08-23 RX ADMIN — SODIUM CHLORIDE 3 G: 9 INJECTION, SOLUTION INTRAVENOUS at 16:38

## 2021-08-23 RX ADMIN — METRONIDAZOLE 500 MG: 500 TABLET ORAL at 05:06

## 2021-08-23 NOTE — PROGRESS NOTES
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  Overall doing well today       Plan:  High grade serous ovarian carcinoma   - Final path resulted  - Pt discussed at tumor board on 8/20, plan is for eventual systemic therapy, genetic and genomic testing     S/p surgery  - Spontaneously voiding  - Regular diet   - DVT ppx: Lovenox 40 mg SC daily + SCDs; discharge on Eliquis   - Continue IS/encourage ambulation   - Pain: PRN:    Tylenol 975mg Q6h (mild)   Tyra 5mg Q4h (moderate)   Tyra 10mg Q4h (severe)   Motrin 600mg Q6h (fever)   Dilaudid 0 5 IV Q2h (breakthrough)   Continue current regimen; consider scheduling certain meds if pain unable to be controlled on current regimen     Pancreatic leak with abscess formation   - Drain amylase 160 (8/18) from left thoracentesis specimen  - Prior > 13K from drain   - Collection decreasing in size on repeat CT 8/20  - WBC 18 <-- 17K <-- 16K; continue to trend daily   - Continue IV cefepime (day 7); IV Daptomycin (day 4); PO metronidazole (day 7)  - Maintain both drains for now, routine drain care     Acute blood loss anemia  - Hgb 8 1 today, improving from 7 5 yesterday   - S/P total 5 U RBC + 2 FFP intraop  - Continue to trend; maintain Hgb goal above 7     Acute respiratory failure   - Now off supplemental O2; maintaining 91-93% on RA; provide supplemental O2 as needed; pt denies SOB today  - With persistent pleural effusion   - S/p CXR 8/20; pending final read     S/p splenectomy with thrombocytosis   - Plts 1183 today; have been slightly increasing  - Continue to trend   - Continue ASA   - Splenectomy vaccines prior to discharge     HTN/HLD  Continue home meds amlodipine and pravastatin    Hypokalemia  - K 3 4 today, will give 20 mg K-Dur     Hyponatremia  - 128; continue to trend    Disposition: remain inpatient for ongoing treatment     Subjective:  Pritesh Flores is doing well  She slept okay last night but had intermittent pain  She is ambulating, tolerating regular diet, and voiding  She denies chest pain, SOB, nausea, vomiting  Review of Systems   Constitutional: Negative for appetite change  Respiratory: Negative for chest tightness and shortness of breath  Cardiovascular: Negative for chest pain  Gastrointestinal: Negative for abdominal pain, nausea and vomiting  Genitourinary: Negative for dysuria  Neurological: Negative for light-headedness and headaches  Psychiatric/Behavioral: Negative for confusion  All other systems reviewed and are negative  Objective:   /72 (BP Location: Right arm)   Pulse (!) 117   Temp 98 8 °F (37 1 °C) (Oral)   Resp 17   Ht 4' 11" (1 499 m)   Wt 77 1 kg (170 lb)   SpO2 93%   BMI 34 34 kg/m²     I/O last 3 completed shifts: In: 180 [P O :180]  Out: 1525 [Urine:1500; Drains:25]  No intake/output data recorded  I/O       08/21 0701 - 08/22 0700 08/22 0701 - 08/23 0700 08/23 0701 - 08/24 0700    P  O  237 180     Total Intake(mL/kg) 237 (3 1) 180 (2 3)     Urine (mL/kg/hr) 500 (0 3) 1200 (0 6)     Drains 25 25     Stool 0      Total Output 525 1225     Net -288 -1045            Unmeasured Stool Occurrence 1 x            Lab Results   Component Value Date    WBC 18 26 (H) 08/22/2021    HGB 7 5 (L) 08/22/2021    HCT 23 5 (L) 08/22/2021    MCV 89 08/22/2021    PLT 1,132 (HH) 08/22/2021       Lab Results   Component Value Date    GLUCOSE 195 (H) 08/06/2021    CALCIUM 8 3 08/23/2021    K 3 4 (L) 08/23/2021    CO2 29 08/23/2021    CL 95 (L) 08/23/2021    BUN 7 08/23/2021    CREATININE 0 43 (L) 08/23/2021       Lab Results   Component Value Date/Time    POCGLU 154 (H) 08/19/2021 10:23 AM    POCGLU 161 (H) 08/19/2021 06:58 AM    POCGLU 158 (H) 08/18/2021 09:09 PM    POCGLU 171 (H) 08/18/2021 04:33 PM    POCGLU 92 08/18/2021 12:10 PM       Physical Exam  Constitutional:       General: She is not in acute distress  Appearance: She is obese  She is not ill-appearing or diaphoretic  HENT:      Head: Normocephalic and atraumatic  Eyes:      Conjunctiva/sclera: Conjunctivae normal       Pupils: Pupils are equal, round, and reactive to light  Cardiovascular:      Rate and Rhythm: Normal rate and regular rhythm  Pulses: Normal pulses  Heart sounds: Normal heart sounds  Pulmonary:      Effort: Pulmonary effort is normal  No respiratory distress  Breath sounds: Normal breath sounds  Comments: Breathing adequately on room air not currently requiring supplemental O2  Abdominal:      General: Abdomen is flat  Bowel sounds are normal  There is no distension  Tenderness: There is no abdominal tenderness  There is no guarding  Comments: Midline vertical incision c/d/i, healing well  Small < 1 cm separation in lower portion of wound, no purulent drainage/bleeding  LLQ OSCAR drain in place, draining purulent milky fluid, unchanged from prior     Posterior Left lower back drain with minimal output at this time    Musculoskeletal:         General: Normal range of motion  Cervical back: Normal range of motion  Skin:     General: Skin is warm and dry  Capillary Refill: Capillary refill takes less than 2 seconds  Neurological:      General: No focal deficit present  Mental Status: She is alert and oriented to person, place, and time  Mental status is at baseline     Psychiatric:         Mood and Affect: Mood normal          Behavior: Behavior normal          Parth Lauren MD   PGY-3, GYN ONC  8/23/2021 7:13 AM

## 2021-08-23 NOTE — OCCUPATIONAL THERAPY NOTE
Occupational Therapy Cancellation Note       08/23/21 1617   OT Last Visit   OT Visit Date 08/23/21   Note Type   Note Type Treatment   Cancel Reasons Other       Chart reviewed  Attempted to see pt x2  In AM, pt requesting therapist return in the afternoon d/t fatigue  In PM, pt tearful regarding length of hospital stay  Pt educated on benefits of therapy and continued to decline  Pt in agreement to have nursing staff assist her OOB to recliner chair later in the PM  OT to continue to follow and attempt session as able        Kavya Carty, OT

## 2021-08-23 NOTE — PROGRESS NOTES
Progress Note - Infectious Disease   Robert Cardona 80 y o  female MRN: [id number]  Unit/Bed#: [facility] 305-01 Encounter: [id number]      Impression/Plan:    1  Abdominal abscess  Patient developed LUQ abscess after complex abdominal surgery and pancreatic leak  8/18, s/p IR drain placement with aspiration of 60cc purulent fluid  Culture is growing Klebsiella oxytoca, Enterococcus faecium, and Bacteroides fragilis  Repeat CT A/P 8/20 shows improvement in the size of the collection               -stop Daptomycin, cefepime, and metronidazole   -start Unasyn 3g IV q6hr and Ciprofloxacin 400mg IV q12hr to cover isolated organisms  Plan to switch to PO Augmentin and Ciprofloxacin prior to discharge   -     2  Sepsis-fevers, leukocytosis  Secondary to #1  However, she continues to have fever despite appropriate antibiotics and drainage of the abscess  Other considerations are drug fever, as well as septic pelvic thrombophlebitis  Although less common, this can occur after hysterectomy and pelvic surgery               -change antibiotics as above   -repeat blood cultures              -consider trial of full dose lovenox for pelvic thrombophlebitis     3  Pancreatic leak  Surgical complication  Abdominal drain in place       4  Ovarian cancer  8/6 status post ex lap, en-bloc hysterectomy, bilateral salpingo-oophorectomy, sigmoidectomy with low rectal anastomosis, SBR, radical omentectomy, splenectomy, appendectomy, oversewing of tail of pancreas, repair of cystotomy, bilateral peritoneal stripping  Surgery was complicated by pancreatic tail hemorrhage and low rectal reanastomosis  Imaging shows liver metastases              -care per Gyn onc     5  Thrombocytosis  Reactive due to splenectomy, which is expected   On aspirin and lovenox     6  Status post splenectomy  Received HiB, meningococcal quadrivalent vaccine              -needs PCV13 prior to discharge     I have discussed the above management plan in detail with the primary service  ID will follow  Antibiotics:  Cefepime  Metronidazole  Daptomycin    Subjective:  Tmax 101 1 yesterday  The patient reports that she is tired today and frustrated with her long hospital stay  She denies abdominal pain, nausea, vomiting, diarrhea  Tolerating a regular diet  Objective:  Vitals:  Temp:  [98 8 °F (37 1 °C)-101 1 °F (38 4 °C)] 98 8 °F (37 1 °C)  HR:  [104-117] 104  Resp:  [17-20] 17  BP: (110-128)/(55-72) 128/58  SpO2:  [91 %-95 %] 95 %  Temp (24hrs), Av 8 °F (37 7 °C), Min:98 8 °F (37 1 °C), Max:101 1 °F (38 4 °C)  Current: Temperature: 98 8 °F (37 1 °C)    Physical Exam:   General Appearance:  Alert, interactive, nontoxic, no acute distress  Throat: Oropharynx moist without lesions  Lungs:   Clear to auscultation bilaterally; no wheezes, rhonchi or rales; respirations unlabored   Heart:  RRR; no murmur, rub or gallop   Abdomen:   Soft, non-tender, non-distended, positive bowel sounds  2 RUQ OSCAR drains with scant purulent fluid   Extremities: No clubbing, cyanosis or edema   Skin: No new rashes or lesions  Abdominal midline scar, no erythema or drainage       Labs:    All pertinent labs and imaging studies were personally reviewed  Results from last 7 days   Lab Units 21  0443 21  0500 21  0654   WBC Thousand/uL 17 74* 18 26* 16 29*   HEMOGLOBIN g/dL 8 1* 7 5* 7 7*   PLATELETS Thousands/uL 1,183* 1,132* 1,063*     Results from last 7 days   Lab Units 21  0443 21  0500 21  0647   SODIUM mmol/L 128* 130* 129*   POTASSIUM mmol/L 3 4* 3 7 3 4*   CHLORIDE mmol/L 95* 98* 96*   CO2 mmol/L 29 27 27   BUN mg/dL 7 7 6   CREATININE mg/dL 0 43* 0 43* 0 35*   EGFR ml/min/1 73sq m 103 103 111   CALCIUM mg/dL 8 3 7 8* 7 7*   AST U/L  --   --  19   ALT U/L  --   --  14   ALK PHOS U/L  --   --  488*     Results from last 7 days   Lab Units 21  0523 21  1537   PROCALCITONIN ng/ml 1 12* 1 01*                   Micro:  Results from last 7 days Lab Units 08/18/21  1038 08/18/21  0904 08/17/21  1536 08/17/21  1535   BLOOD CULTURE   --   --  No Growth After 5 Days  No Growth After 5 Days  GRAM STAIN RESULT  No Polys or Bacteria seen Rare Polys*  2+ Gram negative rods*  --   --    BODY FLUID CULTURE, STERILE  No growth 2+ Growth of Klebsiella oxytoca*  1+ Growth of Enterococcus faecium*  --   --      LUQ Abscess Cx: Klebsiella oxytoca, Enterococcus faecium     Imaging:    CT A/P 8/20/21:  1   Decreased size of left upper quadrant gas and fluid containing collection status post locking loop catheter placement  2   Moderate left pleural effusion with new dependent high density and small locule of gas  This is likely related to interval thoracentesis  The effusion demonstrates increased lateral component  Small right effusion is stable  3   Stable tract of gas extending into the presacral space just above the rectal anastomosis  This may be a normal postoperative appearance of an end-to-side anastomosis no walled off leak is not excluded  Correlation with surgical history recommended  4   Liver lesions again seen suspicious for metastasis  Soft tissue along the posterior liver capsule likely represents metastatic implants

## 2021-08-24 PROBLEM — J96.91 RESPIRATORY FAILURE WITH HYPOXIA (HCC): Status: ACTIVE | Noted: 2021-08-24

## 2021-08-24 PROBLEM — T81.31XA WOUND DEHISCENCE, SURGICAL: Status: ACTIVE | Noted: 2021-08-24

## 2021-08-24 PROBLEM — Z90.81 S/P SPLENECTOMY: Status: ACTIVE | Noted: 2021-08-24

## 2021-08-24 LAB
ANION GAP SERPL CALCULATED.3IONS-SCNC: 6 MMOL/L (ref 4–13)
BASOPHILS # BLD AUTO: 0.06 THOUSANDS/ΜL (ref 0–0.1)
BASOPHILS NFR BLD AUTO: 1 % (ref 0–1)
BUN SERPL-MCNC: 7 MG/DL (ref 5–25)
CALCIUM SERPL-MCNC: 7.9 MG/DL (ref 8.3–10.1)
CHLORIDE SERPL-SCNC: 96 MMOL/L (ref 100–108)
CO2 SERPL-SCNC: 27 MMOL/L (ref 21–32)
CREAT SERPL-MCNC: 0.34 MG/DL (ref 0.6–1.3)
EOSINOPHIL # BLD AUTO: 0.18 THOUSAND/ΜL (ref 0–0.61)
EOSINOPHIL NFR BLD AUTO: 2 % (ref 0–6)
ERYTHROCYTE [DISTWIDTH] IN BLOOD BY AUTOMATED COUNT: 18 % (ref 11.6–15.1)
GFR SERPL CREATININE-BSD FRML MDRD: 112 ML/MIN/1.73SQ M
GLUCOSE SERPL-MCNC: 109 MG/DL (ref 65–140)
HCT VFR BLD AUTO: 24 % (ref 34.8–46.1)
HGB BLD-MCNC: 7.7 G/DL (ref 11.5–15.4)
IMM GRANULOCYTES # BLD AUTO: 0.22 THOUSAND/UL (ref 0–0.2)
IMM GRANULOCYTES NFR BLD AUTO: 2 % (ref 0–2)
LYMPHOCYTES # BLD AUTO: 1.01 THOUSANDS/ΜL (ref 0.6–4.47)
LYMPHOCYTES NFR BLD AUTO: 10 % (ref 14–44)
MCH RBC QN AUTO: 28.1 PG (ref 26.8–34.3)
MCHC RBC AUTO-ENTMCNC: 32.1 G/DL (ref 31.4–37.4)
MCV RBC AUTO: 88 FL (ref 82–98)
MONOCYTES # BLD AUTO: 1.43 THOUSAND/ΜL (ref 0.17–1.22)
MONOCYTES NFR BLD AUTO: 14 % (ref 4–12)
NEUTROPHILS # BLD AUTO: 7.37 THOUSANDS/ΜL (ref 1.85–7.62)
NEUTS SEG NFR BLD AUTO: 71 % (ref 43–75)
NRBC BLD AUTO-RTO: 1 /100 WBCS
PLATELET # BLD AUTO: 1032 THOUSANDS/UL (ref 149–390)
PMV BLD AUTO: 8.9 FL (ref 8.9–12.7)
POTASSIUM SERPL-SCNC: 3.4 MMOL/L (ref 3.5–5.3)
RBC # BLD AUTO: 2.74 MILLION/UL (ref 3.81–5.12)
SARS-COV-2 RNA RESP QL NAA+PROBE: NEGATIVE
SODIUM SERPL-SCNC: 129 MMOL/L (ref 136–145)
WBC # BLD AUTO: 10.27 THOUSAND/UL (ref 4.31–10.16)

## 2021-08-24 PROCEDURE — 99231 SBSQ HOSP IP/OBS SF/LOW 25: CPT | Performed by: STUDENT IN AN ORGANIZED HEALTH CARE EDUCATION/TRAINING PROGRAM

## 2021-08-24 PROCEDURE — 97168 OT RE-EVAL EST PLAN CARE: CPT

## 2021-08-24 PROCEDURE — 80048 BASIC METABOLIC PNL TOTAL CA: CPT | Performed by: OBSTETRICS & GYNECOLOGY

## 2021-08-24 PROCEDURE — 99024 POSTOP FOLLOW-UP VISIT: CPT | Performed by: OBSTETRICS & GYNECOLOGY

## 2021-08-24 PROCEDURE — U0003 INFECTIOUS AGENT DETECTION BY NUCLEIC ACID (DNA OR RNA); SEVERE ACUTE RESPIRATORY SYNDROME CORONAVIRUS 2 (SARS-COV-2) (CORONAVIRUS DISEASE [COVID-19]), AMPLIFIED PROBE TECHNIQUE, MAKING USE OF HIGH THROUGHPUT TECHNOLOGIES AS DESCRIBED BY CMS-2020-01-R: HCPCS | Performed by: OBSTETRICS & GYNECOLOGY

## 2021-08-24 PROCEDURE — 85025 COMPLETE CBC W/AUTO DIFF WBC: CPT | Performed by: OBSTETRICS & GYNECOLOGY

## 2021-08-24 PROCEDURE — U0005 INFEC AGEN DETEC AMPLI PROBE: HCPCS | Performed by: OBSTETRICS & GYNECOLOGY

## 2021-08-24 RX ORDER — OXYCODONE HYDROCHLORIDE 10 MG/1
10 TABLET ORAL EVERY 4 HOURS PRN
Status: DISCONTINUED | OUTPATIENT
Start: 2021-08-24 | End: 2021-09-01

## 2021-08-24 RX ADMIN — ENOXAPARIN SODIUM 80 MG: 80 INJECTION SUBCUTANEOUS at 20:23

## 2021-08-24 RX ADMIN — SODIUM CHLORIDE 3 G: 9 INJECTION, SOLUTION INTRAVENOUS at 08:26

## 2021-08-24 RX ADMIN — OXYCODONE HYDROCHLORIDE 5 MG: 5 TABLET ORAL at 17:28

## 2021-08-24 RX ADMIN — ENOXAPARIN SODIUM 40 MG: 40 INJECTION SUBCUTANEOUS at 08:18

## 2021-08-24 RX ADMIN — SODIUM CHLORIDE 3 G: 9 INJECTION, SOLUTION INTRAVENOUS at 04:57

## 2021-08-24 RX ADMIN — OXYCODONE HYDROCHLORIDE 5 MG: 5 TABLET ORAL at 20:23

## 2021-08-24 RX ADMIN — CIPROFLOXACIN 400 MG: 2 INJECTION, SOLUTION INTRAVENOUS at 09:19

## 2021-08-24 RX ADMIN — ASPIRIN 81 MG: 81 TABLET, COATED ORAL at 08:18

## 2021-08-24 RX ADMIN — SODIUM CHLORIDE 3 G: 9 INJECTION, SOLUTION INTRAVENOUS at 21:45

## 2021-08-24 RX ADMIN — PRAVASTATIN SODIUM 10 MG: 10 TABLET ORAL at 16:17

## 2021-08-24 RX ADMIN — MAGNESIUM OXIDE TAB 400 MG (241.3 MG ELEMENTAL MG) 400 MG: 400 (241.3 MG) TAB at 08:18

## 2021-08-24 RX ADMIN — OXYCODONE HYDROCHLORIDE 5 MG: 5 TABLET ORAL at 08:19

## 2021-08-24 RX ADMIN — PANTOPRAZOLE SODIUM 40 MG: 40 TABLET, DELAYED RELEASE ORAL at 05:00

## 2021-08-24 RX ADMIN — OXYCODONE HYDROCHLORIDE 5 MG: 5 TABLET ORAL at 01:14

## 2021-08-24 RX ADMIN — AMLODIPINE BESYLATE 10 MG: 10 TABLET ORAL at 08:18

## 2021-08-24 RX ADMIN — ACETAMINOPHEN 975 MG: 325 TABLET, FILM COATED ORAL at 08:18

## 2021-08-24 RX ADMIN — OXYCODONE HYDROCHLORIDE 5 MG: 5 TABLET ORAL at 13:13

## 2021-08-24 RX ADMIN — OXYCODONE HYDROCHLORIDE 5 MG: 5 TABLET ORAL at 04:57

## 2021-08-24 RX ADMIN — MAGNESIUM OXIDE TAB 400 MG (241.3 MG ELEMENTAL MG) 400 MG: 400 (241.3 MG) TAB at 13:13

## 2021-08-24 RX ADMIN — SODIUM CHLORIDE 3 G: 9 INJECTION, SOLUTION INTRAVENOUS at 16:16

## 2021-08-24 RX ADMIN — CIPROFLOXACIN 400 MG: 2 INJECTION, SOLUTION INTRAVENOUS at 20:14

## 2021-08-24 NOTE — OCCUPATIONAL THERAPY NOTE
Occupational Therapy Re-Evaluation     Alfonso Dominguezj luis    8/24/2021    Principal Problem:    Encounter for ablation of malignant neoplasm with goal of debulking/cytoreduction [facility])  Active Problems:    Peritoneal carcinomatosis ([address] )    Asthma    Hypertension    Cancer related pain    Acute blood loss anemia    Leukocytosis    Severe protein-calorie malnutrition (HCC)    FHx: PROMISE-BSO (total abdominal hysterectomy and bilateral salpingo-oophorectomy)    S/P bladder repair    Status post small bowel resection    Pancreatic fluid leak    S/P splenectomy    Respiratory failure with hypoxia (HCC)    Wound dehiscence, surgical      [unfilled]    Past Surgical History:   Procedure Laterality Date    APPENDECTOMY N/A 8/6/2021    Procedure: APPENDECTOMY;  Surgeon: Joan German MD;  [facility];  Service: Gynecology Oncology    CHOLECYSTECTOMY      COLONOSCOPY      FL CYSTOGRAM  8/18/2021    GALLBLADDER SURGERY      HYSTERECTOMY N/A 8/6/2021    Procedure: ENBLOCK HYSTERECTOMY, BILATERAL SALPINGO-OOPHORECTOMY, SIGMOIDECTOMY WITH LOW RECTAL REANASTAMOSIS, BLADDER PERITONEAL STRIPPING AND CYSTOTOMY REPAIR, DIAPHRAGM STRIPPING, SMALL BOWEL RESECTION WITH RE-ANASTAMOSIS;  Surgeon: Joan German MD;  [facility];  Service: Gynecology Oncology    IR DRAINAGE TUBE PLACEMENT  8/18/2021    IR THORACENTESIS  8/18/2021    LAPAROTOMY N/A 8/6/2021    Procedure: LAPAROTOMY EXPLORATORY; OVER SEW PANCREAS TAIL;  Surgeon: Joan German MD;  [facility];  Service: Gynecology Oncology    OMENTECTOMY N/A 8/6/2021    Procedure: RADICAL OMENTECTOMY;  Surgeon: Joan German MD;  [facility];  Service: Gynecology Oncology    NV LAP,DIAGNOSTIC ABDOMEN N/A 8/6/2021    Procedure: LAPAROSCOPY DIAGNOSTIC;  Surgeon: Joan German MD;  [facility];  Service: Gynecology Oncology    RIGHT OOPHORECTOMY  1986    SPLENECTOMY, TOTAL N/A 8/6/2021    Procedure: SPLENECTOMY;  Surgeon: Joan German MD; [facility];  Service: Gynecology Oncology    TONSILLECTOMY            08/24/21 1322   OT Last Visit   OT Visit Date 08/24/21   Note Type   Note type Re-Evaluation   Restrictions/Precautions   Weight Bearing Precautions Per Order No   Other Precautions Multiple lines; Fall Risk   Pain Assessment   Pain Assessment Tool 0-10   Pain Score 6   Pain Location/Orientation Location: [facility]   Hospital Pain Intervention(s) Repositioned   Home Living   Additional Comments Please see OT IE   Prior Function   Comments Please see OT IE   Psychosocial   Psychosocial (WDL) WDL   ADL   Eating Assistance 7  Independent   Grooming Assistance 5  Supervision/Setup   UB Bathing Assistance 5  Supervision/Setup   LB Bathing Assistance 5  Supervision/Setup   UB Dressing Assistance 5  Supervision/Setup   LB Dressing Assistance 5  Supervision/Setup   Toileting Assistance  5  Supervision/Setup   Toileting Deficit Grab bar use  (standard toilet)   Bed Mobility   Supine to Sit 5  Supervision   Additional items Increased time required   Sit to Supine 5  Supervision   Additional items Increased time required   Transfers   Sit to Stand 5  Supervision   Additional items Increased time required   Stand to Sit 5  Supervision   Additional items Increased time required   Toilet transfer 5  Supervision   Additional items Standard toilet  (grab bar use)   Functional Mobility   Functional Mobility 5  Supervision   Additional Comments w/in room and bathroom   Balance   Static Sitting Fair +   Dynamic Sitting Fair +   Static Standing Fair   Dynamic Standing Fair   Activity Tolerance   Activity Tolerance Patient limited by fatigue;Patient limited by pain   Nurse Made Aware Per RN pt appropriate to be seen   RUE Assessment   RUE Assessment WFL   LUE Assessment   LUE Assessment WFL   Hand Function   Gross Motor Coordination Functional   Fine Motor Coordination Functional   Cognition   Overall Cognitive Status [facility]   Arousal/Participation Alert; Cooperative   Attention Within functional limits   Orientation Level Oriented X4   Memory Decreased recall of precautions   Following Commands Follows multistep commands with increased time or repetition   Assessment   Prognosis Good   Assessment Pt seen for OT re-evaluation 2' goal expiration  Pt has progressed in ADL tasks and mobility  Currently pt requires SUP for overall ADLS and for functional mobility/transfers  Pt reports her spouse is home throughout the day and able to assist as needed  Pt lightheaded upon standing - educated pt about taking increased time during positional changes, taking seated breaks as needed  Also recommend shower chair for increased safety/independence during bathing tasks  Pt in agreement with plan  Pt is currently limited during ADLs and functional transfers/mobility by decreased endurance, fatigue, standing balance - however appears to be functioning near her baseline  Pt has adequate home support and reports no further questions/concerns  From OT standpoint, recommend HOME OT/HOME WITH FAMILY SUPPORT upon D/C  No further acute OT services recommended at this time - OT to sign off  Recommend pt continue to participate in ADLs/mobility with nursing and restorative staff  Please re-consult as needed for change in pt status      Goals   Patient Goals to go home   Recommendation   OT Discharge Recommendation Home with home health rehabilitation  (home OT & increased home support)   Equipment Recommended Shower/Tub chair with back ($)   AM-PAC Daily Activity Inpatient   Lower Body Dressing 3   Bathing 3   Toileting 3   Upper Body Dressing 3   Grooming 4   Eating 4   Daily Activity Raw Score 20   Daily Activity Standardized Score (Calc for Raw Score >=11) 42 03   AM-PAC Applied Cognition Inpatient   Following a Speech/Presentation 4   Understanding Ordinary Conversation 4   Taking Medications 4   Remembering Where Things Are Placed or Put Away 4   Remembering List of 4-5 Errands 4 Taking Care of Complicated Tasks 4   Applied Cognition Raw Score 24   Applied Cognition Standardized Score 62 21   Modified Vinton Scale   Modified Saleem Scale 3       The patient's raw score on the AM-PAC Daily Activity inpatient short form is 20, standardized score is 42 03, greater than 39 4  Patients at this level are likely to benefit from discharge to home  Please refer to the recommendation of the Occupational Therapist for safe discharge planning            Kathia Castro, OT

## 2021-08-24 NOTE — WOUND OSTOMY CARE
Consult Note - Wound   Kerrygary Banks 80 y o  female MRN: [id number]  Unit/Bed#: [facility] 305-01 Encounter: [id number]      History and Present Illness:  Patient is a 80year old female admitted with malignant neoplasm now s/p Exp lap with small wound dehiscence for which wound care was consulted  She is awake, alert and oriented in bed with no complaints, patient able to elaborate on current treatment for her abdominal wound  Assessment Findings:   1-Mid abdominal incision with minor dehiscence on distal aspect with no drainage or erythema  On arrival to bed side, patient reporting that her surgical team is already caring for her wound, per patient, shortly before wound care nurse arrived dressing was changed  Patient allowed wound care nurse to look at dressing, noted plain packing and 2 x 2 on wound  No other is placed for nursing, we will place order for daily packing of wound with 1/2 plain packing ribbon  Skin care plans:  1-Hydraguard to bilateral sacrum, buttock and heels BID and PRN  2-Elevate heels to offload pressure  3-Ehob cushion in chair when out of bed  4-Moisturize skin daily with skin nourishing cream   5-Turn/reposition q2h or when medically stable for pressure re-distribution on skin  6-Cleanse abdominal wound with NSS, gently pack with 1/2in plain ribbon, cover with DSD and tape daily  Vitals: Blood pressure 134/69, pulse 104, temperature (!) 100 9 °F (38 3 °C), temperature source Oral, resp  rate (!) 23, height 4' 11" (1 499 m), weight 77 1 kg (170 lb), SpO2 92 %  ,Body mass index is 34 34 kg/m²  Wound 08/06/21 Abdomen N/A (Active)   Wound Description Slough;Fragile 08/23/21 1930   Makenna-wound Assessment Dry; Intact; Erythema;Pink 08/23/21 0800   Wound Site Closure Exofin 08/23/21 0800   Drainage Amount Small 08/23/21 1930   Drainage Description Serosanguineous 08/23/21 1930   Dressing Packings;Gauze 08/23/21 1930   Dressing Status Intact; Old drainage 08/23/21 1930       Our skin and wound care recommendations are placed as nursing orders, wound care is signing off, please call or tiger text team with questions or concerns         Leah Mac, RN, BSN, [facility]

## 2021-08-24 NOTE — PROGRESS NOTES
Progress Note - Infectious Disease   Carlito Cordova 80 y o  female MRN: [id number]  Unit/Bed#: [facility] 305-01 Encounter: [id number]      Impression/Plan:    1  Abdominal abscess  Patient developed LUQ abscess after complex abdominal surgery and pancreatic leak  8/18, s/p IR drain placement with aspiration of 60cc purulent fluid  Culture is growing Klebsiella oxytoca, Enterococcus faecium, and Bacteroides fragilis  Repeat CT A/P 8/20 shows improvement in the size of the collection    -continue Unasyn 3g IV q6hr and Ciprofloxacin 400mg IV q12hr to cover isolated organisms  Plan to switch to PO Augmentin and Ciprofloxacin prior to discharge   -will need drain check and CT A/P prior to drain removal      2  Sepsis-fevers, leukocytosis  Secondary to #1  However, she continues to have fever despite appropriate antibiotics and drainage of the abscess  Other considerations are drug fever, as well as septic pelvic thrombophlebitis  Although less common, this can occur after hysterectomy and pelvic surgery  COVID negative                -change antibiotics as above   -repeat blood cultures              -recommend trial of full dose lovenox for pelvic thrombophlebitis if no contraindications     3  Pancreatic leak  Surgical complication  Abdominal drain in place       4  Ovarian cancer  8/6 status post ex lap, en-bloc hysterectomy, bilateral salpingo-oophorectomy, sigmoidectomy with low rectal anastomosis, SBR, radical omentectomy, splenectomy, appendectomy, oversewing of tail of pancreas, repair of cystotomy, bilateral peritoneal stripping  Surgery was complicated by pancreatic tail hemorrhage and low rectal reanastomosis  Imaging shows liver metastases              -care per Gyn onc     5  Thrombocytosis  Reactive due to splenectomy, which is expected   On aspirin and lovenox     6  Status post splenectomy  Received HiB, meningococcal quadrivalent vaccine              -needs PCV13 prior to discharge     I have discussed the above management plan in detail with the primary service  ID will follow  Antibiotics:  Unasyn  Ciprofloxacin     Subjective:  Tmax 100 9 this morning  WBC count improving to 10 today  She reports continued LUQ pain, somewhat controlled by pain medications  Denies chills, nausea, vomiting, diarrhea  Appetite good  Objective:  Vitals:  Temp:  [98 2 °F (36 8 °C)-100 9 °F (38 3 °C)] 98 2 °F (36 8 °C)  HR:  [100-116] 100  Resp:  [17-23] 21  BP: (124-138)/(65-69) 133/68  SpO2:  [90 %-95 %] 95 %  Temp (24hrs), Av 8 °F (37 7 °C), Min:98 2 °F (36 8 °C), Max:100 9 °F (38 3 °C)  Current: Temperature: 98 2 °F (36 8 °C)    Physical Exam:   General Appearance:  Alert, interactive, nontoxic, no acute distress  Throat: Oropharynx moist without lesions  Lungs:   Clear to auscultation bilaterally; no wheezes, rhonchi or rales; respirations unlabored   Heart:  RRR; no murmur, rub or gallop   Abdomen:   Soft, non-tender, non-distended, positive bowel sounds  2 RUQ OSCAR drains with scant purulent fluid   Extremities: No clubbing, cyanosis or edema   Skin: No new rashes or lesions  Abdominal midline scar, no erythema or drainage       Labs:    All pertinent labs and imaging studies were personally reviewed  Results from last 7 days   Lab Units 21  1212 21  0443 21  0500   WBC Thousand/uL 10 27* 17 74* 18 26*   HEMOGLOBIN g/dL 7 7* 8 1* 7 5*   PLATELETS Thousands/uL 1,032* 1,183* 1,132*     Results from last 7 days   Lab Units 21  1212 21  0443 21  0500 21  0647   SODIUM mmol/L 129* 128* 130* 129*   POTASSIUM mmol/L 3 4* 3 4* 3 7 3 4*   CHLORIDE mmol/L 96* 95* 98* 96*   CO2 mmol/L 27 29 27 27   BUN mg/dL 7 7 7 6   CREATININE mg/dL 0 34* 0 43* 0 43* 0 35*   EGFR ml/min/1 73sq m 112 103 103 111   CALCIUM mg/dL 7 9* 8 3 7 8* 7 7*   AST U/L  --   --   --  19   ALT U/L  --   --   --  14   ALK PHOS U/L  --   --   --  488*     Results from last 7 days   Lab Units 21  0584 PROCALCITONIN ng/ml 1 12*                   Micro:  Results from last 7 days   Lab Units 08/23/21  1809 08/23/21  1521 08/23/21  1208 08/18/21  1038 08/18/21  0904   BLOOD CULTURE   --  Received in Microbiology Lab  Culture in Progress  Received in Microbiology Lab  Culture in Progress  --   --   --    GRAM STAIN RESULT  No Polys or Bacteria seen  --  Rare Polys  No bacteria seen No Polys or Bacteria seen Rare Polys*  2+ Gram negative rods*   WOUND CULTURE   --   --  No growth  --   --    BODY FLUID CULTURE, STERILE  Culture too young- will reincubate  --   --  No growth 2+ Growth of Klebsiella oxytoca*  1+ Growth of Enterococcus faecium*     LUQ Abscess Cx: Klebsiella oxytoca, Enterococcus faecium     Imaging:    CT A/P 8/20/21:  1   Decreased size of left upper quadrant gas and fluid containing collection status post locking loop catheter placement  2   Moderate left pleural effusion with new dependent high density and small locule of gas  This is likely related to interval thoracentesis  The effusion demonstrates increased lateral component  Small right effusion is stable  3   Stable tract of gas extending into the presacral space just above the rectal anastomosis  This may be a normal postoperative appearance of an end-to-side anastomosis no walled off leak is not excluded  Correlation with surgical history recommended  4   Liver lesions again seen suspicious for metastasis  Soft tissue along the posterior liver capsule likely represents metastatic implants

## 2021-08-24 NOTE — PROGRESS NOTES
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  Patient's last fever was 101 1 F on 8/22 at 2335  Since then had one low-grade temp at 1700 last night       Plan:  S/p surgery  - Spontaneously voiding  - Regular diet   - DVT ppx: Lovenox 40 mg SC daily + SCDs; discharge on Eliquis   - Continue IS/encourage ambulation   - Pain:    Tyra 5 mg Q4h scheduled     Tylenol 975mg Q6h PRN (mild)   Tyra 10mg Q4h PRN (moderate, severe) - still using every 4-5 h    Motrin 600mg Q6h PRN (fever)   Dilaudid 0 5 IV Q2h PRN (breakthrough)   Continue current regimen - consider palliative v acute pain consultation     Pancreatic leak with abscess formation   - Drain amylase 160 (8/18) from left thoracentesis specimen  - Prior > 13K from abdominal drain   - Collection decreasing in size on repeat CT 8/20  - WBC 18 <-- 17K <-- 16K; AM labs pending; continue to trend daily   - Antibiotics switched to IV cefepime (day 7); IV Daptomycin (day 4); PO metronidazole (day 7)  - Maintain both drains for now, routine drain care     Wound seroma   - Packing in place   - Wound care consulted  - Gauze on top replaced this AM    Acute blood loss anemia  - Hgb pending this AM; 8 1 <-- 7 5   - S/P total 5 U RBC + 2 FFP intraop  - Continue to trend; maintain Hgb goal above 7     Acute respiratory failure   - Now off supplemental O2; maintaining 91-93% on RA; provide supplemental O2 as needed; pt denies SOB today  - With persistent pleural effusion   - S/p CXR 8/20; pending final read     S/p splenectomy with thrombocytosis   - Plts 1183 today; have been slightly increasing  - Continue to trend   - Continue ASA   - Splenectomy vaccines prior to discharge High grade serous ovarian carcinoma   - Final path resulted  - Pt discussed at tumor board on 8/20, plan is for eventual systemic therapy, genetic and genomic testing    HTN/HLD  Continue home meds amlodipine and pravastatin    Hypokalemia  - K 3 4 today, will give 20 mg K-Dur     Hyponatremia  - 128; continue to trend    Disposition: remain inpatient for ongoing treatment     Subjective:  Bhupendra Coto is doing well  She slept well last night  Pain was better controlled over the evening with 5mg roxicodone scheduled and other meds PRN  She is ambulating, tolerating regular diet, and voiding and having BM  She denies chest pain, SOB, nausea, vomiting  Review of Systems   Constitutional: Negative for appetite change  Respiratory: Negative for chest tightness and shortness of breath  Cardiovascular: Negative for chest pain  Gastrointestinal: Negative for abdominal pain, nausea and vomiting  Genitourinary: Negative for dysuria  Neurological: Negative for light-headedness and headaches  Psychiatric/Behavioral: Negative for confusion  All other systems reviewed and are negative  Objective:   /67 (BP Location: Right arm)   Pulse (!) 109   Temp 99 6 °F (37 6 °C) (Oral)   Resp 18   Ht 4' 11" (1 499 m)   Wt 77 1 kg (170 lb)   SpO2 90%   BMI 34 34 kg/m²     I/O last 3 completed shifts: In: 310 [P O :180; I V :30; IV Piggyback:100]  Out: 1230 [Urine:1200; Drains:30]  I/O this shift:  In: -   Out: 720 [Urine:700; Drains:20]     I/O       08/22 0701 - 08/23 0700 08/23 0701 - 08/24 0700    P  O  180     I V  (mL/kg)  30 (0 4)    NG/GT  0    IV Piggyback  100    Total Intake(mL/kg) 180 (2 3) 130 (1 7)    Urine (mL/kg/hr) 1200 (0 6) 700 (0 4)    Drains 25 25    Stool  0    Total Output 1225 725    Net -1045 -595          Unmeasured Stool Occurrence  1 x          Lab Results   Component Value Date    WBC 17 74 (H) 08/23/2021    HGB 8 1 (L) 08/23/2021    HCT 25 8 (L) 08/23/2021    MCV 89 08/23/2021    PLT 1,183 (HH) 08/23/2021       Lab Results   Component Value Date    GLUCOSE 195 (H) 08/06/2021    CALCIUM 8 3 08/23/2021    K 3 4 (L) 08/23/2021    CO2 29 08/23/2021    CL 95 (L) 08/23/2021    BUN 7 08/23/2021    CREATININE 0 43 (L) 08/23/2021       Lab Results   Component Value Date/Time    POCGLU 154 (H) 08/19/2021 10:23 AM    POCGLU 161 (H) 08/19/2021 06:58 AM    POCGLU 158 (H) 08/18/2021 09:09 PM    POCGLU 171 (H) 08/18/2021 04:33 PM    POCGLU 92 08/18/2021 12:10 PM       Physical Exam  Constitutional:       General: She is not in acute distress  Appearance: She is obese  She is not ill-appearing or diaphoretic  HENT:      Head: Normocephalic and atraumatic  Eyes:      Conjunctiva/sclera: Conjunctivae normal       Pupils: Pupils are equal, round, and reactive to light  Cardiovascular:      Rate and Rhythm: Normal rate and regular rhythm  Pulses: Normal pulses  Heart sounds: Normal heart sounds  Pulmonary:      Effort: Pulmonary effort is normal  No respiratory distress  Breath sounds: Normal breath sounds  Comments: Breathing adequately on room air not currently requiring supplemental O2; O2 Sat 90-95% on room air  Abdominal:      General: Abdomen is flat  Bowel sounds are normal  There is no distension  Tenderness: There is no abdominal tenderness  There is no guarding  Comments: Midline vertical incision c/d/i, healing well  Wound seroma at lower portion of vertical incision open, packing in place; gauze on top noted to be soaked with clear fluid, changed this AM  Open area not expanding  No purulent drainage noted  Minor erythema noted  LLQ OSCAR drain in place, draining purulent milky fluid, unchanged from prior     Posterior Left lower back drain with minimal output at this time    Musculoskeletal:         General: Normal range of motion  Cervical back: Normal range of motion  Skin:     General: Skin is warm and dry        Capillary Refill: Capillary refill takes less than 2 seconds  Neurological:      General: No focal deficit present  Mental Status: She is alert and oriented to person, place, and time  Mental status is at baseline     Psychiatric:         Mood and Affect: Mood normal          Behavior: Behavior normal          Schuyler Ulloa MD   PGY-3, GYN ONC  8/24/2021 6:18 AM

## 2021-08-24 NOTE — PLAN OF CARE
Problem: MOBILITY - ADULT  Goal: Maintain or return to baseline ADL function  Description: INTERVENTIONS:  -  Assess patient's ability to carry out ADLs; assess patient's baseline for ADL function and identify physical deficits which impact ability to perform ADLs (bathing, care of mouth/teeth, toileting, grooming, dressing, etc )  - Assess/evaluate cause of self-care deficits   - Assess range of motion  - Assess patient's mobility; develop plan if impaired  - Assess patient's need for assistive devices and provide as appropriate  - Encourage maximum independence but intervene and supervise when necessary  - Involve family in performance of ADLs  - Assess for home care needs following discharge   - Consider OT consult to assist with ADL evaluation and planning for discharge  - Provide patient education as appropriate  Outcome: Progressing  Goal: Maintains/Returns to pre admission functional level  Description: INTERVENTIONS:  - Perform BMAT or MOVE assessment daily    - Set and communicate daily mobility goal to care team and patient/family/caregiver     - Collaborate with rehabilitation services on mobility goals if consulted  - Out of bed for toileting  - Record patient progress and toleration of activity level   Outcome: Progressing     Problem: Potential for Falls  Goal: Patient will remain free of falls  Description: INTERVENTIONS:  - Educate patient/family on patient safety including physical limitations  - Instruct patient to call for assistance with activity   - Consult OT/PT to assist with strengthening/mobility   - Keep Call bell within reach  - Keep bed low and locked with side rails adjusted as appropriate  - Keep care items and personal belongings within reach  - Initiate and maintain comfort rounds  - Make Fall Risk Sign visible to staff  - Apply yellow socks and bracelet for high fall risk patients  - Consider moving patient to room near nurses station  Outcome: Progressing     Problem: CARDIOVASCULAR - ADULT  Goal: Maintains optimal cardiac output and hemodynamic stability  Description: INTERVENTIONS:  - Monitor I/O, vital signs and rhythm  - Monitor for S/S and trends of decreased cardiac output  - Administer and titrate ordered vasoactive medications to optimize hemodynamic stability  - Assess quality of pulses, skin color and temperature  - Assess for signs of decreased coronary artery perfusion  - Instruct patient to report change in severity of symptoms  Outcome: Progressing  Goal: Absence of cardiac dysrhythmias or at baseline rhythm  Description: INTERVENTIONS:  - Continuous cardiac monitoring, vital signs, obtain 12 lead EKG if ordered  - Administer antiarrhythmic and heart rate control medications as ordered  - Monitor electrolytes and administer replacement therapy as ordered  Outcome: Progressing     Problem: GASTROINTESTINAL - ADULT  Goal: Minimal or absence of nausea and/or vomiting  Description: INTERVENTIONS:  - Administer IV fluids if ordered to ensure adequate hydration  - Maintain NPO status until nausea and vomiting are resolved  - Nasogastric tube if ordered  - Administer ordered antiemetic medications as needed  - Provide nonpharmacologic comfort measures as appropriate  - Advance diet as tolerated, if ordered  - Consider nutrition services referral to assist patient with adequate nutrition and appropriate food choices  Outcome: Progressing  Goal: Maintains or returns to baseline bowel function  Description: INTERVENTIONS:  - Assess bowel function  - Encourage oral fluids to ensure adequate hydration  - Administer IV fluids if ordered to ensure adequate hydration  - Administer ordered medications as needed  - Encourage mobilization and activity  - Consider nutritional services referral to assist patient with adequate nutrition and appropriate food choices  Outcome: Progressing  Goal: Maintains adequate nutritional intake  Description: INTERVENTIONS:  - Monitor percentage of each meal consumed  - Identify factors contributing to decreased intake, treat as appropriate  - Assist with meals as needed  - Monitor I&O, weight, and lab values if indicated  - Obtain nutrition services referral as needed  Outcome: Progressing  Goal: Establish and maintain optimal ostomy function  Description: INTERVENTIONS:  - Assess bowel function  - Encourage oral fluids to ensure adequate hydration  - Administer IV fluids if ordered to ensure adequate hydration   - Administer ordered medications as needed  - Encourage mobilization and activity  - Nutrition services referral to assist patient with appropriate food choices  - Assess stoma site  - Consider wound care consult   Outcome: Progressing  Goal: Oral mucous membranes remain intact  Description: INTERVENTIONS  - Assess oral mucosa and hygiene practices  - Implement preventative oral hygiene regimen  - Implement oral medicated treatments as ordered  - Initiate Nutrition services referral as needed  Outcome: Progressing     Problem: METABOLIC, FLUID AND ELECTROLYTES - ADULT  Goal: Electrolytes maintained within normal limits  Description: INTERVENTIONS:  - Monitor labs and assess patient for signs and symptoms of electrolyte imbalances  - Administer electrolyte replacement as ordered  - Monitor response to electrolyte replacements, including repeat lab results as appropriate  - Instruct patient on fluid and nutrition as appropriate  Outcome: Progressing  Goal: Fluid balance maintained  Description: INTERVENTIONS:  - Monitor labs   - Monitor I/O and WT  - Instruct patient on fluid and nutrition as appropriate  - Assess for signs & symptoms of volume excess or deficit  Outcome: Progressing  Goal: Glucose maintained within target range  Description: INTERVENTIONS:  - Monitor Blood Glucose as ordered  - Assess for signs and symptoms of hyperglycemia and hypoglycemia  - Administer ordered medications to maintain glucose within target range  - Assess nutritional intake and initiate nutrition service referral as needed  Outcome: Progressing     Problem: HEMATOLOGIC - ADULT  Goal: Maintains hematologic stability  Description: INTERVENTIONS  - Assess for signs and symptoms of bleeding or hemorrhage  - Monitor labs  - Administer supportive blood products/factors as ordered and appropriate  Outcome: Progressing     Problem: SAFETY,RESTRAINT: NV/NON-SELF DESTRUCTIVE BEHAVIOR  Goal: Remains free of harm/injury (restraint for non violent/non self-detsructive behavior)  Description: INTERVENTIONS:  - Instruct patient/family regarding restraint use   - Assess and monitor physiologic and psychological status   - Provide interventions and comfort measures to meet assessed patient needs   - Identify and implement measures to help patient regain control  - Assess readiness for release of restraint   Outcome: Progressing  Goal: Returns to optimal restraint-free functioning  Description: INTERVENTIONS:  - Assess the patient's behavior and symptoms that indicate continued need for restraint  - Identify and implement measures to help patient regain control  - Assess readiness for release of restraint   Outcome: Progressing     Problem: Prexisting or High Potential for Compromised Skin Integrity  Goal: Skin integrity is maintained or improved  Description: INTERVENTIONS:  - Identify patients at risk for skin breakdown  - Assess and monitor skin integrity  - Assess and monitor nutrition and hydration status  - Monitor labs   - Assess for incontinence   - Turn and reposition patient  - Assist with mobility/ambulation  - Relieve pressure over bony prominences  - Avoid friction and shearing  - Provide appropriate hygiene as needed including keeping skin clean and dry  - Evaluate need for skin moisturizer/barrier cream  - Collaborate with interdisciplinary team   - Patient/family teaching  - Consider wound care consult   Outcome: Progressing     Problem: Nutrition/Hydration-ADULT  Goal: Nutrient/Hydration intake appropriate for improving, restoring or maintaining nutritional needs  Description: Monitor and assess patient's nutrition/hydration status for malnutrition  Collaborate with interdisciplinary team and initiate plan and interventions as ordered  Monitor patient's weight and dietary intake as ordered or per policy  Utilize nutrition screening tool and intervene as necessary  Determine patient's food preferences and provide high-protein, high-caloric foods as appropriate       INTERVENTIONS:  - Monitor oral intake, urinary output, labs, and treatment plans  - Assess nutrition and hydration status and recommend course of action  - Evaluate amount of meals eaten  - Assist patient with eating if necessary   - Allow adequate time for meals  - Recommend/ encourage appropriate diets, oral nutritional supplements, and vitamin/mineral supplements  - Order, calculate, and assess calorie counts as needed  - Recommend, monitor, and adjust tube feedings and TPN/PPN based on assessed needs  - Assess need for intravenous fluids  - Provide specific nutrition/hydration education as appropriate  - Include patient/family/caregiver in decisions related to nutrition  Outcome: Progressing

## 2021-08-24 NOTE — PLAN OF CARE
Problem: OCCUPATIONAL THERAPY ADULT  Goal: Performs self-care activities at highest level of function for planned discharge setting  See evaluation for individualized goals  Description: Treatment Interventions: ADL retraining, Functional transfer training, UE strengthening/ROM, Endurance training, Patient/family training, Equipment evaluation/education, Compensatory technique education, Activityengagement, Energy conservation          See flowsheet documentation for full assessment, interventions and recommendations  Outcome: Adequate for Discharge  Note: Limitation: Decreased ADL status, Decreased Safe judgement during ADL, Decreased endurance, Decreased high-level ADLs  Prognosis: Good  Assessment: Pt seen for OT re-evaluation 2' goal expiration  Pt has progressed in ADL tasks and mobility  Currently pt requires SUP for overall ADLS and for functional mobility/transfers  Pt reports her spouse is home throughout the day and able to assist as needed  Pt lightheaded upon standing - educated pt about taking increased time during positional changes, taking seated breaks as needed  Also recommend shower chair for increased safety/independence during bathing tasks  Pt in agreement with plan  Pt is currently limited during ADLs and functional transfers/mobility by decreased endurance, fatigue, standing balance - however appears to be functioning near her baseline  Pt has adequate home support and reports no further questions/concerns  From OT standpoint, recommend HOME OT/HOME WITH FAMILY SUPPORT upon D/C  No further acute OT services recommended at this time - OT to sign off  Recommend pt continue to participate in ADLs/mobility with nursing and restorative staff  Please re-consult as needed for change in pt status        OT Discharge Recommendation: Home with home health rehabilitation (home OT & increased home support)  OT - OK to Discharge: Yes (when medically cleared)

## 2021-08-25 PROBLEM — C56.9 OVARIAN CANCER (HCC): Status: ACTIVE | Noted: 2021-08-25

## 2021-08-25 PROBLEM — E87.1 HYPONATREMIA: Status: ACTIVE | Noted: 2021-08-25

## 2021-08-25 PROBLEM — J90 PLEURAL EFFUSION: Status: ACTIVE | Noted: 2021-08-25

## 2021-08-25 PROBLEM — K65.1 ABSCESS OF ABDOMINAL CAVITY (HCC): Status: ACTIVE | Noted: 2021-08-25

## 2021-08-25 LAB
ANION GAP SERPL CALCULATED.3IONS-SCNC: 6 MMOL/L (ref 4–13)
BASOPHILS # BLD AUTO: 0.06 THOUSANDS/ΜL (ref 0–0.1)
BASOPHILS NFR BLD AUTO: 1 % (ref 0–1)
BUN SERPL-MCNC: 6 MG/DL (ref 5–25)
CALCIUM SERPL-MCNC: 7.4 MG/DL (ref 8.3–10.1)
CHLORIDE SERPL-SCNC: 94 MMOL/L (ref 100–108)
CO2 SERPL-SCNC: 27 MMOL/L (ref 21–32)
CREAT SERPL-MCNC: 0.36 MG/DL (ref 0.6–1.3)
EOSINOPHIL # BLD AUTO: 0.2 THOUSAND/ΜL (ref 0–0.61)
EOSINOPHIL NFR BLD AUTO: 2 % (ref 0–6)
ERYTHROCYTE [DISTWIDTH] IN BLOOD BY AUTOMATED COUNT: 18 % (ref 11.6–15.1)
GFR SERPL CREATININE-BSD FRML MDRD: 110 ML/MIN/1.73SQ M
GLUCOSE SERPL-MCNC: 134 MG/DL (ref 65–140)
HCT VFR BLD AUTO: 22.8 % (ref 34.8–46.1)
HGB BLD-MCNC: 7.2 G/DL (ref 11.5–15.4)
IMM GRANULOCYTES # BLD AUTO: 0.17 THOUSAND/UL (ref 0–0.2)
IMM GRANULOCYTES NFR BLD AUTO: 1 % (ref 0–2)
LYMPHOCYTES # BLD AUTO: 1.47 THOUSANDS/ΜL (ref 0.6–4.47)
LYMPHOCYTES NFR BLD AUTO: 12 % (ref 14–44)
MCH RBC QN AUTO: 27.9 PG (ref 26.8–34.3)
MCHC RBC AUTO-ENTMCNC: 31.6 G/DL (ref 31.4–37.4)
MCV RBC AUTO: 88 FL (ref 82–98)
MONOCYTES # BLD AUTO: 1.63 THOUSAND/ΜL (ref 0.17–1.22)
MONOCYTES NFR BLD AUTO: 14 % (ref 4–12)
NEUTROPHILS # BLD AUTO: 8.44 THOUSANDS/ΜL (ref 1.85–7.62)
NEUTS SEG NFR BLD AUTO: 70 % (ref 43–75)
NRBC BLD AUTO-RTO: 1 /100 WBCS
OSMOLALITY UR/SERPL-RTO: 263 MMOL/KG (ref 282–298)
OSMOLALITY UR: 376 MMOL/KG
PLATELET # BLD AUTO: 936 THOUSANDS/UL (ref 149–390)
PMV BLD AUTO: 9.1 FL (ref 8.9–12.7)
POTASSIUM SERPL-SCNC: 3.4 MMOL/L (ref 3.5–5.3)
RBC # BLD AUTO: 2.58 MILLION/UL (ref 3.81–5.12)
SODIUM 24H UR-SCNC: 97 MOL/L
SODIUM SERPL-SCNC: 127 MMOL/L (ref 136–145)
WBC # BLD AUTO: 11.97 THOUSAND/UL (ref 4.31–10.16)

## 2021-08-25 PROCEDURE — 99232 SBSQ HOSP IP/OBS MODERATE 35: CPT | Performed by: STUDENT IN AN ORGANIZED HEALTH CARE EDUCATION/TRAINING PROGRAM

## 2021-08-25 PROCEDURE — 80048 BASIC METABOLIC PNL TOTAL CA: CPT | Performed by: OBSTETRICS & GYNECOLOGY

## 2021-08-25 PROCEDURE — 83935 ASSAY OF URINE OSMOLALITY: CPT | Performed by: NURSE PRACTITIONER

## 2021-08-25 PROCEDURE — 84300 ASSAY OF URINE SODIUM: CPT | Performed by: NURSE PRACTITIONER

## 2021-08-25 PROCEDURE — 85025 COMPLETE CBC W/AUTO DIFF WBC: CPT | Performed by: OBSTETRICS & GYNECOLOGY

## 2021-08-25 PROCEDURE — 99254 IP/OBS CNSLTJ NEW/EST MOD 60: CPT | Performed by: NURSE PRACTITIONER

## 2021-08-25 PROCEDURE — 99024 POSTOP FOLLOW-UP VISIT: CPT | Performed by: OBSTETRICS & GYNECOLOGY

## 2021-08-25 PROCEDURE — 83930 ASSAY OF BLOOD OSMOLALITY: CPT | Performed by: NURSE PRACTITIONER

## 2021-08-25 RX ORDER — POTASSIUM CHLORIDE 20 MEQ/1
40 TABLET, EXTENDED RELEASE ORAL ONCE
Status: COMPLETED | OUTPATIENT
Start: 2021-08-25 | End: 2021-08-25

## 2021-08-25 RX ORDER — SODIUM CHLORIDE 1000 MG
1 TABLET, SOLUBLE MISCELLANEOUS
Status: DISCONTINUED | OUTPATIENT
Start: 2021-08-25 | End: 2021-08-26

## 2021-08-25 RX ADMIN — OXYCODONE HYDROCHLORIDE 5 MG: 5 TABLET ORAL at 08:23

## 2021-08-25 RX ADMIN — MAGNESIUM OXIDE TAB 400 MG (241.3 MG ELEMENTAL MG) 400 MG: 400 (241.3 MG) TAB at 08:23

## 2021-08-25 RX ADMIN — OXYCODONE HYDROCHLORIDE 5 MG: 5 TABLET ORAL at 23:28

## 2021-08-25 RX ADMIN — OXYCODONE HYDROCHLORIDE 5 MG: 5 TABLET ORAL at 00:25

## 2021-08-25 RX ADMIN — Medication 1 G: at 17:33

## 2021-08-25 RX ADMIN — SODIUM CHLORIDE 3 G: 9 INJECTION, SOLUTION INTRAVENOUS at 16:44

## 2021-08-25 RX ADMIN — ASPIRIN 81 MG: 81 TABLET, COATED ORAL at 08:23

## 2021-08-25 RX ADMIN — MAGNESIUM OXIDE TAB 400 MG (241.3 MG ELEMENTAL MG) 400 MG: 400 (241.3 MG) TAB at 12:54

## 2021-08-25 RX ADMIN — CIPROFLOXACIN 400 MG: 2 INJECTION, SOLUTION INTRAVENOUS at 09:55

## 2021-08-25 RX ADMIN — PANTOPRAZOLE SODIUM 40 MG: 40 TABLET, DELAYED RELEASE ORAL at 05:46

## 2021-08-25 RX ADMIN — OXYCODONE HYDROCHLORIDE 5 MG: 5 TABLET ORAL at 12:55

## 2021-08-25 RX ADMIN — OXYCODONE HYDROCHLORIDE 5 MG: 5 TABLET ORAL at 20:06

## 2021-08-25 RX ADMIN — SODIUM CHLORIDE 3 G: 9 INJECTION, SOLUTION INTRAVENOUS at 08:35

## 2021-08-25 RX ADMIN — ENOXAPARIN SODIUM 80 MG: 80 INJECTION SUBCUTANEOUS at 20:07

## 2021-08-25 RX ADMIN — PRAVASTATIN SODIUM 10 MG: 10 TABLET ORAL at 16:36

## 2021-08-25 RX ADMIN — SODIUM CHLORIDE 3 G: 9 INJECTION, SOLUTION INTRAVENOUS at 03:24

## 2021-08-25 RX ADMIN — OXYCODONE HYDROCHLORIDE 5 MG: 5 TABLET ORAL at 16:36

## 2021-08-25 RX ADMIN — SODIUM CHLORIDE 3 G: 9 INJECTION, SOLUTION INTRAVENOUS at 20:13

## 2021-08-25 RX ADMIN — ENOXAPARIN SODIUM 80 MG: 80 INJECTION SUBCUTANEOUS at 08:24

## 2021-08-25 RX ADMIN — AMLODIPINE BESYLATE 10 MG: 10 TABLET ORAL at 08:24

## 2021-08-25 RX ADMIN — CIPROFLOXACIN 400 MG: 2 INJECTION, SOLUTION INTRAVENOUS at 21:16

## 2021-08-25 RX ADMIN — POTASSIUM CHLORIDE 40 MEQ: 1500 TABLET, EXTENDED RELEASE ORAL at 16:37

## 2021-08-25 RX ADMIN — OXYCODONE HYDROCHLORIDE 5 MG: 5 TABLET ORAL at 03:23

## 2021-08-25 NOTE — ASSESSMENT & PLAN NOTE
· POD #19 s/p ex lap, en-bloc hysterectomy, bilateral salpingo-oophorectomy, sigmoidectomy with low rectal anastomosis, SBR, radical omentectomy, splenectomy, appendectomy, oversewing of tail of pancreas, repair of cystotomy, bilateral peritoneal stripping     · Surgery was complicated by pancreatic tail hemorrhage and low rectal reanastomosis   Imaging shows liver metastases  · Management per GYN ONC  · Pt's case discussed at tumor board, plan for eventual chemotherapy

## 2021-08-25 NOTE — ASSESSMENT & PLAN NOTE
· Patient developed left upper quadrant abscess after complex abdominal surgery  · Status post IR drain placement  · Currently 2 drains are in the abscess cavity  · ID following  · Antibiotics adjusted per ID  · Fevers improving  · Repeat blood culture show no growth at 24 hours   · Monitor fever curve and CBC with diff

## 2021-08-25 NOTE — ASSESSMENT & PLAN NOTE
Presented with a sodium of 138 on 8/6  Sodium downtrended to 133 on 8/13  Sodium continued to downtrend to 127 on 8/25  · Primary service ordered NaCl 1 gm TID x3 doses today   · Check urine osmo, urine sodium, serum osmo, and TSH  · Start 1500 mL/day fluid restriction   · Monitor I+O, daily weights   · Liberalize salt in diet - patient educated  · Monitor BMP in am

## 2021-08-25 NOTE — ASSESSMENT & PLAN NOTE
· With reactive thrombocytosis  · Continue aspirin  · Received HiB, meningococcal quadrivalent vaccines  · Will need Prevnar vaccine prior to discharge

## 2021-08-25 NOTE — CONSULTS
[address] 1950, 80 y o  female MRN: [id number]  Unit/Bed#: [facility] 305-01 Encounter: [id number]  Primary Care Provider: Isaak Aleman MD   Date and time admitted to hospital: 8/6/2021  9:15 AM    Inpatient consult to Internal Medicine  Consult performed by: JOSI Degroot  Consult ordered by: Alton Baez MD        * Hyponatremia  Assessment & Plan  Presented with a sodium of 138 on 8/6  Sodium downtrended to 133 on 8/13  Sodium continued to downtrend to 127 on 8/25  · Primary service ordered NaCl 1 gm TID x3 doses today   · Check urine osmo, urine sodium, serum osmo, and TSH  · Start 1500 mL/day fluid restriction   · Monitor I+O, daily weights   · Liberalize salt in diet - patient educated  · Monitor BMP in am     Ovarian cancer ([facility] )  Assessment & Plan  · POD #19 s/p ex lap, en-bloc hysterectomy, bilateral salpingo-oophorectomy, sigmoidectomy with low rectal anastomosis, SBR, radical omentectomy, splenectomy, appendectomy, oversewing of tail of pancreas, repair of cystotomy, bilateral peritoneal stripping     · Surgery was complicated by pancreatic tail hemorrhage and low rectal reanastomosis   Imaging shows liver metastases  · Management per GYN ONC  · Pt's case discussed at tumor board, plan for eventual chemotherapy     Abscess of abdominal cavity ([facility] )  Assessment & Plan  · Patient developed left upper quadrant abscess after complex abdominal surgery  · Status post IR drain placement  · Currently 2 drains are in the abscess cavity  · ID following  · Antibiotics adjusted per ID  · Fevers improving  · Repeat blood culture show no growth at 24 hours   · Monitor fever curve and CBC with diff    Pleural effusion  Assessment & Plan  · S/p left sided thoracentesis yielding 230 mL  · Encourage deep breathing and incentive spirometry    S/P splenectomy  Assessment & Plan  · With reactive thrombocytosis  · Continue aspirin  · Received HiB, meningococcal quadrivalent vaccines  · Will need Prevnar vaccine prior to discharge    Asthma  Assessment & Plan  · Appears stable, continue Albuterol PRN      VTE Prophylaxis: Enoxaparin (Lovenox)  / sequential compression device     Recommendations for Discharge:  · Pending further results     Counseling / Coordination of Care Time: 45 minutes  Greater than 50% of total time spent on patient counseling and coordination of care  Collaboration of Care: Were Recommendations Directly Discussed with Primary Treatment Team? - No     History of Present Illness:    Bhupendra Coto is a 80 y o  female with a past medical history including GERD, hypertension, asthma, and newly diagnosed ovarian cancer who is originally admitted to the Gynecologic Oncology service due to peritoneal carcinomatosis with planned total hysterectomy and bilateral salpingo oophorectomy  We are consulted for hyponatremia  On admission, patient underwent ex lap, en-bloc hysterectomy, bilateral salpingo-oophorectomy, sigmoidectomy with low rectal anastomosis, SBR, radical omentectomy, splenectomy, appendectomy, oversewing of tail of pancreas, repair of cystotomy, bilateral peritoneal stripping  Patient's surgery was complicated by pancreatic tail hemorrhage and low rectal reanastomosis that required intraoperative consultation to General surgery and Colorectal surgery  Patient was admitted to the intensive care unit for postop monitoring  Patient underwent left-sided thoracentesis yielding 230 mL of serosanguineous pleural fluid removed on 8/18/2021  Patient presented on 8/6/2021 with a normal sodium of 138  Sodium slowly down trended and is now 127 on 8/25/2021  Review of Systems:    Review of Systems   Constitutional: Positive for activity change  Negative for chills and fever  HENT: Negative for ear pain and sore throat  Eyes: Negative for pain and visual disturbance  Respiratory: Negative for cough and shortness of breath  Cardiovascular: Negative for chest pain, palpitations and leg swelling  Gastrointestinal: Negative for abdominal pain, nausea and vomiting  Genitourinary: Negative for dysuria and hematuria  Musculoskeletal: Negative for arthralgias and back pain  Skin: Negative for color change and rash  Neurological: Negative for seizures and syncope  Psychiatric/Behavioral: The patient is not nervous/anxious  All other systems reviewed and are negative        Past Medical and Surgical History:     Past Medical History:   Diagnosis Date    Acid reflux     Asthma     Cancer ([facility] )     ovarian, peritoneal carcinomatosis    GERD (gastroesophageal reflux disease)     Hypercholesteremia     Hypertension     Migraine        Past Surgical History:   Procedure Laterality Date    APPENDECTOMY N/A 8/6/2021    Procedure: APPENDECTOMY;  Surgeon: Ang Brambila MD;  [facility];  Service: Gynecology Oncology    CHOLECYSTECTOMY      COLONOSCOPY      FL CYSTOGRAM  8/18/2021    GALLBLADDER SURGERY      HYSTERECTOMY N/A 8/6/2021    Procedure: ENBLOCK HYSTERECTOMY, BILATERAL SALPINGO-OOPHORECTOMY, SIGMOIDECTOMY WITH LOW RECTAL REANASTAMOSIS, BLADDER PERITONEAL STRIPPING AND CYSTOTOMY REPAIR, DIAPHRAGM STRIPPING, SMALL BOWEL RESECTION WITH RE-ANASTAMOSIS;  Surgeon: Ang Brambila MD;  [facility];  Service: Gynecology Oncology    IR DRAINAGE TUBE PLACEMENT  8/18/2021    IR THORACENTESIS  8/18/2021    LAPAROTOMY N/A 8/6/2021    Procedure: LAPAROTOMY EXPLORATORY; OVER SEW PANCREAS TAIL;  Surgeon: Ang Brambila MD;  [facility];  Service: Gynecology Oncology    OMENTECTOMY N/A 8/6/2021    Procedure: RADICAL OMENTECTOMY;  Surgeon: Ang Brambila MD;  [facility];  Service: Gynecology Oncology    AR LAP,DIAGNOSTIC ABDOMEN N/A 8/6/2021    Procedure: LAPAROSCOPY DIAGNOSTIC;  Surgeon: Ang Brambila MD;  [facility];  Service: Gynecology Oncology   [address] [place] [address] SPLENECTOMY, TOTAL N/A 8/6/2021    Procedure: SPLENECTOMY;  Surgeon: Velia Galloway MD;  [facility];  Service: Gynecology Oncology    TONSILLECTOMY         Meds/Allergies:    all medications and allergies reviewed    Allergies: Allergies   Allergen Reactions    Erythromycin Nausea Only    Morphine Headache       Social History:     Marital Status: /Civil Union    Substance Use History:   Social History     Substance and Sexual Activity   Alcohol Use Yes    Comment: socially     Social History     Tobacco Use   Smoking Status Never Smoker   Smokeless Tobacco Never Used     Social History     Substance and Sexual Activity   Drug Use Not Currently       Family History:    History reviewed  No pertinent family history  Physical Exam:     Vitals:   Blood Pressure: 121/56 (08/25/21 1500)  Pulse: 103 (08/25/21 1500)  Temperature: 100 1 °F (37 8 °C) (08/25/21 1500)  Temp Source: Oral (08/25/21 1500)  Respirations: 20 (08/25/21 1500)  Height: 4' 11" (149 9 cm) (08/06/21 1016)  Weight - Scale: 77 1 kg (170 lb) (08/06/21 1016)  SpO2: 93 % (08/25/21 1500)    Physical Exam  Vitals and nursing note reviewed  Constitutional:       General: She is not in acute distress  Appearance: She is well-developed  She is ill-appearing  She is not toxic-appearing  Comments: Pleasant, talkative female resting comfortably in bed on room air   HENT:      Head: Normocephalic and atraumatic  Eyes:      Conjunctiva/sclera: Conjunctivae normal    Cardiovascular:      Rate and Rhythm: Normal rate and regular rhythm  Heart sounds: No murmur heard  Pulmonary:      Effort: Pulmonary effort is normal  No tachypnea or respiratory distress  Breath sounds: Examination of the left-lower field reveals decreased breath sounds  Decreased breath sounds present  No wheezing or rales  Abdominal:      General: Bowel sounds are normal  There is no distension  Palpations: Abdomen is soft        Tenderness: There is no abdominal tenderness  Comments: Two abdominal drains with cloudy straw-colored drainage   Musculoskeletal:         General: Normal range of motion  Cervical back: Neck supple  Right lower leg: No edema  Left lower leg: No edema  Skin:     General: Skin is warm and dry  Capillary Refill: Capillary refill takes less than 2 seconds  Coloration: Skin is pale  Neurological:      Mental Status: She is alert and oriented to person, place, and time  Mental status is at baseline  Psychiatric:         Mood and Affect: Mood normal          Behavior: Behavior normal          Thought Content: Thought content normal              Additional Data:     Lab Results: I have personally reviewed pertinent reports  Results from last 7 days   Lab Units 08/25/21  1409   WBC Thousand/uL 11 97*   HEMOGLOBIN g/dL 7 2*   HEMATOCRIT % 22 8*   PLATELETS Thousands/uL 936*   NEUTROS PCT % 70   LYMPHS PCT % 12*   MONOS PCT % 14*   EOS PCT % 2     Results from last 7 days   Lab Units 08/25/21  1409 08/21/21  0647   SODIUM mmol/L 127* 129*   POTASSIUM mmol/L 3 4* 3 4*   CHLORIDE mmol/L 94* 96*   CO2 mmol/L 27 27   BUN mg/dL 6 6   CREATININE mg/dL 0 36* 0 35*   ANION GAP mmol/L 6 6   CALCIUM mg/dL 7 4* 7 7*   ALBUMIN g/dL  --  1 3*   TOTAL BILIRUBIN mg/dL  --  0 36   ALK PHOS U/L  --  488*   ALT U/L  --  14   AST U/L  --  19   GLUCOSE RANDOM mg/dL 134 134             Lab Results   Component Value Date/Time    HGBA1C 5 9 (H) 07/20/2021 11:44 AM     Results from last 7 days   Lab Units 08/19/21  1023 08/19/21  0658 08/18/21  2109   POC GLUCOSE mg/dl 154* 161* 158*           Imaging: I have personally reviewed pertinent reports  XR chest portable   Final Result by Lance Phillips MD (08/23 1002)      Mildly increased small to moderate left pleural effusion  The study was marked in [facility] for immediate notification              Workstation performed: [id number]         CT abdomen pelvis w contrast Final Result by Char Andrea MD (08/20 5339)      1  Decreased size of left upper quadrant gas and fluid containing collection status post locking loop catheter placement  2   Moderate left pleural effusion with new dependent high density and small locule of gas  This is likely related to interval thoracentesis  The effusion demonstrates increased lateral component  Small right effusion is stable  3   Stable tract of gas extending into the presacral space just above the rectal anastomosis  This may be a normal postoperative appearance of an end-to-side anastomosis no walled off leak is not excluded  Correlation with surgical history recommended  4   Liver lesions again seen suspicious for metastasis  Soft tissue along the posterior liver capsule likely represents metastatic implants  The study was marked in EPIC for significant notification  Workstation performed: [id number]         [facility] cystogram   Final Result by Shirley Saldana MD (08/18 1229)   No contrast extravasation visualized from the bladder  Workstation performed: [id number]         XR chest 1 view   Final Result by Char Andrea MD (08/19 0195)      Status post left basilar locking loop catheter placement with stable small left or pleural effusion  No pneumothorax  Workstation performed: [id number]         IR IN-Patient Thoracentesis   Final Result by Jacky Khoury MD (08/19 7741)   Impression:   1  Left thoracentesis yielding 230cc serosanguinous pleural fluid removed  Signed, performed and dictated by [place] Merary LOPEZ under the supervision of Dr Marshall Console  Workstation performed: [id number]         IR drainage tube placement   Final Result by Jacky Khoury MD (24/62 4497)      CT-guided abscess drainage catheter placement  PLAN:      Monitor output  Keep to bag drainage  Flush daily gently with 5 mL saline           Workstation performed: [id number] XR chest pa & lateral   Final Result by Yumiko Hernandez MD (08/18 0901)      No acute findings  Unchanged bilateral pleural effusions  Workstation performed: [id number]         CT chest abdomen pelvis w contrast   Final Result by Alycia Lyn MD (08/16 2031)      CHEST:   1  Consolidation/atelectasis in the lower lobes greater on the left, decreased from prior  2   Bilateral pleural effusions large on the left and moderate on the right appear similar to slightly decreased  ABDOMEN AND PELVIS:   1   Multiple new small ill-defined hypoattenuating liver lesions suspicious for metastases  2   Mild peritoneal enhancement suggests peritonitis  Percutaneous drainage catheter courses through a loculated left upper quadrant fluid collection measuring 5 1 x 4 3 x 5 6 cm with areas of rim enhancement and foci of gas  Few foci of gas also noted    in the right abdomen anteriorly near the area of small bowel postsurgical changes, while this could be residual postsurgical cannot exclude leak  Recommend continued follow-up  3   Fluid surrounding the pancreatic tail, correlate with serum lipase  4   Some mildly dilated small bowel loops may be related to postoperative ileus  Fecalized stool is seen within distal small bowel loops, correlate for constipation  The study was marked in [facility] for immediate notification  Workstation performed: [id number]         XR chest pa & lateral   Final Result by Michelle Chaparro MD (08/13 1241)      Bilateral pleural effusions, small on the right and moderate on the left, decreased in size since 8/10/2021  Atelectasis and/or consolidation in the left lower lobe cannot be excluded  Workstation performed: [id number]         CT chest wo contrast   Final Result by Meme Carty DO (08/11 3263)      Obstruction of the left lower lobe bronchus  Consolidation versus atelectasis in the lung bases        Scattered airspace opacities within the lungs which may represent infection  Recommend short-term follow-up chest scan and 3 months to evaluate for resolution  Bilateral large pleural effusions  Drainage catheter in the left upper quadrant by the fluid and inflammatory changes  Workstation performed: [id number]         XR chest pa & lateral   Final Result by Fercho Lee MD (1925)      Moderate bilateral pleural effusions, new compared to prior chest x-ray  Workstation performed: [id number]         XR chest portable   Final Result by Najma Albrecht MD (1159)      Moderate left effusion and left base atelectasis  ET tube 4 cm above the avlentin  Workstation performed: [id number]         FL cystogram    (Results Pending)   IR drainage tube check/change/reposition/reinsertion/upsize    (Results Pending)       EKG, Pathology, and Other Studies Reviewed on Admission:   · EK/19 sinus tachycardia with      ** Please Note: This note has been constructed using a voice recognition system   **

## 2021-08-25 NOTE — PROGRESS NOTES
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   Patient's last fever was 100 9 on 8/24/2021 @ 0700    Plan:  S/p surgery  - Spontaneously voiding  - Regular diet   - Continue IS/encourage ambulation   - Pain controlled on current regimen:   Tyra 5 mg Q4h scheduled     Tylenol 975mg Q6h PRN (mild)   Tyra 10mg Q4h PRN (moderate, severe)   Motrin 600mg Q6h PRN (fever)   Dilaudid 0 5 IV Q2h PRN (breakthrough)    Pancreatic leak with abscess formation   - Drain amylase 160 (8/18) from left thoracentesis specimen  - Prior > 13K from abdominal drain   - Collection decreasing in size on repeat CT 8/20  - WBC 10K yesterday AM; AM labs pending; continue to trend daily   - Antibiotics switched to IV unasyn and ciprofloxacin  - Maintain both drains for now, routine drain care    - Back drain: approximately 75cc over 12 hours   - LLQ drain: approximately 30 cc over 12 hours  - Repeat blood cultures show no growth at 24h  - Aerobic wound culture without growth thus far; anaerobic culture pending  - Drain culture not yet resulted    Wound seroma   - Packing in place   - Wound care consulted  - Plan for daily dressing changes    Possible septic thrombophlebitis  - On differential due to continued fevers  - Trial of Lovenox 1mg/kg BID started 8/24/2021    Acute blood loss anemia  - Hgb pending this AM; Hgb 7 7 most recently  - S/P total 5 U RBC + 2 FFP intraop  - Continue to trend; maintain Hgb goal above 7     Acute respiratory failure   - Now off supplemental O2; maintaining 91-93% on RA; provide supplemental O2 as needed; pt denies SOB today  - With persistent pleural effusion   - S/p CXR 8/20 with mildly increased small to moderate left pleural effusion  - Continue to monitor closely    S/p splenectomy with thrombocytosis   - Plts 1183 today; have been slightly increasing  - Continue to trend   - Continue ASA   - Splenectomy vaccines prior to discharge     High grade serous ovarian carcinoma   - Final path resulted  - Pt discussed at tumor board on 8/20, plan is for eventual systemic therapy, genetic and genomic testing    HTN/HLD  Continue home meds amlodipine and pravastatin    Hypokalemia  - K 3 4 today, will give 20 mg K-Dur     Hyponatremia  - 128; continue to trend    Disposition: remain inpatient for ongoing treatment     Subjective:  Julien Macias is doing well  She slept well last night  Patient reports that her pain is controlled with current regimen  She is ambulating, tolerating regular diet, and voiding and having BM  She denies chest pain, SOB, nausea, vomiting  Review of Systems   Constitutional: Negative for appetite change  Respiratory: Negative for chest tightness and shortness of breath  Cardiovascular: Negative for chest pain  Gastrointestinal: Negative for abdominal pain, nausea and vomiting  Genitourinary: Negative for dysuria  Neurological: Negative for light-headedness and headaches  Psychiatric/Behavioral: Negative for confusion  All other systems reviewed and are negative  Objective:   /68 (BP Location: Right arm)   Pulse 100   Temp 98 2 °F (36 8 °C) (Oral)   Resp 21   Ht 4' 11" (1 499 m)   Wt 77 1 kg (170 lb)   SpO2 95%   BMI 34 34 kg/m²     I/O last 3 completed shifts: In: 80 [P O :320; I V :60; IV Piggyback:200]  Out: 1550 [Urine:1525; Drains:25]  I/O this shift:  In: -   Out: 320 [Urine:800; Drains:45]     I/O       08/22 0701 - 08/23 0700 08/23 0701 - 08/24 0700    P  O  180     I V  (mL/kg)  30 (0 4)    NG/GT  0    IV Piggyback  100    Total Intake(mL/kg) 180 (2 3) 130 (1 7)    Urine (mL/kg/hr) 1200 (0 6) 700 (0 4)    Drains 25 25    Stool  0    Total Output 1225 725    Net -1045 -595          Unmeasured Stool Occurrence  1 x          Lab Results   Component Value Date    WBC 10 27 (H) 08/24/2021    HGB 7 7 (L) 08/24/2021    HCT 24 0 (L) 08/24/2021    MCV 88 08/24/2021    PLT 1,032 (HH) 08/24/2021       Lab Results   Component Value Date    GLUCOSE 195 (H) 08/06/2021    CALCIUM 7 9 (L) 08/24/2021    K 3 4 (L) 08/24/2021    CO2 27 08/24/2021    CL 96 (L) 08/24/2021    BUN 7 08/24/2021    CREATININE 0 34 (L) 08/24/2021       Lab Results   Component Value Date/Time    POCGLU 154 (H) 08/19/2021 10:23 AM    POCGLU 161 (H) 08/19/2021 06:58 AM    POCGLU 158 (H) 08/18/2021 09:09 PM    POCGLU 171 (H) 08/18/2021 04:33 PM    POCGLU 92 08/18/2021 12:10 PM       Physical Exam  Constitutional:       General: She is not in acute distress  Appearance: She is obese  She is not ill-appearing or diaphoretic  HENT:      Head: Normocephalic and atraumatic  Eyes:      Conjunctiva/sclera: Conjunctivae normal       Pupils: Pupils are equal, round, and reactive to light  Cardiovascular:      Rate and Rhythm: Normal rate and regular rhythm  Pulses: Normal pulses  Heart sounds: Normal heart sounds  Pulmonary:      Effort: Pulmonary effort is normal  No respiratory distress  Breath sounds: Normal breath sounds  Comments: Breathing adequately on room air not currently requiring supplemental O2  Abdominal:      General: Abdomen is flat  Bowel sounds are normal  There is no distension  Tenderness: There is no abdominal tenderness  There is no guarding  Comments: Midline vertical incision c/d/i, healing well  Wound seroma at lower portion of vertical incision open, packing in place    LLQ OSCAR drain in place, draining purulent milky fluid, unchanged from prior     Posterior Left lower back drain with approximately 50cc milky fluid   Musculoskeletal:         General: Normal range of motion  Cervical back: Normal range of motion  Skin:     General: Skin is warm and dry  Capillary Refill: Capillary refill takes less than 2 seconds  Neurological:      General: No focal deficit present  Mental Status: She is alert and oriented to person, place, and time  Mental status is at baseline     Psychiatric:         Mood and Affect: Mood normal          Behavior: Behavior normal          Juan Gonzalez MD   PGY-4, GYN ONC  8/25/2021 6:57 AM

## 2021-08-25 NOTE — UTILIZATION REVIEW
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  Patient's last fever was 100 9 on 8/24/2021 @ 0700  Midline vertical incision c/d/i, healing well   Wound seroma at lower portion of vertical incision open, packing in place  Pancreatic leak with abscess formation   - Drain amylase 160 (8/18) from left thoracentesis specimen  - Prior > 13K from abdominal drain   - Collection decreasing in size on repeat CT 8/20  - WBC 10K yesterday AM; AM labs pending; continue to trend daily   - Antibiotics switched to IV unasyn and ciprofloxacin   Plan to switch to PO Augmentin and Ciprofloxacin tomorrow if remains afebrile  - Maintain both drains for now, routine drain care               - Back drain: approximately 75cc over 12 hours              - LLQ drain: approximately 30 cc over 12 hours  - Repeat blood cultures show no growth at 24h  - Aerobic wound culture without growth thus far; anaerobic culture pending  - Drain culture not yet resulted        Vital Signs:   Date/Time  Temp  Pulse  Resp  BP  MAP (mmHg)  SpO2  O2 Device  Patient Position - Orthostatic VS   08/25/21 1500  100 1 °F (37 8 °C)  103  20  121/56  81  93 %  None (Room air)  Lying   08/25/21 0700  98 4 °F (36 9 °C)  109Abnormal   14  131/69  94  94 %  None (Room air)  Sitting   08/25/21 0100  --  --  --  --  --  --  None (Room air)  --   08/24/21 1500  98 2 °F (36 8 °C)  100  21  133/68  89  95 %  None (Room air)  Lying   08/24/21 0700  100 9 °F (38 3 °C)Abnormal   104  23Abnormal   134/69  95  92 %  None (Room air)  Lying   08/24/21 0114  99 6 °F (37 6 °C)  109Abnormal   18  124/67  89  90 %  None (Room air) Pertinent Labs/Diagnostic Results:   Results from last 7 days   Lab Units 08/24/21  1030   SARS-COV-2  Negative     Results from last 7 days   Lab Units 08/25/21  1409 08/24/21  1212 08/23/21  0443 08/22/21  0500 08/21/21  0654   WBC Thousand/uL 11 97* 10 27* 17 74* 18 26* 16 29*   HEMOGLOBIN g/dL 7 2* 7 7* 8 1* 7 5* 7 7*   HEMATOCRIT % 22 8* 24 0* 25 8* 23 5* 24 1*   PLATELETS Thousands/uL 936* 1,032* 1,183* 1,132* 1,063*   NEUTROS ABS Thousands/µL 8 44* 7 37 13 08* 13 52* 12 33*         Results from last 7 days   Lab Units 08/25/21  1409 08/24/21  1212 08/23/21  0443 08/22/21  0500 08/21/21  0647   SODIUM mmol/L 127* 129* 128* 130* 129*   POTASSIUM mmol/L 3 4* 3 4* 3 4* 3 7 3 4*   CHLORIDE mmol/L 94* 96* 95* 98* 96*   CO2 mmol/L 27 27 29 27 27   ANION GAP mmol/L 6 6 4 5 6   BUN mg/dL 6 7 7 7 6   CREATININE mg/dL 0 36* 0 34* 0 43* 0 43* 0 35*   EGFR ml/min/1 73sq m 110 112 103 103 111   CALCIUM mg/dL 7 4* 7 9* 8 3 7 8* 7 7*   MAGNESIUM mg/dL  --   --   --  1 7  --      Results from last 7 days   Lab Units 08/21/21  0647   AST U/L 19   ALT U/L 14   ALK PHOS U/L 488*   TOTAL PROTEIN g/dL 5 9*   ALBUMIN g/dL 1 3*   TOTAL BILIRUBIN mg/dL 0 36     Results from last 7 days   Lab Units 08/19/21  1023 08/19/21  0658 08/18/21  2109 08/18/21  1633   POC GLUCOSE mg/dl 154* 161* 158* 171*     Results from last 7 days   Lab Units 08/25/21  1409 08/24/21  1212 08/23/21  0443 08/22/21  0500 08/21/21  0647 08/20/21  0954 08/19/21  0848   GLUCOSE RANDOM mg/dL 134 109 114 149* 134 124 129     Results from last 7 days   Lab Units 08/21/21  0647   LIPASE u/L 211     Results from last 7 days   Lab Units 08/23/21  1809 08/23/21  1521 08/23/21  1208   BLOOD CULTURE   --  No Growth at 24 hrs    No Growth at 24 hrs   --    GRAM STAIN RESULT  No Polys or Bacteria seen  --  Rare Polys  No bacteria seen   WOUND CULTURE   --   --  No growth   BODY FLUID CULTURE, STERILE  1+ Growth of Gamma Hemolytic Streptococcus  Few Colonies of Klebsiella-Enterobacter  group*  --   --                  Medications:   Scheduled Medications:  amLODIPine, 10 mg, Oral, Daily  ampicillin-sulbactam, 3 g, Intravenous, Q6H  aspirin, 81 mg, Oral, Daily  ciprofloxacin, 400 mg, Intravenous, Q12H  enoxaparin, 1 mg/kg, Subcutaneous, Q12H ANA MARIA  HYDROmorphone, 0 5 mg, Intravenous, Once  magnesium oxide, 400 mg, Oral, BID  oxyCODONE, 5 mg, Oral, Q4H  pantoprazole, 40 mg, Oral, Early Morning  potassium chloride, 40 mEq, Oral, Once  pravastatin, 10 mg, Oral, Daily With Dinner  sodium chloride, 1 g, Oral, TID With Meals      Continuous IV Infusions:     PRN Meds:  acetaminophen, 975 mg, Oral, Q6H PRN  albuterol, 2 puff, Inhalation, Q4H PRN  HYDROmorphone, 0 5 mg, Intravenous, Q2H PRN  ibuprofen, 600 mg, Oral, Q6H PRN  ondansetron, 4 mg, Intravenous, Q6H PRN  oxyCODONE, 10 mg, Oral, Q4H PRN  phenol, 1 spray, Mouth/Throat, Q2H PRN  pneumococcal 13-valent conjugate vaccine, 0 5 mL, Intramuscular, Prior to discharge  sodium chloride (PF), 500 mL, Intracatheter, Once in imaging        Discharge Plan: [facility]    Network Utilization Review Department  ATTENTION: Please call with any questions or concerns to [phone] and carefully listen to the prompts so that you are directed to the right person  All voicemails are confidential   [facility] all requests for admission clinical reviews, approved or denied determinations and any other requests to dedicated fax number below belonging to the campus where the patient is receiving treatment   List of dedicated fax numbers for the Facilities:  [address] DENIALS (Administrative/Medical Necessity) [phone]   [address] (Maternity/NICU/Pediatrics) 261 [facility] [address] Erwin Mike 2251 [id number] [place] [address] [address]jinder Charles [place] [address] [facility] [phone]

## 2021-08-25 NOTE — PROGRESS NOTES
Progress Note - Infectious Disease   Poncho Nacho 80 y o  female MRN: [id number]  Unit/Bed#: [facility] 305-01 Encounter: [id number]      Impression/Plan:    1  Abdominal abscess  Patient developed LUQ abscess after complex abdominal surgery and pancreatic leak  8/18, s/p IR drain placement with aspiration of 60cc purulent fluid  Culture is growing Klebsiella oxytoca, Enterococcus faecium, and Bacteroides fragilis  Repeat CT A/P 8/20 shows improvement in the size of the collection    -continue Unasyn 3g IV q6hr and Ciprofloxacin 400mg IV q12hr to cover isolated organisms  Plan to switch to PO Augmentin and Ciprofloxacin tomorrow if remains afebrile   -will need drain check and CT A/P as an outpatient, and ID follow up to determine antibiotic duration     2  Sepsis-fevers, leukocytosis  Fevers now improving  Secondary to #1  Other considerations are drug fever, as well as septic pelvic thrombophlebitis  Although less common, this can occur after hysterectomy and pelvic surgery  COVID negative                -antibiotics as above              -continue therapeutic anticoagulation with lovenox for pelvic thrombophlebitis if no contraindications     3  Pancreatic leak  Surgical complication  Abdominal drain in place       4  Ovarian cancer  8/6 status post ex lap, en-bloc hysterectomy, bilateral salpingo-oophorectomy, sigmoidectomy with low rectal anastomosis, SBR, radical omentectomy, splenectomy, appendectomy, oversewing of tail of pancreas, repair of cystotomy, bilateral peritoneal stripping  Surgery was complicated by pancreatic tail hemorrhage and low rectal reanastomosis  Imaging shows liver metastases              -care per Gyn onc     5  Thrombocytosis  Reactive due to splenectomy, which is expected   On aspirin and lovenox     6  Status post splenectomy  Received HiB, meningococcal quadrivalent vaccine              -needs PCV13 prior to discharge     I have discussed the above management plan in detail with the primary service  ID will follow  Antibiotics:  Unasyn  Ciprofloxacin     Subjective:  Last fever yesterday morning  She is feeling better today  Abdominal pain is controlled  She denies fevers, chills nausea, vomiting, diarrhea  Objective:  Vitals:  Temp:  [98 2 °F (36 8 °C)-98 4 °F (36 9 °C)] 98 4 °F (36 9 °C)  HR:  [100-109] 109  Resp:  [14-21] 14  BP: (131-133)/(68-69) 131/69  SpO2:  [94 %-95 %] 94 %  Temp (24hrs), Av 3 °F (36 8 °C), Min:98 2 °F (36 8 °C), Max:98 4 °F (36 9 °C)  Current: Temperature: 98 4 °F (36 9 °C)    Physical Exam:   General Appearance:  Alert, interactive, nontoxic, no acute distress  Throat: Oropharynx moist without lesions  Lungs:   Clear to auscultation bilaterally; no wheezes, rhonchi or rales; respirations unlabored   Heart:  RRR; no murmur, rub or gallop   Abdomen:   Soft, non-tender, non-distended, positive bowel sounds  2 LUQ OSCAR drains with purulent/milky fluid   Extremities: No clubbing, cyanosis or edema   Skin: No new rashes or lesions  Abdominal midline scar, no erythema or drainage       Labs: All pertinent labs and imaging studies were personally reviewed  Results from last 7 days   Lab Units 21  1212 21  0443 21  0500   WBC Thousand/uL 10 27* 17 74* 18 26*   HEMOGLOBIN g/dL 7 7* 8 1* 7 5*   PLATELETS Thousands/uL 1,032* 1,183* 1,132*     Results from last 7 days   Lab Units 21  1212 21  0443 21  0500 21  0647   SODIUM mmol/L 129* 128* 130* 129*   POTASSIUM mmol/L 3 4* 3 4* 3 7 3 4*   CHLORIDE mmol/L 96* 95* 98* 96*   CO2 mmol/L 27 29 27 27   BUN mg/dL 7 7 7 6   CREATININE mg/dL 0 34* 0 43* 0 43* 0 35*   EGFR ml/min/1 73sq m 112 103 103 111   CALCIUM mg/dL 7 9* 8 3 7 8* 7 7*   AST U/L  --   --   --  19   ALT U/L  --   --   --  14   ALK PHOS U/L  --   --   --  488*                       Micro:  Results from last 7 days   Lab Units 21  1809 21  1521 21  1208   BLOOD CULTURE   --  No Growth at 24 hrs  No Growth at 24 hrs   --    GRAM STAIN RESULT  No Polys or Bacteria seen  --  Rare Polys  No bacteria seen   WOUND CULTURE   --   --  No growth   BODY FLUID CULTURE, STERILE  1+ Growth of Gamma Hemolytic Streptococcus  Few Colonies of Klebsiella-Enterobacter  group*  --   --      LUQ Abscess Cx: Klebsiella oxytoca, Enterococcus faecium     Imaging:    CT A/P 8/20/21:  1   Decreased size of left upper quadrant gas and fluid containing collection status post locking loop catheter placement  2   Moderate left pleural effusion with new dependent high density and small locule of gas  This is likely related to interval thoracentesis  The effusion demonstrates increased lateral component  Small right effusion is stable  3   Stable tract of gas extending into the presacral space just above the rectal anastomosis  This may be a normal postoperative appearance of an end-to-side anastomosis no walled off leak is not excluded  Correlation with surgical history recommended  4   Liver lesions again seen suspicious for metastasis  Soft tissue along the posterior liver capsule likely represents metastatic implants

## 2021-08-25 NOTE — QUICK NOTE
Packing exchanged at bedside  No new redness or expansion of wound noted  Patient tolerated well      Juan M Phillips MD, PGY-4  8/25/2021  12:58 PM

## 2021-08-26 ENCOUNTER — APPOINTMENT (INPATIENT)
Dept: RADIOLOGY | Facility: HOSPITAL | Age: 71
DRG: 329 | End: 2021-08-26
Payer: COMMERCIAL

## 2021-08-26 ENCOUNTER — APPOINTMENT (INPATIENT)
Dept: NON INVASIVE DIAGNOSTICS | Facility: HOSPITAL | Age: 71
DRG: 329 | End: 2021-08-26
Payer: COMMERCIAL

## 2021-08-26 LAB
ABO GROUP BLD: NORMAL
ANION GAP SERPL CALCULATED.3IONS-SCNC: 4 MMOL/L (ref 4–13)
BACTERIA SPEC BFLD CULT: ABNORMAL
BACTERIA SPEC BFLD CULT: ABNORMAL
BASOPHILS # BLD AUTO: 0.08 THOUSANDS/ΜL (ref 0–0.1)
BASOPHILS NFR BLD AUTO: 1 % (ref 0–1)
BLD GP AB SCN SERPL QL: NEGATIVE
BUN SERPL-MCNC: 5 MG/DL (ref 5–25)
CALCIUM SERPL-MCNC: 7.6 MG/DL (ref 8.3–10.1)
CHLORIDE SERPL-SCNC: 96 MMOL/L (ref 100–108)
CO2 SERPL-SCNC: 27 MMOL/L (ref 21–32)
CREAT SERPL-MCNC: 0.32 MG/DL (ref 0.6–1.3)
EOSINOPHIL # BLD AUTO: 0.25 THOUSAND/ΜL (ref 0–0.61)
EOSINOPHIL NFR BLD AUTO: 3 % (ref 0–6)
ERYTHROCYTE [DISTWIDTH] IN BLOOD BY AUTOMATED COUNT: 17.9 % (ref 11.6–15.1)
GFR SERPL CREATININE-BSD FRML MDRD: 114 ML/MIN/1.73SQ M
GLUCOSE SERPL-MCNC: 130 MG/DL (ref 65–140)
GRAM STN SPEC: ABNORMAL
HCT VFR BLD AUTO: 22.7 % (ref 34.8–46.1)
HGB BLD-MCNC: 7 G/DL (ref 11.5–15.4)
IMM GRANULOCYTES # BLD AUTO: 0.16 THOUSAND/UL (ref 0–0.2)
IMM GRANULOCYTES NFR BLD AUTO: 2 % (ref 0–2)
LYMPHOCYTES # BLD AUTO: 1.48 THOUSANDS/ΜL (ref 0.6–4.47)
LYMPHOCYTES NFR BLD AUTO: 15 % (ref 14–44)
MAGNESIUM SERPL-MCNC: 1.7 MG/DL (ref 1.6–2.6)
MCH RBC QN AUTO: 27.2 PG (ref 26.8–34.3)
MCHC RBC AUTO-ENTMCNC: 30.8 G/DL (ref 31.4–37.4)
MCV RBC AUTO: 88 FL (ref 82–98)
MONOCYTES # BLD AUTO: 1.51 THOUSAND/ΜL (ref 0.17–1.22)
MONOCYTES NFR BLD AUTO: 15 % (ref 4–12)
NEUTROPHILS # BLD AUTO: 6.66 THOUSANDS/ΜL (ref 1.85–7.62)
NEUTS SEG NFR BLD AUTO: 64 % (ref 43–75)
NRBC BLD AUTO-RTO: 1 /100 WBCS
PHOSPHATE SERPL-MCNC: 2.1 MG/DL (ref 2.3–4.1)
PLATELET # BLD AUTO: 886 THOUSANDS/UL (ref 149–390)
PMV BLD AUTO: 9.4 FL (ref 8.9–12.7)
POTASSIUM SERPL-SCNC: 3.5 MMOL/L (ref 3.5–5.3)
RBC # BLD AUTO: 2.57 MILLION/UL (ref 3.81–5.12)
RH BLD: POSITIVE
SODIUM SERPL-SCNC: 127 MMOL/L (ref 136–145)
SPECIMEN EXPIRATION DATE: NORMAL
TSH SERPL DL<=0.05 MIU/L-ACNC: 1.87 UIU/ML (ref 0.36–3.74)
WBC # BLD AUTO: 10.14 THOUSAND/UL (ref 4.31–10.16)

## 2021-08-26 PROCEDURE — 99231 SBSQ HOSP IP/OBS SF/LOW 25: CPT | Performed by: STUDENT IN AN ORGANIZED HEALTH CARE EDUCATION/TRAINING PROGRAM

## 2021-08-26 PROCEDURE — 86900 BLOOD TYPING SEROLOGIC ABO: CPT | Performed by: OBSTETRICS & GYNECOLOGY

## 2021-08-26 PROCEDURE — 85025 COMPLETE CBC W/AUTO DIFF WBC: CPT | Performed by: OBSTETRICS & GYNECOLOGY

## 2021-08-26 PROCEDURE — P9016 RBC LEUKOCYTES REDUCED: HCPCS

## 2021-08-26 PROCEDURE — 99232 SBSQ HOSP IP/OBS MODERATE 35: CPT | Performed by: NURSE PRACTITIONER

## 2021-08-26 PROCEDURE — 83735 ASSAY OF MAGNESIUM: CPT | Performed by: NURSE PRACTITIONER

## 2021-08-26 PROCEDURE — 86850 RBC ANTIBODY SCREEN: CPT | Performed by: OBSTETRICS & GYNECOLOGY

## 2021-08-26 PROCEDURE — G1004 CDSM NDSC: HCPCS

## 2021-08-26 PROCEDURE — 80048 BASIC METABOLIC PNL TOTAL CA: CPT | Performed by: OBSTETRICS & GYNECOLOGY

## 2021-08-26 PROCEDURE — 84100 ASSAY OF PHOSPHORUS: CPT | Performed by: NURSE PRACTITIONER

## 2021-08-26 PROCEDURE — 71260 CT THORAX DX C+: CPT

## 2021-08-26 PROCEDURE — 99024 POSTOP FOLLOW-UP VISIT: CPT | Performed by: OBSTETRICS & GYNECOLOGY

## 2021-08-26 PROCEDURE — 86923 COMPATIBILITY TEST ELECTRIC: CPT

## 2021-08-26 PROCEDURE — 74177 CT ABD & PELVIS W/CONTRAST: CPT

## 2021-08-26 PROCEDURE — 93970 EXTREMITY STUDY: CPT | Performed by: SURGERY

## 2021-08-26 PROCEDURE — 86901 BLOOD TYPING SEROLOGIC RH(D): CPT | Performed by: OBSTETRICS & GYNECOLOGY

## 2021-08-26 PROCEDURE — 84443 ASSAY THYROID STIM HORMONE: CPT | Performed by: NURSE PRACTITIONER

## 2021-08-26 PROCEDURE — 93970 EXTREMITY STUDY: CPT

## 2021-08-26 RX ORDER — FUROSEMIDE 10 MG/ML
20 INJECTION INTRAMUSCULAR; INTRAVENOUS ONCE
Status: COMPLETED | OUTPATIENT
Start: 2021-08-26 | End: 2021-08-26

## 2021-08-26 RX ORDER — AMOXICILLIN AND CLAVULANATE POTASSIUM 875; 125 MG/1; MG/1
1 TABLET, FILM COATED ORAL EVERY 12 HOURS SCHEDULED
Status: DISCONTINUED | OUTPATIENT
Start: 2021-08-26 | End: 2021-08-30

## 2021-08-26 RX ORDER — CIPROFLOXACIN 500 MG/1
500 TABLET, FILM COATED ORAL EVERY 12 HOURS SCHEDULED
Status: DISCONTINUED | OUTPATIENT
Start: 2021-08-26 | End: 2021-08-30

## 2021-08-26 RX ORDER — POTASSIUM CHLORIDE 20 MEQ/1
20 TABLET, EXTENDED RELEASE ORAL ONCE
Status: COMPLETED | OUTPATIENT
Start: 2021-08-26 | End: 2021-08-26

## 2021-08-26 RX ADMIN — POTASSIUM CHLORIDE 20 MEQ: 1500 TABLET, EXTENDED RELEASE ORAL at 10:18

## 2021-08-26 RX ADMIN — ENOXAPARIN SODIUM 80 MG: 80 INJECTION SUBCUTANEOUS at 08:12

## 2021-08-26 RX ADMIN — FUROSEMIDE 20 MG: 10 INJECTION, SOLUTION INTRAMUSCULAR; INTRAVENOUS at 21:06

## 2021-08-26 RX ADMIN — DIBASIC SODIUM PHOSPHATE, MONOBASIC POTASSIUM PHOSPHATE AND MONOBASIC SODIUM PHOSPHATE 2 TABLET: 852; 155; 130 TABLET ORAL at 08:13

## 2021-08-26 RX ADMIN — ACETAMINOPHEN 975 MG: 325 TABLET, FILM COATED ORAL at 21:04

## 2021-08-26 RX ADMIN — ENOXAPARIN SODIUM 80 MG: 80 INJECTION SUBCUTANEOUS at 21:06

## 2021-08-26 RX ADMIN — CIPROFLOXACIN HYDROCHLORIDE 500 MG: 500 TABLET, FILM COATED ORAL at 21:04

## 2021-08-26 RX ADMIN — DIBASIC SODIUM PHOSPHATE, MONOBASIC POTASSIUM PHOSPHATE AND MONOBASIC SODIUM PHOSPHATE 2 TABLET: 852; 155; 130 TABLET ORAL at 12:41

## 2021-08-26 RX ADMIN — AMLODIPINE BESYLATE 10 MG: 10 TABLET ORAL at 08:11

## 2021-08-26 RX ADMIN — FUROSEMIDE 20 MG: 10 INJECTION, SOLUTION INTRAMUSCULAR; INTRAVENOUS at 10:18

## 2021-08-26 RX ADMIN — MAGNESIUM OXIDE TAB 400 MG (241.3 MG ELEMENTAL MG) 400 MG: 400 (241.3 MG) TAB at 08:11

## 2021-08-26 RX ADMIN — PRAVASTATIN SODIUM 10 MG: 10 TABLET ORAL at 15:34

## 2021-08-26 RX ADMIN — SODIUM CHLORIDE 3 G: 9 INJECTION, SOLUTION INTRAVENOUS at 08:28

## 2021-08-26 RX ADMIN — OXYCODONE HYDROCHLORIDE 5 MG: 5 TABLET ORAL at 12:41

## 2021-08-26 RX ADMIN — AMOXICILLIN AND CLAVULANATE POTASSIUM 1 TABLET: 875; 125 TABLET, FILM COATED ORAL at 21:04

## 2021-08-26 RX ADMIN — OXYCODONE HYDROCHLORIDE 5 MG: 5 TABLET ORAL at 04:03

## 2021-08-26 RX ADMIN — DIBASIC SODIUM PHOSPHATE, MONOBASIC POTASSIUM PHOSPHATE AND MONOBASIC SODIUM PHOSPHATE 2 TABLET: 852; 155; 130 TABLET ORAL at 15:38

## 2021-08-26 RX ADMIN — CIPROFLOXACIN 400 MG: 2 INJECTION, SOLUTION INTRAVENOUS at 10:16

## 2021-08-26 RX ADMIN — OXYCODONE HYDROCHLORIDE 5 MG: 5 TABLET ORAL at 21:05

## 2021-08-26 RX ADMIN — PANTOPRAZOLE SODIUM 40 MG: 40 TABLET, DELAYED RELEASE ORAL at 08:11

## 2021-08-26 RX ADMIN — Medication 1 G: at 08:12

## 2021-08-26 RX ADMIN — ASPIRIN 81 MG: 81 TABLET, COATED ORAL at 08:10

## 2021-08-26 RX ADMIN — IOHEXOL 100 ML: 350 INJECTION, SOLUTION INTRAVENOUS at 20:29

## 2021-08-26 RX ADMIN — MAGNESIUM OXIDE TAB 400 MG (241.3 MG ELEMENTAL MG) 400 MG: 400 (241.3 MG) TAB at 12:41

## 2021-08-26 RX ADMIN — OXYCODONE HYDROCHLORIDE 5 MG: 5 TABLET ORAL at 16:47

## 2021-08-26 RX ADMIN — OXYCODONE HYDROCHLORIDE 5 MG: 5 TABLET ORAL at 08:12

## 2021-08-26 RX ADMIN — AMOXICILLIN AND CLAVULANATE POTASSIUM 1 TABLET: 875; 125 TABLET, FILM COATED ORAL at 15:34

## 2021-08-26 RX ADMIN — HYDROMORPHONE HYDROCHLORIDE 0.5 MG: 1 INJECTION, SOLUTION INTRAMUSCULAR; INTRAVENOUS; SUBCUTANEOUS at 15:38

## 2021-08-26 NOTE — PROGRESS NOTES
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   Patient's last fever was 100 9 on 8/24/2021 @ 0700, temperature of 100 1F noted on 8/25/2021 at 1500    Plan:  S/p surgery  - Spontaneously voiding  - Regular diet   - Continue IS/encourage ambulation   - Pain controlled on current regimen:   Tyra 5 mg Q4h scheduled     Tylenol 975mg Q6h PRN (mild)   Tyra 10mg Q4h PRN (moderate, severe)   Motrin 600mg Q6h PRN (fever)   Dilaudid 0 5 IV Q2h PRN (breakthrough)    Pancreatic leak with abscess formation   - Drain amylase 160 (8/18) from left thoracentesis specimen  - Prior > 13K from abdominal drain   - Collection decreasing in size on repeat CT 8/20  - WBC 11 9K yesterday AM; AM labs CBC pending; continue to trend daily   - Maintain both drains for now, routine drain care    - Back drain: approximately 20cc over 12 hours   - LLQ drain: approximately 5 cc over 12 hours  - Repeat blood cultures show no growth at 24h  - Aerobic and anaerobic wound culture without growth   - Drain culture +klebsiella  - On IV unasyn and ciprofloxacin, can consider switching to PO augmentin and ciprofloxacin today    Hyponatremia  - Fluid restriction given  - Salt tablets added to regimen  - Na+ 127 this AM  - Will replete phosphate  - TSH WNL  - Mag WNL   - Appreciate SLIM recommendations    Wound seroma   - Packing in place   - Wound care consulted, appreciate recommendations  - Plan for daily dressing changes, done this AM on rounds    Possible septic thrombophlebitis  - On differential due to continued fevers  - Trial of Lovenox 1mg/kg BID started 8/24/2021    Acute blood loss anemia  - Hgb pending this AM; Hgb 7 7 most recently  - S/P total 5 U RBC + 2 FFP intraop  - Continue to trend; maintain Hgb goal above 7     Acute respiratory failure   -  Has not required supplemental O2 for several days  - With persistent pleural effusion   - S/p CXR 8/20 with mildly increased small to moderate left pleural effusion  - Continue to monitor closely    S/p splenectomy with thrombocytosis   - Plt 983K yesterday, AM CBC pending  - Continue to trend   - Continue ASA   - Splenectomy vaccines prior to discharge     High grade serous ovarian carcinoma   - Final path resulted  - Pt discussed at tumor board on 8/20, plan is for eventual systemic therapy, genetic and genomic testing    HTN/HLD  Continue home meds amlodipine and pravastatin    Disposition: remain inpatient for ongoing treatment     Subjective:  Kayli Felder is doing well  She slept well last night  Patient reports that her pain is controlled with current regimen  She is ambulating, tolerating regular diet, and voiding and having BM  She denies chest pain, SOB, nausea, vomiting  She has no new complaints    Review of Systems   Constitutional: Negative for appetite change  Respiratory: Negative for chest tightness and shortness of breath  Cardiovascular: Negative for chest pain  Gastrointestinal: Negative for abdominal pain, nausea and vomiting  Genitourinary: Negative for dysuria  Neurological: Negative for light-headedness and headaches  Psychiatric/Behavioral: Negative for confusion  All other systems reviewed and are negative  Objective:   /56 (BP Location: Left arm)   Pulse 103   Temp 99 5 °F (37 5 °C)   Resp 20   Ht 4' 11" (1 499 m)   Wt 87 8 kg (193 lb 9 6 oz)   SpO2 93%   BMI 39 10 kg/m²     I/O last 3 completed shifts: In: 450 [P O :320; I V :30; IV Piggyback:100]  Out: 8069 [Urine:2325; Drains:95]  I/O this shift:  In: -   Out: 800 [Urine:800]     I/O       08/22 0701 - 08/23 0700 08/23 0701 - 08/24 0700    P  O  180     I V  (mL/kg)  30 (0 4)    NG/GT  0    IV Piggyback  100    Total Intake(mL/kg) 180 (2 3) 130 (1 7)    Urine (mL/kg/hr) 1200 (0 6) 700 (0 4)    Drains 25 25    Stool  0    Total Output 1225 725    Net -1045 -595          Unmeasured Stool Occurrence  1 x          Lab Results   Component Value Date    WBC 11 97 (H) 08/25/2021    HGB 7 2 (L) 08/25/2021    HCT 22 8 (L) 08/25/2021    MCV 88 08/25/2021     (H) 08/25/2021       Lab Results   Component Value Date    GLUCOSE 195 (H) 08/06/2021    CALCIUM 7 6 (L) 08/26/2021    K 3 5 08/26/2021    CO2 27 08/26/2021    CL 96 (L) 08/26/2021    BUN 5 08/26/2021    CREATININE 0 32 (L) 08/26/2021       Lab Results   Component Value Date/Time    POCGLU 154 (H) 08/19/2021 10:23 AM    POCGLU 161 (H) 08/19/2021 06:58 AM    POCGLU 158 (H) 08/18/2021 09:09 PM    POCGLU 171 (H) 08/18/2021 04:33 PM    POCGLU 92 08/18/2021 12:10 PM       Physical Exam  Constitutional:       General: She is not in acute distress  Appearance: She is obese  She is not ill-appearing or diaphoretic  HENT:      Head: Normocephalic and atraumatic  Eyes:      Conjunctiva/sclera: Conjunctivae normal       Pupils: Pupils are equal, round, and reactive to light  Cardiovascular:      Rate and Rhythm: Normal rate and regular rhythm  Pulses: Normal pulses  Heart sounds: Normal heart sounds  Pulmonary:      Effort: Pulmonary effort is normal  No respiratory distress  Breath sounds: Normal breath sounds  Abdominal:      General: Abdomen is flat  Bowel sounds are normal  There is no distension  Tenderness: There is no abdominal tenderness  There is no guarding  Comments: Midline vertical incision c/d/i, healing well  Wound seroma at lower portion of vertical incision open, packing in place    LLQ OSCAR drain in place, draining purulent milky fluid, unchanged from prior     Posterior Left lower back drain with approximately 50cc milky fluid   Musculoskeletal:         General: Normal range of motion        Cervical back: Normal range of motion  Skin:     General: Skin is warm and dry  Capillary Refill: Capillary refill takes less than 2 seconds  Neurological:      General: No focal deficit present  Mental Status: She is alert and oriented to person, place, and time  Mental status is at baseline     Psychiatric:         Mood and Affect: Mood normal          Behavior: Behavior normal          Roge Lou MD   PGY-4, GYN ONC  8/26/2021 6:38 AM

## 2021-08-26 NOTE — ASSESSMENT & PLAN NOTE
· Postoperatively   · HGB currently 7 0, no active bleeding   · GYN ordered for 1 unit of pRBCs today  · Will order an additional dose of Lasix IV after transfusion   · Monitor HGB in am

## 2021-08-26 NOTE — ASSESSMENT & PLAN NOTE
Malnutrition Findings:   Adult Malnutrition type: Acute illness  Adult Degree of Malnutrition: Other severe protein calorie malnutrition (Severe pro/kathie malnutrition r/t CA dx as evidenced by 5% unplanned wt loss since 7/22/21, consuming < 50% energy intake compared to estimated energy needs > 5 days  Treated with cl liqs + Ensure Clear 4 xday)    BMI Findings: Body mass index is 39 1 kg/m²     · Encourage oral intake and nutritional supplements

## 2021-08-26 NOTE — PROGRESS NOTES
Progress Note - Infectious Disease   Lucy Oms 80 y o  female MRN: [id number]  Unit/Bed#: [facility] 305-01 Encounter: [id number]      Impression/Plan:    1  Abdominal abscess  Patient developed LUQ abscess after complex abdominal surgery and pancreatic leak  8/18, s/p IR drain placement with aspiration of 60cc purulent fluid  Culture is growing Klebsiella oxytoca, Enterococcus faecium, and Bacteroides fragilis  Repeat CT A/P 8/20 shows improvement in the size of the collection  -will stop IV antibiotics today due to clinical improvement and volume overload   -start Augmentin 875-125mg PO BID and ciprofloxacin 500mg PO BID   -little drain output today--if output stays low recommend drain check with IR or repeat CT A/P      2  Sepsis-fevers, leukocytosis  Fevers now improving  Secondary to #1  Other considerations are drug fever, as well as septic pelvic thrombophlebitis  Although less common, this can occur after hysterectomy and pelvic surgery  COVID negative                -antibiotics as above              -continue therapeutic anticoagulation with lovenox for pelvic thrombophlebitis if no contraindications     3  Pancreatic leak  Surgical complication  Abdominal drains in place       4  Ovarian cancer  8/6 status post ex lap, en-bloc hysterectomy, bilateral salpingo-oophorectomy, sigmoidectomy with low rectal anastomosis, SBR, radical omentectomy, splenectomy, appendectomy, oversewing of tail of pancreas, repair of cystotomy, bilateral peritoneal stripping  Surgery was complicated by pancreatic tail hemorrhage and low rectal reanastomosis  Imaging shows liver metastases              -care per Gyn onc     5  Thrombocytosis  Reactive due to splenectomy, which is expected  On aspirin and lovenox  Platelets now improving     6   Status post splenectomy  Received HiB, meningococcal quadrivalent vaccine              -needs PCV13 prior to discharge     I have discussed the above management plan in detail with the primary service  ID will follow  Antibiotics:  Unasyn  Ciprofloxacin     Subjective:  Patient feeling well today, pain controlled in her abdomen  Last temp 100 4 this morning  Leukocytosis improved  Denies shortness of breath, chest pain, rash  Objective:  Vitals:  Temp:  [99 5 °F (37 5 °C)-100 4 °F (38 °C)] 100 4 °F (38 °C)  HR:  [103-109] 109  Resp:  [20] 20  BP: (121-135)/(56-70) 132/61  SpO2:  [92 %-93 %] 92 %  Temp (24hrs), Av °F (37 8 °C), Min:99 5 °F (37 5 °C), Max:100 4 °F (38 °C)  Current: Temperature: 100 4 °F (38 °C)    Physical Exam:   General Appearance:  Alert, interactive, nontoxic, no acute distress  Throat: Oropharynx moist without lesions  Lungs:   Clear to auscultation bilaterally; no wheezes, rhonchi or rales; respirations unlabored   Heart:  RRR; no murmur, rub or gallop   Abdomen:   Soft, non-tender, non-distended, positive bowel sounds  2 LUQ OSCAR drains with purulent/milky fluid   Extremities: No clubbing, cyanosis or edema   Skin: No new rashes or lesions  Abdominal midline scar, no erythema or drainage       Labs:    All pertinent labs and imaging studies were personally reviewed  Results from last 7 days   Lab Units 21  0455 21  1409 21  1212   WBC Thousand/uL 10 14 11 97* 10 27*   HEMOGLOBIN g/dL 7 0* 7 2* 7 7*   PLATELETS Thousands/uL 886* 936* 1,032*     Results from last 7 days   Lab Units 21  0456 21  1409 21  1212 21  0647   SODIUM mmol/L 127* 127* 129* 129*   POTASSIUM mmol/L 3 5 3 4* 3 4* 3 4*   CHLORIDE mmol/L 96* 94* 96* 96*   CO2 mmol/L 27 27 27 27   BUN mg/dL 5 6 7 6   CREATININE mg/dL 0 32* 0 36* 0 34* 0 35*   EGFR ml/min/1 73sq m 114 110 112 111   CALCIUM mg/dL 7 6* 7 4* 7 9* 7 7*   AST U/L  --   --   --  19   ALT U/L  --   --   --  14   ALK PHOS U/L  --   --   --  488*                       Micro:  Results from last 7 days   Lab Units 21  1809 21  1521 21  1208   BLOOD CULTURE   --  No Growth at 48 hrs   No Growth at 48 hrs   --    GRAM STAIN RESULT  No Polys or Bacteria seen  --  Rare Polys  No bacteria seen   WOUND CULTURE   --   --  Culture results to follow  BODY FLUID CULTURE, STERILE  1+ Growth of Enterococcus faecium*  Few Colonies of Klebsiella-Enterobacter  group*  --   --      LUQ Abscess Cx: Klebsiella oxytoca, Enterococcus faecium     Imaging:    CT A/P 8/20/21:  1   Decreased size of left upper quadrant gas and fluid containing collection status post locking loop catheter placement  2   Moderate left pleural effusion with new dependent high density and small locule of gas  This is likely related to interval thoracentesis  The effusion demonstrates increased lateral component  Small right effusion is stable  3   Stable tract of gas extending into the presacral space just above the rectal anastomosis  This may be a normal postoperative appearance of an end-to-side anastomosis no walled off leak is not excluded  Correlation with surgical history recommended  4   Liver lesions again seen suspicious for metastasis  Soft tissue along the posterior liver capsule likely represents metastatic implants

## 2021-08-26 NOTE — ASSESSMENT & PLAN NOTE
· POD #20 s/p ex lap, en-bloc hysterectomy, bilateral salpingo-oophorectomy, sigmoidectomy with low rectal anastomosis, SBR, radical omentectomy, splenectomy, appendectomy, oversewing of tail of pancreas, repair of cystotomy, bilateral peritoneal stripping     · Surgery was complicated by pancreatic tail hemorrhage and low rectal reanastomosis   Imaging shows liver metastases  · Management per GYN ONC  · Pt's case discussed at tumor board, plan for eventual chemotherapy

## 2021-08-26 NOTE — ASSESSMENT & PLAN NOTE
· S/p left sided thoracentesis yielding 230 mL  · Encourage deep breathing and incentive spirometry  · Trial Lasix 20 mg IV x1 8/26

## 2021-08-26 NOTE — PLAN OF CARE
Problem: MOBILITY - ADULT  Goal: Maintain or return to baseline ADL function  Description: INTERVENTIONS:  -  Assess patient's ability to carry out ADLs; assess patient's baseline for ADL function and identify physical deficits which impact ability to perform ADLs (bathing, care of mouth/teeth, toileting, grooming, dressing, etc )  - Assess/evaluate cause of self-care deficits   - Assess range of motion  - Assess patient's mobility; develop plan if impaired  - Assess patient's need for assistive devices and provide as appropriate  - Encourage maximum independence but intervene and supervise when necessary  - Involve family in performance of ADLs  - Assess for home care needs following discharge   - Consider OT consult to assist with ADL evaluation and planning for discharge  - Provide patient education as appropriate  Outcome: Progressing  Goal: Maintains/Returns to pre admission functional level  Description: INTERVENTIONS:  - Perform BMAT or MOVE assessment daily    - Set and communicate daily mobility goal to care team and patient/family/caregiver  - Collaborate with rehabilitation services on mobility goals if consulted  - Perform Range of Motion  times a day  - Reposition patient every hours    - Dangle patient times a day  - Stand patient times a day  - Ambulate patient  times a day  - Out of bed to chair  times a day   - Out of bed for meals  times a day  - Out of bed for toileting  - Record patient progress and toleration of activity level   Outcome: Progressing

## 2021-08-26 NOTE — PROGRESS NOTES
[facility]  Progress Note - Judit Ritchie 1950, 80 y o  female MRN: [id number]  Unit/Bed#: [facility] 305-01 Encounter: [id number]  Primary Care Provider: Samuel Nava MD   Date and time admitted to hospital: 8/6/2021  9:15 AM    * Hyponatremia  Assessment & Plan  Presented with a sodium of 138 on 8/6  Sodium downtrended to 133 on 8/13  Sodium continued to downtrend to 127 on 8/25  · Serum osmo 263, urine osmo 376, and urine sodium 97  · TSH within normal limits   · Primary service ordered NaCl 1 gm TID x3 doses on 8/25 into 8/16  · Continue 1200 mL/day fluid restriction   · Given Lasix 20 mg IV x1 in am as daily weight shows 14% weight gain in 2 weeks   · Will give another Lasix 20 mg IV x1 tonight after patient receives blood transfusion   · Monitor I+O, daily weights   · Liberalize salt in diet - patient educated  · Monitor BMP in am     Ovarian cancer ([facility] [address] )  Assessment & Plan  · POD #20 s/p ex lap, en-bloc hysterectomy, bilateral salpingo-oophorectomy, sigmoidectomy with low rectal anastomosis, SBR, radical omentectomy, splenectomy, appendectomy, oversewing of tail of pancreas, repair of cystotomy, bilateral peritoneal stripping     · Surgery was complicated by pancreatic tail hemorrhage and low rectal reanastomosis   Imaging shows liver metastases  · Management per GYN ONC  · Pt's case discussed at tumor board, plan for eventual chemotherapy     Abscess of abdominal cavity ([facility] [address] )  Assessment & Plan  · Patient developed left upper quadrant abscess after complex abdominal surgery  · Status post IR drain placement  · Currently 2 drains are in the abscess cavity  · ID following  · Antibiotics adjusted to Augmentin and Cipro per ID  · Fevers improving  · Repeat blood culture show no growth at 48 hours   · Little drain output in 24 hours, consideration for drain check with IR vs repeat CT A/P   · Monitor fever curve and CBC with diff    Pleural effusion  Assessment & Plan  · S/p left sided thoracentesis yielding 230 mL  · Encourage deep breathing and incentive spirometry  · Trial Lasix 20 mg IV x1 8/26    S/P splenectomy  Assessment & Plan  · With reactive thrombocytosis  · Continue aspirin  · Received HiB, meningococcal quadrivalent vaccines  · Will need Prevnar vaccine prior to discharge    Severe protein-calorie malnutrition ([address] )  Assessment & Plan  Malnutrition Findings:   Adult Malnutrition type: Acute illness  Adult Degree of Malnutrition: Other severe protein calorie malnutrition (Severe pro/kathie malnutrition r/t CA dx as evidenced by 5% unplanned wt loss since 7/22/21, consuming < 50% energy intake compared to estimated energy needs > 5 days  Treated with cl liqs + Ensure Clear 4 xday)    BMI Findings: Body mass index is 39 1 kg/m²  · Encourage oral intake and nutritional supplements     Acute blood loss anemia  Assessment & Plan  · Postoperatively   · HGB currently 7 0, no active bleeding   · GYN ordered for 1 unit of pRBCs today  · Will order an additional dose of Lasix IV after transfusion   · Monitor HGB in am     Asthma  Assessment & Plan  · Appears stable, continue Albuterol PRN    VTE Pharmacologic Prophylaxis:   Pharmacologic: Enoxaparin (Lovenox)  Mechanical VTE Prophylaxis in Place: Yes    Patient Centered Rounds: I have performed bedside rounds with nursing staff today  Discussions with Specialists or Other Care Team Provider: nursing, ID, GYN notes appreciated     Education and Discussions with Family / Patient: I have answered all questions to the best of my ability  Time Spent for Care: 30 minutes  More than 50% of total time spent on counseling and coordination of care as described above      Current Length of Stay: 20 day(s)    Current Patient Status: Inpatient   Certification Statement: The patient will continue to require additional inpatient hospital stay due to hyponatremia, anemia requiring blood transfusion    Discharge Plan: Patient is not medically stable for discharge today, likely in 24-48 hours pending progress  Code Status: Level 1 - Full Code      Subjective:   Patient seen and examined at bedside this morning  She was found OOB in chair  Overall, feels OK  Urinating in the bathroom  Denies SOB or CP  Abdominal pain controlled with pain regimen  No N/V  Trying to liberalize salt in her diet  Objective:     Vitals:   Temp (24hrs), Av 3 °F (37 9 °C), Min:99 5 °F (37 5 °C), Max:100 9 °F (38 3 °C)    Temp:  [99 5 °F (37 5 °C)-100 9 °F (38 3 °C)] 100 9 °F (38 3 °C)  HR:  [106-113] 113  Resp:  [20] 20  BP: (132-135)/(61-70) 132/61  SpO2:  [91 %-92 %] 91 %  Body mass index is 39 1 kg/m²  Input and Output Summary (last 24 hours): Intake/Output Summary (Last 24 hours) at 2021 1622  Last data filed at 2021 1300  Gross per 24 hour   Intake 320 ml   Output 2625 ml   Net -2305 ml       Physical Exam:     Physical Exam  Vitals and nursing note reviewed  Constitutional:       General: She is not in acute distress  Appearance: She is well-developed  She is not toxic-appearing or diaphoretic  Comments: Pleasant female resting OOB in chair on room air    HENT:      Head: Normocephalic  Cardiovascular:      Rate and Rhythm: Tachycardia present  Pulmonary:      Effort: Pulmonary effort is normal  No tachypnea or respiratory distress  Breath sounds: Examination of the left-lower field reveals decreased breath sounds  Decreased breath sounds present  No wheezing, rhonchi or rales  Abdominal:      General: Bowel sounds are normal       Palpations: Abdomen is soft  Tenderness: There is no abdominal tenderness  There is no guarding or rebound  Comments: 2 drains with scant amount of purulent yellow/grey drainage   Midline abdominal incision, well healing, small opening at base of wound with packing and DSD   Musculoskeletal:         General: Normal range of motion        Cervical back: Normal range of motion  Right lower leg: Edema (trace) present  Left lower leg: Edema (trace) present  Skin:     General: Skin is warm and dry  Capillary Refill: Capillary refill takes less than 2 seconds  Neurological:      Mental Status: She is alert and oriented to person, place, and time  Mental status is at baseline  Deep Tendon Reflexes: Reflexes are normal and symmetric  Psychiatric:         Mood and Affect: Mood normal          Behavior: Behavior normal          Thought Content: Thought content normal          Judgment: Judgment normal          Additional Data:     Labs:    Results from last 7 days   Lab Units 08/26/21  0455   WBC Thousand/uL 10 14   HEMOGLOBIN g/dL 7 0*   HEMATOCRIT % 22 7*   PLATELETS Thousands/uL 886*   NEUTROS PCT % 64   LYMPHS PCT % 15   MONOS PCT % 15*   EOS PCT % 3     Results from last 7 days   Lab Units 08/26/21  0456 08/21/21  0647   POTASSIUM mmol/L 3 5 3 4*   CHLORIDE mmol/L 96* 96*   CO2 mmol/L 27 27   BUN mg/dL 5 6   CREATININE mg/dL 0 32* 0 35*   CALCIUM mg/dL 7 6* 7 7*   ALK PHOS U/L  --  488*   ALT U/L  --  14   AST U/L  --  19           * I Have Reviewed All Lab Data Listed Above  * Additional Pertinent Lab Tests Reviewed: All Labs Within Last 24 Hours Reviewed    Imaging:    Imaging Reports Reviewed Today Include: CT A/P, CXR  Imaging Personally Reviewed by Myself Includes:  None     Recent Cultures (last 7 days):     Results from last 7 days   Lab Units 08/23/21  1809 08/23/21  1521 08/23/21  1208   BLOOD CULTURE   --  No Growth at 48 hrs  No Growth at 48 hrs   --    GRAM STAIN RESULT  No Polys or Bacteria seen  --  Rare Polys  No bacteria seen   WOUND CULTURE   --   --  Culture results to follow     BODY FLUID CULTURE, STERILE  1+ Growth of Enterococcus faecium*  Few Colonies of Klebsiella oxytoca ESBL*  --   --        Last 24 Hours Medication List:   Current Facility-Administered Medications   Medication Dose Route Frequency Provider Last Rate    acetaminophen  975 mg Oral Q6H PRN Cristina Rascon MD      albuterol  2 puff Inhalation Q4H PRN Mecca Grullon MD      amLODIPine  10 mg Oral Daily Magaly Siddiqui MD      amoxicillin-clavulanate  1 tablet Oral Q12H [address] Onnie Halsted, MD      aspirin  81 mg Oral Daily Enrique Sutherland MD      ciprofloxacin  500 mg Oral Q12H [address] Onnie Halsted, MD      enoxaparin  1 mg/kg Subcutaneous Q12H [address] Loretta Long MD      furosemide  20 mg Intravenous Once JOSI Montoya      HYDROmorphone  0 5 mg Intravenous Q2H PRN Natalia Kiser PA-C      ibuprofen  600 mg Oral Q6H PRN Kiko Ceja MD      magnesium oxide  400 mg Oral BID Cristina Rascon MD      ondansetron  4 mg Intravenous Q6H PRN Zulma Whitney PA-C      oxyCODONE  10 mg Oral Q4H PRN Dori Houston MD      oxyCODONE  5 mg Oral Q4H Loretta Long MD      pantoprazole  40 mg Oral Early Morning Keyona Bsuh MD      phenol  1 spray Mouth/Throat Q2H PRN Zulma Whitney PA-C      pneumococcal 13-valent conjugate vaccine  0 5 mL Intramuscular Prior to discharge Zulma Whitney PA-C      potassium-sodium phosphateS  2 tablet Oral TID With Meals Loretta Long MD      pravastatin  10 mg Oral Daily With [employer] ANA Kiser PA-C      sodium chloride (PF)  500 mL Intracatheter Once in imaging Kiko Ceja MD          Today, Patient Was Seen By: JOSI Montoya    ** Please Note: Dictation voice to text software may have been used in the creation of this document   **

## 2021-08-26 NOTE — ASSESSMENT & PLAN NOTE
Presented with a sodium of 138 on 8/6  Sodium downtrended to 133 on 8/13  Sodium continued to downtrend to 127 on 8/25  · Serum osmo 263, urine osmo 376, and urine sodium 97  · TSH within normal limits   · Primary service ordered NaCl 1 gm TID x3 doses on 8/25 into 8/16  · Continue 1200 mL/day fluid restriction   · Given Lasix 20 mg IV x1 in am as daily weight shows 14% weight gain in 2 weeks   · Will give another Lasix 20 mg IV x1 tonight after patient receives blood transfusion   · Monitor I+O, daily weights   · Liberalize salt in diet - patient educated  · Monitor BMP in am

## 2021-08-26 NOTE — ASSESSMENT & PLAN NOTE
· Patient developed left upper quadrant abscess after complex abdominal surgery  · Status post IR drain placement  · Currently 2 drains are in the abscess cavity  · ID following  · Antibiotics adjusted to Augmentin and Cipro per ID  · Fevers improving  · Repeat blood culture show no growth at 48 hours   · Little drain output in 24 hours, consideration for drain check with IR vs repeat CT A/P   · Monitor fever curve and CBC with diff

## 2021-08-27 ENCOUNTER — APPOINTMENT (INPATIENT)
Dept: RADIOLOGY | Facility: HOSPITAL | Age: 71
DRG: 329 | End: 2021-08-27
Payer: COMMERCIAL

## 2021-08-27 LAB
ABO GROUP BLD BPU: NORMAL
ANION GAP SERPL CALCULATED.3IONS-SCNC: 7 MMOL/L (ref 4–13)
BACTERIA SPEC ANAEROBE CULT: NORMAL
BACTERIA WND AEROBE CULT: ABNORMAL
BASOPHILS # BLD AUTO: 0.07 THOUSANDS/ΜL (ref 0–0.1)
BASOPHILS NFR BLD AUTO: 1 % (ref 0–1)
BPU ID: NORMAL
BUN SERPL-MCNC: 7 MG/DL (ref 5–25)
CALCIUM SERPL-MCNC: 8 MG/DL (ref 8.3–10.1)
CHLORIDE SERPL-SCNC: 96 MMOL/L (ref 100–108)
CO2 SERPL-SCNC: 28 MMOL/L (ref 21–32)
CREAT SERPL-MCNC: 0.54 MG/DL (ref 0.6–1.3)
CROSSMATCH: NORMAL
EOSINOPHIL # BLD AUTO: 0.24 THOUSAND/ΜL (ref 0–0.61)
EOSINOPHIL NFR BLD AUTO: 3 % (ref 0–6)
ERYTHROCYTE [DISTWIDTH] IN BLOOD BY AUTOMATED COUNT: 17.2 % (ref 11.6–15.1)
GFR SERPL CREATININE-BSD FRML MDRD: 96 ML/MIN/1.73SQ M
GLUCOSE SERPL-MCNC: 139 MG/DL (ref 65–140)
GRAM STN SPEC: ABNORMAL
GRAM STN SPEC: ABNORMAL
HCT VFR BLD AUTO: 27.3 % (ref 34.8–46.1)
HGB BLD-MCNC: 8.9 G/DL (ref 11.5–15.4)
IMM GRANULOCYTES # BLD AUTO: 0.21 THOUSAND/UL (ref 0–0.2)
IMM GRANULOCYTES NFR BLD AUTO: 2 % (ref 0–2)
LYMPHOCYTES # BLD AUTO: 0.92 THOUSANDS/ΜL (ref 0.6–4.47)
LYMPHOCYTES NFR BLD AUTO: 10 % (ref 14–44)
MCH RBC QN AUTO: 28.4 PG (ref 26.8–34.3)
MCHC RBC AUTO-ENTMCNC: 32.6 G/DL (ref 31.4–37.4)
MCV RBC AUTO: 87 FL (ref 82–98)
MONOCYTES # BLD AUTO: 1.21 THOUSAND/ΜL (ref 0.17–1.22)
MONOCYTES NFR BLD AUTO: 13 % (ref 4–12)
NEUTROPHILS # BLD AUTO: 6.95 THOUSANDS/ΜL (ref 1.85–7.62)
NEUTS SEG NFR BLD AUTO: 71 % (ref 43–75)
NRBC BLD AUTO-RTO: 1 /100 WBCS
PHOSPHATE SERPL-MCNC: 2.4 MG/DL (ref 2.3–4.1)
PLATELET # BLD AUTO: 843 THOUSANDS/UL (ref 149–390)
PMV BLD AUTO: 9.1 FL (ref 8.9–12.7)
POTASSIUM SERPL-SCNC: 3.4 MMOL/L (ref 3.5–5.3)
RBC # BLD AUTO: 3.13 MILLION/UL (ref 3.81–5.12)
SODIUM SERPL-SCNC: 131 MMOL/L (ref 136–145)
UNIT DISPENSE STATUS: NORMAL
UNIT PRODUCT CODE: NORMAL
UNIT PRODUCT VOLUME: 350 ML
UNIT RH: NORMAL
WBC # BLD AUTO: 9.6 THOUSAND/UL (ref 4.31–10.16)

## 2021-08-27 PROCEDURE — 99024 POSTOP FOLLOW-UP VISIT: CPT | Performed by: OBSTETRICS & GYNECOLOGY

## 2021-08-27 PROCEDURE — 75984 XRAY CONTROL CATHETER CHANGE: CPT | Performed by: RADIOLOGY

## 2021-08-27 PROCEDURE — C1769 GUIDE WIRE: HCPCS

## 2021-08-27 PROCEDURE — 85025 COMPLETE CBC W/AUTO DIFF WBC: CPT | Performed by: OBSTETRICS & GYNECOLOGY

## 2021-08-27 PROCEDURE — 75984 XRAY CONTROL CATHETER CHANGE: CPT

## 2021-08-27 PROCEDURE — 99232 SBSQ HOSP IP/OBS MODERATE 35: CPT | Performed by: STUDENT IN AN ORGANIZED HEALTH CARE EDUCATION/TRAINING PROGRAM

## 2021-08-27 PROCEDURE — 80048 BASIC METABOLIC PNL TOTAL CA: CPT | Performed by: OBSTETRICS & GYNECOLOGY

## 2021-08-27 PROCEDURE — 84100 ASSAY OF PHOSPHORUS: CPT | Performed by: NURSE PRACTITIONER

## 2021-08-27 PROCEDURE — 99232 SBSQ HOSP IP/OBS MODERATE 35: CPT | Performed by: NURSE PRACTITIONER

## 2021-08-27 PROCEDURE — C1729 CATH, DRAINAGE: HCPCS

## 2021-08-27 PROCEDURE — NC001 PR NO CHARGE: Performed by: NURSE PRACTITIONER

## 2021-08-27 PROCEDURE — 0W2GX0Z CHANGE DRAINAGE DEVICE IN PERITONEAL CAVITY, EXTERNAL APPROACH: ICD-10-PCS | Performed by: RADIOLOGY

## 2021-08-27 PROCEDURE — 70220 X-RAY EXAM OF SINUSES: CPT

## 2021-08-27 PROCEDURE — 49423 EXCHANGE DRAINAGE CATHETER: CPT

## 2021-08-27 PROCEDURE — 49423 EXCHANGE DRAINAGE CATHETER: CPT | Performed by: RADIOLOGY

## 2021-08-27 RX ORDER — POTASSIUM CHLORIDE 20 MEQ/1
40 TABLET, EXTENDED RELEASE ORAL ONCE
Status: COMPLETED | OUTPATIENT
Start: 2021-08-27 | End: 2021-08-27

## 2021-08-27 RX ORDER — FENTANYL CITRATE 50 UG/ML
INJECTION, SOLUTION INTRAMUSCULAR; INTRAVENOUS CODE/TRAUMA/SEDATION MEDICATION
Status: COMPLETED | OUTPATIENT
Start: 2021-08-27 | End: 2021-08-27

## 2021-08-27 RX ADMIN — DIBASIC SODIUM PHOSPHATE, MONOBASIC POTASSIUM PHOSPHATE AND MONOBASIC SODIUM PHOSPHATE 1 TABLET: 852; 155; 130 TABLET ORAL at 17:37

## 2021-08-27 RX ADMIN — OXYCODONE HYDROCHLORIDE 5 MG: 5 TABLET ORAL at 00:06

## 2021-08-27 RX ADMIN — AMLODIPINE BESYLATE 10 MG: 10 TABLET ORAL at 10:08

## 2021-08-27 RX ADMIN — DIBASIC SODIUM PHOSPHATE, MONOBASIC POTASSIUM PHOSPHATE AND MONOBASIC SODIUM PHOSPHATE 1 TABLET: 852; 155; 130 TABLET ORAL at 13:50

## 2021-08-27 RX ADMIN — OXYCODONE HYDROCHLORIDE 5 MG: 5 TABLET ORAL at 17:35

## 2021-08-27 RX ADMIN — MAGNESIUM OXIDE TAB 400 MG (241.3 MG ELEMENTAL MG) 400 MG: 400 (241.3 MG) TAB at 17:35

## 2021-08-27 RX ADMIN — CIPROFLOXACIN HYDROCHLORIDE 500 MG: 500 TABLET, FILM COATED ORAL at 21:59

## 2021-08-27 RX ADMIN — OXYCODONE HYDROCHLORIDE 5 MG: 5 TABLET ORAL at 10:05

## 2021-08-27 RX ADMIN — ASPIRIN 81 MG: 81 TABLET, COATED ORAL at 10:09

## 2021-08-27 RX ADMIN — POTASSIUM CHLORIDE 40 MEQ: 1500 TABLET, EXTENDED RELEASE ORAL at 10:07

## 2021-08-27 RX ADMIN — OXYCODONE HYDROCHLORIDE 5 MG: 5 TABLET ORAL at 05:36

## 2021-08-27 RX ADMIN — PANTOPRAZOLE SODIUM 40 MG: 40 TABLET, DELAYED RELEASE ORAL at 05:36

## 2021-08-27 RX ADMIN — HYDROMORPHONE HYDROCHLORIDE 0.5 MG: 1 INJECTION, SOLUTION INTRAMUSCULAR; INTRAVENOUS; SUBCUTANEOUS at 08:20

## 2021-08-27 RX ADMIN — MAGNESIUM OXIDE TAB 400 MG (241.3 MG ELEMENTAL MG) 400 MG: 400 (241.3 MG) TAB at 10:08

## 2021-08-27 RX ADMIN — PRAVASTATIN SODIUM 10 MG: 10 TABLET ORAL at 17:36

## 2021-08-27 RX ADMIN — ENOXAPARIN SODIUM 90 MG: 100 INJECTION SUBCUTANEOUS at 10:09

## 2021-08-27 RX ADMIN — ENOXAPARIN SODIUM 90 MG: 100 INJECTION SUBCUTANEOUS at 21:59

## 2021-08-27 RX ADMIN — OXYCODONE HYDROCHLORIDE 5 MG: 5 TABLET ORAL at 14:02

## 2021-08-27 RX ADMIN — IOHEXOL 15 ML: 350 INJECTION, SOLUTION INTRAVENOUS at 11:37

## 2021-08-27 RX ADMIN — AMOXICILLIN AND CLAVULANATE POTASSIUM 1 TABLET: 875; 125 TABLET, FILM COATED ORAL at 21:59

## 2021-08-27 RX ADMIN — OXYCODONE HYDROCHLORIDE 5 MG: 5 TABLET ORAL at 22:00

## 2021-08-27 RX ADMIN — CIPROFLOXACIN HYDROCHLORIDE 500 MG: 500 TABLET, FILM COATED ORAL at 10:14

## 2021-08-27 RX ADMIN — FENTANYL CITRATE 50 MCG: 50 INJECTION INTRAMUSCULAR; INTRAVENOUS at 11:28

## 2021-08-27 RX ADMIN — AMOXICILLIN AND CLAVULANATE POTASSIUM 1 TABLET: 875; 125 TABLET, FILM COATED ORAL at 10:14

## 2021-08-27 NOTE — SEDATION DOCUMENTATION
Left back drainage tube upsize completed by Dr Kirti Ray without complications, pt tolerated well, report called to P3

## 2021-08-27 NOTE — BRIEF OP NOTE (RAD/CATH)
INTERVENTIONAL RADIOLOGY PROCEDURE NOTE    Date: 8/27/2021    Procedure: Procedure name not found  Preoperative diagnosis:   1  Peritoneal carcinomatosis ([facility] [address] )    2  Hypoxemia    3  Pancreatic fluid leak    4  Thrombocytosis after splenectomy ([facility] [address] )    5  Status post small bowel resection    6  S/P bladder repair    7  FHx: PROMISE-BSO (total abdominal hysterectomy and bilateral salpingo-oophorectomy)    8  Encounter for ablation of malignant neoplasm with goal of debulking/cytoreduction ([facility] )    9  Leukocytosis, unspecified type    10  Hyponatremia         Postoperative diagnosis: Same  Surgeon: Gay Wang MD     Assistant: None  No qualified resident was available  Blood loss: none    Specimens: none     Findings: occluded catheter upsized to 12 Fr with aspiration of 50 ml purulent fluid    Complications: None immediate      Anesthesia: local and IV Fentanyl

## 2021-08-27 NOTE — ASSESSMENT & PLAN NOTE
· s/p ex lap, en-bloc hysterectomy, bilateral salpingo-oophorectomy, sigmoidectomy with low rectal anastomosis, SBR, radical omentectomy, splenectomy, appendectomy, oversewing of tail of pancreas, repair of cystotomy, bilateral peritoneal stripping     · Surgery was complicated by pancreatic tail hemorrhage and low rectal reanastomosis   Imaging shows liver metastases  · Management per GYN ONC

## 2021-08-27 NOTE — PROGRESS NOTES
[facility] [facility]  Progress Note - Guadarrama Chain 1950, 80 y o  female MRN: [id number]  Unit/Bed#: [facility] 305-01 Encounter: [id number]  Primary Care Provider: Lida Goodwin MD   Date and time admitted to hospital: 8/6/2021  9:15 AM    * Hyponatremia  Assessment & Plan  Presented with a sodium of 138 on 8/6  Sodium downtrended to 133 on 8/13  Sodium continued to downtrend to 127 on 8/25  Patient admits to watering down her beverages   · Serum osmo 263, urine osmo 376, and urine sodium 97  · TSH within normal limits   · Primary service ordered NaCl 1 gm TID x3 doses on 8/25 into 8/16  · Started 1200 mL/day fluid restriction, liberalized salt in diet, added Ensure   · Given Lasix 20 mg IV x2 doses on 6/26 as daily weight showed a 14% weight gain in 2 weeks   · Na improved to 131  · Hold off on further Lasix as patient appears euvolemic  · Monitor I+O, daily weights   · Monitor BMP in am     Ovarian cancer ([facility] [address] )  Assessment & Plan  · POD #21 s/p ex lap, en-bloc hysterectomy, bilateral salpingo-oophorectomy, sigmoidectomy with low rectal anastomosis, SBR, radical omentectomy, splenectomy, appendectomy, oversewing of tail of pancreas, repair of cystotomy, bilateral peritoneal stripping     · Surgery was complicated by pancreatic tail hemorrhage and low rectal reanastomosis   Imaging shows liver metastases  · Management per GYN ONC  · Pt's case discussed at tumor board, plan for eventual chemotherapy     Abscess of abdominal cavity ([facility] [address] )  Assessment & Plan  · Patient developed left upper quadrant abscess after complex abdominal surgery  · Status post IR drain placement  · Currently 2 drains are in the abscess cavity (LUQ and left back)  · ID following  · Antibiotics adjusted to oral Augmentin and Cipro per ID  · Due to ongoing fevers, checked CT C/A/P and LEVDs  · LEVDs negative for DVT  · CT C/A/P showed enlarging LUQ fluid collection  · Patient went to IR on 8/27 and was found to have an occluded catheter which was up sized to 12 Fr with aspiration of 50 ml purulent fluid   · Repeat blood culture show no growth at 72 hours   · Monitor fever curve and CBC with diff    Pleural effusion  Assessment & Plan  · S/p left sided thoracentesis yielding 230 mL  · Encourage deep breathing and incentive spirometry  · Given Lasix 20 mg IV x2 8/26  · Repeat CT chest shows moderate pleural effusion with compressive atelectasis  · Patient educated on deep breathing and IS    S/P splenectomy  Assessment & Plan  · With reactive thrombocytosis  · Continue aspirin  · Received HiB, meningococcal quadrivalent vaccines  · Will need Prevnar vaccine prior to discharge    Acute blood loss anemia  Assessment & Plan  · Postoperatively   · HGB 7 0 on 8/26 -> given 1 unit pRBCs followed by Lasix 20 mg IV x1  · HGB today: 8 9  · No active bleeding   · Monitor HGB in am     VTE Pharmacologic Prophylaxis:   Pharmacologic: Enoxaparin (Lovenox)  Mechanical VTE Prophylaxis in Place: Yes    Patient Centered Rounds: I have performed bedside rounds with nursing staff today  Discussions with Specialists or Other Care Team Provider: nursing, ID + GYN notes appreciated     Education and Discussions with Family / Patient: I have answered all questions to the best of my ability  Time Spent for Care: 30 minutes  More than 50% of total time spent on counseling and coordination of care as described above  Current Length of Stay: 21 day(s)    Current Patient Status: Inpatient   Certification Statement: The patient will continue to require additional inpatient hospital stay due to ongoing fevers     Discharge Plan: Patient is not medically stable for discharge today, likely in 24-48 hours pending progress  Code Status: Level 1 - Full Code      Subjective:   Patient seen and examined at bedside  She is happy to hear her DVT study was negative  She is happy to hear her sodium and HGB have improved today   She is aware she will need her 1 tube exchanged by IR today due to enlarging LUQ collection  Her pain is controlled  She is compliant with fluid restriction  Denies HA, dizziness, CP, SOB, N/V/D  Objective:     Vitals:   Temp (24hrs), Av 2 °F (37 9 °C), Min:98 5 °F (36 9 °C), Max:101 7 °F (38 7 °C)    Temp:  [98 5 °F (36 9 °C)-101 7 °F (38 7 °C)] 100 1 °F (37 8 °C)  HR:  [] 109  Resp:  [18-20] 18  BP: (116-150)/(58-83) 137/76  SpO2:  [90 %-97 %] 93 %  Body mass index is 39 1 kg/m²  Input and Output Summary (last 24 hours): Intake/Output Summary (Last 24 hours) at 2021 1236  Last data filed at 2021 8610  Gross per 24 hour   Intake 970 ml   Output 3125 ml   Net -2155 ml       Physical Exam:     Physical Exam  Vitals and nursing note reviewed  Constitutional:       General: She is not in acute distress  Appearance: She is well-developed  She is obese  She is not toxic-appearing or diaphoretic  Comments: Pleasant, talkative female resting in bed on room air    HENT:      Head: Normocephalic  Cardiovascular:      Rate and Rhythm: Regular rhythm  Tachycardia present  Pulmonary:      Effort: Pulmonary effort is normal  No tachypnea or respiratory distress  Breath sounds: Examination of the left-lower field reveals decreased breath sounds  Decreased breath sounds present  No wheezing, rhonchi or rales  Abdominal:      General: Bowel sounds are normal  There is no distension  Palpations: Abdomen is soft  Tenderness: There is no abdominal tenderness (mild with palpitations )  There is no guarding or rebound  Comments: 2 drains in place draining purulent drainage    Musculoskeletal:         General: No tenderness  Normal range of motion  Cervical back: Normal range of motion  Right lower leg: No edema  Left lower leg: No edema  Skin:     General: Skin is warm and dry  Capillary Refill: Capillary refill takes less than 2 seconds  Coloration: Skin is pale  Neurological:      Mental Status: She is alert and oriented to person, place, and time  Mental status is at baseline  Deep Tendon Reflexes: Reflexes are normal and symmetric  Psychiatric:         Mood and Affect: Mood normal          Behavior: Behavior normal          Thought Content: Thought content normal          Judgment: Judgment normal          Additional Data:     Labs:    Results from last 7 days   Lab Units 08/27/21  0545   WBC Thousand/uL 9 60   HEMOGLOBIN g/dL 8 9*   HEMATOCRIT % 27 3*   PLATELETS Thousands/uL 843*   NEUTROS PCT % 71   LYMPHS PCT % 10*   MONOS PCT % 13*   EOS PCT % 3     Results from last 7 days   Lab Units 08/27/21  0545 08/21/21  0647   POTASSIUM mmol/L 3 4* 3 4*   CHLORIDE mmol/L 96* 96*   CO2 mmol/L 28 27   BUN mg/dL 7 6   CREATININE mg/dL 0 54* 0 35*   CALCIUM mg/dL 8 0* 7 7*   ALK PHOS U/L  --  488*   ALT U/L  --  14   AST U/L  --  19           * I Have Reviewed All Lab Data Listed Above  * Additional Pertinent Lab Tests Reviewed: All Labs Within Last 24 Hours Reviewed    Imaging:    Imaging Reports Reviewed Today Include: repeat CT C/A/P, LEVD, sinus xray  Imaging Personally Reviewed by Myself Includes:  None     Recent Cultures (last 7 days):     Results from last 7 days   Lab Units 08/23/21  1809 08/23/21  1521 08/23/21  1208   BLOOD CULTURE   --  No Growth at 72 hrs    No Growth at 72 hrs   --    GRAM STAIN RESULT  No Polys or Bacteria seen  --  Rare Polys  No bacteria seen   WOUND CULTURE   --   --  Growth in Broth culture only Klebsiella oxytoca ESBL*   BODY FLUID CULTURE, STERILE  1+ Growth of Enterococcus faecium*  Few Colonies of Klebsiella oxytoca ESBL*  --   --        Last 24 Hours Medication List:   Current Facility-Administered Medications   Medication Dose Route Frequency Provider Last Rate    acetaminophen  975 mg Oral Q6H PRN Maureen Hernandez MD      albuterol  2 puff Inhalation Q4H PRN Radha White MD      amLODIPine  10 mg Oral Daily Wagner Wharton Jenifer Mccoy MD      amoxicillin-clavulanate  1 tablet Oral Q12H [facility] Fausto Stiles MD      aspirin  81 mg Oral Daily Orquidea Bobby MD      ciprofloxacin  500 mg Oral Q12H [facility] Fausto Stiles MD      enoxaparin  1 mg/kg Subcutaneous Q12H [facility] Nini Atkins MD      HYDROmorphone  0 5 mg Intravenous Q2H PRN Stan Kiser PA-C      ibuprofen  600 mg Oral Q6H PRN Lenka Otoole MD      magnesium oxide  400 mg Oral BID Matt Calderón MD      ondansetron  4 mg Intravenous Q6H PRN Mateo Scott PA-C      oxyCODONE  10 mg Oral Q4H PRN Marysol Gutierrez MD      oxyCODONE  5 mg Oral Q4H Nini Atkins MD      pantoprazole  40 mg Oral Early Morning Faizan Lane MD      phenol  1 spray Mouth/Throat Q2H PRN Mateo Scott PA-C      pneumococcal 13-valent conjugate vaccine  0 5 mL Intramuscular Prior to discharge Mateo Scott PA-C      potassium-sodium phosphateS  1 tablet Oral TID With Meals JOSI Jackson      pravastatin  10 mg Oral Daily With [employer] ANA Kiser PA-C      sodium chloride (PF)  500 mL Intracatheter Once in imaging Lenka Otoole MD          Today, Patient Was Seen By: JOSI Jackson    ** Please Note: Dictation voice to text software may have been used in the creation of this document   **

## 2021-08-27 NOTE — TELEMEDICINE
e-Consult (IPC)  - Interventional Radiology  Ed Fletcher 80 y o  female MRN: [id number]  Unit/Bed#: [facility] 305-01 Encounter: [id number]    IR has been consulted to evaluate the patient, determine the appropriate procedure, and whether or not a procedure can and should be performed regarding the care of Ed Fletcher  We were consulted by OBGYN concerning fevers, increasing abscess size, and to possibly perform a abscess drainage repo if medically appropriate for the patient  IP Consult to IR  Consult performed by: JOSI Shi  Consult ordered by: Nini Atkins MD        08/27/21      Assessment/Recommendation:     80year old female peritoneal carcinomatosis, POD 20 from primary debulking, post op course complicated by fever and pancreatic enzyme leak  10f IR LUQ drain placed 8/18, now with several days of high fevers, T max 101 8, and CT scan demonstrating an enlargement of her LUQ collection with the catheter within the collection  - recommend repositioning/upsizing of the current LUQ collection   - does not have to be NPO, but can offer sedation if patient has significant pain with repositioning/tube changes      Total time spent in review of data, discussion with requesting provider and rendering advice was 30 minutes  Patient or appropriate family member was verbally informed by gyn onc  of this consultative service on their behalf to provide more timely access to specialty care in lieu of an in person consultation  Verbal consent was obtained  Thank you for allowing Interventional Radiology to participate in the care of Ed Fletcher  Please don't hesitate to call or TigerText us with any questions       [address] [place] [address] Rosario Crawford

## 2021-08-27 NOTE — PROGRESS NOTES
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  Fevers were improving, however yesterday she was febrile several times, Tmax 101 7 @ 2000 on 8/26/2021,      Plan:  S/p surgery  - Spontaneously voiding  - Tolerating regular diet  - Continue IS/encourage ambulation   - Pain controlled on current regimen:   Tyra 5 mg Q4h scheduled     Tylenol 975mg Q6h PRN (mild)   Tyra 10mg Q4h PRN (moderate, severe)   Motrin 600mg Q6h PRN (fever)   Dilaudid 0 5 IV Q2h PRN (breakthrough)    Pancreatic leak with abscess formation   - Collection slightly increased in size on CT done 8/26/21 (compared to 8/20/21)  - WBC 10 1 --> 9 6, continue daily trend  - Maintain both drains for now, routine drain care    - Back drain: approximately 5cc over 12 hours   - LLQ drain: approximately 5 cc over 12 hours  - Repeat blood cultures (8/23) show no growth at 72h  - Aerobic and anaerobic wound (8/23) culture without growth   - Drain culture +klebsiella  - Was switched to PO augmentin and ciprofloxacin yesterday    Fever of unknown origin  - Pancreatic leak with abscess formation slightly increased in size on CT completed 8/26 (compared to 8/20)   - Can consider IR evaluation for possible repositioning of drain given continued fevers  - Repeat blood cultures (8/23) show no growth at 72h  - Aerobic and anaerobic wound (8/23) culture without growth   - Drain culture +klebsiella  - Was switched to PO augmentin and ciprofloxacin yesterday  - LE dopplers completed 8/26 with no evidence of DVT  - Patient continues therapeutic Lovenox (started 8/24) for possible septic thrombophlebitis  - Patient reporting history of sinus infections --> will order XR of sinuses    Hyponatremia  - 127 --> 131  - Fluid restriction given  - Salt tablets added to regimen  - TSH WNL  - Mag WNL   - Appreciate SLIM recommendations    Wound seroma   - Packing in place   - Wound care consulted, appreciate recommendations  - Plan for daily dressing changes, will do later this morning    Acute blood loss anemia  - Hgb 7 0 on labs yesterday AM  - s/p 1u pRBC yesterday due to eventual need for chemotherapy  - Hgb 8 9 this AM  - S/P total 5 U RBC + 2 FFP intraop  - Continue to trend; maintain Hgb goal above 7     Acute respiratory failure   -  Has not required supplemental O2 for several days  - With persistent pleural effusion   - S/p CXR 8/20 with mildly increased small to moderate left pleural effusion  - CT scan 8/26/21 shows that pleural effusion remains stable in size, with adjacent compressive atelectasis  - Continue to monitor closely    S/p splenectomy with thrombocytosis   - Plt 886 --> 843K  - Continue to trend   - Continue ASA   - Splenectomy vaccines prior to discharge     High grade serous ovarian carcinoma   - Final path resulted  - Pt discussed at tumor board on 8/20, plan is for eventual systemic therapy, genetic and genomic testing    HTN/HLD  Continue home meds amlodipine and pravastatin    Disposition: remain inpatient for ongoing treatment     Subjective:  Jaron Menon is doing well  She was febrile yesterday evening but feels better since the fever broke  She is ambulating, tolerating regular diet, and voiding and having BM  She denies chest pain, SOB, nausea, vomiting  She has no new complaints  Review of Systems   Constitutional: Negative for appetite change  Respiratory: Negative for chest tightness and shortness of breath  Cardiovascular: Negative for chest pain  Gastrointestinal: Negative for abdominal pain, nausea and vomiting  Genitourinary: Negative for dysuria  Neurological: Negative for light-headedness and headaches     Psychiatric/Behavioral: Negative for confusion  All other systems reviewed and are negative  Objective:   /58 (BP Location: Right arm)   Pulse 98   Temp 98 6 °F (37 °C) (Oral)   Resp 18   Ht 4' 11" (1 499 m)   Wt 87 8 kg (193 lb 9 6 oz)   SpO2 95%   BMI 39 10 kg/m²     I/O last 3 completed shifts: In: 320 [P O :320]  Out: 3393 [Urine:4100; Drains:90]  I/O this shift:  In: 350 [Blood:350]  Out: -      I/O       08/22 0701 - 08/23 0700 08/23 0701 - 08/24 0700    P  O  180     I V  (mL/kg)  30 (0 4)    NG/GT  0    IV Piggyback  100    Total Intake(mL/kg) 180 (2 3) 130 (1 7)    Urine (mL/kg/hr) 1200 (0 6) 700 (0 4)    Drains 25 25    Stool  0    Total Output 1225 725    Net -1045 -595          Unmeasured Stool Occurrence  1 x          Lab Results   Component Value Date    WBC 10 14 08/26/2021    HGB 7 0 (L) 08/26/2021    HCT 22 7 (L) 08/26/2021    MCV 88 08/26/2021     (H) 08/26/2021       Lab Results   Component Value Date    GLUCOSE 195 (H) 08/06/2021    CALCIUM 7 6 (L) 08/26/2021    K 3 5 08/26/2021    CO2 27 08/26/2021    CL 96 (L) 08/26/2021    BUN 5 08/26/2021    CREATININE 0 32 (L) 08/26/2021       Lab Results   Component Value Date/Time    POCGLU 154 (H) 08/19/2021 10:23 AM    POCGLU 161 (H) 08/19/2021 06:58 AM    POCGLU 158 (H) 08/18/2021 09:09 PM    POCGLU 171 (H) 08/18/2021 04:33 PM    POCGLU 92 08/18/2021 12:10 PM       Physical Exam  Constitutional:       General: She is not in acute distress  Appearance: She is obese  She is not ill-appearing or diaphoretic  HENT:      Head: Normocephalic and atraumatic  Eyes:      Conjunctiva/sclera: Conjunctivae normal       Pupils: Pupils are equal, round, and reactive to light  Cardiovascular:      Rate and Rhythm: Normal rate and regular rhythm  Pulses: Normal pulses  Heart sounds: Normal heart sounds  Pulmonary:      Effort: Pulmonary effort is normal  No respiratory distress  Breath sounds: Normal breath sounds     Abdominal: General: Abdomen is flat  Bowel sounds are normal  There is no distension  Tenderness: There is no abdominal tenderness  There is no guarding  Comments: Midline vertical incision c/d/i, healing well  Wound seroma at lower portion of vertical incision open, incision C/D/I    LLQ OSCAR drain in place, draining purulent milky fluid, unchanged from prior; skin surrounding drain site slightly erythematous    Posterior Left lower back drain with approximately 5cc milky fluid   Musculoskeletal:         General: Normal range of motion  Cervical back: Normal range of motion  Skin:     General: Skin is warm and dry  Capillary Refill: Capillary refill takes less than 2 seconds  Neurological:      General: No focal deficit present  Mental Status: She is alert and oriented to person, place, and time  Mental status is at baseline     Psychiatric:         Mood and Affect: Mood normal          Behavior: Behavior normal          Ilene Jeffries MD   PGY-4, GYN ONC  8/27/2021 6:15 AM

## 2021-08-27 NOTE — QUICK NOTE
Packing changed at bedside, patient tolerated well  Per IR attending, no need to hold therapeutic Lovenox for drain repositioning this afternoon  Will keep patient NPO due to significant pain during last IR procedure        Maria Luisa Jones MD, PGY-4  8/27/2021  8:15 AM

## 2021-08-27 NOTE — ASSESSMENT & PLAN NOTE
· Patient developed left upper quadrant abscess after complex abdominal surgery  · Status post IR drain placement  · Currently 2 drains are in the abscess cavity (LUQ and left back)  · Antibiotics adjusted to oral Augmentin and Cipro per ID  · Due to ongoing fevers, checked CT C/A/P and LEVDs  · LEVDs negative for DVT  · CT C/A/P showed enlarging LUQ fluid collection  · Patient went to IR on 8/27 and was found to have an occluded catheter which was up sized to 12 Fr with aspiration of 50 ml purulent fluid   · Fevers improving   · Repeat blood culture show no growth to date   · Monitor fever curve and CBC with diff  · Infectious Disease following

## 2021-08-27 NOTE — CASE MANAGEMENT
CM placed new referral with updated notes to [address] [place] [address] as requested  CM department will continue to follow

## 2021-08-27 NOTE — PROGRESS NOTES
Progress Note - Infectious Disease   Sp Lloyd 80 y o  female MRN: [id number]  Unit/Bed#: [facility] 305-01 Encounter: [id number]      Impression/Plan:    1  Abdominal abscess  Patient developed LUQ abscess after complex abdominal surgery and pancreatic leak  8/18, s/p IR drain placement with aspiration of 60cc purulent fluid  Culture is growing Klebsiella oxytoca, Enterococcus faecium, and Bacteroides fragilis  Repeat CT A/P 8/20 showed improvement in the size of the collection  Patient's fevers continued, and repeat CT 8/26 showed increased size of the collection due to catheter occlusion  Taken back to IR today, occluded catheter upsized to 12 Fr with aspiration of 50 ml purulent fluid    -continue Augmentin 875-125mg PO BID and ciprofloxacin 500mg PO BID   -monitor drain output, should be flushed daily   -monitor fever curve    2  Sepsis-fevers, leukocytosis  Likely secondary to #1 and occlusion of the catheter resulting in poor drainage  Other considerations are drug fever, as well as septic pelvic thrombophlebitis  Although less common, this can occur after hysterectomy and pelvic surgery  COVID negative                -antibiotics as above              -therapeutic anticoagulation with lovenox for pelvic thrombophlebitis if no contraindications     3  Pancreatic leak  Surgical complication  Abdominal drains in place       4  Ovarian cancer  8/6 status post ex lap, en-bloc hysterectomy, bilateral salpingo-oophorectomy, sigmoidectomy with low rectal anastomosis, SBR, radical omentectomy, splenectomy, appendectomy, oversewing of tail of pancreas, repair of cystotomy, bilateral peritoneal stripping  Surgery was complicated by pancreatic tail hemorrhage and low rectal reanastomosis  Imaging shows liver metastases  -care per Gyn onc     5  Thrombocytosis  Reactive due to splenectomy, which is expected  On aspirin and lovenox  Platelets now improving     6   Status post splenectomy  Received HiB, meningococcal quadrivalent vaccine              -needs PCV13 prior to discharge     I have discussed the above management plan in detail with the primary service  ID will follow  Antibiotics:  Augmentin  Ciprofloxacin     Subjective:  Febrile to 101 again last night  Repeat CT A/P showed increased size of the LUQ collection  Seen by IR, occluded catheter upsized to 12 Fr with aspiration of 50 ml purulent fluid  The patient feels well after the procedure and states that her pain is controlled today  No nausea, vomiting, diarrhea  Objective:  Vitals:  Temp:  [98 5 °F (36 9 °C)-101 7 °F (38 7 °C)] 98 6 °F (37 °C)  HR:  [] 99  Resp:  [16-20] 16  BP: (116-150)/(58-83) 128/70  SpO2:  [90 %-97 %] 90 %  Temp (24hrs), Av °F (37 8 °C), Min:98 5 °F (36 9 °C), Max:101 7 °F (38 7 °C)  Current: Temperature: 98 6 °F (37 °C)    Physical Exam:   General Appearance:  Alert, interactive, nontoxic, no acute distress  Throat: Oropharynx moist without lesions  Lungs:   Clear to auscultation bilaterally; no wheezes, rhonchi or rales; respirations unlabored   Heart:  RRR; no murmur, rub or gallop   Abdomen:   Soft, non-tender, non-distended, positive bowel sounds  2 LUQ OSCAR drain with purulent/milky fluid and drain with sanguineous fluid   Extremities: No clubbing, cyanosis or edema   Skin: No new rashes or lesions  Abdominal midline scar, no erythema or drainage       Labs:    All pertinent labs and imaging studies were personally reviewed  Results from last 7 days   Lab Units 21  0545 21  0455 21  1409   WBC Thousand/uL 9 60 10 14 11 97*   HEMOGLOBIN g/dL 8 9* 7 0* 7 2*   PLATELETS Thousands/uL 843* 886* 936*     Results from last 7 days   Lab Units 21  0545 21  0456 21  1409 21  0647   SODIUM mmol/L 131* 127* 127* 129*   POTASSIUM mmol/L 3 4* 3 5 3 4* 3 4*   CHLORIDE mmol/L 96* 96* 94* 96*   CO2 mmol/L  27 27   BUN mg/dL 7 5 6 6   CREATININE mg/dL 0 54* 0 32* 0 36* 0 35*   EGFR ml/min/1 73sq m 96 114 110 111   CALCIUM mg/dL 8 0* 7 6* 7 4* 7 7*   AST U/L  --   --   --  19   ALT U/L  --   --   --  14   ALK PHOS U/L  --   --   --  488*                       Micro:  Results from last 7 days   Lab Units 08/23/21  1809 08/23/21  1521 08/23/21  1208   BLOOD CULTURE   --  No Growth at 72 hrs  No Growth at 72 hrs   --    GRAM STAIN RESULT  No Polys or Bacteria seen  --  Rare Polys  No bacteria seen   WOUND CULTURE   --   --  Growth in Broth culture only Klebsiella oxytoca ESBL*   BODY FLUID CULTURE, STERILE  1+ Growth of Enterococcus faecium*  Few Colonies of Klebsiella oxytoca ESBL*  --   --      LUQ Abscess Cx: Klebsiella oxytoca, Enterococcus faecium     Imaging:    CT A/P 8/26/21:  Slight interval increase in size of left upper quadrant enhancing collection compared to prior recent study dated 8/20/2021 otherwise no significant interval change      Stable moderate size left pleural effusion with subjacent compressive atelectasis      Stable hepatic lesions again suspicious for metastatic disease

## 2021-08-27 NOTE — ASSESSMENT & PLAN NOTE
· S/p left sided thoracentesis yielding 230 mL  · Encourage deep breathing and incentive spirometry  · Given Lasix 20 mg IV x2 8/26  · Repeat CT chest shows moderate pleural effusion with compressive atelectasis  · Patient educated on deep breathing and IS

## 2021-08-28 LAB
ANION GAP SERPL CALCULATED.3IONS-SCNC: 4 MMOL/L (ref 4–13)
BACTERIA BLD CULT: NORMAL
BACTERIA BLD CULT: NORMAL
BASOPHILS # BLD AUTO: 0.1 THOUSANDS/ΜL (ref 0–0.1)
BASOPHILS NFR BLD AUTO: 1 % (ref 0–1)
BUN SERPL-MCNC: 6 MG/DL (ref 5–25)
CALCIUM SERPL-MCNC: 8.3 MG/DL (ref 8.3–10.1)
CHLORIDE SERPL-SCNC: 99 MMOL/L (ref 100–108)
CO2 SERPL-SCNC: 29 MMOL/L (ref 21–32)
CREAT SERPL-MCNC: 0.36 MG/DL (ref 0.6–1.3)
EOSINOPHIL # BLD AUTO: 0.36 THOUSAND/ΜL (ref 0–0.61)
EOSINOPHIL NFR BLD AUTO: 4 % (ref 0–6)
ERYTHROCYTE [DISTWIDTH] IN BLOOD BY AUTOMATED COUNT: 17.2 % (ref 11.6–15.1)
GFR SERPL CREATININE-BSD FRML MDRD: 110 ML/MIN/1.73SQ M
GLUCOSE SERPL-MCNC: 112 MG/DL (ref 65–140)
HCT VFR BLD AUTO: 31.7 % (ref 34.8–46.1)
HGB BLD-MCNC: 10 G/DL (ref 11.5–15.4)
IMM GRANULOCYTES # BLD AUTO: 0.21 THOUSAND/UL (ref 0–0.2)
IMM GRANULOCYTES NFR BLD AUTO: 2 % (ref 0–2)
LYMPHOCYTES # BLD AUTO: 1.19 THOUSANDS/ΜL (ref 0.6–4.47)
LYMPHOCYTES NFR BLD AUTO: 13 % (ref 14–44)
MCH RBC QN AUTO: 28 PG (ref 26.8–34.3)
MCHC RBC AUTO-ENTMCNC: 31.5 G/DL (ref 31.4–37.4)
MCV RBC AUTO: 89 FL (ref 82–98)
MONOCYTES # BLD AUTO: 1 THOUSAND/ΜL (ref 0.17–1.22)
MONOCYTES NFR BLD AUTO: 11 % (ref 4–12)
NEUTROPHILS # BLD AUTO: 6.12 THOUSANDS/ΜL (ref 1.85–7.62)
NEUTS SEG NFR BLD AUTO: 69 % (ref 43–75)
NRBC BLD AUTO-RTO: 1 /100 WBCS
PLATELET # BLD AUTO: 854 THOUSANDS/UL (ref 149–390)
PMV BLD AUTO: 9.4 FL (ref 8.9–12.7)
POTASSIUM SERPL-SCNC: 3.8 MMOL/L (ref 3.5–5.3)
RBC # BLD AUTO: 3.57 MILLION/UL (ref 3.81–5.12)
SODIUM SERPL-SCNC: 132 MMOL/L (ref 136–145)
WBC # BLD AUTO: 8.98 THOUSAND/UL (ref 4.31–10.16)

## 2021-08-28 PROCEDURE — 85025 COMPLETE CBC W/AUTO DIFF WBC: CPT | Performed by: OBSTETRICS & GYNECOLOGY

## 2021-08-28 PROCEDURE — 99232 SBSQ HOSP IP/OBS MODERATE 35: CPT | Performed by: STUDENT IN AN ORGANIZED HEALTH CARE EDUCATION/TRAINING PROGRAM

## 2021-08-28 PROCEDURE — 99232 SBSQ HOSP IP/OBS MODERATE 35: CPT | Performed by: NURSE PRACTITIONER

## 2021-08-28 PROCEDURE — 80048 BASIC METABOLIC PNL TOTAL CA: CPT | Performed by: OBSTETRICS & GYNECOLOGY

## 2021-08-28 PROCEDURE — 99024 POSTOP FOLLOW-UP VISIT: CPT | Performed by: OBSTETRICS & GYNECOLOGY

## 2021-08-28 RX ORDER — ACETAMINOPHEN 325 MG/1
975 TABLET ORAL EVERY 6 HOURS PRN
Status: DISCONTINUED | OUTPATIENT
Start: 2021-08-28 | End: 2021-09-01

## 2021-08-28 RX ADMIN — MAGNESIUM OXIDE TAB 400 MG (241.3 MG ELEMENTAL MG) 400 MG: 400 (241.3 MG) TAB at 09:19

## 2021-08-28 RX ADMIN — MAGNESIUM OXIDE TAB 400 MG (241.3 MG ELEMENTAL MG) 400 MG: 400 (241.3 MG) TAB at 14:00

## 2021-08-28 RX ADMIN — PANTOPRAZOLE SODIUM 40 MG: 40 TABLET, DELAYED RELEASE ORAL at 06:15

## 2021-08-28 RX ADMIN — OXYCODONE HYDROCHLORIDE 5 MG: 5 TABLET ORAL at 09:18

## 2021-08-28 RX ADMIN — AMLODIPINE BESYLATE 10 MG: 10 TABLET ORAL at 09:19

## 2021-08-28 RX ADMIN — OXYCODONE HYDROCHLORIDE 5 MG: 5 TABLET ORAL at 02:00

## 2021-08-28 RX ADMIN — OXYCODONE HYDROCHLORIDE 5 MG: 5 TABLET ORAL at 17:46

## 2021-08-28 RX ADMIN — AMOXICILLIN AND CLAVULANATE POTASSIUM 1 TABLET: 875; 125 TABLET, FILM COATED ORAL at 09:22

## 2021-08-28 RX ADMIN — PRAVASTATIN SODIUM 10 MG: 10 TABLET ORAL at 17:46

## 2021-08-28 RX ADMIN — CIPROFLOXACIN HYDROCHLORIDE 500 MG: 500 TABLET, FILM COATED ORAL at 21:28

## 2021-08-28 RX ADMIN — CIPROFLOXACIN HYDROCHLORIDE 500 MG: 500 TABLET, FILM COATED ORAL at 09:18

## 2021-08-28 RX ADMIN — ENOXAPARIN SODIUM 90 MG: 100 INJECTION SUBCUTANEOUS at 09:18

## 2021-08-28 RX ADMIN — ACETAMINOPHEN 975 MG: 325 TABLET, FILM COATED ORAL at 00:19

## 2021-08-28 RX ADMIN — AMOXICILLIN AND CLAVULANATE POTASSIUM 1 TABLET: 875; 125 TABLET, FILM COATED ORAL at 21:28

## 2021-08-28 RX ADMIN — DIBASIC SODIUM PHOSPHATE, MONOBASIC POTASSIUM PHOSPHATE AND MONOBASIC SODIUM PHOSPHATE 1 TABLET: 852; 155; 130 TABLET ORAL at 17:46

## 2021-08-28 RX ADMIN — ENOXAPARIN SODIUM 90 MG: 100 INJECTION SUBCUTANEOUS at 21:28

## 2021-08-28 RX ADMIN — OXYCODONE HYDROCHLORIDE 5 MG: 5 TABLET ORAL at 22:49

## 2021-08-28 RX ADMIN — DIBASIC SODIUM PHOSPHATE, MONOBASIC POTASSIUM PHOSPHATE AND MONOBASIC SODIUM PHOSPHATE 1 TABLET: 852; 155; 130 TABLET ORAL at 13:59

## 2021-08-28 RX ADMIN — ASPIRIN 81 MG: 81 TABLET, COATED ORAL at 09:19

## 2021-08-28 RX ADMIN — OXYCODONE HYDROCHLORIDE 5 MG: 5 TABLET ORAL at 06:15

## 2021-08-28 RX ADMIN — OXYCODONE HYDROCHLORIDE 5 MG: 5 TABLET ORAL at 14:00

## 2021-08-28 RX ADMIN — DIBASIC SODIUM PHOSPHATE, MONOBASIC POTASSIUM PHOSPHATE AND MONOBASIC SODIUM PHOSPHATE 1 TABLET: 852; 155; 130 TABLET ORAL at 09:18

## 2021-08-28 NOTE — PROGRESS NOTES
Progress Note - Infectious Disease   Pritesh Flores 80 y o  female MRN: [id number]  Unit/Bed#: [facility] 305-01 Encounter: [id number]      Impression/Plan:    1  Abdominal abscess  Patient developed LUQ abscess after complex abdominal surgery and pancreatic leak  8/18, s/p IR drain placement with aspiration of 60cc purulent fluid  Culture is growing Klebsiella oxytoca, Enterococcus faecium, and Bacteroides fragilis  Repeat CT A/P 8/20 showed improvement in the size of the collection  Patient's fevers continued, and repeat CT 8/26 showed increased size of the collection due to catheter occlusion  Taken back to IR 8/27, occluded catheter upsized to 12 Fr with aspiration of 50 ml purulent fluid    -continue Augmentin 875-125mg PO BID and ciprofloxacin 500mg PO BID   -monitor drain output, should be flushed daily   -monitor fever curve    2  Sepsis-fevers, leukocytosis  Likely secondary to #1 and occlusion of the catheter resulting in poor drainage  Other considerations are drug fever, as well as septic pelvic thrombophlebitis  Although less common, this can occur after hysterectomy and pelvic surgery  COVID negative                -antibiotics as above              -therapeutic anticoagulation with lovenox for pelvic thrombophlebitis if no contraindications     3  Pancreatic leak  Surgical complication  Abdominal drains in place       4  Ovarian cancer  8/6 status post ex lap, en-bloc hysterectomy, bilateral salpingo-oophorectomy, sigmoidectomy with low rectal anastomosis, SBR, radical omentectomy, splenectomy, appendectomy, oversewing of tail of pancreas, repair of cystotomy, bilateral peritoneal stripping  Surgery was complicated by pancreatic tail hemorrhage and low rectal reanastomosis  Imaging shows liver metastases  -care per Gyn onc     5  Thrombocytosis  Reactive due to splenectomy, which is expected  On aspirin and lovenox  Platelets now improving     6   Status post splenectomy  Received HiB, meningococcal quadrivalent vaccine              -needs PCV13 prior to discharge     I have discussed the above management plan in detail with the primary service  ID will follow  Antibiotics:  Augmentin  Ciprofloxacin     Subjective:  LUQ drain upsized yesterday  Fever to 102 7 last night, afebrile so far today  She overall feels well today  She is still having some pain in the LUQ, but this is controlled with medication  Ate breakfast this morning  Objective:  Vitals:  Temp:  [98 3 °F (36 8 °C)-102 7 °F (39 3 °C)] 98 9 °F (37 2 °C)  HR:  [] 97  Resp:  [16-21] 18  BP: (128-151)/(58-76) 141/65  SpO2:  [90 %-96 %] 96 %  Temp (24hrs), Av 4 °F (37 4 °C), Min:98 3 °F (36 8 °C), Max:102 7 °F (39 3 °C)  Current: Temperature: 98 9 °F (37 2 °C)    Physical Exam:   General Appearance:  Alert, interactive, nontoxic, no acute distress  Throat: Oropharynx moist without lesions  Lungs:   Clear to auscultation bilaterally; no wheezes, rhonchi or rales; respirations unlabored   Heart:  RRR; no murmur, rub or gallop   Abdomen:   Soft, non-tender, non-distended, positive bowel sounds  2 LUQ OSCAR drain with purulent/milky fluid and drain with sanguineous murky fluid   Extremities: No clubbing, cyanosis or edema   Skin: No new rashes or lesions  Abdominal midline scar, no erythema or drainage       Labs:    All pertinent labs and imaging studies were personally reviewed  Results from last 7 days   Lab Units 21  0928 21  0545 21  0455   WBC Thousand/uL 8 98 9 60 10 14   HEMOGLOBIN g/dL 10 0* 8 9* 7 0*   PLATELETS Thousands/uL 854* 843* 886*     Results from last 7 days   Lab Units 21  0556 21  0545 21  0456   SODIUM mmol/L 132* 131* 127*   POTASSIUM mmol/L 3 8 3 4* 3 5   CHLORIDE mmol/L 99* 96* 96*   CO2 mmol/L 29 28 27   BUN mg/dL 6 7 5   CREATININE mg/dL 0 36* 0 54* 0 32*   EGFR ml/min/1 73sq m 110 96 114   CALCIUM mg/dL 8 3 8 0* 7 6*                       Micro:  Results from last 7 days   Lab Units 08/23/21  1809 08/23/21  1521 08/23/21  1208   BLOOD CULTURE   --  No Growth After 4 Days  No Growth After 4 Days  --    GRAM STAIN RESULT  No Polys or Bacteria seen  --  Rare Polys  No bacteria seen   WOUND CULTURE   --   --  Growth in Broth culture only Klebsiella oxytoca ESBL*   BODY FLUID CULTURE, STERILE  1+ Growth of Enterococcus faecium*  Few Colonies of Klebsiella oxytoca ESBL*  --   --      LUQ Abscess Cx: Klebsiella oxytoca, Enterococcus faecium     Imaging:    CT A/P 8/26/21:  Slight interval increase in size of left upper quadrant enhancing collection compared to prior recent study dated 8/20/2021 otherwise no significant interval change      Stable moderate size left pleural effusion with subjacent compressive atelectasis      Stable hepatic lesions again suspicious for metastatic disease

## 2021-08-28 NOTE — PROGRESS NOTES
Progress Note - General Surgery   Sp Lloyd 80 y o  female MRN: [id number]  Unit/Bed#: [facility] 305-01 Encounter: [id number]    Assessment:  Patient is a 78-year-old female with peritoneal carcinomatosis postop day 21 from primary debulking postop course complicated by fever and pancreatic enzyme leak  Final pathology shows high-grade serous carcinoma  Patient's postoperative course has been complicated by febrile episodes and infected fluid collection in the pancreas bed  The patient underwent drain manipulation yesterday with 50 cc of purulent fluid drawn  She did have fevers last night likely related to the procedure  Plan:  Status post surgery  Voiding spontaneously and tolerating regular diet pain controlled on present pain regimen    Pancreatic leak with abscess formation collection Yumiko accessed yesterday with 50 cc of purulent fluid rim moved  Patient was febrile to 102 yesterday likely related to manipulation of drain  Continue p o  Augmentin and ciprofloxacin  3  Fever  Likely related to abscess in pancreatic bed which was manipulated and drained yesterday  Recent CT scan shows no other sites of likely infected material    4  Hyponatremia  Patient is on fluid restriction and salt tablets  5  Wound seroma  Packing in place continue daily dressing change    6  Acute blood loss anemia  This is surgery related patient has undergone recent transfusion and hemoglobin elevated to 8 9  Patient will receive chemotherapy    7  Acute respiratory failure  Patient has persistent small pleural effusions status post thoracentesis  She has not required supplemental oxygenation for several days    8  Status post splenectomy with thrombocytosis  Platelets continue elevated  Splenectomy vaccines prior to discharge  Subjective/Objective   Chief Complaint:  I feel good today    Subjective:  Patient remains feeling well  She tolerated procedure yesterday without difficulty    She is tolerating regular diet, passing gas, ambulating, and voiding spontaneously  Blood pressure 151/76, pulse 97, temperature 98 3 °F (36 8 °C), temperature source Oral, resp  rate 20, height 4' 11" (1 499 m), weight 88 kg (194 lb 0 1 oz), SpO2 94 %  ,Body mass index is 39 18 kg/m²  Intake/Output Summary (Last 24 hours) at 8/28/2021 0832  Last data filed at 8/28/2021 0618  Gross per 24 hour   Intake 490 ml   Output 585 ml   Net -95 ml       Invasive Devices     Peripheral Intravenous Line            Peripheral IV 08/26/21 Dorsal (posterior); Left Hand 1 day          Drain            Closed/Suction Drain LLQ 19 Fr  21 days    Closed/Suction Drain Left;Posterior Back Bulb 12 Fr  <1 day                Physical Exam: /76 (BP Location: Right arm)   Pulse 97   Temp 98 3 °F (36 8 °C) (Oral)   Resp 20   Ht 4' 11" (1 499 m)   Wt 88 kg (194 lb 0 1 oz)   SpO2 94%   BMI 39 18 kg/m²     General Appearance:    Alert, cooperative, no distress, appears stated age   Head:    Normocephalic, without obvious abnormality, atraumatic   Eyes:    PERRL, conjunctiva/corneas clear, EOM's intact, fundi     benign, both eyes   Ears:    Normal TM's and external ear canals, both ears   Nose:   Nares normal, septum midline, mucosa normal, no drainage    or sinus tenderness   Throat:   Lips, mucosa, and tongue normal; teeth and gums normal   Neck:   Supple, symmetrical, trachea midline, no adenopathy;     thyroid:  no enlargement/tenderness/nodules; no carotid    bruit or JVD   Back:     Symmetric, no curvature, ROM normal, no CVA tenderness   Lungs:     Clear to auscultation bilaterally, respirations unlabored   Chest Wall:    No tenderness or deformity    Heart:    Regular rate and rhythm, S1 and S2 normal, no murmur, rub   or gallop       Abdomen:     Soft, non-tender, bowel sounds active all four quadrants,     no masses, no organomegaly wound open with packing in place           Extremities:   Extremities normal, atraumatic, no cyanosis or edema   Pulses:   2+ and symmetric all extremities   Skin:   Skin color, texture, turgor normal, no rashes or lesions   Lymph nodes:   Cervical, supraclavicular, and axillary nodes normal   Neurologic:   CNII-XII intact, normal strength, sensation and reflexes     throughout       Lab, Imaging and other studies:  CBC: No results found for: WBC, HGB, HCT, MCV, PLT, ADJUSTEDWBC, MCH, MCHC, RDW, MPV, NRBC, CMP:   Lab Results   Component Value Date    SODIUM 132 (L) 08/28/2021    K 3 8 08/28/2021    CL 99 (L) 08/28/2021    CO2 29 08/28/2021    BUN 6 08/28/2021    CREATININE 0 36 (L) 08/28/2021    CALCIUM 8 3 08/28/2021    EGFR 110 08/28/2021     VTE Pharmacologic Prophylaxis: Enoxaparin (Lovenox)  VTE Mechanical Prophylaxis: sequential compression device

## 2021-08-28 NOTE — PROGRESS NOTES
[address] [facility]  Progress Note - Óscar Grullon 1950, 80 y o  female MRN: [id number]  Unit/Bed#: [facility] 305-01 Encounter: [id number]  Primary Care Provider: Jeanette Matthews MD   Date and time admitted to hospital: 8/6/2021  9:15 AM    * Hyponatremia  Assessment & Plan  Presented with a sodium of 138 on 8/6  Sodium downtrended to 133 on 8/13  Sodium continued to downtrend to 127 on 8/25 for which NEPTALI was consulted   Patient admits to watering down her beverages   · Serum osmo 263, urine osmo 376, and urine sodium 97  · TSH within normal limits   · Primary service ordered NaCl 1 gm TID x3 doses on 8/25 into 8/16  · Started 1200 mL/day fluid restriction, liberalized salt in diet, added Ensure   · Given Lasix 20 mg IV x2 doses on 6/26 as daily weight showed a 14% weight gain in 2 weeks   · Na improved to 131 -> 132 today   · Hold off on further Lasix as patient appears euvolemic  · Continue fluid restriction   · Monitor I+O, daily weights   · Monitor sodium in am     Ovarian cancer ([facility] [address] )  Assessment & Plan  · POD #21 s/p ex lap, en-bloc hysterectomy, bilateral salpingo-oophorectomy, sigmoidectomy with low rectal anastomosis, SBR, radical omentectomy, splenectomy, appendectomy, oversewing of tail of pancreas, repair of cystotomy, bilateral peritoneal stripping     · Surgery was complicated by pancreatic tail hemorrhage and low rectal reanastomosis   Imaging shows liver metastases  · Management per GYN ONC  · Pt's case discussed at tumor board, plan for eventual chemotherapy     Abscess of abdominal cavity ([facility] [address] )  Assessment & Plan  · Patient developed left upper quadrant abscess after complex abdominal surgery  · Status post IR drain placement  · Currently 2 drains are in the abscess cavity (LUQ and left back)  · ID following  · Antibiotics adjusted to oral Augmentin and Cipro per ID  · Due to ongoing fevers, checked CT C/A/P and LEVDs  · LEVDs negative for DVT  · CT C/A/P showed enlarging LUQ fluid collection  · Patient went to IR on 8/27 and was found to have an occluded catheter which was up sized to 12 Fr with aspiration of 50 ml purulent fluid   · Fevers improving   · Repeat blood culture show no growth to date   · Monitor fever curve and CBC with diff    Pleural effusion  Assessment & Plan  · S/p left sided thoracentesis yielding 230 mL  · Encourage deep breathing and incentive spirometry  · Given Lasix 20 mg IV x2 8/26  · Repeat CT chest shows moderate pleural effusion with compressive atelectasis  · Patient educated on deep breathing and IS    S/P splenectomy  Assessment & Plan  · With reactive thrombocytosis  · Continue aspirin  · Received HiB, meningococcal quadrivalent vaccines  · Will need Prevnar vaccine prior to discharge    Acute blood loss anemia  Assessment & Plan  · Postoperatively   · HGB 7 0 on 8/26 -> given 1 unit pRBCs followed by Lasix 20 mg IV x1  · HGB today: 10  · No active bleeding   · Monitor HGB in am     VTE Pharmacologic Prophylaxis:   Pharmacologic: Enoxaparin (Lovenox)  Mechanical VTE Prophylaxis in Place: Yes    Patient Centered Rounds: I have performed bedside rounds with nursing staff today  Discussions with Specialists or Other Care Team Provider: nursing    Education and Discussions with Family / Patient: I have answered all questions to the best of my ability  Time Spent for Care: 30 minutes  More than 50% of total time spent on counseling and coordination of care as described above  Current Length of Stay: 22 day(s)    Current Patient Status: Inpatient   Certification Statement: The patient will continue to require additional inpatient hospital stay due to ongoing fevers     Discharge Plan: Patient is not medically stable for discharge today, likely in 24-48 hours pending progress  Code Status: Level 1 - Full Code      Subjective:   Patient seen and examined at bedside  She is in good spirits  Tolerating exchanged right flank/back tube  Denies HA, dizziness, CP, or SOB  Occasional dry, non productive cough  Pain controlled with current regimen  Abdominal pain does get bad if pain medications are not on time  Objective:     Vitals:   Temp (24hrs), Av 6 °F (37 6 °C), Min:98 3 °F (36 8 °C), Max:102 7 °F (39 3 °C)    Temp:  [98 3 °F (36 8 °C)-102 7 °F (39 3 °C)] 98 9 °F (37 2 °C)  HR:  [] 97  Resp:  [18-21] 18  BP: (129-151)/(58-76) 141/65  SpO2:  [94 %-96 %] 96 %  Body mass index is 39 18 kg/m²  Input and Output Summary (last 24 hours): Intake/Output Summary (Last 24 hours) at 2021 1407  Last data filed at 2021 0618  Gross per 24 hour   Intake 490 ml   Output 585 ml   Net -95 ml       Physical Exam:     Physical Exam  Vitals and nursing note reviewed  Constitutional:       General: She is not in acute distress  Appearance: She is well-developed  She is ill-appearing (appears deconditioned )  She is not toxic-appearing or diaphoretic  Comments: Pleasant, talkative female resting in bed on room air    HENT:      Head: Normocephalic  Cardiovascular:      Rate and Rhythm: Normal rate and regular rhythm  Pulmonary:      Effort: Pulmonary effort is normal  No tachypnea or respiratory distress  Breath sounds: Normal breath sounds  No wheezing  Abdominal:      General: Bowel sounds are normal  There is no distension  Palpations: Abdomen is soft  Tenderness: There is no abdominal tenderness  Comments: 2 drains, LUQ draining serous drainage and left flank/back draining purulent white material     Musculoskeletal:         General: Normal range of motion  Cervical back: Normal range of motion  Skin:     General: Skin is warm and dry  Neurological:      Mental Status: She is alert and oriented to person, place, and time  Mental status is at baseline  Deep Tendon Reflexes: Reflexes are normal and symmetric     Psychiatric:         Mood and Affect: Mood normal          Behavior: Behavior normal          Thought Content: Thought content normal          Judgment: Judgment normal          Additional Data:     Labs:    Results from last 7 days   Lab Units 08/28/21  0928   WBC Thousand/uL 8 98   HEMOGLOBIN g/dL 10 0*   HEMATOCRIT % 31 7*   PLATELETS Thousands/uL 854*   NEUTROS PCT % 69   LYMPHS PCT % 13*   MONOS PCT % 11   EOS PCT % 4     Results from last 7 days   Lab Units 08/28/21  0556   POTASSIUM mmol/L 3 8   CHLORIDE mmol/L 99*   CO2 mmol/L 29   BUN mg/dL 6   CREATININE mg/dL 0 36*   CALCIUM mg/dL 8 3           * I Have Reviewed All Lab Data Listed Above  * Additional Pertinent Lab Tests Reviewed: All Labs Within Last 24 Hours Reviewed    Imaging:    Imaging Reports Reviewed Today Include: repeat CT C/A/P, LEVD, sinus xray  Imaging Personally Reviewed by Myself Includes:  None     Recent Cultures (last 7 days):     Results from last 7 days   Lab Units 08/23/21  1809 08/23/21  1521 08/23/21  1208   BLOOD CULTURE   --  No Growth After 4 Days  No Growth After 4 Days    --    GRAM STAIN RESULT  No Polys or Bacteria seen  --  Rare Polys  No bacteria seen   WOUND CULTURE   --   --  Growth in Broth culture only Klebsiella oxytoca ESBL*   BODY FLUID CULTURE, STERILE  1+ Growth of Enterococcus faecium*  Few Colonies of Klebsiella oxytoca ESBL*  --   --        Last 24 Hours Medication List:   Current Facility-Administered Medications   Medication Dose Route Frequency Provider Last Rate    acetaminophen  975 mg Oral Q6H PRN Mustapha Abraham PA-C      albuterol  2 puff Inhalation Q4H PRN Keya Curiel MD      amLODIPine  10 mg Oral Daily Sam Piedra MD      amoxicillin-clavulanate  1 tablet Oral Q12H [address] Nik Peacock MD      aspirin  81 mg Oral Daily Ander Treviño MD      ciprofloxacin  500 mg Oral Q12H [address] Nik Peacock MD      enoxaparin  1 mg/kg Subcutaneous Q12H [address] Roge Lou MD      HYDROmorphone  0 5 mg Intravenous Q2H PRN Joe Dyson PA-C      ibuprofen  600 mg Oral Q6H PRN Issa Gregg MD      magnesium oxide  400 mg Oral BID Christine Quiroz MD      ondansetron  4 mg Intravenous Q6H PRN Sussy Martinez PA-C      oxyCODONE  10 mg Oral Q4H PRN Jing Munoz MD      oxyCODONE  5 mg Oral Q4H Aleksandar Santos MD      pantoprazole  40 mg Oral Early Morning Lucinda Ryan MD      phenol  1 spray Mouth/Throat Q2H PRN Sussy Martinez PA-C      pneumococcal 13-valent conjugate vaccine  0 5 mL Intramuscular Prior to discharge Sussy Martinez PA-C      potassium-sodium phosphateS  1 tablet Oral TID With Meals JOSI Ryan      pravastatin  10 mg Oral Daily With [employer] ANA Kiser PA-C      sodium chloride (PF)  500 mL Intracatheter Once in imaging Issa Gregg MD          Today, Patient Was Seen By: JOSI Ryan    ** Please Note: Dictation voice to text software may have been used in the creation of this document   **

## 2021-08-29 ENCOUNTER — APPOINTMENT (INPATIENT)
Dept: RADIOLOGY | Facility: HOSPITAL | Age: 71
DRG: 329 | End: 2021-08-29
Payer: COMMERCIAL

## 2021-08-29 LAB
ABO GROUP BLD: NORMAL
ALBUMIN SERPL BCP-MCNC: 1.4 G/DL (ref 3.5–5)
ALP SERPL-CCNC: 574 U/L (ref 46–116)
ALT SERPL W P-5'-P-CCNC: 44 U/L (ref 12–78)
ANION GAP SERPL CALCULATED.3IONS-SCNC: 6 MMOL/L (ref 4–13)
ANISOCYTOSIS BLD QL SMEAR: PRESENT
ARTIFACT: PRESENT
AST SERPL W P-5'-P-CCNC: 61 U/L (ref 5–45)
BASOPHILS # BLD MANUAL: 0.16 THOUSAND/UL (ref 0–0.1)
BASOPHILS NFR MAR MANUAL: 2 % (ref 0–1)
BILIRUB SERPL-MCNC: 0.4 MG/DL (ref 0.2–1)
BLD GP AB SCN SERPL QL: NEGATIVE
BUN SERPL-MCNC: 6 MG/DL (ref 5–25)
CALCIUM ALBUM COR SERPL-MCNC: 10.2 MG/DL (ref 8.3–10.1)
CALCIUM SERPL-MCNC: 8.1 MG/DL (ref 8.3–10.1)
CHLORIDE SERPL-SCNC: 98 MMOL/L (ref 100–108)
CO2 SERPL-SCNC: 28 MMOL/L (ref 21–32)
CREAT SERPL-MCNC: 0.34 MG/DL (ref 0.6–1.3)
EOSINOPHIL # BLD MANUAL: 0.66 THOUSAND/UL (ref 0–0.4)
EOSINOPHIL NFR BLD MANUAL: 8 % (ref 0–6)
ERYTHROCYTE [DISTWIDTH] IN BLOOD BY AUTOMATED COUNT: 17.3 % (ref 11.6–15.1)
GFR SERPL CREATININE-BSD FRML MDRD: 112 ML/MIN/1.73SQ M
GLUCOSE SERPL-MCNC: 110 MG/DL (ref 65–140)
HCT VFR BLD AUTO: 22.1 % (ref 34.8–46.1)
HCT VFR BLD AUTO: 26.2 % (ref 34.8–46.1)
HCT VFR BLD AUTO: 27 % (ref 34.8–46.1)
HGB BLD-MCNC: 7 G/DL (ref 11.5–15.4)
HGB BLD-MCNC: 8.3 G/DL (ref 11.5–15.4)
HGB BLD-MCNC: 8.6 G/DL (ref 11.5–15.4)
HYPERCHROMIA BLD QL SMEAR: PRESENT
INR PPP: 1.19 (ref 0.84–1.19)
LYMPHOCYTES # BLD AUTO: 1.07 THOUSAND/UL (ref 0.6–4.47)
LYMPHOCYTES # BLD AUTO: 13 % (ref 14–44)
MCH RBC QN AUTO: 28.2 PG (ref 26.8–34.3)
MCHC RBC AUTO-ENTMCNC: 31.9 G/DL (ref 31.4–37.4)
MCV RBC AUTO: 89 FL (ref 82–98)
MONOCYTES # BLD AUTO: 0.82 THOUSAND/UL (ref 0–1.22)
MONOCYTES NFR BLD: 10 % (ref 4–12)
NEUTROPHILS # BLD MANUAL: 5.5 THOUSAND/UL (ref 1.85–7.62)
NEUTS SEG NFR BLD AUTO: 67 % (ref 43–75)
NRBC BLD AUTO-RTO: 1 /100 WBC (ref 0–2)
OVALOCYTES BLD QL SMEAR: PRESENT
PLATELET # BLD AUTO: 767 THOUSANDS/UL (ref 149–390)
PLATELET BLD QL SMEAR: ABNORMAL
PMV BLD AUTO: 9.1 FL (ref 8.9–12.7)
POIKILOCYTOSIS BLD QL SMEAR: PRESENT
POLYCHROMASIA BLD QL SMEAR: PRESENT
POTASSIUM SERPL-SCNC: 3.5 MMOL/L (ref 3.5–5.3)
PROT SERPL-MCNC: 6.1 G/DL (ref 6.4–8.2)
PROTHROMBIN TIME: 15.1 SECONDS (ref 11.6–14.5)
RBC # BLD AUTO: 3.05 MILLION/UL (ref 3.81–5.12)
RBC MORPH BLD: PRESENT
RH BLD: POSITIVE
SODIUM SERPL-SCNC: 132 MMOL/L (ref 136–145)
SPECIMEN EXPIRATION DATE: NORMAL
TARGETS BLD QL SMEAR: PRESENT
WBC # BLD AUTO: 8.21 THOUSAND/UL (ref 4.31–10.16)

## 2021-08-29 PROCEDURE — P9016 RBC LEUKOCYTES REDUCED: HCPCS

## 2021-08-29 PROCEDURE — 99291 CRITICAL CARE FIRST HOUR: CPT | Performed by: PHYSICIAN ASSISTANT

## 2021-08-29 PROCEDURE — 99232 SBSQ HOSP IP/OBS MODERATE 35: CPT | Performed by: INTERNAL MEDICINE

## 2021-08-29 PROCEDURE — G1004 CDSM NDSC: HCPCS

## 2021-08-29 PROCEDURE — 86850 RBC ANTIBODY SCREEN: CPT | Performed by: SURGERY

## 2021-08-29 PROCEDURE — 85007 BL SMEAR W/DIFF WBC COUNT: CPT | Performed by: STUDENT IN AN ORGANIZED HEALTH CARE EDUCATION/TRAINING PROGRAM

## 2021-08-29 PROCEDURE — 99024 POSTOP FOLLOW-UP VISIT: CPT | Performed by: OBSTETRICS & GYNECOLOGY

## 2021-08-29 PROCEDURE — 99024 POSTOP FOLLOW-UP VISIT: CPT | Performed by: SURGERY

## 2021-08-29 PROCEDURE — 85018 HEMOGLOBIN: CPT | Performed by: SURGERY

## 2021-08-29 PROCEDURE — 74174 CTA ABD&PLVS W/CONTRAST: CPT

## 2021-08-29 PROCEDURE — 86900 BLOOD TYPING SEROLOGIC ABO: CPT | Performed by: SURGERY

## 2021-08-29 PROCEDURE — 85610 PROTHROMBIN TIME: CPT | Performed by: SURGERY

## 2021-08-29 PROCEDURE — 80053 COMPREHEN METABOLIC PANEL: CPT | Performed by: OBSTETRICS & GYNECOLOGY

## 2021-08-29 PROCEDURE — 85014 HEMATOCRIT: CPT | Performed by: SURGERY

## 2021-08-29 PROCEDURE — 97164 PT RE-EVAL EST PLAN CARE: CPT

## 2021-08-29 PROCEDURE — 85027 COMPLETE CBC AUTOMATED: CPT | Performed by: STUDENT IN AN ORGANIZED HEALTH CARE EDUCATION/TRAINING PROGRAM

## 2021-08-29 PROCEDURE — 99231 SBSQ HOSP IP/OBS SF/LOW 25: CPT | Performed by: STUDENT IN AN ORGANIZED HEALTH CARE EDUCATION/TRAINING PROGRAM

## 2021-08-29 PROCEDURE — 86901 BLOOD TYPING SEROLOGIC RH(D): CPT | Performed by: SURGERY

## 2021-08-29 RX ORDER — SODIUM CHLORIDE 9 MG/ML
50 INJECTION, SOLUTION INTRAVENOUS CONTINUOUS
Status: DISCONTINUED | OUTPATIENT
Start: 2021-08-30 | End: 2021-09-04

## 2021-08-29 RX ADMIN — OXYCODONE HYDROCHLORIDE 5 MG: 5 TABLET ORAL at 07:04

## 2021-08-29 RX ADMIN — PRAVASTATIN SODIUM 10 MG: 10 TABLET ORAL at 17:38

## 2021-08-29 RX ADMIN — OXYCODONE HYDROCHLORIDE 5 MG: 5 TABLET ORAL at 03:07

## 2021-08-29 RX ADMIN — AMOXICILLIN AND CLAVULANATE POTASSIUM 1 TABLET: 875; 125 TABLET, FILM COATED ORAL at 21:42

## 2021-08-29 RX ADMIN — IOHEXOL 100 ML: 350 INJECTION, SOLUTION INTRAVENOUS at 13:19

## 2021-08-29 RX ADMIN — AMLODIPINE BESYLATE 10 MG: 10 TABLET ORAL at 09:33

## 2021-08-29 RX ADMIN — OXYCODONE HYDROCHLORIDE 5 MG: 5 TABLET ORAL at 11:48

## 2021-08-29 RX ADMIN — ENOXAPARIN SODIUM 90 MG: 100 INJECTION SUBCUTANEOUS at 09:31

## 2021-08-29 RX ADMIN — DIBASIC SODIUM PHOSPHATE, MONOBASIC POTASSIUM PHOSPHATE AND MONOBASIC SODIUM PHOSPHATE 1 TABLET: 852; 155; 130 TABLET ORAL at 14:02

## 2021-08-29 RX ADMIN — OXYCODONE HYDROCHLORIDE 5 MG: 5 TABLET ORAL at 21:42

## 2021-08-29 RX ADMIN — CIPROFLOXACIN HYDROCHLORIDE 500 MG: 500 TABLET, FILM COATED ORAL at 21:42

## 2021-08-29 RX ADMIN — DIBASIC SODIUM PHOSPHATE, MONOBASIC POTASSIUM PHOSPHATE AND MONOBASIC SODIUM PHOSPHATE 1 TABLET: 852; 155; 130 TABLET ORAL at 06:59

## 2021-08-29 RX ADMIN — OXYCODONE HYDROCHLORIDE 5 MG: 5 TABLET ORAL at 17:38

## 2021-08-29 RX ADMIN — ACETAMINOPHEN 975 MG: 325 TABLET, FILM COATED ORAL at 00:23

## 2021-08-29 RX ADMIN — DIBASIC SODIUM PHOSPHATE, MONOBASIC POTASSIUM PHOSPHATE AND MONOBASIC SODIUM PHOSPHATE 1 TABLET: 852; 155; 130 TABLET ORAL at 17:39

## 2021-08-29 RX ADMIN — OXYCODONE HYDROCHLORIDE 5 MG: 5 TABLET ORAL at 14:01

## 2021-08-29 RX ADMIN — PANTOPRAZOLE SODIUM 40 MG: 40 TABLET, DELAYED RELEASE ORAL at 06:14

## 2021-08-29 RX ADMIN — AMOXICILLIN AND CLAVULANATE POTASSIUM 1 TABLET: 875; 125 TABLET, FILM COATED ORAL at 09:31

## 2021-08-29 RX ADMIN — MAGNESIUM OXIDE TAB 400 MG (241.3 MG ELEMENTAL MG) 400 MG: 400 (241.3 MG) TAB at 06:59

## 2021-08-29 RX ADMIN — ASPIRIN 81 MG: 81 TABLET, COATED ORAL at 09:31

## 2021-08-29 RX ADMIN — CIPROFLOXACIN HYDROCHLORIDE 500 MG: 500 TABLET, FILM COATED ORAL at 09:31

## 2021-08-29 RX ADMIN — IBUPROFEN 600 MG: 600 TABLET, FILM COATED ORAL at 14:06

## 2021-08-29 NOTE — ASSESSMENT & PLAN NOTE
Malnutrition Findings:   Adult Malnutrition type: Acute illness  Adult Degree of Malnutrition: Other severe protein calorie malnutrition (Severe pro/kathie malnutrition r/t CA dx as evidenced by 5% unplanned wt loss since 7/22/21, consuming < 50% energy intake compared to estimated energy needs > 5 days  Treated with cl liqs + Ensure Clear 4 xday)    BMI Findings: Body mass index is 34 4 kg/m²     · Encourage oral intake and nutritional supplements

## 2021-08-29 NOTE — CASE MANAGEMENT
[facility] can accept- they need DC date  CM informed them  Via [facility] that due to ongoing fevers there is no DC date

## 2021-08-29 NOTE — PHYSICAL THERAPY NOTE
Physical Therapy Re- Evaluation     Patient's Name: Bhupendra Coto    Admitting Diagnosis  Peritoneal carcinomatosis [facility]) [C78 6]    Problem List  Patient Active Problem List   Diagnosis    [facility]    Asthma    Hypertension    Migraine    Cancer related pain    Acute blood loss anemia    Leukocytosis    Severe protein-calorie malnutrition ([facility] )    Encounter for ablation of malignant neoplasm with goal of debulking/cytoreduction ([place] [id number] )    FHx: PROMISE-BSO (total abdominal hysterectomy and bilateral salpingo-oophorectomy)    S/P bladder repair    Status post small bowel resection    Pancreatic fluid leak    S/P splenectomy    Respiratory failure with hypoxia (HCC)    Wound dehiscence, surgical    Hyponatremia    Ovarian cancer (HCC)    Abscess of abdominal cavity (HCC)    Pleural effusion       Past Medical History  Past Medical History:   Diagnosis Date    Acid reflux     Asthma     Cancer ([place] [address] )     ovarian, peritoneal carcinomatosis    GERD (gastroesophageal reflux disease)     Hypercholesteremia     Hypertension     Migraine        Past Surgical History  Past Surgical History:   Procedure Laterality Date    APPENDECTOMY N/A 8/6/2021    Procedure: APPENDECTOMY;  Surgeon: Ester Oquendo MD;  [facility];  Service: Gynecology Oncology    CHOLECYSTECTOMY      COLONOSCOPY      FL CYSTOGRAM  8/18/2021    GALLBLADDER SURGERY      HYSTERECTOMY N/A 8/6/2021    Procedure: ENBLOCK HYSTERECTOMY, BILATERAL SALPINGO-OOPHORECTOMY, SIGMOIDECTOMY WITH LOW RECTAL REANASTAMOSIS, BLADDER PERITONEAL STRIPPING AND CYSTOTOMY REPAIR, DIAPHRAGM STRIPPING, SMALL BOWEL RESECTION WITH RE-ANASTAMOSIS;  Surgeon: Ester Oquendo MD;  [facility];  Service: Gynecology Oncology    IR DRAINAGE TUBE CHECK/CHANGE/REPOSITION/REINSERTION/UPSIZE  8/27/2021    IR DRAINAGE TUBE PLACEMENT  8/18/2021    IR THORACENTESIS  8/18/2021    LAPAROTOMY N/A 8/6/2021    Procedure: LAPAROTOMY EXPLORATORY; OVER SEW PANCREAS TAIL;  Surgeon: Tomas Palacio MD;  [facility];  Service: Gynecology Oncology    OMENTECTOMY N/A 8/6/2021    Procedure: RADICAL OMENTECTOMY;  Surgeon: Tomas Palacio MD;  [facility];  Service: Gynecology Oncology    MN LAP,DIAGNOSTIC ABDOMEN N/A 8/6/2021    Procedure: LAPAROSCOPY DIAGNOSTIC;  Surgeon: Tomas Palacio MD;  [facility];  Service: Gynecology Oncology    RIGHT OOPHORECTOMY  1986    SPLENECTOMY, TOTAL N/A 8/6/2021    Procedure: SPLENECTOMY;  Surgeon: Tomas Palacio MD;  [facility];  Service: Gynecology Oncology    TONSILLECTOMY          08/29/21 1152   PT Last Visit   PT Visit Date 08/29/21   Note Type   Note type Re-Evaluation   Pain Assessment   Pain Assessment Tool 0-10   Pain Score 7   Pain Location/Orientation Orientation: Left;Orientation: Mid;Location: Back; Location: Abdomen   Patient's Stated Pain Goal No pain   Hospital Pain Intervention(s) Repositioned; Ambulation/increased activity   Home Living   Type of [facility] One level;Performs ADLs on one level; Able to live on main level with bedroom/bathroom  (0 KAROL)   Bathroom Shower/Tub Walk-in shower   Bathroom Toilet Standard   Bathroom Equipment   (denies )   [address]   (denies )   Prior Function   Level of [place] Independent with ADLs and functional mobility   Lives With Spouse   Receives Help From Family   ADL Assistance Independent   IADLs Independent   Falls in the last 6 months 0   Vocational Full time employment   Restrictions/Precautions   Weight Bearing Precautions Per Order No   Other Precautions Fall Risk;Pain  (2x OSCAR Drains )   General   Family/Caregiver Present No   Cognition   Overall Cognitive Status WFL   Arousal/Participation Alert   Attention Within functional limits   Orientation Level Oriented X4   Memory Within functional limits   Following Commands Follows all commands and directions without difficulty   Comments Pt very pleasant and coopperative to participate in therapy session    RLE Assessment   RLE Assessment   (functionally 4/5 )   LLE Assessment   LLE Assessment   (functionally 4/5 )   Bed Mobility   Supine to Sit 6  Modified independent   Additional items HOB elevated; Increased time required   Sit to Supine 6  Modified independent   Additional items HOB elevated; Increased time required;Verbal cues   Additional Comments Pt supine in bed upon arrival  Pt returned to supine in bed with all needs within reach at the end of therapy session    Transfers   Sit to Stand 5  Supervision   Stand to Sit 5  Supervision   Toilet transfer 5  Supervision   Additional items Standard toilet; Increased time required  (+ grab bars )   Additional Comments Transfers without AD    Ambulation/Elevation   Gait pattern Excessively slow; Short stride   Gait Assistance 5  Supervision   Assistive Device None   Distance 2 x 50ft    Balance   Static Sitting Fair +   Dynamic Sitting Fair   Static Standing Fair   Dynamic Standing Fair   Ambulatory Fair   Activity Tolerance   Activity Tolerance Patient tolerated treatment well   Nurse Made Aware RN cleared pt to participate in therapy session    Assessment   Prognosis Good   Assessment Pt seen for PT re-evaluation 2* change in functional status  Pt with active PT eval/treat orders  Pt is a 80 y o  female who was admitted to [facility] on 8/6/2021 with hyponatremia  Pt's active problems include: ovarian cancer, abscess of abdominal cavity, pleural effusion, s/p splenectopy, and acute blood loss anemia, acute respiratory failure, fever  Pt  has a past medical history of Acid reflux, Asthma, Cancer ([address] ), GERD (gastroesophageal reflux disease), Hypercholesteremia, Hypertension, and Migraine  Pt resides with spouse in [facility] with 0 KAROL and was independent prior to hospital admission   Pt currently performing bed mobility tasks at mod I level, STS transfers from EOB without AD at S level, and ambulating household distances without AD at S level  Pt was left supine in bed at the end of PT session with all needs in reach  Pt with no further questions or concerns regarding d/c home; pt with no further acute inpatient PT needs at this time-Please re-consult if needed  PT to d/c pt and recommends home with increased social support  The patient's AM-PAC Basic Mobility Inpatient Short Form Raw Score is 20, Standardized Score is 43 99  A standardized score greater than 42 9 suggests the patient may benefit from discharge to home  Please also refer to the recommendation of the Physical Therapist for safe discharge planning  Barriers to Discharge None   Goals   Patient Goals to go home    Plan   Treatment/Interventions Functional transfer training;LE strengthening/ROM; Endurance training;Patient/family training;Bed mobility;Gait training;Spoke to nursing   PT Frequency   (eval only this date- d/c PT )   Recommendation   PT Discharge Recommendation No rehabilitation needs  (+ increased social support )   Equipment Recommended   (none )   PT - OK to Discharge Yes  (once medically cleared )   AM-PAC Basic Mobility Inpatient   Turning in Bed Without Bedrails 4   Lying on Back to Sitting on Edge of Flat Bed 4   Moving Bed to Chair 3   Standing Up From Chair 3   Walk in Room 3   Climb 3-5 Stairs 3   Basic Mobility Inpatient Raw Score 20   Basic Mobility Standardized Score 43 99           Kusum Nacho, PT, DPT

## 2021-08-29 NOTE — CONSULTS
Consultation - General Surgery   Lucy Cope 80 y o  female MRN: [id number]  Unit/Bed#: [facility] 521-01 Encounter: [id number]    Assessment/Plan     Assessment:  Lucy Cope is a 80 y o  female w/ peritoneal carcinomatosis s/p debulking w/ ex-lap, hysterectomy, BSO, sigmoid resection w/ low rectal anastamosis, splenectomy, appendectomy, omentectomy, SBR and oversewing of pancreatic tail hemorrhage (8/6) c/b pancreatic leak and intra-abdominal abscess s/p IR drainage (8/18) and drain upsizing (8/27) now with increased bloody drainage in/around OSCAR drain  Plan:  · NPO pending ongoing evaluation of hemorrhage  · STAT hgb/coags/type and screen - on chart review patient w/ hgb drop overnight (10>>8 6 and 8 3 on repeat now)  Patient was last transfused on 8/27 with good response at the time  · Follow up STAT CTA Abd/Pelvis - follow up with IR consult as needed  · Hold therapeutic Lovenox - discussed with in-house surgical PA  · Will monitor hgb q6  · Level of care upgraded  · OSCAR drains per primary  · Care per primary team    History of Present Illness     HPI:  Lucy Cope is a 80 y o  female w/ peritoneal carcinomatosis s/p debulking w/ ex-lap, hysterectomy, BSO, sigmoid resection w/ low rectal anastamosis, splenectomy, appendectomy, omentectomy, SBR and oversewing of pancreatic tail hemorrhage (8/6) c/b pancreatic leak and intra-abdominal abscess s/p IR drainage (8/18) and drain upsizing (8/27) now with increased bloody drainage in/around OSCAR drain  She now presents with concern for intra-abdominal hemorrhage  General surgery was requested by nursing to assess Ms Kanu Marin given concern hemorrhage in and around her OSCAR drain  On arrival patient and linens soaked in dark/venous appearing blood  OSCAR bulb with clot and constant ooze appreciated from track  I was unable to appreciate a focal source and upon chart review, patient appears to have had an acute drop in her hemoglobin overnight   I notified RN to contact in-house surgical PA to assist in coordinating care and placed STAT labs  After discussion with primary team, acute care surgery consult placed and STAT CTA abdomen/pelvis discussed to rule out hemoperitoneum and intra-abdominal source  Inpatient consult to Acute Care Surgery  Consult performed by: Sadi Stockton MD  Consult ordered by: W Romayne Stengel Hohenshilt, PA-C          Review of Systems   Constitutional: Negative  HENT: Negative  Eyes: Negative  Respiratory: Negative  Cardiovascular: Negative  Gastrointestinal: Negative for abdominal distention, abdominal pain, anal bleeding, blood in stool, diarrhea, nausea and vomiting  Endocrine: Negative  Genitourinary: Negative  Musculoskeletal: Negative  Skin: Negative  Allergic/Immunologic: Negative  Neurological: Negative  Hematological: Negative  Psychiatric/Behavioral: Negative          Historical Information   Past Medical History:   Diagnosis Date    Acid reflux     Asthma     Cancer ([facility] [address] )     ovarian, peritoneal carcinomatosis    GERD (gastroesophageal reflux disease)     Hypercholesteremia     Hypertension     Migraine      Past Surgical History:   Procedure Laterality Date    APPENDECTOMY N/A 8/6/2021    Procedure: APPENDECTOMY;  Surgeon: Kimberly Rodriguez MD;  [facility];  Service: Gynecology Oncology    CHOLECYSTECTOMY      COLONOSCOPY      FL CYSTOGRAM  8/18/2021    GALLBLADDER SURGERY      HYSTERECTOMY N/A 8/6/2021    Procedure: ENBLOCK HYSTERECTOMY, BILATERAL SALPINGO-OOPHORECTOMY, SIGMOIDECTOMY WITH LOW RECTAL REANASTAMOSIS, BLADDER PERITONEAL STRIPPING AND CYSTOTOMY REPAIR, DIAPHRAGM STRIPPING, SMALL BOWEL RESECTION WITH RE-ANASTAMOSIS;  Surgeon: Kimberly Rodriguez MD;  [facility];  Service: Gynecology Oncology    IR DRAINAGE TUBE CHECK/CHANGE/REPOSITION/REINSERTION/UPSIZE  8/27/2021    IR DRAINAGE TUBE PLACEMENT  8/18/2021    IR THORACENTESIS  8/18/2021    LAPAROTOMY N/A 8/6/2021 Procedure: LAPAROTOMY EXPLORATORY; OVER SEW PANCREAS TAIL;  Surgeon: Mino Villegas MD;  [facility];  Service: Gynecology Oncology    OMENTECTOMY N/A 8/6/2021    Procedure: RADICAL OMENTECTOMY;  Surgeon: Mino Villegas MD;  [facility];  Service: Gynecology Oncology    NC LAP,DIAGNOSTIC ABDOMEN N/A 8/6/2021    Procedure: LAPAROSCOPY DIAGNOSTIC;  Surgeon: Mino Villegas MD;  [facility];  Service: Gynecology Oncology    RIGHT OOPHORECTOMY  1986    SPLENECTOMY, TOTAL N/A 8/6/2021    Procedure: SPLENECTOMY;  Surgeon: Mino Villegas MD;  [facility];  Service: Gynecology Oncology    TONSILLECTOMY       Social History   Social History     Substance and Sexual Activity   Alcohol Use Yes    Comment: socially     Social History     Substance and Sexual Activity   Drug Use Not Currently     E-Cigarette/Vaping    E-Cigarette Use Never User      E-Cigarette/Vaping Substances    Nicotine No     THC No     CBD No     Flavoring No     Other No     Unknown No      Social History     Tobacco Use   Smoking Status Never Smoker   Smokeless Tobacco Never Used     Family History: History reviewed  No pertinent family history      Meds/Allergies   current meds:   Current Facility-Administered Medications   Medication Dose Route Frequency    acetaminophen (TYLENOL) tablet 975 mg  975 mg Oral Q6H PRN    albuterol (PROVENTIL HFA,VENTOLIN HFA) inhaler 2 puff  2 puff Inhalation Q4H PRN    amLODIPine (NORVASC) tablet 10 mg  10 mg Oral Daily    amoxicillin-clavulanate (AUGMENTIN) 875-125 mg per tablet 1 tablet  1 tablet Oral Q12H [address]    aspirin (ECOTRIN LOW STRENGTH) EC tablet 81 mg  81 mg Oral Daily    ciprofloxacin (CIPRO) tablet 500 mg  500 mg Oral Q12H [address]    HYDROmorphone (DILAUDID) injection 0 5 mg  0 5 mg Intravenous Q2H PRN    ibuprofen (MOTRIN) tablet 600 mg  600 mg Oral Q6H PRN    ondansetron (ZOFRAN) injection 4 mg  4 mg Intravenous Q6H PRN    oxyCODONE (ROXICODONE) immediate release tablet 10 mg  10 mg Oral Q4H PRN    oxyCODONE (ROXICODONE) IR tablet 5 mg  5 mg Oral Q4H    pantoprazole (PROTONIX) EC tablet 40 mg  40 mg Oral Early Morning    phenol (CHLORASEPTIC) 1 4 % mucosal liquid 1 spray  1 spray Mouth/Throat Q2H PRN    pneumococcal 13-valent conjugate vaccine (PREVNAR-13) IM injection 0 5 mL  0 5 mL Intramuscular Prior to discharge    potassium-sodium phosphateS (K-PHOS,PHOSPHA 250) -250 mg tablet 1 tablet  1 tablet Oral TID With Meals    pravastatin (PRAVACHOL) tablet 10 mg  10 mg Oral Daily With Dinner    sodium chloride (PF) 0 9 % injection 500 mL  500 mL Intracatheter Once in imaging    [START ON 8/30/2021] sodium chloride 0 9 % infusion  100 mL/hr Intravenous Continuous     Allergies   Allergen Reactions    Erythromycin Nausea Only    Morphine Headache       Objective   First Vitals:   Blood Pressure: 146/72 (08/06/21 0951)  Pulse: 102 (08/06/21 0951)  Temperature: 98 6 °F (37 °C) (08/06/21 0951)  Temp Source: Temporal (08/06/21 0951)  Respirations: 16 (08/06/21 0951)  Height: 4' 11" (149 9 cm) (08/06/21 1016)  Weight - Scale: 77 1 kg (170 lb) (08/06/21 1016)  SpO2: 94 % (08/06/21 0951)    Current Vitals:   Blood Pressure: 125/59 (08/29/21 1403)  Pulse: (!) 118 (08/29/21 1403)  Temperature: (!) 101 8 °F (38 8 °C) (08/29/21 1403)  Temp Source: Oral (08/29/21 1403)  Respirations: 20 (08/29/21 1403)  Height: 4' 11" (149 9 cm) (08/06/21 1016)  Weight - Scale: 77 3 kg (170 lb 4 9 oz) (08/29/21 0600)  SpO2: 91 % (08/29/21 1403)      Intake/Output Summary (Last 24 hours) at 8/29/2021 1421  Last data filed at 8/29/2021 1147  Gross per 24 hour   Intake 250 ml   Output 1655 ml   Net -1405 ml       Invasive Devices     Peripheral Intravenous Line            Peripheral IV 08/26/21 Dorsal (posterior); Left Hand 2 days          Drain            Closed/Suction Drain LLQ 19 Fr  22 days    Closed/Suction Drain Left;Posterior Back Bulb 12 Fr  2 days                Physical Exam  GEN: Nontoxic  CV: warm/well perfused  Lung: normal effort  Ab: Soft, nontender, nondistended  x2 OSCAR drains w/ dark sanguinous drainage and other with milky  Steady sanguinous drainage from OSCAR site  Neuro: A+Ox3, motor and sensation grossly intact    Lab Results:   CBC:   Lab Results   Component Value Date    WBC 8 21 08/29/2021    HGB 8 3 (L) 08/29/2021    HCT 26 2 (L) 08/29/2021    MCV 89 08/29/2021     (H) 08/29/2021    MCH 28 2 08/29/2021    MCHC 31 9 08/29/2021    RDW 17 3 (H) 08/29/2021    MPV 9 1 08/29/2021    NRBC 1 08/29/2021   , CMP:   Lab Results   Component Value Date    SODIUM 132 (L) 08/29/2021    K 3 5 08/29/2021    CL 98 (L) 08/29/2021    CO2 28 08/29/2021    BUN 6 08/29/2021    CREATININE 0 34 (L) 08/29/2021    CALCIUM 8 1 (L) 08/29/2021    AST 61 (H) 08/29/2021    ALT 44 08/29/2021    ALKPHOS 574 (H) 08/29/2021    EGFR 112 08/29/2021   , Coagulation:   Lab Results   Component Value Date    INR 1 19 08/29/2021   , Urinalysis: No results found for: Kimberly Cedar, SPECGRAV, PHUR, LEUKOCYTESUR, NITRITE, PROTEINUA, GLUCOSEU, KETONESU, BILIRUBINUR, BLOODU  Imaging: I have personally reviewed pertinent reports  and I have personally reviewed pertinent films in PACS  CTA abdomen pelvis w wo contrast   Final Result by Jose Francisco Muir MD (08/29 9548)         1  Percutaneous catheter is in the left upper outer  Residual, more diffuse fluid under the diaphragm and along the greater curvature of the stomach  2   Constipation  No evidence of bowel obstruction or colitis  3   No findings to indicate abdominal aortic aneurysm or dissection  No stenosis in the proximal mesenteric or renal arteries  4   Stable moderate to large left pleural effusion and significant compressive atelectasis           Workstation performed: [id number]         IR drainage tube check/change/reposition/reinsertion/upsize   Final Result by Laura Lo MD (08/27 5625)   Impression:       Successful catheter exchange and upsize      Workstation performed: [id number]         XR sinuses routine 3+ views   Final Result by Bertha Arguello MD (08/27 1125)      No evidence of sinusitis  Workstation performed: [id number]         CT chest abdomen pelvis w contrast   Final Result by Haven Mrarero MD (08/26 2156)      Slight interval increase in size of left upper quadrant enhancing collection compared to prior recent study dated 8/20/2021 otherwise no significant interval change  Stable moderate size left pleural effusion with subjacent compressive atelectasis  Stable hepatic lesions again suspicious for metastatic disease  Workstation performed: [id number]         VAS lower limb venous duplex study, complete bilateral   Final Result by Sergio Salcedo MD (08/26 3788)      XR chest portable   Final Result by Yesica Pillai MD (08/23 1002)      Mildly increased small to moderate left pleural effusion  The study was marked in [facility] for immediate notification  Workstation performed: [id number]         CT abdomen pelvis w contrast   Final Result by Yesica Pillai MD (08/20 1542)      1  Decreased size of left upper quadrant gas and fluid containing collection status post locking loop catheter placement  2   Moderate left pleural effusion with new dependent high density and small locule of gas  This is likely related to interval thoracentesis  The effusion demonstrates increased lateral component  Small right effusion is stable  3   Stable tract of gas extending into the presacral space just above the rectal anastomosis  This may be a normal postoperative appearance of an end-to-side anastomosis no walled off leak is not excluded  Correlation with surgical history recommended  4   Liver lesions again seen suspicious for metastasis  Soft tissue along the posterior liver capsule likely represents metastatic implants        The study was marked in EPIC for significant notification  Workstation performed: [id number]         [facility] cystogram   Final Result by Jean Carlos Salcedo MD (08/18 1229)   No contrast extravasation visualized from the bladder  Workstation performed: [id number]         XR chest 1 view   Final Result by Alfonso Strong MD (08/19 2246)      Status post left basilar locking loop catheter placement with stable small left or pleural effusion  No pneumothorax  Workstation performed: [id number]         IR IN-Patient Thoracentesis   Final Result by Wayne Jones MD (08/19 1513)   Impression:   1  Left thoracentesis yielding 230cc serosanguinous pleural fluid removed  Signed, performed and dictated by Jane Ring PA-C under the supervision of Dr Rodolfo Mariee  Workstation performed: [id number]         IR drainage tube placement   Final Result by Wayne Jones MD (81/87 0379)      CT-guided abscess drainage catheter placement  PLAN:      Monitor output  Keep to bag drainage  Flush daily gently with 5 mL saline  Workstation performed: [id number]         XR chest pa & lateral   Final Result by Reyes Jara MD (08/18 0901)      No acute findings  Unchanged bilateral pleural effusions  Workstation performed: [id number]         CT chest abdomen pelvis w contrast   Final Result by Flaquita Peter MD (08/16 2031)      CHEST:   1  Consolidation/atelectasis in the lower lobes greater on the left, decreased from prior  2   Bilateral pleural effusions large on the left and moderate on the right appear similar to slightly decreased  ABDOMEN AND PELVIS:   1   Multiple new small ill-defined hypoattenuating liver lesions suspicious for metastases  2   Mild peritoneal enhancement suggests peritonitis  Percutaneous drainage catheter courses through a loculated left upper quadrant fluid collection measuring 5 1 x 4 3 x 5 6 cm with areas of rim enhancement and foci of gas  Few foci of gas also noted    in the right abdomen anteriorly near the area of small bowel postsurgical changes, while this could be residual postsurgical cannot exclude leak  Recommend continued follow-up  3   Fluid surrounding the pancreatic tail, correlate with serum lipase  4   Some mildly dilated small bowel loops may be related to postoperative ileus  Fecalized stool is seen within distal small bowel loops, correlate for constipation  The study was marked in [facility] for immediate notification  Workstation performed: [id number]         XR chest pa & lateral   Final Result by Spencer De Leon MD (08/13 1241)      Bilateral pleural effusions, small on the right and moderate on the left, decreased in size since 8/10/2021  Atelectasis and/or consolidation in the left lower lobe cannot be excluded  Workstation performed: [id number]         CT chest wo contrast   Final Result by Thomas Ocampo DO (08/11 7212)      Obstruction of the left lower lobe bronchus  Consolidation versus atelectasis in the lung bases  Scattered airspace opacities within the lungs which may represent infection  Recommend short-term follow-up chest scan and 3 months to evaluate for resolution  Bilateral large pleural effusions  Drainage catheter in the left upper quadrant by the fluid and inflammatory changes  Workstation performed: [id number]         XR chest pa & lateral   Final Result by Zora Keller MD (08/11 0051)      Moderate bilateral pleural effusions, new compared to prior chest x-ray  Workstation performed: [id number]         XR chest portable   Final Result by John Maynard MD (08/07 1159)      Moderate left effusion and left base atelectasis  ET tube 4 cm above the valentin                    Workstation performed: [id number]         FL cystogram    (Results Pending)   IR drainage tube check/change/reposition/reinsertion/upsize (Results Pending)

## 2021-08-29 NOTE — PROGRESS NOTES
Progress Note - Infectious Disease   Ruby Pemberton 80 y o  female MRN: [id number]  Unit/Bed#: [facility] 305-01 Encounter: [id number]      Impression/Plan:    1  Abdominal abscess  Patient developed LUQ abscess after complex abdominal surgery and pancreatic leak  8/18, s/p IR drain placement with aspiration of 60cc purulent fluid  Culture is growing Klebsiella oxytoca, Enterococcus faecium, and Bacteroides fragilis  Repeat CT A/P 8/20 showed improvement in the size of the collection  Patient's fevers continued, and repeat CT 8/26 showed increased size of the collection due to catheter occlusion  Taken back to IR 8/27, occluded catheter upsized to 12 Fr with aspiration of 50 ml purulent fluid    -continue Augmentin 875-125mg PO BID and ciprofloxacin 500mg PO BID   -patient had been receiving ciprofloxacin and mag oxide near the same time, which can decrease absorption of ciprofloxacin  Mag oxide stopped, magnesium has been normal  If need to restart, ciprofloxacin should be administered 2 hours before or 6 hours after the Mag   -monitor drain output, should be flushed daily   -monitor fever curve    2  Sepsis-fevers, leukocytosis  Likely secondary to #1 and occlusion of the catheter resulting in poor drainage  Other considerations are due to pancreatic leak, malignancy, as well as septic pelvic thrombophlebitis  Although less common, this can occur after hysterectomy and pelvic surgery  COVID negative                -antibiotics as above              -therapeutic anticoagulation with lovenox for pelvic thrombophlebitis if no contraindications     3  Pancreatic leak  Surgical complication  Abdominal drains in place  Could be causing fevers       4  Ovarian cancer  8/6 status post ex lap, en-bloc hysterectomy, bilateral salpingo-oophorectomy, sigmoidectomy with low rectal anastomosis, SBR, radical omentectomy, splenectomy, appendectomy, oversewing of tail of pancreas, repair of cystotomy, bilateral peritoneal stripping  Surgery was complicated by pancreatic tail hemorrhage and low rectal reanastomosis  Imaging shows liver metastases  -care per Gyn onc     5  Thrombocytosis  Reactive due to splenectomy, which is expected  On aspirin and lovenox  Platelets now improving     6  Status post splenectomy  Received HiB, meningococcal quadrivalent vaccine              -needs PCV13 prior to discharge     I have discussed the above management plan in detail with the primary service  ID will follow  Antibiotics:  Augmentin  Ciprofloxacin     Subjective:  Fever to 101 last night  Fever has been occurring around midnight every night  She has some LUQ pain today, but this is stable  No other new findings; no dental pain, sinus pain, cough, shortness of breath, dysuria, diarrhea  Objective:  Vitals:  Temp:  [98 4 °F (36 9 °C)-101 °F (38 3 °C)] 98 4 °F (36 9 °C)  HR:  [] 100  Resp:  [18-20] 18  BP: (119-145)/(57-66) 145/65  SpO2:  [94 %-98 %] 98 %  Temp (24hrs), Av 3 °F (37 4 °C), Min:98 4 °F (36 9 °C), Max:101 °F (38 3 °C)  Current: Temperature: 98 4 °F (36 9 °C)    Physical Exam:   General Appearance:  Alert, interactive, nontoxic, no acute distress  Throat: Oropharynx moist without lesions  Lungs:   Clear to auscultation bilaterally; no wheezes, rhonchi or rales; respirations unlabored   Heart:  RRR; no murmur, rub or gallop   Abdomen:   Soft, non-tender, non-distended, positive bowel sounds  2 LUQ OSCAR drain with purulent/milky fluid and drain with sanguineous murky fluid   Extremities: No clubbing, cyanosis or edema   Skin: No new rashes or lesions  Abdominal midline scar, no erythema or drainage       Labs:    All pertinent labs and imaging studies were personally reviewed  Results from last 7 days   Lab Units 21  0618 21  0928 21  0545   WBC Thousand/uL 8 21 8 98 9 60   HEMOGLOBIN g/dL 8 6* 10 0* 8 9*   PLATELETS Thousands/uL 767* 854* 843*     Results from last 7 days   Lab Units 08/29/21  0618 08/28/21  0556 08/27/21  0545   SODIUM mmol/L 132* 132* 131*   POTASSIUM mmol/L 3 5 3 8 3 4*   CHLORIDE mmol/L 98* 99* 96*   CO2 mmol/L 28 29 28   BUN mg/dL 6 6 7   CREATININE mg/dL 0 34* 0 36* 0 54*   EGFR ml/min/1 73sq m 112 110 96   CALCIUM mg/dL 8 1* 8 3 8 0*   AST U/L 61*  --   --    ALT U/L 44  --   --    ALK PHOS U/L 574*  --   --                        Micro:  Results from last 7 days   Lab Units 08/23/21  1809 08/23/21  1521 08/23/21  1208   BLOOD CULTURE   --  No Growth After 5 Days  No Growth After 5 Days  --    GRAM STAIN RESULT  No Polys or Bacteria seen  --  Rare Polys  No bacteria seen   WOUND CULTURE   --   --  Growth in Broth culture only Klebsiella oxytoca ESBL*   BODY FLUID CULTURE, STERILE  1+ Growth of Enterococcus faecium*  Few Colonies of Klebsiella oxytoca ESBL*  --   --      LUQ Abscess Cx: Klebsiella oxytoca, Enterococcus faecium     Imaging:    CT A/P 8/26/21:  Slight interval increase in size of left upper quadrant enhancing collection compared to prior recent study dated 8/20/2021 otherwise no significant interval change      Stable moderate size left pleural effusion with subjacent compressive atelectasis      Stable hepatic lesions again suspicious for metastatic disease

## 2021-08-29 NOTE — PROGRESS NOTES
YN-ONC Progress Note  Kaz Hope  [id number]  PPHP 521/PPHP [id number]  8/29/2021  1:46 PM    ASSESS:70 yo female with peritoneal carcinomatosis postop day 22 from debulking and fever, pancreatic enzyme leak and pancreatic abscess that has been drained  Today patient developed bleeding from around mary drain site and mary filling up with blood  CTA of abdomen/pelvis ordered to access where bleeding is coming from  Waiting to hear official read  Labs ordered and H/H is decreased, will monitor in case patient requires blood  Other vital sign stable    PLAN:CTA read pending with possible interventional radiology consulted  Step down unit  NPO  Labs:H/H, INR, T&S, PT/INR          PPx: SCDs, in place  FEN: NPOdiet, replete lytes prn, D5 1/2NS + 20KCL  Pain mgmnt: oxycodone      ____________________      OBJECTIVE  Vitals  Temp:  [98 4 °F (36 9 °C)-101 °F (38 3 °C)] 98 4 °F (36 9 °C)  HR:  [] 100  Resp:  [18-20] 18  BP: (119-145)/(57-66) 145/65       Intake/Output Summary (Last 24 hours) at 8/29/2021 1346  Last data filed at 8/29/2021 1147  Gross per 24 hour   Intake 250 ml   Output 1655 ml   Net -1405 ml       Physical Exam:   General: Appears well, stressed from abdominal bleeding  CV: RRR, no murmurs, carotid and peripheral pulses palpable  Resp: CTAB, nonlabored breathing  Abd: normal bowel sounds, soft, no tenderness, no distention  Incision C/D/I  Bright  Red blood bleeding briskly around mary site, with mary bulb drained twice in past houe  Ext: No edema, nontender  SCDs on  Neuro: no focal findings  Alert and oriented x 3, cooperative, pleasant mood      Labs:   Recent Results (from the past 72 hour(s))   Basic metabolic panel    Collection Time: 08/27/21  5:45 AM   Result Value Ref Range    Sodium 131 (L) 136 - 145 mmol/L    Potassium 3 4 (L) 3 5 - 5 3 mmol/L    Chloride 96 (L) 100 - 108 mmol/L    CO2 28 21 - 32 mmol/L    ANION GAP 7 4 - 13 mmol/L    BUN 7 5 - 25 mg/dL    Creatinine 0 54 (L) 0 60 - 1 30 mg/dL Glucose 139 65 - 140 mg/dL    Calcium 8 0 (L) 8 3 - 10 1 mg/dL    eGFR 96 ml/min/1 73sq m   Phosphorus    Collection Time: 08/27/21  5:45 AM   Result Value Ref Range    Phosphorus 2 4 2 3 - 4 1 mg/dL   CBC and differential    Collection Time: 08/27/21  5:45 AM   Result Value Ref Range    WBC 9 60 4 31 - 10 16 Thousand/uL    RBC 3 13 (L) 3 81 - 5 12 Million/uL    Hemoglobin 8 9 (L) 11 5 - 15 4 g/dL    Hematocrit 27 3 (L) 34 8 - 46 1 %    MCV 87 82 - 98 fL    MCH 28 4 26 8 - 34 3 pg    MCHC 32 6 31 4 - 37 4 g/dL    RDW 17 2 (H) 11 6 - 15 1 %    MPV 9 1 8 9 - 12 7 fL    Platelets 571 (H) 050 - 390 Thousands/uL    nRBC 1 /100 WBCs    Neutrophils Relative 71 43 - 75 %    Immat GRANS % 2 0 - 2 %    Lymphocytes Relative 10 (L) 14 - 44 %    Monocytes Relative 13 (H) 4 - 12 %    Eosinophils Relative 3 0 - 6 %    Basophils Relative 1 0 - 1 %    Neutrophils Absolute 6 95 1 85 - 7 62 Thousands/µL    Immature Grans Absolute 0 21 (H) 0 00 - 0 20 Thousand/uL    Lymphocytes Absolute 0 92 0 60 - 4 47 Thousands/µL    Monocytes Absolute 1 21 0 17 - 1 22 Thousand/µL    Eosinophils Absolute 0 24 0 00 - 0 61 Thousand/µL    Basophils Absolute 0 07 0 00 - 0 10 Thousands/µL   Prepare Leukoreduced RBC: 1 Units    Collection Time: 08/27/21  5:55 AM   Result Value Ref Range    Unit Product Code N0533H96     Unit Number [id number]     Unit ABO A     Unit RH NEG     Crossmatch Compatible     Unit Dispense Status Presumed Trans     Unit Product Volume 350 mL   Basic metabolic panel    Collection Time: 08/28/21  5:56 AM   Result Value Ref Range    Sodium 132 (L) 136 - 145 mmol/L    Potassium 3 8 3 5 - 5 3 mmol/L    Chloride 99 (L) 100 - 108 mmol/L    CO2 29 21 - 32 mmol/L    ANION GAP 4 4 - 13 mmol/L    BUN 6 5 - 25 mg/dL    Creatinine 0 36 (L) 0 60 - 1 30 mg/dL    Glucose 112 65 - 140 mg/dL    Calcium 8 3 8 3 - 10 1 mg/dL    eGFR 110 ml/min/1 73sq m   CBC and differential    Collection Time: 08/28/21  9:28 AM   Result Value Ref Range    WBC 8  98 4 31 - 10 16 Thousand/uL    RBC 3 57 (L) 3 81 - 5 12 Million/uL    Hemoglobin 10 0 (L) 11 5 - 15 4 g/dL    Hematocrit 31 7 (L) 34 8 - 46 1 %    MCV 89 82 - 98 fL    MCH 28 0 26 8 - 34 3 pg    MCHC 31 5 31 4 - 37 4 g/dL    RDW 17 2 (H) 11 6 - 15 1 %    MPV 9 4 8 9 - 12 7 fL    Platelets 054 (H) 144 - 390 Thousands/uL    nRBC 1 /100 WBCs    Neutrophils Relative 69 43 - 75 %    Immat GRANS % 2 0 - 2 %    Lymphocytes Relative 13 (L) 14 - 44 %    Monocytes Relative 11 4 - 12 %    Eosinophils Relative 4 0 - 6 %    Basophils Relative 1 0 - 1 %    Neutrophils Absolute 6 12 1 85 - 7 62 Thousands/µL    Immature Grans Absolute 0 21 (H) 0 00 - 0 20 Thousand/uL    Lymphocytes Absolute 1 19 0 60 - 4 47 Thousands/µL    Monocytes Absolute 1 00 0 17 - 1 22 Thousand/µL    Eosinophils Absolute 0 36 0 00 - 0 61 Thousand/µL    Basophils Absolute 0 10 0 00 - 0 10 Thousands/µL   CBC and differential    Collection Time: 08/29/21  6:18 AM   Result Value Ref Range    WBC 8 21 4 31 - 10 16 Thousand/uL    RBC 3 05 (L) 3 81 - 5 12 Million/uL    Hemoglobin 8 6 (L) 11 5 - 15 4 g/dL    Hematocrit 27 0 (L) 34 8 - 46 1 %    MCV 89 82 - 98 fL    MCH 28 2 26 8 - 34 3 pg    MCHC 31 9 31 4 - 37 4 g/dL    RDW 17 3 (H) 11 6 - 15 1 %    MPV 9 1 8 9 - 12 7 fL    Platelets 764 (H) 475 - 390 Thousands/uL   Comprehensive metabolic panel    Collection Time: 08/29/21  6:18 AM   Result Value Ref Range    Sodium 132 (L) 136 - 145 mmol/L    Potassium 3 5 3 5 - 5 3 mmol/L    Chloride 98 (L) 100 - 108 mmol/L    CO2 28 21 - 32 mmol/L    ANION GAP 6 4 - 13 mmol/L    BUN 6 5 - 25 mg/dL    Creatinine 0 34 (L) 0 60 - 1 30 mg/dL    Glucose 110 65 - 140 mg/dL    Calcium 8 1 (L) 8 3 - 10 1 mg/dL    Corrected Calcium 10 2 (H) 8 3 - 10 1 mg/dL    AST 61 (H) 5 - 45 U/L    ALT 44 12 - 78 U/L    Alkaline Phosphatase 574 (H) 46 - 116 U/L    Total Protein 6 1 (L) 6 4 - 8 2 g/dL    Albumin 1 4 (L) 3 5 - 5 0 g/dL    Total Bilirubin 0 40 0 20 - 1 00 mg/dL    eGFR 112 ml/min/1 73sq m   Manual Differential(PHLEBS Do Not Order)    Collection Time: 08/29/21  6:18 AM   Result Value Ref Range    Segmented % 67 43 - 75 %    Lymphocytes % 13 (L) 14 - 44 %    Monocytes % 10 4 - 12 %    Eosinophils, % 8 (H) 0 - 6 %    Basophils % 2 (H) 0 - 1 %    Absolute Neutrophils 5 50 1 85 - 7 62 Thousand/uL    Lymphocytes Absolute 1 07 0 60 - 4 47 Thousand/uL    Monocytes Absolute 0 82 0 00 - 1 22 Thousand/uL    Eosinophils Absolute 0 66 (H) 0 00 - 0 40 Thousand/uL    Basophils Absolute 0 16 (H) 0 00 - 0 10 Thousand/uL    Total Counted      nRBC 1 0 - 2 /100 WBC    RBC Morphology Present     Anisocytosis Present     Hypochromia Present     Ovalocytes Present     Poikilocytes Present     Polychromasia Present     Target Cells Present     Platelet Estimate Increased (A) Adequate    Artifact Present    Hemoglobin and hematocrit, blood    Collection Time: 08/29/21  1:01 PM   Result Value Ref Range    Hemoglobin 8 3 (L) 11 5 - 15 4 g/dL    Hematocrit 26 2 (L) 34 8 - 46 1 %   Protime-INR    Collection Time: 08/29/21  1:01 PM   Result Value Ref Range    Protime 15 1 (H) 11 6 - 14 5 seconds    INR 1 19 0 84 - 1 19       Meds:  Scheduled Meds:  Current Facility-Administered Medications   Medication Dose Route Frequency Provider Last Rate    acetaminophen  975 mg Oral Q6H PRN Lashaun Headley PA-C      albuterol  2 puff Inhalation Q4H PRN Juan Romero MD      amLODIPine  10 mg Oral Daily Phineas Gitelman, MD      amoxicillin-clavulanate  1 tablet Oral Q12H [address] Ramone Bragg MD      aspirin  81 mg Oral Daily Shi Lamas MD      ciprofloxacin  500 mg Oral Q12H [address] Ramone Bragg MD      enoxaparin  1 mg/kg Subcutaneous Q12H [address] Lynette Brown MD      HYDROmorphone  0 5 mg Intravenous Q2H PRN Orlando Kiser PA-C      ibuprofen  600 mg Oral Q6H PRN Shaheen Vernon MD      ondansetron  4 mg Intravenous Q6H PRN Dory Motta PA-C      oxyCODONE  10 mg Oral Q4H PRN MD Misty Diez oxyCODONE  5 mg Oral Q4H Juan M Phillips MD      pantoprazole  40 mg Oral Early Morning Park Robbins MD      phenol  1 spray Mouth/Throat Q2H PRN Mary Flores PA-C      pneumococcal 13-valent conjugate vaccine  0 5 mL Intramuscular Prior to discharge Mary Flores PA-C      potassium-sodium phosphateS  1 tablet Oral TID With Meals JOSI Veronica      pravastatin  10 mg Oral Daily With [employer] ANA Kiser PA-C      sodium chloride (PF)  500 mL Intracatheter Once in imaging Diandra Starr MD     [facility] ON 8/30/2021] sodium chloride  100 mL/hr Intravenous Continuous Mumtaz Baez MD       Continuous Infusions:[START ON 8/30/2021] sodium chloride, 100 mL/hr      PRN Meds:   acetaminophen    albuterol    HYDROmorphone    ibuprofen    ondansetron    oxyCODONE    phenol    pneumococcal 13-valent conjugate vaccine    sodium chloride (PF)    __________________    Signature / Title:  Brandon Yuen PA-C  Date: 8/29/2021  Time: 1:46 PM

## 2021-08-29 NOTE — PROGRESS NOTES
[address] [facility]  Progress Note - Bebo Beltran 1950, 80 y o  female MRN: [id number]  Unit/Bed#: [facility] 305-01 Encounter: [id number]  Primary Care Provider: Karly Montiel MD   Date and time admitted to hospital: 8/6/2021  9:15 AM    * Hyponatremia  Assessment & Plan  · Multifactorial  · Presently on salt tablets  · Monitor sodium levels closely    Ovarian cancer [facility])  Assessment & Plan  · s/p ex lap, en-bloc hysterectomy, bilateral salpingo-oophorectomy, sigmoidectomy with low rectal anastomosis, SBR, radical omentectomy, splenectomy, appendectomy, oversewing of tail of pancreas, repair of cystotomy, bilateral peritoneal stripping     · Surgery was complicated by pancreatic tail hemorrhage and low rectal reanastomosis   Imaging shows liver metastases  · Management per GYN ONC    Abscess of abdominal cavity (HCC)  Assessment & Plan  · Patient developed left upper quadrant abscess after complex abdominal surgery  · Status post IR drain placement  · Currently 2 drains are in the abscess cavity (LUQ and left back)  · Antibiotics adjusted to oral Augmentin and Cipro per ID  · Due to ongoing fevers, checked CT C/A/P and LEVDs  · LEVDs negative for DVT  · CT C/A/P showed enlarging LUQ fluid collection  · Patient went to IR on 8/27 and was found to have an occluded catheter which was up sized to 12 Fr with aspiration of 50 ml purulent fluid   · Fevers improving   · Repeat blood culture show no growth to date   · Monitor fever curve and CBC with diff  · Infectious Disease following    Pleural effusion  Assessment & Plan  · S/p left sided thoracentesis yielding 230 mL  · Encourage deep breathing and incentive spirometry  · Given Lasix 20 mg IV x2 8/26  · Repeat CT chest shows moderate pleural effusion with compressive atelectasis  · Patient educated on deep breathing and IS    S/P splenectomy  Assessment & Plan  · With reactive thrombocytosis  · Continue aspirin  · Received HiB, meningococcal quadrivalent vaccines  · Will need Prevnar vaccine prior to discharge    Severe protein-calorie malnutrition [facility])  Assessment & Plan  Malnutrition Findings:   Adult Malnutrition type: Acute illness  Adult Degree of Malnutrition: Other severe protein calorie malnutrition (Severe pro/kathie malnutrition r/t CA dx as evidenced by 5% unplanned wt loss since 21, consuming < 50% energy intake compared to estimated energy needs > 5 days  Treated with cl liqs + Ensure Clear 4 xday)    BMI Findings: Body mass index is 34 4 kg/m²  · Encourage oral intake and nutritional supplements     Acute blood loss anemia  Assessment & Plan  · Postoperatively   · Monitor hemoglobin    Asthma  Assessment & Plan  · continue Albuterol PRN          VTE Pharmacologic Prophylaxis: VTE Score: 9 High Risk (Score >/= 5) - Pharmacological DVT Prophylaxis Ordered: enoxaparin (Lovenox)  Sequential Compression Devices Ordered  Patient Centered Rounds: I performed bedside rounds with nursing staff today  Discussions with Specialists or Other Care Team Provider:  Nursing    Education and Discussions with Family / Patient: Discussed with the patient  Time Spent for Care: 30 minutes  More than 50% of total time spent on counseling and coordination of care as described above      Current Length of Stay: 23 day(s)  Current Patient Status: Inpatient   Certification Statement: The patient will continue to require additional inpatient hospital stay due to As outlined  Discharge Plan: Per primary service    Code Status: Level 1 - Full Code    Subjective:     Comfortably in bed  Reports feeling better  Encouraged out of bed and ambulation as able  History chart labs medications reviewed    Objective:     Vitals:   Temp (24hrs), Av 3 °F (37 4 °C), Min:98 4 °F (36 9 °C), Max:101 °F (38 3 °C)    Temp:  [98 4 °F (36 9 °C)-101 °F (38 3 °C)] 98 4 °F (36 9 °C)  HR:  [] 100  Resp:  [18-20] 18  BP: (119-145)/(57-66) 145/65  SpO2: [94 %-98 %] 98 %  Body mass index is 34 4 kg/m²  Input and Output Summary (last 24 hours): Intake/Output Summary (Last 24 hours) at 8/29/2021 1255  Last data filed at 8/29/2021 1147  Gross per 24 hour   Intake 260 ml   Output 1680 ml   Net -1420 ml       Physical Exam:   Physical Exam     Comfortably in bed  Obese  Short thick neck  Lungs diminished breath sounds bilaterally  Heart sounds S1-S2 noted  Abdomen soft  Drains noted  Pulses noted  No rash  Pedal edema noted    Additional Data:     Labs:  Results from last 7 days   Lab Units 08/29/21  0618 08/28/21  0928   WBC Thousand/uL 8 21 8 98   HEMOGLOBIN g/dL 8 6* 10 0*   HEMATOCRIT % 27 0* 31 7*   PLATELETS Thousands/uL 767* 854*   NEUTROS PCT %  --  69   LYMPHS PCT %  --  13*   LYMPHO PCT % 13*  --    MONOS PCT %  --  11   MONO PCT % 10  --    EOS PCT % 8* 4     Results from last 7 days   Lab Units 08/29/21  0618   SODIUM mmol/L 132*   POTASSIUM mmol/L 3 5   CHLORIDE mmol/L 98*   CO2 mmol/L 28   BUN mg/dL 6   CREATININE mg/dL 0 34*   ANION GAP mmol/L 6   CALCIUM mg/dL 8 1*   ALBUMIN g/dL 1 4*   TOTAL BILIRUBIN mg/dL 0 40   ALK PHOS U/L 574*   ALT U/L 44   AST U/L 61*   GLUCOSE RANDOM mg/dL 110                       Lines/Drains:  Invasive Devices     Peripheral Intravenous Line            Peripheral IV 08/26/21 Dorsal (posterior); Left Hand 2 days          Drain            Closed/Suction Drain LLQ 19 Fr  22 days    Closed/Suction Drain Left;Posterior Back Bulb 12 Fr  2 days                      Imaging: Reviewed radiology reports from this admission including: Imaging studies noted    Recent Cultures (last 7 days):   Results from last 7 days   Lab Units 08/23/21  1809 08/23/21  1521 08/23/21  1208   BLOOD CULTURE   --  No Growth After 5 Days  No Growth After 5 Days    --    GRAM STAIN RESULT  No Polys or Bacteria seen  --  Rare Polys  No bacteria seen   WOUND CULTURE   --   --  Growth in Broth culture only Klebsiella oxytoca ESBL*   BODY FLUID CULTURE, STERILE  1+ Growth of Enterococcus faecium*  Few Colonies of Klebsiella oxytoca ESBL*  --   --        Last 24 Hours Medication List:   Current Facility-Administered Medications   Medication Dose Route Frequency Provider Last Rate    acetaminophen  975 mg Oral Q6H PRN Sydni Tillman PA-C      albuterol  2 puff Inhalation Q4H PRN Alysa Kenny MD      amLODIPine  10 mg Oral Daily Su Kanner, MD      amoxicillin-clavulanate  1 tablet Oral Q12H [address] Miller Reyes MD      aspirin  81 mg Oral Daily Sheron Flood MD      ciprofloxacin  500 mg Oral Q12H [address] Miller Reyes MD      enoxaparin  1 mg/kg Subcutaneous Q12H [address] Sera Tse MD      HYDROmorphone  0 5 mg Intravenous Q2H PRN Johnnie Kiser PA-C      ibuprofen  600 mg Oral Q6H PRN Cass Madera MD      magnesium oxide  400 mg Oral BID Bijan Avila MD      ondansetron  4 mg Intravenous Q6H PRN Boone Frey PA-C      oxyCODONE  10 mg Oral Q4H PRN Chris Knight MD      oxyCODONE  5 mg Oral Q4H Sera Tse MD      pantoprazole  40 mg Oral Early Morning Marely Campbell MD      phenol  1 spray Mouth/Throat Q2H PRN Boone Frey PA-C      pneumococcal 13-valent conjugate vaccine  0 5 mL Intramuscular Prior to discharge Boone Frey PA-C      potassium-sodium phosphateS  1 tablet Oral TID With Meals JOSI Soto      pravastatin  10 mg Oral Daily With [employer] Kiser PA-C      sodium chloride (PF)  500 mL Intracatheter Once in imaging Cass Madera MD          Today, Patient Was Seen By: Rey Cheng MD    **Please Note: This note may have been constructed using a voice recognition system  **

## 2021-08-29 NOTE — PROGRESS NOTES
Progress Note - General Surgery   Cami Henriquez 80 y o  female MRN: [id number]  Unit/Bed#: [facility] 305-01 Encounter: [id number]    Assessment:  Patient is a 73-year-old female with peritoneal carcinomatosis postop day 22 from primary debulking post operative course complicated by fever and pancreatic enzyme leak with pancreatic abscess  Abscess is now drained patient is on antibiotics and fever curve is improving  Plan:  One   Status post surgery  Voiding spontaneously and tolerating regular diet  Pain well controlled on present pain regimen  Pancreatic leak with abscess formation collection accessed with 50 cc of purulent drainage removed on 8/27  White blood cell count down to 8 2 fever curve improving  Two  Fever  Likely related to abscess and pancreatic bed  CT scan shows no other sites of infected area  Wound is packed in the lower margin  3  Hyponatremia  Patient is on fluid restriction and salt tablets  This continues to improve  4  Wound seroma  Packing in place continue daily dressing changes    5  Acute blood loss anemia  Stable post surgery     6  Acute respiratory failure  Stable status post thoracentesis  No oxygen requirement  7  Status post splenectomy with thrombocytosis  Platelets continue elevated in the 700 range splenectomy vaccines prior to discharge  Subjective/Objective   Chief Complaint:  Patient has no complaint today    Subjective:  Patient is doing well  She did have a fever to 101 overnight but otherwise is stable  She is tolerating regular diet  Her pain is controlled  She is ambulating and voiding  Blood pressure 145/65, pulse 100, temperature 98 4 °F (36 9 °C), temperature source Oral, resp  rate 18, height 4' 11" (1 499 m), weight 77 3 kg (170 lb 4 9 oz), SpO2 98 %  ,Body mass index is 34 4 kg/m²        Intake/Output Summary (Last 24 hours) at 8/29/2021 1019  Last data filed at 8/29/2021 0945  Gross per 24 hour   Intake 260 ml   Output 1150 ml Net -890 ml       Invasive Devices     Peripheral Intravenous Line            Peripheral IV 08/26/21 Dorsal (posterior); Left Hand 2 days          Drain            Closed/Suction Drain LLQ 19 Fr  22 days    Closed/Suction Drain Left;Posterior Back Bulb 12 Fr  1 day                Physical Exam: /65 (BP Location: Right arm)   Pulse 100   Temp 98 4 °F (36 9 °C) (Oral)   Resp 18   Ht 4' 11" (1 499 m)   Wt 77 3 kg (170 lb 4 9 oz)   SpO2 98%   BMI 34 40 kg/m²     General Appearance:    Alert, cooperative, no distress, appears stated age   Head:    Normocephalic, without obvious abnormality, atraumatic   Eyes:    PERRL, conjunctiva/corneas clear, EOM's intact, fundi     benign, both eyes   Ears:    Normal TM's and external ear canals, both ears   Nose:   Nares normal, septum midline, mucosa normal, no drainage    or sinus tenderness   Throat:   Lips, mucosa, and tongue normal; teeth and gums normal   Neck:   Supple, symmetrical, trachea midline, no adenopathy;     thyroid:  no enlargement/tenderness/nodules; no carotid    bruit or JVD   Back:     Symmetric, no curvature, ROM normal, no CVA tenderness   Lungs:     Clear to auscultation bilaterally, respirations unlabored   Chest Wall:    No tenderness or deformity    Heart:    Regular rate and rhythm, S1 and S2 normal, no murmur, rub   or gallop       Abdomen:     Soft, non-tender, bowel sounds active all four quadrants,     no masses, no organomegaly  Wound intact with the exception of lower margin  Packing removed  No drainage noted  Drain in place             Extremities:   Extremities normal, atraumatic, no cyanosis or edema   Pulses:   2+ and symmetric all extremities   Skin:   Skin color, texture, turgor normal, no rashes or lesions   Lymph nodes:   Cervical, supraclavicular, and axillary nodes normal   Neurologic:   CNII-XII intact, normal strength, sensation and reflexes     throughout       Lab, Imaging and other studies:  CBC:   Lab Results Component Value Date    WBC 8 21 08/29/2021    HGB 8 6 (L) 08/29/2021    HCT 27 0 (L) 08/29/2021    MCV 89 08/29/2021     (H) 08/29/2021    MCH 28 2 08/29/2021    MCHC 31 9 08/29/2021    RDW 17 3 (H) 08/29/2021    MPV 9 1 08/29/2021    NRBC 1 08/29/2021   , CMP:   Lab Results   Component Value Date    SODIUM 132 (L) 08/29/2021    K 3 5 08/29/2021    CL 98 (L) 08/29/2021    CO2 28 08/29/2021    BUN 6 08/29/2021    CREATININE 0 34 (L) 08/29/2021    CALCIUM 8 1 (L) 08/29/2021    AST 61 (H) 08/29/2021    ALT 44 08/29/2021    ALKPHOS 574 (H) 08/29/2021    EGFR 112 08/29/2021     VTE Pharmacologic Prophylaxis: Enoxaparin (Lovenox)  VTE Mechanical Prophylaxis: sequential compression device

## 2021-08-30 LAB
ABO GROUP BLD BPU: NORMAL
ABO GROUP BLD BPU: NORMAL
ALBUMIN SERPL BCP-MCNC: 1.3 G/DL (ref 3.5–5)
ALP SERPL-CCNC: 469 U/L (ref 46–116)
ALT SERPL W P-5'-P-CCNC: 35 U/L (ref 12–78)
ANION GAP SERPL CALCULATED.3IONS-SCNC: 5 MMOL/L (ref 4–13)
AST SERPL W P-5'-P-CCNC: 36 U/L (ref 5–45)
BASOPHILS # BLD AUTO: 0.07 THOUSANDS/ΜL (ref 0–0.1)
BASOPHILS NFR BLD AUTO: 1 % (ref 0–1)
BILIRUB SERPL-MCNC: 0.38 MG/DL (ref 0.2–1)
BPU ID: NORMAL
BPU ID: NORMAL
BUN SERPL-MCNC: 7 MG/DL (ref 5–25)
CALCIUM ALBUM COR SERPL-MCNC: 9.8 MG/DL (ref 8.3–10.1)
CALCIUM SERPL-MCNC: 7.6 MG/DL (ref 8.3–10.1)
CFFMA (FUNCTIONAL FIBRINOGEN MAX AMPLITUDE): >52 MM (ref 15–32)
CHLORIDE SERPL-SCNC: 100 MMOL/L (ref 100–108)
CKA(ANGLE): 82 DEG (ref 63–78)
CKHR(HEPARINASE REACTION TIME): 5.2 MIN (ref 4.3–8.3)
CKK(CLOT KINETICS): 0.8 MIN (ref 0.8–2.1)
CKMA(MAX AMPLITUDE): 69.4 MM (ref 52–69)
CKR(REACTION TIME): 6.7 MIN (ref 4.6–9.1)
CO2 SERPL-SCNC: 28 MMOL/L (ref 21–32)
CREAT SERPL-MCNC: 0.21 MG/DL (ref 0.6–1.3)
CROSSMATCH: NORMAL
CROSSMATCH: NORMAL
CRTMA(RAPIDTEG MAX AMPLITUDE): >75 MM (ref 52–70)
EOSINOPHIL # BLD AUTO: 0.09 THOUSAND/ΜL (ref 0–0.61)
EOSINOPHIL NFR BLD AUTO: 1 % (ref 0–6)
ERYTHROCYTE [DISTWIDTH] IN BLOOD BY AUTOMATED COUNT: 15.7 % (ref 11.6–15.1)
GFR SERPL CREATININE-BSD FRML MDRD: 131 ML/MIN/1.73SQ M
GLUCOSE SERPL-MCNC: 111 MG/DL (ref 65–140)
HCT VFR BLD AUTO: 26.5 % (ref 34.8–46.1)
HCT VFR BLD AUTO: 27.2 % (ref 34.8–46.1)
HGB BLD-MCNC: 8.7 G/DL (ref 11.5–15.4)
HGB BLD-MCNC: 9 G/DL (ref 11.5–15.4)
IMM GRANULOCYTES # BLD AUTO: 0.22 THOUSAND/UL (ref 0–0.2)
IMM GRANULOCYTES NFR BLD AUTO: 2 % (ref 0–2)
LYMPHOCYTES # BLD AUTO: 1.42 THOUSANDS/ΜL (ref 0.6–4.47)
LYMPHOCYTES NFR BLD AUTO: 14 % (ref 14–44)
MCH RBC QN AUTO: 28.8 PG (ref 26.8–34.3)
MCHC RBC AUTO-ENTMCNC: 33.1 G/DL (ref 31.4–37.4)
MCV RBC AUTO: 87 FL (ref 82–98)
MONOCYTES # BLD AUTO: 0.96 THOUSAND/ΜL (ref 0.17–1.22)
MONOCYTES NFR BLD AUTO: 9 % (ref 4–12)
NEUTROPHILS # BLD AUTO: 7.75 THOUSANDS/ΜL (ref 1.85–7.62)
NEUTS SEG NFR BLD AUTO: 73 % (ref 43–75)
NRBC BLD AUTO-RTO: 0 /100 WBCS
PLATELET # BLD AUTO: 606 THOUSANDS/UL (ref 149–390)
PMV BLD AUTO: 9.1 FL (ref 8.9–12.7)
POTASSIUM SERPL-SCNC: 3.4 MMOL/L (ref 3.5–5.3)
PROT SERPL-MCNC: 5.5 G/DL (ref 6.4–8.2)
RBC # BLD AUTO: 3.12 MILLION/UL (ref 3.81–5.12)
SODIUM SERPL-SCNC: 133 MMOL/L (ref 136–145)
UNIT DISPENSE STATUS: NORMAL
UNIT DISPENSE STATUS: NORMAL
UNIT PRODUCT CODE: NORMAL
UNIT PRODUCT CODE: NORMAL
UNIT PRODUCT VOLUME: 350 ML
UNIT PRODUCT VOLUME: 350 ML
UNIT RH: NORMAL
UNIT RH: NORMAL
WBC # BLD AUTO: 10.51 THOUSAND/UL (ref 4.31–10.16)

## 2021-08-30 PROCEDURE — 99233 SBSQ HOSP IP/OBS HIGH 50: CPT | Performed by: STUDENT IN AN ORGANIZED HEALTH CARE EDUCATION/TRAINING PROGRAM

## 2021-08-30 PROCEDURE — 85014 HEMATOCRIT: CPT | Performed by: PHYSICIAN ASSISTANT

## 2021-08-30 PROCEDURE — 36569 INSJ PICC 5 YR+ W/O IMAGING: CPT

## 2021-08-30 PROCEDURE — C1751 CATH, INF, PER/CENT/MIDLINE: HCPCS

## 2021-08-30 PROCEDURE — NC001 PR NO CHARGE: Performed by: NURSE PRACTITIONER

## 2021-08-30 PROCEDURE — 85384 FIBRINOGEN ACTIVITY: CPT | Performed by: PHYSICIAN ASSISTANT

## 2021-08-30 PROCEDURE — 80053 COMPREHEN METABOLIC PANEL: CPT | Performed by: PHYSICIAN ASSISTANT

## 2021-08-30 PROCEDURE — 85025 COMPLETE CBC W/AUTO DIFF WBC: CPT | Performed by: PHYSICIAN ASSISTANT

## 2021-08-30 PROCEDURE — 99233 SBSQ HOSP IP/OBS HIGH 50: CPT | Performed by: EMERGENCY MEDICINE

## 2021-08-30 PROCEDURE — 85576 BLOOD PLATELET AGGREGATION: CPT | Performed by: PHYSICIAN ASSISTANT

## 2021-08-30 PROCEDURE — 99024 POSTOP FOLLOW-UP VISIT: CPT | Performed by: OBSTETRICS & GYNECOLOGY

## 2021-08-30 PROCEDURE — 85347 COAGULATION TIME ACTIVATED: CPT | Performed by: PHYSICIAN ASSISTANT

## 2021-08-30 PROCEDURE — 85397 CLOTTING FUNCT ACTIVITY: CPT | Performed by: PHYSICIAN ASSISTANT

## 2021-08-30 PROCEDURE — NC001 PR NO CHARGE: Performed by: SURGERY

## 2021-08-30 PROCEDURE — 85018 HEMOGLOBIN: CPT | Performed by: PHYSICIAN ASSISTANT

## 2021-08-30 PROCEDURE — 02HV33Z INSERTION OF INFUSION DEVICE INTO SUPERIOR VENA CAVA, PERCUTANEOUS APPROACH: ICD-10-PCS | Performed by: STUDENT IN AN ORGANIZED HEALTH CARE EDUCATION/TRAINING PROGRAM

## 2021-08-30 RX ORDER — HEPARIN SODIUM 5000 [USP'U]/ML
5000 INJECTION, SOLUTION INTRAVENOUS; SUBCUTANEOUS EVERY 8 HOURS SCHEDULED
Status: DISCONTINUED | OUTPATIENT
Start: 2021-08-30 | End: 2021-08-31

## 2021-08-30 RX ORDER — ALBUMIN, HUMAN INJ 5% 5 %
SOLUTION INTRAVENOUS
Status: DISPENSED
Start: 2021-08-30 | End: 2021-08-30

## 2021-08-30 RX ORDER — POTASSIUM CHLORIDE 20 MEQ/1
40 TABLET, EXTENDED RELEASE ORAL ONCE
Status: COMPLETED | OUTPATIENT
Start: 2021-08-30 | End: 2021-08-30

## 2021-08-30 RX ORDER — DOCUSATE SODIUM 100 MG/1
100 CAPSULE, LIQUID FILLED ORAL 2 TIMES DAILY
Status: DISCONTINUED | OUTPATIENT
Start: 2021-08-30 | End: 2021-09-10 | Stop reason: HOSPADM

## 2021-08-30 RX ADMIN — HEPARIN SODIUM 5000 UNITS: 5000 INJECTION INTRAVENOUS; SUBCUTANEOUS at 15:48

## 2021-08-30 RX ADMIN — PANTOPRAZOLE SODIUM 40 MG: 40 TABLET, DELAYED RELEASE ORAL at 05:38

## 2021-08-30 RX ADMIN — ASPIRIN 81 MG: 81 TABLET, COATED ORAL at 08:21

## 2021-08-30 RX ADMIN — DIBASIC SODIUM PHOSPHATE, MONOBASIC POTASSIUM PHOSPHATE AND MONOBASIC SODIUM PHOSPHATE 1 TABLET: 852; 155; 130 TABLET ORAL at 11:27

## 2021-08-30 RX ADMIN — OXYCODONE HYDROCHLORIDE 5 MG: 5 TABLET ORAL at 10:10

## 2021-08-30 RX ADMIN — POTASSIUM CHLORIDE 40 MEQ: 1500 TABLET, EXTENDED RELEASE ORAL at 11:27

## 2021-08-30 RX ADMIN — AMPICILLIN SODIUM 2000 MG: 2 INJECTION, POWDER, FOR SOLUTION INTRAMUSCULAR; INTRAVENOUS at 17:51

## 2021-08-30 RX ADMIN — OXYCODONE HYDROCHLORIDE 5 MG: 5 TABLET ORAL at 05:38

## 2021-08-30 RX ADMIN — DIBASIC SODIUM PHOSPHATE, MONOBASIC POTASSIUM PHOSPHATE AND MONOBASIC SODIUM PHOSPHATE 1 TABLET: 852; 155; 130 TABLET ORAL at 08:21

## 2021-08-30 RX ADMIN — AMOXICILLIN AND CLAVULANATE POTASSIUM 1 TABLET: 875; 125 TABLET, FILM COATED ORAL at 08:22

## 2021-08-30 RX ADMIN — HEPARIN SODIUM 5000 UNITS: 5000 INJECTION INTRAVENOUS; SUBCUTANEOUS at 22:07

## 2021-08-30 RX ADMIN — OXYCODONE HYDROCHLORIDE 5 MG: 5 TABLET ORAL at 22:07

## 2021-08-30 RX ADMIN — PRAVASTATIN SODIUM 10 MG: 10 TABLET ORAL at 17:49

## 2021-08-30 RX ADMIN — OXYCODONE HYDROCHLORIDE 5 MG: 5 TABLET ORAL at 17:49

## 2021-08-30 RX ADMIN — DOCUSATE SODIUM 100 MG: 100 CAPSULE, LIQUID FILLED ORAL at 17:49

## 2021-08-30 RX ADMIN — AMLODIPINE BESYLATE 10 MG: 10 TABLET ORAL at 08:21

## 2021-08-30 RX ADMIN — OXYCODONE HYDROCHLORIDE 5 MG: 5 TABLET ORAL at 14:09

## 2021-08-30 RX ADMIN — OXYCODONE HYDROCHLORIDE 5 MG: 5 TABLET ORAL at 02:40

## 2021-08-30 RX ADMIN — DIBASIC SODIUM PHOSPHATE, MONOBASIC POTASSIUM PHOSPHATE AND MONOBASIC SODIUM PHOSPHATE 1 TABLET: 852; 155; 130 TABLET ORAL at 17:49

## 2021-08-30 RX ADMIN — ERTAPENEM SODIUM 1000 MG: 1 INJECTION, POWDER, LYOPHILIZED, FOR SOLUTION INTRAMUSCULAR; INTRAVENOUS at 11:27

## 2021-08-30 RX ADMIN — CIPROFLOXACIN HYDROCHLORIDE 500 MG: 500 TABLET, FILM COATED ORAL at 08:23

## 2021-08-30 NOTE — TELEMEDICINE
e-Consult (IPC)  - Interventional Radiology  Massimo Alejandro 80 y o  female MRN: [id number]  Unit/Bed#: [facility] 930-01 Encounter: [id number]    IR has been consulted to evaluate the patient, determine the appropriate procedure, and whether or not a procedure can and should be performed regarding the care of Massimo Alejandro  We were consulted by OBGYN concerning fluid collection, and to possibly perform a aspiration if medically appropriate for the patient  IP Consult to IR  Consult performed by: JOSI Amanda  Consult ordered by: Archana Mo MD        08/30/21      Assessment/Recommendation:     80year old female known to IR, now with a fluid collection quesitonable for seroma, midline to her abdominal incision measuring 5 2 x 2 8 x 2 cm  This is amenable to aspiration by ultrasound  - does not need to be NPO  - plan for aspiration of fluid under ultrasound guidance  - will send fluid for culture        Total time spent in review of data, discussion with requesting provider and rendering advice was 25 minutes  Patient or appropriate family member was verbally informed by OBGYN of this consultative service on their behalf to provide more timely access to specialty care in lieu of an in person consultation  Verbal consent was obtained  Thank you for allowing Interventional Radiology to participate in the care of Massimo Alejandro  Please don't hesitate to call or TigerText us with any questions       [address] [place] [address] [facility] [place] [facility]

## 2021-08-30 NOTE — PROGRESS NOTES
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  Post-operative course complicated by continued febrile episodes and most recently with bloody output from OSCAR drain requiring 1u pRBC  Last fever 101 8F at 1403 on 8/29/2021       Plan:  S/p surgery  - Spontaneously voiding  - NPO overnight due to concern for acute bleeding  - Continue IS/encourage ambulation   - Pain controlled on current regimen:   Tyra 5 mg Q4h scheduled     Tylenol 975mg Q6h PRN (mild)   Tyra 10mg Q4h PRN (moderate, severe)   Motrin 600mg Q6h PRN (fever)   Dilaudid 0 5 IV Q2h PRN (breakthrough)    Bloody drain output  - Acute drop in Hgb yesterday from 10 --> 8 6 --> 8 3 --> 7 0 --> 2u pRBC --> 9 0  - F[place]ad [place] blood noted in OSCAR drain  - CTA with no evidence active extravasation    Pancreatic leak with abscess formation   - Collection slightly increased in size on CT done 8/26/21 (compared to 8/20/21)  - WBC 10 51 this AM, continue daily trend  - Maintain both drains for now, routine drain care    - Back drain: approximately 35cc over 12 hours   - LLQ drain: approximately 15 cc over 12 hours  - Repeat blood cultures (8/23) show no growth at 72h  - Aerobic and anaerobic wound (8/23) culture without growth   - Drain culture +klebsiella  - On PO augmentin and ciprofloxacin  - Continues to febrile, last fever less than 24h ago    Fever of unknown origin  - Pancreatic leak with abscess formation slightly increased in size on CT completed 8/26 (compared to 8/20)  - Repeat blood cultures (8/23) show no growth at 72h  - Aerobic and anaerobic wound (8/23) culture without growth   - Drain culture +klebsiella  - Was switched to PO augmentin and ciprofloxacin yesterday  - LE dopplers completed 8/26 with no evidence of DVT  - Therapeutic Lovenox discontinued due to acute bleed yesterday    Hyponatremia  - 133 this AM  - On NS 100cc/hr  - TSH WNL  - Mag WNL   - Appreciate SLIM recommendations    Wound seroma   - Packing in place   - Wound care consulted, appreciate recommendations  - Plan for daily dressing changes, will do later today    Acute respiratory failure   -  Has not required supplemental O2 for several days  - With persistent pleural effusion   - S/p CXR 8/20 with mildly increased small to moderate left pleural effusion  - CT scan 8/26/21 shows that pleural effusion remains stable in size, with adjacent compressive atelectasis  - Continue to monitor closely    S/p splenectomy with thrombocytosis   - Plt continues to downtrend  - Continue to trend   - Continue ASA   - Splenectomy vaccines prior to discharge     High grade serous ovarian carcinoma   - Final path resulted  - Pt discussed at tumor board on 8/20, plan is for eventual systemic therapy, genetic and genomic testing    HTN/HLD  Continue home meds amlodipine and pravastatin    Disposition: remain inpatient for ongoing treatment     Subjective:  Grace Finney is doing well  She was NPO overnight She is ambulating, voiding and having BM  She denies chest pain, SOB, nausea, vomiting  She has no new complaints  Review of Systems   Constitutional: Negative for appetite change  Respiratory: Negative for chest tightness and shortness of breath  Cardiovascular: Negative for chest pain  Gastrointestinal: Negative for abdominal pain, nausea and vomiting  Genitourinary: Negative for dysuria  Neurological: Negative for light-headedness and headaches  Psychiatric/Behavioral: Negative for confusion  All other systems reviewed and are negative        Objective:   /58   Pulse (!) 108   Temp 98 1 °F (36 7 °C)   Resp 18   Ht 4' 11" (1 499 m)   Wt 77 3 kg (170 lb 4 9 oz)   SpO2 93%   BMI 34 40 kg/m² I/O last 3 completed shifts: In: 18 [P O :540; NG/GT:30]  Out: 2785 [Urine:2650; Drains:135]  I/O this shift:  In: 640 [Blood:620; NG/GT:20]  Out: 50 [Drains:50]     I/O       08/22 0701 - 08/23 0700 08/23 0701 - 08/24 0700    P  O  180     I V  (mL/kg)  30 (0 4)    NG/GT  0    IV Piggyback  100    Total Intake(mL/kg) 180 (2 3) 130 (1 7)    Urine (mL/kg/hr) 1200 (0 6) 700 (0 4)    Drains 25 25    Stool  0    Total Output 1225 725    Net -1045 -595          Unmeasured Stool Occurrence  1 x          Lab Results   Component Value Date    WBC 10 51 (H) 08/30/2021    HGB 9 0 (L) 08/30/2021    HCT 27 2 (L) 08/30/2021    MCV 87 08/30/2021     (H) 08/30/2021       Lab Results   Component Value Date    GLUCOSE 195 (H) 08/06/2021    CALCIUM 7 6 (L) 08/30/2021    K 3 4 (L) 08/30/2021    CO2 28 08/30/2021     08/30/2021    BUN 7 08/30/2021    CREATININE 0 21 (L) 08/30/2021       Lab Results   Component Value Date/Time    POCGLU 154 (H) 08/19/2021 10:23 AM    POCGLU 161 (H) 08/19/2021 06:58 AM    POCGLU 158 (H) 08/18/2021 09:09 PM    POCGLU 171 (H) 08/18/2021 04:33 PM    POCGLU 92 08/18/2021 12:10 PM       Physical Exam  Constitutional:       General: She is not in acute distress  Appearance: She is obese  She is not ill-appearing or diaphoretic  HENT:      Head: Normocephalic and atraumatic  Eyes:      Conjunctiva/sclera: Conjunctivae normal       Pupils: Pupils are equal, round, and reactive to light  Cardiovascular:      Rate and Rhythm: Normal rate and regular rhythm  Pulses: Normal pulses  Heart sounds: Normal heart sounds  Pulmonary:      Effort: Pulmonary effort is normal  No respiratory distress  Breath sounds: Normal breath sounds  Abdominal:      General: Abdomen is flat  Bowel sounds are normal  There is no distension  Tenderness: There is no abdominal tenderness  There is no guarding        Comments: Abdominal dressing C/D/I    LLQ OSCAR drain in place, recently emptied, minimal bloody fluid noted    Posterior Left lower back drain in place, recently emptied, minimal bloody fluid noted     Musculoskeletal:         General: Normal range of motion  Cervical back: Normal range of motion  Skin:     General: Skin is warm and dry  Capillary Refill: Capillary refill takes less than 2 seconds  Neurological:      General: No focal deficit present  Mental Status: She is alert and oriented to person, place, and time  Mental status is at baseline     Psychiatric:         Mood and Affect: Mood normal          Behavior: Behavior normal          Phillip Lorenzo MD   PGY-4, GYN ONC  8/30/2021 6:32 AM

## 2021-08-30 NOTE — UTILIZATION REVIEW
Continued Stay Review    Date:8/30/2021                          Current Patient Class:  Inpatient   Current Level of Care:  Critical care     HPI:71 y o  female initially admitted on 8/6/2021 fr ablation of malignant neoplasm with a goal of debulking - crytoreduction, pt with pancreatic leak and intra-abdominal abscess  S/p IR drainage 8/18 and drain upsizing 8/27 now with increasing bloody drainage in  And around OSCAR drain  ( improved)   Assessment/Plan: 8/30/2021 - POD 23  -  No acute events overnight, she responded to 2 unit of PRBC's was Hg 7 0 on 8/09 to 9 0 today  Resume diet as tolerated, Continue to trend Hg  Last fever 101 8 at 1403 on 8/29, switched back to IV antibiotics Ampicillin and Ertapenem  She remains on NSS @ 100 ml/hr  LLQ OSCAR drain in place,  Posterior left lower back drain in place  Abdominal dressing CDI  Pt states that she feels great and would like to eat today   Pain well managed, she was able to urinate and have a bowel movement this AM         Vital Signs:   Date/Time  Temp  Pulse  Resp  BP  MAP (mmHg)  SpO2  O2 Device  Patient Position - Orthostatic VS   08/30/21 1233  --  92  17  103/57  70  92 %  --  --   08/30/21 1133  --  102  26Abnormal   114/58  74  94 %  --  --   08/30/21 1022  --  98  18  111/46Abnormal   62  93 %  --  --   08/30/21 0922  --  104  29Abnormal   105/60  74  94 %  --  --   08/30/21 0822  --  98  23Abnormal   110/53  74  93 %  --  --   08/30/21 0800  98 2 °F (36 8 °C)  104  --  --  --  94 %  None (Room air)  Lying   08/30/21 0722  --  98  20  119/52  66  93 %  --  --   08/30/21 0600  --  108Abnormal   18  124/58  91  --  --  --   08/30/21 0500  --  98  19  112/52  63  93 %  --  --   08/30/21 0400  98 1 °F (36 7 °C)  94  18  98/50  60  91 %  --  --   08/30/21 0330  --  100  18  121/45Abnormal   --  93 %  --  --   08/30/21 0300  --  100  19  118/57  71  94 %  --  --   08/30/21 0200  --  104  24Abnormal   111/59  74  95 %  --  --   08/30/21 0100  --  90  20 104/56  74  93 %  --  --   08/30/21 0000  --  88  16  115/54  68  93 %  --  --   08/29/21 2330  --  96  21  100/61  76  94 %  --  --   08/29/21 2319  97 9 °F (36 6 °C)  97  17  110/69  --  --  --  --   08/29/21 2300  --  90  15  107/55  66  96 %  --  --   08/29/21 2215  --  --  --  --  --  --  None (Room air)  --   08/29/21 2139  97 9 °F (36 6 °C)  90  14  105/53  75  96 %  --  --         Pertinent Labs/Diagnostic Results:   Results from last 7 days   Lab Units 08/24/21  1030   SARS-COV-2  Negative     Results from last 7 days   Lab Units 08/30/21  1130 08/30/21  0418 08/29/21  1808 08/29/21  1301 08/29/21  0618 08/28/21  0928 08/27/21  0545   WBC Thousand/uL  --  10 51*  --   --  8 21 8 98 9 60   HEMOGLOBIN g/dL 8 7* 9 0* 7 0* 8 3* 8 6* 10 0* 8 9*   HEMATOCRIT % 26 5* 27 2* 22 1* 26 2* 27 0* 31 7* 27 3*   PLATELETS Thousands/uL  --  606*  --   --  767* 854* 843*   NEUTROS ABS Thousands/µL  --  7 75*  --   --   --  6 12 6 95         Results from last 7 days   Lab Units 08/30/21  0350 08/29/21  0618 08/28/21  0556 08/27/21  0545 08/26/21  0456   SODIUM mmol/L 133* 132* 132* 131* 127*   POTASSIUM mmol/L 3 4* 3 5 3 8 3 4* 3 5   CHLORIDE mmol/L 100 98* 99* 96* 96*   CO2 mmol/L 28 28 29 28 27   ANION GAP mmol/L 5 6 4 7 4   BUN mg/dL 7 6 6 7 5   CREATININE mg/dL 0 21* 0 34* 0 36* 0 54* 0 32*   EGFR ml/min/1 73sq m 131 112 110 96 114   CALCIUM mg/dL 7 6* 8 1* 8 3 8 0* 7 6*   MAGNESIUM mg/dL  --   --   --   --  1 7   PHOSPHORUS mg/dL  --   --   --  2 4 2 1*     Results from last 7 days   Lab Units 08/30/21  0350 08/29/21  0618   AST U/L 36 61*   ALT U/L 35 44   ALK PHOS U/L 469* 574*   TOTAL PROTEIN g/dL 5 5* 6 1*   ALBUMIN g/dL 1 3* 1 4*   TOTAL BILIRUBIN mg/dL 0 38 0 40         Results from last 7 days   Lab Units 08/30/21  0350 08/29/21  0618 08/28/21  0556 08/27/21  0545 08/26/21  0456 08/25/21  1409 08/24/21  1212   GLUCOSE RANDOM mg/dL 111 110 112 139 130 134 109     Results from last 7 days   Lab Units 08/25/21  0621 OSMOLALITY, SERUM mmol/*       Results from last 7 days   Lab Units 08/29/21  1301   PROTIME seconds 15 1*   INR  1 19     Results from last 7 days   Lab Units 08/26/21  0456   TSH 3RD GENERATON uIU/mL 1 870       Results from last 7 days   Lab Units 08/25/21  2148   SODIUM UR  97       Results from last 7 days   Lab Units 08/23/21  1809 08/23/21  1521   BLOOD CULTURE   --  No Growth After 5 Days  No Growth After 5 Days  GRAM STAIN RESULT  No Polys or Bacteria seen  --    BODY FLUID CULTURE, STERILE  1+ Growth of Enterococcus faecium*  Few Colonies of Klebsiella oxytoca ESBL*  --            Medications:   Scheduled Medications:  albumin human, , ,   amLODIPine, 10 mg, Oral, Daily  ampicillin, 2,000 mg, Intravenous, Q6H  aspirin, 81 mg, Oral, Daily  docusate sodium, 100 mg, Oral, BID  ertapenem, 1,000 mg, Intravenous, Q24H  oxyCODONE, 5 mg, Oral, Q4H  pantoprazole, 40 mg, Oral, Early Morning  potassium-sodium phosphateS, 1 tablet, Oral, TID With Meals  pravastatin, 10 mg, Oral, Daily With Dinner      Continuous IV Infusions:  sodium chloride, 100 mL/hr, Intravenous, Continuous      PRN Meds:  acetaminophen, 975 mg, Oral, Q6H PRN  albuterol, 2 puff, Inhalation, Q4H PRN  HYDROmorphone, 0 5 mg, Intravenous, Q2H PRN  ibuprofen, 600 mg, Oral, Q6H PRN  ondansetron, 4 mg, Intravenous, Q6H PRN  oxyCODONE, 10 mg, Oral, Q4H PRN  phenol, 1 spray, Mouth/Throat, Q2H PRN  pneumococcal 13-valent conjugate vaccine, 0 5 mL, Intramuscular, Prior to discharge  sodium chloride (PF), 500 mL, Intracatheter, Once in imaging        Discharge Plan:  TBD     Network Utilization Review Department  ATTENTION: Please call with any questions or concerns to [phone] and carefully listen to the prompts so that you are directed to the right person   All voicemails are confidential   Faheem Noe all requests for admission clinical reviews, approved or denied determinations and any other requests to dedicated fax number below belonging to the [facility] where the patient is receiving treatment   List of dedicated fax numbers for the Facilities:  [address] [place] [address] DENIALS (Administrative/Medical Necessity) [phone]   [address] (Maternity/NICU/Pediatrics) [phone] [address] Erwin Mike 1854 [address] [place] [address] [address] [facility] [place] [address] [phone]

## 2021-08-30 NOTE — OCCUPATIONAL THERAPY NOTE
OT SCREEN    Pt chart reviewed  Pt evaluated by OT 2x this admission  Pt discharged from OT services and recommended for Home w/ Home therapy  Spoke w/ pt who reported she still feels comfortable returning home; has been getting OOB, completing her self care and ambulating w/ S and requires no further DME or skilled acute OT at this time  Pt has no concerns or questions regarding D/C home once medically stable  No further immediate OT needs identified @ this time  Will D/C OT orders  Thanks          08/30/21 4155   Note Type   Cancel Reasons Other       Selene Durant MS, OTR/L

## 2021-08-30 NOTE — PROGRESS NOTES
ICU acceptance NOTE  - Grant [id number] 80 y o  female MRN: [id number]  Unit/Bed#: MICU 07 Encounter: [id number]        ----------------------------------------------------------------------------------------  HPI/24hr events:   Patient is a 80-year-old female with peritoneal carcinomatosis status post debulking with ex lap hysterectomy BSO sigmoid resection with low total rectal anastomosis splenectomy appendectomy omentectomy, SBR and over-sewing of pancreatic tail hemorrhage on 08/06  Status post pancreatic leak and intra-abdominal abscess status post IR drainage 818 and drain upsizing 827 was transferred to the ICU increased bloody drainage in and around OSCRA drain  General surgery requested ICU bed for surgical critical care with acute drop in hemoglobin a stat CTA abdomen and pelvis shows a percutaneous catheter in the left upper with more diffuse fluid under the diaphragm and along the great curvature of the stomach few punctate foci of gas within the fluid  No active extravasation  Patient received 1 unit of blood for hemoglobin of 7  Admitted to ICU for closer monitoring      ---------------------------------------------------------------------------------------  SUBJECTIVE  Patient has no complaint    Review of Systems  Review of systems was reviewed and negative unless stated above in HPI/24-hour events   ---------------------------------------------------------------------------------------  Assessment and Plan:    Neuro:  No issues  · Trend neuro checks     · Sleep/wake cycle regulation as able   · Melatonin 6mg qHS  · CAM-ICU BID  · Tylenol p r n  Oxycodone p r n  Ibuprofen p r n  CV:  Hypertension hypercholesteremia   Continue statin   Hold blood pressure medication at this time   Maintain MAP >65    Pulm:  Pleural effusion, asthma   Maintain SpO2 >92%   Continue albuterol p r n     Status post left-sided thoracentesis yielding 230 mils   Repeat CT of chest shows a moderate pleural effusion with compressive atelectasis    Continue pulmonary hygiene  Incentive spirometer q1h while awake, encourage coughing and deep breathing  Upright positioning      GI:  s/p debulking w/ ex-lap, hysterectomy, BSO, sigmoid resection w/ low rectal anastamosis, splenectomy, appendectomy, omentectomy, SBR and oversewing of pancreatic tail hemorrhage (8/6) c/b pancreatic leak and intra-abdominal abscess s/p IR drainage (8/18) and drain upsizing (8/27) now with increased bloody drainage in/around OSCAR drain  Severe protein malnutrition  Abscess of abdominal cavity      NPO for now   CTA of the abdomen pelvis shows no active bleeding   Hold Lovenox   Encourage oral intake   Continue to monitor OSCAR drain   Status post IR drain placement currently has 2 drains   Continue antibiotics   Stress ulcer prophylaxis: Pantoprazole IV    Bowel regimen:  None currently      :  Hyperatremia   Continue to monitor   Baseline creatinine: 0 4   Strict q 2 h I/O monitoring   Monitor Creatine / BUN           F/E/N:    Replete electrolytes with as needed to maintain K >4 0, Mag >2 0, Phos >3 0   Nutrition:  npo   Maintenance fluids :  None          Heme/Onc:  Ovarian cancer acute blood loss anemia   ·s/p ex lap, en-bloc hysterectomy, bilateral salpingo-oophorectomy, sigmoidectomy with low rectal anastomosis, SBR, radical omentectomy, splenectomy, appendectomy, oversewing of tail of pancreas, repair of cystotomy, bilateral peritoneal stripping      Surgery was complicated by pancreatic tail hemorrhage and low rectal reanastomosis   Imaging shows liver metastases   Management per GYN ONC   Transfuse for hemoglobin <7 0 - received 1 unit of RBCs for hemoglobin of 7 after the hemorrhage from OSCAR drains no active extravasation on CT scan   VTE prophylaxis:  Hold Lovenox SCD's to BLE    Endo:  No issues   Adjust insulin regimen as needed to maintain goal -180  Lab Results   Component Value Date    HGBA1C 5 9 (H) 2021     ID:  Abscess of abdominal cavity   Status post drain placements x2 by IR   Infectious disease is following   All Augmentin and Cipro per ID   CTA of the chest abdomen and pelvis shows enlarging left upper quadrant fluid:   Fevers are improving    Monitor WBC / fever curve       MSK/Skin:    Reposition q2h, eliminate pressure points while in bed   Close skin surveillance     Disposition: Admit to Critical Care   Code Status: Level 1 - Full Code  ---------------------------------------------------------------------------------------  ICU CORE MEASURES    Prophylaxis   VTE Pharmacologic Prophylaxis: Pharmacologic VTE Prophylaxis contraindicated due to Bleeding  VTE Mechanical Prophylaxis: sequential compression device  Stress Ulcer Prophylaxis: Pantoprazole IV     ABCDE Protocol (if indicated)  Plan to perform spontaneous awakening trial today? Not applicable  Plan to perform spontaneous breathing trial today? Not applicable  Obvious barriers to extubation? Not applicable  CAM-ICU: Negative    Invasive Devices Review  Invasive Devices     Peripheral Intravenous Line            Peripheral IV 21 Dorsal (posterior); Left Hand 3 days          Drain            Closed/Suction Drain LLQ 19 Fr  23 days    Closed/Suction Drain Left;Posterior Back Bulb 12 Fr  2 days              Can any invasive devices be discontinued today? Not applicable  ---------------------------------------------------------------------------------------  OBJECTIVE    Vitals   Vitals:    21 2330 21 0000 21 0100 21 0200   BP: 100/61 115/54 104/56 111/59   BP Location:       Pulse: 96 88 90 104   Resp:  16 20 (!) 24   Temp:       TempSrc:       SpO2: 94% 93% 93% 95%   Weight:       Height:         Temp (24hrs), Av 7 °F (37 1 °C), Min:97 5 °F (36 4 °C), Max:101 8 °F (38 8 °C)  Current: Temperature: 97 9 °F (36 6 °C)    Respiratory:  SpO2: SpO2: 94 %, SpO2 Activity: SpO2 Activity:  At Rest, SpO2 Device: O2 Device: None (Room air)  Nasal Cannula O2 Flow Rate (L/min): 2 L/min    Invasive/non-invasive ventilation settings   Respiratory    Lab Data (Last 4 hours)    None         O2/Vent Data (Last 4 hours)    None                Physical Exam  Vitals and nursing note reviewed  Constitutional:       General: She is not in acute distress  Appearance: She is well-developed and overweight  She is ill-appearing  She is not diaphoretic  Interventions: Nasal cannula in place  HENT:      Head: Normocephalic and atraumatic  Mouth/Throat:      Pharynx: No oropharyngeal exudate  Eyes:      Conjunctiva/sclera: Conjunctivae normal       Pupils: Pupils are equal, round, and reactive to light  Neck:      Vascular: No JVD  Trachea: No tracheal deviation  Cardiovascular:      Rate and Rhythm: Normal rate and regular rhythm  Pulses: Normal pulses  Heart sounds: Normal heart sounds  No murmur heard  No friction rub  No gallop  Pulmonary:      Effort: Pulmonary effort is normal  No respiratory distress  Breath sounds: Normal breath sounds  No wheezing or rales  Chest:      Chest wall: No tenderness  Abdominal:      General: Bowel sounds are normal  There is distension  Palpations: Abdomen is soft  Tenderness: There is abdominal tenderness  Comments: 2 OSCAR drains  Incision clean dry and intact   Musculoskeletal:         General: No tenderness or deformity  Normal range of motion  Cervical back: Normal range of motion and neck supple  Right lower leg: Edema present  Left lower leg: Edema present  Skin:     General: Skin is warm and dry  Capillary Refill: Capillary refill takes less than 2 seconds  Coloration: Skin is pale  Findings: No erythema  Neurological:      Mental Status: She is alert and oriented to person, place, and time           Laboratory and Diagnostics:  Results from last 7 days   Lab Units 08/29/21  8830 08/29/21  1301 08/29/21  0618 08/28/21  0784 08/27/21  0545 08/26/21  0455 08/25/21  1409 08/24/21  1212 08/23/21  0443   WBC Thousand/uL  --   --  8 21 8 98 9 60 10 14 11 97* 10 27* 17 74*   HEMOGLOBIN g/dL 7 0* 8 3* 8 6* 10 0* 8 9* 7 0* 7 2* 7 7* 8 1*   HEMATOCRIT % 22 1* 26 2* 27 0* 31 7* 27 3* 22 7* 22 8* 24 0* 25 8*   PLATELETS Thousands/uL  --   --  767* 854* 843* 886* 936* 1,032* 1,183*   NEUTROS PCT %  --   --   --  69 71 64 70 71 73   MONOS PCT %  --   --   --  11 13* 15* 14* 14* 11   MONO PCT %  --   --  10  --   --   --   --   --   --      Results from last 7 days   Lab Units 08/29/21  0618 08/28/21  0556 08/27/21  0545 08/26/21  0456 08/25/21  1409 08/24/21  1212 08/23/21  0443   SODIUM mmol/L 132* 132* 131* 127* 127* 129* 128*   POTASSIUM mmol/L 3 5 3 8 3 4* 3 5 3 4* 3 4* 3 4*   CHLORIDE mmol/L 98* 99* 96* 96* 94* 96* 95*   CO2 mmol/L 28 29 28 27 27 27 29   ANION GAP mmol/L 6 4 7 4 6 6 4   BUN mg/dL 6 6 7 5 6 7 7   CREATININE mg/dL 0 34* 0 36* 0 54* 0 32* 0 36* 0 34* 0 43*   CALCIUM mg/dL 8 1* 8 3 8 0* 7 6* 7 4* 7 9* 8 3   GLUCOSE RANDOM mg/dL 110 112 139 130 134 109 114   ALT U/L 44  --   --   --   --   --   --    AST U/L 61*  --   --   --   --   --   --    ALK PHOS U/L 574*  --   --   --   --   --   --    ALBUMIN g/dL 1 4*  --   --   --   --   --   --    TOTAL BILIRUBIN mg/dL 0 40  --   --   --   --   --   --      Results from last 7 days   Lab Units 08/27/21  0545 08/26/21  0456   MAGNESIUM mg/dL  --  1 7   PHOSPHORUS mg/dL 2 4 2 1*      Results from last 7 days   Lab Units 08/29/21  1301   INR  1 19              ABG:    VBG:          Micro  Results from last 7 days   Lab Units 08/23/21  1809 08/23/21  1521 08/23/21  1208   BLOOD CULTURE   --  No Growth After 5 Days  No Growth After 5 Days    --    GRAM STAIN RESULT  No Polys or Bacteria seen  --  Rare Polys  No bacteria seen   WOUND CULTURE   --   --  Growth in Broth culture only Klebsiella oxytoca ESBL*   BODY FLUID CULTURE, STERILE  1+ Growth of Enterococcus faecium*  Few Colonies of Klebsiella oxytoca ESBL*  --   --        EKG: normal EKG, normal sinus rhythm  Imaging:  I have personally reviewed pertinent reports  Intake and Output  I/O       08/28 0701 - 08/29 0700 08/29 0701 - 08/30 0700    P  O  300 240    Blood  320    NG/GT 30 0    Total Intake(mL/kg) 330 (4 3) 560 (7 3)    Urine (mL/kg/hr) 1950 (1 1) 700 (0 4)    Drains 90 45    Stool 0     Total Output 2040 745    Net -1710 -185          Unmeasured Urine Occurrence  1 x    Unmeasured Stool Occurrence 2 x         UOP: 50 ml/hr     Height and Weights   Height: 4' 11" (149 9 cm)  IBW (Ideal Body Weight): 43 2 kg  Body mass index is 34 4 kg/m²  Weight (last 2 days)     Date/Time   Weight    08/29/21 0600   77 3 (170 31)    08/28/21 0600   88 (194 01)                Nutrition       Diet Orders   (From admission, onward)             Start     Ordered    08/29/21 1305  Diet NPO; Sips with meds  Diet effective now     Question Answer Comment   Diet Type NPO    NPO Except: Sips with meds    RD to adjust diet per protocol?  No        08/29/21 1304    08/26/21 1432  Dietary nutrition supplements  Once     Question Answer Comment   Select Supplement: Ensure Compact-Chocolate    Frequency Dinner        08/26/21 1432    08/26/21 1432  Dietary nutrition supplements  Once     Question Answer Comment   Select Supplement: Ensure Compact-Vanilla    Frequency Lunch        08/26/21 1432                      Active Medications  Scheduled Meds:  Current Facility-Administered Medications   Medication Dose Route Frequency Provider Last Rate    acetaminophen  975 mg Oral Q6H PRN Leonela Puente MD      albuterol  2 puff Inhalation Q4H PRN Leonela Puente MD      amLODIPine  10 mg Oral Daily Leonela Puente MD      amoxicillin-clavulanate  1 tablet Oral Q12H [address] Leonela Puente MD      aspirin  81 mg Oral Daily Leonela Puente MD      ciprofloxacin  500 mg Oral Q12H [address] Leonela Puente MD      HYDROmorphone 0 5 mg Intravenous Q2H PRN Leonela Puente MD      ibuprofen  600 mg Oral Q6H PRN Leonela Puente MD      ondansetron  4 mg Intravenous Q6H PRN Leonela Puente MD      oxyCODONE  10 mg Oral Q4H PRN Leonela Puente MD      oxyCODONE  5 mg Oral Q4H Leonela Puente MD      pantoprazole  40 mg Oral Early Morning Leonela Puente MD      phenol  1 spray Mouth/Throat Q2H PRN Leonela Puente MD      pneumococcal 13-valent conjugate vaccine  0 5 mL Intramuscular Prior to discharge Leonela Puente MD      potassium-sodium phosphateS  1 tablet Oral TID With Meals Leonela Puente MD      pravastatin  10 mg Oral Daily With Aria Baldwin MD      sodium chloride (PF)  500 mL Intracatheter Once in imaging Leonela Puente MD      sodium chloride  100 mL/hr Intravenous Continuous Leonela Puente MD       Continuous Infusions:  sodium chloride, 100 mL/hr      PRN Meds:   acetaminophen, 975 mg, Q6H PRN  albuterol, 2 puff, Q4H PRN  HYDROmorphone, 0 5 mg, Q2H PRN  ibuprofen, 600 mg, Q6H PRN  ondansetron, 4 mg, Q6H PRN  oxyCODONE, 10 mg, Q4H PRN  phenol, 1 spray, Q2H PRN  pneumococcal 13-valent conjugate vaccine, 0 5 mL, Prior to discharge  sodium chloride (PF), 500 mL, Once in imaging        Allergies   Allergies   Allergen Reactions    Erythromycin Nausea Only    Morphine Headache     ---------------------------------------------------------------------------------------  Advance Directive and Living Will:      Power of :    POLST:    ---------------------------------------------------------------------------------------  Care Time Delivered:   Upon my evaluation, this patient had a high probability of imminent or life-threatening deterioration due to Acute blood loss anemia due to hemorrhage from CVP is without active access aeration on CT scan ovarian cancer on chemo, which required my direct attention, intervention, and personal management    I have personally provided 30 minutes (2000 to 2030) of critical care time, exclusive of procedures, teaching, family meetings, and any prior time recorded by providers other than myself  Audi Jiménez PA-C      Portions of the record may have been created with voice recognition software  Occasional wrong word or "sound a like" substitutions may have occurred due to the inherent limitations of voice recognition software    Read the chart carefully and recognize, using context, where substitutions have occurred

## 2021-08-30 NOTE — PROGRESS NOTES
Progress Note - Infectious Disease   Guadarrama Chain 80 y o  female MRN: [id number]  Unit/Bed#: MICU 07 Encounter: [id number]      Impression/Plan:    1  Abdominal abscess  Patient developed LUQ abscess after complex abdominal surgery and pancreatic leak  8/18, s/p IR drain placement with aspiration of 60cc purulent fluid  Culture is growing Klebsiella oxytoca, Enterococcus faecium, and Bacteroides fragilis  Repeat CT A/P 8/20 showed improvement in the size of the collection  Patient's fevers continued, and repeat CT 8/26 showed increased size of the collection due to catheter occlusion  Taken back to IR 8/27, occluded catheter upsized to 12 Fr with aspiration of 50 ml purulent fluid  -due to continued fevers and decompensation yesterday from bleed, recommend switch back to IV antibiotics with ampicillin 2g IV q6hr and Ertapenem 1g IV q24hr   -monitor drain output, should be flushed daily   -monitor fever curve   -anticipate switch back to oral antibiotics at discharge    2  Sepsis-fevers, leukocytosis  Likely secondary to #1 and occlusion of the catheter resulting in poor drainage  Now with intraabdominal bleeding  Other considerations are due to pancreatic leak, malignancy, as well as septic pelvic thrombophlebitis  Although less common, this can occur after hysterectomy and pelvic surgery  COVID negative                -antibiotics as above   -anticoagulation held due to bleed             3  Acute anemia due to blood loss  Yesterday afternoon, patient with episode of bleeding from around OSCAR drain, Hgb dropped from 8/6-->7  She was given 2 units PRBC and transferred to the ICU  CTA A/P showed diffuse fluid under the left diaphragm, no active bleeding  Hgb stable    -anticoagulation held   -monitor H & H, transfusion as needed   -switch back to IV antibiotics for now     4  Pancreatic leak  Surgical complication  Abdominal drains in place  Could be causing fevers       5   Ovarian cancer  8/6 status post ex lap, en-bloc hysterectomy, bilateral salpingo-oophorectomy, sigmoidectomy with low rectal anastomosis, SBR, radical omentectomy, splenectomy, appendectomy, oversewing of tail of pancreas, repair of cystotomy, bilateral peritoneal stripping  Surgery was complicated by pancreatic tail hemorrhage and low rectal reanastomosis  Imaging shows liver metastases  -care per Gyn onc     6  Thrombocytosis  Reactive due to splenectomy, which is expected  On aspirin, A/C held due to bleed  Platelets now improving     7  Status post splenectomy  Received HiB, meningococcal quadrivalent vaccine              -needs PCV13 prior to discharge     I have discussed the above management plan in detail with the primary service  ID will follow  Antibiotics:  Augmentin  Ciprofloxacin     Subjective:  Yesterday afternoon, patient with episode of bleeding from around OSCAR drain, Hgb dropped from 8/6-->7  She was given 2 units PRBC and transferred to the ICU  [place] dizzy at the time  CTA A/P showed diffuse fluid under the left diaphragm, no active bleeding  Hgb stable and vitals stable  Last fever 101 8 yesterday  She feels better this morning and denies worsening abdominal pain, nausea, vomiting, fever, chills  Objective:  Vitals:  Temp:  [97 5 °F (36 4 °C)-101 8 °F (38 8 °C)] 98 2 °F (36 8 °C)  HR:  [] 98  Resp:  [13-24] 23  BP: ()/(45-69) 110/53  SpO2:  [91 %-96 %] 93 %  Temp (24hrs), Av 6 °F (37 °C), Min:97 5 °F (36 4 °C), Max:101 8 °F (38 8 °C)  Current: Temperature: 98 2 °F (36 8 °C)    Physical Exam:   General Appearance:  Alert, interactive, nontoxic, no acute distress  Throat: Oropharynx moist without lesions  Lungs:   Clear to auscultation bilaterally; no wheezes, rhonchi or rales; respirations unlabored   Heart:  RRR; no murmur, rub or gallop   Abdomen:   Soft, non-tender, non-distended, positive bowel sounds   2 LUQ OSCAR drain with purulent/milky fluid and drain with serosanguineous fluid Extremities: No clubbing, cyanosis or edema   Skin: No new rashes or lesions  Abdominal midline scar, no erythema or drainage       Labs: All pertinent labs and imaging studies were personally reviewed  Results from last 7 days   Lab Units 08/30/21  0418 08/29/21  1808 08/29/21  1301 08/29/21  0618 08/28/21  0928   WBC Thousand/uL 10 51*  --   --  8 21 8 98   HEMOGLOBIN g/dL 9 0* 7 0* 8 3* 8 6* 10 0*   PLATELETS Thousands/uL 606*  --   --  767* 854*     Results from last 7 days   Lab Units 08/30/21  0350 08/29/21  0618 08/28/21  0556 08/28/21  0556   SODIUM mmol/L 133* 132*  --  132*   POTASSIUM mmol/L 3 4* 3 5  --  3 8   CHLORIDE mmol/L 100 98*  --  99*   CO2 mmol/L 28 28  --  29   BUN mg/dL 7 6  --  6   CREATININE mg/dL 0 21* 0 34*  --  0 36*   EGFR ml/min/1 73sq m 131 112  --  110   CALCIUM mg/dL 7 6* 8 1*  --  8 3   AST U/L 36 61*  --   --    ALT U/L 35 44   < >  --    ALK PHOS U/L 469* 574*   < >  --     < > = values in this interval not displayed  Micro:  Results from last 7 days   Lab Units 08/23/21  1809 08/23/21  1521 08/23/21  1208   BLOOD CULTURE   --  No Growth After 5 Days  No Growth After 5 Days  --    GRAM STAIN RESULT  No Polys or Bacteria seen  --  Rare Polys  No bacteria seen   WOUND CULTURE   --   --  Growth in Broth culture only Klebsiella oxytoca ESBL*   BODY FLUID CULTURE, STERILE  1+ Growth of Enterococcus faecium*  Few Colonies of Klebsiella oxytoca ESBL*  --   --      LUQ Abscess Cx: Klebsiella oxytoca, Enterococcus faecium     Imaging:    CTA A/P 8/29/21:    1  Percutaneous catheter is in the left upper outer  Residual, more diffuse fluid under the diaphragm and along the greater curvature of the stomach  2   Constipation  No evidence of bowel obstruction or colitis  3   No findings to indicate abdominal aortic aneurysm or dissection  No stenosis in the proximal mesenteric or renal arteries    4   Stable moderate to large left pleural effusion and significant compressive atelectasis

## 2021-08-30 NOTE — PROGRESS NOTES
Progress Note - General Surgery   Guadarrama Chain 80 y o  female MRN: [id number]  Unit/Bed#: MICU 07 Encounter: [id number]    Assessment/Plan:  80 y o  female w/ peritoneal carcinomatosis s/p debulking w/ ex-lap, hysterectomy, BSO, sigmoid resection w/ low rectal anastamosis, splenectomy, appendectomy, omentectomy, SBR and oversewing of pancreatic tail hemorrhage (8/6) c/b pancreatic leak and intra-abdominal abscess s/p IR drainage (8/18) and drain upsizing (8/27) now with increased bloody drainage in/around OSCAR drain (improved)  · Resume diet as tolerated  · Continue trend hemoglobin, can space out checks given cessation of hemorrhage and appropriate response to transfusion  · Therapeutic Lovenox on hold, recommend consideration of heparin infusion with ongoing hemoglobin checks to ensure stability  · Care per ICU/primary    Subjective/Objective     Subjective:  No acute events overnight  Patient responded appropriately to 2 units of packed red blood cells  Objective:     Vitals: Temp:  [97 5 °F (36 4 °C)-101 8 °F (38 8 °C)] 98 1 °F (36 7 °C)  HR:  [] 108  Resp:  [13-24] 18  BP: ()/(45-69) 124/58  Body mass index is 34 4 kg/m²  I/O       08/28 0701 - 08/29 0700 08/29 0701 - 08/30 0700    P  O  300 240    Blood  620    NG/GT 30 20    Total Intake(mL/kg) 330 (4 3) 880 (11 4)    Urine (mL/kg/hr) 1950 (1 1) 700 (0 4)    Drains 90 95    Stool 0     Total Output 2040 795    Net -1710 +85          Unmeasured Urine Occurrence  3 x    Unmeasured Stool Occurrence 2 x           Physical Exam:  GEN: NAD  HEENT: MMM  CV:  Warm/well perfused  Lung: Normal effort  Ab: Soft, NT/ND  OSCAR drain dark sanguinous  Extrem: No CCE  Neuro:  A+Ox3    Lab, Imaging and other studies:   CBC with diff:   Lab Results   Component Value Date    WBC 10 51 (H) 08/30/2021    HGB 9 0 (L) 08/30/2021    HCT 27 2 (L) 08/30/2021    MCV 87 08/30/2021     (H) 08/30/2021    MCH 28 8 08/30/2021    MCHC 33 1 08/30/2021    RDW 15 7 (H) 08/30/2021    MPV 9 1 08/30/2021    NRBC 0 08/30/2021   , BMP/CMP:   Lab Results   Component Value Date    SODIUM 133 (L) 08/30/2021    K 3 4 (L) 08/30/2021     08/30/2021    CO2 28 08/30/2021    BUN 7 08/30/2021    CREATININE 0 21 (L) 08/30/2021    CALCIUM 7 6 (L) 08/30/2021    AST 36 08/30/2021    ALT 35 08/30/2021    ALKPHOS 469 (H) 08/30/2021    EGFR 131 08/30/2021   , Magnesium: No components found for: MAG, Coags:   Lab Results   Component Value Date    INR 1 19 08/29/2021   , Blood Culture: No results found for: BLOODCX, Urine Culture: No results found for: URINECX, Wound Culure: No results found for: WOUNDCULT  VTE Pharmacologic Prophylaxis: Sequential compression device (Venodyne)   VTE Mechanical Prophylaxis: sequential compression device

## 2021-08-30 NOTE — PROCEDURES
Insert PICC line    Date/Time: 8/30/2021 11:08 AM  Performed by: Allison Lim RN  Authorized by: Jenelle Gil MD     Patient location:  Bedside  Other Assisting Provider: Yes (comment) Soni KENDALL)    Consent:     Consent obtained:  Written (consent obtained by physician)    Consent given by:  Patient  Universal protocol:     Procedure explained and questions answered to patient or proxy's satisfaction: yes      Relevant documents present and verified: yes      Test results available and properly labeled: yes      Radiology Images displayed and confirmed  If images not available, report reviewed: yes      Required blood products, implants, devices, and special equipment available: yes      Site/side marked: yes      Immediately prior to procedure, a time out was called: yes      Patient identity confirmed:  Verbally with patient and arm band  Pre-procedure details:     Hand hygiene: Hand hygiene performed prior to insertion      Sterile barrier technique: All elements of maximal sterile technique followed      Skin preparation:  ChloraPrep    Skin preparation agent: Skin preparation agent completely dried prior to procedure    Indications:     PICC line indications: chemotherapy    Anesthesia (see MAR for exact dosages):      Anesthesia method:  Local infiltration    Local anesthetic:  Lidocaine 1% w/o epi (3ml)  Procedure details:     Location:  Basilic    Vessel type: vein      Laterality:  Right    Approach: percutaneous technique used      Patient position:  Flat    Procedural supplies:  Double lumen    Catheter size:  5 Fr    Landmarks identified: yes      Ultrasound guidance: yes      Ultrasound image availability:  Not saved    Sterile ultrasound techniques: Sterile gel and sterile probe covers were used      Number of attempts:  1    Successful placement: yes      Vessel of catheter tip end:  Sherlock 3CG confirmed    Total catheter length (cm):  378    Catheter out on skin (cm):  0 Max flow rate:  999    Arm circumference:  30  Post-procedure details:     Post-procedure:  Securement device placed and dressing applied    Assessment:  Blood return through all ports and free fluid flow    Patient tolerance of procedure:   Tolerated well, no immediate complications

## 2021-08-30 NOTE — PROGRESS NOTES
Daily Progress Note - Critical Care   Shelby Barrientos 80 y o  female MRN: [id number]  Unit/Bed#: MICU 07 Encounter: [id number]        ----------------------------------------------------------------------------------------  HPI/24hr events: 79 y/o F with peritoneal carcinomatosis s/p debulking w/ex lap hysterectomy, BSO, sigmoid resection w/low rectal anastomosis, splenectomy, appendectomy, omentectomy, small bowel resection and oversewing of pancreatic tail hemorrhage on 8/6  Patient is s/p pancreatic leak and intraabdominal abscess, s/p IR drainage in and around OSCAR drain  ICU bed was requested due to acute drop in Hgb to 7  Patient received 1u pRBCs and had stat CTA abd/pelvis showing more diffuse fluid under diaphragm with no active extravasation  Hgb stabilized to 9      ---------------------------------------------------------------------------------------  SUBJECTIVE  Patient states that she feels great and would like to eat today  Pain well managed, able to urinate and have a bowel movement this AM      Review of Systems   Constitutional: Negative for chills and fever  HENT: Negative for ear pain and sore throat  Eyes: Negative for pain and visual disturbance  Respiratory: Negative for cough and shortness of breath  Cardiovascular: Negative for chest pain and palpitations  Gastrointestinal: Positive for abdominal pain  Negative for vomiting  Genitourinary: Negative for dysuria and hematuria  Musculoskeletal: Negative for arthralgias and back pain  Skin: Negative for color change and rash  Neurological: Negative for seizures and syncope  All other systems reviewed and are negative      Review of systems was reviewed and negative unless stated above in HPI/24-hour events   ---------------------------------------------------------------------------------------  Assessment and Plan:    Neuro:    Diagnosis: Analgesia/Sedation  o Pain controlled on below pain regimen  - Oxycodone 5mg, q4h, allison  - Tylenol 975mg q6h prn  - Dilaudid 0 5mg q2h prn  - Oxycodone 10mg q4h prn    CV:    Diagnosis: Hypertension  o Restarted home meds  - Norvasc 10mg daily  - Pravastatin 10mg daily    Pulm:   Acute respiratory failure  o No supplemental oxygen required for past few days  o Persistent pleural effusion  o CT abd/pelvis (8/29): stable moderate to large L pleural effusion and significant compressive atelectasis       GI:    Splenectomy w/thrombocytosis  o Plt 886K to 843K  o Trend plt  o Continue ASA  o Vaccines prior to d/c   Pancreatic leak with abscess formation  o WBC 10/51 (8/30), continue to trend  o Posterior drain: 60cc  o LLQ drain:35cc  o Blood cultures: NGTD  o Aerobic and Anaerobic wound culture: NGTD  o Drain culture: +Klebsiella, PO augmentin and ciprofloxacin  o Last febrile 8/29 1400 to 101 8   GI ppx  o Pantoprazole 40mg daily (home med)    :    Diagnosis: Hyponatremia  o NS @100  o Trend BMP, Na 133    F/E/N:    NS @100   Electrolytes replete to maintain Mg>2 0, Phos >3 0, K >4 0   NPO, consider advancing diet      Heme/Onc:    Acute blood loss anemia  o Hgb drop to 7 0 with mundo blood noted in OSCAR drain   CTA showed no evidence of active extravasation    o 1u pRBCs, rHgb 9 0  o Continue to trend, maintain Hgb goal >7 0   High Grade serous ovarian carcinoma  o Final pathology  o Appreciate gyn onc recommendations  o Plan for eventual systemic therapy, genetic and genomic testing     Endo:    No acute issues    ID:    Fever of unknown origin  o Blood cultures: NGTD  o Aerobic and Anaerobic wound culture: NGTD  o Drain culture: +Klebsiella, PO augmentin and ciprofloxacin  o LE dopplers on 8/26 showed no evidence of DVT  o Trend fever curve and WBC    MSK/Skin:    Diagnosis: Wound seroma  o Packing present  o Wound care consult with appreciated recs  o Daily dressing changes    Disposition: Transfer to Med-Surg   Code Status: Level 1 - Full Code  ---------------------------------------------------------------------------------------  ICU CORE MEASURES    Prophylaxis   VTE Pharmacologic Prophylaxis: not indicated  VTE Mechanical Prophylaxis: sequential compression device  Stress Ulcer Prophylaxis: Pantoprazole PO    ABCDE Protocol (if indicated)  Plan to perform spontaneous awakening trial today? Not applicable  Plan to perform spontaneous breathing trial today? Not applicable  Obvious barriers to extubation? Not applicable    Invasive Devices Review  Invasive Devices     Peripheral Intravenous Line            Peripheral IV 21 Dorsal (posterior); Left Hand 3 days          Drain            Closed/Suction Drain LLQ 19 Fr  23 days    Closed/Suction Drain Left;Posterior Back Bulb 12 Fr  2 days              Can any invasive devices be discontinued today? Not applicable  ---------------------------------------------------------------------------------------  OBJECTIVE    Vitals   Vitals:    21 0330 21 0400 21 0500 21 0600   BP: (!) 121/45 98/50 112/52 124/58   Pulse: 100 94 98 (!) 108   Resp: 18 18 19 18   Temp:  98 1 °F (36 7 °C)     TempSrc:       SpO2: 93% 91% 93%    Weight:       Height:         Temp (24hrs), Av 6 °F (37 °C), Min:97 5 °F (36 4 °C), Max:101 8 °F (38 8 °C)  Current: Temperature: 98 1 °F (36 7 °C)  HR: 100  BP: 121/45  RR: 18  SpO2: 93    Respiratory:  SpO2: SpO2: 93 %, SpO2 Activity: SpO2 Activity: At Rest, SpO2 Device: O2 Device: None (Room air)  Nasal Cannula O2 Flow Rate (L/min): 2 L/min    Invasive/non-invasive ventilation settings   Respiratory    Lab Data (Last 4 hours)    None         O2/Vent Data (Last 4 hours)    None                Physical Exam  Vitals and nursing note reviewed  Constitutional:       General: She is not in acute distress  Appearance: She is well-developed  HENT:      Head: Normocephalic and atraumatic     Eyes:      Conjunctiva/sclera: Conjunctivae normal    Cardiovascular: Rate and Rhythm: Normal rate and regular rhythm  Heart sounds: Normal heart sounds  No murmur heard  Pulmonary:      Effort: Pulmonary effort is normal  No respiratory distress  Breath sounds: Normal breath sounds  Abdominal:      General: There is no distension  Palpations: Abdomen is soft  Tenderness: There is abdominal tenderness  Musculoskeletal:         General: Normal range of motion  Cervical back: Neck supple  Skin:     General: Skin is warm and dry  Comments: Dressing over seroma site with 2 bulb drains   Neurological:      General: No focal deficit present  Mental Status: She is alert and oriented to person, place, and time     Psychiatric:         Mood and Affect: Mood normal          Behavior: Behavior normal          Laboratory and Diagnostics:  Results from last 7 days   Lab Units 08/30/21  0418 08/29/21  1808 08/29/21  1301 08/29/21  0618 08/28/21  0928 08/27/21  0545 08/26/21  0455 08/25/21  1409 08/24/21  1212   WBC Thousand/uL 10 51*  --   --  8 21 8 98 9 60 10 14 11 97* 10 27*   HEMOGLOBIN g/dL 9 0* 7 0* 8 3* 8 6* 10 0* 8 9* 7 0* 7 2* 7 7*   HEMATOCRIT % 27 2* 22 1* 26 2* 27 0* 31 7* 27 3* 22 7* 22 8* 24 0*   PLATELETS Thousands/uL 606*  --   --  767* 854* 843* 886* 936* 1,032*   NEUTROS PCT % 73  --   --   --  69 71 64 70 71   MONOS PCT % 9  --   --   --  11 13* 15* 14* 14*   MONO PCT %  --   --   --  10  --   --   --   --   --      Results from last 7 days   Lab Units 08/30/21  0350 08/29/21  0618 08/28/21  0556 08/27/21  0545 08/26/21  0456 08/25/21  1409 08/24/21  1212   SODIUM mmol/L 133* 132* 132* 131* 127* 127* 129*   POTASSIUM mmol/L 3 4* 3 5 3 8 3 4* 3 5 3 4* 3 4*   CHLORIDE mmol/L 100 98* 99* 96* 96* 94* 96*   CO2 mmol/L 28 28 29 28 27 27 27   ANION GAP mmol/L 5 6 4 7 4 6 6   BUN mg/dL 7 6 6 7 5 6 7   CREATININE mg/dL 0 21* 0 34* 0 36* 0 54* 0 32* 0 36* 0 34*   CALCIUM mg/dL 7 6* 8 1* 8 3 8 0* 7 6* 7 4* 7 9*   GLUCOSE RANDOM mg/dL 111 110 112 139 130 134 109   ALT U/L 35 44  --   --   --   --   --    AST U/L 36 61*  --   --   --   --   --    ALK PHOS U/L 469* 574*  --   --   --   --   --    ALBUMIN g/dL 1 3* 1 4*  --   --   --   --   --    TOTAL BILIRUBIN mg/dL 0 38 0 40  --   --   --   --   --      Results from last 7 days   Lab Units 08/27/21  0545 08/26/21  0456   MAGNESIUM mg/dL  --  1 7   PHOSPHORUS mg/dL 2 4 2 1*      Results from last 7 days   Lab Units 08/29/21  1301   INR  1 19              ABG:    VBG:          Micro  Results from last 7 days   Lab Units 08/23/21  1809 08/23/21  1521 08/23/21  1208   BLOOD CULTURE   --  No Growth After 5 Days  No Growth After 5 Days  --    GRAM STAIN RESULT  No Polys or Bacteria seen  --  Rare Polys  No bacteria seen   WOUND CULTURE   --   --  Growth in Broth culture only Klebsiella oxytoca ESBL*   BODY FLUID CULTURE, STERILE  1+ Growth of Enterococcus faecium*  Few Colonies of Klebsiella oxytoca ESBL*  --   --        Imaging: I have personally reviewed pertinent reports  Intake and Output  I/O       08/28 0701 - 08/29 0700 08/29 0701 - 08/30 0700    P  O  300 240    Blood  620    NG/GT 30 20    Total Intake(mL/kg) 330 (4 3) 880 (11 4)    Urine (mL/kg/hr) 1950 (1 1) 700 (0 4)    Drains 90 95    Stool 0 0    Total Output 2040 795    Net -1710 +85          Unmeasured Urine Occurrence  3 x    Unmeasured Stool Occurrence 2 x 1 x        UOP: 81 25 ml/hr     Height and Weights   Height: 4' 11" (149 9 cm)  IBW (Ideal Body Weight): 43 2 kg  Body mass index is 34 4 kg/m²  Weight (last 2 days)     Date/Time   Weight    08/29/21 0600   77 3 (170 31)    08/28/21 0600   88 (194 01)              Nutrition       Diet Orders   (From admission, onward)             Start     Ordered    08/29/21 1305  Diet NPO; Sips with meds  Diet effective now     Question Answer Comment   Diet Type NPO    NPO Except: Sips with meds    RD to adjust diet per protocol?  No        08/29/21 1304    08/26/21 143  Dietary nutrition supplements  Once     Question Answer Comment   Select Supplement: Ensure Compact-Chocolate    Frequency Dinner        08/26/21 1432    08/26/21 1432  Dietary nutrition supplements  Once     Question Answer Comment   Select Supplement: Ensure Compact-Vanilla    Frequency Lunch        08/26/21 1432              Active Medications  Scheduled Meds:  Current Facility-Administered Medications   Medication Dose Route Frequency Provider Last Rate    acetaminophen  975 mg Oral Q6H PRN Irish Moody MD      albumin human           albuterol  2 puff Inhalation Q4H PRN Irish Moody MD      amLODIPine  10 mg Oral Daily Irish Moody MD      amoxicillin-clavulanate  1 tablet Oral Q12H [address] Irish Moody MD      aspirin  81 mg Oral Daily Irish Moody MD      ciprofloxacin  500 mg Oral Q12H [address] Irish Moody MD      HYDROmorphone  0 5 mg Intravenous Q2H PRN Irish Moody MD      ibuprofen  600 mg Oral Q6H PRN Irish Moody MD      ondansetron  4 mg Intravenous Q6H PRN Irish Moody MD      oxyCODONE  10 mg Oral Q4H PRN Irish Moody MD      oxyCODONE  5 mg Oral Q4H Irish Moody MD      pantoprazole  40 mg Oral Early Morning Irish Moody MD      phenol  1 spray Mouth/Throat Q2H PRN Irish Moody MD      pneumococcal 13-valent conjugate vaccine  0 5 mL Intramuscular Prior to discharge Irish Moody MD      potassium-sodium phosphateS  1 tablet Oral TID With Meals Irish Moody MD      pravastatin  10 mg Oral Daily With Juan Daniel Santillan MD      sodium chloride (PF)  500 mL Intracatheter Once in imaging Irish Moody MD      sodium chloride  100 mL/hr Intravenous Continuous Irish Moody MD       Continuous Infusions:  sodium chloride, 100 mL/hr      PRN Meds:   acetaminophen, 975 mg, Q6H PRN  albuterol, 2 puff, Q4H PRN  HYDROmorphone, 0 5 mg, Q2H PRN  ibuprofen, 600 mg, Q6H PRN  ondansetron, 4 mg, Q6H PRN  oxyCODONE, 10 mg, Q4H PRN  phenol, 1 spray, Q2H PRN  pneumococcal 13-valent conjugate vaccine, 0 5 mL, Prior to discharge  sodium chloride (PF), 500 mL, Once in imaging        Allergies   Allergies   Allergen Reactions    Erythromycin Nausea Only    Morphine Headache     ---------------------------------------------------------------------------------------  Advance Directive and Living Will:      Power of :    POLST:    ---------------------------------------------------------------------------------------  Care Time Delivered:   No Critical Care time spent     [place] MD G[place]      Portions of the record may have been created with voice recognition software  Occasional wrong word or "sound a like" substitutions may have occurred due to the inherent limitations of voice recognition software    Read the chart carefully and recognize, using context, where substitutions have occurred

## 2021-08-31 ENCOUNTER — APPOINTMENT (INPATIENT)
Dept: RADIOLOGY | Facility: HOSPITAL | Age: 71
DRG: 329 | End: 2021-08-31
Payer: COMMERCIAL

## 2021-08-31 LAB
ANION GAP SERPL CALCULATED.3IONS-SCNC: 6 MMOL/L (ref 4–13)
BUN SERPL-MCNC: 6 MG/DL (ref 5–25)
CALCIUM SERPL-MCNC: 7.5 MG/DL (ref 8.3–10.1)
CHLORIDE SERPL-SCNC: 98 MMOL/L (ref 100–108)
CO2 SERPL-SCNC: 27 MMOL/L (ref 21–32)
CREAT SERPL-MCNC: 0.37 MG/DL (ref 0.6–1.3)
ERYTHROCYTE [DISTWIDTH] IN BLOOD BY AUTOMATED COUNT: 16 % (ref 11.6–15.1)
GFR SERPL CREATININE-BSD FRML MDRD: 109 ML/MIN/1.73SQ M
GLUCOSE SERPL-MCNC: 115 MG/DL (ref 65–140)
HCT VFR BLD AUTO: 24.6 % (ref 34.8–46.1)
HGB BLD-MCNC: 8 G/DL (ref 11.5–15.4)
MCH RBC QN AUTO: 28.7 PG (ref 26.8–34.3)
MCHC RBC AUTO-ENTMCNC: 32.5 G/DL (ref 31.4–37.4)
MCV RBC AUTO: 88 FL (ref 82–98)
PHOSPHATE SERPL-MCNC: 2.8 MG/DL (ref 2.3–4.1)
PLATELET # BLD AUTO: 585 THOUSANDS/UL (ref 149–390)
PMV BLD AUTO: 9.4 FL (ref 8.9–12.7)
POTASSIUM SERPL-SCNC: 3.6 MMOL/L (ref 3.5–5.3)
RBC # BLD AUTO: 2.79 MILLION/UL (ref 3.81–5.12)
SODIUM SERPL-SCNC: 131 MMOL/L (ref 136–145)
WBC # BLD AUTO: 9.81 THOUSAND/UL (ref 4.31–10.16)

## 2021-08-31 PROCEDURE — 99024 POSTOP FOLLOW-UP VISIT: CPT | Performed by: OBSTETRICS & GYNECOLOGY

## 2021-08-31 PROCEDURE — 76942 ECHO GUIDE FOR BIOPSY: CPT | Performed by: NURSE PRACTITIONER

## 2021-08-31 PROCEDURE — 87070 CULTURE OTHR SPECIMN AEROBIC: CPT | Performed by: NURSE PRACTITIONER

## 2021-08-31 PROCEDURE — 80048 BASIC METABOLIC PNL TOTAL CA: CPT | Performed by: OBSTETRICS & GYNECOLOGY

## 2021-08-31 PROCEDURE — 10160 PNXR ASPIR ABSC HMTMA BULLA: CPT | Performed by: NURSE PRACTITIONER

## 2021-08-31 PROCEDURE — 85027 COMPLETE CBC AUTOMATED: CPT | Performed by: OBSTETRICS & GYNECOLOGY

## 2021-08-31 PROCEDURE — 87205 SMEAR GRAM STAIN: CPT | Performed by: NURSE PRACTITIONER

## 2021-08-31 PROCEDURE — 87075 CULTR BACTERIA EXCEPT BLOOD: CPT | Performed by: NURSE PRACTITIONER

## 2021-08-31 PROCEDURE — 76937 US GUIDE VASCULAR ACCESS: CPT

## 2021-08-31 PROCEDURE — 84100 ASSAY OF PHOSPHORUS: CPT | Performed by: OBSTETRICS & GYNECOLOGY

## 2021-08-31 PROCEDURE — 10030 IMG GID FLU COLL DRG SFT TIS: CPT

## 2021-08-31 PROCEDURE — 99233 SBSQ HOSP IP/OBS HIGH 50: CPT | Performed by: STUDENT IN AN ORGANIZED HEALTH CARE EDUCATION/TRAINING PROGRAM

## 2021-08-31 PROCEDURE — 0W9G3ZX DRAINAGE OF PERITONEAL CAVITY, PERCUTANEOUS APPROACH, DIAGNOSTIC: ICD-10-PCS | Performed by: RADIOLOGY

## 2021-08-31 RX ADMIN — OXYCODONE HYDROCHLORIDE 5 MG: 5 TABLET ORAL at 17:40

## 2021-08-31 RX ADMIN — HEPARIN SODIUM 5000 UNITS: 5000 INJECTION INTRAVENOUS; SUBCUTANEOUS at 06:29

## 2021-08-31 RX ADMIN — DIBASIC SODIUM PHOSPHATE, MONOBASIC POTASSIUM PHOSPHATE AND MONOBASIC SODIUM PHOSPHATE 1 TABLET: 852; 155; 130 TABLET ORAL at 09:28

## 2021-08-31 RX ADMIN — OXYCODONE HYDROCHLORIDE 5 MG: 5 TABLET ORAL at 21:38

## 2021-08-31 RX ADMIN — AMPICILLIN SODIUM 2000 MG: 2 INJECTION, POWDER, FOR SOLUTION INTRAMUSCULAR; INTRAVENOUS at 21:38

## 2021-08-31 RX ADMIN — DIBASIC SODIUM PHOSPHATE, MONOBASIC POTASSIUM PHOSPHATE AND MONOBASIC SODIUM PHOSPHATE 1 TABLET: 852; 155; 130 TABLET ORAL at 17:40

## 2021-08-31 RX ADMIN — DOCUSATE SODIUM 100 MG: 100 CAPSULE, LIQUID FILLED ORAL at 09:28

## 2021-08-31 RX ADMIN — OXYCODONE HYDROCHLORIDE 5 MG: 5 TABLET ORAL at 13:32

## 2021-08-31 RX ADMIN — ERTAPENEM SODIUM 1000 MG: 1 INJECTION, POWDER, LYOPHILIZED, FOR SOLUTION INTRAMUSCULAR; INTRAVENOUS at 11:48

## 2021-08-31 RX ADMIN — AMPICILLIN SODIUM 2000 MG: 2 INJECTION, POWDER, FOR SOLUTION INTRAMUSCULAR; INTRAVENOUS at 01:26

## 2021-08-31 RX ADMIN — SODIUM CHLORIDE 100 ML/HR: 0.9 INJECTION, SOLUTION INTRAVENOUS at 01:28

## 2021-08-31 RX ADMIN — AMPICILLIN SODIUM 2000 MG: 2 INJECTION, POWDER, FOR SOLUTION INTRAMUSCULAR; INTRAVENOUS at 09:28

## 2021-08-31 RX ADMIN — ASPIRIN 81 MG: 81 TABLET, COATED ORAL at 09:28

## 2021-08-31 RX ADMIN — DOCUSATE SODIUM 100 MG: 100 CAPSULE, LIQUID FILLED ORAL at 17:39

## 2021-08-31 RX ADMIN — OXYCODONE HYDROCHLORIDE 5 MG: 5 TABLET ORAL at 06:28

## 2021-08-31 RX ADMIN — OXYCODONE HYDROCHLORIDE 5 MG: 5 TABLET ORAL at 09:28

## 2021-08-31 RX ADMIN — SODIUM CHLORIDE 50 ML/HR: 0.9 INJECTION, SOLUTION INTRAVENOUS at 17:42

## 2021-08-31 RX ADMIN — AMLODIPINE BESYLATE 10 MG: 10 TABLET ORAL at 09:28

## 2021-08-31 RX ADMIN — AMPICILLIN SODIUM 2000 MG: 2 INJECTION, POWDER, FOR SOLUTION INTRAMUSCULAR; INTRAVENOUS at 13:29

## 2021-08-31 RX ADMIN — OXYCODONE HYDROCHLORIDE 5 MG: 5 TABLET ORAL at 01:25

## 2021-08-31 RX ADMIN — DIBASIC SODIUM PHOSPHATE, MONOBASIC POTASSIUM PHOSPHATE AND MONOBASIC SODIUM PHOSPHATE 1 TABLET: 852; 155; 130 TABLET ORAL at 11:50

## 2021-08-31 RX ADMIN — PANTOPRAZOLE SODIUM 40 MG: 40 TABLET, DELAYED RELEASE ORAL at 06:28

## 2021-08-31 RX ADMIN — PRAVASTATIN SODIUM 10 MG: 10 TABLET ORAL at 17:40

## 2021-08-31 NOTE — PROGRESS NOTES
Progress Note - Infectious Disease   Lucy Oms 80 y o  female MRN: [id number]  Unit/Bed#: [facility] 930-01 Encounter: [id number]      Impression/Plan:    1  Abdominal abscesses  Patient developed LUQ abscess after complex abdominal surgery and pancreatic leak  8/18, s/p IR drain placement with aspiration of 60cc purulent fluid  Culture is growing Klebsiella oxytoca (now ESBL), Enterococcus faecium, and Bacteroides fragilis  Repeat CT A/P 8/20 showed improvement in the size of the collection  Patient's fevers continued, and repeat CT 8/26 showed increased size of the collection due to catheter occlusion  Taken back to IR 8/27, occluded catheter upsized to 12 Fr with aspiration of 50 ml purulent fluid  8/29 patient developed bleed from her drainage catheters and acute hemoglobin drop; repeat CTA A/P showed fluid collection along midline incision  6cc pus drained from midline incision collection today   -for now, continue IV antibiotics with ampicillin 2g IV q6hr and Ertapenem 1g IV q24hr   -monitor drain output, should be flushed daily   -monitor fever curve   -anticipate switch back to oral antibiotics at discharge    2  Sepsis-fevers, leukocytosis  Likely secondary to abdominal abscesses  Now with intraabdominal bleeding 8/29  Other considerations are due to pancreatic leak, malignancy, as well as septic pelvic thrombophlebitis  Although less common, this can occur after hysterectomy and pelvic surgery  COVID negative                -antibiotics as above   -anticoagulation held due to bleed             3  Acute anemia due to blood loss  8/29, patient with episode of bleeding from around OSCAR drain, Hgb dropped from 8/6-->7  She was given 2 units PRBC and transferred to the ICU  CTA A/P showed diffuse fluid under the left diaphragm, no active bleeding  Hgb stable, patient out of the ICU   -anticoagulation held   -monitor H & H, transfusion as needed   -switch back to IV antibiotics for now     4   Pancreatic leak  Surgical complication  Abdominal drains in place  5  Ovarian cancer   status post ex lap, en-bloc hysterectomy, bilateral salpingo-oophorectomy, sigmoidectomy with low rectal anastomosis, SBR, radical omentectomy, splenectomy, appendectomy, oversewing of tail of pancreas, repair of cystotomy, bilateral peritoneal stripping  Surgery was complicated by pancreatic tail hemorrhage and low rectal reanastomosis  Imaging shows liver metastases  -care per Gyn onc     6  Thrombocytosis  Reactive due to splenectomy, which is expected  On aspirin, A/C held due to bleed  Platelets now improving     7  Status post splenectomy  Received HiB, meningococcal quadrivalent vaccine              -needs PCV13 prior to discharge     I have discussed the above management plan in detail with the primary service  ID will follow  Antibiotics:  Ampicillin   Ertapenem    Subjective:  S/p aspiration of 6cc pus from midline incision collection today  She feels well and denies fever, chills, nausea, diarrhea  Abdominal pain in stable  Objective:  Vitals:  Temp:  [99 3 °F (37 4 °C)-99 8 °F (37 7 °C)] 99 8 °F (37 7 °C)  HR:  [106-116] 116  Resp:  [16-18] 18  BP: (118-129)/(61-71) 118/68  SpO2:  [91 %-96 %] 91 %  Temp (24hrs), Av 6 °F (37 6 °C), Min:99 3 °F (37 4 °C), Max:99 8 °F (37 7 °C)  Current: Temperature: 99 8 °F (37 7 °C)    Physical Exam:   General Appearance:  Alert, interactive, nontoxic, no acute distress  Throat: Oropharynx moist without lesions  Lungs:   Clear to auscultation bilaterally; no wheezes, rhonchi or rales; respirations unlabored   Heart:  RRR; no murmur, rub or gallop   Abdomen:   Soft, non-tender, non-distended, positive bowel sounds  2 LUQ OSCAR drain with purulent/milky fluid and drain with serosanguineous fluid   Extremities: No clubbing, cyanosis or edema   Skin: No new rashes or lesions  Abdominal midline scar, no erythema or drainage       Labs:    All pertinent labs and imaging studies were personally reviewed  Results from last 7 days   Lab Units 08/31/21  0706 08/30/21  1130 08/30/21  0418 08/29/21  0618   WBC Thousand/uL 9 81  --  10 51* 8 21   HEMOGLOBIN g/dL 8 0* 8 7* 9 0* 8 6*   PLATELETS Thousands/uL 585*  --  606* 767*     Results from last 7 days   Lab Units 08/31/21  0706 08/30/21  0350 08/29/21  0618   SODIUM mmol/L 131* 133* 132*   POTASSIUM mmol/L 3 6 3 4* 3 5   CHLORIDE mmol/L 98* 100 98*   CO2 mmol/L 27 28 28   BUN mg/dL 6 7 6   CREATININE mg/dL 0 37* 0 21* 0 34*   EGFR ml/min/1 73sq m 109 131 112   CALCIUM mg/dL 7 5* 7 6* 8 1*   AST U/L  --  36 61*   ALT U/L  --  35 44   ALK PHOS U/L  --  469* 574*                       Micro:      LUQ Abscess Cx 8/18: Klebsiella oxytoca, Enterococcus faecium, Bacteroides fragilis  Midline Wound Cx 8/23/21: ESBL Klebsiella  LUQ Abscess Cx 8/23/21: E faecium , ESBL Klebsiella oxytoca    Imaging:    CTA A/P 8/29/21:    1  Percutaneous catheter is in the left upper outer  Residual, more diffuse fluid under the diaphragm and along the greater curvature of the stomach  2   Constipation  No evidence of bowel obstruction or colitis  3   No findings to indicate abdominal aortic aneurysm or dissection  No stenosis in the proximal mesenteric or renal arteries  4   Stable moderate to large left pleural effusion and significant compressive atelectasis

## 2021-08-31 NOTE — BRIEF OP NOTE (RAD/CATH)
Procedure Note    PATIENT NAME: Albaro Gaines  : 1950  MRN: [id number]    Pre-op Diagnosis:   1  Peritoneal carcinomatosis ([address] )    2  Hypoxemia    3  Pancreatic fluid leak    4  Thrombocytosis after splenectomy ([address] )    5  Status post small bowel resection    6  S/P bladder repair    7  FHx: PROMISE-BSO (total abdominal hysterectomy and bilateral salpingo-oophorectomy)    8  Encounter for ablation of malignant neoplasm with goal of debulking/cytoreduction ([facility] [address] )    9  Leukocytosis, unspecified type    10  Hyponatremia    11  Bleeding      Post-op Diagnosis:   1  Peritoneal carcinomatosis ([address] )    2  Hypoxemia    3  Pancreatic fluid leak    4  Thrombocytosis after splenectomy ([facility] [address] )    5  Status post small bowel resection    6  S/P bladder repair    7  FHx: PROMISE-BSO (total abdominal hysterectomy and bilateral salpingo-oophorectomy)    8  Encounter for ablation of malignant neoplasm with goal of debulking/cytoreduction ([facility] [address] )    9  Leukocytosis, unspecified type    10  Hyponatremia    11   Bleeding        Provider:   JOSI Moore  Assistants:     No qualified resident was available, Resident is only observing    Estimated Blood Loss: none    Findings: 6 mL pus removed from midline abdominal collection aspirated    Specimens: sent for aerobic and anaerobic cultures     Complications:  none    Anesthesia: local    JOSI Moore     Date: 2021  Time: 3:11 PM

## 2021-08-31 NOTE — PROGRESS NOTES
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  Post-operative course complicated by continued febrile episodes  Last fever 101 8F at 1403 on 8/29/2021       Plan:  S/p surgery  - Spontaneously voiding  - Tolerating regular diet  - Continue IS/encourage ambulation   - Pain controlled on current regimen:   Tyra 5 mg Q4h scheduled     Tylenol 975mg Q6h PRN (mild)   Tyra 10mg Q4h PRN (moderate, severe)   Motrin 600mg Q6h PRN (fever)   Dilaudid 0 5 IV Q2h PRN (breakthrough)    Bloody drain output  - Acute drop in Hgb yesterday from 10 --> 8 6 --> 8 3 --> 7 0 --> 2u pRBC --> 9 0 --> 8 3 --> AM labs pending  - Wolf Figueroa blood noted in OSCAR drain  - CTA with no evidence active extravasation    Pancreatic leak with abscess formation   - Collection slightly increased in size on CT done 8/26/21 (compared to 8/20/21)  - WBC 10 51 this AM, continue daily trend  - Maintain both drains for now, routine drain care    - Back drain: approximately 20cc over 12 hours   - LLQ drain: approximately 5 cc over 12 hours  - Repeat blood cultures (8/23) show no growth at 72h  - Aerobic and anaerobic wound (8/23) culture without growth   - Drain culture +klebsiella  - On PO augmentin and ciprofloxacin  - Continues to febrile, last fever less than 24h ago    Fever of unknown origin  - Pancreatic leak with abscess formation slightly increased in size on CT completed 8/26 (compared to 8/20)  - Repeat blood cultures (8/23) show no growth at 72h  - Anaerobic wound (8/23) culture without growth   - Wound culture (8/23) +klebsiella   - Drain culture +klebsiella  - Now on ertapenem and ampicillin   - LE dopplers completed 8/26 with no evidence of DVT  - Concern for new superficial enhancement in incision, plan for IR ultrasound guided aspiration    Hyponatremia  - AM labs pending  - On NS 50cc/hr   - TSH WNL  - Mag WNL   - Appreciate SLIM recommendations    Wound seroma   - Packing in place   - Wound care consulted, appreciate recommendations  - Plan for daily dressing changes, done this morning on rounds    Acute respiratory failure   -  Has not required supplemental O2 for several days  - With persistent pleural effusion   - S/p CXR 8/20 with mildly increased small to moderate left pleural effusion  - CT scan 8/29/21 shows that pleural effusion remains stable in size, with adjacent compressive atelectasis  - Continue to monitor closely    S/p splenectomy with thrombocytosis   - AM labs pending  - Continue to trend   - Continue ASA   - Splenectomy vaccines prior to discharge     High grade serous ovarian carcinoma   - Final path resulted  - Pt discussed at tumor board on 8/20, plan is for eventual systemic therapy, genetic and genomic testing    HTN/HLD  Continue home meds amlodipine and pravastatin    Disposition: remain inpatient for ongoing treatment     Subjective:  Ed Fletcher is doing well  She reports that she was able to sleep overnight  She is ambulating, voiding and having BM  She denies chest pain, SOB, nausea, vomiting  She has no new complaints  Review of Systems   Constitutional: Negative for appetite change  Respiratory: Negative for chest tightness and shortness of breath  Cardiovascular: Negative for chest pain  Gastrointestinal: Negative for abdominal pain, nausea and vomiting  Genitourinary: Negative for dysuria  Neurological: Negative for light-headedness and headaches  Psychiatric/Behavioral: Negative for confusion  All other systems reviewed and are negative        Objective:   /71   Pulse 101   Temp 99 1 °F (37 3 °C)   Resp 20   Ht 4' 11" (1 499 m)   Wt 77 3 kg (170 lb 4 9 oz)   SpO2 93%   BMI 34 40 kg/m²     I/O last 3 completed shifts: In: 1330 [P O :600; I V :30; Blood:620; NG/GT:30; IV Piggyback:50]  Out: 26 [Urine:700; Drains:115]  No intake/output data recorded  I/O       08/22 0701 - 08/23 0700 08/23 0701 - 08/24 0700    P  O  180     I V  (mL/kg)  30 (0 4)    NG/GT  0    IV Piggyback  100    Total Intake(mL/kg) 180 (2 3) 130 (1 7)    Urine (mL/kg/hr) 1200 (0 6) 700 (0 4)    Drains 25 25    Stool  0    Total Output 1225 725    Net -1045 -595          Unmeasured Stool Occurrence  1 x          Lab Results   Component Value Date    WBC 10 51 (H) 08/30/2021    HGB 8 7 (L) 08/30/2021    HCT 26 5 (L) 08/30/2021    MCV 87 08/30/2021     (H) 08/30/2021       Lab Results   Component Value Date    GLUCOSE 195 (H) 08/06/2021    CALCIUM 7 6 (L) 08/30/2021    K 3 4 (L) 08/30/2021    CO2 28 08/30/2021     08/30/2021    BUN 7 08/30/2021    CREATININE 0 21 (L) 08/30/2021       Lab Results   Component Value Date/Time    POCGLU 154 (H) 08/19/2021 10:23 AM    POCGLU 161 (H) 08/19/2021 06:58 AM    POCGLU 158 (H) 08/18/2021 09:09 PM    POCGLU 171 (H) 08/18/2021 04:33 PM    POCGLU 92 08/18/2021 12:10 PM       Physical Exam  Constitutional:       General: She is not in acute distress  Appearance: She is obese  She is not ill-appearing or diaphoretic  HENT:      Head: Normocephalic and atraumatic  Eyes:      Conjunctiva/sclera: Conjunctivae normal       Pupils: Pupils are equal, round, and reactive to light  Cardiovascular:      Rate and Rhythm: Normal rate and regular rhythm  Pulses: Normal pulses  Heart sounds: Normal heart sounds  Pulmonary:      Effort: Pulmonary effort is normal  No respiratory distress  Breath sounds: Normal breath sounds  Abdominal:      General: Abdomen is flat  Bowel sounds are normal  There is no distension  Tenderness: There is no abdominal tenderness  There is no guarding        Comments: Abdominal dressing C/D/I    LLQ OSCAR drain in place, recently emptied, minimal bloody fluid noted    Posterior Left lower back drain in place, recently emptied, dark, old blood noted in drain     Musculoskeletal:         General: Normal range of motion  Cervical back: Normal range of motion  Skin:     General: Skin is warm and dry  Capillary Refill: Capillary refill takes less than 2 seconds  Neurological:      General: No focal deficit present  Mental Status: She is alert and oriented to person, place, and time  Mental status is at baseline     Psychiatric:         Mood and Affect: Mood normal          Behavior: Behavior normal          Alina Carmen MD   PGY-4, GYN ONC  8/31/2021 6:23 AM

## 2021-09-01 LAB
ANION GAP SERPL CALCULATED.3IONS-SCNC: 6 MMOL/L (ref 4–13)
BUN SERPL-MCNC: 5 MG/DL (ref 5–25)
CALCIUM SERPL-MCNC: 7.1 MG/DL (ref 8.3–10.1)
CHLORIDE SERPL-SCNC: 101 MMOL/L (ref 100–108)
CO2 SERPL-SCNC: 25 MMOL/L (ref 21–32)
CREAT SERPL-MCNC: 0.28 MG/DL (ref 0.6–1.3)
ERYTHROCYTE [DISTWIDTH] IN BLOOD BY AUTOMATED COUNT: 15.9 % (ref 11.6–15.1)
ERYTHROCYTE [DISTWIDTH] IN BLOOD BY AUTOMATED COUNT: 16 % (ref 11.6–15.1)
GFR SERPL CREATININE-BSD FRML MDRD: 119 ML/MIN/1.73SQ M
GLUCOSE SERPL-MCNC: 127 MG/DL (ref 65–140)
GLUCOSE SERPL-MCNC: 93 MG/DL (ref 65–140)
HCT VFR BLD AUTO: 23.5 % (ref 34.8–46.1)
HCT VFR BLD AUTO: 27.9 % (ref 34.8–46.1)
HGB BLD-MCNC: 7.6 G/DL (ref 11.5–15.4)
HGB BLD-MCNC: 8.9 G/DL (ref 11.5–15.4)
MCH RBC QN AUTO: 28.3 PG (ref 26.8–34.3)
MCH RBC QN AUTO: 28.8 PG (ref 26.8–34.3)
MCHC RBC AUTO-ENTMCNC: 31.9 G/DL (ref 31.4–37.4)
MCHC RBC AUTO-ENTMCNC: 32.3 G/DL (ref 31.4–37.4)
MCV RBC AUTO: 89 FL (ref 82–98)
MCV RBC AUTO: 89 FL (ref 82–98)
PLATELET # BLD AUTO: 563 THOUSANDS/UL (ref 149–390)
PLATELET # BLD AUTO: 658 THOUSANDS/UL (ref 149–390)
PMV BLD AUTO: 9.4 FL (ref 8.9–12.7)
PMV BLD AUTO: 9.6 FL (ref 8.9–12.7)
POTASSIUM SERPL-SCNC: 3.3 MMOL/L (ref 3.5–5.3)
RBC # BLD AUTO: 2.64 MILLION/UL (ref 3.81–5.12)
RBC # BLD AUTO: 3.14 MILLION/UL (ref 3.81–5.12)
SODIUM SERPL-SCNC: 132 MMOL/L (ref 136–145)
WBC # BLD AUTO: 10.81 THOUSAND/UL (ref 4.31–10.16)
WBC # BLD AUTO: 9.08 THOUSAND/UL (ref 4.31–10.16)

## 2021-09-01 PROCEDURE — 99233 SBSQ HOSP IP/OBS HIGH 50: CPT | Performed by: INTERNAL MEDICINE

## 2021-09-01 PROCEDURE — 80048 BASIC METABOLIC PNL TOTAL CA: CPT | Performed by: OBSTETRICS & GYNECOLOGY

## 2021-09-01 PROCEDURE — 85027 COMPLETE CBC AUTOMATED: CPT | Performed by: OBSTETRICS & GYNECOLOGY

## 2021-09-01 PROCEDURE — 99024 POSTOP FOLLOW-UP VISIT: CPT | Performed by: OBSTETRICS & GYNECOLOGY

## 2021-09-01 PROCEDURE — 82948 REAGENT STRIP/BLOOD GLUCOSE: CPT

## 2021-09-01 RX ORDER — OXYCODONE HYDROCHLORIDE 10 MG/1
10 TABLET ORAL EVERY 4 HOURS PRN
Status: DISCONTINUED | OUTPATIENT
Start: 2021-09-01 | End: 2021-09-10 | Stop reason: HOSPADM

## 2021-09-01 RX ORDER — IBUPROFEN 600 MG/1
600 TABLET ORAL EVERY 6 HOURS
Status: DISCONTINUED | OUTPATIENT
Start: 2021-09-01 | End: 2021-09-10 | Stop reason: HOSPADM

## 2021-09-01 RX ORDER — HEPARIN SODIUM 5000 [USP'U]/ML
5000 INJECTION, SOLUTION INTRAVENOUS; SUBCUTANEOUS EVERY 8 HOURS SCHEDULED
Status: DISCONTINUED | OUTPATIENT
Start: 2021-09-01 | End: 2021-09-10 | Stop reason: HOSPADM

## 2021-09-01 RX ORDER — OXYCODONE HYDROCHLORIDE 5 MG/1
5 TABLET ORAL EVERY 4 HOURS PRN
Status: DISCONTINUED | OUTPATIENT
Start: 2021-09-01 | End: 2021-09-10 | Stop reason: HOSPADM

## 2021-09-01 RX ORDER — ACETAMINOPHEN 325 MG/1
975 TABLET ORAL EVERY 8 HOURS SCHEDULED
Status: DISCONTINUED | OUTPATIENT
Start: 2021-09-01 | End: 2021-09-10 | Stop reason: HOSPADM

## 2021-09-01 RX ADMIN — AMPICILLIN SODIUM 2000 MG: 2 INJECTION, POWDER, FOR SOLUTION INTRAMUSCULAR; INTRAVENOUS at 12:53

## 2021-09-01 RX ADMIN — SODIUM CHLORIDE 50 ML/HR: 0.9 INJECTION, SOLUTION INTRAVENOUS at 12:00

## 2021-09-01 RX ADMIN — DOCUSATE SODIUM 100 MG: 100 CAPSULE, LIQUID FILLED ORAL at 08:32

## 2021-09-01 RX ADMIN — AMLODIPINE BESYLATE 10 MG: 10 TABLET ORAL at 08:32

## 2021-09-01 RX ADMIN — OXYCODONE HYDROCHLORIDE 10 MG: 10 TABLET ORAL at 16:44

## 2021-09-01 RX ADMIN — AMPICILLIN SODIUM 2000 MG: 2 INJECTION, POWDER, FOR SOLUTION INTRAMUSCULAR; INTRAVENOUS at 04:54

## 2021-09-01 RX ADMIN — HYDROMORPHONE HYDROCHLORIDE 0.5 MG: 1 INJECTION, SOLUTION INTRAMUSCULAR; INTRAVENOUS; SUBCUTANEOUS at 06:00

## 2021-09-01 RX ADMIN — AMPICILLIN SODIUM 2000 MG: 2 INJECTION, POWDER, FOR SOLUTION INTRAMUSCULAR; INTRAVENOUS at 22:30

## 2021-09-01 RX ADMIN — HEPARIN SODIUM 5000 UNITS: 5000 INJECTION INTRAVENOUS; SUBCUTANEOUS at 20:58

## 2021-09-01 RX ADMIN — PANTOPRAZOLE SODIUM 40 MG: 40 TABLET, DELAYED RELEASE ORAL at 04:54

## 2021-09-01 RX ADMIN — DOCUSATE SODIUM 100 MG: 100 CAPSULE, LIQUID FILLED ORAL at 18:07

## 2021-09-01 RX ADMIN — PRAVASTATIN SODIUM 10 MG: 10 TABLET ORAL at 16:44

## 2021-09-01 RX ADMIN — ASPIRIN 81 MG: 81 TABLET, COATED ORAL at 08:29

## 2021-09-01 RX ADMIN — DIBASIC SODIUM PHOSPHATE, MONOBASIC POTASSIUM PHOSPHATE AND MONOBASIC SODIUM PHOSPHATE 1 TABLET: 852; 155; 130 TABLET ORAL at 11:59

## 2021-09-01 RX ADMIN — DIBASIC SODIUM PHOSPHATE, MONOBASIC POTASSIUM PHOSPHATE AND MONOBASIC SODIUM PHOSPHATE 1 TABLET: 852; 155; 130 TABLET ORAL at 16:43

## 2021-09-01 RX ADMIN — AMPICILLIN SODIUM 2000 MG: 2 INJECTION, POWDER, FOR SOLUTION INTRAMUSCULAR; INTRAVENOUS at 16:49

## 2021-09-01 RX ADMIN — ACETAMINOPHEN 975 MG: 325 TABLET, FILM COATED ORAL at 20:59

## 2021-09-01 RX ADMIN — IBUPROFEN 600 MG: 600 TABLET, FILM COATED ORAL at 20:59

## 2021-09-01 RX ADMIN — ERTAPENEM SODIUM 1000 MG: 1 INJECTION, POWDER, LYOPHILIZED, FOR SOLUTION INTRAMUSCULAR; INTRAVENOUS at 12:00

## 2021-09-01 RX ADMIN — OXYCODONE HYDROCHLORIDE 5 MG: 5 TABLET ORAL at 03:34

## 2021-09-01 RX ADMIN — OXYCODONE HYDROCHLORIDE 5 MG: 5 TABLET ORAL at 08:29

## 2021-09-01 RX ADMIN — IBUPROFEN 600 MG: 600 TABLET, FILM COATED ORAL at 16:00

## 2021-09-01 RX ADMIN — OXYCODONE HYDROCHLORIDE 5 MG: 5 TABLET ORAL at 11:59

## 2021-09-01 RX ADMIN — DIBASIC SODIUM PHOSPHATE, MONOBASIC POTASSIUM PHOSPHATE AND MONOBASIC SODIUM PHOSPHATE 1 TABLET: 852; 155; 130 TABLET ORAL at 08:29

## 2021-09-01 RX ADMIN — HEPARIN SODIUM 5000 UNITS: 5000 INJECTION INTRAVENOUS; SUBCUTANEOUS at 16:14

## 2021-09-01 NOTE — PROGRESS NOTES
Progress Note - Infectious Disease   John Ramirez 80 y o  female MRN: [id number]  Unit/Bed#: [facility] 930-01 Encounter: [id number]      Impression/Recommendations:  1  Abdominal abscesses  Patient developed LUQ abscess after complex abdominal surgery and pancreatic leak  8/18, s/p IR drain placement with aspiration of 60cc purulent fluid  Culture is growing Klebsiella oxytoca (now ESBL), Enterococcus faecium, and Bacteroides fragilis  Repeat CT A/P 8/20 showed improvement in the size of the collection  Patient's fevers continued, and repeat CT 8/26 showed increased size of the collection due to catheter occlusion  Taken back to IR 8/27, occluded catheter upsized to 12 Fr with aspiration of 50 ml purulent fluid  8/29 patient developed bleed from her drainage catheters and acute hemoglobin drop; repeat CTA A/P showed fluid collection along midline incision  6cc pus drained from midline incision collection              -for now, continue IV antibiotics with ampicillin 2g IV q6hr and Ertapenem 1g IV q24hr              -monitor drain output              -monitor fever curve              -anticipate switch back to oral antibiotics at discharge     2  Sepsis-fevers, leukocytosis  Likely secondary to abdominal abscesses  Now with intraabdominal bleeding 8/29  Other considerations are due to pancreatic leak, malignancy, as well as septic pelvic thrombophlebitis  Although less common, this can occur after hysterectomy and pelvic surgery  COVID negative                -[id number] as above              -anticoagulation held due to bleed             3  Pancreatic leak  Surgical complication   Abdominal drains in place      4  Ovarian cancer  8/6 status post ex lap, en-bloc hysterectomy, bilateral salpingo-oophorectomy, sigmoidectomy with low rectal anastomosis, SBR, radical omentectomy, splenectomy, appendectomy, oversewing of tail of pancreas, repair of cystotomy, bilateral peritoneal stripping   Surgery was complicated by pancreatic tail hemorrhage and low rectal reanastomosis  Imaging shows liver metastases              -care per Gyn onc     5  Status post splenectomy  Received HiB, meningococcal quadrivalent vaccine              -needs PCV13 prior to discharge     Antibiotics:  Ampicillin   Ertapenem    Subjective:  Patient seen on PM rounds  Doing well  Denies fevers, chills, sweats, nausea, vomiting, or diarrhea  24 Hour Events:  No documented fevers, chills, sweats, nausea, vomiting, or diarrhea  Objective:  Vitals:  Temp:  [98 4 °F (36 9 °C)-99 6 °F (37 6 °C)] 98 4 °F (36 9 °C)  HR:  [109-114] 114  Resp:  [18] 18  BP: (121-142)/(71) 142/71  SpO2:  [91 %-95 %] 95 %  Temp (24hrs), Av °F (37 2 °C), Min:98 4 °F (36 9 °C), Max:99 6 °F (37 6 °C)  Current: Temperature: 98 4 °F (36 9 °C)    Physical Exam:   General:  No acute distress  Psychiatric:  Awake and alert  Pulmonary:  Normal respiratory excursion without accessory muscle use  Abdomen:  Soft, obese  Drains with bloody fluid  Extremities:  No edema  Skin:  No rashes    Lab Results:  I have personally reviewed pertinent labs    Results from last 7 days   Lab Units 21  0616 21  0706 21  0350 21  0618   POTASSIUM mmol/L 3 3* 3 6 3 4* 3 5   CHLORIDE mmol/L 101 98* 100 98*   CO2 mmol/L 25 27 28 28   BUN mg/dL 5 6 7 6   CREATININE mg/dL 0 28* 0 37* 0 21* 0 34*   EGFR ml/min/1 73sq m 119 109 131 112   CALCIUM mg/dL 7 1* 7 5* 7 6* 8 1*   AST U/L  --   --  36 61*   ALT U/L  --   --  35 44   ALK PHOS U/L  --   --  469* 574*     Results from last 7 days   Lab Units 21  1156 21  0617 21  0706   WBC Thousand/uL 10 81* 9 08 9 81   HEMOGLOBIN g/dL 8 9* 7 6* 8 0*   PLATELETS Thousands/uL 658* 563* 585*     Results from last 7 days   Lab Units 21  1433   GRAM STAIN RESULT  3+ Polys  No organisms seen   BODY FLUID CULTURE, STERILE  No growth       Imaging Studies:   I have personally reviewed pertinent imaging study reports and images in PACS  EKG, Pathology, and Other Studies:   I have personally reviewed pertinent reports

## 2021-09-01 NOTE — PROGRESS NOTES
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  Post-operative course complicated by continued febrile episodes  Last fever 101 8F at 1403 on 8/29/2021       Plan:  S/p surgery  - Spontaneously voiding  - Tolerating regular diet  - Continue IS/encourage ambulation   - Pain controlled on current regimen:   Tyra 5 mg Q4h scheduled     Tylenol 975mg Q6h PRN (mild)   Tyra 10mg Q4h PRN (moderate, severe)   Motrin 600mg Q6h PRN (fever)   Dilaudid 0 5 IV Q2h PRN (breakthrough)    Bloody drain output  - Acute drop in Hgb yesterday from 10 --> 8 6 --> 8 3 --> 7 0 --> 2u pRBC --> 9 0 --> 8 3 --> AM labs pending  - Debroah Sow blood noted in OSCAR drain  - CTA with no evidence active extravasation    Pancreatic leak with abscess formation   - Collection slightly increased in size on CT done 8/26/21 (compared to 8/20/21)  - AM labs pending  - Maintain both drains for now, routine drain care    - Back drain: approximately 25cc over 12 hours   - LLQ drain: approximately 0cc over 12 hours  - Repeat blood cultures (8/23) show no growth at 72h  - Aerobic and anaerobic wound (8/23) culture without growth   - Drain culture +klebsiella  - On PO augmentin and ciprofloxacin  - Continues to febrile, last fever less than 24h ago    Fever of unknown origin  - Pancreatic leak with abscess formation slightly increased in size on CT completed 8/26 (compared to 8/20)  - Repeat blood cultures (8/23) show no growth at 72h  - Anaerobic wound (8/23) culture without growth   - Wound culture (8/23) +klebsiella   - Drain culture +klebsiella  - 6mL of pus drained from incision yesterday, will likely need wound opened today at bedside  - Now on ertapenem and ampicillin   - LE dopplers completed 8/26 with no evidence of DVT    Hyponatremia  - AM labs pending  - On NS 50cc/hr   - TSH WNL  - Mag WNL   - Appreciate SLIM recommendations    Wound seroma   - Packing in place   - Wound care consulted, appreciate recommendations  - Plan for daily dressing changes, done this morning on rounds    Acute respiratory failure   -  Has not required supplemental O2 for several days  - With persistent pleural effusion   - S/p CXR 8/20 with mildly increased small to moderate left pleural effusion  - CT scan 8/29/21 shows that pleural effusion remains stable in size, with adjacent compressive atelectasis  - Continue to monitor closely    S/p splenectomy with thrombocytosis   - AM labs pending  - Continue to trend   - Continue ASA   - Splenectomy vaccines prior to discharge     High grade serous ovarian carcinoma   - Final path resulted  - Pt discussed at tumor board on 8/20, plan is for eventual systemic therapy, genetic and genomic testing    HTN/HLD  Continue home meds amlodipine and pravastatin    Disposition: remain inpatient for ongoing treatment     Subjective:  Anthony Casiano is doing well  She reports that she got a little behind on her pain medication overnight and required dilaud She is ambulating, voiding and having BM  She denies chest pain, SOB, nausea, vomiting  She has no new complaints  Review of Systems   Constitutional: Negative for appetite change  Respiratory: Negative for chest tightness and shortness of breath  Cardiovascular: Negative for chest pain  Gastrointestinal: Negative for abdominal pain, nausea and vomiting  Genitourinary: Negative for dysuria  Neurological: Negative for light-headedness and headaches  Psychiatric/Behavioral: Negative for confusion  All other systems reviewed and are negative        Objective:   /68   Pulse (!) 116   Temp 99 8 °F (37 7 °C)   Resp 18   Ht 4' 11" (1 499 m)   Wt 77 3 kg (170 lb 4 9 oz)   SpO2 91% BMI 34 40 kg/m²     I/O last 3 completed shifts: In: 2235 [P O :680; I V :1285; NG/GT:20; IV Piggyback:250]  Out: 250 [Urine:200; Drains:50]  I/O this shift:  In: -   Out: 600 [Urine:600]     I/O       08/22 0701 - 08/23 0700 08/23 0701 - 08/24 0700    P  O  180     I V  (mL/kg)  30 (0 4)    NG/GT  0    IV Piggyback  100    Total Intake(mL/kg) 180 (2 3) 130 (1 7)    Urine (mL/kg/hr) 1200 (0 6) 700 (0 4)    Drains 25 25    Stool  0    Total Output 1225 725    Net -1045 -595          Unmeasured Stool Occurrence  1 x          Lab Results   Component Value Date    WBC 9 81 08/31/2021    HGB 8 0 (L) 08/31/2021    HCT 24 6 (L) 08/31/2021    MCV 88 08/31/2021     (H) 08/31/2021       Lab Results   Component Value Date    GLUCOSE 195 (H) 08/06/2021    CALCIUM 7 5 (L) 08/31/2021    K 3 6 08/31/2021    CO2 27 08/31/2021    CL 98 (L) 08/31/2021    BUN 6 08/31/2021    CREATININE 0 37 (L) 08/31/2021       Lab Results   Component Value Date/Time    POCGLU 127 09/01/2021 12:17 AM    POCGLU 154 (H) 08/19/2021 10:23 AM    POCGLU 161 (H) 08/19/2021 06:58 AM    POCGLU 158 (H) 08/18/2021 09:09 PM    POCGLU 171 (H) 08/18/2021 04:33 PM       Physical Exam  Constitutional:       General: She is not in acute distress  Appearance: She is obese  She is not ill-appearing or diaphoretic  HENT:      Head: Normocephalic and atraumatic  Eyes:      Conjunctiva/sclera: Conjunctivae normal       Pupils: Pupils are equal, round, and reactive to light  Cardiovascular:      Rate and Rhythm: Normal rate and regular rhythm  Pulses: Normal pulses  Heart sounds: Normal heart sounds  Pulmonary:      Effort: Pulmonary effort is normal  No respiratory distress  Breath sounds: Normal breath sounds  Abdominal:      General: Abdomen is flat  There is no distension  Tenderness: There is no abdominal tenderness  There is no guarding        Comments: Abdominal dressing C/D/I    LLQ OSCAR drain in place, recently emptied, minimal bloody fluid noted    Posterior Left lower back drain in place, dark, old blood noted in drain     Musculoskeletal:         General: Normal range of motion  Cervical back: Normal range of motion  Skin:     General: Skin is warm and dry  Capillary Refill: Capillary refill takes less than 2 seconds  Neurological:      General: No focal deficit present  Mental Status: She is alert and oriented to person, place, and time  Mental status is at baseline     Psychiatric:         Mood and Affect: Mood normal          Behavior: Behavior normal          Harrison Corey MD   PGY-4, GYN ONC  9/1/2021 6:20 AM ABW used to calculate energy needs d/t pt's current body weight % of IBW (109%). Needs adjusted for moderate malnutrition, advanced age, and post op. Aim for higher end of needs.

## 2021-09-02 ENCOUNTER — APPOINTMENT (INPATIENT)
Dept: RADIOLOGY | Facility: HOSPITAL | Age: 71
DRG: 329 | End: 2021-09-02
Payer: COMMERCIAL

## 2021-09-02 LAB
ANION GAP SERPL CALCULATED.3IONS-SCNC: 3 MMOL/L (ref 4–13)
BUN SERPL-MCNC: 6 MG/DL (ref 5–25)
CALCIUM SERPL-MCNC: 8.3 MG/DL (ref 8.3–10.1)
CHLORIDE SERPL-SCNC: 104 MMOL/L (ref 100–108)
CO2 SERPL-SCNC: 28 MMOL/L (ref 21–32)
CREAT SERPL-MCNC: 0.28 MG/DL (ref 0.6–1.3)
ERYTHROCYTE [DISTWIDTH] IN BLOOD BY AUTOMATED COUNT: 15.9 % (ref 11.6–15.1)
GFR SERPL CREATININE-BSD FRML MDRD: 119 ML/MIN/1.73SQ M
GLUCOSE SERPL-MCNC: 95 MG/DL (ref 65–140)
HCT VFR BLD AUTO: 25.3 % (ref 34.8–46.1)
HGB BLD-MCNC: 7.9 G/DL (ref 11.5–15.4)
MCH RBC QN AUTO: 28.3 PG (ref 26.8–34.3)
MCHC RBC AUTO-ENTMCNC: 31.2 G/DL (ref 31.4–37.4)
MCV RBC AUTO: 91 FL (ref 82–98)
PLATELET # BLD AUTO: 611 THOUSANDS/UL (ref 149–390)
PMV BLD AUTO: 9.8 FL (ref 8.9–12.7)
POTASSIUM SERPL-SCNC: 3.3 MMOL/L (ref 3.5–5.3)
RBC # BLD AUTO: 2.79 MILLION/UL (ref 3.81–5.12)
SODIUM SERPL-SCNC: 135 MMOL/L (ref 136–145)
WBC # BLD AUTO: 8.61 THOUSAND/UL (ref 4.31–10.16)

## 2021-09-02 PROCEDURE — 99024 POSTOP FOLLOW-UP VISIT: CPT | Performed by: OBSTETRICS & GYNECOLOGY

## 2021-09-02 PROCEDURE — 85027 COMPLETE CBC AUTOMATED: CPT | Performed by: OBSTETRICS & GYNECOLOGY

## 2021-09-02 PROCEDURE — 80048 BASIC METABOLIC PNL TOTAL CA: CPT | Performed by: OBSTETRICS & GYNECOLOGY

## 2021-09-02 PROCEDURE — G1004 CDSM NDSC: HCPCS

## 2021-09-02 PROCEDURE — 74177 CT ABD & PELVIS W/CONTRAST: CPT

## 2021-09-02 PROCEDURE — 99233 SBSQ HOSP IP/OBS HIGH 50: CPT | Performed by: INTERNAL MEDICINE

## 2021-09-02 RX ADMIN — IBUPROFEN 600 MG: 600 TABLET, FILM COATED ORAL at 16:19

## 2021-09-02 RX ADMIN — OXYCODONE HYDROCHLORIDE 10 MG: 10 TABLET ORAL at 16:19

## 2021-09-02 RX ADMIN — DIBASIC SODIUM PHOSPHATE, MONOBASIC POTASSIUM PHOSPHATE AND MONOBASIC SODIUM PHOSPHATE 1 TABLET: 852; 155; 130 TABLET ORAL at 08:41

## 2021-09-02 RX ADMIN — AMPICILLIN SODIUM 2000 MG: 2 INJECTION, POWDER, FOR SOLUTION INTRAMUSCULAR; INTRAVENOUS at 23:48

## 2021-09-02 RX ADMIN — PRAVASTATIN SODIUM 10 MG: 10 TABLET ORAL at 16:19

## 2021-09-02 RX ADMIN — DIBASIC SODIUM PHOSPHATE, MONOBASIC POTASSIUM PHOSPHATE AND MONOBASIC SODIUM PHOSPHATE 1 TABLET: 852; 155; 130 TABLET ORAL at 16:19

## 2021-09-02 RX ADMIN — OXYCODONE HYDROCHLORIDE 5 MG: 5 TABLET ORAL at 11:19

## 2021-09-02 RX ADMIN — HEPARIN SODIUM 5000 UNITS: 5000 INJECTION INTRAVENOUS; SUBCUTANEOUS at 06:18

## 2021-09-02 RX ADMIN — IOHEXOL 100 ML: 350 INJECTION, SOLUTION INTRAVENOUS at 14:48

## 2021-09-02 RX ADMIN — OXYCODONE HYDROCHLORIDE 5 MG: 5 TABLET ORAL at 03:27

## 2021-09-02 RX ADMIN — IBUPROFEN 600 MG: 600 TABLET, FILM COATED ORAL at 08:41

## 2021-09-02 RX ADMIN — ACETAMINOPHEN 975 MG: 325 TABLET, FILM COATED ORAL at 21:24

## 2021-09-02 RX ADMIN — DIBASIC SODIUM PHOSPHATE, MONOBASIC POTASSIUM PHOSPHATE AND MONOBASIC SODIUM PHOSPHATE 1 TABLET: 852; 155; 130 TABLET ORAL at 11:14

## 2021-09-02 RX ADMIN — IBUPROFEN 600 MG: 600 TABLET, FILM COATED ORAL at 03:27

## 2021-09-02 RX ADMIN — AMLODIPINE BESYLATE 10 MG: 10 TABLET ORAL at 08:41

## 2021-09-02 RX ADMIN — AMPICILLIN SODIUM 2000 MG: 2 INJECTION, POWDER, FOR SOLUTION INTRAMUSCULAR; INTRAVENOUS at 11:55

## 2021-09-02 RX ADMIN — PANTOPRAZOLE SODIUM 40 MG: 40 TABLET, DELAYED RELEASE ORAL at 06:18

## 2021-09-02 RX ADMIN — DOCUSATE SODIUM 100 MG: 100 CAPSULE, LIQUID FILLED ORAL at 08:41

## 2021-09-02 RX ADMIN — ERTAPENEM SODIUM 1000 MG: 1 INJECTION, POWDER, LYOPHILIZED, FOR SOLUTION INTRAMUSCULAR; INTRAVENOUS at 11:14

## 2021-09-02 RX ADMIN — DOCUSATE SODIUM 100 MG: 100 CAPSULE, LIQUID FILLED ORAL at 16:19

## 2021-09-02 RX ADMIN — OXYCODONE HYDROCHLORIDE 5 MG: 5 TABLET ORAL at 23:48

## 2021-09-02 RX ADMIN — IBUPROFEN 600 MG: 600 TABLET, FILM COATED ORAL at 21:24

## 2021-09-02 RX ADMIN — SODIUM CHLORIDE 50 ML/HR: 0.9 INJECTION, SOLUTION INTRAVENOUS at 06:02

## 2021-09-02 RX ADMIN — HEPARIN SODIUM 5000 UNITS: 5000 INJECTION INTRAVENOUS; SUBCUTANEOUS at 21:24

## 2021-09-02 RX ADMIN — AMPICILLIN SODIUM 2000 MG: 2 INJECTION, POWDER, FOR SOLUTION INTRAMUSCULAR; INTRAVENOUS at 16:19

## 2021-09-02 RX ADMIN — SODIUM CHLORIDE 50 ML/HR: 0.9 INJECTION, SOLUTION INTRAVENOUS at 23:48

## 2021-09-02 RX ADMIN — ACETAMINOPHEN 975 MG: 325 TABLET, FILM COATED ORAL at 06:18

## 2021-09-02 RX ADMIN — AMPICILLIN SODIUM 2000 MG: 2 INJECTION, POWDER, FOR SOLUTION INTRAMUSCULAR; INTRAVENOUS at 06:04

## 2021-09-02 RX ADMIN — ACETAMINOPHEN 975 MG: 325 TABLET, FILM COATED ORAL at 13:14

## 2021-09-02 NOTE — PROGRESS NOTES
Progress Note - Infectious Disease   Sammie Nikolski 80 y o  female MRN: [id number]  Unit/Bed#: [facility] 930-01 Encounter: [id number]      Impression/Recommendations:  1  Abdominal abscesses  Following complex abdominal surgery and pancreatic leak  8/18, s/p IR drain placement with pus  Culture with Klebsiella oxytoca (now ESBL), Enterococcus, Bacteroides  8/27, occluded catheter upsized to 12 Fr with aspiration of 50 ml purulent fluid   8/29, patient developed bleed from her drainage catheters, CTA A/P showed fluid collection along midline incision  6cc pus drained from midline incision collection  9/2 CT shows stable collection with drain in place  Rec:  · Continue ampicillin, ertapenem for now pending clarification of #2  · Drain per IR  · Follow temperatures closely  · If no evidence for empyema, can change to amoxicillin, doxycycline with plan to continue through 9/6 to complete 14 days total antibiotics     2  Left pleural effusion  In setting of recent surgery, splenectomy  CT now suggests enhancement  Consider empyema although appears remarkable well  Rec:  · Consider repeat thoracentesis to evaluate if chest tube, more prolonged antibiotic course indicated     3  Sepsis  Evolving 8/17:  Fever, WBC  Due to #2  Improving  Rec:  · Continue antibiotics as above  · Follow temperatures closely  · Check CBC in AM             3  Pancreatic leak  Surgical complication  Abdominal drain in place      4  Ovarian cancer  8/6 status post ex lap, en-bloc hysterectomy, bilateral salpingo-oophorectomy, sigmoidectomy with low rectal anastomosis, SBR, radical omentectomy, splenectomy, appendectomy, oversewing of tail of pancreas, repair of cystotomy, bilateral peritoneal stripping   Surgery was complicated by pancreatic tail hemorrhage and low rectal reanastomosis  Imaging shows liver metastases       5  Status post splenectomy  Received HiB, meningococcal quadrivalent vaccine    Rec:  · Needs PCV13 prior to discharge     Antibiotics:  Ampicillin   Ertapenem  ESBL antibiotics #10    Subjective:  Patient seen on PM rounds  Denies fevers, chills, sweats, nausea, vomiting, or diarrhea  24 Hour Events:  No documented fevers, chills, sweats, nausea, vomiting, or diarrhea  Objective:  Vitals:  Temp:  [97 5 °F (36 4 °C)-98 3 °F (36 8 °C)] 98 3 °F (36 8 °C)  HR:  [90-98] 98  Resp:  [18] 18  BP: (119-121)/(65-67) 119/67  SpO2:  [94 %-95 %] 94 %  Temp (24hrs), Av 9 °F (36 6 °C), Min:97 5 °F (36 4 °C), Max:98 3 °F (36 8 °C)  Current: Temperature: 98 3 °F (36 8 °C)    Physical Exam:   General:  No acute distress  Psychiatric:  Awake and alert  Pulmonary:  Normal respiratory excursion without accessory muscle use  Abdomen:  Soft, nontender  Extremities:  No edema  Skin:  No rashes    Lab Results:  I have personally reviewed pertinent labs  Results from last 7 days   Lab Units 21  0610 21  0616 21  0706 21  0350 21  0618   POTASSIUM mmol/L 3 3* 3 3* 3 6 3 4* 3 5   CHLORIDE mmol/L 104 101 98* 100 98*   CO2 mmol/L 28 25 27 28 28   BUN mg/dL 6 5 6 7 6   CREATININE mg/dL 0 28* 0 28* 0 37* 0 21* 0 34*   EGFR ml/min/1 73sq m 119 119 109 131 112   CALCIUM mg/dL 8 3 7 1* 7 5* 7 6* 8 1*   AST U/L  --   --   --  36 61*   ALT U/L  --   --   --  35 44   ALK PHOS U/L  --   --   --  469* 574*     Results from last 7 days   Lab Units 21  0610 21  1156 21  0617   WBC Thousand/uL 8 61 10 81* 9 08   HEMOGLOBIN g/dL 7 9* 8 9* 7 6*   PLATELETS Thousands/uL 611* 658* 563*     Results from last 7 days   Lab Units 21  1433   GRAM STAIN RESULT  3+ Polys  No organisms seen   BODY FLUID CULTURE, STERILE  No growth       Imaging Studies:   I have personally reviewed pertinent imaging study reports and images in PACS  EKG, Pathology, and Other Studies:   I have personally reviewed pertinent reports

## 2021-09-03 ENCOUNTER — APPOINTMENT (OUTPATIENT)
Dept: RADIOLOGY | Facility: HOSPITAL | Age: 71
End: 2021-09-03
Payer: COMMERCIAL

## 2021-09-03 ENCOUNTER — APPOINTMENT (INPATIENT)
Dept: RADIOLOGY | Facility: HOSPITAL | Age: 71
DRG: 329 | End: 2021-09-03
Payer: COMMERCIAL

## 2021-09-03 LAB
AMYLASE FLD QL: 1534 U/L
ANION GAP SERPL CALCULATED.3IONS-SCNC: 6 MMOL/L (ref 4–13)
APPEARANCE FLD: ABNORMAL
BACTERIA SPEC ANAEROBE CULT: NO GROWTH
BACTERIA SPEC BFLD CULT: NO GROWTH
BASOPHILS NFR FLD MANUAL: 6 %
BUN SERPL-MCNC: 7 MG/DL (ref 5–25)
CALCIUM SERPL-MCNC: 8.4 MG/DL (ref 8.3–10.1)
CHLORIDE SERPL-SCNC: 103 MMOL/L (ref 100–108)
CO2 SERPL-SCNC: 27 MMOL/L (ref 21–32)
COLOR FLD: ABNORMAL
CREAT SERPL-MCNC: 0.3 MG/DL (ref 0.6–1.3)
EOSINOPHIL NFR FLD MANUAL: 2 %
ERYTHROCYTE [DISTWIDTH] IN BLOOD BY AUTOMATED COUNT: 16.1 % (ref 11.6–15.1)
GFR SERPL CREATININE-BSD FRML MDRD: 116 ML/MIN/1.73SQ M
GLUCOSE SERPL-MCNC: 106 MG/DL (ref 65–140)
GRAM STN SPEC: NORMAL
GRAM STN SPEC: NORMAL
HCT VFR BLD AUTO: 26.3 % (ref 34.8–46.1)
HGB BLD-MCNC: 8.3 G/DL (ref 11.5–15.4)
LYMPHOCYTES NFR BLD AUTO: 75 %
MCH RBC QN AUTO: 28.6 PG (ref 26.8–34.3)
MCHC RBC AUTO-ENTMCNC: 31.6 G/DL (ref 31.4–37.4)
MCV RBC AUTO: 91 FL (ref 82–98)
MONOCYTES NFR BLD AUTO: 6 %
NEUTS SEG NFR BLD AUTO: 11 %
PLATELET # BLD AUTO: 713 THOUSANDS/UL (ref 149–390)
PMV BLD AUTO: 9.6 FL (ref 8.9–12.7)
POTASSIUM SERPL-SCNC: 3.3 MMOL/L (ref 3.5–5.3)
RBC # BLD AUTO: 2.9 MILLION/UL (ref 3.81–5.12)
SITE: ABNORMAL
SODIUM SERPL-SCNC: 136 MMOL/L (ref 136–145)
TOTAL CELLS COUNTED SPEC: 100
WBC # BLD AUTO: 9.22 THOUSAND/UL (ref 4.31–10.16)
WBC # FLD MANUAL: 242 /UL

## 2021-09-03 PROCEDURE — 88305 TISSUE EXAM BY PATHOLOGIST: CPT | Performed by: PATHOLOGY

## 2021-09-03 PROCEDURE — 49424 ASSESS CYST CONTRAST INJECT: CPT

## 2021-09-03 PROCEDURE — 80048 BASIC METABOLIC PNL TOTAL CA: CPT | Performed by: OBSTETRICS & GYNECOLOGY

## 2021-09-03 PROCEDURE — 99232 SBSQ HOSP IP/OBS MODERATE 35: CPT | Performed by: INTERNAL MEDICINE

## 2021-09-03 PROCEDURE — 32555 ASPIRATE PLEURA W/ IMAGING: CPT | Performed by: INTERNAL MEDICINE

## 2021-09-03 PROCEDURE — 88112 CYTOPATH CELL ENHANCE TECH: CPT | Performed by: PATHOLOGY

## 2021-09-03 PROCEDURE — 76937 US GUIDE VASCULAR ACCESS: CPT

## 2021-09-03 PROCEDURE — 99024 POSTOP FOLLOW-UP VISIT: CPT | Performed by: OBSTETRICS & GYNECOLOGY

## 2021-09-03 PROCEDURE — 0W9B3ZZ DRAINAGE OF LEFT PLEURAL CAVITY, PERCUTANEOUS APPROACH: ICD-10-PCS | Performed by: INTERNAL MEDICINE

## 2021-09-03 PROCEDURE — 82150 ASSAY OF AMYLASE: CPT | Performed by: OBSTETRICS & GYNECOLOGY

## 2021-09-03 PROCEDURE — 87070 CULTURE OTHR SPECIMN AEROBIC: CPT | Performed by: OBSTETRICS & GYNECOLOGY

## 2021-09-03 PROCEDURE — 49424 ASSESS CYST CONTRAST INJECT: CPT | Performed by: INTERNAL MEDICINE

## 2021-09-03 PROCEDURE — 32554 ASPIRATE PLEURA W/O IMAGING: CPT

## 2021-09-03 PROCEDURE — NC001 PR NO CHARGE: Performed by: INTERNAL MEDICINE

## 2021-09-03 PROCEDURE — 76080 X-RAY EXAM OF FISTULA: CPT

## 2021-09-03 PROCEDURE — 85027 COMPLETE CBC AUTOMATED: CPT | Performed by: OBSTETRICS & GYNECOLOGY

## 2021-09-03 PROCEDURE — 87205 SMEAR GRAM STAIN: CPT | Performed by: OBSTETRICS & GYNECOLOGY

## 2021-09-03 PROCEDURE — 76080 X-RAY EXAM OF FISTULA: CPT | Performed by: INTERNAL MEDICINE

## 2021-09-03 PROCEDURE — 89051 BODY FLUID CELL COUNT: CPT | Performed by: OBSTETRICS & GYNECOLOGY

## 2021-09-03 RX ORDER — LIDOCAINE WITH 8.4% SOD BICARB 0.9%(10ML)
SYRINGE (ML) INJECTION CODE/TRAUMA/SEDATION MEDICATION
Status: COMPLETED | OUTPATIENT
Start: 2021-09-03 | End: 2021-09-03

## 2021-09-03 RX ORDER — FENTANYL CITRATE 50 UG/ML
INJECTION, SOLUTION INTRAMUSCULAR; INTRAVENOUS CODE/TRAUMA/SEDATION MEDICATION
Status: COMPLETED | OUTPATIENT
Start: 2021-09-03 | End: 2021-09-03

## 2021-09-03 RX ADMIN — AMPICILLIN SODIUM 2000 MG: 2 INJECTION, POWDER, FOR SOLUTION INTRAMUSCULAR; INTRAVENOUS at 23:48

## 2021-09-03 RX ADMIN — AMLODIPINE BESYLATE 10 MG: 10 TABLET ORAL at 09:01

## 2021-09-03 RX ADMIN — AMPICILLIN SODIUM 2000 MG: 2 INJECTION, POWDER, FOR SOLUTION INTRAMUSCULAR; INTRAVENOUS at 12:23

## 2021-09-03 RX ADMIN — Medication 10 ML: at 11:22

## 2021-09-03 RX ADMIN — ACETAMINOPHEN 975 MG: 325 TABLET, FILM COATED ORAL at 21:34

## 2021-09-03 RX ADMIN — OXYCODONE HYDROCHLORIDE 5 MG: 5 TABLET ORAL at 16:36

## 2021-09-03 RX ADMIN — DIBASIC SODIUM PHOSPHATE, MONOBASIC POTASSIUM PHOSPHATE AND MONOBASIC SODIUM PHOSPHATE 1 TABLET: 852; 155; 130 TABLET ORAL at 12:23

## 2021-09-03 RX ADMIN — FENTANYL CITRATE 50 MCG: 50 INJECTION INTRAMUSCULAR; INTRAVENOUS at 11:34

## 2021-09-03 RX ADMIN — IBUPROFEN 600 MG: 600 TABLET, FILM COATED ORAL at 14:01

## 2021-09-03 RX ADMIN — AMPICILLIN SODIUM 2000 MG: 2 INJECTION, POWDER, FOR SOLUTION INTRAMUSCULAR; INTRAVENOUS at 05:06

## 2021-09-03 RX ADMIN — HEPARIN SODIUM 5000 UNITS: 5000 INJECTION INTRAVENOUS; SUBCUTANEOUS at 05:12

## 2021-09-03 RX ADMIN — DOCUSATE SODIUM 100 MG: 100 CAPSULE, LIQUID FILLED ORAL at 09:01

## 2021-09-03 RX ADMIN — IOHEXOL 10 ML: 350 INJECTION, SOLUTION INTRAVENOUS at 11:58

## 2021-09-03 RX ADMIN — DIBASIC SODIUM PHOSPHATE, MONOBASIC POTASSIUM PHOSPHATE AND MONOBASIC SODIUM PHOSPHATE 1 TABLET: 852; 155; 130 TABLET ORAL at 09:00

## 2021-09-03 RX ADMIN — HEPARIN SODIUM 5000 UNITS: 5000 INJECTION INTRAVENOUS; SUBCUTANEOUS at 13:03

## 2021-09-03 RX ADMIN — IBUPROFEN 600 MG: 600 TABLET, FILM COATED ORAL at 21:34

## 2021-09-03 RX ADMIN — ACETAMINOPHEN 975 MG: 325 TABLET, FILM COATED ORAL at 13:01

## 2021-09-03 RX ADMIN — PANTOPRAZOLE SODIUM 40 MG: 40 TABLET, DELAYED RELEASE ORAL at 05:12

## 2021-09-03 RX ADMIN — DIBASIC SODIUM PHOSPHATE, MONOBASIC POTASSIUM PHOSPHATE AND MONOBASIC SODIUM PHOSPHATE 1 TABLET: 852; 155; 130 TABLET ORAL at 16:25

## 2021-09-03 RX ADMIN — IBUPROFEN 600 MG: 600 TABLET, FILM COATED ORAL at 03:57

## 2021-09-03 RX ADMIN — SODIUM CHLORIDE 50 ML/HR: 0.9 INJECTION, SOLUTION INTRAVENOUS at 22:07

## 2021-09-03 RX ADMIN — HYDROMORPHONE HYDROCHLORIDE 0.5 MG: 1 INJECTION, SOLUTION INTRAMUSCULAR; INTRAVENOUS; SUBCUTANEOUS at 12:24

## 2021-09-03 RX ADMIN — PRAVASTATIN SODIUM 10 MG: 10 TABLET ORAL at 16:25

## 2021-09-03 RX ADMIN — DOCUSATE SODIUM 100 MG: 100 CAPSULE, LIQUID FILLED ORAL at 18:00

## 2021-09-03 RX ADMIN — OXYCODONE HYDROCHLORIDE 10 MG: 10 TABLET ORAL at 10:14

## 2021-09-03 RX ADMIN — HYDROMORPHONE HYDROCHLORIDE 0.5 MG: 1 INJECTION, SOLUTION INTRAMUSCULAR; INTRAVENOUS; SUBCUTANEOUS at 08:59

## 2021-09-03 RX ADMIN — OXYCODONE HYDROCHLORIDE 10 MG: 10 TABLET ORAL at 21:34

## 2021-09-03 RX ADMIN — IBUPROFEN 600 MG: 600 TABLET, FILM COATED ORAL at 09:01

## 2021-09-03 RX ADMIN — ACETAMINOPHEN 975 MG: 325 TABLET, FILM COATED ORAL at 05:12

## 2021-09-03 RX ADMIN — ERTAPENEM SODIUM 1000 MG: 1 INJECTION, POWDER, LYOPHILIZED, FOR SOLUTION INTRAMUSCULAR; INTRAVENOUS at 10:14

## 2021-09-03 RX ADMIN — AMPICILLIN SODIUM 2000 MG: 2 INJECTION, POWDER, FOR SOLUTION INTRAMUSCULAR; INTRAVENOUS at 16:24

## 2021-09-03 RX ADMIN — HEPARIN SODIUM 5000 UNITS: 5000 INJECTION INTRAVENOUS; SUBCUTANEOUS at 21:34

## 2021-09-03 NOTE — PROGRESS NOTES
Interventional Radiology:    Patient presenting with moderate left pleural effusion: plan for US guided thoracentesis  A LLQ abscess drain is present, initially placed on 8 18, subsequently exchanged and upsized to 12F on 8 27  Recent CT scan on 9/2 demonstrates no residual collection  Minimal output from the drain per patient   Plan for sinogram, possible tube exchange vs removal

## 2021-09-03 NOTE — BRIEF OP NOTE (RAD/CATH)
INTERVENTIONAL RADIOLOGY PROCEDURE NOTE    Date: 9/3/2021    Procedure: LAPAROSCOPY DIAGNOSTIC (N/A Abdomen)  LAPAROTOMY EXPLORATORY; OVER SEW PANCREAS TAIL (N/A Abdomen)  ENBLOCK HYSTERECTOMY, BILATERAL SALPINGO-OOPHORECTOMY, SIGMOIDECTOMY WITH LOW RECTAL REANASTAMOSIS, BLADDER PERITONEAL STRIPPING AND CYSTOTOMY REPAIR, DIAPHRAGM STRIPPING, SMALL BOWEL RESECTION WITH RE-ANASTAMOSIS (N/A Abdomen)  RADICAL OMENTECTOMY (N/A Abdomen)  SPLENECTOMY (N/A Abdomen)  APPENDECTOMY (N/A Abdomen)    Preoperative diagnosis:   1  Peritoneal carcinomatosis ([address] )    2  Hypoxemia    3  Pancreatic fluid leak    4  Thrombocytosis after splenectomy ([address] )    5  Status post small bowel resection    6  S/P bladder repair    7  FHx: PROMISE-BSO (total abdominal hysterectomy and bilateral salpingo-oophorectomy)    8  Encounter for ablation of malignant neoplasm with goal of debulking/cytoreduction ([address] )    9  Leukocytosis, unspecified type    10  Hyponatremia    11  Bleeding         Postoperative diagnosis: Same  Surgeon: Cameron Mohs, MD     Assistant: None  No qualified resident was available  Blood loss: none    Specimens:     Left thoracentesis aspirate obtained for analysis     Findings:    Left pleural effusion with numerous loculations  Thoracentesis yielding 200cc of serosanguinous fluid  If additional drainage is desired, a pigtail chest tube could be considered  Sinogram of existing left lower quadrant 12F drain  No significant residual collection identified  No fistula identified  30-50ml of output per day per chart, which is too high to recommend removal at this point  Continue to record output  Flush daily  Plan to repeat sinogram in 7-10 days  Complications: None immediate      Anesthesia: local

## 2021-09-03 NOTE — PROGRESS NOTES
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  Post-operative course complicated by continued febrile episodes  Last fever 101 8F at 1403 on 8/29/2021       Plan:  S/p surgery  - Spontaneously voiding  - Tolerating regular diet  - Continue IS/encourage ambulation   - Pain controlled on current regimen:   Motrin 600mg q6h allison   Tylenol 975mg Q8h allison    Tyra 5 mg Q4h scheduled     Tyra 10mg Q4h PRN (moderate, severe)   Motrin 600mg Q6h PRN (fever)   Dilaudid 0 5 IV Q2h PRN (breakthrough)    Bloody drain output  - Kulwant Will blood noted in OSCAR drain over the weekend  - CTA with no evidence active extravasation  - Hgb has stabilized     Pancreatic leak with abscess formation   - Collection stable in size on CT done 9/3/2021, not yet resolved  - WBC normal this morning, 9 22  - Maintain both drains for now, routine drain care    - Back drain: approximately 40cc over 12 hours   - LLQ drain: approximately 5cc over 12 hours  - Repeat blood cultures (8/23) show no growth at 72h  - Aerobic and anaerobic wound (8/23) culture without growth   - Drain culture +klebsiella    Fever of unknown origin  - Collection stable in size on CT done 9/3/2021, not yet resolved  - Repeat blood cultures (8/23) show no growth at 72h  - Anaerobic wound (8/23) culture without growth   - Wound culture (8/23) +klebsiella   - Drain culture +klebsiella  - 6mL of pus drained from incision by IR, incision unable to be opened at bedside; no growth identified; can consider IR drainage/catheter placement if patient continues to be febrile  - Now on ertapenem and ampicillin, per ID will consider PO doxycycline and amoxicililn  - LE dopplers completed 8/26 with no evidence of DVT    Hyponatremia  - Stable, 136 this AM  - On NS 50cc/hr   - TSH WNL  - Mag WNL   - Appreciate SLIM recommendations    Wound seroma   - Packing in place   - Wound care consulted, appreciate recommendations  - Plan for daily dressing changes, nursing staff notified    Acute respiratory failure   -  Has not required supplemental O2 for several days  - With persistent pleural effusion   - S/p CXR 8/20 with mildly increased small to moderate left pleural effusion  - CT scan 9/3/21 shows stable pleural effusion  - Continue to monitor closely    S/p splenectomy with thrombocytosis   - Plt 713K this AM  - Continue to trend   - ASA discontinued   - Splenectomy vaccines prior to discharge     High grade serous ovarian carcinoma   - Final path resulted  - Pt discussed at tumor board on 8/20, plan is for eventual systemic therapy, genetic and genomic testing    HTN/HLD  Continue home meds amlodipine and pravastatin    Disposition: remain inpatient for ongoing treatment     Subjective:  Albaro Gaines is doing well  She reports that pain is overall controlled  She is ambulating, voiding and having BM  She denies chest pain, SOB, nausea, vomiting  She has no new complaints  Review of Systems   Constitutional: Negative for appetite change  Respiratory: Negative for chest tightness and shortness of breath  Cardiovascular: Negative for chest pain  Gastrointestinal: Negative for abdominal pain, nausea and vomiting  Genitourinary: Negative for dysuria  Neurological: Negative for light-headedness and headaches  Psychiatric/Behavioral: Negative for confusion  All other systems reviewed and are negative  Objective:   /75   Pulse (!) 108   Temp 98 9 °F (37 2 °C)   Resp 20   Ht 4' 11" (1 499 m)   Wt 83 5 kg (184 lb 1 4 oz)   SpO2 94%   BMI 37 18 kg/m²     I/O last 3 completed shifts: In: 2138 [P O :118;  I V :1790; NG/GT:30; IV Piggyback:200]  Out: 3830 [Urine:6150; Drains:85]  No intake/output data recorded  I/O       08/22 0701 - 08/23 0700 08/23 0701 - 08/24 0700    P  O  180     I V  (mL/kg)  30 (0 4)    NG/GT  0    IV Piggyback  100    Total Intake(mL/kg) 180 (2 3) 130 (1 7)    Urine (mL/kg/hr) 1200 (0 6) 700 (0 4)    Drains 25 25    Stool  0    Total Output 1225 725    Net -1045 -595          Unmeasured Stool Occurrence  1 x          Lab Results   Component Value Date    WBC 9 22 09/03/2021    HGB 8 3 (L) 09/03/2021    HCT 26 3 (L) 09/03/2021    MCV 91 09/03/2021     (H) 09/03/2021       Lab Results   Component Value Date    GLUCOSE 195 (H) 08/06/2021    CALCIUM 8 4 09/03/2021    K 3 3 (L) 09/03/2021    CO2 27 09/03/2021     09/03/2021    BUN 7 09/03/2021    CREATININE 0 30 (L) 09/03/2021       Lab Results   Component Value Date/Time    POCGLU 127 09/01/2021 12:17 AM    POCGLU 154 (H) 08/19/2021 10:23 AM    POCGLU 161 (H) 08/19/2021 06:58 AM    POCGLU 158 (H) 08/18/2021 09:09 PM    POCGLU 171 (H) 08/18/2021 04:33 PM       Physical Exam  Constitutional:       General: She is not in acute distress  Appearance: She is obese  She is not ill-appearing or diaphoretic  HENT:      Head: Normocephalic and atraumatic  Eyes:      Conjunctiva/sclera: Conjunctivae normal       Pupils: Pupils are equal, round, and reactive to light  Cardiovascular:      Rate and Rhythm: Normal rate and regular rhythm  Pulses: Normal pulses  Heart sounds: Normal heart sounds  Pulmonary:      Effort: Pulmonary effort is normal  No respiratory distress  Breath sounds: Normal breath sounds  Abdominal:      General: Abdomen is flat  There is no distension  Tenderness: There is no abdominal tenderness  There is no guarding  Comments: Abdominal dressing C/D/I    LLQ OSCRA drain in place, recently emptied, minimal bloody fluid noted    Posterior Left lower back drain in place, dark, old blood noted in drain     Musculoskeletal:         General: Normal range of motion  Cervical back: Normal range of motion  Skin:     General: Skin is warm and dry  Capillary Refill: Capillary refill takes less than 2 seconds  Neurological:      General: No focal deficit present  Mental Status: She is alert and oriented to person, place, and time  Mental status is at baseline     Psychiatric:         Mood and Affect: Mood normal          Behavior: Behavior normal          Maria Luisa Jones MD   PGY-4, GYN ONC  9/3/2021 7:13 AM

## 2021-09-03 NOTE — UTILIZATION REVIEW
Continued Stay Review    Date: 9/3                          Current Patient Class: IP  Current Level of Care: MS    HPI:71 y o  female initially admitted on 8/6 with fr ablation of malignant neoplasm with a goal of debulking - crytoreduction, pt with pancreatic leak and intra-abdominal abscess  S/p IR drainage 8/18 and drain upsizing 8/27 now with increasing bloody drainage in  And around OSCAR drain  ( improved)    Assessment/Plan:   VS stable, no bleeding notes from OSCAR drain, has 2 drains in place that are draining - Back drain 40 cc/ 12 hr, LLQ drain 5 cc/12 hrs  No growth on repeat blood cultures  Drain culture + Klebsiella  Pt had L thoracentesis aspirate done today - 200 cc SS fluid out  If additional drainage is desired, could put in a pigtail catheter  Pt remains afebrile, intermittently tachycardic  Remains on 2 IV antibiotics, IV fluids at 50 ml/hr  Continues to use both parenteral and oral analgesia today       +++++++++++++++++++++++  9/3 IR  Procedure NOTE - Left thoracentesis aspirate obtained for analysis    Left pleural effusion with numerous loculations  Thoracentesis yielding 200cc of serosanguinous fluid  If additional drainage is desired, a pigtail chest tube could be considered       Sinogram of existing left lower quadrant 12F drain  No significant residual collection identified  No fistula identified  30-50ml of output per day per chart, which is too high to recommend removal at this point  Continue to record output   Flush daily  Plan to repeat sinogram in 7-10 days   ++++++++++++++++++++++++++     Vital Signs:   09/03/21 1542  98 5 °F (36 9 °C)  96  16  110/61  77  93 %  --   09/03/21 11:46:31  --  104  --  100/56  --  96 %  --   09/03/21 11:38:30  --  101  --  122/60  --  94 %  --   09/03/21 11:15:06  --  106Abnormal   --  --  --  97 %  --   09/03/21 06:39:59  98 9 °F (37 2 °C)  108Abnormal   20  131/75  94  94 %  --   09/03/21 0002  --  --  --  --  --  --  None (Room air) 09/02/21 22:25:06  98 °F (36 7 °C)  97  17  119/67  84  93 %  --   09/02/21 15:28:07  98 3 °F (36 8 °C)  98  18  119/67  84  94 %  --   09/02/21 08:10:49  97 5 °F (36 4 °C)  90  18  119/65  83  95 %  --   09/02/21 0200  --  --  --  --  --  --  None (Room air)   09/01/21 21:09:12  97 8 °F (36 6 °C)  98  18  121/65  84  94 %  --   09/01/21 15:22:55  98 4 °F (36 9 °C)  114Abnormal   18  142/71  95  95 %  --   09/01/21 08:32:21  99 6 °F (37 6 °C)  109Abnormal   --  121/71  88  91 %  --   09/01/21 0832  --  --  --  121/71  --  --  --   09/01/21 0800  --  --  --  --  --  92 %  None (Room      Pertinent Labs/Diagnostic Results:       Results from last 7 days   Lab Units 09/03/21  0511 09/02/21  0610 09/01/21  1156 09/01/21  0617 08/31/21  0706 08/30/21  0418 08/28/21  0928   WBC Thousand/uL 9 22 8 61 10 81* 9 08 9 81 10 51* 8 98   HEMOGLOBIN g/dL 8 3* 7 9* 8 9* 7 6* 8 0* 9 0* 10 0*   HEMATOCRIT % 26 3* 25 3* 27 9* 23 5* 24 6* 27 2* 31 7*   PLATELETS Thousands/uL 713* 611* 658* 563* 585* 606* 854*   NEUTROS ABS Thousands/µL  --   --   --   --   --  7 75* 6 12         Results from last 7 days   Lab Units 09/03/21  0511 09/02/21  0610 09/01/21  0616 08/31/21  0706 08/30/21  0350   SODIUM mmol/L 136 135* 132* 131* 133*   POTASSIUM mmol/L 3 3* 3 3* 3 3* 3 6 3 4*   CHLORIDE mmol/L 103 104 101 98* 100   CO2 mmol/L 27 28 25 27 28   ANION GAP mmol/L 6 3* 6 6 5   BUN mg/dL 7 6 5 6 7   CREATININE mg/dL 0 30* 0 28* 0 28* 0 37* 0 21*   EGFR ml/min/1 73sq m 116 119 119 109 131   CALCIUM mg/dL 8 4 8 3 7 1* 7 5* 7 6*   PHOSPHORUS mg/dL  --   --   --  2 8  --      Results from last 7 days   Lab Units 08/30/21  0350 08/29/21  0618   AST U/L 36 61*   ALT U/L 35 44   ALK PHOS U/L 469* 574*   TOTAL PROTEIN g/dL 5 5* 6 1*   ALBUMIN g/dL 1 3* 1 4*   TOTAL BILIRUBIN mg/dL 0 38 0 40     Results from last 7 days   Lab Units 09/01/21  0017   POC GLUCOSE mg/dl 127     Results from last 7 days   Lab Units 09/03/21  0511 09/02/21  0610 09/01/21  9042 08/31/21  0706 08/30/21  0350 08/29/21  0618 08/28/21  0556   GLUCOSE RANDOM mg/dL 106 95 93 115 111 110 112     Results from last 7 days   Lab Units 08/29/21  1301   PROTIME seconds 15 1*   INR  1 19     Results from last 7 days   Lab Units 08/31/21  1433   GRAM STAIN RESULT  3+ Polys  No organisms seen   BODY FLUID CULTURE, STERILE  No growth     Results from last 7 days   Lab Units 09/03/21  1147   WBC FLUID /ul 242     Medications:   Scheduled Medications:  acetaminophen, 975 mg, Oral, Q8H [address]  amLODIPine, 10 mg, Oral, Daily  ampicillin, 2,000 mg, Intravenous, Q6H  docusate sodium, 100 mg, Oral, BID  ertapenem, 1,000 mg, Intravenous, Q24H  heparin (porcine), 5,000 Units, Subcutaneous, Q8H ANA MARIA  ibuprofen, 600 mg, Oral, Q6H  pantoprazole, 40 mg, Oral, Early Morning  potassium-sodium phosphateS, 1 tablet, Oral, TID With Meals  pravastatin, 10 mg, Oral, Daily With Dinner      Continuous IV Infusions:  sodium chloride, 50 mL/hr, Intravenous, Continuous      PRN Meds:  albuterol, 2 puff, Inhalation, Q4H PRN  HYDROmorphone, 0 5 mg, Intravenous, Q2H PRN - x 2 9/3  iohexol, 50 mL, Oral, Once in imaging  ondansetron, 4 mg, Intravenous, Q6H PRN  oxyCODONE, 10 mg, Oral, Q4H PRN - x 1 9/2, 9/3  oxyCODONE, 5 mg, Oral, Q4H PRN - x 3 9/2  phenol, 1 spray, Mouth/Throat, Q2H PRN  pneumococcal 13-valent conjugate vaccine, 0 5 mL, Intramuscular, Prior to discharge  sodium chloride (PF), 500 mL, Intracatheter, Once in imaging    Discharge Plan: D    Network Utilization Review Department  ATTENTION: Please call with any questions or concerns to [phone] and carefully listen to the prompts so that you are directed to the right person  All voicemails are confidential   Maria Fernanda Dull all requests for admission clinical reviews, approved or denied determinations and any other requests to dedicated fax number below belonging to the campus where the patient is receiving treatment   List of dedicated fax numbers for the Facilities:  Anurag [employer] NAME UR FAX NUMBER   ADMISSION DENIALS (Administrative/Medical Necessity) [phone]   PARENT CHILD HEALTH (Maternity/NICU/Pediatrics) [id number] [facility] [address] [place] [id number] [place] [address] [address] [facility] [address] [phone]

## 2021-09-03 NOTE — PROGRESS NOTES
Progress Note - Infectious Disease   Diana Artist 80 y o  female MRN: [id number]  Unit/Bed#: [facility] 930-01 Encounter: [id number]      Impression/Recommendations:  1  Abdominal abscesses  Following complex abdominal surgery and pancreatic leak  8/18, s/p IR drain placement with pus  Culture with Klebsiella oxytoca (now ESBL), Enterococcus, Bacteroides  8/27, occluded catheter upsized to 12 Fr with aspiration of 50 ml purulent fluid   8/29, patient developed bleed from her drainage catheters, CTA A/P showed fluid collection along midline incision  6cc pus drained from midline incision collection  9/2 CT shows stable collection with drain in place  Rec:  · Continue ampicillin, ertapenem for now  Anticipate minimum treatment duration of 2 weeks thru 9/6  · Drain per IR  · Monitor clinical response, temperature curve     2  Left pleural effusion  In setting of recent surgery, splenectomy  CT now suggests enhancement  9/3 s/p left thoracentesis yielding 200 cc of serosanguineous fluid  Rec:  · Monitor drain output  · Follow pleural fluid cultures  · IR is suggesting pigtail drain placement given presence of loculations  Pt may ultimately need chest tube placement if pleural fluid cx are positive  However, at this time, she is not in favor of drain or chest tube placement  3   Sepsis  Evolving 8/17:  Fever, WBC  Due to #2  Improving  Rec:  · Continue antibiotics as above  · Follow temperatures closely  · Repeat CBC in AM             3  Pancreatic leak  Surgical complication  Abdominal drain in place      4  Ovarian cancer  8/6 status post ex lap, en-bloc hysterectomy, bilateral salpingo-oophorectomy, sigmoidectomy with low rectal anastomosis, SBR, radical omentectomy, splenectomy, appendectomy, oversewing of tail of pancreas, repair of cystotomy, bilateral peritoneal stripping   Surgery was complicated by pancreatic tail hemorrhage and low rectal reanastomosis  Imaging shows liver metastases       5  Status post splenectomy  Received HiB, meningococcal quadrivalent vaccine  Rec:  · Needs PCV13 prior to discharge    My recommendations were discussed with the patient in detail who verbalized understanding  Her questions were answered by me  Patient care discussed and coordinated with Dr Maldonado Huff (IR), Dr Baljit Ladd (Gyn-Onc) and Dr Jose Raul Hooper (Gyn-Onc)  ID service will formally re-evaluate this patient on Tue, 9/7  Please contact ID attending on call with questions before then prn      Antibiotics:  Ampicillin   Ertapenem  ESBL antibiotics #11    Current Facility-Administered Medications:     acetaminophen (TYLENOL) tablet 975 mg, 975 mg, Oral, Q8H [address], Karly Lopez MD, 975 mg at 09/03/21 0512    albuterol (PROVENTIL HFA,VENTOLIN HFA) inhaler 2 puff, 2 puff, Inhalation, Q4H PRN, Karly Lopez MD    amLODIPine (NORVASC) tablet 10 mg, 10 mg, Oral, Daily, 10 mg at 09/03/21 0901 **AND** [DISCONTINUED] lisinopril (ZESTRIL) tablet 20 mg, 20 mg, Oral, Daily, Faye Contreras MD, 20 mg at 08/14/21 0842    ampicillin (OMNIPEN) 2,000 mg in sodium chloride 0 9 % 100 mL IVPB, 2,000 mg, Intravenous, Q6H, Karly Lopez MD, Last Rate: 200 mL/hr at 09/03/21 0506, 2,000 mg at 09/03/21 0506    docusate sodium (COLACE) capsule 100 mg, 100 mg, Oral, BID, Karly Lopez MD, 100 mg at 09/03/21 0901    ertapenem (INVanz) 1,000 mg in sodium chloride 0 9 % 50 mL IVPB, 1,000 mg, Intravenous, Q24H, Karly Lopez MD, Last Rate: 100 mL/hr at 09/03/21 1014, 1,000 mg at 09/03/21 1014    heparin (porcine) subcutaneous injection 5,000 Units, 5,000 Units, Subcutaneous, Q8H [address], Karly Lopez MD, 5,000 Units at 09/03/21 0512    HYDROmorphone (DILAUDID) injection 0 5 mg, 0 5 mg, Intravenous, Q2H PRN, Karly Lopez MD, 0 5 mg at 09/03/21 0859    ibuprofen (MOTRIN) tablet 600 mg, 600 mg, Oral, Q6H, Karly Lopez MD, 600 mg at 09/03/21 0901    iohexol (OMNIPAQUE) 240 MG/ML solution 50 mL, 50 mL, Oral, Once in imaging, Bicske Nedra Loza MD    ondansetron [facility]) injection 4 mg, 4 mg, Intravenous, Q6H PRN, Harrison Corey MD    oxyCODONE (ROXICODONE) immediate release tablet 10 mg, 10 mg, Oral, Q4H PRN, Verenice Lopez MD, 10 mg at 21 1014    oxyCODONE (ROXICODONE) IR tablet 5 mg, 5 mg, Oral, Q4H PRN, Harrison Corey MD, 5 mg at 21 2348    pantoprazole (PROTONIX) EC tablet 40 mg, 40 mg, Oral, Early Morning, Harrison Corey MD, 40 mg at 21 0512    phenol (CHLORASEPTIC) 1 4 % mucosal liquid 1 spray, 1 spray, Mouth/Throat, Q2H PRN, Harrison Corey MD, 1 spray at 21 0157    pneumococcal 13-valent conjugate vaccine (PREVNAR-13) IM injection 0 5 mL, 0 5 mL, Intramuscular, Prior to discharge, Harrison Corey MD    potassium-sodium phosphateS (K-PHOS,PHOSPHA 250) -182 mg tablet 1 tablet, 1 tablet, Oral, TID With Meals, Harrison Corey MD, 1 tablet at 21 0900    pravastatin (PRAVACHOL) tablet 10 mg, 10 mg, Oral, Daily With Thomas Collins MD, 10 mg at 21 1619    sodium chloride (PF) 0 9 % injection 500 mL, 500 mL, Intracatheter, Once in imaging, Harrison Corey MD    sodium chloride 0 9 % infusion, 50 mL/hr, Intravenous, Continuous, Harrison Corey MD, Last Rate: 50 mL/hr at 21, 50 mL/hr at 21    Subjective:  Patient seen on PM rounds  Pt underwent thoracentesis this morning  She reports pain during the thoracentesis procedure, but none now  She says her breathing is fine  She denies fevers, chills, sweats, cough, SOB, rash, nausea, vomiting, or diarrhea  She is tolerating current antibiotic therapy       Objective:  Vitals:  Temp:  [98 °F (36 7 °C)-98 9 °F (37 2 °C)] 98 9 °F (37 2 °C)  HR:  [] 104  Resp:  [17-20] 20  BP: (100-131)/(56-75) 100/56  SpO2:  [93 %-97 %] 96 %  Temp (24hrs), Av 4 °F (36 9 °C), Min:98 °F (36 7 °C), Max:98 9 °F (37 2 °C)  Current: Temperature: 98 9 °F (37 2 °C)    Physical Exam:   General:  No acute distress  Psychiatric:  Awake and alert  Pulmonary:  diminished breath sounds posterior lung fields b/l, no accessory muscle use  CV: S1, S2, RRR, no murmur  Abdomen:  Soft, nontender, 2 OSCAR drains noted from abd abscess, one with moderate sanguinous drainage  : No Paulino catheter present  Extremities:  No distal leg edema b/l  Skin:  No rash  Dressing intact LT posterior thorax following thoracentesis    Lab Results:  I have personally reviewed pertinent labs  Results from last 7 days   Lab Units 09/03/21  0511 09/02/21  0610 09/01/21  0616 08/30/21  0350 08/29/21  0618   POTASSIUM mmol/L 3 3* 3 3* 3 3* 3 4* 3 5   CHLORIDE mmol/L 103 104 101 100 98*   CO2 mmol/L 27 28 25 28 28   BUN mg/dL 7 6 5 7 6   CREATININE mg/dL 0 30* 0 28* 0 28* 0 21* 0 34*   EGFR ml/min/1 73sq m 116 119 119 131 112   CALCIUM mg/dL 8 4 8 3 7 1* 7 6* 8 1*   AST U/L  --   --   --  36 61*   ALT U/L  --   --   --  35 44   ALK PHOS U/L  --   --   --  469* 574*     Results from last 7 days   Lab Units 09/03/21  0511 09/02/21  0610 09/01/21  1156   WBC Thousand/uL 9 22 8 61 10 81*   HEMOGLOBIN g/dL 8 3* 7 9* 8 9*   PLATELETS Thousands/uL 713* 611* 658*     Results from last 7 days   Lab Units 08/31/21  1433   GRAM STAIN RESULT  3+ Polys  No organisms seen   BODY FLUID CULTURE, STERILE  No growth     Imaging Studies:   I have personally reviewed pertinent imaging study reports

## 2021-09-04 LAB
ANION GAP SERPL CALCULATED.3IONS-SCNC: 5 MMOL/L (ref 4–13)
BUN SERPL-MCNC: 7 MG/DL (ref 5–25)
CALCIUM SERPL-MCNC: 8.1 MG/DL (ref 8.3–10.1)
CHLORIDE SERPL-SCNC: 105 MMOL/L (ref 100–108)
CO2 SERPL-SCNC: 27 MMOL/L (ref 21–32)
CREAT SERPL-MCNC: 0.33 MG/DL (ref 0.6–1.3)
ERYTHROCYTE [DISTWIDTH] IN BLOOD BY AUTOMATED COUNT: 16.5 % (ref 11.6–15.1)
GFR SERPL CREATININE-BSD FRML MDRD: 113 ML/MIN/1.73SQ M
GLUCOSE SERPL-MCNC: 88 MG/DL (ref 65–140)
HCT VFR BLD AUTO: 27.4 % (ref 34.8–46.1)
HGB BLD-MCNC: 8.5 G/DL (ref 11.5–15.4)
MCH RBC QN AUTO: 28.2 PG (ref 26.8–34.3)
MCHC RBC AUTO-ENTMCNC: 31 G/DL (ref 31.4–37.4)
MCV RBC AUTO: 91 FL (ref 82–98)
PLATELET # BLD AUTO: 792 THOUSANDS/UL (ref 149–390)
PMV BLD AUTO: 10.1 FL (ref 8.9–12.7)
POTASSIUM SERPL-SCNC: 3.2 MMOL/L (ref 3.5–5.3)
RBC # BLD AUTO: 3.01 MILLION/UL (ref 3.81–5.12)
SODIUM SERPL-SCNC: 137 MMOL/L (ref 136–145)
WBC # BLD AUTO: 9.87 THOUSAND/UL (ref 4.31–10.16)

## 2021-09-04 PROCEDURE — 99024 POSTOP FOLLOW-UP VISIT: CPT | Performed by: OBSTETRICS & GYNECOLOGY

## 2021-09-04 PROCEDURE — 80048 BASIC METABOLIC PNL TOTAL CA: CPT | Performed by: OBSTETRICS & GYNECOLOGY

## 2021-09-04 PROCEDURE — 85027 COMPLETE CBC AUTOMATED: CPT | Performed by: OBSTETRICS & GYNECOLOGY

## 2021-09-04 RX ORDER — POTASSIUM CHLORIDE 20 MEQ/1
20 TABLET, EXTENDED RELEASE ORAL 2 TIMES DAILY
Status: DISCONTINUED | OUTPATIENT
Start: 2021-09-04 | End: 2021-09-10 | Stop reason: HOSPADM

## 2021-09-04 RX ORDER — SODIUM CHLORIDE 450 MG/100ML
50 INJECTION, SOLUTION INTRAVENOUS CONTINUOUS
Status: DISCONTINUED | OUTPATIENT
Start: 2021-09-04 | End: 2021-09-10 | Stop reason: HOSPADM

## 2021-09-04 RX ADMIN — DOCUSATE SODIUM 100 MG: 100 CAPSULE, LIQUID FILLED ORAL at 09:46

## 2021-09-04 RX ADMIN — OXYCODONE HYDROCHLORIDE 5 MG: 5 TABLET ORAL at 14:03

## 2021-09-04 RX ADMIN — IBUPROFEN 600 MG: 600 TABLET, FILM COATED ORAL at 13:02

## 2021-09-04 RX ADMIN — AMPICILLIN SODIUM 2000 MG: 2 INJECTION, POWDER, FOR SOLUTION INTRAMUSCULAR; INTRAVENOUS at 13:02

## 2021-09-04 RX ADMIN — ERTAPENEM SODIUM 1000 MG: 1 INJECTION, POWDER, LYOPHILIZED, FOR SOLUTION INTRAMUSCULAR; INTRAVENOUS at 09:38

## 2021-09-04 RX ADMIN — AMPICILLIN SODIUM 2000 MG: 2 INJECTION, POWDER, FOR SOLUTION INTRAMUSCULAR; INTRAVENOUS at 17:27

## 2021-09-04 RX ADMIN — ACETAMINOPHEN 975 MG: 325 TABLET, FILM COATED ORAL at 22:18

## 2021-09-04 RX ADMIN — OXYCODONE HYDROCHLORIDE 5 MG: 5 TABLET ORAL at 17:37

## 2021-09-04 RX ADMIN — DIBASIC SODIUM PHOSPHATE, MONOBASIC POTASSIUM PHOSPHATE AND MONOBASIC SODIUM PHOSPHATE 1 TABLET: 852; 155; 130 TABLET ORAL at 17:29

## 2021-09-04 RX ADMIN — AMPICILLIN SODIUM 2000 MG: 2 INJECTION, POWDER, FOR SOLUTION INTRAMUSCULAR; INTRAVENOUS at 05:04

## 2021-09-04 RX ADMIN — IBUPROFEN 600 MG: 600 TABLET, FILM COATED ORAL at 09:46

## 2021-09-04 RX ADMIN — DIBASIC SODIUM PHOSPHATE, MONOBASIC POTASSIUM PHOSPHATE AND MONOBASIC SODIUM PHOSPHATE 1 TABLET: 852; 155; 130 TABLET ORAL at 09:49

## 2021-09-04 RX ADMIN — ACETAMINOPHEN 975 MG: 325 TABLET, FILM COATED ORAL at 05:07

## 2021-09-04 RX ADMIN — OXYCODONE HYDROCHLORIDE 10 MG: 10 TABLET ORAL at 03:09

## 2021-09-04 RX ADMIN — PANTOPRAZOLE SODIUM 40 MG: 40 TABLET, DELAYED RELEASE ORAL at 05:07

## 2021-09-04 RX ADMIN — AMLODIPINE BESYLATE 10 MG: 10 TABLET ORAL at 09:46

## 2021-09-04 RX ADMIN — IBUPROFEN 600 MG: 600 TABLET, FILM COATED ORAL at 02:50

## 2021-09-04 RX ADMIN — PRAVASTATIN SODIUM 10 MG: 10 TABLET ORAL at 17:29

## 2021-09-04 RX ADMIN — HEPARIN SODIUM 5000 UNITS: 5000 INJECTION INTRAVENOUS; SUBCUTANEOUS at 22:18

## 2021-09-04 RX ADMIN — POTASSIUM CHLORIDE 20 MEQ: 1500 TABLET, EXTENDED RELEASE ORAL at 12:50

## 2021-09-04 RX ADMIN — ACETAMINOPHEN 975 MG: 325 TABLET, FILM COATED ORAL at 14:02

## 2021-09-04 RX ADMIN — HEPARIN SODIUM 5000 UNITS: 5000 INJECTION INTRAVENOUS; SUBCUTANEOUS at 13:02

## 2021-09-04 RX ADMIN — HEPARIN SODIUM 5000 UNITS: 5000 INJECTION INTRAVENOUS; SUBCUTANEOUS at 05:07

## 2021-09-04 RX ADMIN — DIBASIC SODIUM PHOSPHATE, MONOBASIC POTASSIUM PHOSPHATE AND MONOBASIC SODIUM PHOSPHATE 1 TABLET: 852; 155; 130 TABLET ORAL at 12:51

## 2021-09-04 RX ADMIN — OXYCODONE HYDROCHLORIDE 10 MG: 10 TABLET ORAL at 22:17

## 2021-09-04 RX ADMIN — AMPICILLIN SODIUM 2000 MG: 2 INJECTION, POWDER, FOR SOLUTION INTRAMUSCULAR; INTRAVENOUS at 22:26

## 2021-09-04 RX ADMIN — OXYCODONE HYDROCHLORIDE 10 MG: 10 TABLET ORAL at 09:46

## 2021-09-04 RX ADMIN — POTASSIUM CHLORIDE 20 MEQ: 1500 TABLET, EXTENDED RELEASE ORAL at 17:29

## 2021-09-04 RX ADMIN — DOCUSATE SODIUM 100 MG: 100 CAPSULE, LIQUID FILLED ORAL at 17:29

## 2021-09-04 RX ADMIN — IBUPROFEN 600 MG: 600 TABLET, FILM COATED ORAL at 22:18

## 2021-09-04 RX ADMIN — SODIUM CHLORIDE 50 ML/HR: 0.45 INJECTION, SOLUTION INTRAVENOUS at 09:34

## 2021-09-04 NOTE — PROGRESS NOTES
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 are currently in place the left upper quadrant  Drainage has significantly reduced overall  Drain amylase is now 1534 from a prior value of over 13,000  Will continue drainage given ongoing fistula  IR has evaluated the drain as of 9/3/2021 and there is a limited collection remaining  2  Left upper quadrant abscess in the setting of the postoperative pancreatic fistula-appreciate ID consultation and recommendations  She continues on a regimen of ampicillin, ertapenem for cultures growing ESBL Klebsiella  Plan completion of IV antibiotics is 9/6/2021     3  Left pleural effusion-likely reactive although increasing enhancement lately suggests possible empyema  She is status post left thoracentesis on 9/3/2021  Initial evaluation of cell count does not suggest empyema  Will await cultures and cytology  4  Acute blood loss anemia initially secondary to surgery then with the secondary bleeding left upper quadrant assess dating ICU transfer, transfusion of 2 units of packed red blood cells  Her hemoglobin is currently stable at 8 5 grams/deciliter  No further active bleeding from drain  Will continue to trend CBC  5  Postoperative superficial wound infection-inferior aspect of the incision was opened and drained  This is currently getting packed daily  ESBL Klebsiella grew from this site as well  A superior wound collection was aspirated for purulent material on 8/31/2021  The wound was then explored further  Culture was negative    6  Thrombocytosis likely reactive and secondary to splenectomy  Aspirin has been discontinued as her platelet count is less than 1,000,000   7  She is ambulating, eating, pain controlled with oral medication  Will plan for possible discharge to home upon completion of her IV antibiotics and when the thoracentesis culture returns  8  Patient may shower tomorrow after dressings are removed  Subjective/Objective       Subjective:  Abdominal pain  This is relieved with narcotics  No nausea or vomiting  Ambulating, voiding, having bowel movements  Objective:  Awake, alert, no apparent distress  The abdominal dressings were removed  Packing at the inferior aspect of her incision was removed exchanged using quarter-inch plain packing gauze  No drainage from the superior aspect of the incision  The bilateral drains are putting out serosanguineous type fluid  No active bleeding from the incision sites  Abdomen is soft, minimally tender  No rebound or guarding tenderness  Extremities without evidence of edema  Blood pressure 122/71, pulse 100, temperature 98 6 °F (37 °C), resp  rate 16, height 4' 11" (1 499 m), weight 77 3 kg (170 lb 6 7 oz), SpO2 93 %  ,Body mass index is 34 42 kg/m²  Intake/Output Summary (Last 24 hours) at 9/4/2021 1005  Last data filed at 9/4/2021 0932  Gross per 24 hour   Intake 2615 ml   Output 2725 ml   Net -110 ml       Invasive Devices     Peripherally Inserted Central Catheter Line            PICC Line 19/40/49 Right Basilic 4 days          Drain            Closed/Suction Drain LLQ 19 Fr  28 days    Closed/Suction Drain Left;Posterior Back Bulb 12 Fr  7 days                Physical Exam:  See objective    Lab, Imaging and other studies:  I have personally reviewed pertinent lab results    , CBC:   Lab Results   Component Value Date    WBC 9 87 09/04/2021    HGB 8 5 (L) 09/04/2021    HCT 27 4 (L) 09/04/2021    MCV 91 09/04/2021     (H) 09/04/2021    MCH 28 2 09/04/2021    MCHC 31 0 (L) 09/04/2021    RDW 16 5 (H) 09/04/2021    MPV 10 1 09/04/2021   , CMP:   Lab Results   Component Value Date    SODIUM 137 09/04/2021    K 3 2 (L) 09/04/2021     09/04/2021    CO2 27 09/04/2021    BUN 7 09/04/2021    CREATININE 0 33 (L) 09/04/2021    CALCIUM 8 1 (L) 09/04/2021    EGFR 113 09/04/2021     VTE Pharmacologic Prophylaxis: Heparin  VTE Mechanical Prophylaxis: sequential compression device

## 2021-09-05 LAB
ANION GAP SERPL CALCULATED.3IONS-SCNC: 4 MMOL/L (ref 4–13)
BUN SERPL-MCNC: 7 MG/DL (ref 5–25)
CALCIUM SERPL-MCNC: 8.2 MG/DL (ref 8.3–10.1)
CHLORIDE SERPL-SCNC: 107 MMOL/L (ref 100–108)
CO2 SERPL-SCNC: 28 MMOL/L (ref 21–32)
CREAT SERPL-MCNC: 0.35 MG/DL (ref 0.6–1.3)
ERYTHROCYTE [DISTWIDTH] IN BLOOD BY AUTOMATED COUNT: 16.7 % (ref 11.6–15.1)
GFR SERPL CREATININE-BSD FRML MDRD: 111 ML/MIN/1.73SQ M
GLUCOSE SERPL-MCNC: 88 MG/DL (ref 65–140)
HCT VFR BLD AUTO: 25.7 % (ref 34.8–46.1)
HGB BLD-MCNC: 8 G/DL (ref 11.5–15.4)
MCH RBC QN AUTO: 28.5 PG (ref 26.8–34.3)
MCHC RBC AUTO-ENTMCNC: 31.1 G/DL (ref 31.4–37.4)
MCV RBC AUTO: 92 FL (ref 82–98)
PLATELET # BLD AUTO: 753 THOUSANDS/UL (ref 149–390)
PMV BLD AUTO: 9.8 FL (ref 8.9–12.7)
POTASSIUM SERPL-SCNC: 3.6 MMOL/L (ref 3.5–5.3)
RBC # BLD AUTO: 2.81 MILLION/UL (ref 3.81–5.12)
SODIUM SERPL-SCNC: 139 MMOL/L (ref 136–145)
WBC # BLD AUTO: 9.62 THOUSAND/UL (ref 4.31–10.16)

## 2021-09-05 PROCEDURE — 80048 BASIC METABOLIC PNL TOTAL CA: CPT | Performed by: OBSTETRICS & GYNECOLOGY

## 2021-09-05 PROCEDURE — 85027 COMPLETE CBC AUTOMATED: CPT | Performed by: OBSTETRICS & GYNECOLOGY

## 2021-09-05 PROCEDURE — 99024 POSTOP FOLLOW-UP VISIT: CPT | Performed by: OBSTETRICS & GYNECOLOGY

## 2021-09-05 PROCEDURE — 87040 BLOOD CULTURE FOR BACTERIA: CPT | Performed by: OBSTETRICS & GYNECOLOGY

## 2021-09-05 RX ADMIN — OXYCODONE HYDROCHLORIDE 10 MG: 10 TABLET ORAL at 19:46

## 2021-09-05 RX ADMIN — SODIUM CHLORIDE 50 ML/HR: 0.45 INJECTION, SOLUTION INTRAVENOUS at 02:48

## 2021-09-05 RX ADMIN — IBUPROFEN 600 MG: 600 TABLET, FILM COATED ORAL at 22:38

## 2021-09-05 RX ADMIN — AMLODIPINE BESYLATE 10 MG: 10 TABLET ORAL at 10:21

## 2021-09-05 RX ADMIN — HEPARIN SODIUM 5000 UNITS: 5000 INJECTION INTRAVENOUS; SUBCUTANEOUS at 06:10

## 2021-09-05 RX ADMIN — HEPARIN SODIUM 5000 UNITS: 5000 INJECTION INTRAVENOUS; SUBCUTANEOUS at 22:38

## 2021-09-05 RX ADMIN — AMPICILLIN SODIUM 2000 MG: 2 INJECTION, POWDER, FOR SOLUTION INTRAMUSCULAR; INTRAVENOUS at 06:00

## 2021-09-05 RX ADMIN — DIBASIC SODIUM PHOSPHATE, MONOBASIC POTASSIUM PHOSPHATE AND MONOBASIC SODIUM PHOSPHATE 1 TABLET: 852; 155; 130 TABLET ORAL at 10:20

## 2021-09-05 RX ADMIN — PANTOPRAZOLE SODIUM 40 MG: 40 TABLET, DELAYED RELEASE ORAL at 06:10

## 2021-09-05 RX ADMIN — POTASSIUM CHLORIDE 20 MEQ: 1500 TABLET, EXTENDED RELEASE ORAL at 17:55

## 2021-09-05 RX ADMIN — IBUPROFEN 600 MG: 600 TABLET, FILM COATED ORAL at 10:21

## 2021-09-05 RX ADMIN — DIBASIC SODIUM PHOSPHATE, MONOBASIC POTASSIUM PHOSPHATE AND MONOBASIC SODIUM PHOSPHATE 1 TABLET: 852; 155; 130 TABLET ORAL at 17:55

## 2021-09-05 RX ADMIN — ACETAMINOPHEN 975 MG: 325 TABLET, FILM COATED ORAL at 06:10

## 2021-09-05 RX ADMIN — AMPICILLIN SODIUM 2000 MG: 2 INJECTION, POWDER, FOR SOLUTION INTRAMUSCULAR; INTRAVENOUS at 11:23

## 2021-09-05 RX ADMIN — DOCUSATE SODIUM 100 MG: 100 CAPSULE, LIQUID FILLED ORAL at 10:20

## 2021-09-05 RX ADMIN — AMPICILLIN SODIUM 2000 MG: 2 INJECTION, POWDER, FOR SOLUTION INTRAMUSCULAR; INTRAVENOUS at 17:56

## 2021-09-05 RX ADMIN — AMPICILLIN SODIUM 2000 MG: 2 INJECTION, POWDER, FOR SOLUTION INTRAMUSCULAR; INTRAVENOUS at 22:43

## 2021-09-05 RX ADMIN — DOCUSATE SODIUM 100 MG: 100 CAPSULE, LIQUID FILLED ORAL at 17:55

## 2021-09-05 RX ADMIN — IBUPROFEN 600 MG: 600 TABLET, FILM COATED ORAL at 02:48

## 2021-09-05 RX ADMIN — OXYCODONE HYDROCHLORIDE 10 MG: 10 TABLET ORAL at 10:22

## 2021-09-05 RX ADMIN — ACETAMINOPHEN 975 MG: 325 TABLET, FILM COATED ORAL at 14:27

## 2021-09-05 RX ADMIN — PRAVASTATIN SODIUM 10 MG: 10 TABLET ORAL at 17:55

## 2021-09-05 RX ADMIN — POTASSIUM CHLORIDE 20 MEQ: 1500 TABLET, EXTENDED RELEASE ORAL at 10:21

## 2021-09-05 RX ADMIN — IBUPROFEN 600 MG: 600 TABLET, FILM COATED ORAL at 16:34

## 2021-09-05 RX ADMIN — HEPARIN SODIUM 5000 UNITS: 5000 INJECTION INTRAVENOUS; SUBCUTANEOUS at 14:37

## 2021-09-05 RX ADMIN — ERTAPENEM SODIUM 1000 MG: 1 INJECTION, POWDER, LYOPHILIZED, FOR SOLUTION INTRAMUSCULAR; INTRAVENOUS at 10:31

## 2021-09-05 RX ADMIN — DIBASIC SODIUM PHOSPHATE, MONOBASIC POTASSIUM PHOSPHATE AND MONOBASIC SODIUM PHOSPHATE 1 TABLET: 852; 155; 130 TABLET ORAL at 12:33

## 2021-09-05 RX ADMIN — ACETAMINOPHEN 975 MG: 325 TABLET, FILM COATED ORAL at 22:38

## 2021-09-05 NOTE — PROGRESS NOTES
Progress Note - Gynecologic Oncology  Julien Macias 80 y o  female MRN: [id number]  Unit/Bed#: [facility] 930-01 Encounter: [id number]    Assessment:  71-year-old postoperative day 27 from primary debulking surgery including splenectomy, modified radical hysterectomy, bilateral salpingo-oophorectomy, rectosigmoid resection with end-to-end reanastomosis, omentectomy, appendectomy, resection and ablation of peritoneal tumors    Plan:  1  Postoperative pancreatic fistula-she has 2 drains in the left upper quadrant  Will continue drainage given elevated amylase  2  Left upper quadrant abscess in the setting of postoperative pancreatic fistula-appreciate ID consultations and recommendations  She continues on a regimen of ampicillin, ertapenem for cultures growing ESBL Klebsiella  She had a fever on 9/4/2021  No localizing symptoms  Will plan to continue antibiotics as prescribed and re-evaluate with ID on 9/6/2021  Will perform blood cultures today  CT of the chest abdomen pelvis with contrast in the event that she has another fever  3  Left pleural effusion-likely reactive  Cultures of the most recent thoracentesis on 9/3/2021 are negative  She does not have dyspnea  4  Acute blood loss anemia secondary to surgery then secondary to bleeding in the left upper quadrant likely from drain irritation  This required ICU transfer, transfusion of 2 units of packed red blood cells  Her current hemoglobin is 8 grams/deciliter  There is no further active bleeding from the drain  Will continue to trend CBC  5  Postoperative superficial wound infection-continue dressing changes  No evidence of erythema, induration, purulent discharge  6  Thrombocytosis is reactive due to infection and splenectomy  Current platelet count is [id number]  Subjective/Objective       Subjective:  Cold this morning  No nausea or vomiting  Having bowel movements  Voiding  No dysuria  No diarrhea    No chest pain or shortness of breath  Objective:  Awake and alert, no apparent distress  Abdomen is soft and minimally tender  The incision is clean dry and intact with the exception of 2 openings of the incision  The superior aspect is also clean dry and intact  Packing was removed from the inferior aspect and left open for the patient to shower  Drain output is in total of 30 mL for 24 hours  There is old blood present in the drain, no evidence of active bleeding  Also evidence of pancreatic secretions  Blood pressure 123/64, pulse (!) 106, temperature 98 3 °F (36 8 °C), resp  rate 16, height 4' 11" (1 499 m), weight 77 3 kg (170 lb 6 7 oz), SpO2 93 %  ,Body mass index is 34 42 kg/m²  Intake/Output Summary (Last 24 hours) at 9/5/2021 1128  Last data filed at 9/5/2021 1031  Gross per 24 hour   Intake 1105 83 ml   Output 3730 ml   Net -2624 17 ml       Invasive Devices     Peripherally Inserted Central Catheter Line            PICC Line 86/03/40 Right Basilic 6 days          Drain            Closed/Suction Drain LLQ 19 Fr  29 days    Closed/Suction Drain Left;Posterior Back Bulb 12 Fr  8 days                Physical Exam:  See objective    Lab, Imaging and other studies:  I have personally reviewed pertinent lab results    , CBC:   Lab Results   Component Value Date    WBC 9 62 09/05/2021    HGB 8 0 (L) 09/05/2021    HCT 25 7 (L) 09/05/2021    MCV 92 09/05/2021     (H) 09/05/2021    MCH 28 5 09/05/2021    MCHC 31 1 (L) 09/05/2021    RDW 16 7 (H) 09/05/2021    MPV 9 8 09/05/2021   , CMP:   Lab Results   Component Value Date    SODIUM 139 09/05/2021    K 3 6 09/05/2021     09/05/2021    CO2 28 09/05/2021    BUN 7 09/05/2021    CREATININE 0 35 (L) 09/05/2021    CALCIUM 8 2 (L) 09/05/2021    EGFR 111 09/05/2021     VTE Pharmacologic Prophylaxis: Heparin  VTE Mechanical Prophylaxis: sequential compression device

## 2021-09-06 LAB
ANION GAP SERPL CALCULATED.3IONS-SCNC: 4 MMOL/L (ref 4–13)
BACTERIA SPEC BFLD CULT: NO GROWTH
BUN SERPL-MCNC: 7 MG/DL (ref 5–25)
CALCIUM SERPL-MCNC: 8.4 MG/DL (ref 8.3–10.1)
CHLORIDE SERPL-SCNC: 107 MMOL/L (ref 100–108)
CO2 SERPL-SCNC: 27 MMOL/L (ref 21–32)
CREAT SERPL-MCNC: 0.31 MG/DL (ref 0.6–1.3)
ERYTHROCYTE [DISTWIDTH] IN BLOOD BY AUTOMATED COUNT: 17 % (ref 11.6–15.1)
GFR SERPL CREATININE-BSD FRML MDRD: 115 ML/MIN/1.73SQ M
GLUCOSE SERPL-MCNC: 94 MG/DL (ref 65–140)
GRAM STN SPEC: NORMAL
HCT VFR BLD AUTO: 26.8 % (ref 34.8–46.1)
HGB BLD-MCNC: 8.3 G/DL (ref 11.5–15.4)
MAGNESIUM SERPL-MCNC: 1.9 MG/DL (ref 1.6–2.6)
MCH RBC QN AUTO: 28.7 PG (ref 26.8–34.3)
MCHC RBC AUTO-ENTMCNC: 31 G/DL (ref 31.4–37.4)
MCV RBC AUTO: 93 FL (ref 82–98)
PLATELET # BLD AUTO: 806 THOUSANDS/UL (ref 149–390)
PMV BLD AUTO: 10.1 FL (ref 8.9–12.7)
POTASSIUM SERPL-SCNC: 3.5 MMOL/L (ref 3.5–5.3)
RBC # BLD AUTO: 2.89 MILLION/UL (ref 3.81–5.12)
SODIUM SERPL-SCNC: 138 MMOL/L (ref 136–145)
WBC # BLD AUTO: 9.31 THOUSAND/UL (ref 4.31–10.16)

## 2021-09-06 PROCEDURE — 83735 ASSAY OF MAGNESIUM: CPT | Performed by: OBSTETRICS & GYNECOLOGY

## 2021-09-06 PROCEDURE — 80048 BASIC METABOLIC PNL TOTAL CA: CPT | Performed by: OBSTETRICS & GYNECOLOGY

## 2021-09-06 PROCEDURE — 99024 POSTOP FOLLOW-UP VISIT: CPT | Performed by: OBSTETRICS & GYNECOLOGY

## 2021-09-06 PROCEDURE — 85027 COMPLETE CBC AUTOMATED: CPT | Performed by: OBSTETRICS & GYNECOLOGY

## 2021-09-06 RX ADMIN — SODIUM CHLORIDE 50 ML/HR: 0.45 INJECTION, SOLUTION INTRAVENOUS at 16:15

## 2021-09-06 RX ADMIN — DIBASIC SODIUM PHOSPHATE, MONOBASIC POTASSIUM PHOSPHATE AND MONOBASIC SODIUM PHOSPHATE 1 TABLET: 852; 155; 130 TABLET ORAL at 11:25

## 2021-09-06 RX ADMIN — IBUPROFEN 600 MG: 600 TABLET, FILM COATED ORAL at 03:08

## 2021-09-06 RX ADMIN — IBUPROFEN 600 MG: 600 TABLET, FILM COATED ORAL at 22:38

## 2021-09-06 RX ADMIN — OXYCODONE HYDROCHLORIDE 5 MG: 5 TABLET ORAL at 12:50

## 2021-09-06 RX ADMIN — ACETAMINOPHEN 975 MG: 325 TABLET, FILM COATED ORAL at 22:39

## 2021-09-06 RX ADMIN — DIBASIC SODIUM PHOSPHATE, MONOBASIC POTASSIUM PHOSPHATE AND MONOBASIC SODIUM PHOSPHATE 1 TABLET: 852; 155; 130 TABLET ORAL at 12:51

## 2021-09-06 RX ADMIN — AMPICILLIN SODIUM 2000 MG: 2 INJECTION, POWDER, FOR SOLUTION INTRAMUSCULAR; INTRAVENOUS at 17:31

## 2021-09-06 RX ADMIN — AMPICILLIN SODIUM 2000 MG: 2 INJECTION, POWDER, FOR SOLUTION INTRAMUSCULAR; INTRAVENOUS at 11:33

## 2021-09-06 RX ADMIN — AMPICILLIN SODIUM 2000 MG: 2 INJECTION, POWDER, FOR SOLUTION INTRAMUSCULAR; INTRAVENOUS at 05:28

## 2021-09-06 RX ADMIN — ACETAMINOPHEN 975 MG: 325 TABLET, FILM COATED ORAL at 12:51

## 2021-09-06 RX ADMIN — DOCUSATE SODIUM 100 MG: 100 CAPSULE, LIQUID FILLED ORAL at 17:30

## 2021-09-06 RX ADMIN — POTASSIUM CHLORIDE 20 MEQ: 1500 TABLET, EXTENDED RELEASE ORAL at 11:27

## 2021-09-06 RX ADMIN — POTASSIUM CHLORIDE 20 MEQ: 1500 TABLET, EXTENDED RELEASE ORAL at 17:30

## 2021-09-06 RX ADMIN — ACETAMINOPHEN 975 MG: 325 TABLET, FILM COATED ORAL at 05:28

## 2021-09-06 RX ADMIN — SODIUM CHLORIDE 50 ML/HR: 0.45 INJECTION, SOLUTION INTRAVENOUS at 22:44

## 2021-09-06 RX ADMIN — ERTAPENEM SODIUM 1000 MG: 1 INJECTION, POWDER, LYOPHILIZED, FOR SOLUTION INTRAMUSCULAR; INTRAVENOUS at 12:44

## 2021-09-06 RX ADMIN — PRAVASTATIN SODIUM 10 MG: 10 TABLET ORAL at 17:30

## 2021-09-06 RX ADMIN — OXYCODONE HYDROCHLORIDE 10 MG: 10 TABLET ORAL at 22:44

## 2021-09-06 RX ADMIN — PANTOPRAZOLE SODIUM 40 MG: 40 TABLET, DELAYED RELEASE ORAL at 05:28

## 2021-09-06 RX ADMIN — OXYCODONE HYDROCHLORIDE 10 MG: 10 TABLET ORAL at 02:09

## 2021-09-06 RX ADMIN — OXYCODONE HYDROCHLORIDE 10 MG: 10 TABLET ORAL at 17:30

## 2021-09-06 RX ADMIN — HEPARIN SODIUM 5000 UNITS: 5000 INJECTION INTRAVENOUS; SUBCUTANEOUS at 05:28

## 2021-09-06 RX ADMIN — IBUPROFEN 600 MG: 600 TABLET, FILM COATED ORAL at 11:27

## 2021-09-06 RX ADMIN — IBUPROFEN 600 MG: 600 TABLET, FILM COATED ORAL at 16:11

## 2021-09-06 RX ADMIN — DIBASIC SODIUM PHOSPHATE, MONOBASIC POTASSIUM PHOSPHATE AND MONOBASIC SODIUM PHOSPHATE 1 TABLET: 852; 155; 130 TABLET ORAL at 17:30

## 2021-09-06 RX ADMIN — HEPARIN SODIUM 5000 UNITS: 5000 INJECTION INTRAVENOUS; SUBCUTANEOUS at 12:53

## 2021-09-06 RX ADMIN — AMPICILLIN SODIUM 2000 MG: 2 INJECTION, POWDER, FOR SOLUTION INTRAMUSCULAR; INTRAVENOUS at 22:38

## 2021-09-06 RX ADMIN — DOCUSATE SODIUM 100 MG: 100 CAPSULE, LIQUID FILLED ORAL at 11:28

## 2021-09-06 RX ADMIN — AMLODIPINE BESYLATE 10 MG: 10 TABLET ORAL at 11:27

## 2021-09-06 RX ADMIN — HEPARIN SODIUM 5000 UNITS: 5000 INJECTION INTRAVENOUS; SUBCUTANEOUS at 22:38

## 2021-09-06 NOTE — PROGRESS NOTES
Progress Note - Gynecologic Oncology  Bhupendra Coto 80 y o  female MRN: [id number]  Unit/Bed#: [facility] 930-01 Encounter: [id number]    Assessment:  73-year-old postoperative day number 31 status post primary debulking surgery including splenectomy, modified radical hysterectomy bilateral salpingo-oophorectomy, rectosigmoid resection with end-to-end reanastomosis, omentectomy, appendectomy, resection and ablation of peritoneal tumors    Plan:  1  Postoperative pancreatic fistula-2 drains remain in the left upper quadrant  Will continue drainage  2  Left upper quadrant abscess in the setting of postoperative pancreatic fistula  Her IV antibiotics are due to stop today  Last fever 9/4/2021  Repeat blood cultures are pending  The abscess grew ESBL Klebsiella  She is clinically well  Will plan for CT of abdomen pelvis in the event that she has another fever  Will discuss management of antibiotics with Infectious Disease  3  Left pleural effusion likely reactive  Cultures are negative to date  4  Acute blood loss anemia secondary to surgery than secondary to left upper quadrant bleeding secondary to drains  Current hemoglobin is 8 3 grams/deciliter  Will continue to trend CBC  5  Postoperative superficial wound infection which grew out ESBL Klebsiella  Continue daily dressing changes with packing  There was evidence of excellent granulation tissue  6  Thrombocytosis-reactive due to infection and splenectomy  Current platelet count is a [id number]   7  Status post splenectomy-will administer prophylactic vaccinations prior to discharge  Subjective/Objective       Subjective:  No complaints today  Tolerating regular diet  Voiding, having bowel movements  Showered yesterday  Ambulating  Objective:  Awake and alert, no apparent distress  Abdomen is soft nontender  Incision is clean dry and intact with the exception of 2 areas  The superficial aspect was opened    There is no evidence of erythema, induration, or purulent discharge  The posterior aspect is packed with 1/2 inch plain packing gauze  The packing gauze was removed  There was no evidence of purulence  Inspection of the wound revealed excellent granulation tissue  Packing was then replaced  The drains were inspected  The anterior drain is putting out low volume of old blood  The posterior drain has pancreatic secretions mixed with old blood  Extremities are nontender without edema  Blood pressure 139/76, pulse 102, temperature 98 3 °F (36 8 °C), resp  rate 18, height 4' 11" (1 499 m), weight 77 3 kg (170 lb 6 7 oz), SpO2 95 %  ,Body mass index is 34 42 kg/m²  Intake/Output Summary (Last 24 hours) at 9/6/2021 1136  Last data filed at 9/6/2021 1126  Gross per 24 hour   Intake 245 ml   Output 2110 ml   Net -1865 ml       Invasive Devices     Peripherally Inserted Central Catheter Line            PICC Line 68/70/44 Right Basilic 7 days          Drain            Closed/Suction Drain LLQ 19 Fr  30 days    Closed/Suction Drain Left;Posterior Back Bulb 12 Fr  10 days                Physical Exam:  See objective    Lab, Imaging and other studies:  I have personally reviewed pertinent lab results    , CBC:   Lab Results   Component Value Date    WBC 9 31 09/06/2021    HGB 8 3 (L) 09/06/2021    HCT 26 8 (L) 09/06/2021    MCV 93 09/06/2021     (H) 09/06/2021    MCH 28 7 09/06/2021    MCHC 31 0 (L) 09/06/2021    RDW 17 0 (H) 09/06/2021    MPV 10 1 09/06/2021   , CMP:   Lab Results   Component Value Date    SODIUM 138 09/06/2021    K 3 5 09/06/2021     09/06/2021    CO2 27 09/06/2021    BUN 7 09/06/2021    CREATININE 0 31 (L) 09/06/2021    CALCIUM 8 4 09/06/2021    EGFR 115 09/06/2021     VTE Pharmacologic Prophylaxis: Heparin  VTE Mechanical Prophylaxis: sequential compression device

## 2021-09-07 LAB
ABO GROUP BLD: NORMAL
ALBUMIN SERPL BCP-MCNC: 1.2 G/DL (ref 3.5–5)
ALP SERPL-CCNC: 405 U/L (ref 46–116)
ALT SERPL W P-5'-P-CCNC: 15 U/L (ref 12–78)
ANION GAP SERPL CALCULATED.3IONS-SCNC: 7 MMOL/L (ref 4–13)
AST SERPL W P-5'-P-CCNC: 18 U/L (ref 5–45)
BILIRUB SERPL-MCNC: 0.16 MG/DL (ref 0.2–1)
BLD GP AB SCN SERPL QL: NEGATIVE
BUN SERPL-MCNC: 7 MG/DL (ref 5–25)
CALCIUM ALBUM COR SERPL-MCNC: 9.1 MG/DL (ref 8.3–10.1)
CALCIUM SERPL-MCNC: 6.9 MG/DL (ref 8.3–10.1)
CHLORIDE SERPL-SCNC: 113 MMOL/L (ref 100–108)
CO2 SERPL-SCNC: 22 MMOL/L (ref 21–32)
CREAT SERPL-MCNC: 0.27 MG/DL (ref 0.6–1.3)
ERYTHROCYTE [DISTWIDTH] IN BLOOD BY AUTOMATED COUNT: 17.2 % (ref 11.6–15.1)
GFR SERPL CREATININE-BSD FRML MDRD: 120 ML/MIN/1.73SQ M
GLUCOSE SERPL-MCNC: 88 MG/DL (ref 65–140)
HCT VFR BLD AUTO: 23.7 % (ref 34.8–46.1)
HCT VFR BLD AUTO: 31.9 % (ref 34.8–46.1)
HGB BLD-MCNC: 7.4 G/DL (ref 11.5–15.4)
HGB BLD-MCNC: 9.9 G/DL (ref 11.5–15.4)
MAGNESIUM SERPL-MCNC: 1.6 MG/DL (ref 1.6–2.6)
MCH RBC QN AUTO: 29.1 PG (ref 26.8–34.3)
MCHC RBC AUTO-ENTMCNC: 31.2 G/DL (ref 31.4–37.4)
MCV RBC AUTO: 93 FL (ref 82–98)
PLATELET # BLD AUTO: 691 THOUSANDS/UL (ref 149–390)
PMV BLD AUTO: 9.5 FL (ref 8.9–12.7)
POTASSIUM SERPL-SCNC: 3 MMOL/L (ref 3.5–5.3)
PROT SERPL-MCNC: 4.8 G/DL (ref 6.4–8.2)
RBC # BLD AUTO: 2.54 MILLION/UL (ref 3.81–5.12)
RH BLD: POSITIVE
SODIUM SERPL-SCNC: 142 MMOL/L (ref 136–145)
SPECIMEN EXPIRATION DATE: NORMAL
WBC # BLD AUTO: 9.36 THOUSAND/UL (ref 4.31–10.16)

## 2021-09-07 PROCEDURE — 83735 ASSAY OF MAGNESIUM: CPT | Performed by: OBSTETRICS & GYNECOLOGY

## 2021-09-07 PROCEDURE — 86900 BLOOD TYPING SEROLOGIC ABO: CPT | Performed by: OBSTETRICS & GYNECOLOGY

## 2021-09-07 PROCEDURE — 86901 BLOOD TYPING SEROLOGIC RH(D): CPT | Performed by: OBSTETRICS & GYNECOLOGY

## 2021-09-07 PROCEDURE — 99024 POSTOP FOLLOW-UP VISIT: CPT | Performed by: OBSTETRICS & GYNECOLOGY

## 2021-09-07 PROCEDURE — 86923 COMPATIBILITY TEST ELECTRIC: CPT

## 2021-09-07 PROCEDURE — 85018 HEMOGLOBIN: CPT | Performed by: PHYSICIAN ASSISTANT

## 2021-09-07 PROCEDURE — 86850 RBC ANTIBODY SCREEN: CPT | Performed by: OBSTETRICS & GYNECOLOGY

## 2021-09-07 PROCEDURE — 93005 ELECTROCARDIOGRAM TRACING: CPT

## 2021-09-07 PROCEDURE — 80053 COMPREHEN METABOLIC PANEL: CPT | Performed by: OBSTETRICS & GYNECOLOGY

## 2021-09-07 PROCEDURE — 99232 SBSQ HOSP IP/OBS MODERATE 35: CPT | Performed by: INTERNAL MEDICINE

## 2021-09-07 PROCEDURE — NC001 PR NO CHARGE: Performed by: PHYSICIAN ASSISTANT

## 2021-09-07 PROCEDURE — P9016 RBC LEUKOCYTES REDUCED: HCPCS

## 2021-09-07 PROCEDURE — 85027 COMPLETE CBC AUTOMATED: CPT | Performed by: OBSTETRICS & GYNECOLOGY

## 2021-09-07 PROCEDURE — 85014 HEMATOCRIT: CPT | Performed by: PHYSICIAN ASSISTANT

## 2021-09-07 RX ORDER — POTASSIUM CHLORIDE 14.9 MG/ML
20 INJECTION INTRAVENOUS ONCE
Status: COMPLETED | OUTPATIENT
Start: 2021-09-07 | End: 2021-09-07

## 2021-09-07 RX ADMIN — DOCUSATE SODIUM 100 MG: 100 CAPSULE, LIQUID FILLED ORAL at 09:56

## 2021-09-07 RX ADMIN — DOCUSATE SODIUM 100 MG: 100 CAPSULE, LIQUID FILLED ORAL at 18:26

## 2021-09-07 RX ADMIN — OXYCODONE HYDROCHLORIDE 10 MG: 10 TABLET ORAL at 19:34

## 2021-09-07 RX ADMIN — IBUPROFEN 600 MG: 600 TABLET, FILM COATED ORAL at 15:12

## 2021-09-07 RX ADMIN — PRAVASTATIN SODIUM 10 MG: 10 TABLET ORAL at 18:26

## 2021-09-07 RX ADMIN — OXYCODONE HYDROCHLORIDE 10 MG: 10 TABLET ORAL at 23:48

## 2021-09-07 RX ADMIN — AMLODIPINE BESYLATE 10 MG: 10 TABLET ORAL at 09:57

## 2021-09-07 RX ADMIN — ACETAMINOPHEN 975 MG: 325 TABLET, FILM COATED ORAL at 15:11

## 2021-09-07 RX ADMIN — ACETAMINOPHEN 975 MG: 325 TABLET, FILM COATED ORAL at 06:03

## 2021-09-07 RX ADMIN — IBUPROFEN 600 MG: 600 TABLET, FILM COATED ORAL at 09:57

## 2021-09-07 RX ADMIN — DIBASIC SODIUM PHOSPHATE, MONOBASIC POTASSIUM PHOSPHATE AND MONOBASIC SODIUM PHOSPHATE 1 TABLET: 852; 155; 130 TABLET ORAL at 12:35

## 2021-09-07 RX ADMIN — HEPARIN SODIUM 5000 UNITS: 5000 INJECTION INTRAVENOUS; SUBCUTANEOUS at 22:08

## 2021-09-07 RX ADMIN — IBUPROFEN 600 MG: 600 TABLET, FILM COATED ORAL at 22:08

## 2021-09-07 RX ADMIN — PANTOPRAZOLE SODIUM 40 MG: 40 TABLET, DELAYED RELEASE ORAL at 06:03

## 2021-09-07 RX ADMIN — ACETAMINOPHEN 975 MG: 325 TABLET, FILM COATED ORAL at 22:08

## 2021-09-07 RX ADMIN — HEPARIN SODIUM 5000 UNITS: 5000 INJECTION INTRAVENOUS; SUBCUTANEOUS at 15:11

## 2021-09-07 RX ADMIN — POTASSIUM CHLORIDE 20 MEQ: 1500 TABLET, EXTENDED RELEASE ORAL at 09:57

## 2021-09-07 RX ADMIN — OXYCODONE HYDROCHLORIDE 10 MG: 10 TABLET ORAL at 09:56

## 2021-09-07 RX ADMIN — POTASSIUM CHLORIDE 20 MEQ: 1500 TABLET, EXTENDED RELEASE ORAL at 18:27

## 2021-09-07 RX ADMIN — DIBASIC SODIUM PHOSPHATE, MONOBASIC POTASSIUM PHOSPHATE AND MONOBASIC SODIUM PHOSPHATE 1 TABLET: 852; 155; 130 TABLET ORAL at 18:26

## 2021-09-07 RX ADMIN — POTASSIUM CHLORIDE 20 MEQ: 14.9 INJECTION, SOLUTION INTRAVENOUS at 12:35

## 2021-09-07 RX ADMIN — SODIUM CHLORIDE 50 ML/HR: 0.45 INJECTION, SOLUTION INTRAVENOUS at 20:04

## 2021-09-07 RX ADMIN — DIBASIC SODIUM PHOSPHATE, MONOBASIC POTASSIUM PHOSPHATE AND MONOBASIC SODIUM PHOSPHATE 1 TABLET: 852; 155; 130 TABLET ORAL at 09:56

## 2021-09-07 RX ADMIN — HEPARIN SODIUM 5000 UNITS: 5000 INJECTION INTRAVENOUS; SUBCUTANEOUS at 06:02

## 2021-09-07 NOTE — PROGRESS NOTES
Gynecology Oncology Progress note   Kaz Hope 80 y o  female MRN: [id number]  Unit/Bed#: [facility] 930-01 Encounter: [id number]    Assessment: Kaz Hope is a 80 y o  female POD 28 from primary debulking surgery including splenectomy, modified radical hysterectomy bilateral salpingo-oophorectomy, rectosigmoid resection with end-to-end reanastomosis, omentectomy, appendectomy, resection and ablation of peritoneal tumors  Post op course has been complicated by pancreatic fistula, LUQ collection, intermittent fevers, and acute blood loss anemia       Plan:  S/p surgery  - Spontaneously voiding  - Continue regular diet   - Continue IS/encourage ambulation   - Pain controlled on current regimen:              Motrin 600mg q6h allison              Tylenol 975mg Q8h allison               Tyra 5 mg Q4h PRN (moderate)                   Tyra 10mg Q4h PRN (severe)              Dilaudid 0 5 IV Q2h PRN (breakthrough)     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   - Collection stable on CT 9/2  - S/p L thoracentesis 9/2, 200 cc out   - Repeat blood cx negative x 24h (9/5)  - Drain culture +klebsiella  - Aerobic and anaerobic wound (8/23) culture without growth   - LUQ drain with 10 cc out this AM, old blood  - Posterior drain with 20cc out this AM, pancreatic secretions   - Maintain both drains      Fever of unknown origin  - S/p IV abx (ertapenem and ampicillin)  - Discuss with ID PO antibiotic regimen going forward   - LE dopplers completed 8/26 with no evidence of DVT     Anemia   - Hgb 7 4, had previously been stable in the 8's  - Transfuse below 7     Tachycardia  - Possible in the setting of anemia  - Pt asymptomatic  - Continue to monitor, if persists consider further workup     Hyponatremia  - Stable, 138   - Continue IV fluids   - S/p SLIM consultatiion  - Appreciate SLIM recommendations     Wound seroma   - Packing in place in inferior aspect of incision   - Plan for daily dressing changes     Persistent left lower lobe pleural effusion   - S/p IR drainage of 200 cc on 9/2  - Afebrile   - Saturating well on room air   - Continue to monitor closely     S/p splenectomy with thrombocytosis   - Improving   - Continue to trend   - ASA discontinued   - Splenectomy vaccines prior to discharge      High grade serous ovarian carcinoma   - Final path resulted  - Pt discussed at tumor board on 8/20, plan is for eventual systemic therapy, genetic and genomic testing    DVT ppx: SCDs, Heparin     Disposition: remain inpatient    Subjective:  Cami Henriquez slept well overnight  She continues to ambulate, void, pass flatus/stool, and tolerate regular diet  Denies fever, chills, chest pain, SOB, nausea, vomiting, leg pain  Still has some soreness in her abdomen  Review of Systems   All other systems reviewed and are negative  Objective:   /76   Pulse (!) 122   Temp 99 8 °F (37 7 °C)   Resp 20   Ht 4' 11" (1 499 m)   Wt 77 3 kg (170 lb 6 7 oz)   SpO2 92%   BMI 34 42 kg/m²     I/O       09/05 0701 - 09/06 0700 09/06 0701 - 09/07 0700 09/07 0701 - 09/08 0700    P  O  240 360     I V  (mL/kg)  2190 8 (28 3)     NG/GT 10 5     Total Intake(mL/kg) 250 (3 2) 2555 8 (33 1)     Urine (mL/kg/hr) 2300 (1 2) 3700 (2)     Drains 10 30     Stool  0     Total Output 2310 3730     Net -2060 -1174 2            Unmeasured Stool Occurrence  1 x           Lab Results   Component Value Date    WBC 9 36 09/07/2021    HGB 7 4 (L) 09/07/2021    HCT 23 7 (L) 09/07/2021    MCV 93 09/07/2021     (H) 09/07/2021       Lab Results   Component Value Date    GLUCOSE 195 (H) 08/06/2021    CALCIUM 8 4 09/06/2021    K 3 5 09/06/2021    CO2 27 09/06/2021     09/06/2021    BUN 7 09/06/2021    CREATININE 0 31 (L) 09/06/2021       Lab Results   Component Value Date/Time    POCGLU 127 09/01/2021 12:17 AM    POCGLU 154 (H) 08/19/2021 10:23 AM    POCGLU 161 (H) 08/19/2021 06:58 AM    POCGLU 158 (H) 08/18/2021 09:09 PM    POCGLU 171 (H) 08/18/2021 04:33 PM       Physical Exam  Vitals reviewed  Constitutional:       Appearance: Normal appearance  She is obese  HENT:      Head: Normocephalic and atraumatic  Mouth/Throat:      Mouth: Mucous membranes are moist       Pharynx: Oropharynx is clear  Eyes:      Conjunctiva/sclera: Conjunctivae normal       Pupils: Pupils are equal, round, and reactive to light  Cardiovascular:      Rate and Rhythm: Normal rate  Pulses: Normal pulses  Heart sounds: Normal heart sounds  Pulmonary:      Effort: Pulmonary effort is normal  No respiratory distress  Breath sounds: Normal breath sounds  Abdominal:      General: Abdomen is flat  There is no distension  Palpations: Abdomen is soft  Tenderness: There is no abdominal tenderness  Comments: Midline vertical incision c/d/i, healing well except for one area in the superior portion (now healing) and one area in the inferior portion of the wound  Inferior aspect with packing - changed at bedside today, no evidence of purulent drainage  Anterior LUQ drain in place, drainage of old blood fluid (10cc)  Posterior LUQ drain in place, drainage of pancreatic secretions (20cc)   Genitourinary:     Comments: Deferred  Musculoskeletal:         General: Normal range of motion  Cervical back: Normal range of motion  Skin:     General: Skin is warm and dry  Capillary Refill: Capillary refill takes less than 2 seconds  Coloration: Skin is not pale  Neurological:      General: No focal deficit present  Mental Status: She is alert and oriented to person, place, and time  Mental status is at baseline  Psychiatric:         Mood and Affect: Mood normal          Behavior: Behavior normal          Thought Content:  Thought content normal          Ger Cannon MD   PGY-3, GYN ONC  9/7/2021 7:05 AM

## 2021-09-07 NOTE — TELEMEDICINE
e-Consult (IPC)  - Interventional Radiology  Fay Reyes 80 y o  female MRN: [id number]  Unit/Bed#: [facility] 930-01 Encounter: [id number]    IR has been consulted to evaluate the patient, determine the appropriate procedure, and whether or not a procedure can and should be performed regarding the care of Fay Reyes  We were consulted by gyn onc concerning need to start chemotherapy, peritoneal carcinomatosis, and to possibly perform a port placement if medically appropriate for the patient  IP Consult to IR  Consult performed by: Cally Stahl PA-C  Consult ordered by: Curt Reddy MD        09/07/21      Assessment/Recommendation:     80year old female with peritoneal carcinomatosis, POD 32 s/p debulking surgery, will be discharged within the next two days    - will plan for port placement on last hospital day to decrease risk of infection  Currently this is expected to be 9/9 Thursday  - please keep npo after midnight Wednesday night  - if patient is decided to be discharged at a later time, can place port as an outpatient  Total time spent in review of data, discussion with requesting provider and rendering advice was 25 minutes       Patient or appropriate family member was verbally informed by gyn onc of this consultative service on their behalf to provide more timely access to specialty care in lieu of an in person consultation  Verbal consent was obtained  Thank you for allowing Interventional Radiology to participate in the care of Fay Reyes  Please don't hesitate to call or TigerText us with any questions       Cally Stahl PA-C

## 2021-09-07 NOTE — UTILIZATION REVIEW
Continued Stay Review    Date: 9/6/2021                          Current Patient Class:  Inpatient   Current Level of Care:  Med Surg     HPI:71 y o  female initially admitted on 8/6/2021 fr ablation of malignant neoplasm with a goal of debulking - crytoreduction, pt with pancreatic leak and intra-abdominal abscess  S/p IR drainage 8/18 and drain upsizing 8/27 now with increasing bloody drainage in  And around OSCAR drain  ( improved)   Assessment/Plan: 9/6/2021 -  Pt is  POD #31 s/p primary debulking surgery including splenectomy, modified radiacl hysterectomy BSO, rectosigmoid resection with end - to end reanastomosis , omentectomy, appendectomy, resection and ablation of peritoneal tumors  She has no complaints today tolerating regular diet  voiding and having 's  Ambulating  Plan 2 drains remain in the LUQ, post op for  pancreatic fistula, continue drainage  LUQ abscess  Last day of IV abx's  Repeat blood cultures pending  The absces grew ESBL Klebsiella  She is clinically well and afebrile  Left pleural effusion  Likely reactive  Cultures negative to date  current Hg 8 3 continue to monitor, trend CBC  Exam: her incision is CDI with the exception of 2 areas  The superficial aspect was opened  There is no evidence of erythema, induration or purulent discharge  The posterior aspect is packed with 1/2 in plain packing gauze  The packing was removed, with no evidence of prurlence  Inspection of the pwound revelaed excellent granulation tissue  Packing then replaced  The drains were inspected       Vital Signs:   Date/Time  Temp  Pulse  Resp  BP  MAP (mmHg)  SpO2  O2 Device   09/07/21 07:35:14  98 5 °F (36 9 °C)  106Abnormal   16  118/74  89  92 %  --   09/06/21 23:13:47  99 8 °F (37 7 °C)  122Abnormal   20  129/76  94  92 %  --   09/06/21 23:13:17  99 8 °F (37 7 °C)  117Abnormal   20  --  --  92 %  --   09/06/21 2041  --  --  --  --  --  --  None (Room air)   09/06/21 17:44:25  99 °F (37 2 °C)  104  18 122/68  86  94 %  --   09/06/21 08:43:27  98 3 °F (36 8 °C)  102  --  139/76  97  95 %  --   09/05/21 2238  --  --  --  --  --  --  None (Room air)   09/05/21 22:25:18  98 5 °F (36 9 °C)  109Abnormal   --  126/66  86  97 %  --   09/05/21 15:34:20  99 2 °F (37 3 °C)  110Abnormal   --  124/66  85  93 %  --         Pertinent Labs/Diagnostic Results:       Results from last 7 days   Lab Units 09/07/21  0603 09/06/21  0530 09/05/21  0609 09/04/21  0459 09/03/21  0511   WBC Thousand/uL 9 36 9 31 9 62 9 87 9 22   HEMOGLOBIN g/dL 7 4* 8 3* 8 0* 8 5* 8 3*   HEMATOCRIT % 23 7* 26 8* 25 7* 27 4* 26 3*   PLATELETS Thousands/uL 691* 806* 753* 792* 713*         Results from last 7 days   Lab Units 09/07/21  0603 09/06/21  0530 09/05/21  0609 09/04/21  0500 09/03/21  0511   SODIUM mmol/L 142 138 139 137 136   POTASSIUM mmol/L 3 0* 3 5 3 6 3 2* 3 3*   CHLORIDE mmol/L 113* 107 107 105 103   CO2 mmol/L 22 27 28 27 27   ANION GAP mmol/L 7 4 4 5 6   BUN mg/dL 7 7 7 7 7   CREATININE mg/dL 0 27* 0 31* 0 35* 0 33* 0 30*   EGFR ml/min/1 73sq m 120 115 111 113 116   CALCIUM mg/dL 6 9* 8 4 8 2* 8 1* 8 4   MAGNESIUM mg/dL  --  1 9  --   --   --      Results from last 7 days   Lab Units 09/07/21  0603   AST U/L 18   ALT U/L 15   ALK PHOS U/L 405*   TOTAL PROTEIN g/dL 4 8*   ALBUMIN g/dL 1 2*   TOTAL BILIRUBIN mg/dL 0 16*     Results from last 7 days   Lab Units 09/01/21  0017   POC GLUCOSE mg/dl 127     Results from last 7 days   Lab Units 09/07/21  0603 09/06/21  0530 09/05/21  0609 09/04/21  0500 09/03/21  0511 09/02/21  0610 09/01/21  0616   GLUCOSE RANDOM mg/dL 88 94 88 88 106 95 93       Results from last 7 days   Lab Units 09/05/21  1749 09/03/21  1147 08/31/21  1433   BLOOD CULTURE  No Growth at 24 hrs    No Growth at 24 hrs   --   --    GRAM STAIN RESULT   --  No Polys or Bacteria seen 3+ Polys  No organisms seen   BODY FLUID CULTURE, STERILE   --  No growth No growth     Results from last 7 days   Lab Units 09/03/21  1147   TOTAL COUNTED  100   WBC FLUID /ul 242      IR Thoracentesis  - 9/3 - therapeutic left thoracentesis - 200 cc pleural fluid removed  A pigtail chest tube could be considered to improve drainage    Medications:   Scheduled Medications:  Ampicillin  2000 mg iv  q6 - last dose 9/7 @ 0557  acetaminophen, 975 mg, Oral, Q8H [facility]  amLODIPine, 10 mg, Oral, Daily  docusate sodium, 100 mg, Oral, BID  Ertapenem 1000 mg iv  Qd - last dose 9/6  heparin (porcine), 5,000 Units, Subcutaneous, Q8H [facility] MARIA  ibuprofen, 600 mg, Oral, Q6H  pantoprazole, 40 mg, Oral, Early Morning  potassium chloride, 20 mEq, Oral, BID  potassium-sodium phosphateS, 1 tablet, Oral, TID With Meals  pravastatin, 10 mg, Oral, Daily With Dinner      Continuous IV Infusions:  sodium chloride, 50 mL/hr, Intravenous, Continuous      PRN Meds:  albuterol, 2 puff, Inhalation, Q4H PRN  HYDROmorphone, 0 5 mg, Intravenous, Q2H PRN  iohexol, 50 mL, Oral, Once in imaging  ondansetron, 4 mg, Intravenous, Q6H PRN  oxyCODONE, 10 mg, Oral, Q4H PRN - 9/5 x 2 - 9/6 x 3 - 9/7 x1   oxyCODONE, 5 mg, Oral, Q4H PRN - 9/6 x1   phenol, 1 spray, Mouth/Throat, Q2H PRN  pneumococcal 13-valent conjugate vaccine, 0 5 mL, Intramuscular, Prior to discharge  sodium chloride (PF), 500 mL, Intracatheter, Once in imaging        Discharge Plan: D     Network Utilization Review Department  ATTENTION: Please call with any questions or concerns to [phone] and carefully listen to the prompts so that you are directed to the right person  All voicemails are confidential   Shante Torres all requests for admission clinical reviews, approved or denied determinations and any other requests to dedicated fax number below belonging to the campus where the patient is receiving treatment   List of dedicated fax numbers for the Facilities:  [address] DENIALS (Administrative/Medical Necessity) [phone]   [address] (Maternity/NICU/Pediatrics) [phone] 5000 [facility] Amina Hutton [phone]   [address] [place] [address] [facility] [address] [id number] [place] [address] Harjinder De La Rosa [address] [phone]   4601 [facility] [phone]

## 2021-09-07 NOTE — PROGRESS NOTES
Progress Note - Infectious Disease   Massimo Alejandro 80 y o  female MRN: [id number]  Unit/Bed#: [facility] 930-01 Encounter: [id number]      Impression/Recommendations:  1  Abdominal abscesses  Following complex abdominal surgery and pancreatic leak  8/18, s/p IR drain placement with pus  Culture with Klebsiella oxytoca (now ESBL), Enterococcus, Bacteroides  8/27, occluded catheter upsized to 12 Fr with aspiration of 50 ml purulent fluid   8/29, patient developed bleed from her drainage catheters, CTA A/P showed fluid collection along midline incision  6cc pus drained from midline incision collection  9/2 CT shows stable collection with drain in place  Status post 14 days IV antibiotics  Rec:  ? Continue to follow closely off antibiotics  ? Drains per Gyn/Onc and IR     2   Loculated left pleural effusion  In setting of recent surgery, splenectomy  CT with enhancement but pleural fluid bland with negative cultures on thoracentesis 9/3/21  Respiratory status stable on room air  Rec:  ? Follow respiratory status closely  ? Will likely need repeat imaging in several weeks - defer to primary service on timing     3  Recent sepsis  Evolving 8/17:  Fever, WBC  Due to #1  Improved status post drain, antibiotics             3  Pancreatic leak  Surgical complication  Abdominal drain in place      4  Ovarian cancer  8/6 status post ex lap, en-bloc hysterectomy, bilateral salpingo-oophorectomy, sigmoidectomy with low rectal anastomosis, SBR, radical omentectomy, splenectomy, appendectomy, oversewing of tail of pancreas, repair of cystotomy, bilateral peritoneal stripping   Surgery was complicated by pancreatic tail hemorrhage and low rectal reanastomosis  Imaging shows liver metastases       5  Status post splenectomy  Received HiB, meningococcal quadrivalent vaccine  Rec:  ? Needs PCV13 prior to discharge     The patient is stable from an ID standpoint for D/C    The above plan was discussed in detail with the patient and Dr Perkins Plants  Antibiotics:  Off antibiotics #1    Subjective:  Patient seen on AM rounds  Denies fevers, chills, sweats, nausea, vomiting, or diarrhea  24 Hour Events:  No documented fevers, chills, sweats, nausea, vomiting, or diarrhea  Objective:  Vitals:  Temp:  [98 5 °F (36 9 °C)-99 8 °F (37 7 °C)] 98 5 °F (36 9 °C)  HR:  [104-122] 106  Resp:  [16-20] 16  BP: (118-129)/(68-76) 118/74  SpO2:  [92 %-94 %] 93 %  Temp (24hrs), Av 3 °F (37 4 °C), Min:98 5 °F (36 9 °C), Max:99 8 °F (37 7 °C)  Current: Temperature: 98 5 °F (36 9 °C)    Physical Exam:   General:  No acute distress  Psychiatric:  Awake and alert  Pulmonary:  Normal respiratory excursion without accessory muscle use  Abdomen:  Obese; 2 OSCAR drains 1-purulent, 1-old blood  Extremities:  No edema  Skin:  No rashes    Lab Results:  I have personally reviewed pertinent labs  Results from last 7 days   Lab Units 21  0603 21  0530 21  0609   POTASSIUM mmol/L 3 0* 3 5 3 6   CHLORIDE mmol/L 113* 107 107   CO2 mmol/L 22 27 28   BUN mg/dL 7 7 7   CREATININE mg/dL 0 27* 0 31* 0 35*   EGFR ml/min/1 73sq m 120 115 111   CALCIUM mg/dL 6 9* 8 4 8 2*   AST U/L 18  --   --    ALT U/L 15  --   --    ALK PHOS U/L 405*  --   --      Results from last 7 days   Lab Units 21  0603 21  0530 21  0609   WBC Thousand/uL 9 36 9 31 9 62   HEMOGLOBIN g/dL 7 4* 8 3* 8 0*   PLATELETS Thousands/uL 691* 806* 753*     Results from last 7 days   Lab Units 21  1749 21  1147   BLOOD CULTURE  No Growth at 24 hrs  No Growth at 24 hrs   --    GRAM STAIN RESULT   --  No Polys or Bacteria seen   BODY FLUID CULTURE, STERILE   --  No growth       Imaging Studies:   I have personally reviewed pertinent imaging study reports and images in PACS  EKG, Pathology, and Other Studies:   I have personally reviewed pertinent reports

## 2021-09-08 ENCOUNTER — DOCUMENTATION (OUTPATIENT)
Dept: HEMATOLOGY ONCOLOGY | Facility: CLINIC | Age: 71
End: 2021-09-08

## 2021-09-08 ENCOUNTER — PREP FOR PROCEDURE (OUTPATIENT)
Dept: INTERVENTIONAL RADIOLOGY/VASCULAR | Facility: CLINIC | Age: 71
End: 2021-09-08

## 2021-09-08 DIAGNOSIS — K65.1 ABSCESS OF ABDOMINAL CAVITY (HCC): Primary | ICD-10-CM

## 2021-09-08 LAB
ABO GROUP BLD BPU: NORMAL
ANION GAP SERPL CALCULATED.3IONS-SCNC: 5 MMOL/L (ref 4–13)
ATRIAL RATE: 105 BPM
BPU ID: NORMAL
BUN SERPL-MCNC: 10 MG/DL (ref 5–25)
CALCIUM SERPL-MCNC: 8.7 MG/DL (ref 8.3–10.1)
CHLORIDE SERPL-SCNC: 105 MMOL/L (ref 100–108)
CO2 SERPL-SCNC: 27 MMOL/L (ref 21–32)
CREAT SERPL-MCNC: 0.36 MG/DL (ref 0.6–1.3)
CROSSMATCH: NORMAL
ERYTHROCYTE [DISTWIDTH] IN BLOOD BY AUTOMATED COUNT: 16.6 % (ref 11.6–15.1)
GFR SERPL CREATININE-BSD FRML MDRD: 110 ML/MIN/1.73SQ M
GLUCOSE SERPL-MCNC: 85 MG/DL (ref 65–140)
HCT VFR BLD AUTO: 31.8 % (ref 34.8–46.1)
HGB BLD-MCNC: 9.8 G/DL (ref 11.5–15.4)
MCH RBC QN AUTO: 28.6 PG (ref 26.8–34.3)
MCHC RBC AUTO-ENTMCNC: 30.8 G/DL (ref 31.4–37.4)
MCV RBC AUTO: 93 FL (ref 82–98)
P AXIS: 23 DEGREES
PLATELET # BLD AUTO: 791 THOUSANDS/UL (ref 149–390)
PMV BLD AUTO: 10.2 FL (ref 8.9–12.7)
POTASSIUM SERPL-SCNC: 4 MMOL/L (ref 3.5–5.3)
PR INTERVAL: 148 MS
QRS AXIS: 41 DEGREES
QRSD INTERVAL: 60 MS
QT INTERVAL: 324 MS
QTC INTERVAL: 428 MS
RBC # BLD AUTO: 3.43 MILLION/UL (ref 3.81–5.12)
SODIUM SERPL-SCNC: 137 MMOL/L (ref 136–145)
T WAVE AXIS: 21 DEGREES
UNIT DISPENSE STATUS: NORMAL
UNIT PRODUCT CODE: NORMAL
UNIT PRODUCT VOLUME: 350 ML
UNIT RH: NORMAL
VENTRICULAR RATE: 105 BPM
WBC # BLD AUTO: 10.61 THOUSAND/UL (ref 4.31–10.16)

## 2021-09-08 PROCEDURE — 99024 POSTOP FOLLOW-UP VISIT: CPT | Performed by: OBSTETRICS & GYNECOLOGY

## 2021-09-08 PROCEDURE — 80048 BASIC METABOLIC PNL TOTAL CA: CPT | Performed by: OBSTETRICS & GYNECOLOGY

## 2021-09-08 PROCEDURE — 93010 ELECTROCARDIOGRAM REPORT: CPT | Performed by: INTERNAL MEDICINE

## 2021-09-08 PROCEDURE — 99232 SBSQ HOSP IP/OBS MODERATE 35: CPT | Performed by: INTERNAL MEDICINE

## 2021-09-08 PROCEDURE — 85027 COMPLETE CBC AUTOMATED: CPT | Performed by: OBSTETRICS & GYNECOLOGY

## 2021-09-08 RX ADMIN — IBUPROFEN 600 MG: 600 TABLET, FILM COATED ORAL at 14:46

## 2021-09-08 RX ADMIN — PRAVASTATIN SODIUM 10 MG: 10 TABLET ORAL at 17:53

## 2021-09-08 RX ADMIN — DOCUSATE SODIUM 100 MG: 100 CAPSULE, LIQUID FILLED ORAL at 17:52

## 2021-09-08 RX ADMIN — PANTOPRAZOLE SODIUM 40 MG: 40 TABLET, DELAYED RELEASE ORAL at 06:24

## 2021-09-08 RX ADMIN — SODIUM CHLORIDE 50 ML/HR: 0.45 INJECTION, SOLUTION INTRAVENOUS at 17:58

## 2021-09-08 RX ADMIN — ACETAMINOPHEN 975 MG: 325 TABLET, FILM COATED ORAL at 21:01

## 2021-09-08 RX ADMIN — ACETAMINOPHEN 975 MG: 325 TABLET, FILM COATED ORAL at 06:23

## 2021-09-08 RX ADMIN — HEPARIN SODIUM 5000 UNITS: 5000 INJECTION INTRAVENOUS; SUBCUTANEOUS at 21:01

## 2021-09-08 RX ADMIN — OXYCODONE HYDROCHLORIDE 5 MG: 5 TABLET ORAL at 06:25

## 2021-09-08 RX ADMIN — POTASSIUM CHLORIDE 20 MEQ: 1500 TABLET, EXTENDED RELEASE ORAL at 08:38

## 2021-09-08 RX ADMIN — DIBASIC SODIUM PHOSPHATE, MONOBASIC POTASSIUM PHOSPHATE AND MONOBASIC SODIUM PHOSPHATE 1 TABLET: 852; 155; 130 TABLET ORAL at 11:45

## 2021-09-08 RX ADMIN — IBUPROFEN 600 MG: 600 TABLET, FILM COATED ORAL at 21:01

## 2021-09-08 RX ADMIN — ACETAMINOPHEN 975 MG: 325 TABLET, FILM COATED ORAL at 14:46

## 2021-09-08 RX ADMIN — POTASSIUM CHLORIDE 20 MEQ: 1500 TABLET, EXTENDED RELEASE ORAL at 17:52

## 2021-09-08 RX ADMIN — DOCUSATE SODIUM 100 MG: 100 CAPSULE, LIQUID FILLED ORAL at 08:38

## 2021-09-08 RX ADMIN — DIBASIC SODIUM PHOSPHATE, MONOBASIC POTASSIUM PHOSPHATE AND MONOBASIC SODIUM PHOSPHATE 1 TABLET: 852; 155; 130 TABLET ORAL at 08:38

## 2021-09-08 RX ADMIN — OXYCODONE HYDROCHLORIDE 10 MG: 10 TABLET ORAL at 23:33

## 2021-09-08 RX ADMIN — IBUPROFEN 600 MG: 600 TABLET, FILM COATED ORAL at 08:38

## 2021-09-08 RX ADMIN — DIBASIC SODIUM PHOSPHATE, MONOBASIC POTASSIUM PHOSPHATE AND MONOBASIC SODIUM PHOSPHATE 1 TABLET: 852; 155; 130 TABLET ORAL at 17:52

## 2021-09-08 RX ADMIN — HEPARIN SODIUM 5000 UNITS: 5000 INJECTION INTRAVENOUS; SUBCUTANEOUS at 14:46

## 2021-09-08 RX ADMIN — AMLODIPINE BESYLATE 10 MG: 10 TABLET ORAL at 08:38

## 2021-09-08 RX ADMIN — HEPARIN SODIUM 5000 UNITS: 5000 INJECTION INTRAVENOUS; SUBCUTANEOUS at 06:23

## 2021-09-08 RX ADMIN — OXYCODONE HYDROCHLORIDE 10 MG: 10 TABLET ORAL at 14:49

## 2021-09-08 NOTE — PROGRESS NOTES
8/19:  Jaspal Myers has been submitted via cover my meds    08/20:  Jaspal Myers has been denied via cover my meds  CaseId:[id number];Status:Denied; Review Type:Prior Auth; Appeal Information: [place] [phone]; Important - Please read the below note on eAppeals: Please reference the denial letter for information on the rights for an appeal, rationale for the denial, and how to submit an appeal including if any information is needed to support the appeal  Note about urgent situations - Generally, an urgent situation is one which, in the opinion of the provider, the health of the patient may be in serious jeopardy or may experience pain that cannot be adequately controlled while waiting for a decision on the appeal ;    08/24:  Lilia Redo has been started and is pending case number [id number]    08/27:  Lilia Redo is still pending  09/07:  Called and insurance stated they could not find the appeal that I started even with the case number provided  Re opened the case and appealed it with expedited request     Chris Kenyon:  Alli Crenshaw from clinical she stated patient no longer needs to be on Eliquis  No further action is necessary

## 2021-09-08 NOTE — PROGRESS NOTES
Gynecology Oncology Progress note   Pritesh Flores 80 y o  female MRN: [id number]  Unit/Bed#: [facility] 930-01 Encounter: [id number]    Assessment: Pritesh Flores is a 80 y o  female POD 35 from primary debulking surgery including splenectomy, modified radical hysterectomy bilateral salpingo-oophorectomy, rectosigmoid resection with end-to-end reanastomosis, omentectomy, appendectomy, resection and ablation of peritoneal tumors  Post op course has been complicated by pancreatic fistula, LUQ collection, intermittent fevers, and acute blood loss anemia       Plan:  S/p surgery  - Spontaneously voiding  - Continue regular diet   - Continue IS/encourage ambulation   - Pain controlled on current regimen:              Motrin 600mg q6h allison              Tylenol 975mg Q8h allison               Tyra 5 mg Q4h PRN (moderate)                   Tyra 10mg Q4h PRN (severe)              Dilaudid 0 5 IV Q2h PRN (breakthrough)     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   - Collection stable on CT 9/2  - S/p L thoracentesis 9/2, 200 cc out, no growth on culture  - Repeat blood cx negative x 24h (9/5)  - Drain culture +klebsiella  - Aerobic and anaerobic wound (8/23) culture without growth   - LUQ drain with 10 cc overnight  - Posterior drain with no output overnight  - Maintain both drains      Fever of unknown origin  - S/p IV abx (ertapenem and ampicillin)  - Per ID, no further abx at this time and will observe fever curve  - LE dopplers completed 8/26 with no evidence of DVT  - Patient  without fevers for several days    Anemia   - Hgb 7 4, had previously been stable in the 8's  - 1u pRBC transfused yesterday, Hgb post-transfusion was 9 9  - AM labs pending    Tachycardia  - Possible in the setting of anemia  - Pt asymptomatic  - EKG ordered yestrday    Hyponatremia  - Stable, 138 , AM labs pending  - Continue IV fluids   - S/p SLIM consultatiion  - Appreciate SLIM recommendations     Wound seroma   - Packing in place in inferior aspect of incision   - Plan for daily dressing changes  - Will need VNA services for drain and wound care upon discharge     Persistent left lower lobe pleural effusion   - S/p IR drainage of 200 cc on 9/2, no growth on culture  - Afebrile   - Saturating well on room air   - Continue to monitor closely     S/p splenectomy with thrombocytosis   - Improving   - Continue to trend   - ASA discontinued   - Splenectomy vaccines prior to discharge      High grade serous ovarian carcinoma   - Final path resulted  - Pt discussed at tumor board on 8/20, plan is for eventual systemic therapy, genetic and genomic testing    DVT ppx: SCDs, Heparin     Disposition: remain inpatient    Subjective:  Pritesh Flores slept well overnight  She continues to ambulate, void, pass flatus/stool, and tolerate regular diet  Denies fever, chills, chest pain, SOB, nausea, vomiting, leg pain  Still has some soreness in her abdomen  Review of Systems   All other systems reviewed and are negative  Objective:   /73   Pulse (!) 113   Temp 99 1 °F (37 3 °C)   Resp 18   Ht 4' 11" (1 499 m)   Wt 74 9 kg (165 lb 2 oz)   SpO2 92%   BMI 33 35 kg/m²     I/O       09/05 0701 - 09/06 0700 09/06 0701 - 09/07 0700 09/07 0701 - 09/08 0700    P  O  240 360     I V  (mL/kg)  2190 8 (28 3)     NG/GT 10 5     Total Intake(mL/kg) 250 (3 2) 2555 8 (33 1)     Urine (mL/kg/hr) 2300 (1 2) 3700 (2)     Drains 10 30     Stool  0     Total Output 2310 3730     Net -2060 -1174 2            Unmeasured Stool Occurrence  1 x           Lab Results   Component Value Date    WBC 9 36 09/07/2021    HGB 9 9 (L) 09/07/2021    HCT 31 9 (L) 09/07/2021    MCV 93 09/07/2021     (H) 09/07/2021       Lab Results   Component Value Date    GLUCOSE 195 (H) 08/06/2021    CALCIUM 6 9 (L) 09/07/2021    K 3 0 (L) 09/07/2021    CO2 22 09/07/2021     (H) 09/07/2021    BUN 7 09/07/2021    CREATININE 0 27 (L) 09/07/2021       Lab Results   Component Value Date/Time POCGLU 127 09/01/2021 12:17 AM    POCGLU 154 (H) 08/19/2021 10:23 AM    POCGLU 161 (H) 08/19/2021 06:58 AM    POCGLU 158 (H) 08/18/2021 09:09 PM    POCGLU 171 (H) 08/18/2021 04:33 PM       Physical Exam  Vitals reviewed  Constitutional:       Appearance: Normal appearance  She is obese  HENT:      Head: Normocephalic and atraumatic  Mouth/Throat:      Mouth: Mucous membranes are moist       Pharynx: Oropharynx is clear  Eyes:      Conjunctiva/sclera: Conjunctivae normal       Pupils: Pupils are equal, round, and reactive to light  Cardiovascular:      Rate and Rhythm: Normal rate  Pulses: Normal pulses  Heart sounds: Normal heart sounds  Pulmonary:      Effort: Pulmonary effort is normal  No respiratory distress  Breath sounds: Normal breath sounds  Abdominal:      General: Abdomen is flat  There is no distension  Palpations: Abdomen is soft  Tenderness: There is no abdominal tenderness  Comments: Midline vertical incision c/d/i, healing well except for one area in the superior portion (now healing) and one area in the inferior portion of the wound  Inferior aspect with packing     Anterior LUQ drain in place, drainage of old blood fluid (10cc)  Posterior LUQ drain in place, drainage of pancreatic secretions (0cc)   Genitourinary:     Comments: Deferred  Musculoskeletal:         General: Normal range of motion  Cervical back: Normal range of motion  Skin:     General: Skin is warm and dry  Capillary Refill: Capillary refill takes less than 2 seconds  Coloration: Skin is not pale  Neurological:      General: No focal deficit present  Mental Status: She is alert and oriented to person, place, and time  Mental status is at baseline  Psychiatric:         Mood and Affect: Mood normal          Behavior: Behavior normal          Thought Content:  Thought content normal          Radhika Aviles MD   PGY-4, GYN ONC  9/8/2021 6:32 AM

## 2021-09-08 NOTE — PROGRESS NOTES
Progress Note - Infectious Disease   Diana Artist 80 y o  female MRN: [id number]  Unit/Bed#: [facility] 930-01 Encounter: [id number]      Impression/Recommendations:  1  Abdominal abscesses   Following complex abdominal surgery and pancreatic leak  8/18, s/p IR drain placement with pus  Culture with Klebsiella oxytoca (now ESBL), Enterococcus, Bacteroides  8/27, occluded catheter upsized to 12 Fr with aspiration of 50 ml purulent fluid   8/29, patient developed bleed from her drainage catheters, CTA A/P showed fluid collection along midline incision  6cc pus drained from midline incision collection   9/2 CT shows stable collection with drain in place  Status post 14 days IV antibiotics  Rec:  ? Continue to follow closely off antibiotics  ? Drains per Gyn/Onc and IR     2   Loculated left pleural effusion   In setting of recent surgery, splenectomy   CT with enhancement but pleural fluid bland with negative cultures on thoracentesis 9/3/21  Respiratory status stable on room air  Rec:  ? Follow respiratory status closely  ? Will likely need repeat imaging in several weeks - defer to primary service on timing     3   Recent sepsis   Evolving 8/17:  Fever, WBC   Due to #1   Improved status post drain, antibiotics             3  Pancreatic leak   Surgical complication  Abdominal drain in place      4  Ovarian cancer   8/6 status post ex lap, en-bloc hysterectomy, bilateral salpingo-oophorectomy, sigmoidectomy with low rectal anastomosis, SBR, radical omentectomy, splenectomy, appendectomy, oversewing of tail of pancreas, repair of cystotomy, bilateral peritoneal stripping   Surgery was complicated by pancreatic tail hemorrhage and low rectal reanastomosis  Imaging shows liver metastases       5  Status post splenectomy  Received HiB, meningococcal quadrivalent vaccine  Rec:  ? Needs PCV13 prior to discharge     The patient is stable from an ID standpoint      Antibiotics:  Off antibiotics #2    Subjective:  Patient seen on AM rounds  Doing OK  Offers no complaints  Denies fevers, chills, sweats, nausea, vomiting, or diarrhea  24 Hour Events:  No documented fevers, chills, sweats, nausea, vomiting, or diarrhea  Objective:  Vitals:  Temp:  [98 9 °F (37 2 °C)-99 6 °F (37 6 °C)] 98 9 °F (37 2 °C)  HR:  [104-113] 104  Resp:  [16-18] 18  BP: (120-134)/(71-74) 129/74  SpO2:  [91 %-98 %] 95 %  Temp (24hrs), Av 2 °F (37 3 °C), Min:98 9 °F (37 2 °C), Max:99 6 °F (37 6 °C)  Current: Temperature: 98 9 °F (37 2 °C)    Physical Exam:   General:  No acute distress  Psychiatric:  Awake and alert  Pulmonary:  Normal respiratory excursion without accessory muscle use  Abdomen:  Soft, pancreatic drain with dark green-brown fluid, posterior drain with purulent fluid  Extremities:  No edema  Skin:  No rashes    Lab Results:  I have personally reviewed pertinent labs  Results from last 7 days   Lab Units 21  0631 21  0603 21  0530   POTASSIUM mmol/L 4 0 3 0* 3 5   CHLORIDE mmol/L 105 113* 107   CO2 mmol/L 27 22 27   BUN mg/dL 10 7 7   CREATININE mg/dL 0 36* 0 27* 0 31*   EGFR ml/min/1 73sq m 110 120 115   CALCIUM mg/dL 8 7 6 9* 8 4   AST U/L  --  18  --    ALT U/L  --  15  --    ALK PHOS U/L  --  405*  --      Results from last 7 days   Lab Units 21  0631 21  2204 21  0603 21  0530   WBC Thousand/uL 10 61*  --  9 36 9 31   HEMOGLOBIN g/dL 9 8* 9 9* 7 4* 8 3*   PLATELETS Thousands/uL 791*  --  691* 806*     Results from last 7 days   Lab Units 21  1749 21  1147   BLOOD CULTURE  No Growth at 48 hrs  No Growth at 48 hrs   --    GRAM STAIN RESULT   --  No Polys or Bacteria seen   BODY FLUID CULTURE, STERILE   --  No growth       Imaging Studies:   I have personally reviewed pertinent imaging study reports and images in PACS  EKG, Pathology, and Other Studies:   I have personally reviewed pertinent reports

## 2021-09-09 ENCOUNTER — APPOINTMENT (INPATIENT)
Dept: RADIOLOGY | Facility: HOSPITAL | Age: 71
DRG: 329 | End: 2021-09-09
Payer: COMMERCIAL

## 2021-09-09 ENCOUNTER — PREP FOR PROCEDURE (OUTPATIENT)
Dept: INTERVENTIONAL RADIOLOGY/VASCULAR | Facility: CLINIC | Age: 71
End: 2021-09-09

## 2021-09-09 DIAGNOSIS — C78.6 PERITONEAL CARCINOMATOSIS (HCC): Primary | ICD-10-CM

## 2021-09-09 LAB
AMYLASE FLD QL: 327 U/L
ANION GAP SERPL CALCULATED.3IONS-SCNC: 3 MMOL/L (ref 4–13)
BASOPHILS # BLD AUTO: 0.12 THOUSANDS/ΜL (ref 0–0.1)
BASOPHILS NFR BLD AUTO: 1 % (ref 0–1)
BUN SERPL-MCNC: 10 MG/DL (ref 5–25)
CALCIUM SERPL-MCNC: 8.4 MG/DL (ref 8.3–10.1)
CHLORIDE SERPL-SCNC: 104 MMOL/L (ref 100–108)
CO2 SERPL-SCNC: 27 MMOL/L (ref 21–32)
CREAT SERPL-MCNC: 0.34 MG/DL (ref 0.6–1.3)
EOSINOPHIL # BLD AUTO: 0.53 THOUSAND/ΜL (ref 0–0.61)
EOSINOPHIL NFR BLD AUTO: 5 % (ref 0–6)
ERYTHROCYTE [DISTWIDTH] IN BLOOD BY AUTOMATED COUNT: 16.6 % (ref 11.6–15.1)
GFR SERPL CREATININE-BSD FRML MDRD: 112 ML/MIN/1.73SQ M
GLUCOSE SERPL-MCNC: 90 MG/DL (ref 65–140)
HCT VFR BLD AUTO: 29.7 % (ref 34.8–46.1)
HGB BLD-MCNC: 9.3 G/DL (ref 11.5–15.4)
IMM GRANULOCYTES # BLD AUTO: 0.25 THOUSAND/UL (ref 0–0.2)
IMM GRANULOCYTES NFR BLD AUTO: 2 % (ref 0–2)
LYMPHOCYTES # BLD AUTO: 4.18 THOUSANDS/ΜL (ref 0.6–4.47)
LYMPHOCYTES NFR BLD AUTO: 40 % (ref 14–44)
MCH RBC QN AUTO: 29.2 PG (ref 26.8–34.3)
MCHC RBC AUTO-ENTMCNC: 31.3 G/DL (ref 31.4–37.4)
MCV RBC AUTO: 93 FL (ref 82–98)
MONOCYTES # BLD AUTO: 1.65 THOUSAND/ΜL (ref 0.17–1.22)
MONOCYTES NFR BLD AUTO: 16 % (ref 4–12)
NEUTROPHILS # BLD AUTO: 3.78 THOUSANDS/ΜL (ref 1.85–7.62)
NEUTS SEG NFR BLD AUTO: 36 % (ref 43–75)
NRBC BLD AUTO-RTO: 0 /100 WBCS
PLATELET # BLD AUTO: 713 THOUSANDS/UL (ref 149–390)
PMV BLD AUTO: 9.6 FL (ref 8.9–12.7)
POTASSIUM SERPL-SCNC: 3.7 MMOL/L (ref 3.5–5.3)
RBC # BLD AUTO: 3.18 MILLION/UL (ref 3.81–5.12)
SODIUM SERPL-SCNC: 134 MMOL/L (ref 136–145)
WBC # BLD AUTO: 10.51 THOUSAND/UL (ref 4.31–10.16)

## 2021-09-09 PROCEDURE — 71260 CT THORAX DX C+: CPT

## 2021-09-09 PROCEDURE — 99024 POSTOP FOLLOW-UP VISIT: CPT | Performed by: OBSTETRICS & GYNECOLOGY

## 2021-09-09 PROCEDURE — 85025 COMPLETE CBC W/AUTO DIFF WBC: CPT | Performed by: OBSTETRICS & GYNECOLOGY

## 2021-09-09 PROCEDURE — 74177 CT ABD & PELVIS W/CONTRAST: CPT

## 2021-09-09 PROCEDURE — G1004 CDSM NDSC: HCPCS

## 2021-09-09 PROCEDURE — 99233 SBSQ HOSP IP/OBS HIGH 50: CPT | Performed by: INTERNAL MEDICINE

## 2021-09-09 PROCEDURE — 82150 ASSAY OF AMYLASE: CPT | Performed by: OBSTETRICS & GYNECOLOGY

## 2021-09-09 PROCEDURE — 80048 BASIC METABOLIC PNL TOTAL CA: CPT | Performed by: OBSTETRICS & GYNECOLOGY

## 2021-09-09 PROCEDURE — 87040 BLOOD CULTURE FOR BACTERIA: CPT | Performed by: INTERNAL MEDICINE

## 2021-09-09 RX ORDER — SODIUM CHLORIDE 9 MG/ML
75 INJECTION, SOLUTION INTRAVENOUS CONTINUOUS
Status: CANCELLED | OUTPATIENT
Start: 2021-09-09

## 2021-09-09 RX ADMIN — IBUPROFEN 600 MG: 600 TABLET, FILM COATED ORAL at 14:13

## 2021-09-09 RX ADMIN — ACETAMINOPHEN 975 MG: 325 TABLET, FILM COATED ORAL at 22:33

## 2021-09-09 RX ADMIN — HEPARIN SODIUM 5000 UNITS: 5000 INJECTION INTRAVENOUS; SUBCUTANEOUS at 14:13

## 2021-09-09 RX ADMIN — AMLODIPINE BESYLATE 10 MG: 10 TABLET ORAL at 09:28

## 2021-09-09 RX ADMIN — POTASSIUM CHLORIDE 20 MEQ: 1500 TABLET, EXTENDED RELEASE ORAL at 18:00

## 2021-09-09 RX ADMIN — PANTOPRAZOLE SODIUM 40 MG: 40 TABLET, DELAYED RELEASE ORAL at 05:19

## 2021-09-09 RX ADMIN — ACETAMINOPHEN 975 MG: 325 TABLET, FILM COATED ORAL at 14:13

## 2021-09-09 RX ADMIN — IBUPROFEN 600 MG: 600 TABLET, FILM COATED ORAL at 09:27

## 2021-09-09 RX ADMIN — HEPARIN SODIUM 5000 UNITS: 5000 INJECTION INTRAVENOUS; SUBCUTANEOUS at 22:33

## 2021-09-09 RX ADMIN — DOCUSATE SODIUM 100 MG: 100 CAPSULE, LIQUID FILLED ORAL at 18:00

## 2021-09-09 RX ADMIN — DIBASIC SODIUM PHOSPHATE, MONOBASIC POTASSIUM PHOSPHATE AND MONOBASIC SODIUM PHOSPHATE 1 TABLET: 852; 155; 130 TABLET ORAL at 16:14

## 2021-09-09 RX ADMIN — DIBASIC SODIUM PHOSPHATE, MONOBASIC POTASSIUM PHOSPHATE AND MONOBASIC SODIUM PHOSPHATE 1 TABLET: 852; 155; 130 TABLET ORAL at 11:54

## 2021-09-09 RX ADMIN — PRAVASTATIN SODIUM 10 MG: 10 TABLET ORAL at 16:14

## 2021-09-09 RX ADMIN — IBUPROFEN 600 MG: 600 TABLET, FILM COATED ORAL at 02:54

## 2021-09-09 RX ADMIN — IBUPROFEN 600 MG: 600 TABLET, FILM COATED ORAL at 22:32

## 2021-09-09 RX ADMIN — ACETAMINOPHEN 975 MG: 325 TABLET, FILM COATED ORAL at 05:19

## 2021-09-09 RX ADMIN — IOHEXOL 100 ML: 350 INJECTION, SOLUTION INTRAVENOUS at 10:47

## 2021-09-09 RX ADMIN — OXYCODONE HYDROCHLORIDE 10 MG: 10 TABLET ORAL at 16:14

## 2021-09-09 NOTE — PROGRESS NOTES
Progress Note - Infectious Disease   Pritesh Flores 80 y o  female MRN: [id number]  Unit/Bed#: [facility] 930-01 Encounter: [id number]      Impression/Recommendations:  1  Low-grade temp  New  Unclear etiology  ROS negative  Consider due to intraabdominal collections in setting of decreased drain output  Consider bacteremia given PICC line  No  symptoms to suggest UTI  No cough to suggest pneumonia  Consider noninfectious including atelectasis  Clinically stable and nontoxic  WBC normal   Noted to be on standing Tylenol, ibuprofen  Rec:  · Continue to follow closely off antibiotics  · Check blood cultures x2 sets  · Check CT per primary service  · Follow drain outputs closely  · Consider stopping standing Tylenol, ibuprofen to better monitor temperatures  · Recheck CBC in a m  · Supportive care as per the primary service    2  Abdominal abscesses   Following complex abdominal surgery and pancreatic leak  8/18, s/p IR drain placement with pus  Culture with Klebsiella oxytoca (now ESBL), Enterococcus, Bacteroides  8/27, occluded catheter upsized to 12 Fr with aspiration of 50 ml purulent fluid   8/29, patient developed bleed from her drainage catheters, CTA A/P showed fluid collection along midline incision  6cc pus drained from midline incision collection   9/2 CT shows stable collection with drain in place   Status post 14 days IV antibiotics  Rec:  ? Continue to follow closely off antibiotics  ? Drains per Gyn/Onc and IR  ? Repeat CT as above     3   Loculated left pleural effusion   In setting of recent surgery, splenectomy   CT with enhancement but pleural fluid bland with negative cultures on thoracentesis 9/3/21   Respiratory status stable on room air  Rec:  ? Follow respiratory status closely  ? Repeat CT as above     4   Recent sepsis   Evolving 8/17:  Fever, WBC   Due to #1   Improved status post drain, antibiotics             5  Pancreatic leak   Surgical complication   Abdominal drain in place      6  Ovarian cancer    status post ex lap, en-bloc hysterectomy, bilateral salpingo-oophorectomy, sigmoidectomy with low rectal anastomosis, SBR, radical omentectomy, splenectomy, appendectomy, oversewing of tail of pancreas, repair of cystotomy, bilateral peritoneal stripping   Surgery was complicated by pancreatic tail hemorrhage and low rectal reanastomosis  Imaging shows liver metastases       7  Status post splenectomy   Received HiB, meningococcal quadrivalent vaccine  Rec:  ? Needs PCV13 prior to discharge     The above plan was discussed in detail with the patient and Dr Yuko Giordano      Antibiotics:  Off antibiotics #3    Subjective:  Patient seen on AM rounds  Felt very cold last night, then noted to have temp of 100 9  Deniessweats, nausea, vomiting, or diarrhea  Did not ambulate in halls yesterday but was OOB to chair  24 Hour Events:  Low grade temp overnight  No documented fevers, chills, sweats, nausea, vomiting, or diarrhea  Objective:  Vitals:  Temp:  [97 8 °F (36 6 °C)-100 9 °F (38 3 °C)] 97 8 °F (36 6 °C)  HR:  [101-120] 101  Resp:  [16-20] 16  BP: (119-134)/(71-76) 134/71  SpO2:  [91 %-93 %] 91 %  Temp (24hrs), Av 2 °F (37 3 °C), Min:97 8 °F (36 6 °C), Max:100 9 °F (38 3 °C)  Current: Temperature: 97 8 °F (36 6 °C)    Physical Exam:   General:  No acute distress  Eyes:  Normal lids and conjunctivae  ENT:  Normal external ears and nose  Neck:  Neck symmetric with midline trachea  Pulmonary:  Normal respiratory effort without accessory muscle use  Cardiovascular:  Tachycardia with a regular rhythm; no peripheral edema  Gastrointestinal:  No tenderness or distention, drains with minimal fluid  Musculoskeletal:  No digital clubbing or cyanosis  Skin:  No visible rashes; No palpable nodules  Neurologic:  Sensation grossly intact to light touch  Psychiatric:  Alert and oriented; Normal mood    Lab Results:  I have personally reviewed pertinent labs    Results from last 7 days Lab Units 09/09/21  0635 09/08/21  0631 09/07/21  0603   POTASSIUM mmol/L 3 7 4 0 3 0*   CHLORIDE mmol/L 104 105 113*   CO2 mmol/L 27 27 22   BUN mg/dL 10 10 7   CREATININE mg/dL 0 34* 0 36* 0 27*   EGFR ml/min/1 73sq m 112 110 120   CALCIUM mg/dL 8 4 8 7 6 9*   AST U/L  --   --  18   ALT U/L  --   --  15   ALK PHOS U/L  --   --  405*     Results from last 7 days   Lab Units 09/09/21  0635 09/08/21  0631 09/07/21  2204 09/07/21  0603   WBC Thousand/uL 10 51* 10 61*  --  9 36   HEMOGLOBIN g/dL 9 3* 9 8* 9 9* 7 4*   PLATELETS Thousands/uL 713* 791*  --  691*     Results from last 7 days   Lab Units 09/05/21  1749 09/03/21  1147   BLOOD CULTURE  No Growth at 72 hrs  No Growth at 72 hrs   --    GRAM STAIN RESULT   --  No Polys or Bacteria seen   BODY FLUID CULTURE, STERILE   --  No growth       Imaging Studies:   I have personally reviewed pertinent imaging study reports and images in PACS  EKG, Pathology, and Other Studies:   I have personally reviewed pertinent reports

## 2021-09-09 NOTE — QUICK NOTE
Will plan for outpatient port placement  Will ask for this to be scheduled within one week  Will cancel inpatient order      Chester Hamman, PA-C

## 2021-09-09 NOTE — PROGRESS NOTES
Gynecology Oncology Progress note   Albaro Gaines 80 y o  female MRN: [id number]  Unit/Bed#: [facility] 930-01 Encounter: [id number]    Assessment: Albaro Gaines is a 80 y o  female POD 29 from primary debulking surgery including splenectomy, modified radical hysterectomy bilateral salpingo-oophorectomy, rectosigmoid resection with end-to-end reanastomosis, omentectomy, appendectomy, resection and ablation of peritoneal tumors  Post op course has been complicated by pancreatic fistula, LUQ collection, intermittent fevers, and acute blood loss anemia       Plan:  S/p surgery  - Spontaneously voiding  - Continue regular diet   - Continue IS/encourage ambulation   - Pain controlled on current regimen:              Motrin 600mg q6h allison              Tylenol 975mg Q8h allison               Tyra 5 mg Q4h PRN (moderate)                   Tyra 10mg Q4h PRN (severe)              Dilaudid 0 5 IV Q2h PRN (breakthrough)     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   - Collection stable on CT 9/2  - S/p L thoracentesis 9/2, 200 cc out, no growth on culture  - Repeat blood cx negative x 24h (9/5)  - Drain culture +klebsiella, s/p antibiotics  - Aerobic and anaerobic wound (8/23) culture without growth   - LUQ drain with 10 cc overnight  - Posterior drain with no output overnight  - Maintain both drains     Fever of unknown origin  - S/p IV abx (ertapenem and ampicillin)  - Per ID, no further abx at this time and will observe fever curve  - LE dopplers completed 8/26 with no evidence of DVT  - Patient with fever of 100 9F yesterday evening after several days of no fevers  - Will discuss next steps with ID  - Consider CT scan  - WBC 10 51 this AM    Anemia   - Hgb 7 4, had previously been stable in the 8's  - 1u pRBC transfused yesterday, Hgb post-transfusion was 9 9  - Hgb 9 3 this AM    Tachycardia  - Possible in the setting of anemia  - Pt asymptomatic  - EKG normal     Hyponatremia  - Stable, 138 , AM labs pending  - Continue IV fluids   - S/p SLIM consultatiion  - Appreciate SLIM recommendations     Wound seroma   - Packing in place in inferior aspect of incision   - Plan for daily dressing changes, done at bedside today  - Patient instructed on how to complete dressing change  - Will need VNA services for drain and wound care upon discharge     Persistent left lower lobe pleural effusion   - S/p IR drainage of 200 cc on 9/2, no growth on culture  - Saturating well on room air   - Continue to monitor closely     S/p splenectomy with thrombocytosis   - Improving   - Continue to trend   - ASA discontinued   - Splenectomy vaccines prior to discharge      High grade serous ovarian carcinoma   - Final path resulted  - Pt discussed at tumor board on 8/20, plan is for eventual systemic therapy, genetic and genomic testing    DVT ppx: SCDs, Heparin     Disposition: remain inpatient    Subjective:  Cami Henriquez slept well overnight  She did report a period of chills then sweating around the time that her fever was recorded  She continues to ambulate, void, pass flatus/stool, and tolerate regular diet  Denies  chest pain, SOB, nausea, vomiting, leg pain  Still has some soreness in her abdomen  Review of Systems   All other systems reviewed and are negative  Objective:   /76   Pulse (!) 117   Temp 99 3 °F (37 4 °C)   Resp 20   Ht 4' 11" (1 499 m)   Wt 74 9 kg (165 lb 2 oz)   SpO2 91%   BMI 33 35 kg/m²     I/O       09/05 0701 - 09/06 0700 09/06 0701 - 09/07 0700 09/07 0701 - 09/08 0700    P  O  240 360     I V  (mL/kg)  2190 8 (28 3)     NG/GT 10 5     Total Intake(mL/kg) 250 (3 2) 2555 8 (33 1)     Urine (mL/kg/hr) 2300 (1 2) 3700 (2)     Drains 10 30     Stool  0     Total Output 2310 3730     Net -2060 -1174 2            Unmeasured Stool Occurrence  1 x           Lab Results   Component Value Date    WBC 10 61 (H) 09/08/2021    HGB 9 8 (L) 09/08/2021    HCT 31 8 (L) 09/08/2021    MCV 93 09/08/2021     (H) 09/08/2021 Lab Results   Component Value Date    GLUCOSE 195 (H) 08/06/2021    CALCIUM 8 7 09/08/2021    K 4 0 09/08/2021    CO2 27 09/08/2021     09/08/2021    BUN 10 09/08/2021    CREATININE 0 36 (L) 09/08/2021       Lab Results   Component Value Date/Time    POCGLU 127 09/01/2021 12:17 AM    POCGLU 154 (H) 08/19/2021 10:23 AM    POCGLU 161 (H) 08/19/2021 06:58 AM    POCGLU 158 (H) 08/18/2021 09:09 PM    POCGLU 171 (H) 08/18/2021 04:33 PM       Physical Exam  Vitals reviewed  Constitutional:       Appearance: Normal appearance  She is obese  HENT:      Head: Normocephalic and atraumatic  Mouth/Throat:      Mouth: Mucous membranes are moist       Pharynx: Oropharynx is clear  Eyes:      Conjunctiva/sclera: Conjunctivae normal       Pupils: Pupils are equal, round, and reactive to light  Cardiovascular:      Rate and Rhythm: Normal rate  Pulses: Normal pulses  Heart sounds: Normal heart sounds  Pulmonary:      Effort: Pulmonary effort is normal  No respiratory distress  Breath sounds: Normal breath sounds  Abdominal:      General: Abdomen is flat  There is no distension  Palpations: Abdomen is soft  Tenderness: There is no abdominal tenderness  Comments: Midline vertical incision c/d/i, healing well except for one area in the superior portion (now healing) and one area in the inferior portion of the wound  Inferior aspect with packing, exchanged on rounds this AM     Anterior LUQ drain in place, drainage of old blood fluid (10cc)  Posterior LUQ drain in place, drainage of pancreatic secretions (0cc)   Genitourinary:     Comments: Deferred  Musculoskeletal:         General: Normal range of motion  Cervical back: Normal range of motion  Skin:     General: Skin is warm and dry  Capillary Refill: Capillary refill takes less than 2 seconds  Coloration: Skin is not pale  Neurological:      General: No focal deficit present        Mental Status: She is alert and oriented to person, place, and time  Mental status is at baseline  Psychiatric:         Mood and Affect: Mood normal          Behavior: Behavior normal          Thought Content:  Thought content normal          Ilene Jeffries MD   PGY-4, GYN ONC  9/9/2021 6:24 AM

## 2021-09-09 NOTE — UTILIZATION REVIEW
Continued Stay Review    Date: 9/9/21                          Current Patient Class: IP Current Level of Care: MS    HPI:71 y o  female initially admitted on 8/6 with fr ablation of malignant neoplasm with a goal of debulking - crytoreduction, pt with pancreatic leak and intra-abdominal abscess  S/p IR drainage 8/18 and drain upsizing 8/27 now with increasing bloody drainage in  And around OSCAR drain  ( improved)   Assessment/Plan:   Pt was febrile last evening with chills and diaphoresis  Unsure of cause, cultures have been negative  Post op pancreatic fistula has decreasing output  This could be a potential cause of fever  Could be from PICC line  Imaging today shows almost resolution of abscess but continued atelectasis/pleural effusion  She is clinically stable and nontoxic  WBC WNL  Follow off antibiotics, do 2 sets of blood cultures, follow drain outputs closely, stop standing antipyretics to monitor temps, CBC in AM   Will have port placed on last hospital day before d/c as she will be doing chemo OP        Vital Signs:   09/09/21 1614  98 4 °F (36 9 °C)  --  --  --  --  --  --  --  --   09/09/21 15:36:50  98 6 °F (37 °C)  104  16  101/62  75  94 %  --  --  None (Room air)   09/09/21 1100  --  --  --  --  --  92 %  --  --  None (Room air)   09/09/21 08:03:57  97 8 °F (36 6 °C)  101  16  134/71  92  91 %  --  --  --   09/09/21 0100  --  --  --  --  --  --  --  --  None (Room air)   09/08/21 23:32:07  99 3 °F (37 4 °C)  117Abnormal   --  --  --  91 %  --  --  --   09/08/21 22:56:05  100 9 °F (38 3 °C)Abnormal   120Abnormal   20  119/76  90  92 %  --  --  --   09/08/21 15:34:52  98 6 °F (37 °C)  107Abnormal   16  127/71  90  93 %  --  --  --   09/08/21 0838  --  --  --  --  --  95 %  --  --  None (Room air)   09/08/21 08:23:07  98 9 °F (37 2 °C)  104  18  129/74  92  92 %  --  --  --   09/07/21 22:13:27  99 1 °F (37 3 °C)  113Abnormal   --  134/73  93  92 %  --  --  --   09/07/21 20:01:50  99 °F (37 2 °C)  108Abnormal   18  126/71  89  93 %  --  --  None (Room air)   09/07/21 17:42:50  98 9 °F (37 2 °C)  109Abnormal   16  127/72  90  92 %  --  --  --   09/07/21 1709  99 6 °F (37 6 °C)  112Abnormal   16  121/71  --  --  --  --  --   09/07/21 17:07:15  99 6 °F (37 6 °C)  112Abnormal   --  121/71  88  91 %  --  --  --   09/07/21 15:53:01  99 °F (37 2 °C)  110Abnormal   --  120/71  87  98 %  --  --  --   09/07/21 0900  --  --  --  --  --  93 %  28  2 L/min  None (Room air)   09/07/21 07:35:14  98 5 °F (36 9 °C)  106Abnormal   16  118/74  89  92 %  --  --       Pertinent Labs/Diagnostic Results:     9/9 CT CAP - 1  Postoperative change in the abdomen and pelvis as noted  2   Loculated peripherally enhancing left pleural effusion, consistent with an exudative effusion, as described above  Empyema to be excluded  3   Surgical drains in stable position in the left upper quadrant of the abdomen in the splenectomy bed adjacent to the pancreatic tail  Small amount of residual fluid present in this location similar to the prior exam   4   Multiple stable hepatic low-attenuation lesions suspicious for metastases        Results from last 7 days   Lab Units 09/09/21  0635 09/08/21  0631 09/07/21  2204 09/07/21  0603 09/06/21  0530   WBC Thousand/uL 10 51* 10 61*  --  9 36 9 31   HEMOGLOBIN g/dL 9 3* 9 8* 9 9* 7 4* 8 3*   HEMATOCRIT % 29 7* 31 8* 31 9* 23 7* 26 8*   PLATELETS Thousands/uL 713* 791*  --  691* 806*   NEUTROS ABS Thousands/µL 3 78  --   --   --   --          Results from last 7 days   Lab Units 09/09/21  0635 09/08/21  0631 09/07/21  0603 09/06/21  0530 09/05/21  0609   SODIUM mmol/L 134* 137 142 138 139   POTASSIUM mmol/L 3 7 4 0 3 0* 3 5 3 6   CHLORIDE mmol/L 104 105 113* 107 107   CO2 mmol/L 27 27 22 27 28   ANION GAP mmol/L 3* 5 7 4 4   BUN mg/dL 10 10 7 7 7   CREATININE mg/dL 0 34* 0 36* 0 27* 0 31* 0 35*   EGFR ml/min/1 73sq m 112 110 120 115 111   CALCIUM mg/dL 8 4 8 7 6 9* 8 4 8 2*   MAGNESIUM mg/dL  -- --  1 6 1 9  --      Results from last 7 days   Lab Units 09/07/21  0603   AST U/L 18   ALT U/L 15   ALK PHOS U/L 405*   TOTAL PROTEIN g/dL 4 8*   ALBUMIN g/dL 1 2*   TOTAL BILIRUBIN mg/dL 0 16*         Results from last 7 days   Lab Units 09/09/21  0635 09/08/21  0631 09/07/21  0603 09/06/21  0530 09/05/21  0609 09/04/21  0500 09/03/21  0511   GLUCOSE RANDOM mg/dL 90 85 88 94 88 88 106     Results from last 7 days   Lab Units 09/05/21  1749 09/03/21  1147   BLOOD CULTURE  No Growth at 72 hrs  No Growth at 72 hrs   --    GRAM STAIN RESULT   --  No Polys or Bacteria seen   BODY FLUID CULTURE, STERILE   --  No growth     Results from last 7 days   Lab Units 09/03/21  1147   TOTAL COUNTED  100   WBC FLUID /ul 242     Medications:   Scheduled Medications:  acetaminophen, 975 mg, Oral, Q8H ANA MARIA  amLODIPine, 10 mg, Oral, Daily  docusate sodium, 100 mg, Oral, BID  heparin (porcine), 5,000 Units, Subcutaneous, Q8H ANA MARIA  ibuprofen, 600 mg, Oral, Q6H  iohexol, 50 mL, Oral, 90 min pre-procedure  pantoprazole, 40 mg, Oral, Early Morning  potassium chloride, 20 mEq, Oral, BID  potassium-sodium phosphateS, 1 tablet, Oral, TID With Meals  pravastatin, 10 mg, Oral, Daily With Dinner      Continuous IV Infusions:  sodium chloride, 50 mL/hr, Intravenous, Continuous      PRN Meds:  albuterol, 2 puff, Inhalation, Q4H PRN  HYDROmorphone, 0 5 mg, Intravenous, Q2H PRN  iohexol, 50 mL, Oral, Once in imaging  ondansetron, 4 mg, Intravenous, Q6H PRN  oxyCODONE, 10 mg, Oral, Q4H PRN  oxyCODONE, 5 mg, Oral, Q4H PRN  phenol, 1 spray, Mouth/Throat, Q2H PRN  pneumococcal 13-valent conjugate vaccine, 0 5 mL, Intramuscular, Prior to discharge  sodium chloride (PF), 500 mL, Intracatheter, Once in imaging    Discharge Plan: D    Network Utilization Review Department  ATTENTION: Please call with any questions or concerns to [phone] and carefully listen to the prompts so that you are directed to the right person   All voicemails are confidential   [facility] all requests for admission clinical reviews, approved or denied determinations and any other requests to dedicated fax number below belonging to the campus where the patient is receiving treatment   List of dedicated fax numbers for the Facilities:  [address] DENIALS (Administrative/Medical Necessity) [phone]   [address] (Maternity/NICU/Pediatrics) [phone]   [address] [facility] [id number] [address] [facility] 14[address] [facility] [facility] [phone]

## 2021-09-10 ENCOUNTER — TELEPHONE (OUTPATIENT)
Dept: RADIOLOGY | Facility: HOSPITAL | Age: 71
End: 2021-09-10

## 2021-09-10 VITALS
TEMPERATURE: 98.7 F | HEART RATE: 98 BPM | RESPIRATION RATE: 16 BRPM | BODY MASS INDEX: 33.33 KG/M2 | OXYGEN SATURATION: 92 % | HEIGHT: 59 IN | DIASTOLIC BLOOD PRESSURE: 66 MMHG | SYSTOLIC BLOOD PRESSURE: 116 MMHG | WEIGHT: 165.34 LBS

## 2021-09-10 LAB
ANION GAP SERPL CALCULATED.3IONS-SCNC: 3 MMOL/L (ref 4–13)
BACTERIA BLD CULT: NORMAL
BACTERIA BLD CULT: NORMAL
BASOPHILS # BLD AUTO: 0.1 THOUSANDS/ΜL (ref 0–0.1)
BASOPHILS NFR BLD AUTO: 1 % (ref 0–1)
BUN SERPL-MCNC: 9 MG/DL (ref 5–25)
CALCIUM SERPL-MCNC: 9 MG/DL (ref 8.3–10.1)
CHLORIDE SERPL-SCNC: 104 MMOL/L (ref 100–108)
CO2 SERPL-SCNC: 29 MMOL/L (ref 21–32)
CREAT SERPL-MCNC: 0.42 MG/DL (ref 0.6–1.3)
EOSINOPHIL # BLD AUTO: 0.45 THOUSAND/ΜL (ref 0–0.61)
EOSINOPHIL NFR BLD AUTO: 5 % (ref 0–6)
ERYTHROCYTE [DISTWIDTH] IN BLOOD BY AUTOMATED COUNT: 16.7 % (ref 11.6–15.1)
GFR SERPL CREATININE-BSD FRML MDRD: 104 ML/MIN/1.73SQ M
GLUCOSE SERPL-MCNC: 96 MG/DL (ref 65–140)
HCT VFR BLD AUTO: 32.2 % (ref 34.8–46.1)
HGB BLD-MCNC: 10 G/DL (ref 11.5–15.4)
IMM GRANULOCYTES # BLD AUTO: 0.16 THOUSAND/UL (ref 0–0.2)
IMM GRANULOCYTES NFR BLD AUTO: 2 % (ref 0–2)
LYMPHOCYTES # BLD AUTO: 4.13 THOUSANDS/ΜL (ref 0.6–4.47)
LYMPHOCYTES NFR BLD AUTO: 43 % (ref 14–44)
MCH RBC QN AUTO: 28.7 PG (ref 26.8–34.3)
MCHC RBC AUTO-ENTMCNC: 31.1 G/DL (ref 31.4–37.4)
MCV RBC AUTO: 93 FL (ref 82–98)
MONOCYTES # BLD AUTO: 1.48 THOUSAND/ΜL (ref 0.17–1.22)
MONOCYTES NFR BLD AUTO: 15 % (ref 4–12)
NEUTROPHILS # BLD AUTO: 3.29 THOUSANDS/ΜL (ref 1.85–7.62)
NEUTS SEG NFR BLD AUTO: 34 % (ref 43–75)
NRBC BLD AUTO-RTO: 0 /100 WBCS
PLATELET # BLD AUTO: 766 THOUSANDS/UL (ref 149–390)
PMV BLD AUTO: 10.2 FL (ref 8.9–12.7)
POTASSIUM SERPL-SCNC: 3.8 MMOL/L (ref 3.5–5.3)
RBC # BLD AUTO: 3.48 MILLION/UL (ref 3.81–5.12)
SODIUM SERPL-SCNC: 136 MMOL/L (ref 136–145)
WBC # BLD AUTO: 9.61 THOUSAND/UL (ref 4.31–10.16)

## 2021-09-10 PROCEDURE — 99024 POSTOP FOLLOW-UP VISIT: CPT | Performed by: OBSTETRICS & GYNECOLOGY

## 2021-09-10 PROCEDURE — 85025 COMPLETE CBC W/AUTO DIFF WBC: CPT | Performed by: OBSTETRICS & GYNECOLOGY

## 2021-09-10 PROCEDURE — 90670 PCV13 VACCINE IM: CPT | Performed by: OBSTETRICS & GYNECOLOGY

## 2021-09-10 PROCEDURE — 80048 BASIC METABOLIC PNL TOTAL CA: CPT | Performed by: OBSTETRICS & GYNECOLOGY

## 2021-09-10 PROCEDURE — 99232 SBSQ HOSP IP/OBS MODERATE 35: CPT | Performed by: INTERNAL MEDICINE

## 2021-09-10 PROCEDURE — G0009 ADMIN PNEUMOCOCCAL VACCINE: HCPCS | Performed by: OBSTETRICS & GYNECOLOGY

## 2021-09-10 RX ORDER — IBUPROFEN 600 MG/1
600 TABLET ORAL EVERY 6 HOURS
Qty: 30 TABLET | Refills: 0
Start: 2021-09-10

## 2021-09-10 RX ORDER — ACETAMINOPHEN 325 MG/1
975 TABLET ORAL EVERY 8 HOURS SCHEDULED
Start: 2021-09-10

## 2021-09-10 RX ORDER — DOCUSATE SODIUM 100 MG/1
100 CAPSULE, LIQUID FILLED ORAL 2 TIMES DAILY
Start: 2021-09-10

## 2021-09-10 RX ADMIN — DOCUSATE SODIUM 100 MG: 100 CAPSULE, LIQUID FILLED ORAL at 09:03

## 2021-09-10 RX ADMIN — DIBASIC SODIUM PHOSPHATE, MONOBASIC POTASSIUM PHOSPHATE AND MONOBASIC SODIUM PHOSPHATE 1 TABLET: 852; 155; 130 TABLET ORAL at 09:03

## 2021-09-10 RX ADMIN — OXYCODONE HYDROCHLORIDE 10 MG: 10 TABLET ORAL at 12:29

## 2021-09-10 RX ADMIN — SODIUM CHLORIDE 50 ML/HR: 0.45 INJECTION, SOLUTION INTRAVENOUS at 06:29

## 2021-09-10 RX ADMIN — PANTOPRAZOLE SODIUM 40 MG: 40 TABLET, DELAYED RELEASE ORAL at 06:25

## 2021-09-10 RX ADMIN — DIBASIC SODIUM PHOSPHATE, MONOBASIC POTASSIUM PHOSPHATE AND MONOBASIC SODIUM PHOSPHATE 1 TABLET: 852; 155; 130 TABLET ORAL at 11:53

## 2021-09-10 RX ADMIN — IBUPROFEN 600 MG: 600 TABLET, FILM COATED ORAL at 03:49

## 2021-09-10 RX ADMIN — PNEUMOCOCCAL 13-VALENT CONJUGATE VACCINE 0.5 ML: 2.2; 2.2; 2.2; 2.2; 2.2; 4.4; 2.2; 2.2; 2.2; 2.2; 2.2; 2.2; 2.2 INJECTION, SUSPENSION INTRAMUSCULAR at 13:44

## 2021-09-10 RX ADMIN — HEPARIN SODIUM 5000 UNITS: 5000 INJECTION INTRAVENOUS; SUBCUTANEOUS at 06:26

## 2021-09-10 RX ADMIN — PRAVASTATIN SODIUM 10 MG: 10 TABLET ORAL at 15:54

## 2021-09-10 RX ADMIN — IBUPROFEN 600 MG: 600 TABLET, FILM COATED ORAL at 09:03

## 2021-09-10 RX ADMIN — ACETAMINOPHEN 975 MG: 325 TABLET, FILM COATED ORAL at 13:51

## 2021-09-10 RX ADMIN — DIBASIC SODIUM PHOSPHATE, MONOBASIC POTASSIUM PHOSPHATE AND MONOBASIC SODIUM PHOSPHATE 1 TABLET: 852; 155; 130 TABLET ORAL at 15:53

## 2021-09-10 RX ADMIN — AMLODIPINE BESYLATE 10 MG: 10 TABLET ORAL at 09:04

## 2021-09-10 RX ADMIN — IBUPROFEN 600 MG: 600 TABLET, FILM COATED ORAL at 15:54

## 2021-09-10 RX ADMIN — ACETAMINOPHEN 975 MG: 325 TABLET, FILM COATED ORAL at 06:25

## 2021-09-10 RX ADMIN — POTASSIUM CHLORIDE 20 MEQ: 1500 TABLET, EXTENDED RELEASE ORAL at 09:04

## 2021-09-10 NOTE — H&P (VIEW-ONLY)
Gynecology Oncology Progress note   Sammie Tipton 80 y o  female MRN: [id number]  Unit/Bed#: [facility] 930-01 Encounter: [id number]    Assessment: Sammie Tipton is a 80 y o  female POD 28 from primary debulking surgery including splenectomy, modified radical hysterectomy bilateral salpingo-oophorectomy, rectosigmoid resection with end-to-end reanastomosis, omentectomy, appendectomy, resection and ablation of peritoneal tumors  Post op course has been complicated by pancreatic fistula, LUQ collection, intermittent fevers, and acute blood loss anemia       Plan:  S/p surgery  - Spontaneously voiding  - Continue regular diet   - Continue IS/encourage ambulation   - Pain controlled on current regimen:              Motrin 600mg q6h allison              Tylenol 975mg Q8h allison               Tyra 5 mg Q4h PRN (moderate)                   Tyra 10mg Q4h PRN (severe)              Dilaudid 0 5 IV Q2h PRN (breakthrough)     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   - Collection stable on CT 9/2  - S/p L thoracentesis 9/2, 200 cc out, no growth on culture  - Repeat blood cx negative x 24h (9/5)  - Drain culture +klebsiella, s/p antibiotics  - Aerobic and anaerobic wound (8/23) culture without growth   - LUQ drain with 5cc overnight  - Posterior drain with 5cc overnight  - Maintain both drains     Fever of unknown origin  - Patient with fever of 100 7F yesterday evening   - S/p IV abx (ertapenem and ampicillin)  - Per ID, no further abx at this time and will observe fever curve  - LE dopplers completed 8/26 with no evidence of DVT  - CT done yesterday shows almost complete resolution of abscess but continued atelectasis/pleurla effusion  - WBC 9 6 this AM    Anemia   - Hgb 7 4, had previously been stable in the 8's  - 1u pRBC transfused yesterday, Hgb post-transfusion was 9 9  - Hgb 10 0    Tachycardia  - Possible in the setting of anemia  - Pt asymptomatic  - EKG normal     Hyponatremia  - Stable, 136 , AM labs pending  - Continue IV fluids   - S/p SLIM consultatiion  - Appreciate SLIM recommendations     Wound seroma   - Packing in place in inferior aspect of incision   - Plan for daily dressing changes  - Patient instructed on how to complete dressing change  - Will need VNA services for drain and wound care upon discharge     Persistent left lower lobe pleural effusion   - S/p IR drainage of 200 cc on 9/2, no growth on culture  - Saturating well on room air  - Encouraged ambulation and use of IS throughout the day   - Continue to monitor closely     S/p splenectomy with thrombocytosis   - Improving   - Continue to trend   - ASA discontinued   - Splenectomy vaccines prior to discharge      High grade serous ovarian carcinoma   - Final path resulted  - Pt discussed at tumor board on 8/20, plan is for eventual systemic therapy, genetic and genomic testing    DVT ppx: SCDs, Heparin     Disposition: remain inpatient    Subjective:  Cami Henriquez slept well overnight  She continues to endorse a period of chills then sweating around the time that her fever was recorded  She continues to ambulate, void, pass flatus/stool, and tolerate regular diet  Denies  chest pain, SOB, nausea, vomiting, leg pain  Still has some soreness in her abdomen  Review of Systems   All other systems reviewed and are negative  Objective:   /68   Pulse 105   Temp 98 3 °F (36 8 °C)   Resp 18   Ht 4' 11" (1 499 m)   Wt 75 kg (165 lb 5 5 oz)   SpO2 93%   BMI 33 40 kg/m²     I/O       09/05 0701 - 09/06 0700 09/06 0701 - 09/07 0700 09/07 0701 - 09/08 0700    P  O  240 360     I V  (mL/kg)  2190 8 (28 3)     NG/GT 10 5     Total Intake(mL/kg) 250 (3 2) 2555 8 (33 1)     Urine (mL/kg/hr) 2300 (1 2) 3700 (2)     Drains 10 30     Stool  0     Total Output 2310 3730     Net -2060 -1174 2            Unmeasured Stool Occurrence  1 x           Lab Results   Component Value Date    WBC 10 51 (H) 09/09/2021    HGB 9 3 (L) 09/09/2021    HCT 29 7 (L) 09/09/2021    MCV 93 09/09/2021     (H) 09/09/2021       Lab Results   Component Value Date    GLUCOSE 195 (H) 08/06/2021    CALCIUM 8 4 09/09/2021    K 3 7 09/09/2021    CO2 27 09/09/2021     09/09/2021    BUN 10 09/09/2021    CREATININE 0 34 (L) 09/09/2021       Lab Results   Component Value Date/Time    POCGLU 127 09/01/2021 12:17 AM    POCGLU 154 (H) 08/19/2021 10:23 AM    POCGLU 161 (H) 08/19/2021 06:58 AM    POCGLU 158 (H) 08/18/2021 09:09 PM    POCGLU 171 (H) 08/18/2021 04:33 PM       Physical Exam  Vitals reviewed  Constitutional:       Appearance: Normal appearance  She is obese  HENT:      Head: Normocephalic and atraumatic  Mouth/Throat:      Mouth: Mucous membranes are moist       Pharynx: Oropharynx is clear  Eyes:      Conjunctiva/sclera: Conjunctivae normal       Pupils: Pupils are equal, round, and reactive to light  Cardiovascular:      Rate and Rhythm: Normal rate  Pulses: Normal pulses  Heart sounds: Normal heart sounds  Pulmonary:      Effort: Pulmonary effort is normal  No respiratory distress  Breath sounds: Normal breath sounds  Abdominal:      General: Abdomen is flat  There is no distension  Palpations: Abdomen is soft  Tenderness: There is no abdominal tenderness  Comments: Midline vertical incision c/d/i, healing well except for one area in the superior portion (now healing) and one area in the inferior portion of the wound  Dressing C/D/I    Anterior LUQ drain in place, drainage of old blood fluid (5cc)  Posterior LUQ drain in place, drainage of pancreatic secretions (5 cc)   Genitourinary:     Comments: Deferred  Musculoskeletal:         General: Normal range of motion  Cervical back: Normal range of motion  Skin:     General: Skin is warm and dry  Capillary Refill: Capillary refill takes less than 2 seconds  Coloration: Skin is not pale  Neurological:      General: No focal deficit present        Mental Status: She is alert and oriented to person, place, and time  Mental status is at baseline  Psychiatric:         Mood and Affect: Mood normal          Behavior: Behavior normal          Thought Content:  Thought content normal          Trent Krause MD   PGY-4, GYN ONC  9/10/2021 6:19 AM

## 2021-09-10 NOTE — PRE-PROCEDURE INSTRUCTIONS
Pre-procedure Instructions for Interventional Radiology  Mark Anthony Arleneroyer  Girma Gavin [address] [phone]    You are scheduled for a/an [facility] Placement  On Friday 9/17/21  Your tentative arrival time is 5  Short stay will notify you the day before your procedure with the exact arrival time and the location to arrive  To prepare for your procedure:  1  Please arrange for someone to drive you home after the procedure and stay with you until the next morning if you are instructed to do so  This is typically for patients receiving some type of sedative or anesthetic for the procedure  2  DO NOT EAT OR DRINK ANYTHING after midnight on the evening before your procedure including candy & gum   3  ONLY SIPS OF WATER with your medications are allowed on the morning of your procedure  4  TAKE ALL OF YOUR REGULAR MEDICATIONS THE MORNING OF YOUR PROCEDURE with sips of water! We may call you to stop some of your blood sugar, blood pressure and blood thinning medications depending on the procedure  Please take all of these medications unless we instruct you to stop them  5  If you have an allergy to x-ray dye, please contact Interventional Radiology for an x-ray dye preparation which usually consists of an oral steroid and Benadryl  The day of your procedure:  1  Bring a list of the medications you take at home  2  Bring medications you take for breathing problems (such as inhalers), medications for chest pain, or both  3  Bring a case for your glasses or contacts  4  Bring your insurance card and a form of photo ID   5  Please leave all valuables such as credit cards and jewelry at home  6  Report to the registration desk in the main lobby at the [address], Entrance B  Ask to be directed to [facility]  7  While your procedure is being performed, your family may wait in the Radiology Waiting Room on the 1st floor in Radiology    if they need to leave, they may provide a number to be called following the procedure  8  Be prepared to stay overnight just in case  Sometimes procedures will indicate the need for further observation or treatment  9  If you are scheduled for a follow-up visit with the Interventional Radiologist after your procedure, you will be called with a date and time  10  Covid vaccine completed Feb 2021      Special Instructions (Medications to stop taking before your procedure etc ):

## 2021-09-10 NOTE — CASE MANAGEMENT
CM was notified that pt is medically cleared for d/c today  Pt will transition home w/ [facility] via family transport

## 2021-09-10 NOTE — PROGRESS NOTES
Progress Note - Infectious Disease   Alfonso Kraus 80 y o  female MRN: [id number]  Unit/Bed#: [facility] 930-01 Encounter: [id number]      Impression/Recommendations:  1  Low-grade temp  Suspect noninfectious  ROS and exam benign     No  symptoms to suggest UTI  No cough to suggest pneumonia  WBC normal   CT C/A/P negative  Consider atelectasis, drug fever, tumor fever  Remains clinically stable and nontoxic off antibiotics  Noted to be on standing Tylenol, ibuprofen for pain control  Rec:  ? Continue to follow closely off antibiotics  ? Follow up final blood cultures but suspect will remain negative  ? Follow drain outputs closely  ? Consider stopping standing Tylenol, ibuprofen to better monitor temperatures  ? Supportive care as per the primary service     2  Abdominal abscesses   Following complex abdominal surgery and pancreatic leak  8/18, s/p IR drain placement with pus  Culture with Klebsiella oxytoca (now ESBL), Enterococcus, Bacteroides  8/27, occluded catheter upsized to 12 Fr with aspiration of 50 ml purulent fluid   8/29, patient developed bleed from her drainage catheters, CTA A/P showed fluid collection along midline incision  6cc pus drained from midline incision collection   9/2 CT shows stable collection with drain in place   Status post 14 days IV antibiotics  CT 9/9 shows marked improvement  Rec:  ? Continue to follow closely off antibiotics  ? Drains per Gyn/Onc and IR     3   Loculated left pleural effusion   In setting of recent surgery, splenectomy   CT with enhancement but pleural fluid bland with negative cultures on thoracentesis 9/3/21   Respiratory status stable on room air  Rec:  ? Follow respiratory status closely     4   Recent sepsis   Evolving 8/17:  Fever, WBC   Due to #1   Improved status post drain, antibiotics             5  Pancreatic leak   Surgical complication  Abdominal drain in place      6   Ovarian cancer   8/6 status post ex lap, en-bloc hysterectomy, bilateral salpingo-oophorectomy, sigmoidectomy with low rectal anastomosis, SBR, radical omentectomy, splenectomy, appendectomy, oversewing of tail of pancreas, repair of cystotomy, bilateral peritoneal stripping   Surgery was complicated by pancreatic tail hemorrhage and low rectal reanastomosis  Imaging shows liver metastases       7  Status post splenectomy   Received HiB, meningococcal quadrivalent vaccine  Rec:  ? Needs PCV13 prior to discharge     The patient is stable from an ID standpoint for D/C home  The above plan was discussed in detail with Dr Lianne Beckett      Antibiotics:  Off antibiotics #4    Subjective:  Patient seen on AM rounds  Had episode chills with LGT again overnight  Denies sweats, nausea, vomiting, or diarrhea  No cough  Did not ambulate yesterday but plans to walk today  24 Hour Events:  Low-grade temp  No documented fevers, chills, sweats, nausea, vomiting, or diarrhea  Objective:  Vitals:  Temp:  [98 3 °F (36 8 °C)-100 7 °F (38 2 °C)] 98 7 °F (37 1 °C)  HR:  [] 98  Resp:  [16-21] 16  BP: (101-138)/(62-68) 116/66  SpO2:  [90 %-94 %] 92 %  Temp (24hrs), Av 2 °F (37 3 °C), Min:98 3 °F (36 8 °C), Max:100 7 °F (38 2 °C)  Current: Temperature: 98 7 °F (37 1 °C)    Physical Exam:   General:  No acute distress  Psychiatric:  Awake and alert  Pulmonary:  Normal respiratory excursion without accessory muscle use  Abdomen:  Soft, nondistended  Extremities:  No edema  Skin:  No rashes    Lab Results:  I have personally reviewed pertinent labs    Results from last 7 days   Lab Units 09/10/21  0641 21  0635 21  0631 21  0603   POTASSIUM mmol/L 3 8 3 7 4 0 3 0*   CHLORIDE mmol/L 104 104 105 113*   CO2 mmol/L 29 27 27 22   BUN mg/dL 9 10 10 7   CREATININE mg/dL 0 42* 0 34* 0 36* 0 27*   EGFR ml/min/1 73sq m 104 112 110 120   CALCIUM mg/dL 9 0 8 4 8 7 6 9*   AST U/L  --   --   --  18   ALT U/L  --   --   --  15   ALK PHOS U/L  --   --   --  405*     Results from last 7 days   Lab Units 09/10/21  0640 09/09/21  0635 09/08/21  0631   WBC Thousand/uL 9 61 10 51* 10 61*   HEMOGLOBIN g/dL 10 0* 9 3* 9 8*   PLATELETS Thousands/uL 766* 713* 791*     Results from last 7 days   Lab Units 09/09/21  1248 09/09/21  1246 09/05/21  1749   BLOOD CULTURE  Received in Microbiology Lab  Culture in Progress  Received in Microbiology Lab  Culture in Progress  No Growth After 4 Days  No Growth After 4 Days  Imaging Studies:   I have personally reviewed pertinent imaging study reports and images in PACS  EKG, Pathology, and Other Studies:   I have personally reviewed pertinent reports

## 2021-09-10 NOTE — PROGRESS NOTES
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, LUQ collection, intermittent fevers, and acute blood loss anemia       Plan:  S/p surgery  - Spontaneously voiding  - Continue regular diet   - Continue IS/encourage ambulation   - Pain controlled on current regimen:              Motrin 600mg q6h allison              Tylenol 975mg Q8h allison               Tyra 5 mg Q4h PRN (moderate)                   Tyra 10mg Q4h PRN (severe)              Dilaudid 0 5 IV Q2h PRN (breakthrough)     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   - Collection stable on CT 9/2  - S/p L thoracentesis 9/2, 200 cc out, no growth on culture  - Repeat blood cx negative x 24h (9/5)  - Drain culture +klebsiella, s/p antibiotics  - Aerobic and anaerobic wound (8/23) culture without growth   - LUQ drain with 5cc overnight  - Posterior drain with 5cc overnight  - Maintain both drains     Fever of unknown origin  - Patient with fever of 100 7F yesterday evening   - S/p IV abx (ertapenem and ampicillin)  - Per ID, no further abx at this time and will observe fever curve  - LE dopplers completed 8/26 with no evidence of DVT  - CT done yesterday shows almost complete resolution of abscess but continued atelectasis/pleurla effusion  - WBC 9 6 this AM    Anemia   - Hgb 7 4, had previously been stable in the 8's  - 1u pRBC transfused yesterday, Hgb post-transfusion was 9 9  - Hgb 10 0    Tachycardia  - Possible in the setting of anemia  - Pt asymptomatic  - EKG normal     Hyponatremia  - Stable, 136 , AM labs pending  - Continue IV fluids   - S/p SLIM consultatiion  - Appreciate SLIM recommendations     Wound seroma   - Packing in place in inferior aspect of incision   - Plan for daily dressing changes  - Patient instructed on how to complete dressing change  - Will need VNA services for drain and wound care upon discharge     Persistent left lower lobe pleural effusion   - S/p IR drainage of 200 cc on 9/2, no growth on culture  - Saturating well on room air  - Encouraged ambulation and use of IS throughout the day   - Continue to monitor closely     S/p splenectomy with thrombocytosis   - Improving   - Continue to trend   - ASA discontinued   - Splenectomy vaccines prior to discharge      High grade serous ovarian carcinoma   - Final path resulted  - Pt discussed at tumor board on 8/20, plan is for eventual systemic therapy, genetic and genomic testing    DVT ppx: SCDs, Heparin     Disposition: remain inpatient    Subjective:  Óscar Grullon slept well overnight  She continues to endorse a period of chills then sweating around the time that her fever was recorded  She continues to ambulate, void, pass flatus/stool, and tolerate regular diet  Denies  chest pain, SOB, nausea, vomiting, leg pain  Still has some soreness in her abdomen  Review of Systems   All other systems reviewed and are negative  Objective:   /68   Pulse 105   Temp 98 3 °F (36 8 °C)   Resp 18   Ht 4' 11" (1 499 m)   Wt 75 kg (165 lb 5 5 oz)   SpO2 93%   BMI 33 40 kg/m²     I/O       09/05 0701 - 09/06 0700 09/06 0701 - 09/07 0700 09/07 0701 - 09/08 0700    P  O  240 360     I V  (mL/kg)  2190 8 (28 3)     NG/GT 10 5     Total Intake(mL/kg) 250 (3 2) 2555 8 (33 1)     Urine (mL/kg/hr) 2300 (1 2) 3700 (2)     Drains 10 30     Stool  0     Total Output 2310 3730     Net -2060 -1174 2            Unmeasured Stool Occurrence  1 x           Lab Results   Component Value Date    WBC 10 51 (H) 09/09/2021    HGB 9 3 (L) 09/09/2021    HCT 29 7 (L) 09/09/2021    MCV 93 09/09/2021     (H) 09/09/2021       Lab Results   Component Value Date    GLUCOSE 195 (H) 08/06/2021    CALCIUM 8 4 09/09/2021    K 3 7 09/09/2021    CO2 27 09/09/2021     09/09/2021    BUN 10 09/09/2021    CREATININE 0 34 (L) 09/09/2021       Lab Results   Component Value Date/Time    POCGLU 127 09/01/2021 12:17 AM    POCGLU 154 (H) 08/19/2021 10:23 AM    POCGLU 161 (H) 08/19/2021 06:58 AM    POCGLU 158 (H) 08/18/2021 09:09 PM    POCGLU 171 (H) 08/18/2021 04:33 PM       Physical Exam  Vitals reviewed  Constitutional:       Appearance: Normal appearance  She is obese  HENT:      Head: Normocephalic and atraumatic  Mouth/Throat:      Mouth: Mucous membranes are moist       Pharynx: Oropharynx is clear  Eyes:      Conjunctiva/sclera: Conjunctivae normal       Pupils: Pupils are equal, round, and reactive to light  Cardiovascular:      Rate and Rhythm: Normal rate  Pulses: Normal pulses  Heart sounds: Normal heart sounds  Pulmonary:      Effort: Pulmonary effort is normal  No respiratory distress  Breath sounds: Normal breath sounds  Abdominal:      General: Abdomen is flat  There is no distension  Palpations: Abdomen is soft  Tenderness: There is no abdominal tenderness  Comments: Midline vertical incision c/d/i, healing well except for one area in the superior portion (now healing) and one area in the inferior portion of the wound  Dressing C/D/I    Anterior LUQ drain in place, drainage of old blood fluid (5cc)  Posterior LUQ drain in place, drainage of pancreatic secretions (5 cc)   Genitourinary:     Comments: Deferred  Musculoskeletal:         General: Normal range of motion  Cervical back: Normal range of motion  Skin:     General: Skin is warm and dry  Capillary Refill: Capillary refill takes less than 2 seconds  Coloration: Skin is not pale  Neurological:      General: No focal deficit present        Mental Status: She is alert and oriented to person, place, and time  Mental status is at baseline  Psychiatric:         Mood and Affect: Mood normal          Behavior: Behavior normal          Thought Content:  Thought content normal          Imelda Harris MD   PGY-4, GYN ONC  9/10/2021 6:19 AM

## 2021-09-10 NOTE — DISCHARGE INSTRUCTIONS
[facility]  Yamila Herrera, [place]  [phone]    Hysterectomy Discharge Instructions    WHAT YOU NEED TO KNOW:   A hysterectomy is surgery to remove your uterus  Your ovaries, fallopian tubes, cervix, or part of your vagina may also need to be removed  The organs and tissue that will be removed depends on your medical condition  After a hysterectomy, you will not be able to become pregnant  If your ovaries are removed, you will go through menopause if you have not already  DISCHARGE INSTRUCTIONS:   Contact your doctor at the number above if:   · You have a fever over 101o  · You have nausea or are vomiting that does not improve after a light meal    · Your pain is getting worse, even after you take medicine  · You feel pain or burning when you urinate, or you have trouble urinating  · You have pus or a foul-smelling odor coming from your vagina  · Your wound is red, swollen, or draining pus  · You see new or an increased amount of bright red blood coming from your vagina or your incisions  · You have questions or concerns about your condition or care  Seek care immediately:   · Your arm or leg feels warm, tender, and painful  It may look swollen and red  · You have increasing abdominal or pelvic pain  · You have heavy vaginal bleeding that fills 1 or more sanitary pads in 1 hour  Call 911 for any of the following:   · You feel lightheaded, short of breath, and have chest pain  · You cough up blood  Medicines: You may need any of the following:  · Prescription medicine may be given  You may receive a prescription for pain medication or be advised to use over the counter (OTC) pain medication such as acetaminophen (Tylenol) or ibuprofen (Advil, Motrin)  Ask your healthcare provider how to take this medicine safely  · NSAIDs , such as ibuprofen, help decrease swelling, pain, and fever   NSAIDs can cause stomach bleeding or kidney problems in certain people  If you take blood thinner medicine, always ask your healthcare provider if NSAIDs are safe for you  Always read the medicine label and follow directions  · Stool softeners help treat or prevent constipation  · Take your medicine as directed  Contact your healthcare provider if you think your medicine is not helping or if you have side effects  Tell him or her if you are allergic to any medicine  Keep a list of the medicines, vitamins, and herbs you take  Include the amounts, and when and why you take them  Bring the list or the pill bottles to follow-up visits  Carry your medicine list with you in case of an emergency  Activity:   · Rest as needed  Get up and move around as directed to help prevent blood clots  Start with short walks and slowly increase the distance every day  Limit the number of times you climb stairs to 2 times each day for the first week  Plan most of your daily activities on one level of your home  · Do not lift objects heavier than 10 pounds for 6 weeks  Avoid strenuous activity for 2 weeks  · Do not strain during bowel movements  High-fiber foods and extra liquids can help you prevent constipation  Examples of high-fiber foods are fruit and bran  Prune juice and water are good liquids to drink  · Do not have sex, use tampons, or douche for up to 8 weeks  You may shower as soon as the day after surgery  Tub baths can be taken starting 2 weeks after surgery  Do not go into pools or hot tubs until cleared by your doctor  · Ask when it is safe for you to drive  It is generally safe to drive after 2 weeks and when no longer taking prescription pain medication  · Ask when you may return to work and to other regular activities  Wound care: Care for your abdominal incisions as directed  Carefully wash around the wound with soap and water   If you have Hibiclens or medicated soap that you were instructed to use before surgery, you may use that to wash with for up to 2 days after surgery  If not, any mild non-scented, non-abrasive soap is safe  It is okay to let the soap and water run over your incision  Do not scrub your incision  Dry the area and put on new, clean bandages as directed  Change your bandages when they get wet or dirty  If you have strips of medical tape, let them fall off on their own  It may take 7 to 14 days for them to fall off  Check your incision every day for redness, swelling, or pus  Deep breathing: Take deep breaths and cough 10 times each hour  This will decrease your risk for a lung infection  Take a deep breath and hold it for as long as you can  Let the air out and then cough strongly  Deep breaths help open your airway  You may be given an incentive spirometer to help you take deep breaths  Put the plastic piece in your mouth and take a slow, deep breath, then let the air out and cough  Repeat these steps 10 times every hour  Get support: This surgery may be life-changing for you and your family  You will no longer be able to get pregnant  Sudden changes in the levels of your hormones may occur and cause mood swings and depression  You may feel angry, sad, or frightened, or cry frequently and unexpectedly  These feelings are normal  Talk to your healthcare provider about where you can get support  You can also ask if hormone replacement medicine is right for you  Follow up with your healthcare provider or gynecologist as directed: You may need to return to have stitches removed, and for other tests  Write down your questions so you remember to ask them during your visits  © 2017 [employer] Information is for End User's use only and may not be sold, redistributed or otherwise used for commercial purposes  All illustrations and images included in CareNotes® are the copyrighted property of [employer]  or Meek [employer]  The above information is an  only   It is not intended as medical advice for individual conditions or treatments  Talk to your doctor, nurse or pharmacist before following any medical regimen to see if it is safe and effective for you  Abscess/fluid collection Aspiration      WHAT YOU NEED TO KNOW:     Today you underwent a fluid collection/abscess aspiration  Usually the fluid is sent to the lab for culture  You may have some pain associated with the puncture site  The Procedure is performed with Local anesthesia (Lidocaine) and sometimes with local and IV Sedation  After you go Home:    Home care  These tips can help your wound heal:   The wound may drain for the first 2 days  Cover the wound with a clean dry dressing  Change the dressing if it becomes soaked with blood or pus   If a gauze packing was placed inside the abscess pocket, you may be told to remove it yourself  You may do this in the shower  Once the packing is removed, you should wash the area in the shower, or clean the area as directed by your provider  Continue to do this until the skin opening has closed  Make sure you wash your hands after changing the packing or cleaning the wound   If you were prescribed antibiotics, take them as directed until they are all gone   You may use acetaminophen or ibuprofen to control pain, unless another pain medicine was prescribed  If you have liver disease or ever had a stomach ulcer, talk with your doctor before using these medicines  Follow-up care  Follow up with your healthcare provider, or as advised  If a gauze packing was put in your wound, it should be removed in 1 to 2 days  Check your wound every day for any signs that the infection is getting worse  The signs are listed below    When to seek medical advice  Call your healthcare provider right away if any of these occur:   Increasing redness or swelling   Red streaks in the skin leading away from the wound   Increasing local pain or swelling   Continued pus draining from the wound 2 days after treatment   Fever of 100 4ºF (38ºC) or higher, or as directed by your healthcare provider  Abscess  returns when you are at home     [address]  after your procedure:    Resume your normal diet  Small sips of flat soda will help with nausea  1  The properly functioning catheter should be forward flushed once (1x) daily with 10ml of normal saline using clean technique  You will be given a prescription for flushes  To flush the tube, clean both connections with alcohol swab  Twist off the drainage bag/ bulb  tubing and twist the saline syringe into the drainage tube and flush  Remove the syringe and twist the drainage bag / bulb tubing tubing back on     2  The drainage bag/bulb may be emptied as necessary  Keep a record of the amount of fluid you drain from your tube  This should be done with clean technique as well  3  A fresh dressing should be applied daily over the tube insertion site  4  As the tube is secured to the skin with only a suture,try not to pull on your tube  Tub baths are not permitted  Showers are permitted if the patient's skin entry site is prevented from getting wet  Similarly, washcloth "baths" are acceptable  Contact Interventional Radiology at [phone] [place] PATIENTS: Contact Interventional Radiology at [phone]) Obie Olivera PATIENTS: Contact Interventional Radiology at [phone]) if:    1  Leakage or large amounts of liquid around the catheter  2  Fever of 101 degrees lasting several hours without other obvious cause (such as sore throat, flu, etc)  3  Persistent nausea or vomiting  4  Diminished drainage, which may be associated with pressure or pain  Or when the     drainage from your tube is less than 10mls for 48 hours  5  Catheter pulled back or falls out        The following pharmacies carry the flush syringes  [facility]                     [facility]  [address]                         [address] [facility]  Phone [phone]            Phone [phone] [address]             [address]                                [phone] [address]                      [address] [facility]  Phone [phone]            Phone [phone]                      Demarcus[address]athon Frankie                                                                                                          [phone]  [facility]  [address]  [facility]  Phone [phone]    Thoracentesis   WHAT YOU NEED TO KNOW:   A thoracentesis is a procedure to remove extra fluid or air from between your lungs and your inner chest wall  Air or fluid buildup may make it hard for you to breathe  A thoracentesis allows your lungs to expand fully so you can breathe more easily  DISCHARGE INSTRUCTIONS:   Medicines:   · Pain medicine: You may be given a prescription medicine to decrease pain  Do not wait until the pain is severe before you take your medicine  · Antibiotics: This medicine helps fight or prevent an infection  · Take your medicine as directed  Call your healthcare provider if you think your medicine is not helping or if you have side effects  Tell him if you are allergic to any medicine  Keep a list of the medicines, vitamins, and herbs you take  Include the amounts, and when and why you take them   Bring the list or the pill bottles to follow-up visits  Carry your medicine list with you in case of an emergency  Follow up with your healthcare provider as directed:  Write down your questions so you remember to ask them during your visits  Rest:  Rest when you feel it is needed  Slowly start to do more each day  Return to your daily activities as directed  Do not smoke: If you smoke, it is never too late to quit  Ask for information about how to stop smoking if you need help  Contact your healthcare provider if:   · You have a fever  · Your puncture site is red, warm, swollen, or draining pus  · You have questions or concerns about your procedure, medicine, or care  · If you have any questions regarding, call the IR department @ [phone]  Seek care immediately or call 911 if:   · Blood soaks through your bandage  · There is blood in your spit

## 2021-09-13 NOTE — UTILIZATION REVIEW
Notification of Discharge   This is a Notification of Discharge from our facility [address]  Please be advised that this patient has been discharge from our facility  Below you will find the admission and discharge date and time including the patients disposition  UTILIZATION REVIEW CONTACT:  Adriane Libman  Utilization   Network Utilization Review Department  Phone: [phone] x carefully listen to the prompts  All voicemails are confidential   Email: [email or web address]     PHYSICIAN ADVISORY SERVICES:  FOR [id number] REVIEW - MEDICAL NECESSITY DENIAL  Phone: [phone]  Fax: [phone]  Email: [email or web address]     PRESENTATION DATE: 8/6/2021  9:15 AM  OBERVATION ADMISSION DATE:   INPATIENT ADMISSION DATE: 8/6/21  7:05 PM   DISCHARGE DATE: 9/10/2021  4:37 PM  DISPOSITION: Home with [place] with [address] [place] [address] Road INFORMATION:  Send all requests for admission clinical reviews, approved or denied determinations and any other requests to dedicated fax number below belonging to the campus where the patient is receiving treatment   List of dedicated fax numbers:  [address] [place] [address] DENIALS (Administrative/Medical Necessity) [phone]   [address]  [facility] (Maternity/NICU/Pediatrics) [phone]   Khadar Orn [phone]   Logan Freemanise [phone]   Cory Larsen [phone]   Jeramywilliam Zamarripa [facility] [address] [place] [address] [phone]   [facility]  [phone]   [address] [facility] [address]  [address] [facility] [address] [facility] [phone]

## 2021-09-14 LAB
BACTERIA BLD CULT: NORMAL
BACTERIA BLD CULT: NORMAL

## 2021-09-15 ENCOUNTER — TELEPHONE (OUTPATIENT)
Dept: SURGICAL ONCOLOGY | Facility: CLINIC | Age: 71
End: 2021-09-15

## 2021-09-15 NOTE — TELEPHONE ENCOUNTER
Patient called in with 2 questions  1 - Wound is currently being packed  However, per pt, "visiting nurse said it isn't doing anything and the packing isn't even medicated " She wants to know if she should be seen by Dr Anuradha Correa, or if she should try to go to the wound center  2 - She has two drains - 1 surgical drain, 1 IR drain - only scant drainage from either drain  She has appt with IR for port placement and will have that drain checked at that time  She is wondering what to do about the other drain  She has no f/u scheduled with Gyn Onc

## 2021-09-16 PROBLEM — J96.91 RESPIRATORY FAILURE WITH HYPOXIA (HCC): Status: RESOLVED | Noted: 2021-08-24 | Resolved: 2021-09-16

## 2021-09-16 PROBLEM — D62 ACUTE BLOOD LOSS ANEMIA: Status: RESOLVED | Noted: 2021-08-08 | Resolved: 2021-09-16

## 2021-09-16 PROBLEM — C78.6 PERITONEAL CARCINOMATOSIS (HCC): Status: RESOLVED | Noted: 2021-07-16 | Resolved: 2021-09-16

## 2021-09-16 PROBLEM — C80.1: Status: RESOLVED | Noted: 2021-08-16 | Resolved: 2021-09-16

## 2021-09-16 PROBLEM — Z84.2 FHX: TAH-BSO (TOTAL ABDOMINAL HYSTERECTOMY AND BILATERAL SALPINGO-OOPHORECTOMY): Status: RESOLVED | Noted: 2021-08-16 | Resolved: 2021-09-16

## 2021-09-17 ENCOUNTER — HOSPITAL ENCOUNTER (OUTPATIENT)
Dept: RADIOLOGY | Facility: HOSPITAL | Age: 71
Discharge: HOME | End: 2021-09-17
Attending: RADIOLOGY | Admitting: INTERNAL MEDICINE
Payer: COMMERCIAL

## 2021-09-17 VITALS
HEART RATE: 110 BPM | SYSTOLIC BLOOD PRESSURE: 150 MMHG | DIASTOLIC BLOOD PRESSURE: 68 MMHG | TEMPERATURE: 99.5 F | HEIGHT: 59 IN | BODY MASS INDEX: 34.27 KG/M2 | OXYGEN SATURATION: 95 % | WEIGHT: 170 LBS | RESPIRATION RATE: 17 BRPM

## 2021-09-17 DIAGNOSIS — C78.6 PERITONEAL CARCINOMATOSIS (HCC): ICD-10-CM

## 2021-09-17 DIAGNOSIS — K65.1 ABSCESS OF ABDOMINAL CAVITY (HCC): ICD-10-CM

## 2021-09-17 PROCEDURE — 77001 FLUOROGUIDE FOR VEIN DEVICE: CPT | Performed by: INTERNAL MEDICINE

## 2021-09-17 PROCEDURE — 99153 MOD SED SAME PHYS/QHP EA: CPT

## 2021-09-17 PROCEDURE — 36561 INSERT TUNNELED CV CATH: CPT | Performed by: INTERNAL MEDICINE

## 2021-09-17 PROCEDURE — 99152 MOD SED SAME PHYS/QHP 5/>YRS: CPT | Performed by: INTERNAL MEDICINE

## 2021-09-17 PROCEDURE — 76937 US GUIDE VASCULAR ACCESS: CPT | Performed by: INTERNAL MEDICINE

## 2021-09-17 PROCEDURE — 49424 ASSESS CYST CONTRAST INJECT: CPT | Performed by: INTERNAL MEDICINE

## 2021-09-17 PROCEDURE — 76080 X-RAY EXAM OF FISTULA: CPT | Performed by: INTERNAL MEDICINE

## 2021-09-17 PROCEDURE — 76937 US GUIDE VASCULAR ACCESS: CPT

## 2021-09-17 PROCEDURE — C1788 PORT, INDWELLING, IMP: HCPCS

## 2021-09-17 PROCEDURE — 99152 MOD SED SAME PHYS/QHP 5/>YRS: CPT

## 2021-09-17 PROCEDURE — 49424 ASSESS CYST CONTRAST INJECT: CPT

## 2021-09-17 PROCEDURE — 76080 X-RAY EXAM OF FISTULA: CPT

## 2021-09-17 PROCEDURE — 77001 FLUOROGUIDE FOR VEIN DEVICE: CPT

## 2021-09-17 PROCEDURE — 36561 INSERT TUNNELED CV CATH: CPT

## 2021-09-17 RX ORDER — MIDAZOLAM HYDROCHLORIDE 2 MG/2ML
INJECTION, SOLUTION INTRAMUSCULAR; INTRAVENOUS CODE/TRAUMA/SEDATION MEDICATION
Status: COMPLETED | OUTPATIENT
Start: 2021-09-17 | End: 2021-09-17

## 2021-09-17 RX ORDER — SODIUM CHLORIDE 9 MG/ML
75 INJECTION, SOLUTION INTRAVENOUS CONTINUOUS
Status: DISCONTINUED | OUTPATIENT
Start: 2021-09-17 | End: 2021-09-17 | Stop reason: HOSPADM

## 2021-09-17 RX ORDER — CEFAZOLIN SODIUM 1 G/50ML
1000 SOLUTION INTRAVENOUS ONCE
Status: COMPLETED | OUTPATIENT
Start: 2021-09-17 | End: 2021-09-17

## 2021-09-17 RX ORDER — FENTANYL CITRATE 50 UG/ML
INJECTION, SOLUTION INTRAMUSCULAR; INTRAVENOUS CODE/TRAUMA/SEDATION MEDICATION
Status: COMPLETED | OUTPATIENT
Start: 2021-09-17 | End: 2021-09-17

## 2021-09-17 RX ADMIN — CEFAZOLIN SODIUM 1000 MG: 1 SOLUTION INTRAVENOUS at 08:21

## 2021-09-17 RX ADMIN — MIDAZOLAM 0.5 MG: 1 INJECTION INTRAMUSCULAR; INTRAVENOUS at 09:00

## 2021-09-17 RX ADMIN — MIDAZOLAM 1 MG: 1 INJECTION INTRAMUSCULAR; INTRAVENOUS at 09:21

## 2021-09-17 RX ADMIN — FENTANYL CITRATE 25 MCG: 50 INJECTION INTRAMUSCULAR; INTRAVENOUS at 08:54

## 2021-09-17 RX ADMIN — MIDAZOLAM 0.5 MG: 1 INJECTION INTRAMUSCULAR; INTRAVENOUS at 08:54

## 2021-09-17 RX ADMIN — FENTANYL CITRATE 50 MCG: 50 INJECTION INTRAMUSCULAR; INTRAVENOUS at 09:15

## 2021-09-17 RX ADMIN — SODIUM CHLORIDE 75 ML/HR: 0.9 INJECTION, SOLUTION INTRAVENOUS at 07:14

## 2021-09-17 RX ADMIN — IOHEXOL 2 ML: 350 INJECTION, SOLUTION INTRAVENOUS at 09:40

## 2021-09-17 RX ADMIN — MIDAZOLAM 1 MG: 1 INJECTION INTRAMUSCULAR; INTRAVENOUS at 09:07

## 2021-09-17 RX ADMIN — FENTANYL CITRATE 25 MCG: 50 INJECTION INTRAMUSCULAR; INTRAVENOUS at 09:07

## 2021-09-17 RX ADMIN — FENTANYL CITRATE 50 MCG: 50 INJECTION INTRAMUSCULAR; INTRAVENOUS at 08:43

## 2021-09-17 RX ADMIN — MIDAZOLAM 1 MG: 1 INJECTION INTRAMUSCULAR; INTRAVENOUS at 08:43

## 2021-09-17 NOTE — DISCHARGE INSTRUCTIONS
Implanted Venous Access Port     WHAT YOU NEED TO KNOW:   An implanted venous access port is a device used to give treatments and take blood  It may also be called a central venous access device (CVAD)  The port is a small container that is placed under your skin, usually in your upper chest  The port is attached to a catheter that enters a large vein  DISCHARGE INSTRUCTIONS:   Resume your normal diet  Small sips of flat soda will help with mild nausea  Prevent an infection:   · Wash your hands often  Use soap and water  Clean your hands before and after you care for your port  Remind everyone who cares for your port to wash their hands  · Check your skin for infection every day  Look for redness, swelling, or fluid oozing from the port site  Care for your port:   1  You may shower beginning 48 hours after procedure  2  Change dressing if it becomes wet  3  Remove dressing after 24 hours  Leave glue in place  4  It is normal for some bruising to occur  5  Use Tylenol for pain  6  Limit use of arm on the side that your port was placed  Lift nothing heavier than 5 pounds for 1 week, and then gradually increase activity as tolerated  7  DO NOT apply ointment, lotion or cream to port site until incision is healed  Allow glue to fall off  DO NOT attempt to peel glue from skin even it it begins to flake  8, After the port incision is healed you may swim, bathe  Notify the Interventional Radiologist if you have any of the followin  Fever above 101 F    2  Increased redness or swelling after 1st day  3  Increased pain after 1st day  4  Any sign of infection (drainage from port site, skin separation, hot to touch)  5  Persistent nausea or vomiting  Contact Interventional Radiology at [phone] [facility] PATIENTS: Contact Interventional Radiology at [phone]) ([address] [facility] [address]: Contact Interventional Radiology at [phone])  Drainage Tube Removal    Your drainage tube was removed today  What you need know at home:   Keep a clean dry dressing at the tube site until the small opening closes  It will take twenty four to forty eight hours  Keep the site dry until it heals  A small amount of drainage on your dressing is normal  Resume your normal diet  Small sips of flat soda will help with any nausea  Contact Interventional Radiology for any of the following: You have pain, fever greater than 101, shaking chills  If you have increased redness or swelling at the site  I the drainage from your site does not stop  If the site drains pus or has a bad odor  Contact Interventional Radiology at [phone]   [place] PATIENTS: Contact Interventional Radiology at [phone]) Lynda Perez PATIENTS: Contact Interventional Radiology at [phone]) if:  Procedural Sedation   WHAT YOU NEED TO KNOW:   Procedural sedation is medicine used during procedures to help you feel relaxed and calm  You will remember little to none of the procedure  After sedation you may feel tired, weak, or unsteady on your feet  You may also have trouble concentrating or short-term memory loss  These symptoms should go away in 24 hours or less  DISCHARGE INSTRUCTIONS:     Call 911 or have someone else call for any of the following:   · You have sudden trouble breathing      · You cannot be woken        Contact Interventional Radiology at [phone]   [place] PATIENTS: Contact Interventional Radiology at [phone]) Lynda Perez PATIENTS: Contact Interventional Radiology at [phone]) if any of the following occur:     · You have a severe headache or dizziness      · Your heart is beating faster than usual     · You have a fever or chills      · Your skin is itchy, swollen, or you have a rash      · You have nausea or are vomiting for more than 8 hours after the procedure       · You have questions or concerns about your condition or care  Self-care:   · Have someone stay with you for 24 hours  This person can drive you to errands and help you do things around the house  This person can also watch for problems       · Rest and do quiet activities for 24 hours  Do not exercise, ride a bike, or play sports  Stand up slowly to prevent dizziness and falls  Take short walks around the house with another person  Slowly return to your usual activities the next day       · Do not drive or use dangerous machines or tools for 24 hours  You may injure yourself or others  Examples include a lawnmower, saw, or drill  Do not return to work for 24 hours if you use dangerous machines or tools for work       · Do not make important decisions for 24 hours  For example, do not sign important papers or invest money       · Drink liquids as directed  Liquids help flush the sedation medicine out of your body  Ask how much liquid to drink each day and which liquids are best for you       · Eat small, frequent meals to prevent nausea and vomiting  Start with clear liquids such as juice or broth  If you do not vomit after clear liquids, you can eat your usual foods       · Do not drink alcohol or take medicines that make you drowsy  This includes medicines that help you sleep and anxiety medicines  Ask your healthcare provider if it is safe for you to take pain medicine  Follow up with your healthcare provider as directed: Write down your questions so you remember to ask them during your visits

## 2021-09-17 NOTE — SEDATION DOCUMENTATION
Port placement and left posterior- lateral drainage tube check/removal completed in IR by Dr Nick Sheridan without complication  Bedrest start time 0974

## 2021-09-17 NOTE — INTERVAL H&P NOTE
Update: (This section must be completed if the H&P was completed greater than 24 hrs to procedure or admission)    H&P reviewed  After examining the patient, I find no changed to the H&P since it had been written  /72   Pulse 104   Temp 99 5 °F (37 5 °C) (Oral)   Resp 18   Ht 4' 11" (1 499 m)   Wt 77 1 kg (170 lb)   SpO2 96%   BMI 34 34 kg/m²     Patient re-evaluated  Accept as history and physical     We will proceed with port placement today and drain check      Erik Webber MD/September 17, 2021/8:39 AM

## 2021-09-20 NOTE — ASSESSMENT & PLAN NOTE
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 s/p PROMISE/BSO/tumor debulking presents for post op and treatment discussion  Plan to start chemotherapy after drain removal    I discussed with patient rationale, logistics, common side effects and toxicities associated with chemotherapy regimen  She understands toxicities include but are not limited to PPE, fatigue, decreased appetite, nausea and vomiting, peripheral neuropathy, bone marrow suppression ( anemia, neutropenia and or thrombocytopenia) with subsequent risk of life-threatening infection or hemorrhage, kidney and or liver damage, etc   we discussed specific toxicities associated with Avastin including but not limited to hypertension, venous/arterial thromboembolism, nephropathy, posterior encephalopathy and or bowel perforation    Start with Carbo AUC6, Taxol 175mg/m2 with plans to add avastin cycle 3 given wound separation

## 2021-09-20 NOTE — PROGRESS NOTES
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 s/p PROMISE/BSO/tumor debulking presents for post op and treatment discussion  Plan to start chemotherapy after drain removal    I discussed with patient rationale, logistics, common side effects and toxicities associated with chemotherapy regimen  She understands toxicities include but are not limited to PPE, fatigue, decreased appetite, nausea and vomiting, peripheral neuropathy, bone marrow suppression ( anemia, neutropenia and or thrombocytopenia) with subsequent risk of life-threatening infection or hemorrhage, kidney and or liver damage, etc   we discussed specific toxicities associated with Avastin including but not limited to hypertension, venous/arterial thromboembolism, nephropathy, posterior encephalopathy and or bowel perforation    Start with Carbo AUC6, Taxol 175mg/m2 with plans to add avastin cycle 3 given wound separation  Relevant Orders    Infusion Calculated Appointment Request    Infusion Calculated Appointment Request    Infusion Calculated Appointment Request    Infusion Calculated Appointment Request    Infusion Calculated Appointment Request    Infusion Calculated Appointment Request    Ambulatory Referral to Oncology Genetics    Ambulatory referral to Palliative Care       Respiratory    Pleural effusion     Denies SOB            Other    Severe protein-calorie malnutrition (HCC)     Appetite improving            Wound dehiscence, surgical     Minimal opening - 1cm    Continue daily packing                  CHIEF COMPLAINT: follow up       Problem:  Cancer Staging  Ovarian cancer [facility])  Staging form: Ovary, Fallopian Tube, Primary Peritoneal, AJCC 8th Edition  - Clinical: FIGO Stage IIIC (cT3c) - Signed by Velia Galloway MD on 9/16/2021        Previous therapy:  Oncology History   Ovarian cancer ([facility] [address] )   8/6/2021 Surgery    PROMISE/BSO/tumor debulking to optimal cytoreduction     8/25/2021 Initial Diagnosis    Ovarian cancer ([facility] [address] )     9/16/2021 -  Cancer Staged    Staging form: Ovary, Fallopian Tube, Primary Peritoneal, AJCC 8th Edition  - Clinical: FIGO Stage IIIC (cT3c) - Signed by Velia Galloway MD on 9/16/2021  Stage prefix: Initial diagnosis  Cancer antigen 125 () (U/mL): 543       9/28/2021 -  Chemotherapy    palonosetron (ALOXI), 0 25 mg, Intravenous, Once, 0 of 6 cycles  fosaprepitant (EMEND) IVPB, 150 mg, Intravenous, Once, 0 of 6 cycles  CARBOplatin (PARAPLATIN) IVPB (GOG AUC DOSING), , Intravenous, Once, 0 of 6 cycles  bevacizumab (AVASTIN) IVPB, 15 mg/kg = 1,107 5 mg, Intravenous, Once, 0 of 4 cycles  PACLItaxel (TAXOL) chemo IVPB, 175 mg/m2, Intravenous, Once, 0 of 6 cycles           Patient ID: Claudette Gonzalez is a 80 y o  female  70yo with 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 s/p PROMISE/BSO/tumor debulking presents for post op and treatment discussion  Pt had a prolonged hospitalization complicated by pancreatic leak and subsequent abscess, intraabdominal bleeding s/p IR drain placement and recurrent fevers  Pt was discharged with 2 drains in place and superficial wound being packed  She followed up with IR last week and no further collection was noted and drain was removed  Patient reports doing well overall  Her energy is improving  Her bowels and urination are normal   She has a small superficial defect lower portion of her incision that is being packed daily  She denies any further fevers or chills  She reports that her OSCAR drain has minimal output over the last week with a small amount of greenish fluid last night        The following portions of the patient's history were reviewed and updated as appropriate: allergies, current medications, past family history, past medical history, past social history, past surgical history and problem list     Review of Systems   Constitutional: Positive for fatigue  Negative for appetite change, chills and fever  Respiratory: Negative for chest tightness and shortness of breath  Gastrointestinal: Negative for abdominal distention, abdominal pain, constipation, diarrhea and nausea  Genitourinary: Negative for difficulty urinating, flank pain, frequency, urgency, vaginal bleeding, vaginal discharge and vaginal pain  Musculoskeletal: Negative for back pain, joint swelling and myalgias  Skin: Negative for rash  Neurological: Negative for dizziness, light-headedness, numbness and headaches  Current Outpatient Medications   Medication Sig Dispense Refill    acetaminophen (TYLENOL) 325 mg tablet Take 3 tablets (975 mg total) by mouth every 8 (eight) hours      albuterol (PROVENTIL HFA,VENTOLIN HFA) 90 mcg/act inhaler Inhale 2 puffs every 6 (six) hours as needed for wheezing      AMLODIPINE BENZOATE PO Take by mouth      amLODIPine-benazepril (LOTREL) 10-20 MG per capsule Take 1 capsule by mouth daily      benzonatate (TESSALON) 200 MG capsule Take 1 capsule (200 mg total) by mouth 3 (three) times a day as needed for cough 20 capsule 0    butalbital-aspirin-caffeine (FIORINAL) -40 mg per capsule Take 1 capsule by mouth every 4 (four) hours as needed for headaches      docusate sodium (COLACE) 100 mg capsule Take 1 capsule (100 mg total) by mouth 2 (two) times a day      fluticasone (FLONASE) 50 mcg/act nasal spray 1 spray into each nostril daily      fluticasone (FLOVENT DISKUS) 50 MCG/BLIST diskus inhaler Inhale 1 puff 2 (two) times a day Rinse mouth after use        ibuprofen (MOTRIN) 600 mg tablet Take 1 tablet (600 mg total) by mouth every 6 (six) hours 30 tablet 0    Naproxen Sodium (Aleve) 220 MG CAPS Take 220 mg by mouth daily 2 caps AM      omeprazole (PriLOSEC) 20 mg delayed release capsule Take 20 mg by mouth 2 (two) times a day      polyethylene glycol (MiraLax) 17 GM/SCOOP powder Mix with 64 oz Gatorade, begin 4 PM day before surgery per bowel prep instructions  238 g 0    pravastatin (PRAVACHOL) 10 mg tablet Take 10 mg by mouth daily      sodium chloride, PF, 0 9 % 10 mL by Intracatheter route daily 300 mL 3     No current facility-administered medications for this visit  Objective:    Blood pressure 140/80, pulse (!) 119, temperature 99 8 °F (37 7 °C), temperature source Temporal, resp  rate 18, height 4' 11" (1 499 m), weight 73 9 kg (163 lb), SpO2 95 %  Body mass index is 32 92 kg/m²  Body surface area is 1 69 meters squared  Physical Exam  HENT:      Head: Normocephalic and atraumatic  Cardiovascular:      Rate and Rhythm: Normal rate and regular rhythm  Pulmonary:      Effort: Pulmonary effort is normal    Abdominal:      General: There is no distension  Palpations: Abdomen is soft  There is no mass  Comments: OSCAR drain with scant greenish fluid    Incision with small defect - 1cm deep repacked with 1/2inch tape  Genitourinary:     Comments: The external female genitalia is normal  The bartholin's, uretheral and skenes glands are normal  The urethral meatus is normal (midline with no lesions)  Anus without fissure or lesion  Speculum exam reveals a grossly normal vagina cuff  No masses, lesions,discharge or bleeding  No significant cystocele or rectocele noted  Bimanual exam notes a surgical absent cervix, uterus and adnexal structures  No masses or fullness  Bladder is without fullness, mass or tenderness  Musculoskeletal:         General: Normal range of motion  Cervical back: Normal range of motion  Skin:     General: Skin is warm and dry  Neurological:      Mental Status: She is alert and oriented to person, place, and time             Lab Results   Component Value Date    K 3 8 09/10/2021     09/10/2021    CO2 29 09/10/2021    BUN 9 09/10/2021    CREATININE 0 42 (L) 09/10/2021    GLUCOSE 195 (H) 08/06/2021    GLUF 102 (H) 07/20/2021    CALCIUM 9 0 09/10/2021    CORRECTEDCA 9 1 09/07/2021    AST 18 09/07/2021    ALT 15 09/07/2021    ALKPHOS 405 (H) 09/07/2021    EGFR 104 09/10/2021     Lab Results   Component Value Date    WBC 9 61 09/10/2021    HGB 10 0 (L) 09/10/2021    HCT 32 2 (L) 09/10/2021    MCV 93 09/10/2021     (H) 09/10/2021     Lab Results   Component Value Date    NEUTROABS 3 29 09/10/2021        Trend:  Lab Results   Component Value Date     543 7 (H) 07/20/2021

## 2021-09-20 NOTE — ASSESSMENT & PLAN NOTE
Minimal output over last few days     Will obtain CT to r/o further collection  If negative, will pull drain and start chemotherapy

## 2021-09-21 ENCOUNTER — HOSPITAL ENCOUNTER (OUTPATIENT)
Dept: RADIOLOGY | Facility: HOSPITAL | Age: 71
Discharge: HOME | End: 2021-09-21
Attending: OBSTETRICS & GYNECOLOGY
Payer: COMMERCIAL

## 2021-09-21 ENCOUNTER — OFFICE VISIT (OUTPATIENT)
Dept: GYNECOLOGIC ONCOLOGY | Facility: CLINIC | Age: 71
End: 2021-09-21

## 2021-09-21 VITALS
SYSTOLIC BLOOD PRESSURE: 140 MMHG | OXYGEN SATURATION: 95 % | WEIGHT: 163 LBS | BODY MASS INDEX: 32.86 KG/M2 | RESPIRATION RATE: 18 BRPM | HEIGHT: 59 IN | TEMPERATURE: 99.8 F | HEART RATE: 119 BPM | DIASTOLIC BLOOD PRESSURE: 80 MMHG

## 2021-09-21 DIAGNOSIS — D70.1 CHEMOTHERAPY INDUCED NEUTROPENIA (HCC): ICD-10-CM

## 2021-09-21 DIAGNOSIS — E43 SEVERE PROTEIN-CALORIE MALNUTRITION (HCC): ICD-10-CM

## 2021-09-21 DIAGNOSIS — C56.3 MALIGNANT NEOPLASM OF BOTH OVARIES (HCC): Primary | ICD-10-CM

## 2021-09-21 DIAGNOSIS — K86.89 PANCREATIC FLUID LEAK: ICD-10-CM

## 2021-09-21 DIAGNOSIS — T81.31XD POSTOPERATIVE WOUND DEHISCENCE, SUBSEQUENT ENCOUNTER: ICD-10-CM

## 2021-09-21 DIAGNOSIS — T45.1X5A CHEMOTHERAPY INDUCED NEUTROPENIA (HCC): ICD-10-CM

## 2021-09-21 DIAGNOSIS — J90 PLEURAL EFFUSION: ICD-10-CM

## 2021-09-21 PROBLEM — Z45.2 ENCOUNTER FOR CENTRAL LINE CARE: Status: ACTIVE | Noted: 2021-09-21

## 2021-09-21 PROCEDURE — 99024 POSTOP FOLLOW-UP VISIT: CPT | Performed by: OBSTETRICS & GYNECOLOGY

## 2021-09-21 PROCEDURE — G1004 CDSM NDSC: HCPCS

## 2021-09-21 PROCEDURE — 74177 CT ABD & PELVIS W/CONTRAST: CPT

## 2021-09-21 RX ORDER — ONDANSETRON HYDROCHLORIDE 8 MG/1
8 TABLET, FILM COATED ORAL EVERY 8 HOURS PRN
Qty: 20 TABLET | Refills: 1 | Status: SHIPPED | OUTPATIENT
Start: 2021-09-21

## 2021-09-21 RX ORDER — LORAZEPAM 1 MG/1
1 TABLET ORAL EVERY 8 HOURS PRN
Qty: 30 TABLET | Refills: 0 | Status: SHIPPED | OUTPATIENT
Start: 2021-09-21 | End: 2021-11-02 | Stop reason: SDUPTHER

## 2021-09-21 RX ADMIN — IOHEXOL 100 ML: 350 INJECTION, SOLUTION INTRAVENOUS at 13:04

## 2021-09-22 ENCOUNTER — OFFICE VISIT (OUTPATIENT)
Dept: GYNECOLOGIC ONCOLOGY | Facility: CLINIC | Age: 71
End: 2021-09-22

## 2021-09-22 VITALS
SYSTOLIC BLOOD PRESSURE: 126 MMHG | RESPIRATION RATE: 18 BRPM | HEIGHT: 59 IN | TEMPERATURE: 97.9 F | DIASTOLIC BLOOD PRESSURE: 76 MMHG | BODY MASS INDEX: 32.92 KG/M2 | HEART RATE: 113 BPM

## 2021-09-22 DIAGNOSIS — C56.3 MALIGNANT NEOPLASM OF BOTH OVARIES (HCC): ICD-10-CM

## 2021-09-22 DIAGNOSIS — K86.89 PANCREATIC FLUID LEAK: Primary | ICD-10-CM

## 2021-09-22 PROCEDURE — 99024 POSTOP FOLLOW-UP VISIT: CPT | Performed by: OBSTETRICS & GYNECOLOGY

## 2021-09-22 NOTE — PROGRESS NOTES
Assessment/Plan:    Problem List Items Addressed This Visit        Digestive    Pancreatic fluid leak - Primary     CT negative for further collection  Drain pulled today             Endocrine    Ovarian cancer ([facility] [address] )     Chemo calendar given  Pt to have labs done Friday  Chemo planned for next week                  CHIEF COMPLAINT: drain removal        Problem:  Cancer Staging  Ovarian cancer [facility])  Staging form: Ovary, Fallopian Tube, Primary Peritoneal, AJCC 8th Edition  - Clinical: FIGO Stage IIIC (cT3c) - Signed by Mino Villegas MD on 9/16/2021        Previous therapy:  Oncology History   Ovarian cancer ([facility] [address] )   8/6/2021 Surgery    PROMISE/BSO/tumor debulking to optimal cytoreduction     8/25/2021 Initial Diagnosis    Ovarian cancer ([facility] [address] )     9/16/2021 -  Cancer Staged    Staging form: Ovary, Fallopian Tube, Primary Peritoneal, AJCC 8th Edition  - Clinical: FIGO Stage IIIC (cT3c) - Signed by Mino Villegas MD on 9/16/2021  Stage prefix: Initial diagnosis  Cancer antigen 125 () (U/mL): 543       9/28/2021 -  Chemotherapy    palonosetron (ALOXI), 0 25 mg, Intravenous, Once, 0 of 6 cycles  fosaprepitant (EMEND) IVPB, 150 mg, Intravenous, Once, 0 of 6 cycles  CARBOplatin (PARAPLATIN) IVPB (GOG AUC DOSING), , Intravenous, Once, 0 of 6 cycles  bevacizumab (AVASTIN) IVPB, 15 mg/kg = 1,107 5 mg, Intravenous, Once, 0 of 4 cycles  PACLItaxel (TAXOL) chemo IVPB, 175 mg/m2, Intravenous, Once, 0 of 6 cycles           Patient ID: Holly Orourke is a 80 y o  female  70yo with 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 s/p PROMISE/BSO/tumor debulking presents to have drain removed  Patient had CT scan done yesterday showing no residual fluid collection  She continues to have minimal output from the drain  She has no other complaints at this time    Her appetite is adequate and she has been increasing      The following portions of the patient's history were reviewed and updated as appropriate: allergies, current medications, past family history, past medical history, past social history, past surgical history and problem list     Review of Systems   All other systems reviewed and are negative  Current Outpatient Medications:     acetaminophen (TYLENOL) 325 mg tablet, Take 3 tablets (975 mg total) by mouth every 8 (eight) hours, Disp: , Rfl:     albuterol (PROVENTIL HFA,VENTOLIN HFA) 90 mcg/act inhaler, Inhale 2 puffs every 6 (six) hours as needed for wheezing, Disp: , Rfl:     AMLODIPINE BENZOATE PO, Take by mouth, Disp: , Rfl:     amLODIPine-benazepril (LOTREL) 10-20 MG per capsule, Take 1 capsule by mouth daily, Disp: , Rfl:     butalbital-aspirin-caffeine (FIORINAL) -40 mg per capsule, Take 1 capsule by mouth every 4 (four) hours as needed for headaches, Disp: , Rfl:     docusate sodium (COLACE) 100 mg capsule, Take 1 capsule (100 mg total) by mouth 2 (two) times a day, Disp: , Rfl:     fluticasone (FLONASE) 50 mcg/act nasal spray, 1 spray into each nostril daily, Disp: , Rfl:     ibuprofen (MOTRIN) 600 mg tablet, Take 1 tablet (600 mg total) by mouth every 6 (six) hours, Disp: 30 tablet, Rfl: 0    LORazepam (ATIVAN) 1 mg tablet, Take 1 tablet (1 mg total) by mouth every 8 (eight) hours as needed for anxiety (nausea or anxiety), Disp: 30 tablet, Rfl: 0    Naproxen Sodium (Aleve) 220 MG CAPS, Take 220 mg by mouth daily 2 caps AM, Disp: , Rfl:     omeprazole (PriLOSEC) 20 mg delayed release capsule, Take 20 mg by mouth 2 (two) times a day, Disp: , Rfl:     ondansetron (ZOFRAN) 8 mg tablet, Take 1 tablet (8 mg total) by mouth every 8 (eight) hours as needed for nausea or vomiting, Disp: 20 tablet, Rfl: 1    polyethylene glycol (MiraLax) 17 GM/SCOOP powder, Mix with 64 oz Gatorade, begin 4 PM day before surgery per bowel prep instructions  , Disp: 238 g, Rfl: 0    pravastatin (PRAVACHOL) 10 mg tablet, Take 10 mg by mouth daily, Disp: , Rfl:     sodium chloride, PF, 0 9 %, 10 mL by Intracatheter route daily, Disp: 300 mL, Rfl: 3    benzonatate (TESSALON) 200 MG capsule, Take 1 capsule (200 mg total) by mouth 3 (three) times a day as needed for cough (Patient not taking: Reported on 9/22/2021), Disp: 20 capsule, Rfl: 0    fluticasone (FLOVENT DISKUS) 50 MCG/BLIST diskus inhaler, Inhale 1 puff 2 (two) times a day Rinse mouth after use  (Patient not taking: Reported on 9/22/2021), Disp: , Rfl:   No current facility-administered medications for this visit  Objective:    Blood pressure 126/76, pulse (!) 113, temperature 97 9 °F (36 6 °C), temperature source Temporal, resp  rate 18, height 4' 11" (1 499 m)  Body mass index is 32 92 kg/m²  Body surface area is 1 69 meters squared  Physical Exam  HENT:      Head: Normocephalic and atraumatic  Cardiovascular:      Rate and Rhythm: Normal rate and regular rhythm  Pulmonary:      Effort: Pulmonary effort is normal    Abdominal:      General: There is no distension  Palpations: Abdomen is soft  There is no mass  Comments: Incisions c/d/i    OSCAR removed    Genitourinary:     Comments: defer    Musculoskeletal:         General: Normal range of motion  Cervical back: Normal range of motion  Skin:     General: Skin is warm and dry  Neurological:      Mental Status: She is alert and oriented to person, place, and time

## 2021-09-24 ENCOUNTER — HOSPITAL ENCOUNTER (OUTPATIENT)
Dept: INFUSION CENTER | Facility: HOSPITAL | Age: 71
Discharge: HOME | End: 2021-09-24
Payer: COMMERCIAL

## 2021-09-24 ENCOUNTER — TELEPHONE (OUTPATIENT)
Dept: PALLIATIVE MEDICINE | Facility: CLINIC | Age: 71
End: 2021-09-24

## 2021-09-24 DIAGNOSIS — Z45.2 ENCOUNTER FOR CENTRAL LINE CARE: Primary | ICD-10-CM

## 2021-09-24 DIAGNOSIS — C56.3 MALIGNANT NEOPLASM OF BOTH OVARIES (HCC): ICD-10-CM

## 2021-09-24 LAB
ALBUMIN SERPL BCP-MCNC: 2.4 G/DL (ref 3.5–5)
ALP SERPL-CCNC: 471 U/L (ref 46–116)
ALT SERPL W P-5'-P-CCNC: 22 U/L (ref 12–78)
ANION GAP SERPL CALCULATED.3IONS-SCNC: 5 MMOL/L (ref 4–13)
AST SERPL W P-5'-P-CCNC: 24 U/L (ref 5–45)
BASOPHILS # BLD AUTO: 0.1 THOUSANDS/ΜL (ref 0–0.1)
BASOPHILS NFR BLD AUTO: 1 % (ref 0–1)
BILIRUB SERPL-MCNC: 0.19 MG/DL (ref 0.2–1)
BUN SERPL-MCNC: 12 MG/DL (ref 5–25)
CALCIUM ALBUM COR SERPL-MCNC: 10.1 MG/DL (ref 8.3–10.1)
CALCIUM SERPL-MCNC: 8.8 MG/DL (ref 8.3–10.1)
CANCER AG125 SERPL-ACNC: 94.1 U/ML (ref 0–30)
CHLORIDE SERPL-SCNC: 97 MMOL/L (ref 100–108)
CO2 SERPL-SCNC: 27 MMOL/L (ref 21–32)
CREAT SERPL-MCNC: 0.8 MG/DL (ref 0.6–1.3)
CREAT UR-MCNC: 132 MG/DL
EOSINOPHIL # BLD AUTO: 0.32 THOUSAND/ΜL (ref 0–0.61)
EOSINOPHIL NFR BLD AUTO: 3 % (ref 0–6)
ERYTHROCYTE [DISTWIDTH] IN BLOOD BY AUTOMATED COUNT: 15.5 % (ref 11.6–15.1)
GFR SERPL CREATININE-BSD FRML MDRD: 75 ML/MIN/1.73SQ M
GLUCOSE SERPL-MCNC: 117 MG/DL (ref 65–140)
HCT VFR BLD AUTO: 31.9 % (ref 34.8–46.1)
HGB BLD-MCNC: 9.8 G/DL (ref 11.5–15.4)
IMM GRANULOCYTES # BLD AUTO: 0.06 THOUSAND/UL (ref 0–0.2)
IMM GRANULOCYTES NFR BLD AUTO: 1 % (ref 0–2)
LYMPHOCYTES # BLD AUTO: 3.58 THOUSANDS/ΜL (ref 0.6–4.47)
LYMPHOCYTES NFR BLD AUTO: 30 % (ref 14–44)
MAGNESIUM SERPL-MCNC: 1.7 MG/DL (ref 1.6–2.6)
MCH RBC QN AUTO: 28.2 PG (ref 26.8–34.3)
MCHC RBC AUTO-ENTMCNC: 30.7 G/DL (ref 31.4–37.4)
MCV RBC AUTO: 92 FL (ref 82–98)
MONOCYTES # BLD AUTO: 1.51 THOUSAND/ΜL (ref 0.17–1.22)
MONOCYTES NFR BLD AUTO: 13 % (ref 4–12)
NEUTROPHILS # BLD AUTO: 6.49 THOUSANDS/ΜL (ref 1.85–7.62)
NEUTS SEG NFR BLD AUTO: 52 % (ref 43–75)
NRBC BLD AUTO-RTO: 0 /100 WBCS
PLATELET # BLD AUTO: 659 THOUSANDS/UL (ref 149–390)
PMV BLD AUTO: 9.9 FL (ref 8.9–12.7)
POTASSIUM SERPL-SCNC: 3.1 MMOL/L (ref 3.5–5.3)
PROT SERPL-MCNC: 7.7 G/DL (ref 6.4–8.2)
PROT UR-MCNC: 41 MG/DL
PROT/CREAT UR: 0.31 MG/G{CREAT} (ref 0–0.1)
RBC # BLD AUTO: 3.48 MILLION/UL (ref 3.81–5.12)
SODIUM SERPL-SCNC: 129 MMOL/L (ref 136–145)
WBC # BLD AUTO: 12.06 THOUSAND/UL (ref 4.31–10.16)

## 2021-09-24 PROCEDURE — 84156 ASSAY OF PROTEIN URINE: CPT

## 2021-09-24 PROCEDURE — 83735 ASSAY OF MAGNESIUM: CPT

## 2021-09-24 PROCEDURE — 82570 ASSAY OF URINE CREATININE: CPT

## 2021-09-24 PROCEDURE — 86304 IMMUNOASSAY TUMOR CA 125: CPT

## 2021-09-24 PROCEDURE — 80053 COMPREHEN METABOLIC PANEL: CPT

## 2021-09-24 PROCEDURE — 85025 COMPLETE CBC W/AUTO DIFF WBC: CPT

## 2021-09-24 NOTE — TELEPHONE ENCOUNTER
Left message on machine to return call to the office to schedule a consult appointment with Palliative Care  Phone call to patient's insurance  to inquire if patient can be seen in [place] and [place]    Per insurance patient has coverage in [address] [place] [address]   Ref # [id number]

## 2021-09-28 ENCOUNTER — HOSPITAL ENCOUNTER (OUTPATIENT)
Dept: INFUSION CENTER | Facility: HOSPITAL | Age: 71
Discharge: HOME | End: 2021-09-28
Payer: COMMERCIAL

## 2021-09-28 ENCOUNTER — TELEPHONE (OUTPATIENT)
Dept: GENETICS | Facility: CLINIC | Age: 71
End: 2021-09-28

## 2021-09-28 VITALS
BODY MASS INDEX: 31.42 KG/M2 | DIASTOLIC BLOOD PRESSURE: 62 MMHG | SYSTOLIC BLOOD PRESSURE: 122 MMHG | OXYGEN SATURATION: 97 % | TEMPERATURE: 97.6 F | WEIGHT: 155.87 LBS | RESPIRATION RATE: 18 BRPM | HEART RATE: 71 BPM | HEIGHT: 59 IN

## 2021-09-28 DIAGNOSIS — C56.3 MALIGNANT NEOPLASM OF BOTH OVARIES (HCC): Primary | ICD-10-CM

## 2021-09-28 DIAGNOSIS — C56.3 MALIGNANT NEOPLASM OF BOTH OVARIES (HCC): ICD-10-CM

## 2021-09-28 PROCEDURE — 96415 CHEMO IV INFUSION ADDL HR: CPT

## 2021-09-28 PROCEDURE — 96367 TX/PROPH/DG ADDL SEQ IV INF: CPT

## 2021-09-28 PROCEDURE — 96417 CHEMO IV INFUS EACH ADDL SEQ: CPT

## 2021-09-28 PROCEDURE — 96413 CHEMO IV INFUSION 1 HR: CPT

## 2021-09-28 PROCEDURE — 96375 TX/PRO/DX INJ NEW DRUG ADDON: CPT

## 2021-09-28 RX ORDER — SODIUM CHLORIDE 9 MG/ML
20 INJECTION, SOLUTION INTRAVENOUS ONCE
Status: COMPLETED | OUTPATIENT
Start: 2021-09-28 | End: 2021-09-28

## 2021-09-28 RX ORDER — PALONOSETRON 0.05 MG/ML
0.25 INJECTION, SOLUTION INTRAVENOUS ONCE
Status: COMPLETED | OUTPATIENT
Start: 2021-09-28 | End: 2021-09-28

## 2021-09-28 RX ADMIN — FOSAPREPITANT 150 MG: 150 INJECTION, POWDER, LYOPHILIZED, FOR SOLUTION INTRAVENOUS at 09:40

## 2021-09-28 RX ADMIN — SODIUM CHLORIDE 20 ML/HR: 0.9 INJECTION, SOLUTION INTRAVENOUS at 08:31

## 2021-09-28 RX ADMIN — CARBOPLATIN 514.2 MG: 10 INJECTION, SOLUTION INTRAVENOUS at 14:11

## 2021-09-28 RX ADMIN — FAMOTIDINE 20 MG: 10 INJECTION, SOLUTION INTRAVENOUS at 08:33

## 2021-09-28 RX ADMIN — PALONOSETRON 0.25 MG: 0.25 INJECTION, SOLUTION INTRAVENOUS at 09:40

## 2021-09-28 RX ADMIN — DIPHENHYDRAMINE HYDROCHLORIDE 25 MG: 50 INJECTION, SOLUTION INTRAMUSCULAR; INTRAVENOUS at 09:18

## 2021-09-28 RX ADMIN — PACLITAXEL 295.8 MG: 6 INJECTION, SOLUTION, CONCENTRATE INTRAVENOUS at 10:20

## 2021-09-28 RX ADMIN — DEXAMETHASONE SODIUM PHOSPHATE 20 MG: 10 INJECTION, SOLUTION INTRAMUSCULAR; INTRAVENOUS at 08:57

## 2021-09-28 NOTE — TELEPHONE ENCOUNTER
I called Robert Coreas to schedule a new patient appointment with the [facility]       Outcome:   Spoke with patient, she was in middle of her infusion treatment, she will call later today to schedule  Patient was provided with our phone # to call     Follow-up:   At this time the referral will be closed and we will wait to hear back from the patient regarding scheduling this appointment

## 2021-10-01 ENCOUNTER — HOSPITAL ENCOUNTER (OUTPATIENT)
Dept: INFUSION CENTER | Facility: HOSPITAL | Age: 71
Discharge: HOME | End: 2021-10-01
Payer: COMMERCIAL

## 2021-10-01 VITALS — TEMPERATURE: 97.9 F

## 2021-10-01 DIAGNOSIS — Z45.2 ENCOUNTER FOR CENTRAL LINE CARE: Primary | ICD-10-CM

## 2021-10-01 DIAGNOSIS — C56.3 MALIGNANT NEOPLASM OF BOTH OVARIES (HCC): ICD-10-CM

## 2021-10-01 LAB
ALBUMIN SERPL BCP-MCNC: 2.7 G/DL (ref 3.5–5)
ALP SERPL-CCNC: 279 U/L (ref 46–116)
ALT SERPL W P-5'-P-CCNC: 23 U/L (ref 12–78)
ANION GAP SERPL CALCULATED.3IONS-SCNC: 8 MMOL/L (ref 4–13)
AST SERPL W P-5'-P-CCNC: 31 U/L (ref 5–45)
BASOPHILS # BLD AUTO: 0.02 THOUSANDS/ΜL (ref 0–0.1)
BASOPHILS NFR BLD AUTO: 0 % (ref 0–1)
BILIRUB SERPL-MCNC: 0.31 MG/DL (ref 0.2–1)
BUN SERPL-MCNC: 16 MG/DL (ref 5–25)
CALCIUM ALBUM COR SERPL-MCNC: 9.9 MG/DL (ref 8.3–10.1)
CALCIUM SERPL-MCNC: 8.9 MG/DL (ref 8.3–10.1)
CHLORIDE SERPL-SCNC: 100 MMOL/L (ref 100–108)
CO2 SERPL-SCNC: 27 MMOL/L (ref 21–32)
CREAT SERPL-MCNC: 0.69 MG/DL (ref 0.6–1.3)
EOSINOPHIL # BLD AUTO: 0.31 THOUSAND/ΜL (ref 0–0.61)
EOSINOPHIL NFR BLD AUTO: 4 % (ref 0–6)
ERYTHROCYTE [DISTWIDTH] IN BLOOD BY AUTOMATED COUNT: 15.5 % (ref 11.6–15.1)
GFR SERPL CREATININE-BSD FRML MDRD: 88 ML/MIN/1.73SQ M
GLUCOSE SERPL-MCNC: 110 MG/DL (ref 65–140)
HCT VFR BLD AUTO: 34 % (ref 34.8–46.1)
HGB BLD-MCNC: 10.4 G/DL (ref 11.5–15.4)
IMM GRANULOCYTES # BLD AUTO: 0.02 THOUSAND/UL (ref 0–0.2)
IMM GRANULOCYTES NFR BLD AUTO: 0 % (ref 0–2)
LYMPHOCYTES # BLD AUTO: 2.57 THOUSANDS/ΜL (ref 0.6–4.47)
LYMPHOCYTES NFR BLD AUTO: 32 % (ref 14–44)
MAGNESIUM SERPL-MCNC: 1.9 MG/DL (ref 1.6–2.6)
MCH RBC QN AUTO: 27.7 PG (ref 26.8–34.3)
MCHC RBC AUTO-ENTMCNC: 30.6 G/DL (ref 31.4–37.4)
MCV RBC AUTO: 91 FL (ref 82–98)
MONOCYTES # BLD AUTO: 0.28 THOUSAND/ΜL (ref 0.17–1.22)
MONOCYTES NFR BLD AUTO: 3 % (ref 4–12)
NEUTROPHILS # BLD AUTO: 4.95 THOUSANDS/ΜL (ref 1.85–7.62)
NEUTS SEG NFR BLD AUTO: 61 % (ref 43–75)
NRBC BLD AUTO-RTO: 0 /100 WBCS
PLATELET # BLD AUTO: 517 THOUSANDS/UL (ref 149–390)
PMV BLD AUTO: 10.5 FL (ref 8.9–12.7)
POTASSIUM SERPL-SCNC: 3.9 MMOL/L (ref 3.5–5.3)
PROT SERPL-MCNC: 7.5 G/DL (ref 6.4–8.2)
RBC # BLD AUTO: 3.75 MILLION/UL (ref 3.81–5.12)
SODIUM SERPL-SCNC: 135 MMOL/L (ref 136–145)
WBC # BLD AUTO: 8.15 THOUSAND/UL (ref 4.31–10.16)

## 2021-10-01 PROCEDURE — 83735 ASSAY OF MAGNESIUM: CPT

## 2021-10-01 PROCEDURE — 80053 COMPREHEN METABOLIC PANEL: CPT

## 2021-10-01 PROCEDURE — 85025 COMPLETE CBC W/AUTO DIFF WBC: CPT

## 2021-10-08 ENCOUNTER — HOSPITAL ENCOUNTER (OUTPATIENT)
Dept: INFUSION CENTER | Facility: HOSPITAL | Age: 71
Discharge: HOME | End: 2021-10-08
Payer: COMMERCIAL

## 2021-10-08 VITALS — TEMPERATURE: 96.6 F

## 2021-10-08 DIAGNOSIS — Z45.2 ENCOUNTER FOR CENTRAL LINE CARE: ICD-10-CM

## 2021-10-08 DIAGNOSIS — C56.3 MALIGNANT NEOPLASM OF BOTH OVARIES (HCC): Primary | ICD-10-CM

## 2021-10-08 LAB
ALBUMIN SERPL BCP-MCNC: 3 G/DL (ref 3.5–5)
ALP SERPL-CCNC: 268 U/L (ref 46–116)
ALT SERPL W P-5'-P-CCNC: 24 U/L (ref 12–78)
ANION GAP SERPL CALCULATED.3IONS-SCNC: 10 MMOL/L (ref 4–13)
AST SERPL W P-5'-P-CCNC: 28 U/L (ref 5–45)
BASOPHILS # BLD AUTO: 0.05 THOUSANDS/ΜL (ref 0–0.1)
BASOPHILS NFR BLD AUTO: 1 % (ref 0–1)
BILIRUB SERPL-MCNC: 0.18 MG/DL (ref 0.2–1)
BUN SERPL-MCNC: 14 MG/DL (ref 5–25)
CALCIUM ALBUM COR SERPL-MCNC: 9.6 MG/DL (ref 8.3–10.1)
CALCIUM SERPL-MCNC: 8.8 MG/DL (ref 8.3–10.1)
CHLORIDE SERPL-SCNC: 101 MMOL/L (ref 100–108)
CO2 SERPL-SCNC: 24 MMOL/L (ref 21–32)
CREAT SERPL-MCNC: 0.64 MG/DL (ref 0.6–1.3)
EOSINOPHIL # BLD AUTO: 0.11 THOUSAND/ΜL (ref 0–0.61)
EOSINOPHIL NFR BLD AUTO: 2 % (ref 0–6)
ERYTHROCYTE [DISTWIDTH] IN BLOOD BY AUTOMATED COUNT: 15.3 % (ref 11.6–15.1)
GFR SERPL CREATININE-BSD FRML MDRD: 91 ML/MIN/1.73SQ M
GLUCOSE SERPL-MCNC: 105 MG/DL (ref 65–140)
HCT VFR BLD AUTO: 32.8 % (ref 34.8–46.1)
HGB BLD-MCNC: 10.1 G/DL (ref 11.5–15.4)
IMM GRANULOCYTES # BLD AUTO: 0.01 THOUSAND/UL (ref 0–0.2)
IMM GRANULOCYTES NFR BLD AUTO: 0 % (ref 0–2)
LYMPHOCYTES # BLD AUTO: 2.74 THOUSANDS/ΜL (ref 0.6–4.47)
LYMPHOCYTES NFR BLD AUTO: 45 % (ref 14–44)
MAGNESIUM SERPL-MCNC: 1.6 MG/DL (ref 1.6–2.6)
MCH RBC QN AUTO: 27.4 PG (ref 26.8–34.3)
MCHC RBC AUTO-ENTMCNC: 30.8 G/DL (ref 31.4–37.4)
MCV RBC AUTO: 89 FL (ref 82–98)
MONOCYTES # BLD AUTO: 0.94 THOUSAND/ΜL (ref 0.17–1.22)
MONOCYTES NFR BLD AUTO: 15 % (ref 4–12)
NEUTROPHILS # BLD AUTO: 2.26 THOUSANDS/ΜL (ref 1.85–7.62)
NEUTS SEG NFR BLD AUTO: 37 % (ref 43–75)
NRBC BLD AUTO-RTO: 0 /100 WBCS
PLATELET # BLD AUTO: 484 THOUSANDS/UL (ref 149–390)
PMV BLD AUTO: 9.6 FL (ref 8.9–12.7)
POTASSIUM SERPL-SCNC: 4.1 MMOL/L (ref 3.5–5.3)
PROT SERPL-MCNC: 7.2 G/DL (ref 6.4–8.2)
RBC # BLD AUTO: 3.68 MILLION/UL (ref 3.81–5.12)
SODIUM SERPL-SCNC: 135 MMOL/L (ref 136–145)
WBC # BLD AUTO: 6.11 THOUSAND/UL (ref 4.31–10.16)

## 2021-10-08 PROCEDURE — 85025 COMPLETE CBC W/AUTO DIFF WBC: CPT

## 2021-10-08 PROCEDURE — 80053 COMPREHEN METABOLIC PANEL: CPT

## 2021-10-08 PROCEDURE — 83735 ASSAY OF MAGNESIUM: CPT

## 2021-10-12 LAB — SCAN RESULT: NORMAL

## 2021-10-13 ENCOUNTER — OFFICE VISIT (OUTPATIENT)
Dept: GYNECOLOGIC ONCOLOGY | Facility: CLINIC | Age: 71
End: 2021-10-13
Payer: COMMERCIAL

## 2021-10-13 VITALS
TEMPERATURE: 97.4 F | DIASTOLIC BLOOD PRESSURE: 66 MMHG | WEIGHT: 154 LBS | OXYGEN SATURATION: 96 % | RESPIRATION RATE: 16 BRPM | SYSTOLIC BLOOD PRESSURE: 118 MMHG | BODY MASS INDEX: 31.04 KG/M2 | HEART RATE: 95 BPM | HEIGHT: 59 IN

## 2021-10-13 DIAGNOSIS — K59.03 DRUG INDUCED CONSTIPATION: ICD-10-CM

## 2021-10-13 DIAGNOSIS — C56.3 MALIGNANT NEOPLASM OF BOTH OVARIES (HCC): Primary | ICD-10-CM

## 2021-10-13 DIAGNOSIS — T81.31XD POSTOPERATIVE WOUND DEHISCENCE, SUBSEQUENT ENCOUNTER: ICD-10-CM

## 2021-10-13 PROCEDURE — 99024 POSTOP FOLLOW-UP VISIT: CPT | Performed by: OBSTETRICS & GYNECOLOGY

## 2021-10-15 ENCOUNTER — HOSPITAL ENCOUNTER (OUTPATIENT)
Dept: INFUSION CENTER | Facility: HOSPITAL | Age: 71
Discharge: HOME | End: 2021-10-15
Payer: COMMERCIAL

## 2021-10-15 VITALS — TEMPERATURE: 96.3 F

## 2021-10-15 DIAGNOSIS — Z45.2 ENCOUNTER FOR CENTRAL LINE CARE: Primary | ICD-10-CM

## 2021-10-15 DIAGNOSIS — C56.3 MALIGNANT NEOPLASM OF BOTH OVARIES (HCC): ICD-10-CM

## 2021-10-15 LAB
ALBUMIN SERPL BCP-MCNC: 2.8 G/DL (ref 3.5–5)
ALP SERPL-CCNC: 209 U/L (ref 46–116)
ALT SERPL W P-5'-P-CCNC: 24 U/L (ref 12–78)
ANION GAP SERPL CALCULATED.3IONS-SCNC: 12 MMOL/L (ref 4–13)
AST SERPL W P-5'-P-CCNC: 15 U/L (ref 5–45)
BASOPHILS # BLD AUTO: 0.09 THOUSANDS/ΜL (ref 0–0.1)
BASOPHILS NFR BLD AUTO: 1 % (ref 0–1)
BILIRUB SERPL-MCNC: 0.2 MG/DL (ref 0.2–1)
BUN SERPL-MCNC: 15 MG/DL (ref 5–25)
CALCIUM ALBUM COR SERPL-MCNC: 10.1 MG/DL (ref 8.3–10.1)
CALCIUM SERPL-MCNC: 9.1 MG/DL (ref 8.3–10.1)
CANCER AG125 SERPL-ACNC: 69.9 U/ML (ref 0–30)
CHLORIDE SERPL-SCNC: 104 MMOL/L (ref 100–108)
CO2 SERPL-SCNC: 26 MMOL/L (ref 21–32)
CREAT SERPL-MCNC: 0.62 MG/DL (ref 0.6–1.3)
CREAT UR-MCNC: 73.2 MG/DL
EOSINOPHIL # BLD AUTO: 0.26 THOUSAND/ΜL (ref 0–0.61)
EOSINOPHIL NFR BLD AUTO: 4 % (ref 0–6)
ERYTHROCYTE [DISTWIDTH] IN BLOOD BY AUTOMATED COUNT: 16.2 % (ref 11.6–15.1)
GFR SERPL CREATININE-BSD FRML MDRD: 92 ML/MIN/1.73SQ M
GLUCOSE SERPL-MCNC: 88 MG/DL (ref 65–140)
HCT VFR BLD AUTO: 32 % (ref 34.8–46.1)
HGB BLD-MCNC: 9.8 G/DL (ref 11.5–15.4)
IMM GRANULOCYTES # BLD AUTO: 0.04 THOUSAND/UL (ref 0–0.2)
IMM GRANULOCYTES NFR BLD AUTO: 1 % (ref 0–2)
LYMPHOCYTES # BLD AUTO: 2.93 THOUSANDS/ΜL (ref 0.6–4.47)
LYMPHOCYTES NFR BLD AUTO: 44 % (ref 14–44)
MAGNESIUM SERPL-MCNC: 1.4 MG/DL (ref 1.6–2.6)
MCH RBC QN AUTO: 28 PG (ref 26.8–34.3)
MCHC RBC AUTO-ENTMCNC: 30.6 G/DL (ref 31.4–37.4)
MCV RBC AUTO: 91 FL (ref 82–98)
MONOCYTES # BLD AUTO: 1.39 THOUSAND/ΜL (ref 0.17–1.22)
MONOCYTES NFR BLD AUTO: 21 % (ref 4–12)
NEUTROPHILS # BLD AUTO: 1.94 THOUSANDS/ΜL (ref 1.85–7.62)
NEUTS SEG NFR BLD AUTO: 29 % (ref 43–75)
NRBC BLD AUTO-RTO: 0 /100 WBCS
PLATELET # BLD AUTO: 486 THOUSANDS/UL (ref 149–390)
PMV BLD AUTO: 10 FL (ref 8.9–12.7)
POTASSIUM SERPL-SCNC: 3.6 MMOL/L (ref 3.5–5.3)
PROT SERPL-MCNC: 6.8 G/DL (ref 6.4–8.2)
PROT UR-MCNC: 17 MG/DL
PROT/CREAT UR: 0.23 MG/G{CREAT} (ref 0–0.1)
RBC # BLD AUTO: 3.5 MILLION/UL (ref 3.81–5.12)
SODIUM SERPL-SCNC: 142 MMOL/L (ref 136–145)
WBC # BLD AUTO: 6.65 THOUSAND/UL (ref 4.31–10.16)

## 2021-10-15 PROCEDURE — 86304 IMMUNOASSAY TUMOR CA 125: CPT

## 2021-10-15 PROCEDURE — 85025 COMPLETE CBC W/AUTO DIFF WBC: CPT

## 2021-10-15 PROCEDURE — 84156 ASSAY OF PROTEIN URINE: CPT

## 2021-10-15 PROCEDURE — 36593 DECLOT VASCULAR DEVICE: CPT

## 2021-10-15 PROCEDURE — 83735 ASSAY OF MAGNESIUM: CPT

## 2021-10-15 PROCEDURE — 82570 ASSAY OF URINE CREATININE: CPT

## 2021-10-15 PROCEDURE — 80053 COMPREHEN METABOLIC PANEL: CPT

## 2021-10-15 RX ADMIN — ALTEPLASE 2 MG: 2.2 INJECTION, POWDER, LYOPHILIZED, FOR SOLUTION INTRAVENOUS at 10:22

## 2021-10-18 RX ORDER — PALONOSETRON 0.05 MG/ML
0.25 INJECTION, SOLUTION INTRAVENOUS ONCE
Status: CANCELLED | OUTPATIENT
Start: 2021-10-19

## 2021-10-18 RX ORDER — SODIUM CHLORIDE 9 MG/ML
20 INJECTION, SOLUTION INTRAVENOUS ONCE
Status: CANCELLED | OUTPATIENT
Start: 2021-10-19

## 2021-10-19 ENCOUNTER — HOSPITAL ENCOUNTER (OUTPATIENT)
Dept: INFUSION CENTER | Facility: HOSPITAL | Age: 71
Discharge: HOME | End: 2021-10-19
Payer: COMMERCIAL

## 2021-10-19 ENCOUNTER — TELEPHONE (OUTPATIENT)
Dept: GENETICS | Facility: CLINIC | Age: 71
End: 2021-10-19

## 2021-10-19 VITALS
WEIGHT: 159.39 LBS | TEMPERATURE: 97.6 F | HEIGHT: 59 IN | OXYGEN SATURATION: 97 % | DIASTOLIC BLOOD PRESSURE: 64 MMHG | BODY MASS INDEX: 32.13 KG/M2 | RESPIRATION RATE: 18 BRPM | HEART RATE: 100 BPM | SYSTOLIC BLOOD PRESSURE: 142 MMHG

## 2021-10-19 DIAGNOSIS — C56.3 MALIGNANT NEOPLASM OF BOTH OVARIES (HCC): Primary | ICD-10-CM

## 2021-10-19 PROCEDURE — 96413 CHEMO IV INFUSION 1 HR: CPT

## 2021-10-19 PROCEDURE — 96367 TX/PROPH/DG ADDL SEQ IV INF: CPT

## 2021-10-19 PROCEDURE — 96415 CHEMO IV INFUSION ADDL HR: CPT

## 2021-10-19 PROCEDURE — 96375 TX/PRO/DX INJ NEW DRUG ADDON: CPT

## 2021-10-19 PROCEDURE — 96417 CHEMO IV INFUS EACH ADDL SEQ: CPT

## 2021-10-19 RX ORDER — PALONOSETRON 0.05 MG/ML
0.25 INJECTION, SOLUTION INTRAVENOUS ONCE
Status: COMPLETED | OUTPATIENT
Start: 2021-10-19 | End: 2021-10-19

## 2021-10-19 RX ORDER — SODIUM CHLORIDE 9 MG/ML
20 INJECTION, SOLUTION INTRAVENOUS ONCE
Status: COMPLETED | OUTPATIENT
Start: 2021-10-19 | End: 2021-10-19

## 2021-10-19 RX ADMIN — PALONOSETRON 0.25 MG: 0.25 INJECTION, SOLUTION INTRAVENOUS at 10:02

## 2021-10-19 RX ADMIN — SODIUM CHLORIDE 20 ML/HR: 0.9 INJECTION, SOLUTION INTRAVENOUS at 09:07

## 2021-10-19 RX ADMIN — FOSAPREPITANT 150 MG: 150 INJECTION, POWDER, LYOPHILIZED, FOR SOLUTION INTRAVENOUS at 10:26

## 2021-10-19 RX ADMIN — PACLITAXEL 295.8 MG: 6 INJECTION, SOLUTION, CONCENTRATE INTRAVENOUS at 11:04

## 2021-10-19 RX ADMIN — FAMOTIDINE 20 MG: 10 INJECTION, SOLUTION INTRAVENOUS at 10:03

## 2021-10-19 RX ADMIN — DIPHENHYDRAMINE HYDROCHLORIDE 25 MG: 50 INJECTION, SOLUTION INTRAMUSCULAR; INTRAVENOUS at 09:32

## 2021-10-19 RX ADMIN — DEXAMETHASONE SODIUM PHOSPHATE 20 MG: 10 INJECTION, SOLUTION INTRAMUSCULAR; INTRAVENOUS at 09:08

## 2021-10-19 RX ADMIN — CARBOPLATIN 566.4 MG: 10 INJECTION, SOLUTION INTRAVENOUS at 14:10

## 2021-10-22 ENCOUNTER — HOSPITAL ENCOUNTER (OUTPATIENT)
Dept: INFUSION CENTER | Facility: HOSPITAL | Age: 71
Discharge: HOME | End: 2021-10-22
Payer: COMMERCIAL

## 2021-10-22 VITALS — TEMPERATURE: 97.3 F

## 2021-10-22 DIAGNOSIS — C56.3 MALIGNANT NEOPLASM OF BOTH OVARIES (HCC): Primary | ICD-10-CM

## 2021-10-22 DIAGNOSIS — Z45.2 ENCOUNTER FOR CENTRAL LINE CARE: ICD-10-CM

## 2021-10-22 LAB
ALBUMIN SERPL BCP-MCNC: 3.1 G/DL (ref 3.5–5)
ALP SERPL-CCNC: 146 U/L (ref 46–116)
ALT SERPL W P-5'-P-CCNC: 28 U/L (ref 12–78)
ANION GAP SERPL CALCULATED.3IONS-SCNC: 12 MMOL/L (ref 4–13)
AST SERPL W P-5'-P-CCNC: 18 U/L (ref 5–45)
BASOPHILS # BLD AUTO: 0.03 THOUSANDS/ΜL (ref 0–0.1)
BASOPHILS NFR BLD AUTO: 1 % (ref 0–1)
BILIRUB SERPL-MCNC: 0.3 MG/DL (ref 0.2–1)
BUN SERPL-MCNC: 19 MG/DL (ref 5–25)
CALCIUM ALBUM COR SERPL-MCNC: 9.3 MG/DL (ref 8.3–10.1)
CALCIUM SERPL-MCNC: 8.6 MG/DL (ref 8.3–10.1)
CHLORIDE SERPL-SCNC: 102 MMOL/L (ref 100–108)
CO2 SERPL-SCNC: 24 MMOL/L (ref 21–32)
CREAT SERPL-MCNC: 0.59 MG/DL (ref 0.6–1.3)
EOSINOPHIL # BLD AUTO: 0.17 THOUSAND/ΜL (ref 0–0.61)
EOSINOPHIL NFR BLD AUTO: 3 % (ref 0–6)
ERYTHROCYTE [DISTWIDTH] IN BLOOD BY AUTOMATED COUNT: 16.7 % (ref 11.6–15.1)
GFR SERPL CREATININE-BSD FRML MDRD: 93 ML/MIN/1.73SQ M
GLUCOSE SERPL-MCNC: 129 MG/DL (ref 65–140)
HCT VFR BLD AUTO: 33 % (ref 34.8–46.1)
HGB BLD-MCNC: 10.1 G/DL (ref 11.5–15.4)
IMM GRANULOCYTES # BLD AUTO: 0.01 THOUSAND/UL (ref 0–0.2)
IMM GRANULOCYTES NFR BLD AUTO: 0 % (ref 0–2)
LYMPHOCYTES # BLD AUTO: 2.14 THOUSANDS/ΜL (ref 0.6–4.47)
LYMPHOCYTES NFR BLD AUTO: 35 % (ref 14–44)
MAGNESIUM SERPL-MCNC: 1.8 MG/DL (ref 1.6–2.6)
MCH RBC QN AUTO: 28 PG (ref 26.8–34.3)
MCHC RBC AUTO-ENTMCNC: 30.6 G/DL (ref 31.4–37.4)
MCV RBC AUTO: 91 FL (ref 82–98)
MONOCYTES # BLD AUTO: 0.24 THOUSAND/ΜL (ref 0.17–1.22)
MONOCYTES NFR BLD AUTO: 4 % (ref 4–12)
NEUTROPHILS # BLD AUTO: 3.46 THOUSANDS/ΜL (ref 1.85–7.62)
NEUTS SEG NFR BLD AUTO: 57 % (ref 43–75)
NRBC BLD AUTO-RTO: 0 /100 WBCS
PLATELET # BLD AUTO: 337 THOUSANDS/UL (ref 149–390)
PMV BLD AUTO: 10.2 FL (ref 8.9–12.7)
POTASSIUM SERPL-SCNC: 3.1 MMOL/L (ref 3.5–5.3)
PROT SERPL-MCNC: 6.9 G/DL (ref 6.4–8.2)
RBC # BLD AUTO: 3.61 MILLION/UL (ref 3.81–5.12)
SODIUM SERPL-SCNC: 138 MMOL/L (ref 136–145)
WBC # BLD AUTO: 6.05 THOUSAND/UL (ref 4.31–10.16)

## 2021-10-22 PROCEDURE — 85025 COMPLETE CBC W/AUTO DIFF WBC: CPT

## 2021-10-22 PROCEDURE — 80053 COMPREHEN METABOLIC PANEL: CPT

## 2021-10-22 PROCEDURE — 83735 ASSAY OF MAGNESIUM: CPT

## 2021-10-25 ENCOUNTER — TELEPHONE (OUTPATIENT)
Dept: HEMATOLOGY ONCOLOGY | Facility: CLINIC | Age: 71
End: 2021-10-25

## 2021-10-29 ENCOUNTER — HOSPITAL ENCOUNTER (OUTPATIENT)
Dept: INFUSION CENTER | Facility: HOSPITAL | Age: 71
Discharge: HOME | End: 2021-10-29
Payer: COMMERCIAL

## 2021-10-29 VITALS — TEMPERATURE: 97.6 F

## 2021-10-29 DIAGNOSIS — Z45.2 ENCOUNTER FOR CENTRAL LINE CARE: Primary | ICD-10-CM

## 2021-10-29 DIAGNOSIS — C56.3 MALIGNANT NEOPLASM OF BOTH OVARIES (HCC): ICD-10-CM

## 2021-10-29 LAB
ALBUMIN SERPL BCP-MCNC: 3.1 G/DL (ref 3.5–5)
ALP SERPL-CCNC: 152 U/L (ref 46–116)
ALT SERPL W P-5'-P-CCNC: 26 U/L (ref 12–78)
ANION GAP SERPL CALCULATED.3IONS-SCNC: 8 MMOL/L (ref 4–13)
AST SERPL W P-5'-P-CCNC: 18 U/L (ref 5–45)
BASOPHILS # BLD AUTO: 0.04 THOUSANDS/ΜL (ref 0–0.1)
BASOPHILS NFR BLD AUTO: 1 % (ref 0–1)
BILIRUB SERPL-MCNC: 0.19 MG/DL (ref 0.2–1)
BUN SERPL-MCNC: 18 MG/DL (ref 5–25)
CALCIUM ALBUM COR SERPL-MCNC: 9.2 MG/DL (ref 8.3–10.1)
CALCIUM SERPL-MCNC: 8.5 MG/DL (ref 8.3–10.1)
CHLORIDE SERPL-SCNC: 103 MMOL/L (ref 100–108)
CO2 SERPL-SCNC: 25 MMOL/L (ref 21–32)
CREAT SERPL-MCNC: 0.57 MG/DL (ref 0.6–1.3)
EOSINOPHIL # BLD AUTO: 0.1 THOUSAND/ΜL (ref 0–0.61)
EOSINOPHIL NFR BLD AUTO: 2 % (ref 0–6)
ERYTHROCYTE [DISTWIDTH] IN BLOOD BY AUTOMATED COUNT: 16.7 % (ref 11.6–15.1)
GFR SERPL CREATININE-BSD FRML MDRD: 94 ML/MIN/1.73SQ M
GLUCOSE SERPL-MCNC: 109 MG/DL (ref 65–140)
HCT VFR BLD AUTO: 31.2 % (ref 34.8–46.1)
HGB BLD-MCNC: 9.6 G/DL (ref 11.5–15.4)
IMM GRANULOCYTES # BLD AUTO: 0.01 THOUSAND/UL (ref 0–0.2)
IMM GRANULOCYTES NFR BLD AUTO: 0 % (ref 0–2)
LYMPHOCYTES # BLD AUTO: 2.09 THOUSANDS/ΜL (ref 0.6–4.47)
LYMPHOCYTES NFR BLD AUTO: 51 % (ref 14–44)
MAGNESIUM SERPL-MCNC: 1.6 MG/DL (ref 1.6–2.6)
MCH RBC QN AUTO: 28 PG (ref 26.8–34.3)
MCHC RBC AUTO-ENTMCNC: 30.8 G/DL (ref 31.4–37.4)
MCV RBC AUTO: 91 FL (ref 82–98)
MONOCYTES # BLD AUTO: 0.69 THOUSAND/ΜL (ref 0.17–1.22)
MONOCYTES NFR BLD AUTO: 16 % (ref 4–12)
NEUTROPHILS # BLD AUTO: 1.27 THOUSANDS/ΜL (ref 1.85–7.62)
NEUTS SEG NFR BLD AUTO: 30 % (ref 43–75)
NRBC BLD AUTO-RTO: 0 /100 WBCS
PLATELET # BLD AUTO: 354 THOUSANDS/UL (ref 149–390)
PMV BLD AUTO: 10 FL (ref 8.9–12.7)
POTASSIUM SERPL-SCNC: 3.4 MMOL/L (ref 3.5–5.3)
PROT SERPL-MCNC: 6.6 G/DL (ref 6.4–8.2)
RBC # BLD AUTO: 3.43 MILLION/UL (ref 3.81–5.12)
SODIUM SERPL-SCNC: 136 MMOL/L (ref 136–145)
WBC # BLD AUTO: 4.2 THOUSAND/UL (ref 4.31–10.16)

## 2021-10-29 PROCEDURE — 80053 COMPREHEN METABOLIC PANEL: CPT

## 2021-10-29 PROCEDURE — 85025 COMPLETE CBC W/AUTO DIFF WBC: CPT

## 2021-10-29 PROCEDURE — 83735 ASSAY OF MAGNESIUM: CPT

## 2021-11-01 PROBLEM — E46 UNSPECIFIED PROTEIN-CALORIE MALNUTRITION (HCC): Status: ACTIVE | Noted: 2021-08-10

## 2021-11-02 ENCOUNTER — CONSULT (OUTPATIENT)
Dept: PALLIATIVE MEDICINE | Facility: CLINIC | Age: 71
End: 2021-11-02
Payer: COMMERCIAL

## 2021-11-02 VITALS
BODY MASS INDEX: 31.61 KG/M2 | TEMPERATURE: 96.6 F | DIASTOLIC BLOOD PRESSURE: 63 MMHG | OXYGEN SATURATION: 96 % | RESPIRATION RATE: 18 BRPM | HEART RATE: 105 BPM | SYSTOLIC BLOOD PRESSURE: 130 MMHG | WEIGHT: 156.6 LBS

## 2021-11-02 DIAGNOSIS — F41.9 ANXIOUSNESS: ICD-10-CM

## 2021-11-02 DIAGNOSIS — G89.18 JOINT PAIN FOLLOWING CHEMOTHERAPY: ICD-10-CM

## 2021-11-02 DIAGNOSIS — R63.4 UNINTENTIONAL WEIGHT LOSS: ICD-10-CM

## 2021-11-02 DIAGNOSIS — C56.3 MALIGNANT NEOPLASM OF BOTH OVARIES (HCC): Primary | ICD-10-CM

## 2021-11-02 DIAGNOSIS — R11.0 NAUSEA: ICD-10-CM

## 2021-11-02 DIAGNOSIS — Z51.5 PALLIATIVE CARE PATIENT: ICD-10-CM

## 2021-11-02 DIAGNOSIS — M25.50 JOINT PAIN FOLLOWING CHEMOTHERAPY: ICD-10-CM

## 2021-11-02 DIAGNOSIS — G89.3 CANCER RELATED PAIN: ICD-10-CM

## 2021-11-02 DIAGNOSIS — Z71.89 GOALS OF CARE, COUNSELING/DISCUSSION: ICD-10-CM

## 2021-11-02 DIAGNOSIS — F11.90 OPIOID USE: ICD-10-CM

## 2021-11-02 DIAGNOSIS — E46 PROTEIN-CALORIE MALNUTRITION, UNSPECIFIED SEVERITY (HCC): ICD-10-CM

## 2021-11-02 DIAGNOSIS — G47.01 INSOMNIA DUE TO MEDICAL CONDITION: ICD-10-CM

## 2021-11-02 PROCEDURE — 99215 OFFICE O/P EST HI 40 MIN: CPT | Performed by: INTERNAL MEDICINE

## 2021-11-02 RX ORDER — NALOXONE HYDROCHLORIDE 4 MG/.1ML
1 SPRAY NASAL
Qty: 1 EACH | Refills: 1 | Status: SHIPPED | OUTPATIENT
Start: 2021-11-02

## 2021-11-02 RX ORDER — OXYCODONE HYDROCHLORIDE 5 MG/1
2.5-5 TABLET ORAL EVERY 4 HOURS PRN
Qty: 30 TABLET | Refills: 0 | Status: SHIPPED | OUTPATIENT
Start: 2021-11-02 | End: 2021-12-07 | Stop reason: SDUPTHER

## 2021-11-02 RX ORDER — LORAZEPAM 1 MG/1
0.5 TABLET ORAL EVERY 8 HOURS PRN
Qty: 45 TABLET | Refills: 0 | Status: SHIPPED | OUTPATIENT
Start: 2021-11-02 | End: 2022-03-15 | Stop reason: SDUPTHER

## 2021-11-03 ENCOUNTER — OFFICE VISIT (OUTPATIENT)
Dept: GYNECOLOGIC ONCOLOGY | Facility: CLINIC | Age: 71
End: 2021-11-03
Payer: COMMERCIAL

## 2021-11-03 VITALS
RESPIRATION RATE: 16 BRPM | HEART RATE: 95 BPM | BODY MASS INDEX: 31.45 KG/M2 | SYSTOLIC BLOOD PRESSURE: 120 MMHG | DIASTOLIC BLOOD PRESSURE: 80 MMHG | WEIGHT: 156 LBS | OXYGEN SATURATION: 97 % | TEMPERATURE: 97.3 F | HEIGHT: 59 IN

## 2021-11-03 DIAGNOSIS — M25.50 JOINT PAIN FOLLOWING CHEMOTHERAPY: ICD-10-CM

## 2021-11-03 DIAGNOSIS — G47.01 INSOMNIA DUE TO MEDICAL CONDITION: ICD-10-CM

## 2021-11-03 DIAGNOSIS — C56.3 MALIGNANT NEOPLASM OF BOTH OVARIES (HCC): Primary | ICD-10-CM

## 2021-11-03 DIAGNOSIS — G89.18 JOINT PAIN FOLLOWING CHEMOTHERAPY: ICD-10-CM

## 2021-11-03 PROBLEM — J45.909 ASTHMA: Status: RESOLVED | Noted: 2021-07-16 | Resolved: 2021-11-03

## 2021-11-03 PROBLEM — D72.829 LEUKOCYTOSIS: Status: RESOLVED | Noted: 2021-08-08 | Resolved: 2021-11-03

## 2021-11-03 PROCEDURE — 99213 OFFICE O/P EST LOW 20 MIN: CPT | Performed by: OBSTETRICS & GYNECOLOGY

## 2021-11-05 ENCOUNTER — HOSPITAL ENCOUNTER (OUTPATIENT)
Dept: INFUSION CENTER | Facility: HOSPITAL | Age: 71
Discharge: HOME | End: 2021-11-05
Payer: COMMERCIAL

## 2021-11-05 VITALS — TEMPERATURE: 97.1 F

## 2021-11-05 DIAGNOSIS — Z45.2 ENCOUNTER FOR CENTRAL LINE CARE: ICD-10-CM

## 2021-11-05 DIAGNOSIS — C56.3 MALIGNANT NEOPLASM OF BOTH OVARIES (HCC): Primary | ICD-10-CM

## 2021-11-05 LAB
ALBUMIN SERPL BCP-MCNC: 3.2 G/DL (ref 3.5–5)
ALP SERPL-CCNC: 145 U/L (ref 46–116)
ALT SERPL W P-5'-P-CCNC: 18 U/L (ref 12–78)
ANION GAP SERPL CALCULATED.3IONS-SCNC: 7 MMOL/L (ref 4–13)
AST SERPL W P-5'-P-CCNC: 12 U/L (ref 5–45)
BASOPHILS # BLD AUTO: 0.04 THOUSANDS/ΜL (ref 0–0.1)
BASOPHILS NFR BLD AUTO: 1 % (ref 0–1)
BILIRUB SERPL-MCNC: 0.17 MG/DL (ref 0.2–1)
BUN SERPL-MCNC: 18 MG/DL (ref 5–25)
CALCIUM ALBUM COR SERPL-MCNC: 9.7 MG/DL (ref 8.3–10.1)
CALCIUM SERPL-MCNC: 9.1 MG/DL (ref 8.3–10.1)
CANCER AG125 SERPL-ACNC: 19.4 U/ML (ref 0–30)
CHLORIDE SERPL-SCNC: 104 MMOL/L (ref 100–108)
CO2 SERPL-SCNC: 27 MMOL/L (ref 21–32)
CREAT SERPL-MCNC: 0.57 MG/DL (ref 0.6–1.3)
CREAT UR-MCNC: 32.5 MG/DL
EOSINOPHIL # BLD AUTO: 0.09 THOUSAND/ΜL (ref 0–0.61)
EOSINOPHIL NFR BLD AUTO: 2 % (ref 0–6)
ERYTHROCYTE [DISTWIDTH] IN BLOOD BY AUTOMATED COUNT: 17.7 % (ref 11.6–15.1)
GFR SERPL CREATININE-BSD FRML MDRD: 94 ML/MIN/1.73SQ M
GLUCOSE SERPL-MCNC: 93 MG/DL (ref 65–140)
HCT VFR BLD AUTO: 31.7 % (ref 34.8–46.1)
HGB BLD-MCNC: 9.9 G/DL (ref 11.5–15.4)
IMM GRANULOCYTES # BLD AUTO: 0.03 THOUSAND/UL (ref 0–0.2)
IMM GRANULOCYTES NFR BLD AUTO: 1 % (ref 0–2)
LYMPHOCYTES # BLD AUTO: 2.99 THOUSANDS/ΜL (ref 0.6–4.47)
LYMPHOCYTES NFR BLD AUTO: 54 % (ref 14–44)
MAGNESIUM SERPL-MCNC: 1.7 MG/DL (ref 1.6–2.6)
MCH RBC QN AUTO: 28.3 PG (ref 26.8–34.3)
MCHC RBC AUTO-ENTMCNC: 31.2 G/DL (ref 31.4–37.4)
MCV RBC AUTO: 91 FL (ref 82–98)
MONOCYTES # BLD AUTO: 1.16 THOUSAND/ΜL (ref 0.17–1.22)
MONOCYTES NFR BLD AUTO: 22 % (ref 4–12)
NEUTROPHILS # BLD AUTO: 1.06 THOUSANDS/ΜL (ref 1.85–7.62)
NEUTS SEG NFR BLD AUTO: 20 % (ref 43–75)
NRBC BLD AUTO-RTO: 0 /100 WBCS
PLATELET # BLD AUTO: 433 THOUSANDS/UL (ref 149–390)
PMV BLD AUTO: 9.4 FL (ref 8.9–12.7)
POTASSIUM SERPL-SCNC: 3.7 MMOL/L (ref 3.5–5.3)
PROT SERPL-MCNC: 6.8 G/DL (ref 6.4–8.2)
PROT UR-MCNC: 6 MG/DL
PROT/CREAT UR: 0.18 MG/G{CREAT} (ref 0–0.1)
RBC # BLD AUTO: 3.5 MILLION/UL (ref 3.81–5.12)
SODIUM SERPL-SCNC: 138 MMOL/L (ref 136–145)
WBC # BLD AUTO: 5.37 THOUSAND/UL (ref 4.31–10.16)

## 2021-11-05 PROCEDURE — 80053 COMPREHEN METABOLIC PANEL: CPT

## 2021-11-05 PROCEDURE — 83735 ASSAY OF MAGNESIUM: CPT

## 2021-11-05 PROCEDURE — 85025 COMPLETE CBC W/AUTO DIFF WBC: CPT

## 2021-11-05 PROCEDURE — 84156 ASSAY OF PROTEIN URINE: CPT

## 2021-11-05 PROCEDURE — 82570 ASSAY OF URINE CREATININE: CPT

## 2021-11-05 PROCEDURE — 86304 IMMUNOASSAY TUMOR CA 125: CPT

## 2021-11-08 ENCOUNTER — TELEPHONE (OUTPATIENT)
Dept: SURGICAL ONCOLOGY | Facility: CLINIC | Age: 71
End: 2021-11-08

## 2021-11-08 DIAGNOSIS — C56.3 MALIGNANT NEOPLASM OF BOTH OVARIES (HCC): Primary | ICD-10-CM

## 2021-11-08 RX ORDER — SODIUM CHLORIDE 9 MG/ML
20 INJECTION, SOLUTION INTRAVENOUS ONCE
Status: CANCELLED | OUTPATIENT
Start: 2021-11-09

## 2021-11-08 RX ORDER — PALONOSETRON 0.05 MG/ML
0.25 INJECTION, SOLUTION INTRAVENOUS ONCE
Status: CANCELLED | OUTPATIENT
Start: 2021-11-09

## 2021-11-09 ENCOUNTER — HOSPITAL ENCOUNTER (OUTPATIENT)
Dept: INFUSION CENTER | Facility: HOSPITAL | Age: 71
Discharge: HOME | End: 2021-11-09
Payer: COMMERCIAL

## 2021-11-09 VITALS
WEIGHT: 157.85 LBS | TEMPERATURE: 98.1 F | HEIGHT: 59 IN | BODY MASS INDEX: 31.82 KG/M2 | RESPIRATION RATE: 18 BRPM | SYSTOLIC BLOOD PRESSURE: 127 MMHG | DIASTOLIC BLOOD PRESSURE: 66 MMHG | OXYGEN SATURATION: 97 % | HEART RATE: 97 BPM

## 2021-11-09 DIAGNOSIS — C56.3 MALIGNANT NEOPLASM OF BOTH OVARIES (HCC): Primary | ICD-10-CM

## 2021-11-09 LAB
BASOPHILS # BLD AUTO: 0.05 THOUSANDS/ΜL (ref 0–0.1)
BASOPHILS NFR BLD AUTO: 1 % (ref 0–1)
EOSINOPHIL # BLD AUTO: 0.09 THOUSAND/ΜL (ref 0–0.61)
EOSINOPHIL NFR BLD AUTO: 1 % (ref 0–6)
ERYTHROCYTE [DISTWIDTH] IN BLOOD BY AUTOMATED COUNT: 18 % (ref 11.6–15.1)
HCT VFR BLD AUTO: 33.7 % (ref 34.8–46.1)
HGB BLD-MCNC: 10.6 G/DL (ref 11.5–15.4)
IMM GRANULOCYTES # BLD AUTO: 0.04 THOUSAND/UL (ref 0–0.2)
IMM GRANULOCYTES NFR BLD AUTO: 1 % (ref 0–2)
LYMPHOCYTES # BLD AUTO: 3.14 THOUSANDS/ΜL (ref 0.6–4.47)
LYMPHOCYTES NFR BLD AUTO: 44 % (ref 14–44)
MCH RBC QN AUTO: 28.9 PG (ref 26.8–34.3)
MCHC RBC AUTO-ENTMCNC: 31.5 G/DL (ref 31.4–37.4)
MCV RBC AUTO: 92 FL (ref 82–98)
MONOCYTES # BLD AUTO: 1.09 THOUSAND/ΜL (ref 0.17–1.22)
MONOCYTES NFR BLD AUTO: 15 % (ref 4–12)
NEUTROPHILS # BLD AUTO: 2.73 THOUSANDS/ΜL (ref 1.85–7.62)
NEUTS SEG NFR BLD AUTO: 38 % (ref 43–75)
NRBC BLD AUTO-RTO: 0 /100 WBCS
PLATELET # BLD AUTO: 360 THOUSANDS/UL (ref 149–390)
PMV BLD AUTO: 8.9 FL (ref 8.9–12.7)
RBC # BLD AUTO: 3.67 MILLION/UL (ref 3.81–5.12)
WBC # BLD AUTO: 7.14 THOUSAND/UL (ref 4.31–10.16)

## 2021-11-09 PROCEDURE — 96417 CHEMO IV INFUS EACH ADDL SEQ: CPT

## 2021-11-09 PROCEDURE — 85025 COMPLETE CBC W/AUTO DIFF WBC: CPT | Performed by: OBSTETRICS & GYNECOLOGY

## 2021-11-09 PROCEDURE — 96375 TX/PRO/DX INJ NEW DRUG ADDON: CPT

## 2021-11-09 PROCEDURE — 96367 TX/PROPH/DG ADDL SEQ IV INF: CPT

## 2021-11-09 PROCEDURE — 96413 CHEMO IV INFUSION 1 HR: CPT

## 2021-11-09 PROCEDURE — 96415 CHEMO IV INFUSION ADDL HR: CPT

## 2021-11-09 RX ORDER — SODIUM CHLORIDE 9 MG/ML
20 INJECTION, SOLUTION INTRAVENOUS ONCE
Status: COMPLETED | OUTPATIENT
Start: 2021-11-09 | End: 2021-11-09

## 2021-11-09 RX ORDER — PALONOSETRON 0.05 MG/ML
0.25 INJECTION, SOLUTION INTRAVENOUS ONCE
Status: COMPLETED | OUTPATIENT
Start: 2021-11-09 | End: 2021-11-09

## 2021-11-09 RX ADMIN — PALONOSETRON 0.25 MG: 0.05 INJECTION, SOLUTION INTRAVENOUS at 09:35

## 2021-11-09 RX ADMIN — FOSAPREPITANT 150 MG: 150 INJECTION, POWDER, LYOPHILIZED, FOR SOLUTION INTRAVENOUS at 09:36

## 2021-11-09 RX ADMIN — PACLITAXEL 295.8 MG: 6 INJECTION, SOLUTION, CONCENTRATE INTRAVENOUS at 10:17

## 2021-11-09 RX ADMIN — FAMOTIDINE 20 MG: 10 INJECTION, SOLUTION INTRAVENOUS at 09:12

## 2021-11-09 RX ADMIN — BEVACIZUMAB-AWWB 1100 MG: 400 INJECTION, SOLUTION INTRAVENOUS at 14:29

## 2021-11-09 RX ADMIN — DEXAMETHASONE SODIUM PHOSPHATE 20 MG: 10 INJECTION, SOLUTION INTRAMUSCULAR; INTRAVENOUS at 08:24

## 2021-11-09 RX ADMIN — DIPHENHYDRAMINE HYDROCHLORIDE 25 MG: 50 INJECTION, SOLUTION INTRAMUSCULAR; INTRAVENOUS at 08:47

## 2021-11-09 RX ADMIN — SODIUM CHLORIDE 20 ML/HR: 0.9 INJECTION, SOLUTION INTRAVENOUS at 08:24

## 2021-11-09 RX ADMIN — CARBOPLATIN 472 MG: 10 INJECTION, SOLUTION INTRAVENOUS at 13:19

## 2021-11-12 ENCOUNTER — HOSPITAL ENCOUNTER (OUTPATIENT)
Dept: INFUSION CENTER | Facility: HOSPITAL | Age: 71
Discharge: HOME | End: 2021-11-12
Payer: COMMERCIAL

## 2021-11-12 VITALS — TEMPERATURE: 97.8 F

## 2021-11-12 DIAGNOSIS — Z45.2 ENCOUNTER FOR CENTRAL LINE CARE: Primary | ICD-10-CM

## 2021-11-12 DIAGNOSIS — C56.3 MALIGNANT NEOPLASM OF BOTH OVARIES (HCC): ICD-10-CM

## 2021-11-12 LAB
ALBUMIN SERPL BCP-MCNC: 3.3 G/DL (ref 3.5–5)
ALP SERPL-CCNC: 107 U/L (ref 46–116)
ALT SERPL W P-5'-P-CCNC: 27 U/L (ref 12–78)
ANION GAP SERPL CALCULATED.3IONS-SCNC: 10 MMOL/L (ref 4–13)
AST SERPL W P-5'-P-CCNC: 15 U/L (ref 5–45)
BASOPHILS # BLD AUTO: 0.03 THOUSANDS/ΜL (ref 0–0.1)
BASOPHILS NFR BLD AUTO: 1 % (ref 0–1)
BILIRUB SERPL-MCNC: 0.32 MG/DL (ref 0.2–1)
BUN SERPL-MCNC: 17 MG/DL (ref 5–25)
CALCIUM ALBUM COR SERPL-MCNC: 9.2 MG/DL (ref 8.3–10.1)
CALCIUM SERPL-MCNC: 8.6 MG/DL (ref 8.3–10.1)
CHLORIDE SERPL-SCNC: 101 MMOL/L (ref 100–108)
CO2 SERPL-SCNC: 24 MMOL/L (ref 21–32)
CREAT SERPL-MCNC: 0.56 MG/DL (ref 0.6–1.3)
EOSINOPHIL # BLD AUTO: 0.04 THOUSAND/ΜL (ref 0–0.61)
EOSINOPHIL NFR BLD AUTO: 1 % (ref 0–6)
ERYTHROCYTE [DISTWIDTH] IN BLOOD BY AUTOMATED COUNT: 18.1 % (ref 11.6–15.1)
GFR SERPL CREATININE-BSD FRML MDRD: 95 ML/MIN/1.73SQ M
GLUCOSE SERPL-MCNC: 132 MG/DL (ref 65–140)
HCT VFR BLD AUTO: 32.7 % (ref 34.8–46.1)
HGB BLD-MCNC: 10.3 G/DL (ref 11.5–15.4)
IMM GRANULOCYTES # BLD AUTO: 0.01 THOUSAND/UL (ref 0–0.2)
IMM GRANULOCYTES NFR BLD AUTO: 0 % (ref 0–2)
LYMPHOCYTES # BLD AUTO: 2.69 THOUSANDS/ΜL (ref 0.6–4.47)
LYMPHOCYTES NFR BLD AUTO: 62 % (ref 14–44)
MAGNESIUM SERPL-MCNC: 1.7 MG/DL (ref 1.6–2.6)
MCH RBC QN AUTO: 28.9 PG (ref 26.8–34.3)
MCHC RBC AUTO-ENTMCNC: 31.5 G/DL (ref 31.4–37.4)
MCV RBC AUTO: 92 FL (ref 82–98)
MONOCYTES # BLD AUTO: 0.27 THOUSAND/ΜL (ref 0.17–1.22)
MONOCYTES NFR BLD AUTO: 6 % (ref 4–12)
NEUTROPHILS # BLD AUTO: 1.28 THOUSANDS/ΜL (ref 1.85–7.62)
NEUTS SEG NFR BLD AUTO: 30 % (ref 43–75)
NRBC BLD AUTO-RTO: 1 /100 WBCS
PLATELET # BLD AUTO: 282 THOUSANDS/UL (ref 149–390)
PMV BLD AUTO: 10.3 FL (ref 8.9–12.7)
POTASSIUM SERPL-SCNC: 3.4 MMOL/L (ref 3.5–5.3)
PROT SERPL-MCNC: 6.7 G/DL (ref 6.4–8.2)
RBC # BLD AUTO: 3.57 MILLION/UL (ref 3.81–5.12)
SODIUM SERPL-SCNC: 135 MMOL/L (ref 136–145)
WBC # BLD AUTO: 4.32 THOUSAND/UL (ref 4.31–10.16)

## 2021-11-12 PROCEDURE — 85025 COMPLETE CBC W/AUTO DIFF WBC: CPT

## 2021-11-12 PROCEDURE — 80053 COMPREHEN METABOLIC PANEL: CPT

## 2021-11-12 PROCEDURE — 83735 ASSAY OF MAGNESIUM: CPT

## 2021-11-15 ENCOUNTER — TELEPHONE (OUTPATIENT)
Dept: PALLIATIVE MEDICINE | Facility: CLINIC | Age: 71
End: 2021-11-15

## 2021-11-19 ENCOUNTER — DOCUMENTATION (OUTPATIENT)
Dept: GENETICS | Facility: CLINIC | Age: 71
End: 2021-11-19

## 2021-11-19 ENCOUNTER — CLINICAL SUPPORT (OUTPATIENT)
Dept: GENETICS | Facility: CLINIC | Age: 71
End: 2021-11-19

## 2021-11-19 ENCOUNTER — HOSPITAL ENCOUNTER (OUTPATIENT)
Dept: INFUSION CENTER | Facility: HOSPITAL | Age: 71
Discharge: HOME | End: 2021-11-19
Payer: COMMERCIAL

## 2021-11-19 DIAGNOSIS — C56.3 MALIGNANT NEOPLASM OF BOTH OVARIES (HCC): Primary | ICD-10-CM

## 2021-11-19 DIAGNOSIS — Z45.2 ENCOUNTER FOR CENTRAL LINE CARE: ICD-10-CM

## 2021-11-19 DIAGNOSIS — Z80.0 FAMILY HISTORY OF COLON CANCER: ICD-10-CM

## 2021-11-19 LAB
ALBUMIN SERPL BCP-MCNC: 3.3 G/DL (ref 3.5–5)
ALP SERPL-CCNC: 109 U/L (ref 46–116)
ALT SERPL W P-5'-P-CCNC: 23 U/L (ref 12–78)
ANION GAP SERPL CALCULATED.3IONS-SCNC: 13 MMOL/L (ref 4–13)
AST SERPL W P-5'-P-CCNC: 13 U/L (ref 5–45)
BASOPHILS # BLD AUTO: 0.03 THOUSANDS/ΜL (ref 0–0.1)
BASOPHILS NFR BLD AUTO: 1 % (ref 0–1)
BILIRUB SERPL-MCNC: 0.16 MG/DL (ref 0.2–1)
BUN SERPL-MCNC: 20 MG/DL (ref 5–25)
CALCIUM ALBUM COR SERPL-MCNC: 9.3 MG/DL (ref 8.3–10.1)
CALCIUM SERPL-MCNC: 8.7 MG/DL (ref 8.3–10.1)
CHLORIDE SERPL-SCNC: 103 MMOL/L (ref 100–108)
CO2 SERPL-SCNC: 22 MMOL/L (ref 21–32)
CREAT SERPL-MCNC: 0.68 MG/DL (ref 0.6–1.3)
EOSINOPHIL # BLD AUTO: 0.03 THOUSAND/ΜL (ref 0–0.61)
EOSINOPHIL NFR BLD AUTO: 1 % (ref 0–6)
ERYTHROCYTE [DISTWIDTH] IN BLOOD BY AUTOMATED COUNT: 18.3 % (ref 11.6–15.1)
GFR SERPL CREATININE-BSD FRML MDRD: 89 ML/MIN/1.73SQ M
GLUCOSE SERPL-MCNC: 135 MG/DL (ref 65–140)
HCT VFR BLD AUTO: 32.6 % (ref 34.8–46.1)
HGB BLD-MCNC: 10.3 G/DL (ref 11.5–15.4)
IMM GRANULOCYTES # BLD AUTO: 0.01 THOUSAND/UL (ref 0–0.2)
IMM GRANULOCYTES NFR BLD AUTO: 0 % (ref 0–2)
LYMPHOCYTES # BLD AUTO: 2.47 THOUSANDS/ΜL (ref 0.6–4.47)
LYMPHOCYTES NFR BLD AUTO: 62 % (ref 14–44)
MAGNESIUM SERPL-MCNC: 1.7 MG/DL (ref 1.6–2.6)
MCH RBC QN AUTO: 29 PG (ref 26.8–34.3)
MCHC RBC AUTO-ENTMCNC: 31.6 G/DL (ref 31.4–37.4)
MCV RBC AUTO: 92 FL (ref 82–98)
MONOCYTES # BLD AUTO: 0.62 THOUSAND/ΜL (ref 0.17–1.22)
MONOCYTES NFR BLD AUTO: 16 % (ref 4–12)
NEUTROPHILS # BLD AUTO: 0.81 THOUSANDS/ΜL (ref 1.85–7.62)
NEUTS SEG NFR BLD AUTO: 20 % (ref 43–75)
NRBC BLD AUTO-RTO: 0 /100 WBCS
PLATELET # BLD AUTO: 343 THOUSANDS/UL (ref 149–390)
PMV BLD AUTO: 10 FL (ref 8.9–12.7)
POTASSIUM SERPL-SCNC: 3.4 MMOL/L (ref 3.5–5.3)
PROT SERPL-MCNC: 6.7 G/DL (ref 6.4–8.2)
RBC # BLD AUTO: 3.55 MILLION/UL (ref 3.81–5.12)
SODIUM SERPL-SCNC: 138 MMOL/L (ref 136–145)
WBC # BLD AUTO: 3.97 THOUSAND/UL (ref 4.31–10.16)

## 2021-11-19 PROCEDURE — NC001 PR NO CHARGE: Performed by: GENETIC COUNSELOR, MS

## 2021-11-19 PROCEDURE — 80053 COMPREHEN METABOLIC PANEL: CPT

## 2021-11-19 PROCEDURE — 83735 ASSAY OF MAGNESIUM: CPT

## 2021-11-19 PROCEDURE — 85025 COMPLETE CBC W/AUTO DIFF WBC: CPT

## 2021-11-26 ENCOUNTER — HOSPITAL ENCOUNTER (OUTPATIENT)
Dept: INFUSION CENTER | Facility: HOSPITAL | Age: 71
Discharge: HOME | End: 2021-11-26
Payer: COMMERCIAL

## 2021-11-26 ENCOUNTER — OFFICE VISIT (OUTPATIENT)
Dept: GYNECOLOGIC ONCOLOGY | Facility: CLINIC | Age: 71
End: 2021-11-26
Payer: COMMERCIAL

## 2021-11-26 ENCOUNTER — APPOINTMENT (OUTPATIENT)
Dept: LAB | Facility: CLINIC | Age: 71
End: 2021-11-26
Payer: COMMERCIAL

## 2021-11-26 VITALS
TEMPERATURE: 98.1 F | BODY MASS INDEX: 32.05 KG/M2 | HEART RATE: 99 BPM | DIASTOLIC BLOOD PRESSURE: 72 MMHG | WEIGHT: 159 LBS | RESPIRATION RATE: 16 BRPM | SYSTOLIC BLOOD PRESSURE: 100 MMHG | HEIGHT: 59 IN

## 2021-11-26 VITALS — TEMPERATURE: 97.6 F

## 2021-11-26 DIAGNOSIS — C56.3 MALIGNANT NEOPLASM OF BOTH OVARIES (HCC): ICD-10-CM

## 2021-11-26 DIAGNOSIS — Z45.2 ENCOUNTER FOR CENTRAL LINE CARE: Primary | ICD-10-CM

## 2021-11-26 DIAGNOSIS — K59.03 DRUG INDUCED CONSTIPATION: ICD-10-CM

## 2021-11-26 DIAGNOSIS — C56.3 MALIGNANT NEOPLASM OF BOTH OVARIES (HCC): Primary | ICD-10-CM

## 2021-11-26 DIAGNOSIS — Z80.0 FAMILY HISTORY OF MALIGNANT NEOPLASM OF GASTROINTESTINAL TRACT: ICD-10-CM

## 2021-11-26 DIAGNOSIS — G89.3 CANCER RELATED PAIN: ICD-10-CM

## 2021-11-26 PROBLEM — K65.1 ABSCESS OF ABDOMINAL CAVITY (HCC): Status: RESOLVED | Noted: 2021-08-25 | Resolved: 2021-11-26

## 2021-11-26 PROBLEM — J90 PLEURAL EFFUSION: Status: RESOLVED | Noted: 2021-08-25 | Resolved: 2021-11-26

## 2021-11-26 PROBLEM — R63.4 UNINTENTIONAL WEIGHT LOSS: Status: RESOLVED | Noted: 2021-11-02 | Resolved: 2021-11-26

## 2021-11-26 PROBLEM — T81.31XA WOUND DEHISCENCE, SURGICAL: Status: RESOLVED | Noted: 2021-08-24 | Resolved: 2021-11-26

## 2021-11-26 LAB
ALBUMIN SERPL BCP-MCNC: 3.4 G/DL (ref 3.5–5)
ALP SERPL-CCNC: 107 U/L (ref 46–116)
ALT SERPL W P-5'-P-CCNC: 28 U/L (ref 12–78)
ANION GAP SERPL CALCULATED.3IONS-SCNC: 8 MMOL/L (ref 4–13)
AST SERPL W P-5'-P-CCNC: 16 U/L (ref 5–45)
BASOPHILS # BLD AUTO: 0.04 THOUSANDS/ΜL (ref 0–0.1)
BASOPHILS NFR BLD AUTO: 1 % (ref 0–1)
BILIRUB SERPL-MCNC: 0.15 MG/DL (ref 0.2–1)
BUN SERPL-MCNC: 17 MG/DL (ref 5–25)
CALCIUM ALBUM COR SERPL-MCNC: 9.4 MG/DL (ref 8.3–10.1)
CALCIUM SERPL-MCNC: 8.9 MG/DL (ref 8.3–10.1)
CANCER AG125 SERPL-ACNC: 11.6 U/ML (ref 0–30)
CHLORIDE SERPL-SCNC: 105 MMOL/L (ref 100–108)
CO2 SERPL-SCNC: 27 MMOL/L (ref 21–32)
CREAT SERPL-MCNC: 0.56 MG/DL (ref 0.6–1.3)
CREAT UR-MCNC: 31.5 MG/DL
EOSINOPHIL # BLD AUTO: 0.06 THOUSAND/ΜL (ref 0–0.61)
EOSINOPHIL NFR BLD AUTO: 1 % (ref 0–6)
ERYTHROCYTE [DISTWIDTH] IN BLOOD BY AUTOMATED COUNT: 19.2 % (ref 11.6–15.1)
GFR SERPL CREATININE-BSD FRML MDRD: 95 ML/MIN/1.73SQ M
GLUCOSE SERPL-MCNC: 90 MG/DL (ref 65–140)
HCT VFR BLD AUTO: 33.3 % (ref 34.8–46.1)
HGB BLD-MCNC: 10.6 G/DL (ref 11.5–15.4)
IMM GRANULOCYTES # BLD AUTO: 0.02 THOUSAND/UL (ref 0–0.2)
IMM GRANULOCYTES NFR BLD AUTO: 0 % (ref 0–2)
LYMPHOCYTES # BLD AUTO: 2.26 THOUSANDS/ΜL (ref 0.6–4.47)
LYMPHOCYTES NFR BLD AUTO: 50 % (ref 14–44)
MAGNESIUM SERPL-MCNC: 1.8 MG/DL (ref 1.6–2.6)
MCH RBC QN AUTO: 29.4 PG (ref 26.8–34.3)
MCHC RBC AUTO-ENTMCNC: 31.8 G/DL (ref 31.4–37.4)
MCV RBC AUTO: 93 FL (ref 82–98)
MONOCYTES # BLD AUTO: 1.16 THOUSAND/ΜL (ref 0.17–1.22)
MONOCYTES NFR BLD AUTO: 26 % (ref 4–12)
NEUTROPHILS # BLD AUTO: 0.99 THOUSANDS/ΜL (ref 1.85–7.62)
NEUTS SEG NFR BLD AUTO: 22 % (ref 43–75)
NRBC BLD AUTO-RTO: 0 /100 WBCS
PLATELET # BLD AUTO: 459 THOUSANDS/UL (ref 149–390)
PMV BLD AUTO: 9.1 FL (ref 8.9–12.7)
POTASSIUM SERPL-SCNC: 3.7 MMOL/L (ref 3.5–5.3)
PROT SERPL-MCNC: 6.7 G/DL (ref 6.4–8.2)
PROT UR-MCNC: <6 MG/DL
PROT/CREAT UR: <0.19 MG/G{CREAT} (ref 0–0.1)
RBC # BLD AUTO: 3.6 MILLION/UL (ref 3.81–5.12)
SODIUM SERPL-SCNC: 140 MMOL/L (ref 136–145)
WBC # BLD AUTO: 4.53 THOUSAND/UL (ref 4.31–10.16)

## 2021-11-26 PROCEDURE — 80053 COMPREHEN METABOLIC PANEL: CPT

## 2021-11-26 PROCEDURE — 83735 ASSAY OF MAGNESIUM: CPT

## 2021-11-26 PROCEDURE — 85025 COMPLETE CBC W/AUTO DIFF WBC: CPT

## 2021-11-26 PROCEDURE — 82570 ASSAY OF URINE CREATININE: CPT

## 2021-11-26 PROCEDURE — 86304 IMMUNOASSAY TUMOR CA 125: CPT

## 2021-11-26 PROCEDURE — 84156 ASSAY OF PROTEIN URINE: CPT

## 2021-11-26 PROCEDURE — 99213 OFFICE O/P EST LOW 20 MIN: CPT | Performed by: OBSTETRICS & GYNECOLOGY

## 2021-11-26 PROCEDURE — 36415 COLL VENOUS BLD VENIPUNCTURE: CPT

## 2021-11-29 DIAGNOSIS — C56.3 MALIGNANT NEOPLASM OF BOTH OVARIES (HCC): Primary | ICD-10-CM

## 2021-11-29 LAB — MISCELLANEOUS LAB TEST RESULT: NORMAL

## 2021-11-29 RX ORDER — PALONOSETRON 0.05 MG/ML
0.25 INJECTION, SOLUTION INTRAVENOUS ONCE
Status: CANCELLED | OUTPATIENT
Start: 2021-11-30

## 2021-11-29 RX ORDER — SODIUM CHLORIDE 9 MG/ML
20 INJECTION, SOLUTION INTRAVENOUS ONCE
Status: CANCELLED | OUTPATIENT
Start: 2021-11-30

## 2021-11-30 ENCOUNTER — HOSPITAL ENCOUNTER (OUTPATIENT)
Dept: INFUSION CENTER | Facility: HOSPITAL | Age: 71
Discharge: HOME | End: 2021-11-30
Payer: COMMERCIAL

## 2021-11-30 VITALS
BODY MASS INDEX: 32.36 KG/M2 | TEMPERATURE: 97.1 F | RESPIRATION RATE: 20 BRPM | OXYGEN SATURATION: 97 % | HEIGHT: 59 IN | WEIGHT: 160.5 LBS | DIASTOLIC BLOOD PRESSURE: 62 MMHG | SYSTOLIC BLOOD PRESSURE: 138 MMHG | HEART RATE: 95 BPM

## 2021-11-30 DIAGNOSIS — Z45.2 ENCOUNTER FOR CENTRAL LINE CARE: ICD-10-CM

## 2021-11-30 DIAGNOSIS — C56.3 MALIGNANT NEOPLASM OF BOTH OVARIES (HCC): Primary | ICD-10-CM

## 2021-11-30 LAB
ANISOCYTOSIS BLD QL SMEAR: PRESENT
BASOPHILS # BLD MANUAL: 0.15 THOUSAND/UL (ref 0–0.1)
BASOPHILS NFR MAR MANUAL: 2 % (ref 0–1)
EOSINOPHIL # BLD MANUAL: 0.15 THOUSAND/UL (ref 0–0.4)
EOSINOPHIL NFR BLD MANUAL: 2 % (ref 0–6)
ERYTHROCYTE [DISTWIDTH] IN BLOOD BY AUTOMATED COUNT: 19.2 % (ref 11.6–15.1)
HCT VFR BLD AUTO: 33.2 % (ref 34.8–46.1)
HGB BLD-MCNC: 10.8 G/DL (ref 11.5–15.4)
HOWELL-JOLLY BOD BLD QL SMEAR: PRESENT
LYMPHOCYTES # BLD AUTO: 4.07 THOUSAND/UL (ref 0.6–4.47)
LYMPHOCYTES # BLD AUTO: 56 % (ref 14–44)
MCH RBC QN AUTO: 30.2 PG (ref 26.8–34.3)
MCHC RBC AUTO-ENTMCNC: 32.5 G/DL (ref 31.4–37.4)
MCV RBC AUTO: 93 FL (ref 82–98)
MONOCYTES # BLD AUTO: 1.6 THOUSAND/UL (ref 0–1.22)
MONOCYTES NFR BLD: 22 % (ref 4–12)
NEUTROPHILS # BLD MANUAL: 1.09 THOUSAND/UL (ref 1.85–7.62)
NEUTS BAND NFR BLD MANUAL: 1 % (ref 0–8)
NEUTS SEG NFR BLD AUTO: 14 % (ref 43–75)
PLATELET # BLD AUTO: 336 THOUSANDS/UL (ref 149–390)
PLATELET BLD QL SMEAR: ADEQUATE
PMV BLD AUTO: 9.5 FL (ref 8.9–12.7)
RBC # BLD AUTO: 3.58 MILLION/UL (ref 3.81–5.12)
RBC MORPH BLD: PRESENT
SCHISTOCYTES BLD QL SMEAR: PRESENT
SMUDGE CELLS BLD QL SMEAR: PRESENT
VARIANT LYMPHS # BLD AUTO: 3 %
WBC # BLD AUTO: 7.27 THOUSAND/UL (ref 4.31–10.16)

## 2021-11-30 PROCEDURE — 85007 BL SMEAR W/DIFF WBC COUNT: CPT | Performed by: OBSTETRICS & GYNECOLOGY

## 2021-11-30 PROCEDURE — 96417 CHEMO IV INFUS EACH ADDL SEQ: CPT

## 2021-11-30 PROCEDURE — 96377 APPLICATON ON-BODY INJECTOR: CPT

## 2021-11-30 PROCEDURE — 96413 CHEMO IV INFUSION 1 HR: CPT

## 2021-11-30 PROCEDURE — 96367 TX/PROPH/DG ADDL SEQ IV INF: CPT

## 2021-11-30 PROCEDURE — 85027 COMPLETE CBC AUTOMATED: CPT | Performed by: OBSTETRICS & GYNECOLOGY

## 2021-11-30 PROCEDURE — 96415 CHEMO IV INFUSION ADDL HR: CPT

## 2021-11-30 PROCEDURE — 96375 TX/PRO/DX INJ NEW DRUG ADDON: CPT

## 2021-11-30 RX ORDER — SODIUM CHLORIDE 9 MG/ML
20 INJECTION, SOLUTION INTRAVENOUS ONCE
Status: COMPLETED | OUTPATIENT
Start: 2021-11-30 | End: 2021-11-30

## 2021-11-30 RX ORDER — PALONOSETRON 0.05 MG/ML
0.25 INJECTION, SOLUTION INTRAVENOUS ONCE
Status: COMPLETED | OUTPATIENT
Start: 2021-11-30 | End: 2021-11-30

## 2021-11-30 RX ADMIN — CARBOPLATIN 468 MG: 10 INJECTION, SOLUTION INTRAVENOUS at 13:42

## 2021-11-30 RX ADMIN — PEGFILGRASTIM 6 MG: KIT SUBCUTANEOUS at 16:09

## 2021-11-30 RX ADMIN — BEVACIZUMAB-AWWB 1100 MG: 400 INJECTION, SOLUTION INTRAVENOUS at 14:54

## 2021-11-30 RX ADMIN — PALONOSETRON 0.25 MG: 0.05 INJECTION, SOLUTION INTRAVENOUS at 09:38

## 2021-11-30 RX ADMIN — PACLITAXEL 292.2 MG: 6 INJECTION, SOLUTION, CONCENTRATE INTRAVENOUS at 10:36

## 2021-11-30 RX ADMIN — SODIUM CHLORIDE 20 ML/HR: 9 INJECTION, SOLUTION INTRAVENOUS at 08:24

## 2021-11-30 RX ADMIN — DIPHENHYDRAMINE HYDROCHLORIDE 25 MG: 50 INJECTION, SOLUTION INTRAMUSCULAR; INTRAVENOUS at 08:47

## 2021-11-30 RX ADMIN — FOSAPREPITANT 150 MG: 150 INJECTION, POWDER, LYOPHILIZED, FOR SOLUTION INTRAVENOUS at 10:00

## 2021-11-30 RX ADMIN — DEXAMETHASONE SODIUM PHOSPHATE 20 MG: 10 INJECTION, SOLUTION INTRAMUSCULAR; INTRAVENOUS at 08:25

## 2021-11-30 RX ADMIN — FAMOTIDINE 20 MG: 10 INJECTION, SOLUTION INTRAVENOUS at 09:16

## 2021-12-03 ENCOUNTER — HOSPITAL ENCOUNTER (OUTPATIENT)
Dept: INFUSION CENTER | Facility: HOSPITAL | Age: 71
Discharge: HOME | End: 2021-12-03
Payer: COMMERCIAL

## 2021-12-03 DIAGNOSIS — Z45.2 ENCOUNTER FOR CENTRAL LINE CARE: Primary | ICD-10-CM

## 2021-12-03 DIAGNOSIS — C56.3 MALIGNANT NEOPLASM OF BOTH OVARIES (HCC): ICD-10-CM

## 2021-12-03 LAB
ALBUMIN SERPL BCP-MCNC: 3.4 G/DL (ref 3.5–5)
ALP SERPL-CCNC: 123 U/L (ref 46–116)
ALT SERPL W P-5'-P-CCNC: 29 U/L (ref 12–78)
ANION GAP SERPL CALCULATED.3IONS-SCNC: 8 MMOL/L (ref 4–13)
ANISOCYTOSIS BLD QL SMEAR: PRESENT
AST SERPL W P-5'-P-CCNC: 19 U/L (ref 5–45)
BASOPHILS # BLD MANUAL: 0 THOUSAND/UL (ref 0–0.1)
BASOPHILS NFR MAR MANUAL: 0 % (ref 0–1)
BILIRUB SERPL-MCNC: 0.35 MG/DL (ref 0.2–1)
BUN SERPL-MCNC: 24 MG/DL (ref 5–25)
CALCIUM ALBUM COR SERPL-MCNC: 8.7 MG/DL (ref 8.3–10.1)
CALCIUM SERPL-MCNC: 8.2 MG/DL (ref 8.3–10.1)
CHLORIDE SERPL-SCNC: 106 MMOL/L (ref 100–108)
CO2 SERPL-SCNC: 27 MMOL/L (ref 21–32)
CREAT SERPL-MCNC: 0.63 MG/DL (ref 0.6–1.3)
EOSINOPHIL # BLD MANUAL: 0 THOUSAND/UL (ref 0–0.4)
EOSINOPHIL NFR BLD MANUAL: 0 % (ref 0–6)
ERYTHROCYTE [DISTWIDTH] IN BLOOD BY AUTOMATED COUNT: 20 % (ref 11.6–15.1)
GFR SERPL CREATININE-BSD FRML MDRD: 91 ML/MIN/1.73SQ M
GLUCOSE SERPL-MCNC: 96 MG/DL (ref 65–140)
HCT VFR BLD AUTO: 33.1 % (ref 34.8–46.1)
HGB BLD-MCNC: 10.7 G/DL (ref 11.5–15.4)
HOWELL-JOLLY BOD BLD QL SMEAR: PRESENT
LYMPHOCYTES # BLD AUTO: 10 % (ref 14–44)
LYMPHOCYTES # BLD AUTO: 2.76 THOUSAND/UL (ref 0.6–4.47)
MAGNESIUM SERPL-MCNC: 1.7 MG/DL (ref 1.6–2.6)
MCH RBC QN AUTO: 30.1 PG (ref 26.8–34.3)
MCHC RBC AUTO-ENTMCNC: 32.3 G/DL (ref 31.4–37.4)
MCV RBC AUTO: 93 FL (ref 82–98)
MONOCYTES # BLD AUTO: 0.55 THOUSAND/UL (ref 0–1.22)
MONOCYTES NFR BLD: 2 % (ref 4–12)
NEUTROPHILS # BLD MANUAL: 24.27 THOUSAND/UL (ref 1.85–7.62)
NEUTS BAND NFR BLD MANUAL: 8 % (ref 0–8)
NEUTS SEG NFR BLD AUTO: 80 % (ref 43–75)
PLATELET # BLD AUTO: 194 THOUSANDS/UL (ref 149–390)
PLATELET BLD QL SMEAR: ADEQUATE
PMV BLD AUTO: 11.2 FL (ref 8.9–12.7)
POTASSIUM SERPL-SCNC: 3.6 MMOL/L (ref 3.5–5.3)
PROT SERPL-MCNC: 6.6 G/DL (ref 6.4–8.2)
RBC # BLD AUTO: 3.55 MILLION/UL (ref 3.81–5.12)
RBC MORPH BLD: PRESENT
SCHISTOCYTES BLD QL SMEAR: PRESENT
SODIUM SERPL-SCNC: 141 MMOL/L (ref 136–145)
WBC # BLD AUTO: 27.58 THOUSAND/UL (ref 4.31–10.16)

## 2021-12-03 PROCEDURE — 80053 COMPREHEN METABOLIC PANEL: CPT

## 2021-12-03 PROCEDURE — 83735 ASSAY OF MAGNESIUM: CPT

## 2021-12-03 PROCEDURE — 85027 COMPLETE CBC AUTOMATED: CPT

## 2021-12-03 PROCEDURE — 85007 BL SMEAR W/DIFF WBC COUNT: CPT

## 2021-12-07 ENCOUNTER — OFFICE VISIT (OUTPATIENT)
Dept: PALLIATIVE MEDICINE | Facility: CLINIC | Age: 71
End: 2021-12-07
Payer: COMMERCIAL

## 2021-12-07 ENCOUNTER — TELEPHONE (OUTPATIENT)
Dept: HEMATOLOGY ONCOLOGY | Facility: CLINIC | Age: 71
End: 2021-12-07

## 2021-12-07 VITALS
SYSTOLIC BLOOD PRESSURE: 112 MMHG | BODY MASS INDEX: 31.89 KG/M2 | DIASTOLIC BLOOD PRESSURE: 62 MMHG | WEIGHT: 158 LBS | HEART RATE: 104 BPM | TEMPERATURE: 96.2 F | OXYGEN SATURATION: 97 % | RESPIRATION RATE: 18 BRPM

## 2021-12-07 DIAGNOSIS — C56.3 MALIGNANT NEOPLASM OF BOTH OVARIES (HCC): Primary | ICD-10-CM

## 2021-12-07 DIAGNOSIS — G47.01 INSOMNIA DUE TO MEDICAL CONDITION: ICD-10-CM

## 2021-12-07 DIAGNOSIS — R11.0 NAUSEA: ICD-10-CM

## 2021-12-07 DIAGNOSIS — Z51.5 PALLIATIVE CARE PATIENT: ICD-10-CM

## 2021-12-07 DIAGNOSIS — M25.50 JOINT PAIN FOLLOWING CHEMOTHERAPY: ICD-10-CM

## 2021-12-07 DIAGNOSIS — F41.9 ANXIOUSNESS: ICD-10-CM

## 2021-12-07 DIAGNOSIS — G89.3 CANCER RELATED PAIN: ICD-10-CM

## 2021-12-07 DIAGNOSIS — G89.18 JOINT PAIN FOLLOWING CHEMOTHERAPY: ICD-10-CM

## 2021-12-07 PROCEDURE — 99214 OFFICE O/P EST MOD 30 MIN: CPT | Performed by: INTERNAL MEDICINE

## 2021-12-07 RX ORDER — OXYCODONE HYDROCHLORIDE 5 MG/1
2.5-5 TABLET ORAL EVERY 4 HOURS PRN
Qty: 60 TABLET | Refills: 0 | Status: SHIPPED | OUTPATIENT
Start: 2021-12-07 | End: 2021-12-22 | Stop reason: SDUPTHER

## 2021-12-09 ENCOUNTER — TELEPHONE (OUTPATIENT)
Dept: GENETICS | Facility: CLINIC | Age: 71
End: 2021-12-09

## 2021-12-10 ENCOUNTER — HOSPITAL ENCOUNTER (OUTPATIENT)
Dept: INFUSION CENTER | Facility: HOSPITAL | Age: 71
Discharge: HOME | End: 2021-12-10
Payer: COMMERCIAL

## 2021-12-10 VITALS — TEMPERATURE: 97 F

## 2021-12-10 DIAGNOSIS — Z45.2 ENCOUNTER FOR CENTRAL LINE CARE: Primary | ICD-10-CM

## 2021-12-10 DIAGNOSIS — C56.3 MALIGNANT NEOPLASM OF BOTH OVARIES (HCC): ICD-10-CM

## 2021-12-10 LAB
ALBUMIN SERPL BCP-MCNC: 3.2 G/DL (ref 3.5–5)
ALP SERPL-CCNC: 160 U/L (ref 46–116)
ALT SERPL W P-5'-P-CCNC: 25 U/L (ref 12–78)
ANION GAP SERPL CALCULATED.3IONS-SCNC: 11 MMOL/L (ref 4–13)
AST SERPL W P-5'-P-CCNC: 13 U/L (ref 5–45)
BASOPHILS # BLD AUTO: 0.04 THOUSANDS/ΜL (ref 0–0.1)
BASOPHILS NFR BLD AUTO: 0 % (ref 0–1)
BILIRUB SERPL-MCNC: 0.12 MG/DL (ref 0.2–1)
BUN SERPL-MCNC: 16 MG/DL (ref 5–25)
CALCIUM ALBUM COR SERPL-MCNC: 9.2 MG/DL (ref 8.3–10.1)
CALCIUM SERPL-MCNC: 8.6 MG/DL (ref 8.3–10.1)
CHLORIDE SERPL-SCNC: 104 MMOL/L (ref 100–108)
CO2 SERPL-SCNC: 25 MMOL/L (ref 21–32)
CREAT SERPL-MCNC: 0.69 MG/DL (ref 0.6–1.3)
EOSINOPHIL # BLD AUTO: 0.04 THOUSAND/ΜL (ref 0–0.61)
EOSINOPHIL NFR BLD AUTO: 0 % (ref 0–6)
ERYTHROCYTE [DISTWIDTH] IN BLOOD BY AUTOMATED COUNT: 20.3 % (ref 11.6–15.1)
GFR SERPL CREATININE-BSD FRML MDRD: 88 ML/MIN/1.73SQ M
GLUCOSE SERPL-MCNC: 137 MG/DL (ref 65–140)
HCT VFR BLD AUTO: 32.8 % (ref 34.8–46.1)
HGB BLD-MCNC: 10.2 G/DL (ref 11.5–15.4)
IMM GRANULOCYTES # BLD AUTO: 0.28 THOUSAND/UL (ref 0–0.2)
IMM GRANULOCYTES NFR BLD AUTO: 2 % (ref 0–2)
LYMPHOCYTES # BLD AUTO: 4.43 THOUSANDS/ΜL (ref 0.6–4.47)
LYMPHOCYTES NFR BLD AUTO: 32 % (ref 14–44)
MAGNESIUM SERPL-MCNC: 1.6 MG/DL (ref 1.6–2.6)
MCH RBC QN AUTO: 29.3 PG (ref 26.8–34.3)
MCHC RBC AUTO-ENTMCNC: 31.1 G/DL (ref 31.4–37.4)
MCV RBC AUTO: 94 FL (ref 82–98)
MONOCYTES # BLD AUTO: 1.35 THOUSAND/ΜL (ref 0.17–1.22)
MONOCYTES NFR BLD AUTO: 10 % (ref 4–12)
NEUTROPHILS # BLD AUTO: 7.83 THOUSANDS/ΜL (ref 1.85–7.62)
NEUTS SEG NFR BLD AUTO: 56 % (ref 43–75)
NRBC BLD AUTO-RTO: 2 /100 WBCS
PLATELET # BLD AUTO: 222 THOUSANDS/UL (ref 149–390)
PMV BLD AUTO: 10.1 FL (ref 8.9–12.7)
POTASSIUM SERPL-SCNC: 3.6 MMOL/L (ref 3.5–5.3)
PROT SERPL-MCNC: 6.4 G/DL (ref 6.4–8.2)
RBC # BLD AUTO: 3.48 MILLION/UL (ref 3.81–5.12)
SODIUM SERPL-SCNC: 140 MMOL/L (ref 136–145)
WBC # BLD AUTO: 13.97 THOUSAND/UL (ref 4.31–10.16)

## 2021-12-10 PROCEDURE — 83735 ASSAY OF MAGNESIUM: CPT

## 2021-12-10 PROCEDURE — 80053 COMPREHEN METABOLIC PANEL: CPT

## 2021-12-10 PROCEDURE — 85025 COMPLETE CBC W/AUTO DIFF WBC: CPT

## 2021-12-13 ENCOUNTER — TELEPHONE (OUTPATIENT)
Dept: GENETICS | Facility: CLINIC | Age: 71
End: 2021-12-13

## 2021-12-14 ENCOUNTER — OFFICE VISIT (OUTPATIENT)
Dept: GYNECOLOGIC ONCOLOGY | Facility: CLINIC | Age: 71
End: 2021-12-14
Payer: COMMERCIAL

## 2021-12-14 VITALS
HEIGHT: 59 IN | SYSTOLIC BLOOD PRESSURE: 114 MMHG | RESPIRATION RATE: 17 BRPM | HEART RATE: 89 BPM | DIASTOLIC BLOOD PRESSURE: 64 MMHG | BODY MASS INDEX: 32.16 KG/M2 | WEIGHT: 159.5 LBS | OXYGEN SATURATION: 98 % | TEMPERATURE: 98.3 F

## 2021-12-14 DIAGNOSIS — C56.3 MALIGNANT NEOPLASM OF BOTH OVARIES (HCC): Primary | ICD-10-CM

## 2021-12-14 DIAGNOSIS — G89.3 CANCER RELATED PAIN: ICD-10-CM

## 2021-12-14 DIAGNOSIS — K59.03 DRUG INDUCED CONSTIPATION: ICD-10-CM

## 2021-12-14 PROCEDURE — 99213 OFFICE O/P EST LOW 20 MIN: CPT | Performed by: OBSTETRICS & GYNECOLOGY

## 2021-12-17 ENCOUNTER — HOSPITAL ENCOUNTER (OUTPATIENT)
Dept: INFUSION CENTER | Facility: HOSPITAL | Age: 71
Discharge: HOME | End: 2021-12-17
Payer: COMMERCIAL

## 2021-12-17 VITALS — TEMPERATURE: 98.5 F

## 2021-12-17 DIAGNOSIS — Z45.2 ENCOUNTER FOR CENTRAL LINE CARE: ICD-10-CM

## 2021-12-17 DIAGNOSIS — C56.3 MALIGNANT NEOPLASM OF BOTH OVARIES (HCC): Primary | ICD-10-CM

## 2021-12-17 LAB
ALBUMIN SERPL BCP-MCNC: 3.5 G/DL (ref 3.5–5)
ALP SERPL-CCNC: 151 U/L (ref 46–116)
ALT SERPL W P-5'-P-CCNC: 37 U/L (ref 12–78)
ANION GAP SERPL CALCULATED.3IONS-SCNC: 11 MMOL/L (ref 4–13)
AST SERPL W P-5'-P-CCNC: 15 U/L (ref 5–45)
BASOPHILS # BLD AUTO: 0.07 THOUSANDS/ΜL (ref 0–0.1)
BASOPHILS NFR BLD AUTO: 1 % (ref 0–1)
BILIRUB SERPL-MCNC: 0.15 MG/DL (ref 0.2–1)
BUN SERPL-MCNC: 17 MG/DL (ref 5–25)
CALCIUM SERPL-MCNC: 8.9 MG/DL (ref 8.3–10.1)
CANCER AG125 SERPL-ACNC: 12.5 U/ML (ref 0–30)
CHLORIDE SERPL-SCNC: 104 MMOL/L (ref 100–108)
CO2 SERPL-SCNC: 25 MMOL/L (ref 21–32)
CREAT SERPL-MCNC: 0.48 MG/DL (ref 0.6–1.3)
CREAT UR-MCNC: <13 MG/DL
EOSINOPHIL # BLD AUTO: 0.07 THOUSAND/ΜL (ref 0–0.61)
EOSINOPHIL NFR BLD AUTO: 1 % (ref 0–6)
ERYTHROCYTE [DISTWIDTH] IN BLOOD BY AUTOMATED COUNT: 20.9 % (ref 11.6–15.1)
GFR SERPL CREATININE-BSD FRML MDRD: 99 ML/MIN/1.73SQ M
GLUCOSE SERPL-MCNC: 84 MG/DL (ref 65–140)
HCT VFR BLD AUTO: 32.7 % (ref 34.8–46.1)
HGB BLD-MCNC: 10.4 G/DL (ref 11.5–15.4)
IMM GRANULOCYTES # BLD AUTO: 0.03 THOUSAND/UL (ref 0–0.2)
IMM GRANULOCYTES NFR BLD AUTO: 0 % (ref 0–2)
LYMPHOCYTES # BLD AUTO: 3.08 THOUSANDS/ΜL (ref 0.6–4.47)
LYMPHOCYTES NFR BLD AUTO: 37 % (ref 14–44)
MAGNESIUM SERPL-MCNC: 1.9 MG/DL (ref 1.6–2.6)
MCH RBC QN AUTO: 29.7 PG (ref 26.8–34.3)
MCHC RBC AUTO-ENTMCNC: 31.8 G/DL (ref 31.4–37.4)
MCV RBC AUTO: 93 FL (ref 82–98)
MONOCYTES # BLD AUTO: 1.47 THOUSAND/ΜL (ref 0.17–1.22)
MONOCYTES NFR BLD AUTO: 18 % (ref 4–12)
NEUTROPHILS # BLD AUTO: 3.55 THOUSANDS/ΜL (ref 1.85–7.62)
NEUTS SEG NFR BLD AUTO: 43 % (ref 43–75)
NRBC BLD AUTO-RTO: 0 /100 WBCS
PLATELET # BLD AUTO: 501 THOUSANDS/UL (ref 149–390)
PMV BLD AUTO: 9.6 FL (ref 8.9–12.7)
POTASSIUM SERPL-SCNC: 4.1 MMOL/L (ref 3.5–5.3)
PROT SERPL-MCNC: 6.8 G/DL (ref 6.4–8.2)
PROT UR-MCNC: <6 MG/DL
RBC # BLD AUTO: 3.5 MILLION/UL (ref 3.81–5.12)
SODIUM SERPL-SCNC: 140 MMOL/L (ref 136–145)
WBC # BLD AUTO: 8.27 THOUSAND/UL (ref 4.31–10.16)

## 2021-12-17 PROCEDURE — 82570 ASSAY OF URINE CREATININE: CPT

## 2021-12-17 PROCEDURE — 84156 ASSAY OF PROTEIN URINE: CPT

## 2021-12-17 PROCEDURE — 85025 COMPLETE CBC W/AUTO DIFF WBC: CPT

## 2021-12-17 PROCEDURE — 80053 COMPREHEN METABOLIC PANEL: CPT

## 2021-12-17 PROCEDURE — 83735 ASSAY OF MAGNESIUM: CPT

## 2021-12-17 PROCEDURE — 86304 IMMUNOASSAY TUMOR CA 125: CPT

## 2021-12-20 PROBLEM — D70.1 CHEMOTHERAPY INDUCED NEUTROPENIA (HCC): Status: ACTIVE | Noted: 2021-12-20

## 2021-12-20 PROBLEM — T45.1X5A CHEMOTHERAPY INDUCED NEUTROPENIA (HCC): Status: ACTIVE | Noted: 2021-12-20

## 2021-12-20 RX ORDER — PALONOSETRON 0.05 MG/ML
0.25 INJECTION, SOLUTION INTRAVENOUS ONCE
Status: CANCELLED | OUTPATIENT
Start: 2021-12-21

## 2021-12-20 RX ORDER — SODIUM CHLORIDE 9 MG/ML
20 INJECTION, SOLUTION INTRAVENOUS ONCE
Status: CANCELLED | OUTPATIENT
Start: 2021-12-21

## 2021-12-21 ENCOUNTER — HOSPITAL ENCOUNTER (OUTPATIENT)
Dept: INFUSION CENTER | Facility: HOSPITAL | Age: 71
Discharge: HOME | End: 2021-12-21
Payer: COMMERCIAL

## 2021-12-21 VITALS
SYSTOLIC BLOOD PRESSURE: 140 MMHG | HEIGHT: 59 IN | OXYGEN SATURATION: 99 % | WEIGHT: 162.48 LBS | DIASTOLIC BLOOD PRESSURE: 70 MMHG | HEART RATE: 105 BPM | BODY MASS INDEX: 32.76 KG/M2 | RESPIRATION RATE: 20 BRPM | TEMPERATURE: 97.3 F

## 2021-12-21 DIAGNOSIS — D70.1 CHEMOTHERAPY INDUCED NEUTROPENIA (HCC): Primary | ICD-10-CM

## 2021-12-21 DIAGNOSIS — T45.1X5A CHEMOTHERAPY INDUCED NEUTROPENIA (HCC): Primary | ICD-10-CM

## 2021-12-21 DIAGNOSIS — C56.3 MALIGNANT NEOPLASM OF BOTH OVARIES (HCC): ICD-10-CM

## 2021-12-21 PROCEDURE — 96367 TX/PROPH/DG ADDL SEQ IV INF: CPT

## 2021-12-21 PROCEDURE — 96377 APPLICATON ON-BODY INJECTOR: CPT

## 2021-12-21 PROCEDURE — 96415 CHEMO IV INFUSION ADDL HR: CPT

## 2021-12-21 PROCEDURE — 96413 CHEMO IV INFUSION 1 HR: CPT

## 2021-12-21 PROCEDURE — 96417 CHEMO IV INFUS EACH ADDL SEQ: CPT

## 2021-12-21 PROCEDURE — 96375 TX/PRO/DX INJ NEW DRUG ADDON: CPT

## 2021-12-21 RX ORDER — SODIUM CHLORIDE 9 MG/ML
20 INJECTION, SOLUTION INTRAVENOUS ONCE
Status: COMPLETED | OUTPATIENT
Start: 2021-12-21 | End: 2021-12-21

## 2021-12-21 RX ORDER — PALONOSETRON 0.05 MG/ML
0.25 INJECTION, SOLUTION INTRAVENOUS ONCE
Status: COMPLETED | OUTPATIENT
Start: 2021-12-21 | End: 2021-12-21

## 2021-12-21 RX ADMIN — PEGFILGRASTIM 6 MG: KIT SUBCUTANEOUS at 15:36

## 2021-12-21 RX ADMIN — FOSAPREPITANT 150 MG: 150 INJECTION, POWDER, LYOPHILIZED, FOR SOLUTION INTRAVENOUS at 10:00

## 2021-12-21 RX ADMIN — DIPHENHYDRAMINE HYDROCHLORIDE 25 MG: 50 INJECTION, SOLUTION INTRAMUSCULAR; INTRAVENOUS at 09:02

## 2021-12-21 RX ADMIN — PACLITAXEL 292.2 MG: 6 INJECTION, SOLUTION, CONCENTRATE INTRAVENOUS at 10:35

## 2021-12-21 RX ADMIN — BEVACIZUMAB-AWWB 1100 MG: 400 INJECTION, SOLUTION INTRAVENOUS at 14:55

## 2021-12-21 RX ADMIN — PALONOSETRON 0.25 MG: 0.25 INJECTION, SOLUTION INTRAVENOUS at 08:38

## 2021-12-21 RX ADMIN — DEXAMETHASONE SODIUM PHOSPHATE 20 MG: 10 INJECTION, SOLUTION INTRAMUSCULAR; INTRAVENOUS at 08:40

## 2021-12-21 RX ADMIN — FAMOTIDINE 20 MG: 10 INJECTION, SOLUTION INTRAVENOUS at 09:23

## 2021-12-21 RX ADMIN — CARBOPLATIN 468.5 MG: 10 INJECTION, SOLUTION INTRAVENOUS at 13:44

## 2021-12-21 RX ADMIN — SODIUM CHLORIDE 20 ML/HR: 0.9 INJECTION, SOLUTION INTRAVENOUS at 08:37

## 2021-12-22 DIAGNOSIS — G89.3 CANCER RELATED PAIN: ICD-10-CM

## 2021-12-22 DIAGNOSIS — Z51.5 PALLIATIVE CARE PATIENT: ICD-10-CM

## 2021-12-22 DIAGNOSIS — C56.3 MALIGNANT NEOPLASM OF BOTH OVARIES (HCC): ICD-10-CM

## 2021-12-22 RX ORDER — OXYCODONE HYDROCHLORIDE 5 MG/1
2.5-5 TABLET ORAL EVERY 4 HOURS PRN
Qty: 60 TABLET | Refills: 0 | Status: SHIPPED | OUTPATIENT
Start: 2021-12-22 | End: 2022-01-04 | Stop reason: SDUPTHER

## 2021-12-23 ENCOUNTER — TELEPHONE (OUTPATIENT)
Dept: GYNECOLOGIC ONCOLOGY | Facility: CLINIC | Age: 71
End: 2021-12-23

## 2021-12-23 ENCOUNTER — HOSPITAL ENCOUNTER (OUTPATIENT)
Dept: INFUSION CENTER | Facility: HOSPITAL | Age: 71
Discharge: HOME | End: 2021-12-23
Payer: COMMERCIAL

## 2021-12-23 VITALS — TEMPERATURE: 98.3 F

## 2021-12-23 DIAGNOSIS — C56.3 MALIGNANT NEOPLASM OF BOTH OVARIES (HCC): Primary | ICD-10-CM

## 2021-12-23 DIAGNOSIS — Z45.2 ENCOUNTER FOR CENTRAL LINE CARE: ICD-10-CM

## 2021-12-23 LAB
ALBUMIN SERPL BCP-MCNC: 3.5 G/DL (ref 3.5–5)
ALP SERPL-CCNC: 122 U/L (ref 46–116)
ALT SERPL W P-5'-P-CCNC: 33 U/L (ref 12–78)
ANION GAP SERPL CALCULATED.3IONS-SCNC: 8 MMOL/L (ref 4–13)
ANISOCYTOSIS BLD QL SMEAR: PRESENT
AST SERPL W P-5'-P-CCNC: 18 U/L (ref 5–45)
BASOPHILS # BLD MANUAL: 0 THOUSAND/UL (ref 0–0.1)
BASOPHILS NFR MAR MANUAL: 0 % (ref 0–1)
BILIRUB SERPL-MCNC: 0.33 MG/DL (ref 0.2–1)
BUN SERPL-MCNC: 23 MG/DL (ref 5–25)
CALCIUM SERPL-MCNC: 8.3 MG/DL (ref 8.3–10.1)
CHLORIDE SERPL-SCNC: 104 MMOL/L (ref 100–108)
CO2 SERPL-SCNC: 26 MMOL/L (ref 21–32)
CREAT SERPL-MCNC: 0.69 MG/DL (ref 0.6–1.3)
EOSINOPHIL # BLD MANUAL: 0 THOUSAND/UL (ref 0–0.4)
EOSINOPHIL NFR BLD MANUAL: 0 % (ref 0–6)
ERYTHROCYTE [DISTWIDTH] IN BLOOD BY AUTOMATED COUNT: 21.2 % (ref 11.6–15.1)
GFR SERPL CREATININE-BSD FRML MDRD: 88 ML/MIN/1.73SQ M
GLUCOSE SERPL-MCNC: 127 MG/DL (ref 65–140)
HCT VFR BLD AUTO: 31.7 % (ref 34.8–46.1)
HGB BLD-MCNC: 10.2 G/DL (ref 11.5–15.4)
LYMPHOCYTES # BLD AUTO: 0.56 THOUSAND/UL (ref 0.6–4.47)
LYMPHOCYTES # BLD AUTO: 1 % (ref 14–44)
MACROCYTES BLD QL AUTO: PRESENT
MAGNESIUM SERPL-MCNC: 1.7 MG/DL (ref 1.6–2.6)
MCH RBC QN AUTO: 30.4 PG (ref 26.8–34.3)
MCHC RBC AUTO-ENTMCNC: 32.2 G/DL (ref 31.4–37.4)
MCV RBC AUTO: 95 FL (ref 82–98)
MONOCYTES # BLD AUTO: 2.26 THOUSAND/UL (ref 0–1.22)
MONOCYTES NFR BLD: 4 % (ref 4–12)
NEUTROPHILS # BLD MANUAL: 51.96 THOUSAND/UL (ref 1.85–7.62)
NEUTS BAND NFR BLD MANUAL: 1 % (ref 0–8)
NEUTS SEG NFR BLD AUTO: 91 % (ref 43–75)
PLATELET # BLD AUTO: 358 THOUSANDS/UL (ref 149–390)
PLATELET BLD QL SMEAR: ADEQUATE
PMV BLD AUTO: 10.2 FL (ref 8.9–12.7)
POTASSIUM SERPL-SCNC: 3.1 MMOL/L (ref 3.5–5.3)
PROT SERPL-MCNC: 6.6 G/DL (ref 6.4–8.2)
RBC # BLD AUTO: 3.35 MILLION/UL (ref 3.81–5.12)
RBC MORPH BLD: PRESENT
SODIUM SERPL-SCNC: 138 MMOL/L (ref 136–145)
VARIANT LYMPHS # BLD AUTO: 3 %
WBC # BLD AUTO: 56.48 THOUSAND/UL (ref 4.31–10.16)

## 2021-12-23 PROCEDURE — 83735 ASSAY OF MAGNESIUM: CPT

## 2021-12-23 PROCEDURE — 85027 COMPLETE CBC AUTOMATED: CPT

## 2021-12-23 PROCEDURE — 80053 COMPREHEN METABOLIC PANEL: CPT

## 2021-12-23 PROCEDURE — 85007 BL SMEAR W/DIFF WBC COUNT: CPT

## 2021-12-30 ENCOUNTER — TELEPHONE (OUTPATIENT)
Dept: GYNECOLOGIC ONCOLOGY | Facility: CLINIC | Age: 71
End: 2021-12-30

## 2021-12-30 ENCOUNTER — HOSPITAL ENCOUNTER (OUTPATIENT)
Dept: INFUSION CENTER | Facility: HOSPITAL | Age: 71
Discharge: HOME | End: 2021-12-30
Payer: COMMERCIAL

## 2021-12-30 VITALS — TEMPERATURE: 97.8 F

## 2021-12-30 DIAGNOSIS — Z45.2 ENCOUNTER FOR CENTRAL LINE CARE: ICD-10-CM

## 2021-12-30 DIAGNOSIS — C56.3 MALIGNANT NEOPLASM OF BOTH OVARIES (HCC): Primary | ICD-10-CM

## 2021-12-30 LAB
ALBUMIN SERPL BCP-MCNC: 3.3 G/DL (ref 3.5–5)
ALP SERPL-CCNC: 192 U/L (ref 46–116)
ALT SERPL W P-5'-P-CCNC: 31 U/L (ref 12–78)
ANION GAP SERPL CALCULATED.3IONS-SCNC: 8 MMOL/L (ref 4–13)
AST SERPL W P-5'-P-CCNC: 14 U/L (ref 5–45)
BASOPHILS # BLD AUTO: 0.09 THOUSANDS/ΜL (ref 0–0.1)
BASOPHILS NFR BLD AUTO: 1 % (ref 0–1)
BILIRUB SERPL-MCNC: 0.11 MG/DL (ref 0.2–1)
BUN SERPL-MCNC: 15 MG/DL (ref 5–25)
CALCIUM ALBUM COR SERPL-MCNC: 9.1 MG/DL (ref 8.3–10.1)
CALCIUM SERPL-MCNC: 8.5 MG/DL (ref 8.3–10.1)
CHLORIDE SERPL-SCNC: 104 MMOL/L (ref 100–108)
CO2 SERPL-SCNC: 26 MMOL/L (ref 21–32)
CREAT SERPL-MCNC: 0.53 MG/DL (ref 0.6–1.3)
EOSINOPHIL # BLD AUTO: 0.06 THOUSAND/ΜL (ref 0–0.61)
EOSINOPHIL NFR BLD AUTO: 0 % (ref 0–6)
ERYTHROCYTE [DISTWIDTH] IN BLOOD BY AUTOMATED COUNT: 21.3 % (ref 11.6–15.1)
GFR SERPL CREATININE-BSD FRML MDRD: 96 ML/MIN/1.73SQ M
GLUCOSE SERPL-MCNC: 111 MG/DL (ref 65–140)
HCT VFR BLD AUTO: 33.4 % (ref 34.8–46.1)
HGB BLD-MCNC: 10.7 G/DL (ref 11.5–15.4)
IMM GRANULOCYTES # BLD AUTO: 0.37 THOUSAND/UL (ref 0–0.2)
IMM GRANULOCYTES NFR BLD AUTO: 2 % (ref 0–2)
LYMPHOCYTES # BLD AUTO: 4.21 THOUSANDS/ΜL (ref 0.6–4.47)
LYMPHOCYTES NFR BLD AUTO: 25 % (ref 14–44)
MAGNESIUM SERPL-MCNC: 1.7 MG/DL (ref 1.6–2.6)
MCH RBC QN AUTO: 30.4 PG (ref 26.8–34.3)
MCHC RBC AUTO-ENTMCNC: 32 G/DL (ref 31.4–37.4)
MCV RBC AUTO: 95 FL (ref 82–98)
MONOCYTES # BLD AUTO: 2.41 THOUSAND/ΜL (ref 0.17–1.22)
MONOCYTES NFR BLD AUTO: 14 % (ref 4–12)
NEUTROPHILS # BLD AUTO: 9.77 THOUSANDS/ΜL (ref 1.85–7.62)
NEUTS SEG NFR BLD AUTO: 58 % (ref 43–75)
NRBC BLD AUTO-RTO: 2 /100 WBCS
PLATELET # BLD AUTO: 243 THOUSANDS/UL (ref 149–390)
PMV BLD AUTO: 10.2 FL (ref 8.9–12.7)
POTASSIUM SERPL-SCNC: 3.7 MMOL/L (ref 3.5–5.3)
PROT SERPL-MCNC: 6.5 G/DL (ref 6.4–8.2)
RBC # BLD AUTO: 3.52 MILLION/UL (ref 3.81–5.12)
SODIUM SERPL-SCNC: 138 MMOL/L (ref 136–145)
WBC # BLD AUTO: 16.91 THOUSAND/UL (ref 4.31–10.16)

## 2021-12-30 PROCEDURE — 83735 ASSAY OF MAGNESIUM: CPT

## 2021-12-30 PROCEDURE — 80053 COMPREHEN METABOLIC PANEL: CPT

## 2021-12-30 PROCEDURE — 85025 COMPLETE CBC W/AUTO DIFF WBC: CPT

## 2021-12-30 NOTE — PROGRESS NOTES
Port accessed, no blood return noted  Labs drawn peripherally  Patient will receive alteplase at next lab work appointment, 1/7/22  Next appointment verified, AVS provided

## 2021-12-30 NOTE — PLAN OF CARE
Problem: Potential for Falls  Goal: Patient will remain free of falls  Description: INTERVENTIONS:  - Educate patient/family on patient safety including physical limitations  - Instruct patient to call for assistance with activity   - Consult OT/PT to assist with strengthening/mobility   - Keep Call bell within reach    Outcome: Progressing     Problem: Knowledge Deficit  Goal: Patient/family/caregiver demonstrates understanding of disease process, treatment plan, medications, and discharge instructions  Description: Complete learning assessment and assess knowledge base    Interventions:  - Provide teaching at level of understanding  - Provide teaching via preferred learning methods  Outcome: Progressing

## 2022-01-04 ENCOUNTER — OFFICE VISIT (OUTPATIENT)
Dept: PALLIATIVE MEDICINE | Facility: CLINIC | Age: 72
End: 2022-01-04
Payer: COMMERCIAL

## 2022-01-04 VITALS
OXYGEN SATURATION: 98 % | SYSTOLIC BLOOD PRESSURE: 122 MMHG | HEART RATE: 96 BPM | WEIGHT: 161.6 LBS | BODY MASS INDEX: 32.62 KG/M2 | DIASTOLIC BLOOD PRESSURE: 62 MMHG | RESPIRATION RATE: 18 BRPM | TEMPERATURE: 97.1 F

## 2022-01-04 DIAGNOSIS — G47.01 INSOMNIA DUE TO MEDICAL CONDITION: ICD-10-CM

## 2022-01-04 DIAGNOSIS — C56.3 MALIGNANT NEOPLASM OF BOTH OVARIES (HCC): Primary | ICD-10-CM

## 2022-01-04 DIAGNOSIS — G62.0 CHEMOTHERAPY-INDUCED PERIPHERAL NEUROPATHY (HCC): ICD-10-CM

## 2022-01-04 DIAGNOSIS — Z71.89 ADVANCED CARE PLANNING/COUNSELING DISCUSSION: ICD-10-CM

## 2022-01-04 DIAGNOSIS — F41.9 ANXIOUSNESS: ICD-10-CM

## 2022-01-04 DIAGNOSIS — M25.50 JOINT PAIN FOLLOWING CHEMOTHERAPY: ICD-10-CM

## 2022-01-04 DIAGNOSIS — T45.1X5A CHEMOTHERAPY-INDUCED PERIPHERAL NEUROPATHY (HCC): ICD-10-CM

## 2022-01-04 DIAGNOSIS — G89.18 JOINT PAIN FOLLOWING CHEMOTHERAPY: ICD-10-CM

## 2022-01-04 DIAGNOSIS — Z51.5 PALLIATIVE CARE PATIENT: ICD-10-CM

## 2022-01-04 DIAGNOSIS — G89.3 CANCER RELATED PAIN: ICD-10-CM

## 2022-01-04 PROCEDURE — 99497 ADVNCD CARE PLAN 30 MIN: CPT | Performed by: INTERNAL MEDICINE

## 2022-01-04 PROCEDURE — 99214 OFFICE O/P EST MOD 30 MIN: CPT | Performed by: INTERNAL MEDICINE

## 2022-01-04 RX ORDER — OXYCODONE HYDROCHLORIDE 5 MG/1
2.5-5 TABLET ORAL EVERY 4 HOURS PRN
Qty: 180 TABLET | Refills: 0 | Status: SHIPPED | OUTPATIENT
Start: 2022-01-04 | End: 2022-02-01 | Stop reason: SDUPTHER

## 2022-01-04 RX ORDER — GABAPENTIN 300 MG/1
CAPSULE ORAL
Qty: 53 CAPSULE | Refills: 0 | Status: SHIPPED | OUTPATIENT
Start: 2022-01-04 | End: 2022-02-01 | Stop reason: SDUPTHER

## 2022-01-04 NOTE — PATIENT INSTRUCTIONS
It was good to see you today  Thank you for coming in  · Increasing dispensed supply of oxycodone  If the pharmacy does not provide 180 tablets (for 30 days) please let us know  · Start gabapentin for neuropathic pain, cancer-related pain  For one week take 300mg at bedtime  If tolerated, after 1 week increase to twice per day  · Use Zofran as needed, first line treatment for nausea or vomiting  · Use Ativan (lorazepam) up to 3 times per day as needed for anxiousness, insomnia (or second-line treatment for nausea)  · Thank you for completing the Five Wishes advanced directive  After it is signed and witnessed we will scan it in to the system  · Return in about 4 weeks  · Call us for refills on medications that we supply, as needed  · If something changes and you need to come in sooner, please call our office  PRESCRIPTION REFILL REMINDER:  All medication refills should be requested prior to [facility] on Friday  Any refill requests after noon on Friday would be addressed the following Monday

## 2022-01-04 NOTE — PROGRESS NOTES
Follow-up with Palliative and Supportive Care  Davin Bailey 80 y o  female [id number]    ASSESSMENT & PLAN:  1  Malignant neoplasm of both ovaries    2  Cancer related pain    3  Joint pain following chemotherapy    4  Anxiousness    5  Insomnia due to medical condition    6  Chemotherapy-induced peripheral neuropathy ([facility] [address] )    7  Palliative care patient    8  Advanced care planning/counseling discussion           Continue Tylenol 1000mg TID ATC   Continue oxyIR 2 5-5mg q4h PRN; she has been using up to 30mg/day  Increasing dispensed supply  Pharmacy had only provided #60 or #150 prescribed tablets on 12/22/21   o Patient cannot tolerate morphine d/t adverse effects (N+V, headaches); she tolerated oxyIR w/o issue   Start gabapentin, 300mg QHS x 1 week then 300mg BID after that, if tolerated  For progressive chemo-induced joint pain and chemo-induced neuropathy   Naloxone Rx provided   Continue Zofran PRN N/V (1st line)   Continue lorazepam 0 5mg q8h PRN anxiousness, insomnia, 2nd-line for N/V    Continue OTC Blue Emu oil for chemo-induced joint pain   Continue bowel regimen PRN (though apple juice is usually enough for her to avoid constipation)   Separate 20 minutes spent reviewing and completing advanced directives (Five Wishes)  Counseling provided  All questions answered  Document signed and witnessed and will be scanned into the chart   Patient reported she had received [address] vaccinations   Reviewed notes (Gynecologic Oncology), labs (12/30/21: Cr 0 53, alb 3 3, Hb 10 7;  12 5 on 12/17/21, 69 9 on 10/15/21), imaging + procedures (nothing newer than 9/21/21 [facility])   Emotional support provided   Medication safety issues addressed - no driving under the influence of narcotics, watch for adverse effects including AMS and respiratory depression, keep medications stored in a safe/locked environment        Requested Prescriptions     Signed Prescriptions Disp Refills    gabapentin (Neurontin) 300 mg capsule 53 capsule 0     Sig: Take 1 capsule (300 mg total) by mouth daily at bedtime for 7 days, THEN 1 capsule (300 mg total) 2 (two) times a day for 23 days   oxyCODONE (Roxicodone) 5 immediate release tablet 180 tablet 0     Sig: Take 0 5-1 tablets (2 5-5 mg total) by mouth every 4 (four) hours as needed for moderate pain or severe pain Max Daily Amount: 30 mg       Medications Discontinued During This Encounter   Medication Reason    oxyCODONE (Roxicodone) 5 immediate release tablet Reorder       Representatives have queried the patient's controlled substance dispensing history in the Prescription Drug Monitoring Program in compliance with regulations before I have prescribed any controlled substances  The prescription history is consistent with prescribed therapy and our practice policies  25+ minutes (separate from and in addition to ACP time detailed above) were spent face to face with patient and family ( Carmella Ray) with greater than 50% of the time spent in counseling or coordination of care including discussions of symptom assessment and management, medication review and adjustment, psychosocial support, chart review, imaging review, lab review, supportive listening and anticipatory guidance  All of the patient's questions were answered during this discussion  Return in about 4 weeks (around 2/1/2022)  SUBJECTIVE:  Chief Complaint   Patient presents with    Cancer    Cancer Pain    Joint Pain    Anxiety    Insomnia    Counseling    Follow-up        AZAEL Padilla is a 80 y o  female w/ 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 s/p PROMISE+BSO and tumor debulking on paclitaxel + carboplatin  She follows w/ Dr Miky Castro (Gynecologic Oncology)  Patient is known to [facility] clinic; seen 12/7/21 for symptom management, psychosocial support      Patient c/o progressive chemotherapy-induced joint pain, back pain, chemotherapy-induced peripheral neuropathy  Neuropathy now affects her entire R foot and has spread to her L food  Joint pain + neuropathy increase in intensity post-chemotherapy  She is taking up to 6 tabs of oxyIR per day w/ some relief noted; she is hesitant to increase oxyIR dosage or to add a long-acting opioid  She is amenable to gabapentin  Blue Emu oil is helpful for joint pain and neuropathy (OTC)  Patient reports her mood is "up and down"; she feels sad on occasions and is at times anxious, but overall her mood is "doing fine" and she feels positive  Sleep is still fragmented (she has been using Ativan for nausea, not for anxiousness or insomnia - counseled)  Patient has completed the Five Wishes document, with much thought put into her cares at end-of-life,  plans, etc  She names her  as first surrogate healthcare decision-maker  PDMP shows no concerns  The following portions of the medical history were reviewed: past medical history, problem list, medication list, and social history        Current Outpatient Medications:     acetaminophen (TYLENOL) 325 mg tablet, Take 3 tablets (975 mg total) by mouth every 8 (eight) hours, Disp: , Rfl:     albuterol (PROVENTIL HFA,VENTOLIN HFA) 90 mcg/act inhaler, Inhale 2 puffs every 6 (six) hours as needed for wheezing, Disp: , Rfl:     amLODIPine-benazepril (LOTREL) 10-20 MG per capsule, Take 1 capsule by mouth daily, Disp: , Rfl:     butalbital-aspirin-caffeine (FIORINAL) -40 mg per capsule, Take 1 capsule by mouth every 4 (four) hours as needed for headaches  , Disp: , Rfl:     fluticasone (FLONASE) 50 mcg/act nasal spray, 1 spray into each nostril daily, Disp: , Rfl:     Liniments (Blue-Emu Super Strength) CREA, Apply topically as needed (joint pain), Disp: , Rfl:     LORazepam (ATIVAN) 1 mg tablet, Take 0 5 tablets (0 5 mg total) by mouth every 8 (eight) hours as needed (nausea or anxiety or insomnia), Disp: 45 tablet, Rfl: 0    oxyCODONE (Roxicodone) 5 immediate release tablet, Take 0 5-1 tablets (2 5-5 mg total) by mouth every 4 (four) hours as needed for moderate pain or severe pain Max Daily Amount: 30 mg, Disp: 180 tablet, Rfl: 0    pravastatin (PRAVACHOL) 10 mg tablet, Take 10 mg by mouth daily, Disp: , Rfl:     AMLODIPINE BENZOATE PO, Take by mouth  , Disp: , Rfl:     benzonatate (TESSALON) 200 MG capsule, Take 1 capsule (200 mg total) by mouth 3 (three) times a day as needed for cough (Patient not taking: Reported on 9/22/2021), Disp: 20 capsule, Rfl: 0    docusate sodium (COLACE) 100 mg capsule, Take 1 capsule (100 mg total) by mouth 2 (two) times a day (Patient not taking: Reported on 11/2/2021), Disp: , Rfl:     gabapentin (Neurontin) 300 mg capsule, Take 1 capsule (300 mg total) by mouth daily at bedtime for 7 days, THEN 1 capsule (300 mg total) 2 (two) times a day for 23 days  , Disp: 53 capsule, Rfl: 0    ibuprofen (MOTRIN) 600 mg tablet, Take 1 tablet (600 mg total) by mouth every 6 (six) hours (Patient not taking: Reported on 1/4/2022 ), Disp: 30 tablet, Rfl: 0    naloxone (NARCAN) 4 mg/0 1 mL nasal spray, 0 1 mL (4 mg total) by Alternating Nares route every 3 (three) minutes as needed (accidental opioid overdose or respiratory depression) (Patient not taking: Reported on 11/3/2021), Disp: 1 each, Rfl: 1    Naproxen Sodium (Aleve) 220 MG CAPS, Take 220 mg by mouth daily 2 caps AM (Patient not taking: Reported on 10/13/2021), Disp: , Rfl:     omeprazole (PriLOSEC) 20 mg delayed release capsule, Take 20 mg by mouth 2 (two) times a day, Disp: , Rfl:     ondansetron (ZOFRAN) 8 mg tablet, Take 1 tablet (8 mg total) by mouth every 8 (eight) hours as needed for nausea or vomiting (Patient not taking: Reported on 11/26/2021 ), Disp: 20 tablet, Rfl: 1    polyethylene glycol (MiraLax) 17 GM/SCOOP powder, Mix with 64 oz Gatorade, begin 4 PM day before surgery per bowel prep instructions   (Patient not taking: Reported on 10/13/2021), Disp: 238 g, Rfl: 0    sodium chloride, PF, 0 9 %, 10 mL by Intracatheter route daily (Patient not taking: Reported on 11/3/2021), Disp: 300 mL, Rfl: 3    Review of Systems   Constitutional: Positive for activity change and fatigue  Negative for appetite change and unexpected weight change (defending her weight)  HENT: Negative for trouble swallowing  Eyes: Negative for pain and redness  Cardiovascular: Negative for chest pain  Gastrointestinal: Positive for constipation (improves w/ Asif's apple juice) and nausea (not much recently; Ativan has helped PRN)  Negative for diarrhea  Endocrine: Negative for polydipsia and polyphagia  Musculoskeletal: Positive for arthralgias and back pain  Allergic/Immunologic: Positive for immunocompromised state  Neurological: Negative for facial asymmetry  Painful neuropathy  Psychiatric/Behavioral: Positive for sleep disturbance  Negative for confusion and decreased concentration  The patient is nervous/anxious (stable)  OBJECTIVE:  /62 (BP Location: Right arm, Patient Position: Sitting, Cuff Size: Standard)   Pulse 96   Temp (!) 97 1 °F (36 2 °C) (Tympanic)   Resp 18   Wt 73 3 kg (161 lb 9 6 oz)   SpO2 98%   BMI 32 62 kg/m²   Physical Exam  Vitals reviewed  Constitutional:       General: She is not in acute distress  Appearance: She is overweight  She is ill-appearing (chronically)  She is not toxic-appearing  HENT:      Head: Normocephalic and atraumatic  Right Ear: External ear normal       Left Ear: External ear normal    Eyes:      General: No scleral icterus  Right eye: No discharge  Left eye: No discharge  Extraocular Movements: Extraocular movements intact  Conjunctiva/sclera: Conjunctivae normal       Pupils: Pupils are equal, round, and reactive to light  Pulmonary:      Effort: Pulmonary effort is normal  No respiratory distress     Abdominal: General: There is no distension  Tenderness: There is no guarding  Musculoskeletal:      Right lower leg: No edema  Left lower leg: No edema  Skin:     General: Skin is dry  Coloration: Skin is not pale  Neurological:      Mental Status: She is alert and oriented to person, place, and time  Cranial Nerves: No dysarthria or facial asymmetry  Psychiatric:         Attention and Perception: Attention normal          Mood and Affect: Mood and affect normal          Speech: Speech normal          Behavior: Behavior normal  Behavior is cooperative  Thought Content: Thought content normal          Cognition and Memory: Cognition and memory normal          Judgment: Judgment normal       Comments: Positive mood  Hayley Silver MD  [facility] Palliative and Supportive Care      Portions of this document may have been created using dictation software and as such some "sound alike" terms may have been generated by the system  Do not hesitate to contact me with any questions or clarifications

## 2022-01-05 ENCOUNTER — TRANSCRIBE ORDERS (OUTPATIENT)
Dept: URGENT CARE | Facility: CLINIC | Age: 72
End: 2022-01-05

## 2022-01-05 ENCOUNTER — OFFICE VISIT (OUTPATIENT)
Dept: URGENT CARE | Facility: CLINIC | Age: 72
End: 2022-01-05
Payer: COMMERCIAL

## 2022-01-05 ENCOUNTER — OFFICE VISIT (OUTPATIENT)
Dept: GYNECOLOGIC ONCOLOGY | Facility: CLINIC | Age: 72
End: 2022-01-05
Payer: COMMERCIAL

## 2022-01-05 VITALS — TEMPERATURE: 97.8 F | OXYGEN SATURATION: 99 % | HEART RATE: 90 BPM

## 2022-01-05 VITALS
RESPIRATION RATE: 17 BRPM | SYSTOLIC BLOOD PRESSURE: 112 MMHG | WEIGHT: 162 LBS | TEMPERATURE: 98 F | HEART RATE: 100 BPM | DIASTOLIC BLOOD PRESSURE: 78 MMHG | BODY MASS INDEX: 32.7 KG/M2

## 2022-01-05 DIAGNOSIS — T45.1X5A CHEMOTHERAPY-INDUCED PERIPHERAL NEUROPATHY (HCC): ICD-10-CM

## 2022-01-05 DIAGNOSIS — J06.9 ACUTE URI: Primary | ICD-10-CM

## 2022-01-05 DIAGNOSIS — U07.1 COVID-19: Primary | ICD-10-CM

## 2022-01-05 DIAGNOSIS — M25.50 JOINT PAIN FOLLOWING CHEMOTHERAPY: ICD-10-CM

## 2022-01-05 DIAGNOSIS — Z20.822 PERSON UNDER INVESTIGATION FOR COVID-19: ICD-10-CM

## 2022-01-05 DIAGNOSIS — G62.0 CHEMOTHERAPY-INDUCED PERIPHERAL NEUROPATHY (HCC): ICD-10-CM

## 2022-01-05 DIAGNOSIS — C56.3 MALIGNANT NEOPLASM OF BOTH OVARIES (HCC): Primary | ICD-10-CM

## 2022-01-05 DIAGNOSIS — G89.18 JOINT PAIN FOLLOWING CHEMOTHERAPY: ICD-10-CM

## 2022-01-05 PROCEDURE — U0003 INFECTIOUS AGENT DETECTION BY NUCLEIC ACID (DNA OR RNA); SEVERE ACUTE RESPIRATORY SYNDROME CORONAVIRUS 2 (SARS-COV-2) (CORONAVIRUS DISEASE [COVID-19]), AMPLIFIED PROBE TECHNIQUE, MAKING USE OF HIGH THROUGHPUT TECHNOLOGIES AS DESCRIBED BY CMS-2020-01-R: HCPCS | Performed by: FAMILY MEDICINE

## 2022-01-05 PROCEDURE — U0005 INFEC AGEN DETEC AMPLI PROBE: HCPCS | Performed by: FAMILY MEDICINE

## 2022-01-05 PROCEDURE — 99213 OFFICE O/P EST LOW 20 MIN: CPT | Performed by: OBSTETRICS & GYNECOLOGY

## 2022-01-05 PROCEDURE — 99213 OFFICE O/P EST LOW 20 MIN: CPT | Performed by: FAMILY MEDICINE

## 2022-01-05 NOTE — ASSESSMENT & PLAN NOTE
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 s/p PROMISE/BSO/tumor debulking presents for pre cycle 6 visit   continues to be low  She is tolerating chemotherapy with mild toxicity    CBC, CMP, mag,  reviewed and all hematologic parameters support continued treatment    We will continue chemo as prescribed plan for repeat CT chest abdomen pelvis after cycle 6 to assess disease status    Depending on results of CT scan we will plan on continued a vast in and olaparib maintenance therapy

## 2022-01-05 NOTE — PROGRESS NOTES
Assessment/Plan:    Problem List Items Addressed This Visit        Endocrine    Ovarian cancer ([facility] ) - Primary     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 s/p PROMISE/BSO/tumor debulking presents for pre cycle 6 visit   continues to be low  She is tolerating chemotherapy with mild toxicity    CBC, CMP, mag,  reviewed and all hematologic parameters support continued treatment    We will continue chemo as prescribed plan for repeat CT chest abdomen pelvis after cycle 6 to assess disease status    Depending on results of CT scan we will plan on continued a vast in and olaparib maintenance therapy         Relevant Orders    CT chest abdomen pelvis w contrast       Nervous and Auditory    Chemotherapy-induced peripheral neuropathy (HCC)     Mild  Starting gabapentin            Other    Joint pain following chemotherapy     Continue gabapentin, oxycodone and claritin                  CHIEF COMPLAINT: pre cycle 6      Problem:  Cancer Staging  Ovarian cancer ([place] [address] )  Staging form: Ovary, Fallopian Tube, Primary Peritoneal, AJCC 8th Edition  - Clinical: FIGO Stage IIIC (cT3c) - Signed by Kane Santos MD on 9/16/2021        Previous therapy:  Oncology History   Ovarian cancer ([facility] )   8/6/2021 Surgery    PROMISE/BSO/tumor debulking to optimal cytoreduction     8/25/2021 Initial Diagnosis    Ovarian cancer ([place] [address] )     9/16/2021 -  Cancer Staged    Staging form: Ovary, Fallopian Tube, Primary Peritoneal, AJCC 8th Edition  - Clinical: FIGO Stage IIIC (cT3c) - Signed by Kane Santos MD on 9/16/2021  Stage prefix: Initial diagnosis  Cancer antigen 125 () (U/mL): 543       9/28/2021 -  Chemotherapy    Carbo AUC6, Taxol 175mg/m2 q3 weeks  Avastin 15mg/m2 added cycle 3    Toxicities:  Cycle4: carbo AUC 5 for neutropenia, added neulasta     10/2021 Genomic Testing    SEAN high  CPS >2     12/9/2021 Genetic Testing    POLD VUS           Patient ID: Brandon Neal is a 80 y o  female  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 s/p PROMISE/BSO/tumor debulking presents for pre cycle 6 visit  Pt f/u with palliative care who started her on gabapentin for continued pain and joint pain  She continues to use oxycodone prn  Her diet is adequate  No changes in BMs/urination  The following portions of the patient's history were reviewed and updated as appropriate: allergies, current medications, past family history, past medical history, past social history, past surgical history and problem list     Review of Systems   Constitutional: Positive for activity change and fatigue  Negative for appetite change, chills and fever  Respiratory: Negative for chest tightness and shortness of breath  Gastrointestinal: Negative for abdominal distention, abdominal pain, constipation, diarrhea and nausea  Genitourinary: Negative for difficulty urinating, flank pain, frequency, urgency, vaginal bleeding, vaginal discharge and vaginal pain  Musculoskeletal: Positive for arthralgias, joint swelling and myalgias  Negative for back pain  Skin: Negative for rash  Neurological: Positive for numbness  Negative for dizziness, light-headedness and headaches         Current Outpatient Medications   Medication Sig Dispense Refill    acetaminophen (TYLENOL) 325 mg tablet Take 3 tablets (975 mg total) by mouth every 8 (eight) hours      albuterol (PROVENTIL HFA,VENTOLIN HFA) 90 mcg/act inhaler Inhale 2 puffs every 6 (six) hours as needed for wheezing      AMLODIPINE BENZOATE PO Take by mouth        amLODIPine-benazepril (LOTREL) 10-20 MG per capsule Take 1 capsule by mouth daily      butalbital-aspirin-caffeine (FIORINAL) -40 mg per capsule Take 1 capsule by mouth every 4 (four) hours as needed for headaches        fluticasone (FLONASE) 50 mcg/act nasal spray 1 spray into each nostril daily      gabapentin (Neurontin) 300 mg capsule Take 1 capsule (300 mg total) by mouth daily at bedtime for 7 days, THEN 1 capsule (300 mg total) 2 (two) times a day for 23 days  53 capsule 0    Liniments (Blue-Emu Super Strength) CREA Apply topically as needed (joint pain)      LORazepam (ATIVAN) 1 mg tablet Take 0 5 tablets (0 5 mg total) by mouth every 8 (eight) hours as needed (nausea or anxiety or insomnia) 45 tablet 0    omeprazole (PriLOSEC) 20 mg delayed release capsule Take 20 mg by mouth 2 (two) times a day      oxyCODONE (Roxicodone) 5 immediate release tablet Take 0 5-1 tablets (2 5-5 mg total) by mouth every 4 (four) hours as needed for moderate pain or severe pain Max Daily Amount: 30 mg 180 tablet 0    pravastatin (PRAVACHOL) 10 mg tablet Take 10 mg by mouth daily      benzonatate (TESSALON) 200 MG capsule Take 1 capsule (200 mg total) by mouth 3 (three) times a day as needed for cough (Patient not taking: Reported on 9/22/2021) 20 capsule 0    docusate sodium (COLACE) 100 mg capsule Take 1 capsule (100 mg total) by mouth 2 (two) times a day (Patient not taking: Reported on 11/2/2021)      ibuprofen (MOTRIN) 600 mg tablet Take 1 tablet (600 mg total) by mouth every 6 (six) hours (Patient not taking: Reported on 1/4/2022 ) 30 tablet 0    naloxone (NARCAN) 4 mg/0 1 mL nasal spray 0 1 mL (4 mg total) by Alternating Nares route every 3 (three) minutes as needed (accidental opioid overdose or respiratory depression) (Patient not taking: Reported on 11/3/2021) 1 each 1    Naproxen Sodium (Aleve) 220 MG CAPS Take 220 mg by mouth daily 2 caps AM (Patient not taking: Reported on 10/13/2021)      ondansetron (ZOFRAN) 8 mg tablet Take 1 tablet (8 mg total) by mouth every 8 (eight) hours as needed for nausea or vomiting (Patient not taking: Reported on 11/26/2021 ) 20 tablet 1    polyethylene glycol (MiraLax) 17 GM/SCOOP powder Mix with 64 oz Gatorade, begin 4 PM day before surgery per bowel prep instructions   (Patient not taking: Reported on 10/13/2021) 238 g 0    sodium chloride, PF, 0 9 % 10 mL by Intracatheter route daily (Patient not taking: Reported on 11/3/2021) 300 mL 3     No current facility-administered medications for this visit  Objective:    Blood pressure 112/78, pulse 100, temperature 98 °F (36 7 °C), temperature source Temporal, resp  rate 17, weight 73 5 kg (162 lb)  Body mass index is 32 7 kg/m²  Body surface area is 1 69 meters squared  Physical Exam  HENT:      Head: Normocephalic and atraumatic  Nose: Nose normal    Cardiovascular:      Rate and Rhythm: Normal rate and regular rhythm  Pulmonary:      Effort: Pulmonary effort is normal    Abdominal:      General: There is no distension  Palpations: Abdomen is soft  There is no mass  Genitourinary:     Comments: defer  Musculoskeletal:         General: No swelling  Normal range of motion  Cervical back: Normal range of motion  Skin:     General: Skin is warm and dry  Neurological:      General: No focal deficit present  Mental Status: She is alert     Psychiatric:         Mood and Affect: Mood normal            Lab Results   Component Value Date    K 3 7 12/30/2021     12/30/2021    CO2 26 12/30/2021    BUN 15 12/30/2021    CREATININE 0 53 (L) 12/30/2021    GLUCOSE 195 (H) 08/06/2021    GLUF 102 (H) 07/20/2021    CALCIUM 8 5 12/30/2021    CORRECTEDCA 9 1 12/30/2021    AST 14 12/30/2021    ALT 31 12/30/2021    ALKPHOS 192 (H) 12/30/2021    EGFR 96 12/30/2021     Lab Results   Component Value Date    WBC 16 91 (H) 12/30/2021    HGB 10 7 (L) 12/30/2021    HCT 33 4 (L) 12/30/2021    MCV 95 12/30/2021     12/30/2021     Lab Results   Component Value Date    NEUTROABS 9 77 (H) 12/30/2021        Trend:  Lab Results   Component Value Date     12 5 12/17/2021     11 6 11/26/2021     19 4 11/05/2021     69 9 (H) 10/15/2021     94 1 (H) 09/24/2021     543 7 (H) 07/20/2021

## 2022-01-06 NOTE — PATIENT INSTRUCTIONS
Patient tested for COVID-19 in office today  Results may be accessed via [facility]  Patient was informed that she must remain at home on self isolation until test results return, unless it is to seek medical care  If COVID-19 test result is positive, patient must continue to isolate for 5 days from date of symptom onset, and continue to wear a mask around others for an additional 5 days after that  Patient was informed that she must be fever free for at least 24 hours without medications prior to discontinuing isolation  Patient has been instructed to rest at home, drink plenty of fluids, take Tylenol as needed, drink warm tea w/ lemon and honey, run a humidifier at home, and take a multivitamin daily  Follow up w/ PCP for re-check in 3-5 days  If symptoms persist despite treatment, worsen,or any new symptoms present including but not limited to, fever, chest pain, shortness of breath, patient is to be seen in the ER  If COVID-19 test is positive, patient was instructed to call the [facility] or talk to her PCP regarding treatment with the MAB infusion if indicated  Patient verbalizes understanding and agrees w/ treatment plan

## 2022-01-06 NOTE — PROGRESS NOTES
[employer] [facility]        NAME: Milagros Spicer is a 80 y o  female  : 1950    MRN: [id number]  DATE: 2022  TIME: 8:34 PM    Assessment and Plan   Acute URI [J06 9]  1  Acute URI  COVID Only -Office Collect   2  Person under investigation for [address]          Patient Instructions     Patient Instructions   Patient tested for COVID-19 in office today  Results may be accessed via  [employer]  Patient was informed that she must remain at home on self isolation until test results return, unless it is to seek medical care  If COVID-19 test result is positive, patient must continue to isolate for 5 days from date of symptom onset, and continue to wear a mask around others for an additional 5 days after that  Patient was informed that she must be fever free for at least 24 hours without medications prior to discontinuing isolation  Patient has been instructed to rest at home, drink plenty of fluids, take Tylenol as needed, drink warm tea w/ lemon and honey, run a humidifier at home, and take a multivitamin daily  Follow up w/ PCP for re-check in 3-5 days  If symptoms persist despite treatment, worsen,or any new symptoms present including but not limited to, fever, chest pain, shortness of breath, patient is to be seen in the ER  If COVID-19 test is positive, patient was instructed to call the [facility] line or talk to her PCP regarding treatment with the [facility] infusion if indicated  Patient verbalizes understanding and agrees w/ treatment plan  Follow up with PCP in 3-5 days  Proceed to  ER if symptoms worsen  Chief Complaint     Chief Complaint   Patient presents with    COVID-19      tested positive         History of Present Illness       79 yo female presents c/o sinus pressure, nasal congestion, rhinorrhea, post-nasal drip, sore throat, and a cough  She has been ill x 5 days  No fever/chills  No headache or body aches   No chest pain, SOB, or wheezing  Non-smoker  No GI sx  No skin rashes  No loss of taste or smell  No recent travel, however her  has tested positive for COVID-19  Patient has received the COVID-19 vaccine  She would like to be tested for COVID-19 today  She has been taking an OTC sinus medication for the symptoms  Review of Systems   Review of Systems   Constitutional: Negative  HENT:        As noted in HPI   Eyes: Negative  Respiratory:        As noted in HPI   Cardiovascular: Negative  Gastrointestinal: Negative  Skin: Negative  Allergic/Immunologic:        As noted in the chart   Neurological: Negative  Hematological: Negative  Current Medications       Current Outpatient Medications:     acetaminophen (TYLENOL) 325 mg tablet, Take 3 tablets (975 mg total) by mouth every 8 (eight) hours, Disp: , Rfl:     albuterol (PROVENTIL HFA,VENTOLIN HFA) 90 mcg/act inhaler, Inhale 2 puffs every 6 (six) hours as needed for wheezing, Disp: , Rfl:     AMLODIPINE BENZOATE PO, Take by mouth  , Disp: , Rfl:     amLODIPine-benazepril (LOTREL) 10-20 MG per capsule, Take 1 capsule by mouth daily, Disp: , Rfl:     benzonatate (TESSALON) 200 MG capsule, Take 1 capsule (200 mg total) by mouth 3 (three) times a day as needed for cough, Disp: 20 capsule, Rfl: 0    fluticasone (FLONASE) 50 mcg/act nasal spray, 1 spray into each nostril daily, Disp: , Rfl:     gabapentin (Neurontin) 300 mg capsule, Take 1 capsule (300 mg total) by mouth daily at bedtime for 7 days, THEN 1 capsule (300 mg total) 2 (two) times a day for 23 days  , Disp: 53 capsule, Rfl: 0    Liniments (Blue-Emu Super Strength) CREA, Apply topically as needed (joint pain), Disp: , Rfl:     LORazepam (ATIVAN) 1 mg tablet, Take 0 5 tablets (0 5 mg total) by mouth every 8 (eight) hours as needed (nausea or anxiety or insomnia), Disp: 45 tablet, Rfl: 0    naloxone (NARCAN) 4 mg/0 1 mL nasal spray, 0 1 mL (4 mg total) by Alternating Nares route every 3 (three) minutes as needed (accidental opioid overdose or respiratory depression), Disp: 1 each, Rfl: 1    omeprazole (PriLOSEC) 20 mg delayed release capsule, Take 20 mg by mouth 2 (two) times a day, Disp: , Rfl:     ondansetron (ZOFRAN) 8 mg tablet, Take 1 tablet (8 mg total) by mouth every 8 (eight) hours as needed for nausea or vomiting, Disp: 20 tablet, Rfl: 1    oxyCODONE (Roxicodone) 5 immediate release tablet, Take 0 5-1 tablets (2 5-5 mg total) by mouth every 4 (four) hours as needed for moderate pain or severe pain Max Daily Amount: 30 mg, Disp: 180 tablet, Rfl: 0    pravastatin (PRAVACHOL) 10 mg tablet, Take 10 mg by mouth daily, Disp: , Rfl:     butalbital-aspirin-caffeine (FIORINAL) -40 mg per capsule, Take 1 capsule by mouth every 4 (four) hours as needed for headaches  , Disp: , Rfl:     docusate sodium (COLACE) 100 mg capsule, Take 1 capsule (100 mg total) by mouth 2 (two) times a day (Patient not taking: Reported on 11/2/2021), Disp: , Rfl:     ibuprofen (MOTRIN) 600 mg tablet, Take 1 tablet (600 mg total) by mouth every 6 (six) hours (Patient not taking: Reported on 1/4/2022 ), Disp: 30 tablet, Rfl: 0    Naproxen Sodium (Aleve) 220 MG CAPS, Take 220 mg by mouth daily 2 caps AM (Patient not taking: Reported on 10/13/2021), Disp: , Rfl:     polyethylene glycol (MiraLax) 17 GM/SCOOP powder, Mix with 64 oz Gatorade, begin 4 PM day before surgery per bowel prep instructions   (Patient not taking: Reported on 1/5/2022 ), Disp: 238 g, Rfl: 0    sodium chloride, PF, 0 9 %, 10 mL by Intracatheter route daily (Patient not taking: Reported on 11/3/2021), Disp: 300 mL, Rfl: 3    Current Allergies     Allergies as of 01/05/2022 - Reviewed 01/05/2022   Allergen Reaction Noted    Erythromycin Nausea Only 07/11/2021    Morphine Headache 07/11/2021            The following portions of the patient's history were reviewed and updated as appropriate: allergies, current medications, past family history, past medical history, past social history, past surgical history and problem list      Past Medical History:   Diagnosis Date    Acid reflux     Asthma     Cancer ([facility] [address] )     ovarian, peritoneal carcinomatosis    GERD (gastroesophageal reflux disease)     Hypercholesteremia     Hypertension     Migraine     [facility]        Past Surgical History:   Procedure Laterality Date    APPENDECTOMY N/A 8/6/2021    Procedure: APPENDECTOMY;  Surgeon: Juan Alberto Dorado MD;  [facility];  Service: Gynecology Oncology    CHOLECYSTECTOMY      COLONOSCOPY      FL CYSTOGRAM  8/18/2021    GALLBLADDER SURGERY      HYSTERECTOMY N/A 8/6/2021    Procedure: ENBLOCK HYSTERECTOMY, BILATERAL SALPINGO-OOPHORECTOMY, SIGMOIDECTOMY WITH LOW RECTAL REANASTAMOSIS, BLADDER PERITONEAL STRIPPING AND CYSTOTOMY REPAIR, DIAPHRAGM STRIPPING, SMALL BOWEL RESECTION WITH RE-ANASTAMOSIS;  Surgeon: Juan Alberto Dorado MD;  [facility];  Service: Gynecology Oncology    IR ASPIRATION ONLY  8/31/2021    IR DRAINAGE TUBE CHECK AND/OR REMOVAL  9/17/2021    IR DRAINAGE TUBE CHECK/CHANGE/REPOSITION/REINSERTION/UPSIZE  8/27/2021    IR DRAINAGE TUBE CHECK/CHANGE/REPOSITION/REINSERTION/UPSIZE  9/3/2021    IR DRAINAGE TUBE PLACEMENT  8/18/2021    IR PORT PLACEMENT  9/17/2021    IR THORACENTESIS  8/18/2021    IR THORACENTESIS  9/3/2021    LAPAROTOMY N/A 8/6/2021    Procedure: LAPAROTOMY EXPLORATORY; OVER SEW PANCREAS TAIL;  Surgeon: Juan Alberto Dorado MD;  [facility];  Service: Gynecology Oncology    OMENTECTOMY N/A 8/6/2021    Procedure: RADICAL OMENTECTOMY;  Surgeon: Juan Alberto Dorado MD;  [facility];  Service: Gynecology Oncology    RI LAP,DIAGNOSTIC ABDOMEN N/A 8/6/2021    Procedure: LAPAROSCOPY DIAGNOSTIC;  Surgeon: Juan Alberto Dorado MD;  [facility];  Service: Gynecology Oncology    RIGHT OOPHORECTOMY  1986    SPLENECTOMY, TOTAL N/A 8/6/2021    Procedure: SPLENECTOMY;  Surgeon: Juan Alberto Dorado MD;  [facility];  Service: Gynecology Oncology    TONSILLECTOMY         History reviewed  No pertinent family history  Medications have been verified  Objective   Pulse 90   Temp 97 8 °F (36 6 °C)   SpO2 99%   No LMP recorded  Patient is postmenopausal        Physical Exam     Physical Exam  Vitals and nursing note reviewed  Constitutional:       General: She is awake  She is not in acute distress  Appearance: Normal appearance  She is well-developed and well-groomed  She is not ill-appearing, toxic-appearing or diaphoretic  HENT:      Head: Normocephalic and atraumatic  Right Ear: Tympanic membrane, ear canal and external ear normal       Left Ear: Tympanic membrane, ear canal and external ear normal       Nose: Nose normal       Mouth/Throat:      Lips: Pink  Mouth: Mucous membranes are moist       Pharynx: Oropharynx is clear  Uvula midline  Eyes:      General: Lids are normal  Vision grossly intact  Gaze aligned appropriately  Conjunctiva/sclera: Conjunctivae normal    Cardiovascular:      Rate and Rhythm: Normal rate and regular rhythm  Pulses: Normal pulses  Heart sounds: Normal heart sounds  Pulmonary:      Effort: Pulmonary effort is normal  No tachypnea, accessory muscle usage or respiratory distress  Breath sounds: Normal breath sounds and air entry  Musculoskeletal:      Cervical back: Neck supple  No edema, erythema, rigidity or tenderness  Lymphadenopathy:      Cervical: No cervical adenopathy  Skin:     General: Skin is warm and dry  Coloration: Skin is not pale  Neurological:      Mental Status: She is alert and oriented to person, place, and time  Mental status is at baseline  Psychiatric:         Attention and Perception: Attention and perception normal          Mood and Affect: Mood and affect normal          Speech: Speech normal          Behavior: Behavior normal  Behavior is cooperative  Thought Content:  Thought content normal          Cognition and Memory: Cognition and memory normal          Judgment: Judgment normal

## 2022-01-07 ENCOUNTER — HOSPITAL ENCOUNTER (OUTPATIENT)
Dept: INFUSION CENTER | Facility: HOSPITAL | Age: 72
Discharge: HOME | End: 2022-01-07

## 2022-01-07 LAB — SARS-COV-2 RNA RESP QL NAA+PROBE: NEGATIVE

## 2022-01-10 ENCOUNTER — HOSPITAL ENCOUNTER (OUTPATIENT)
Dept: INFUSION CENTER | Facility: HOSPITAL | Age: 72
Discharge: HOME | End: 2022-01-10
Payer: COMMERCIAL

## 2022-01-10 VITALS — TEMPERATURE: 99.5 F

## 2022-01-10 DIAGNOSIS — C56.3 MALIGNANT NEOPLASM OF BOTH OVARIES (HCC): ICD-10-CM

## 2022-01-10 DIAGNOSIS — Z45.2 ENCOUNTER FOR CENTRAL LINE CARE: Primary | ICD-10-CM

## 2022-01-10 LAB
ALBUMIN SERPL BCP-MCNC: 3.6 G/DL (ref 3.5–5)
ALP SERPL-CCNC: 125 U/L (ref 46–116)
ALT SERPL W P-5'-P-CCNC: 26 U/L (ref 12–78)
ANION GAP SERPL CALCULATED.3IONS-SCNC: 11 MMOL/L (ref 4–13)
AST SERPL W P-5'-P-CCNC: 11 U/L (ref 5–45)
BASOPHILS # BLD AUTO: 0.1 THOUSANDS/ΜL (ref 0–0.1)
BASOPHILS NFR BLD AUTO: 1 % (ref 0–1)
BILIRUB SERPL-MCNC: 0.14 MG/DL (ref 0.2–1)
BUN SERPL-MCNC: 24 MG/DL (ref 5–25)
CALCIUM SERPL-MCNC: 8.9 MG/DL (ref 8.3–10.1)
CANCER AG125 SERPL-ACNC: 15.5 U/ML (ref 0–30)
CHLORIDE SERPL-SCNC: 103 MMOL/L (ref 100–108)
CO2 SERPL-SCNC: 24 MMOL/L (ref 21–32)
CREAT SERPL-MCNC: 0.57 MG/DL (ref 0.6–1.3)
CREAT UR-MCNC: 25.4 MG/DL
EOSINOPHIL # BLD AUTO: 0.13 THOUSAND/ΜL (ref 0–0.61)
EOSINOPHIL NFR BLD AUTO: 2 % (ref 0–6)
ERYTHROCYTE [DISTWIDTH] IN BLOOD BY AUTOMATED COUNT: 20.2 % (ref 11.6–15.1)
GFR SERPL CREATININE-BSD FRML MDRD: 94 ML/MIN/1.73SQ M
GLUCOSE SERPL-MCNC: 80 MG/DL (ref 65–140)
HCT VFR BLD AUTO: 36.1 % (ref 34.8–46.1)
HGB BLD-MCNC: 11.3 G/DL (ref 11.5–15.4)
IMM GRANULOCYTES # BLD AUTO: 0.01 THOUSAND/UL (ref 0–0.2)
IMM GRANULOCYTES NFR BLD AUTO: 0 % (ref 0–2)
LYMPHOCYTES # BLD AUTO: 3.14 THOUSANDS/ΜL (ref 0.6–4.47)
LYMPHOCYTES NFR BLD AUTO: 42 % (ref 14–44)
MAGNESIUM SERPL-MCNC: 1.7 MG/DL (ref 1.6–2.6)
MCH RBC QN AUTO: 30.1 PG (ref 26.8–34.3)
MCHC RBC AUTO-ENTMCNC: 31.3 G/DL (ref 31.4–37.4)
MCV RBC AUTO: 96 FL (ref 82–98)
MONOCYTES # BLD AUTO: 1.86 THOUSAND/ΜL (ref 0.17–1.22)
MONOCYTES NFR BLD AUTO: 24 % (ref 4–12)
NEUTROPHILS # BLD AUTO: 2.38 THOUSANDS/ΜL (ref 1.85–7.62)
NEUTS SEG NFR BLD AUTO: 31 % (ref 43–75)
NRBC BLD AUTO-RTO: 0 /100 WBCS
PLATELET # BLD AUTO: 490 THOUSANDS/UL (ref 149–390)
PMV BLD AUTO: 9.4 FL (ref 8.9–12.7)
POTASSIUM SERPL-SCNC: 3.7 MMOL/L (ref 3.5–5.3)
PROT SERPL-MCNC: 7.1 G/DL (ref 6.4–8.2)
PROT UR-MCNC: <6 MG/DL
PROT/CREAT UR: <0.24 MG/G{CREAT} (ref 0–0.1)
RBC # BLD AUTO: 3.75 MILLION/UL (ref 3.81–5.12)
SODIUM SERPL-SCNC: 138 MMOL/L (ref 136–145)
WBC # BLD AUTO: 7.62 THOUSAND/UL (ref 4.31–10.16)

## 2022-01-10 PROCEDURE — 80053 COMPREHEN METABOLIC PANEL: CPT

## 2022-01-10 PROCEDURE — 83735 ASSAY OF MAGNESIUM: CPT

## 2022-01-10 PROCEDURE — 84156 ASSAY OF PROTEIN URINE: CPT

## 2022-01-10 PROCEDURE — 36593 DECLOT VASCULAR DEVICE: CPT

## 2022-01-10 PROCEDURE — 85025 COMPLETE CBC W/AUTO DIFF WBC: CPT

## 2022-01-10 PROCEDURE — 82570 ASSAY OF URINE CREATININE: CPT

## 2022-01-10 PROCEDURE — 86304 IMMUNOASSAY TUMOR CA 125: CPT

## 2022-01-10 RX ADMIN — ALTEPLASE 2 MG: 2.2 INJECTION, POWDER, LYOPHILIZED, FOR SOLUTION INTRAVENOUS at 11:21

## 2022-01-10 NOTE — PROGRESS NOTES
Alteplase instilled x 1 hr, blood return obtained  Labs drawn and urine collected  Port flushed and deaccessed per protocol

## 2022-01-11 ENCOUNTER — HOSPITAL ENCOUNTER (OUTPATIENT)
Dept: INFUSION CENTER | Facility: HOSPITAL | Age: 72
Discharge: HOME | End: 2022-01-11
Payer: COMMERCIAL

## 2022-01-11 VITALS
RESPIRATION RATE: 20 BRPM | HEART RATE: 99 BPM | BODY MASS INDEX: 32.98 KG/M2 | WEIGHT: 163.58 LBS | SYSTOLIC BLOOD PRESSURE: 136 MMHG | TEMPERATURE: 98.8 F | DIASTOLIC BLOOD PRESSURE: 66 MMHG | OXYGEN SATURATION: 98 % | HEIGHT: 59 IN

## 2022-01-11 DIAGNOSIS — C56.3 MALIGNANT NEOPLASM OF BOTH OVARIES (HCC): ICD-10-CM

## 2022-01-11 DIAGNOSIS — T45.1X5A CHEMOTHERAPY INDUCED NEUTROPENIA (HCC): Primary | ICD-10-CM

## 2022-01-11 DIAGNOSIS — D70.1 CHEMOTHERAPY INDUCED NEUTROPENIA (HCC): Primary | ICD-10-CM

## 2022-01-11 PROCEDURE — 96375 TX/PRO/DX INJ NEW DRUG ADDON: CPT

## 2022-01-11 PROCEDURE — 96377 APPLICATON ON-BODY INJECTOR: CPT

## 2022-01-11 PROCEDURE — 96417 CHEMO IV INFUS EACH ADDL SEQ: CPT

## 2022-01-11 PROCEDURE — 96367 TX/PROPH/DG ADDL SEQ IV INF: CPT

## 2022-01-11 PROCEDURE — 96413 CHEMO IV INFUSION 1 HR: CPT

## 2022-01-11 PROCEDURE — 96415 CHEMO IV INFUSION ADDL HR: CPT

## 2022-01-11 RX ORDER — PALONOSETRON 0.05 MG/ML
0.25 INJECTION, SOLUTION INTRAVENOUS ONCE
Status: COMPLETED | OUTPATIENT
Start: 2022-01-11 | End: 2022-01-11

## 2022-01-11 RX ORDER — SODIUM CHLORIDE 9 MG/ML
20 INJECTION, SOLUTION INTRAVENOUS ONCE
Status: COMPLETED | OUTPATIENT
Start: 2022-01-11 | End: 2022-01-11

## 2022-01-11 RX ADMIN — PEGFILGRASTIM 6 MG: KIT SUBCUTANEOUS at 15:11

## 2022-01-11 RX ADMIN — FOSAPREPITANT 150 MG: 150 INJECTION, POWDER, LYOPHILIZED, FOR SOLUTION INTRAVENOUS at 09:44

## 2022-01-11 RX ADMIN — CARBOPLATIN 471.5 MG: 10 INJECTION, SOLUTION INTRAVENOUS at 13:28

## 2022-01-11 RX ADMIN — SODIUM CHLORIDE 20 ML/HR: 0.9 INJECTION, SOLUTION INTRAVENOUS at 08:34

## 2022-01-11 RX ADMIN — PACLITAXEL 295.8 MG: 6 INJECTION, SOLUTION, CONCENTRATE INTRAVENOUS at 10:19

## 2022-01-11 RX ADMIN — PALONOSETRON 0.25 MG: 0.05 INJECTION, SOLUTION INTRAVENOUS at 08:59

## 2022-01-11 RX ADMIN — DEXAMETHASONE SODIUM PHOSPHATE 20 MG: 10 INJECTION, SOLUTION INTRAMUSCULAR; INTRAVENOUS at 08:34

## 2022-01-11 RX ADMIN — FAMOTIDINE 20 MG: 10 INJECTION INTRAVENOUS at 09:00

## 2022-01-11 RX ADMIN — DIPHENHYDRAMINE HYDROCHLORIDE 25 MG: 50 INJECTION, SOLUTION INTRAMUSCULAR; INTRAVENOUS at 09:22

## 2022-01-11 RX ADMIN — BEVACIZUMAB-AWWB 1100 MG: 400 INJECTION, SOLUTION INTRAVENOUS at 14:34

## 2022-01-14 ENCOUNTER — HOSPITAL ENCOUNTER (OUTPATIENT)
Dept: INFUSION CENTER | Facility: HOSPITAL | Age: 72
Discharge: HOME | End: 2022-01-14
Payer: COMMERCIAL

## 2022-01-14 ENCOUNTER — TELEPHONE (OUTPATIENT)
Dept: HEMATOLOGY ONCOLOGY | Facility: CLINIC | Age: 72
End: 2022-01-14

## 2022-01-14 VITALS — TEMPERATURE: 99.3 F

## 2022-01-14 DIAGNOSIS — Z45.2 ENCOUNTER FOR CENTRAL LINE CARE: ICD-10-CM

## 2022-01-14 DIAGNOSIS — C56.3 MALIGNANT NEOPLASM OF BOTH OVARIES (HCC): Primary | ICD-10-CM

## 2022-01-14 LAB
ACANTHOCYTES BLD QL SMEAR: PRESENT
ALBUMIN SERPL BCP-MCNC: 3.5 G/DL (ref 3.5–5)
ALP SERPL-CCNC: 135 U/L (ref 46–116)
ALT SERPL W P-5'-P-CCNC: 37 U/L (ref 12–78)
ANION GAP SERPL CALCULATED.3IONS-SCNC: 9 MMOL/L (ref 4–13)
AST SERPL W P-5'-P-CCNC: 17 U/L (ref 5–45)
BASOPHILS # BLD MANUAL: 0 THOUSAND/UL (ref 0–0.1)
BASOPHILS NFR MAR MANUAL: 0 % (ref 0–1)
BILIRUB SERPL-MCNC: 0.53 MG/DL (ref 0.2–1)
BUN SERPL-MCNC: 19 MG/DL (ref 5–25)
CALCIUM SERPL-MCNC: 8.2 MG/DL (ref 8.3–10.1)
CHLORIDE SERPL-SCNC: 102 MMOL/L (ref 100–108)
CO2 SERPL-SCNC: 26 MMOL/L (ref 21–32)
CREAT SERPL-MCNC: 0.83 MG/DL (ref 0.6–1.3)
EOSINOPHIL # BLD MANUAL: 0 THOUSAND/UL (ref 0–0.4)
EOSINOPHIL NFR BLD MANUAL: 0 % (ref 0–6)
ERYTHROCYTE [DISTWIDTH] IN BLOOD BY AUTOMATED COUNT: 19.7 % (ref 11.6–15.1)
GFR SERPL CREATININE-BSD FRML MDRD: 71 ML/MIN/1.73SQ M
GLUCOSE SERPL-MCNC: 117 MG/DL (ref 65–140)
HCT VFR BLD AUTO: 32.8 % (ref 34.8–46.1)
HGB BLD-MCNC: 10.5 G/DL (ref 11.5–15.4)
HOWELL-JOLLY BOD BLD QL SMEAR: PRESENT
LYMPHOCYTES # BLD AUTO: 2.01 THOUSAND/UL (ref 0.6–4.47)
LYMPHOCYTES # BLD AUTO: 4 % (ref 14–44)
MAGNESIUM SERPL-MCNC: 1.7 MG/DL (ref 1.6–2.6)
MCH RBC QN AUTO: 31.2 PG (ref 26.8–34.3)
MCHC RBC AUTO-ENTMCNC: 32 G/DL (ref 31.4–37.4)
MCV RBC AUTO: 97 FL (ref 82–98)
METAMYELOCYTES NFR BLD MANUAL: 8 % (ref 0–1)
MONOCYTES # BLD AUTO: 1.51 THOUSAND/UL (ref 0–1.22)
MONOCYTES NFR BLD: 3 % (ref 4–12)
MYELOCYTES NFR BLD MANUAL: 4 % (ref 0–1)
NEUTROPHILS # BLD MANUAL: 40.69 THOUSAND/UL (ref 1.85–7.62)
NEUTS BAND NFR BLD MANUAL: 10 % (ref 0–8)
NEUTS SEG NFR BLD AUTO: 71 % (ref 43–75)
PLATELET # BLD AUTO: 225 THOUSANDS/UL (ref 149–390)
PLATELET BLD QL SMEAR: ADEQUATE
PMV BLD AUTO: 10.9 FL (ref 8.9–12.7)
POTASSIUM SERPL-SCNC: 3.7 MMOL/L (ref 3.5–5.3)
PROT SERPL-MCNC: 6.6 G/DL (ref 6.4–8.2)
RBC # BLD AUTO: 3.37 MILLION/UL (ref 3.81–5.12)
RBC MORPH BLD: PRESENT
SODIUM SERPL-SCNC: 137 MMOL/L (ref 136–145)
WBC # BLD AUTO: 50.24 THOUSAND/UL (ref 4.31–10.16)

## 2022-01-14 PROCEDURE — 85007 BL SMEAR W/DIFF WBC COUNT: CPT

## 2022-01-14 PROCEDURE — 85027 COMPLETE CBC AUTOMATED: CPT

## 2022-01-14 PROCEDURE — 80053 COMPREHEN METABOLIC PANEL: CPT

## 2022-01-14 PROCEDURE — 83735 ASSAY OF MAGNESIUM: CPT

## 2022-01-20 ENCOUNTER — TELEPHONE (OUTPATIENT)
Dept: SURGICAL ONCOLOGY | Facility: CLINIC | Age: 72
End: 2022-01-20

## 2022-01-20 NOTE — TELEPHONE ENCOUNTER
I called patient back and LM this morning in response to her [place] message about her disability from December  The [facility] web site kept giving me an error message and this was unable to be processed online  I will need an actual form sent to me to be completed since the web site isn't work

## 2022-01-21 ENCOUNTER — HOSPITAL ENCOUNTER (OUTPATIENT)
Dept: INFUSION CENTER | Facility: HOSPITAL | Age: 72
Discharge: HOME | End: 2022-01-21
Payer: COMMERCIAL

## 2022-01-21 DIAGNOSIS — C56.3 MALIGNANT NEOPLASM OF BOTH OVARIES (HCC): ICD-10-CM

## 2022-01-21 DIAGNOSIS — Z45.2 ENCOUNTER FOR CENTRAL LINE CARE: Primary | ICD-10-CM

## 2022-01-21 LAB
ALBUMIN SERPL BCP-MCNC: 3.5 G/DL (ref 3.5–5)
ALP SERPL-CCNC: 177 U/L (ref 46–116)
ALT SERPL W P-5'-P-CCNC: 28 U/L (ref 12–78)
ANION GAP SERPL CALCULATED.3IONS-SCNC: 12 MMOL/L (ref 4–13)
AST SERPL W P-5'-P-CCNC: 13 U/L (ref 5–45)
BASOPHILS # BLD AUTO: 0.09 THOUSANDS/ΜL (ref 0–0.1)
BASOPHILS NFR BLD AUTO: 1 % (ref 0–1)
BILIRUB SERPL-MCNC: 0.14 MG/DL (ref 0.2–1)
BUN SERPL-MCNC: 20 MG/DL (ref 5–25)
CALCIUM SERPL-MCNC: 8.6 MG/DL (ref 8.3–10.1)
CHLORIDE SERPL-SCNC: 103 MMOL/L (ref 100–108)
CO2 SERPL-SCNC: 24 MMOL/L (ref 21–32)
CREAT SERPL-MCNC: 0.66 MG/DL (ref 0.6–1.3)
EOSINOPHIL # BLD AUTO: 0.08 THOUSAND/ΜL (ref 0–0.61)
EOSINOPHIL NFR BLD AUTO: 1 % (ref 0–6)
ERYTHROCYTE [DISTWIDTH] IN BLOOD BY AUTOMATED COUNT: 18.5 % (ref 11.6–15.1)
GFR SERPL CREATININE-BSD FRML MDRD: 89 ML/MIN/1.73SQ M
GLUCOSE SERPL-MCNC: 119 MG/DL (ref 65–140)
HCT VFR BLD AUTO: 34.9 % (ref 34.8–46.1)
HGB BLD-MCNC: 11.4 G/DL (ref 11.5–15.4)
IMM GRANULOCYTES # BLD AUTO: 0.13 THOUSAND/UL (ref 0–0.2)
IMM GRANULOCYTES NFR BLD AUTO: 1 % (ref 0–2)
LYMPHOCYTES # BLD AUTO: 3.83 THOUSANDS/ΜL (ref 0.6–4.47)
LYMPHOCYTES NFR BLD AUTO: 23 % (ref 14–44)
MAGNESIUM SERPL-MCNC: 1.6 MG/DL (ref 1.6–2.6)
MCH RBC QN AUTO: 31.8 PG (ref 26.8–34.3)
MCHC RBC AUTO-ENTMCNC: 32.7 G/DL (ref 31.4–37.4)
MCV RBC AUTO: 97 FL (ref 82–98)
MONOCYTES # BLD AUTO: 1.8 THOUSAND/ΜL (ref 0.17–1.22)
MONOCYTES NFR BLD AUTO: 11 % (ref 4–12)
NEUTROPHILS # BLD AUTO: 10.63 THOUSANDS/ΜL (ref 1.85–7.62)
NEUTS SEG NFR BLD AUTO: 63 % (ref 43–75)
NRBC BLD AUTO-RTO: 0 /100 WBCS
PLATELET # BLD AUTO: 246 THOUSANDS/UL (ref 149–390)
PMV BLD AUTO: 10.5 FL (ref 8.9–12.7)
POTASSIUM SERPL-SCNC: 3.9 MMOL/L (ref 3.5–5.3)
PROT SERPL-MCNC: 7.1 G/DL (ref 6.4–8.2)
RBC # BLD AUTO: 3.59 MILLION/UL (ref 3.81–5.12)
SODIUM SERPL-SCNC: 139 MMOL/L (ref 136–145)
WBC # BLD AUTO: 16.56 THOUSAND/UL (ref 4.31–10.16)

## 2022-01-21 PROCEDURE — 85025 COMPLETE CBC W/AUTO DIFF WBC: CPT

## 2022-01-21 PROCEDURE — 83735 ASSAY OF MAGNESIUM: CPT

## 2022-01-21 PROCEDURE — 80053 COMPREHEN METABOLIC PANEL: CPT

## 2022-01-24 ENCOUNTER — HOSPITAL ENCOUNTER (OUTPATIENT)
Dept: RADIOLOGY | Facility: HOSPITAL | Age: 72
Discharge: HOME | End: 2022-01-24
Payer: COMMERCIAL

## 2022-01-24 DIAGNOSIS — C56.3 MALIGNANT NEOPLASM OF BOTH OVARIES (HCC): ICD-10-CM

## 2022-01-24 PROCEDURE — G1004 CDSM NDSC: HCPCS

## 2022-01-24 PROCEDURE — 74177 CT ABD & PELVIS W/CONTRAST: CPT

## 2022-01-24 PROCEDURE — 71260 CT THORAX DX C+: CPT

## 2022-01-24 RX ADMIN — IOHEXOL 100 ML: 350 INJECTION, SOLUTION INTRAVENOUS at 10:59

## 2022-01-26 ENCOUNTER — PATIENT MESSAGE (OUTPATIENT)
Dept: GYNECOLOGIC ONCOLOGY | Facility: CLINIC | Age: 72
End: 2022-01-26

## 2022-01-28 ENCOUNTER — HOSPITAL ENCOUNTER (OUTPATIENT)
Dept: INFUSION CENTER | Facility: HOSPITAL | Age: 72
Discharge: HOME | End: 2022-01-28
Payer: COMMERCIAL

## 2022-01-28 DIAGNOSIS — C56.3 MALIGNANT NEOPLASM OF BOTH OVARIES (HCC): Primary | ICD-10-CM

## 2022-01-28 DIAGNOSIS — Z45.2 ENCOUNTER FOR CENTRAL LINE CARE: ICD-10-CM

## 2022-01-28 LAB
ALBUMIN SERPL BCP-MCNC: 3.5 G/DL (ref 3.5–5)
ALP SERPL-CCNC: 120 U/L (ref 46–116)
ALT SERPL W P-5'-P-CCNC: 27 U/L (ref 12–78)
ANION GAP SERPL CALCULATED.3IONS-SCNC: 9 MMOL/L (ref 4–13)
AST SERPL W P-5'-P-CCNC: 14 U/L (ref 5–45)
BASOPHILS # BLD AUTO: 0.07 THOUSANDS/ΜL (ref 0–0.1)
BASOPHILS NFR BLD AUTO: 1 % (ref 0–1)
BILIRUB SERPL-MCNC: 0.31 MG/DL (ref 0.2–1)
BUN SERPL-MCNC: 23 MG/DL (ref 5–25)
CALCIUM SERPL-MCNC: 8.7 MG/DL (ref 8.3–10.1)
CHLORIDE SERPL-SCNC: 103 MMOL/L (ref 100–108)
CO2 SERPL-SCNC: 25 MMOL/L (ref 21–32)
CREAT SERPL-MCNC: 0.56 MG/DL (ref 0.6–1.3)
EOSINOPHIL # BLD AUTO: 0.16 THOUSAND/ΜL (ref 0–0.61)
EOSINOPHIL NFR BLD AUTO: 2 % (ref 0–6)
ERYTHROCYTE [DISTWIDTH] IN BLOOD BY AUTOMATED COUNT: 17.9 % (ref 11.6–15.1)
GFR SERPL CREATININE-BSD FRML MDRD: 94 ML/MIN/1.73SQ M
GLUCOSE SERPL-MCNC: 93 MG/DL (ref 65–140)
HCT VFR BLD AUTO: 33.4 % (ref 34.8–46.1)
HGB BLD-MCNC: 10.7 G/DL (ref 11.5–15.4)
IMM GRANULOCYTES # BLD AUTO: 0.03 THOUSAND/UL (ref 0–0.2)
IMM GRANULOCYTES NFR BLD AUTO: 0 % (ref 0–2)
LYMPHOCYTES # BLD AUTO: 3.19 THOUSANDS/ΜL (ref 0.6–4.47)
LYMPHOCYTES NFR BLD AUTO: 35 % (ref 14–44)
MAGNESIUM SERPL-MCNC: 1.7 MG/DL (ref 1.6–2.6)
MCH RBC QN AUTO: 31.3 PG (ref 26.8–34.3)
MCHC RBC AUTO-ENTMCNC: 32 G/DL (ref 31.4–37.4)
MCV RBC AUTO: 98 FL (ref 82–98)
MONOCYTES # BLD AUTO: 1.46 THOUSAND/ΜL (ref 0.17–1.22)
MONOCYTES NFR BLD AUTO: 16 % (ref 4–12)
NEUTROPHILS # BLD AUTO: 4.31 THOUSANDS/ΜL (ref 1.85–7.62)
NEUTS SEG NFR BLD AUTO: 46 % (ref 43–75)
NRBC BLD AUTO-RTO: 0 /100 WBCS
PLATELET # BLD AUTO: 354 THOUSANDS/UL (ref 149–390)
PMV BLD AUTO: 9.9 FL (ref 8.9–12.7)
POTASSIUM SERPL-SCNC: 3.9 MMOL/L (ref 3.5–5.3)
PROT SERPL-MCNC: 6.9 G/DL (ref 6.4–8.2)
RBC # BLD AUTO: 3.42 MILLION/UL (ref 3.81–5.12)
SODIUM SERPL-SCNC: 137 MMOL/L (ref 136–145)
WBC # BLD AUTO: 9.22 THOUSAND/UL (ref 4.31–10.16)

## 2022-01-28 PROCEDURE — 80053 COMPREHEN METABOLIC PANEL: CPT

## 2022-01-28 PROCEDURE — 85025 COMPLETE CBC W/AUTO DIFF WBC: CPT

## 2022-01-28 PROCEDURE — 83735 ASSAY OF MAGNESIUM: CPT

## 2022-01-31 ENCOUNTER — TELEPHONE (OUTPATIENT)
Dept: SURGICAL ONCOLOGY | Facility: CLINIC | Age: 72
End: 2022-01-31

## 2022-02-01 ENCOUNTER — OFFICE VISIT (OUTPATIENT)
Dept: PALLIATIVE MEDICINE | Facility: CLINIC | Age: 72
End: 2022-02-01
Payer: COMMERCIAL

## 2022-02-01 VITALS
OXYGEN SATURATION: 96 % | SYSTOLIC BLOOD PRESSURE: 122 MMHG | WEIGHT: 162.2 LBS | TEMPERATURE: 97.2 F | DIASTOLIC BLOOD PRESSURE: 56 MMHG | BODY MASS INDEX: 32.74 KG/M2 | HEART RATE: 113 BPM

## 2022-02-01 DIAGNOSIS — F41.9 ANXIOUSNESS: ICD-10-CM

## 2022-02-01 DIAGNOSIS — G89.18 JOINT PAIN FOLLOWING CHEMOTHERAPY: ICD-10-CM

## 2022-02-01 DIAGNOSIS — K59.03 DRUG INDUCED CONSTIPATION: ICD-10-CM

## 2022-02-01 DIAGNOSIS — G62.0 CHEMOTHERAPY-INDUCED PERIPHERAL NEUROPATHY (HCC): ICD-10-CM

## 2022-02-01 DIAGNOSIS — G89.3 CANCER RELATED PAIN: ICD-10-CM

## 2022-02-01 DIAGNOSIS — M25.50 JOINT PAIN FOLLOWING CHEMOTHERAPY: ICD-10-CM

## 2022-02-01 DIAGNOSIS — G47.01 INSOMNIA DUE TO MEDICAL CONDITION: ICD-10-CM

## 2022-02-01 DIAGNOSIS — T45.1X5A CHEMOTHERAPY-INDUCED PERIPHERAL NEUROPATHY (HCC): ICD-10-CM

## 2022-02-01 DIAGNOSIS — C56.3 MALIGNANT NEOPLASM OF BOTH OVARIES (HCC): Primary | ICD-10-CM

## 2022-02-01 DIAGNOSIS — Z51.5 PALLIATIVE CARE PATIENT: ICD-10-CM

## 2022-02-01 PROCEDURE — 99214 OFFICE O/P EST MOD 30 MIN: CPT | Performed by: INTERNAL MEDICINE

## 2022-02-01 RX ORDER — GABAPENTIN 300 MG/1
300 CAPSULE ORAL EVERY 12 HOURS
Qty: 180 CAPSULE | Refills: 0 | Status: SHIPPED | OUTPATIENT
Start: 2022-02-01 | End: 2022-04-26 | Stop reason: SDUPTHER

## 2022-02-01 RX ORDER — OXYCODONE HYDROCHLORIDE 5 MG/1
2.5-5 TABLET ORAL EVERY 4 HOURS PRN
Qty: 180 TABLET | Refills: 0 | Status: SHIPPED | OUTPATIENT
Start: 2022-02-01 | End: 2022-03-15 | Stop reason: SDUPTHER

## 2022-02-01 NOTE — PATIENT INSTRUCTIONS
It was good to see you today  Thank you for coming in  · Continue current medications  Consider using the lorazepam to help w/ sleep (as well as anxiousness)  · Return in about 6 weeks  · Call us for refills on medications that we supply, as needed  · If something changes and you need to come in sooner, please call our office  PRESCRIPTION REFILL REMINDER:  All medication refills should be requested prior to [facility] on Friday  Any refill requests after noon on Friday would be addressed the following Monday

## 2022-02-01 NOTE — PROGRESS NOTES
Follow-up with Palliative and [address] [facility] 80 y o  female [id number]    ASSESSMENT & PLAN:  1  Malignant neoplasm of both ovaries    2  Cancer related pain    3  Chemotherapy-induced peripheral neuropathy ([address] )    4  Insomnia due to medical condition    5  Anxiousness    6  Joint pain following chemotherapy    7  Drug induced constipation    8  Palliative care patient           Systemic treatment on hold pending discussion w/ Gynecologic Oncology; recent CT scan showed response to treatment  Patient may be transitioning to maintenance regimen, which may affect symptomatology   Continue Tylenol   Continue oxyIR 2 5-5mg q4h PRN; patient may take 10mg if pain is severe, if needed  Effective   Continue gabapentin 300mg q12h ATC  Effective for chemo-induced joint pain and chemo-induced neuropathy   Naloxone Rx provided in earlier visit   Continue Zofran PRN N/V (1st line)  Not needed in recent weeks   Continue lorazepam 0 5mg q8h PRN anxiousness, insomnia, 2nd-line for N/V  She has not needed this medication in recent weeks   Continue OTC Blue Emu oil for chemo-induced joint pain   Counseled on bowel regimen for constipation (Asif's apple juice is usually enough for her to avoid constipation)   Patient had reported receiving [address] vaccinations   Reviewed notes (FM, Gynecologic Oncology), labs (1/28/22: Cr 0 56, alb 3 5, Hb 10 7;  15/ 5 on 1/10/22, 12 5 on 12/17/21, 69 9 on 10/15/21), imaging + procedures (1/24/22 CTCAP showing response to treatment)   Emotional support provided   Medication safety issues addressed - no driving under the influence of narcotics, watch for adverse effects including AMS and respiratory depression, keep medications stored in a safe/locked environment        Requested Prescriptions     Signed Prescriptions Disp Refills    oxyCODONE (Roxicodone) 5 immediate release tablet 180 tablet 0     Sig: Take 0 5-1 tablets (2 5-5 mg total) by mouth every 4 (four) hours as needed for moderate pain or severe pain Max Daily Amount: 30 mg    gabapentin (Neurontin) 300 mg capsule 180 capsule 0     Sig: Take 1 capsule (300 mg total) by mouth every 12 (twelve) hours       Medications Discontinued During This Encounter   Medication Reason    gabapentin (Neurontin) 300 mg capsule Reorder    oxyCODONE (Roxicodone) 5 immediate release tablet Reorder       Representatives have queried the patient's controlled substance dispensing history in the Prescription Drug Monitoring Program in compliance with regulations before I have prescribed any controlled substances  The prescription history is consistent with prescribed therapy and our practice policies  30 minutes were spent face to face with patient and family ([facility]) with greater than 50% of the time spent in counseling or coordination of care including discussions of symptom assessment and management, medication review, psychosocial support, chart review, imaging review, lab review, supportive listening and anticipatory guidance  All of the patient's questions were answered during this discussion  Return in about 6 weeks (around 3/15/2022)  SUBJECTIVE:  Chief Complaint   Patient presents with    Cancer    Cancer Pain    Anxiety    Insomnia    Follow-up    Constipation    Counseling    Peripheral Neuropathy    Joint Pain        HPI    Annalisabarbie Redd is a 80 y o  female w/ 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 s/p PROMISE+BSO and tumor debulking most recently on paclitaxel + carboplatin  She follows w/ Dr Margarita Appiah (Gynecologic Oncology)  Patient is known to [facility] operated by [facility]; seen 1/4/22 for symptom management, psychosocial support  Patient is happy to report the recent addition of gabapentin has made a positive impact in reducing her chemotherapy-induced joint pain + back pain + neuropathic pain   When 5mg oxyIR is added (she takes 5mg every 4-6 hours) her pain becomes manageable, as low as 2/10  She is tolerating these medications w/o issue, though she is concerned that she may not be clear-minded enough to return to work on these medications  Any constipation is forestalled by taking Asif's apple juice  Patient has been tolerating her treatments recently w/o significant side effects  No recent N/V  Her appetite will wax and wane but she is defending her weight  Sleep is "off and on"; she can sleep up to 5 hours uninterrupted at times, and will "cat nap" during the day  She has not been using lorazepam recently for insomnia (or for anxiousness)  Mood has been positive  Patient's chemotherapy is on hold as she just had a scan last week  She is waiting to discuss w/ Dr Zara Vernon, to see if she will be transitioning to maintenance therapy  PDMP shows no concerns  The following portions of the medical history were reviewed: past medical history, problem list, medication list, and social history        Current Outpatient Medications:     acetaminophen (TYLENOL) 325 mg tablet, Take 3 tablets (975 mg total) by mouth every 8 (eight) hours, Disp: , Rfl:     albuterol (PROVENTIL HFA,VENTOLIN HFA) 90 mcg/act inhaler, Inhale 2 puffs every 6 (six) hours as needed for wheezing, Disp: , Rfl:     AMLODIPINE BENZOATE PO, Take by mouth  , Disp: , Rfl:     amLODIPine-benazepril (LOTREL) 10-20 MG per capsule, Take 1 capsule by mouth daily, Disp: , Rfl:     butalbital-aspirin-caffeine (FIORINAL) -40 mg per capsule, Take 1 capsule by mouth every 4 (four) hours as needed for headaches  , Disp: , Rfl:     fluticasone (FLONASE) 50 mcg/act nasal spray, 1 spray into each nostril daily, Disp: , Rfl:     gabapentin (Neurontin) 300 mg capsule, Take 1 capsule (300 mg total) by mouth every 12 (twelve) hours, Disp: 180 capsule, Rfl: 0    Liniments (Blue-Emu Super Strength) CREA, Apply topically as needed (joint pain), Disp: , Rfl:     LORazepam (ATIVAN) 1 mg tablet, Take 0 5 tablets (0 5 mg total) by mouth every 8 (eight) hours as needed (nausea or anxiety or insomnia), Disp: 45 tablet, Rfl: 0    omeprazole (PriLOSEC) 20 mg delayed release capsule, Take 20 mg by mouth 2 (two) times a day, Disp: , Rfl:     ondansetron (ZOFRAN) 8 mg tablet, Take 1 tablet (8 mg total) by mouth every 8 (eight) hours as needed for nausea or vomiting, Disp: 20 tablet, Rfl: 1    oxyCODONE (Roxicodone) 5 immediate release tablet, Take 0 5-1 tablets (2 5-5 mg total) by mouth every 4 (four) hours as needed for moderate pain or severe pain Max Daily Amount: 30 mg, Disp: 180 tablet, Rfl: 0    polyethylene glycol (MiraLax) 17 GM/SCOOP powder, Mix with 64 oz Gatorade, begin 4 PM day before surgery per bowel prep instructions  , Disp: 238 g, Rfl: 0    pravastatin (PRAVACHOL) 10 mg tablet, Take 10 mg by mouth daily, Disp: , Rfl:     benzonatate (TESSALON) 200 MG capsule, Take 1 capsule (200 mg total) by mouth 3 (three) times a day as needed for cough (Patient not taking: Reported on 2/1/2022 ), Disp: 20 capsule, Rfl: 0    docusate sodium (COLACE) 100 mg capsule, Take 1 capsule (100 mg total) by mouth 2 (two) times a day (Patient not taking: Reported on 11/2/2021), Disp: , Rfl:     ibuprofen (MOTRIN) 600 mg tablet, Take 1 tablet (600 mg total) by mouth every 6 (six) hours (Patient not taking: Reported on 1/4/2022 ), Disp: 30 tablet, Rfl: 0    naloxone (NARCAN) 4 mg/0 1 mL nasal spray, 0 1 mL (4 mg total) by Alternating Nares route every 3 (three) minutes as needed (accidental opioid overdose or respiratory depression), Disp: 1 each, Rfl: 1    Naproxen Sodium (Aleve) 220 MG CAPS, Take 220 mg by mouth daily 2 caps AM (Patient not taking: Reported on 10/13/2021), Disp: , Rfl:     sodium chloride, PF, 0 9 %, 10 mL by Intracatheter route daily (Patient not taking: Reported on 11/3/2021), Disp: 300 mL, Rfl: 3    Review of Systems   Constitutional: Positive for activity change (improved) and fatigue (improved)  Negative for unexpected weight change (defending her weight)  HENT: Negative for trouble swallowing  Eyes: Negative for pain and redness  Respiratory: Negative for shortness of breath  Cardiovascular: Negative for chest pain  Gastrointestinal: Positive for constipation (improves w/ Asif's apple juice)  Negative for diarrhea, nausea and vomiting  Endocrine: Negative for polydipsia and polyphagia  Musculoskeletal: Positive for arthralgias and back pain  Allergic/Immunologic: Positive for immunocompromised state  Neurological: Negative for facial asymmetry  Painful neuropathy (improved)  Paresthesia  Psychiatric/Behavioral: Positive for sleep disturbance  Negative for confusion, decreased concentration and dysphoric mood  The patient is not nervous/anxious  OBJECTIVE:  /56 (BP Location: Right arm, Patient Position: Sitting, Cuff Size: Standard)   Pulse (!) 113   Temp (!) 97 2 °F (36 2 °C) (Tympanic)   Wt 73 6 kg (162 lb 3 2 oz)   SpO2 96%   BMI 32 74 kg/m²   Physical Exam  Vitals reviewed  Constitutional:       General: She is not in acute distress  Appearance: She is well-developed, well-groomed and overweight  She is ill-appearing (chronically)  She is not toxic-appearing  HENT:      Head: Normocephalic and atraumatic  Right Ear: External ear normal       Left Ear: External ear normal    Eyes:      General: No scleral icterus  Right eye: No discharge  Left eye: No discharge  Extraocular Movements: Extraocular movements intact  Conjunctiva/sclera: Conjunctivae normal       Pupils: Pupils are equal, round, and reactive to light  Cardiovascular:      Rate and Rhythm: Tachycardia present  Pulmonary:      Effort: Pulmonary effort is normal  No tachypnea, bradypnea, accessory muscle usage or respiratory distress  Abdominal:      General: There is no distension  Tenderness: There is no guarding  Skin:     General: Skin is dry  Coloration: Skin is pale  Neurological:      Mental Status: She is alert and oriented to person, place, and time  Cranial Nerves: No dysarthria or facial asymmetry  Psychiatric:         Attention and Perception: Attention normal          Mood and Affect: Mood and affect normal          Speech: Speech normal          Behavior: Behavior normal  Behavior is cooperative  Thought Content: Thought content normal          Cognition and Memory: Cognition and memory normal          Judgment: Judgment normal       Comments: Positive mood  Dolly Roldan MD  [facility]      Portions of this document may have been created using dictation software and as such some "sound alike" terms may have been generated by the system  Do not hesitate to contact me with any questions or clarifications

## 2022-02-03 ENCOUNTER — HOSPITAL ENCOUNTER (OUTPATIENT)
Dept: INFUSION CENTER | Facility: HOSPITAL | Age: 72
Discharge: HOME | End: 2022-02-03
Payer: COMMERCIAL

## 2022-02-03 VITALS — TEMPERATURE: 98.4 F

## 2022-02-03 DIAGNOSIS — C56.3 MALIGNANT NEOPLASM OF BOTH OVARIES (HCC): ICD-10-CM

## 2022-02-03 LAB
ALBUMIN SERPL BCP-MCNC: 3.5 G/DL (ref 3.5–5)
ALP SERPL-CCNC: 105 U/L (ref 46–116)
ALT SERPL W P-5'-P-CCNC: 25 U/L (ref 12–78)
ANION GAP SERPL CALCULATED.3IONS-SCNC: 6 MMOL/L (ref 4–13)
AST SERPL W P-5'-P-CCNC: 14 U/L (ref 5–45)
BASOPHILS # BLD AUTO: 0.05 THOUSANDS/ΜL (ref 0–0.1)
BASOPHILS NFR BLD AUTO: 1 % (ref 0–1)
BILIRUB SERPL-MCNC: 0.34 MG/DL (ref 0.2–1)
BUN SERPL-MCNC: 27 MG/DL (ref 5–25)
CALCIUM SERPL-MCNC: 8.7 MG/DL (ref 8.3–10.1)
CHLORIDE SERPL-SCNC: 101 MMOL/L (ref 100–108)
CO2 SERPL-SCNC: 27 MMOL/L (ref 21–32)
CREAT SERPL-MCNC: 0.58 MG/DL (ref 0.6–1.3)
CREAT UR-MCNC: 33.5 MG/DL
EOSINOPHIL # BLD AUTO: 0.16 THOUSAND/ΜL (ref 0–0.61)
EOSINOPHIL NFR BLD AUTO: 2 % (ref 0–6)
ERYTHROCYTE [DISTWIDTH] IN BLOOD BY AUTOMATED COUNT: 16.8 % (ref 11.6–15.1)
GFR SERPL CREATININE-BSD FRML MDRD: 93 ML/MIN/1.73SQ M
GLUCOSE SERPL-MCNC: 102 MG/DL (ref 65–140)
HCT VFR BLD AUTO: 32.1 % (ref 34.8–46.1)
HGB BLD-MCNC: 10.5 G/DL (ref 11.5–15.4)
IMM GRANULOCYTES # BLD AUTO: 0.02 THOUSAND/UL (ref 0–0.2)
IMM GRANULOCYTES NFR BLD AUTO: 0 % (ref 0–2)
LYMPHOCYTES # BLD AUTO: 2.95 THOUSANDS/ΜL (ref 0.6–4.47)
LYMPHOCYTES NFR BLD AUTO: 44 % (ref 14–44)
MAGNESIUM SERPL-MCNC: 1.8 MG/DL (ref 1.6–2.6)
MCH RBC QN AUTO: 32.5 PG (ref 26.8–34.3)
MCHC RBC AUTO-ENTMCNC: 32.7 G/DL (ref 31.4–37.4)
MCV RBC AUTO: 99 FL (ref 82–98)
MONOCYTES # BLD AUTO: 1.3 THOUSAND/ΜL (ref 0.17–1.22)
MONOCYTES NFR BLD AUTO: 19 % (ref 4–12)
NEUTROPHILS # BLD AUTO: 2.32 THOUSANDS/ΜL (ref 1.85–7.62)
NEUTS SEG NFR BLD AUTO: 34 % (ref 43–75)
NRBC BLD AUTO-RTO: 0 /100 WBCS
PLATELET # BLD AUTO: 370 THOUSANDS/UL (ref 149–390)
PMV BLD AUTO: 9.5 FL (ref 8.9–12.7)
POTASSIUM SERPL-SCNC: 3.6 MMOL/L (ref 3.5–5.3)
PROT SERPL-MCNC: 6.9 G/DL (ref 6.4–8.2)
PROT UR-MCNC: <6 MG/DL
PROT/CREAT UR: <0.18 MG/G{CREAT} (ref 0–0.1)
RBC # BLD AUTO: 3.23 MILLION/UL (ref 3.81–5.12)
SODIUM SERPL-SCNC: 134 MMOL/L (ref 136–145)
WBC # BLD AUTO: 6.8 THOUSAND/UL (ref 4.31–10.16)

## 2022-02-03 PROCEDURE — 80053 COMPREHEN METABOLIC PANEL: CPT

## 2022-02-03 PROCEDURE — 85025 COMPLETE CBC W/AUTO DIFF WBC: CPT

## 2022-02-03 PROCEDURE — 83735 ASSAY OF MAGNESIUM: CPT

## 2022-02-03 PROCEDURE — 84156 ASSAY OF PROTEIN URINE: CPT

## 2022-02-03 PROCEDURE — 82570 ASSAY OF URINE CREATININE: CPT

## 2022-02-04 DIAGNOSIS — T45.1X5A CHEMOTHERAPY INDUCED NEUTROPENIA (HCC): ICD-10-CM

## 2022-02-04 DIAGNOSIS — D70.1 CHEMOTHERAPY INDUCED NEUTROPENIA (HCC): ICD-10-CM

## 2022-02-04 DIAGNOSIS — C56.3 MALIGNANT NEOPLASM OF BOTH OVARIES (HCC): Primary | ICD-10-CM

## 2022-02-04 NOTE — TELEPHONE ENCOUNTER
This is completed, patients  came into the [place] office yesterday to  the original and a copy was scanned into her chart

## 2022-02-08 ENCOUNTER — DOCUMENTATION (OUTPATIENT)
Dept: HEMATOLOGY ONCOLOGY | Facility: CLINIC | Age: 72
End: 2022-02-08

## 2022-02-08 NOTE — PROGRESS NOTES
Received request from clinical Kylietheresa Goode that patient should start on Lynparza 300 MG BID  PA is required  PA is pending via cover my meds Key: [id number]  help desk: [place] [address] Bad:       [id number]                  ID:          [id number]                  GRP:      [id number]    UPDATE:  This has been approved  [id number]; Review Type:Prior Auth; Coverage Start Date:2022; Coverage End Date:2025; [employer] is aware and awaiting to see if they can provide to patient

## 2022-02-09 DIAGNOSIS — C56.3 MALIGNANT NEOPLASM OF BOTH OVARIES (HCC): Primary | ICD-10-CM

## 2022-02-11 ENCOUNTER — HOSPITAL ENCOUNTER (OUTPATIENT)
Dept: INFUSION CENTER | Facility: HOSPITAL | Age: 72
Discharge: HOME | End: 2022-02-11
Payer: COMMERCIAL

## 2022-02-11 VITALS — TEMPERATURE: 97 F

## 2022-02-11 DIAGNOSIS — Z45.2 ENCOUNTER FOR CENTRAL LINE CARE: ICD-10-CM

## 2022-02-11 DIAGNOSIS — C56.3 MALIGNANT NEOPLASM OF BOTH OVARIES (HCC): Primary | ICD-10-CM

## 2022-02-11 LAB
ALBUMIN SERPL BCP-MCNC: 3.3 G/DL (ref 3.5–5)
ALP SERPL-CCNC: 103 U/L (ref 46–116)
ALT SERPL W P-5'-P-CCNC: 34 U/L (ref 12–78)
ANION GAP SERPL CALCULATED.3IONS-SCNC: 11 MMOL/L (ref 4–13)
AST SERPL W P-5'-P-CCNC: 18 U/L (ref 5–45)
BASOPHILS # BLD AUTO: 0.06 THOUSANDS/ΜL (ref 0–0.1)
BASOPHILS NFR BLD AUTO: 1 % (ref 0–1)
BILIRUB SERPL-MCNC: 0.25 MG/DL (ref 0.2–1)
BUN SERPL-MCNC: 16 MG/DL (ref 5–25)
CALCIUM ALBUM COR SERPL-MCNC: 9 MG/DL (ref 8.3–10.1)
CALCIUM SERPL-MCNC: 8.4 MG/DL (ref 8.3–10.1)
CANCER AG125 SERPL-ACNC: 10.5 U/ML (ref 0–30)
CHLORIDE SERPL-SCNC: 104 MMOL/L (ref 100–108)
CO2 SERPL-SCNC: 26 MMOL/L (ref 21–32)
CREAT SERPL-MCNC: 0.62 MG/DL (ref 0.6–1.3)
EOSINOPHIL # BLD AUTO: 0.25 THOUSAND/ΜL (ref 0–0.61)
EOSINOPHIL NFR BLD AUTO: 4 % (ref 0–6)
ERYTHROCYTE [DISTWIDTH] IN BLOOD BY AUTOMATED COUNT: 16 % (ref 11.6–15.1)
GFR SERPL CREATININE-BSD FRML MDRD: 91 ML/MIN/1.73SQ M
GLUCOSE SERPL-MCNC: 111 MG/DL (ref 65–140)
HCT VFR BLD AUTO: 32.8 % (ref 34.8–46.1)
HGB BLD-MCNC: 10.5 G/DL (ref 11.5–15.4)
IMM GRANULOCYTES # BLD AUTO: 0.01 THOUSAND/UL (ref 0–0.2)
IMM GRANULOCYTES NFR BLD AUTO: 0 % (ref 0–2)
LYMPHOCYTES # BLD AUTO: 2.6 THOUSANDS/ΜL (ref 0.6–4.47)
LYMPHOCYTES NFR BLD AUTO: 45 % (ref 14–44)
MAGNESIUM SERPL-MCNC: 1.7 MG/DL (ref 1.6–2.6)
MCH RBC QN AUTO: 32.2 PG (ref 26.8–34.3)
MCHC RBC AUTO-ENTMCNC: 32 G/DL (ref 31.4–37.4)
MCV RBC AUTO: 101 FL (ref 82–98)
MONOCYTES # BLD AUTO: 1.03 THOUSAND/ΜL (ref 0.17–1.22)
MONOCYTES NFR BLD AUTO: 18 % (ref 4–12)
NEUTROPHILS # BLD AUTO: 1.88 THOUSANDS/ΜL (ref 1.85–7.62)
NEUTS SEG NFR BLD AUTO: 32 % (ref 43–75)
NRBC BLD AUTO-RTO: 0 /100 WBCS
PLATELET # BLD AUTO: 304 THOUSANDS/UL (ref 149–390)
PMV BLD AUTO: 10 FL (ref 8.9–12.7)
POTASSIUM SERPL-SCNC: 4 MMOL/L (ref 3.5–5.3)
PROT SERPL-MCNC: 6.7 G/DL (ref 6.4–8.2)
RBC # BLD AUTO: 3.26 MILLION/UL (ref 3.81–5.12)
SODIUM SERPL-SCNC: 141 MMOL/L (ref 136–145)
WBC # BLD AUTO: 5.83 THOUSAND/UL (ref 4.31–10.16)

## 2022-02-11 PROCEDURE — 83735 ASSAY OF MAGNESIUM: CPT

## 2022-02-11 PROCEDURE — 85025 COMPLETE CBC W/AUTO DIFF WBC: CPT

## 2022-02-11 PROCEDURE — 86304 IMMUNOASSAY TUMOR CA 125: CPT

## 2022-02-11 PROCEDURE — 80053 COMPREHEN METABOLIC PANEL: CPT

## 2022-02-16 PROBLEM — K86.89 PANCREATIC FLUID LEAK: Status: RESOLVED | Noted: 2021-08-16 | Resolved: 2022-02-16

## 2022-02-16 NOTE — PROGRESS NOTES
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, PROMISE/BSO/tumor debulking and adjuvant chemotherapy presents for maintenance treatment discussion    Imaging c/w decreased size and number of hepatic mets and possible small residual nodularity at pleural base  We discussed her normalized  and possibility of small residual disease  Options at this time would be to continue with another 3 cycles of chemotherapy or start on maintenance avastin/PARP  Given patient's significant toxicity from systemic chemotherapy I believe it is reasonable to transition to Avastin and olaparib at this time  We discussed repeating imaging in 2 months to assess disease status normal liver at that time  I discussed with patient rationale, logistics, common side effects and toxicities associated with chemotherapy regimen  She understands toxicities include but are not limited to PPE, fatigue, decreased appetite, nausea and vomiting, peripheral neuropathy, bone marrow suppression ( anemia, neutropenia and or thrombocytopenia) with subsequent risk of life-threatening infection or hemorrhage, kidney and or liver damage, etc   we discussed specific toxicities associated with Avastin including but not limited to hypertension, venous/arterial thromboembolism, nephropathy, posterior encephalopathy and or bowel perforation    Consents were signed  We will continue with Avastin 15 mg per kg q  3 weeks and olaparib 300 mg b i d  Repeat imaging in 2 months              Nervous and Auditory    Chemotherapy-induced peripheral neuropathy (HCC)       Other    Cancer related pain            CHIEF COMPLAINT: f/u after cycle 6      Problem:  Cancer Staging  Ovarian cancer ([facility] )  Staging form: Ovary, Fallopian Tube, Primary Peritoneal, AJCC 8th Edition  - Clinical: FIGO Stage IIIC (cT3c) - Signed by Deandre Osorio Sumeet Dominguez MD on 9/16/2021        Previous therapy:  Oncology History   Ovarian cancer ([facility] )   8/6/2021 Surgery    PROMISE/BSO/tumor debulking to optimal cytoreduction     8/25/2021 Initial Diagnosis    Ovarian cancer ([facility] )     9/16/2021 -  Cancer Staged    Staging form: Ovary, Fallopian Tube, Primary Peritoneal, AJCC 8th Edition  - Clinical: FIGO Stage IIIC (cT3c) - Signed by Pastor Aj MD on 9/16/2021  Stage prefix: Initial diagnosis  Cancer antigen 125 () (U/mL): 543       9/28/2021 - 2/2022 Chemotherapy    Carbo AUC6, Taxol 175mg/m2 q3 weeks  Avastin 15mg/m2 added cycle 3    Toxicities:  Cycle4: carbo AUC 5 for neutropenia, added neulasta     10/2021 Genomic Testing    SEAN high  CPS >2     12/9/2021 Genetic Testing    POLD VUS     2/22/2022 -  Chemotherapy    Maintenance: Avastin 15mg/m2  Olaparib 300mg BID             Patient ID: Michael Hood is a 80 y o  female  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, PROMISE/BSO/tumor debulking and adjuvant chemotherapy presents for maintenance treatment discussion  Patient is with persistent joint and bone pain since last cycle  Continue peripheral neuropathy is grade 1  Constipation is well managed  Persistent fatigue  The following portions of the patient's history were reviewed and updated as appropriate: allergies, current medications, past family history, past medical history, past social history, past surgical history and problem list     Review of Systems   Constitutional: Positive for fatigue  Negative for appetite change, chills and fever  Respiratory: Negative for chest tightness and shortness of breath  Gastrointestinal: Negative for abdominal distention, abdominal pain, constipation, diarrhea and nausea  Genitourinary: Negative for difficulty urinating, flank pain, frequency, urgency, vaginal bleeding, vaginal discharge and vaginal pain  Musculoskeletal: Positive for arthralgias and back pain   Negative for joint swelling and myalgias  Skin: Negative for rash  Neurological: Negative for dizziness, light-headedness, numbness and headaches  Current Outpatient Medications   Medication Sig Dispense Refill    acetaminophen (TYLENOL) 325 mg tablet Take 3 tablets (975 mg total) by mouth every 8 (eight) hours      albuterol (PROVENTIL HFA,VENTOLIN HFA) 90 mcg/act inhaler Inhale 2 puffs every 6 (six) hours as needed for wheezing      AMLODIPINE BENZOATE PO Take by mouth        amLODIPine-benazepril (LOTREL) 10-20 MG per capsule Take 1 capsule by mouth daily      butalbital-aspirin-caffeine (FIORINAL) -40 mg per capsule Take 1 capsule by mouth every 4 (four) hours as needed for headaches        fluticasone (FLONASE) 50 mcg/act nasal spray 1 spray into each nostril daily      gabapentin (Neurontin) 300 mg capsule Take 1 capsule (300 mg total) by mouth every 12 (twelve) hours 180 capsule 0    Liniments (Blue-Emu Super Strength) CREA Apply topically as needed (joint pain)      LORazepam (ATIVAN) 1 mg tablet Take 0 5 tablets (0 5 mg total) by mouth every 8 (eight) hours as needed (nausea or anxiety or insomnia) 45 tablet 0    naloxone (NARCAN) 4 mg/0 1 mL nasal spray 0 1 mL (4 mg total) by Alternating Nares route every 3 (three) minutes as needed (accidental opioid overdose or respiratory depression) 1 each 1    omeprazole (PriLOSEC) 20 mg delayed release capsule Take 20 mg by mouth 2 (two) times a day      ondansetron (ZOFRAN) 8 mg tablet Take 1 tablet (8 mg total) by mouth every 8 (eight) hours as needed for nausea or vomiting 20 tablet 1    oxyCODONE (Roxicodone) 5 immediate release tablet Take 0 5-1 tablets (2 5-5 mg total) by mouth every 4 (four) hours as needed for moderate pain or severe pain Max Daily Amount: 30 mg 180 tablet 0    polyethylene glycol (MiraLax) 17 GM/SCOOP powder Mix with 64 oz Gatorade, begin 4 PM day before surgery per bowel prep instructions   238 g 0    pravastatin (PRAVACHOL) 10 mg tablet Take 10 mg by mouth daily      benzonatate (TESSALON) 200 MG capsule Take 1 capsule (200 mg total) by mouth 3 (three) times a day as needed for cough (Patient not taking: Reported on 2/1/2022 ) 20 capsule 0    docusate sodium (COLACE) 100 mg capsule Take 1 capsule (100 mg total) by mouth 2 (two) times a day (Patient not taking: Reported on 11/2/2021)      ibuprofen (MOTRIN) 600 mg tablet Take 1 tablet (600 mg total) by mouth every 6 (six) hours (Patient not taking: Reported on 1/4/2022 ) 30 tablet 0    Naproxen Sodium (Aleve) 220 MG CAPS Take 220 mg by mouth daily 2 caps AM (Patient not taking: Reported on 10/13/2021)      olaparib [facility] [address]) tablet Take 2 tablets PO  tablet 3    sodium chloride, PF, 0 9 % 10 mL by Intracatheter route daily (Patient not taking: Reported on 11/3/2021) 300 mL 3     No current facility-administered medications for this visit  Objective:    Blood pressure 118/80, pulse 102, temperature 98 5 °F (36 9 °C), resp  rate 18, height 4' 11" (1 499 m), weight 72 6 kg (160 lb), SpO2 99 %  Body mass index is 32 32 kg/m²  Body surface area is 1 68 meters squared  Physical Exam  HENT:      Head: Normocephalic and atraumatic  Nose: Nose normal    Cardiovascular:      Rate and Rhythm: Normal rate and regular rhythm  Pulmonary:      Effort: Pulmonary effort is normal    Abdominal:      General: There is no distension  Palpations: Abdomen is soft  There is no mass  Genitourinary:     Comments: defer  Musculoskeletal:         General: No swelling  Normal range of motion  Cervical back: Normal range of motion  Skin:     General: Skin is warm and dry  Neurological:      General: No focal deficit present  Mental Status: She is alert     Psychiatric:         Mood and Affect: Mood normal            Lab Results   Component Value Date    K 4 2 03/11/2022     03/11/2022    CO2 26 03/11/2022    BUN 18 03/11/2022    CREATININE 0 82 03/11/2022    GLUCOSE 195 (H) 08/06/2021    GLUF 102 (H) 07/20/2021    CALCIUM 8 6 03/11/2022    CORRECTEDCA 9 2 03/04/2022    AST 16 03/11/2022    ALT 33 03/11/2022    ALKPHOS 97 03/11/2022    EGFR 72 03/11/2022     Lab Results   Component Value Date    WBC 6 31 03/11/2022    HGB 11 0 (L) 03/11/2022    HCT 33 8 (L) 03/11/2022     (H) 03/11/2022     03/11/2022     Lab Results   Component Value Date    NEUTROABS 2 61 03/11/2022        Trend:  Lab Results   Component Value Date     7 6 03/11/2022     10 5 02/11/2022     15 5 01/10/2022     12 5 12/17/2021     11 6 11/26/2021     19 4 11/05/2021     69 9 (H) 10/15/2021     94 1 (H) 09/24/2021     543 7 (H) 07/20/2021

## 2022-02-16 NOTE — ASSESSMENT & PLAN NOTE
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, PROMISE/BSO/tumor debulking and adjuvant chemotherapy presents for maintenance treatment discussion    Imaging c/w decreased size and number of hepatic mets and possible small residual nodularity at pleural base  We discussed her normalized  and possibility of small residual disease  Options at this time would be to continue with another 3 cycles of chemotherapy or start on maintenance avastin/PARP  Given patient's significant toxicity from systemic chemotherapy I believe it is reasonable to transition to Avastin and olaparib at this time  We discussed repeating imaging in 2 months to assess disease status normal liver at that time  I discussed with patient rationale, logistics, common side effects and toxicities associated with chemotherapy regimen  She understands toxicities include but are not limited to PPE, fatigue, decreased appetite, nausea and vomiting, peripheral neuropathy, bone marrow suppression ( anemia, neutropenia and or thrombocytopenia) with subsequent risk of life-threatening infection or hemorrhage, kidney and or liver damage, etc   we discussed specific toxicities associated with Avastin including but not limited to hypertension, venous/arterial thromboembolism, nephropathy, posterior encephalopathy and or bowel perforation    Consents were signed  We will continue with Avastin 15 mg per kg q  3 weeks and olaparib 300 mg b i d  Repeat imaging in 2 months

## 2022-02-18 ENCOUNTER — HOSPITAL ENCOUNTER (OUTPATIENT)
Dept: INFUSION CENTER | Facility: HOSPITAL | Age: 72
Discharge: HOME | End: 2022-02-18
Payer: COMMERCIAL

## 2022-02-18 ENCOUNTER — OFFICE VISIT (OUTPATIENT)
Dept: GYNECOLOGIC ONCOLOGY | Facility: CLINIC | Age: 72
End: 2022-02-18
Payer: COMMERCIAL

## 2022-02-18 VITALS — TEMPERATURE: 98.5 F

## 2022-02-18 VITALS
DIASTOLIC BLOOD PRESSURE: 80 MMHG | WEIGHT: 160 LBS | SYSTOLIC BLOOD PRESSURE: 118 MMHG | BODY MASS INDEX: 32.25 KG/M2 | RESPIRATION RATE: 18 BRPM | HEIGHT: 59 IN | HEART RATE: 102 BPM | TEMPERATURE: 98.5 F | OXYGEN SATURATION: 99 %

## 2022-02-18 DIAGNOSIS — G62.0 CHEMOTHERAPY-INDUCED PERIPHERAL NEUROPATHY (HCC): ICD-10-CM

## 2022-02-18 DIAGNOSIS — C56.3 MALIGNANT NEOPLASM OF BOTH OVARIES (HCC): ICD-10-CM

## 2022-02-18 DIAGNOSIS — C56.3 MALIGNANT NEOPLASM OF BOTH OVARIES (HCC): Primary | ICD-10-CM

## 2022-02-18 DIAGNOSIS — T45.1X5A CHEMOTHERAPY-INDUCED PERIPHERAL NEUROPATHY (HCC): ICD-10-CM

## 2022-02-18 DIAGNOSIS — G89.3 CANCER RELATED PAIN: ICD-10-CM

## 2022-02-18 DIAGNOSIS — Z45.2 ENCOUNTER FOR CENTRAL LINE CARE: Primary | ICD-10-CM

## 2022-02-18 LAB
ALBUMIN SERPL BCP-MCNC: 3.6 G/DL (ref 3.5–5)
ALP SERPL-CCNC: 102 U/L (ref 46–116)
ALT SERPL W P-5'-P-CCNC: 37 U/L (ref 12–78)
ANION GAP SERPL CALCULATED.3IONS-SCNC: 10 MMOL/L (ref 4–13)
AST SERPL W P-5'-P-CCNC: 20 U/L (ref 5–45)
BASOPHILS # BLD AUTO: 0.04 THOUSANDS/ΜL (ref 0–0.1)
BASOPHILS NFR BLD AUTO: 1 % (ref 0–1)
BILIRUB SERPL-MCNC: 0.28 MG/DL (ref 0.2–1)
BUN SERPL-MCNC: 15 MG/DL (ref 5–25)
CALCIUM SERPL-MCNC: 8.5 MG/DL (ref 8.3–10.1)
CHLORIDE SERPL-SCNC: 104 MMOL/L (ref 100–108)
CO2 SERPL-SCNC: 26 MMOL/L (ref 21–32)
CREAT SERPL-MCNC: 0.56 MG/DL (ref 0.6–1.3)
EOSINOPHIL # BLD AUTO: 0.28 THOUSAND/ΜL (ref 0–0.61)
EOSINOPHIL NFR BLD AUTO: 3 % (ref 0–6)
ERYTHROCYTE [DISTWIDTH] IN BLOOD BY AUTOMATED COUNT: 15.3 % (ref 11.6–15.1)
GFR SERPL CREATININE-BSD FRML MDRD: 94 ML/MIN/1.73SQ M
GLUCOSE SERPL-MCNC: 97 MG/DL (ref 65–140)
HCT VFR BLD AUTO: 33.6 % (ref 34.8–46.1)
HGB BLD-MCNC: 11 G/DL (ref 11.5–15.4)
IMM GRANULOCYTES # BLD AUTO: 0.01 THOUSAND/UL (ref 0–0.2)
IMM GRANULOCYTES NFR BLD AUTO: 0 % (ref 0–2)
LYMPHOCYTES # BLD AUTO: 2.54 THOUSANDS/ΜL (ref 0.6–4.47)
LYMPHOCYTES NFR BLD AUTO: 30 % (ref 14–44)
MAGNESIUM SERPL-MCNC: 1.8 MG/DL (ref 1.6–2.6)
MCH RBC QN AUTO: 33.1 PG (ref 26.8–34.3)
MCHC RBC AUTO-ENTMCNC: 32.7 G/DL (ref 31.4–37.4)
MCV RBC AUTO: 101 FL (ref 82–98)
MONOCYTES # BLD AUTO: 0.99 THOUSAND/ΜL (ref 0.17–1.22)
MONOCYTES NFR BLD AUTO: 12 % (ref 4–12)
NEUTROPHILS # BLD AUTO: 4.69 THOUSANDS/ΜL (ref 1.85–7.62)
NEUTS SEG NFR BLD AUTO: 54 % (ref 43–75)
NRBC BLD AUTO-RTO: 0 /100 WBCS
PLATELET # BLD AUTO: 339 THOUSANDS/UL (ref 149–390)
PMV BLD AUTO: 9.6 FL (ref 8.9–12.7)
POTASSIUM SERPL-SCNC: 3.8 MMOL/L (ref 3.5–5.3)
PROT SERPL-MCNC: 6.8 G/DL (ref 6.4–8.2)
RBC # BLD AUTO: 3.32 MILLION/UL (ref 3.81–5.12)
SODIUM SERPL-SCNC: 140 MMOL/L (ref 136–145)
WBC # BLD AUTO: 8.55 THOUSAND/UL (ref 4.31–10.16)

## 2022-02-18 PROCEDURE — 80053 COMPREHEN METABOLIC PANEL: CPT

## 2022-02-18 PROCEDURE — 85025 COMPLETE CBC W/AUTO DIFF WBC: CPT

## 2022-02-18 PROCEDURE — 82570 ASSAY OF URINE CREATININE: CPT

## 2022-02-18 PROCEDURE — 84156 ASSAY OF PROTEIN URINE: CPT

## 2022-02-18 PROCEDURE — 99214 OFFICE O/P EST MOD 30 MIN: CPT | Performed by: OBSTETRICS & GYNECOLOGY

## 2022-02-18 PROCEDURE — 83735 ASSAY OF MAGNESIUM: CPT

## 2022-02-19 LAB
CREAT UR-MCNC: 122 MG/DL
PROT UR-MCNC: 15 MG/DL
PROT/CREAT UR: 0.12 MG/G{CREAT} (ref 0–0.1)

## 2022-02-22 ENCOUNTER — HOSPITAL ENCOUNTER (OUTPATIENT)
Dept: INFUSION CENTER | Facility: HOSPITAL | Age: 72
Discharge: HOME | End: 2022-02-22
Payer: COMMERCIAL

## 2022-02-22 ENCOUNTER — PATIENT MESSAGE (OUTPATIENT)
Dept: GYNECOLOGIC ONCOLOGY | Facility: CLINIC | Age: 72
End: 2022-02-22

## 2022-02-22 VITALS
HEIGHT: 59 IN | BODY MASS INDEX: 33.07 KG/M2 | RESPIRATION RATE: 20 BRPM | WEIGHT: 164.02 LBS | HEART RATE: 93 BPM | SYSTOLIC BLOOD PRESSURE: 119 MMHG | OXYGEN SATURATION: 98 % | TEMPERATURE: 98 F | DIASTOLIC BLOOD PRESSURE: 60 MMHG

## 2022-02-22 DIAGNOSIS — T45.1X5A CHEMOTHERAPY INDUCED NEUTROPENIA (HCC): Primary | ICD-10-CM

## 2022-02-22 DIAGNOSIS — D70.1 CHEMOTHERAPY INDUCED NEUTROPENIA (HCC): Primary | ICD-10-CM

## 2022-02-22 DIAGNOSIS — C56.3 MALIGNANT NEOPLASM OF BOTH OVARIES (HCC): ICD-10-CM

## 2022-02-22 PROCEDURE — 96413 CHEMO IV INFUSION 1 HR: CPT

## 2022-02-22 RX ORDER — SODIUM CHLORIDE 9 MG/ML
20 INJECTION, SOLUTION INTRAVENOUS ONCE
Status: COMPLETED | OUTPATIENT
Start: 2022-02-22 | End: 2022-02-22

## 2022-02-22 RX ADMIN — BEVACIZUMAB-AWWB 1100 MG: 400 INJECTION, SOLUTION INTRAVENOUS at 13:16

## 2022-02-22 RX ADMIN — SODIUM CHLORIDE 20 ML/HR: 9 INJECTION, SOLUTION INTRAVENOUS at 13:14

## 2022-02-25 ENCOUNTER — HOSPITAL ENCOUNTER (OUTPATIENT)
Dept: INFUSION CENTER | Facility: HOSPITAL | Age: 72
Discharge: HOME | End: 2022-02-25

## 2022-02-28 ENCOUNTER — HOSPITAL ENCOUNTER (OUTPATIENT)
Dept: INFUSION CENTER | Facility: HOSPITAL | Age: 72
Discharge: HOME | End: 2022-02-28
Payer: COMMERCIAL

## 2022-02-28 VITALS — TEMPERATURE: 98.8 F

## 2022-02-28 DIAGNOSIS — C56.3 MALIGNANT NEOPLASM OF BOTH OVARIES (HCC): Primary | ICD-10-CM

## 2022-02-28 DIAGNOSIS — Z45.2 ENCOUNTER FOR CENTRAL LINE CARE: ICD-10-CM

## 2022-02-28 LAB
ALBUMIN SERPL BCP-MCNC: 3.3 G/DL (ref 3.5–5)
ALP SERPL-CCNC: 105 U/L (ref 46–116)
ALT SERPL W P-5'-P-CCNC: 33 U/L (ref 12–78)
ANION GAP SERPL CALCULATED.3IONS-SCNC: 9 MMOL/L (ref 4–13)
AST SERPL W P-5'-P-CCNC: 18 U/L (ref 5–45)
BASOPHILS # BLD AUTO: 0.06 THOUSANDS/ΜL (ref 0–0.1)
BASOPHILS NFR BLD AUTO: 1 % (ref 0–1)
BILIRUB SERPL-MCNC: 0.18 MG/DL (ref 0.2–1)
BUN SERPL-MCNC: 17 MG/DL (ref 5–25)
CALCIUM ALBUM COR SERPL-MCNC: 9.2 MG/DL (ref 8.3–10.1)
CALCIUM SERPL-MCNC: 8.6 MG/DL (ref 8.3–10.1)
CHLORIDE SERPL-SCNC: 104 MMOL/L (ref 100–108)
CO2 SERPL-SCNC: 25 MMOL/L (ref 21–32)
CREAT SERPL-MCNC: 0.64 MG/DL (ref 0.6–1.3)
EOSINOPHIL # BLD AUTO: 0.81 THOUSAND/ΜL (ref 0–0.61)
EOSINOPHIL NFR BLD AUTO: 11 % (ref 0–6)
ERYTHROCYTE [DISTWIDTH] IN BLOOD BY AUTOMATED COUNT: 14.4 % (ref 11.6–15.1)
GFR SERPL CREATININE-BSD FRML MDRD: 90 ML/MIN/1.73SQ M
GLUCOSE SERPL-MCNC: 115 MG/DL (ref 65–140)
HCT VFR BLD AUTO: 35.3 % (ref 34.8–46.1)
HGB BLD-MCNC: 11.3 G/DL (ref 11.5–15.4)
IMM GRANULOCYTES # BLD AUTO: 0.01 THOUSAND/UL (ref 0–0.2)
IMM GRANULOCYTES NFR BLD AUTO: 0 % (ref 0–2)
LYMPHOCYTES # BLD AUTO: 2.62 THOUSANDS/ΜL (ref 0.6–4.47)
LYMPHOCYTES NFR BLD AUTO: 37 % (ref 14–44)
MAGNESIUM SERPL-MCNC: 1.7 MG/DL (ref 1.6–2.6)
MCH RBC QN AUTO: 32.5 PG (ref 26.8–34.3)
MCHC RBC AUTO-ENTMCNC: 32 G/DL (ref 31.4–37.4)
MCV RBC AUTO: 101 FL (ref 82–98)
MONOCYTES # BLD AUTO: 1.2 THOUSAND/ΜL (ref 0.17–1.22)
MONOCYTES NFR BLD AUTO: 17 % (ref 4–12)
NEUTROPHILS # BLD AUTO: 2.44 THOUSANDS/ΜL (ref 1.85–7.62)
NEUTS SEG NFR BLD AUTO: 34 % (ref 43–75)
NRBC BLD AUTO-RTO: 0 /100 WBCS
PLATELET # BLD AUTO: 300 THOUSANDS/UL (ref 149–390)
PMV BLD AUTO: 9.9 FL (ref 8.9–12.7)
POTASSIUM SERPL-SCNC: 3.6 MMOL/L (ref 3.5–5.3)
PROT SERPL-MCNC: 6.7 G/DL (ref 6.4–8.2)
RBC # BLD AUTO: 3.48 MILLION/UL (ref 3.81–5.12)
SODIUM SERPL-SCNC: 138 MMOL/L (ref 136–145)
WBC # BLD AUTO: 7.14 THOUSAND/UL (ref 4.31–10.16)

## 2022-02-28 PROCEDURE — 80053 COMPREHEN METABOLIC PANEL: CPT

## 2022-02-28 PROCEDURE — 85025 COMPLETE CBC W/AUTO DIFF WBC: CPT

## 2022-02-28 PROCEDURE — 83735 ASSAY OF MAGNESIUM: CPT

## 2022-03-04 ENCOUNTER — HOSPITAL ENCOUNTER (OUTPATIENT)
Dept: INFUSION CENTER | Facility: HOSPITAL | Age: 72
Discharge: HOME | End: 2022-03-04
Payer: COMMERCIAL

## 2022-03-04 VITALS — TEMPERATURE: 99.3 F

## 2022-03-04 DIAGNOSIS — Z45.2 ENCOUNTER FOR CENTRAL LINE CARE: Primary | ICD-10-CM

## 2022-03-04 DIAGNOSIS — C56.3 MALIGNANT NEOPLASM OF BOTH OVARIES (HCC): ICD-10-CM

## 2022-03-04 LAB
ALBUMIN SERPL BCP-MCNC: 3.4 G/DL (ref 3.5–5)
ALP SERPL-CCNC: 107 U/L (ref 46–116)
ALT SERPL W P-5'-P-CCNC: 39 U/L (ref 12–78)
ANION GAP SERPL CALCULATED.3IONS-SCNC: 12 MMOL/L (ref 4–13)
AST SERPL W P-5'-P-CCNC: 18 U/L (ref 5–45)
BASOPHILS # BLD AUTO: 0.05 THOUSANDS/ΜL (ref 0–0.1)
BASOPHILS NFR BLD AUTO: 1 % (ref 0–1)
BILIRUB SERPL-MCNC: 0.23 MG/DL (ref 0.2–1)
BUN SERPL-MCNC: 16 MG/DL (ref 5–25)
CALCIUM ALBUM COR SERPL-MCNC: 9.2 MG/DL (ref 8.3–10.1)
CALCIUM SERPL-MCNC: 8.7 MG/DL (ref 8.3–10.1)
CHLORIDE SERPL-SCNC: 103 MMOL/L (ref 100–108)
CO2 SERPL-SCNC: 24 MMOL/L (ref 21–32)
CREAT SERPL-MCNC: 0.75 MG/DL (ref 0.6–1.3)
EOSINOPHIL # BLD AUTO: 0.62 THOUSAND/ΜL (ref 0–0.61)
EOSINOPHIL NFR BLD AUTO: 10 % (ref 0–6)
ERYTHROCYTE [DISTWIDTH] IN BLOOD BY AUTOMATED COUNT: 14 % (ref 11.6–15.1)
GFR SERPL CREATININE-BSD FRML MDRD: 81 ML/MIN/1.73SQ M
GLUCOSE SERPL-MCNC: 143 MG/DL (ref 65–140)
HCT VFR BLD AUTO: 35.5 % (ref 34.8–46.1)
HGB BLD-MCNC: 11.4 G/DL (ref 11.5–15.4)
IMM GRANULOCYTES # BLD AUTO: 0.01 THOUSAND/UL (ref 0–0.2)
IMM GRANULOCYTES NFR BLD AUTO: 0 % (ref 0–2)
LYMPHOCYTES # BLD AUTO: 2.54 THOUSANDS/ΜL (ref 0.6–4.47)
LYMPHOCYTES NFR BLD AUTO: 41 % (ref 14–44)
MAGNESIUM SERPL-MCNC: 1.6 MG/DL (ref 1.6–2.6)
MCH RBC QN AUTO: 32.4 PG (ref 26.8–34.3)
MCHC RBC AUTO-ENTMCNC: 32.1 G/DL (ref 31.4–37.4)
MCV RBC AUTO: 101 FL (ref 82–98)
MONOCYTES # BLD AUTO: 0.75 THOUSAND/ΜL (ref 0.17–1.22)
MONOCYTES NFR BLD AUTO: 12 % (ref 4–12)
NEUTROPHILS # BLD AUTO: 2.19 THOUSANDS/ΜL (ref 1.85–7.62)
NEUTS SEG NFR BLD AUTO: 36 % (ref 43–75)
NRBC BLD AUTO-RTO: 1 /100 WBCS
PLATELET # BLD AUTO: 311 THOUSANDS/UL (ref 149–390)
PMV BLD AUTO: 9.6 FL (ref 8.9–12.7)
POTASSIUM SERPL-SCNC: 3.4 MMOL/L (ref 3.5–5.3)
PROT SERPL-MCNC: 6.8 G/DL (ref 6.4–8.2)
RBC # BLD AUTO: 3.52 MILLION/UL (ref 3.81–5.12)
SODIUM SERPL-SCNC: 139 MMOL/L (ref 136–145)
WBC # BLD AUTO: 6.16 THOUSAND/UL (ref 4.31–10.16)

## 2022-03-04 PROCEDURE — 85025 COMPLETE CBC W/AUTO DIFF WBC: CPT

## 2022-03-04 PROCEDURE — 83735 ASSAY OF MAGNESIUM: CPT

## 2022-03-04 PROCEDURE — 80053 COMPREHEN METABOLIC PANEL: CPT

## 2022-03-11 ENCOUNTER — HOSPITAL ENCOUNTER (OUTPATIENT)
Dept: INFUSION CENTER | Facility: HOSPITAL | Age: 72
Discharge: HOME | End: 2022-03-11
Payer: COMMERCIAL

## 2022-03-11 DIAGNOSIS — C56.3 MALIGNANT NEOPLASM OF BOTH OVARIES (HCC): ICD-10-CM

## 2022-03-11 DIAGNOSIS — Z45.2 ENCOUNTER FOR CENTRAL LINE CARE: Primary | ICD-10-CM

## 2022-03-11 LAB
ALBUMIN SERPL BCP-MCNC: 3.5 G/DL (ref 3.5–5)
ALP SERPL-CCNC: 97 U/L (ref 46–116)
ALT SERPL W P-5'-P-CCNC: 33 U/L (ref 12–78)
ANION GAP SERPL CALCULATED.3IONS-SCNC: 8 MMOL/L (ref 4–13)
AST SERPL W P-5'-P-CCNC: 16 U/L (ref 5–45)
BASOPHILS # BLD AUTO: 0.06 THOUSANDS/ΜL (ref 0–0.1)
BASOPHILS NFR BLD AUTO: 1 % (ref 0–1)
BILIRUB SERPL-MCNC: 0.3 MG/DL (ref 0.2–1)
BUN SERPL-MCNC: 18 MG/DL (ref 5–25)
CALCIUM SERPL-MCNC: 8.6 MG/DL (ref 8.3–10.1)
CANCER AG125 SERPL-ACNC: 7.6 U/ML (ref 0–30)
CHLORIDE SERPL-SCNC: 106 MMOL/L (ref 100–108)
CO2 SERPL-SCNC: 26 MMOL/L (ref 21–32)
CREAT SERPL-MCNC: 0.82 MG/DL (ref 0.6–1.3)
CREAT UR-MCNC: 119 MG/DL
EOSINOPHIL # BLD AUTO: 0.42 THOUSAND/ΜL (ref 0–0.61)
EOSINOPHIL NFR BLD AUTO: 7 % (ref 0–6)
ERYTHROCYTE [DISTWIDTH] IN BLOOD BY AUTOMATED COUNT: 13.7 % (ref 11.6–15.1)
GFR SERPL CREATININE-BSD FRML MDRD: 72 ML/MIN/1.73SQ M
GLUCOSE SERPL-MCNC: 105 MG/DL (ref 65–140)
HCT VFR BLD AUTO: 33.8 % (ref 34.8–46.1)
HGB BLD-MCNC: 11 G/DL (ref 11.5–15.4)
IMM GRANULOCYTES # BLD AUTO: 0.01 THOUSAND/UL (ref 0–0.2)
IMM GRANULOCYTES NFR BLD AUTO: 0 % (ref 0–2)
LYMPHOCYTES # BLD AUTO: 2.34 THOUSANDS/ΜL (ref 0.6–4.47)
LYMPHOCYTES NFR BLD AUTO: 37 % (ref 14–44)
MAGNESIUM SERPL-MCNC: 1.9 MG/DL (ref 1.6–2.6)
MCH RBC QN AUTO: 32.7 PG (ref 26.8–34.3)
MCHC RBC AUTO-ENTMCNC: 32.5 G/DL (ref 31.4–37.4)
MCV RBC AUTO: 101 FL (ref 82–98)
MONOCYTES # BLD AUTO: 0.87 THOUSAND/ΜL (ref 0.17–1.22)
MONOCYTES NFR BLD AUTO: 14 % (ref 4–12)
NEUTROPHILS # BLD AUTO: 2.61 THOUSANDS/ΜL (ref 1.85–7.62)
NEUTS SEG NFR BLD AUTO: 41 % (ref 43–75)
NRBC BLD AUTO-RTO: 3 /100 WBCS
PLATELET # BLD AUTO: 279 THOUSANDS/UL (ref 149–390)
PMV BLD AUTO: 9.9 FL (ref 8.9–12.7)
POTASSIUM SERPL-SCNC: 4.2 MMOL/L (ref 3.5–5.3)
PROT SERPL-MCNC: 6.9 G/DL (ref 6.4–8.2)
PROT UR-MCNC: 17 MG/DL
PROT/CREAT UR: 0.14 MG/G{CREAT} (ref 0–0.1)
RBC # BLD AUTO: 3.36 MILLION/UL (ref 3.81–5.12)
SODIUM SERPL-SCNC: 140 MMOL/L (ref 136–145)
WBC # BLD AUTO: 6.31 THOUSAND/UL (ref 4.31–10.16)

## 2022-03-11 PROCEDURE — 80053 COMPREHEN METABOLIC PANEL: CPT

## 2022-03-11 PROCEDURE — 85025 COMPLETE CBC W/AUTO DIFF WBC: CPT

## 2022-03-11 PROCEDURE — 83735 ASSAY OF MAGNESIUM: CPT

## 2022-03-11 PROCEDURE — 84156 ASSAY OF PROTEIN URINE: CPT

## 2022-03-11 PROCEDURE — 82570 ASSAY OF URINE CREATININE: CPT

## 2022-03-11 PROCEDURE — 86304 IMMUNOASSAY TUMOR CA 125: CPT

## 2022-03-14 ENCOUNTER — TELEPHONE (OUTPATIENT)
Dept: GYNECOLOGIC ONCOLOGY | Facility: CLINIC | Age: 72
End: 2022-03-14

## 2022-03-14 RX ORDER — SODIUM CHLORIDE 9 MG/ML
20 INJECTION, SOLUTION INTRAVENOUS ONCE
Status: CANCELLED | OUTPATIENT
Start: 2022-03-15

## 2022-03-14 NOTE — TELEPHONE ENCOUNTER
Left message for patient to reschedule 6/20 appt with Graul to the week before (Monday or Friday) per Max Sample

## 2022-03-15 ENCOUNTER — HOSPITAL ENCOUNTER (OUTPATIENT)
Dept: INFUSION CENTER | Facility: HOSPITAL | Age: 72
Discharge: HOME | End: 2022-03-15
Payer: COMMERCIAL

## 2022-03-15 ENCOUNTER — OFFICE VISIT (OUTPATIENT)
Dept: PALLIATIVE MEDICINE | Facility: CLINIC | Age: 72
End: 2022-03-15
Payer: COMMERCIAL

## 2022-03-15 VITALS
DIASTOLIC BLOOD PRESSURE: 55 MMHG | RESPIRATION RATE: 18 BRPM | SYSTOLIC BLOOD PRESSURE: 129 MMHG | TEMPERATURE: 97.4 F | BODY MASS INDEX: 33.87 KG/M2 | OXYGEN SATURATION: 96 % | WEIGHT: 167.8 LBS | HEART RATE: 99 BPM

## 2022-03-15 VITALS
OXYGEN SATURATION: 99 % | DIASTOLIC BLOOD PRESSURE: 62 MMHG | TEMPERATURE: 97.9 F | RESPIRATION RATE: 20 BRPM | HEIGHT: 59 IN | BODY MASS INDEX: 33.78 KG/M2 | WEIGHT: 167.55 LBS | SYSTOLIC BLOOD PRESSURE: 122 MMHG | HEART RATE: 86 BPM

## 2022-03-15 DIAGNOSIS — E46 PROTEIN-CALORIE MALNUTRITION, UNSPECIFIED SEVERITY (HCC): ICD-10-CM

## 2022-03-15 DIAGNOSIS — K59.03 DRUG INDUCED CONSTIPATION: ICD-10-CM

## 2022-03-15 DIAGNOSIS — G89.18 JOINT PAIN FOLLOWING CHEMOTHERAPY: ICD-10-CM

## 2022-03-15 DIAGNOSIS — C78.6 PERITONEAL CARCINOMATOSIS (HCC): ICD-10-CM

## 2022-03-15 DIAGNOSIS — T45.1X5A CHEMOTHERAPY INDUCED NEUTROPENIA (HCC): ICD-10-CM

## 2022-03-15 DIAGNOSIS — C56.3 MALIGNANT NEOPLASM OF BOTH OVARIES (HCC): Primary | ICD-10-CM

## 2022-03-15 DIAGNOSIS — G62.0 CHEMOTHERAPY-INDUCED PERIPHERAL NEUROPATHY (HCC): ICD-10-CM

## 2022-03-15 DIAGNOSIS — Z51.5 PALLIATIVE CARE PATIENT: ICD-10-CM

## 2022-03-15 DIAGNOSIS — D70.1 CHEMOTHERAPY INDUCED NEUTROPENIA (HCC): ICD-10-CM

## 2022-03-15 DIAGNOSIS — M25.50 JOINT PAIN FOLLOWING CHEMOTHERAPY: ICD-10-CM

## 2022-03-15 DIAGNOSIS — F41.9 ANXIOUSNESS: ICD-10-CM

## 2022-03-15 DIAGNOSIS — R53.0 NEOPLASTIC MALIGNANT RELATED FATIGUE: ICD-10-CM

## 2022-03-15 DIAGNOSIS — T45.1X5A CHEMOTHERAPY-INDUCED PERIPHERAL NEUROPATHY (HCC): ICD-10-CM

## 2022-03-15 DIAGNOSIS — G89.3 CANCER RELATED PAIN: ICD-10-CM

## 2022-03-15 DIAGNOSIS — G47.01 INSOMNIA DUE TO MEDICAL CONDITION: ICD-10-CM

## 2022-03-15 PROCEDURE — 96413 CHEMO IV INFUSION 1 HR: CPT

## 2022-03-15 PROCEDURE — 99214 OFFICE O/P EST MOD 30 MIN: CPT | Performed by: INTERNAL MEDICINE

## 2022-03-15 RX ORDER — LORAZEPAM 1 MG/1
0.5 TABLET ORAL EVERY 8 HOURS PRN
Qty: 45 TABLET | Refills: 0 | Status: SHIPPED | OUTPATIENT
Start: 2022-03-15

## 2022-03-15 RX ORDER — OXYCODONE HYDROCHLORIDE 5 MG/1
2.5-5 TABLET ORAL EVERY 4 HOURS PRN
Qty: 180 TABLET | Refills: 0 | Status: SHIPPED | OUTPATIENT
Start: 2022-03-15 | End: 2022-04-26 | Stop reason: SDUPTHER

## 2022-03-15 RX ORDER — SODIUM CHLORIDE 9 MG/ML
20 INJECTION, SOLUTION INTRAVENOUS ONCE
Status: COMPLETED | OUTPATIENT
Start: 2022-03-15 | End: 2022-03-15

## 2022-03-15 RX ADMIN — BEVACIZUMAB-AWWB 1100 MG: 400 INJECTION, SOLUTION INTRAVENOUS at 13:19

## 2022-03-15 RX ADMIN — SODIUM CHLORIDE 20 ML/HR: 9 INJECTION, SOLUTION INTRAVENOUS at 12:36

## 2022-03-15 NOTE — PROGRESS NOTES
Follow-up with Palliative and [address] [facility] 80 y o  female [id number]    ASSESSMENT & PLAN:  1  Malignant neoplasm of both ovaries    2  Cancer related pain    3  Protein-calorie malnutrition, unspecified severity ([facility] )    4  Joint pain following chemotherapy    5  Chemotherapy-induced peripheral neuropathy ([facility] )    6  Neoplastic malignant related fatigue    7  Anxiousness    8  Insomnia due to medical condition    9  Drug induced constipation    10  Palliative care patient    6  Peritoneal carcinomatosis ([facility] )           Continue disease-directed therapies   Continue Tylenol   Continue oxyIR 2 5-5mg q4h PRN; patient may take 10mg if pain is severe, if needed  Effective   Continue gabapentin 300mg q12h ATC  Effective for chemo-induced joint pain and chemo-induced neuropathy   Naloxone Rx provided in prior visit   Continue Zofran PRN N/V (1st line)  Not needed in recent weeks   Continue lorazepam 0 5mg q8h PRN anxiousness, insomnia, 2nd-line for N/V  Effective   Continue OTC Blue Emu oil for chemo-induced joint pain   Constipation improved (she reports needing Asif's apple juice less often)   Patient had reported receiving [address] vaccinations   Reviewed notes (Gynecologic Oncology), labs (3/11/22: Cr 0 82, alb 3 5,  7 6, Hb 11 0), imaging + procedures (nothing newer than 1/24/22 CTCAP which showed response to treatment)   Medication safety issues addressed - no driving under the influence of narcotics, watch for adverse effects including AMS and respiratory depression, keep medications stored in a safe/locked environment        Requested Prescriptions     Signed Prescriptions Disp Refills    LORazepam (ATIVAN) 1 mg tablet 45 tablet 0     Sig: Take 0 5 tablets (0 5 mg total) by mouth every 8 (eight) hours as needed (nausea or anxiety or insomnia)    oxyCODONE (Roxicodone) 5 immediate release tablet 180 tablet 0     Sig: Take 0 5-1 tablets (2 5-5 mg total) by mouth every 4 (four) hours as needed for moderate pain or severe pain Max Daily Amount: 30 mg       Medications Discontinued During This Encounter   Medication Reason    LORazepam (ATIVAN) 1 mg tablet Reorder    oxyCODONE (Roxicodone) 5 immediate release tablet Reorder       Representatives have queried the patient's controlled substance dispensing history in the Prescription Drug Monitoring Program in compliance with regulations before I have prescribed any controlled substances  The prescription history is consistent with prescribed therapy and our practice policies  25+ minutes were spent face to face with patient and family (sister Ranjana Payan) with greater than 50% of the time spent in counseling or coordination of care including discussions of symptom assessment and management, medication review, psychosocial support, chart review, imaging review, lab review, supportive listening and anticipatory guidance  All of the patient's questions were answered during this discussion  Return in about 6 weeks (around 4/26/2022)  SUBJECTIVE:  Chief Complaint   Patient presents with    Pain    Follow-up    Cancer    Cancer Pain    Constipation    Anxiety    Counseling    Fatigue        HPI    Dionisio Francis is a 80 y o  female w/ 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 s/p PROMISE+BSO and tumor debulking s/p chemo w/ paclitaxel + carboplatin, now receiving maintenance bevacizumab + olaparib (though olaparib was delayed s/t availability)  She follows w/ Dr Eryn Duffy (Gynecologic Oncology)  Patient is known to [facility] clinic; seen 1/4/22 for symptom management, psychosocial support  Patient reports adequate pain control w/ gabapentin q12h, PRN oxycodone  She is taking about 5-6 tabs of oxyIR per day  At time she is able to get restorative sleep, though it continues to be an issue most nights   She reports feeling overwhelmed w/ her medical issues and other stressors; lorazepam provides relief  She reports her appetite comes and goes but is better than before  She is gaining weight  She denies recent N/V  She reports a decrease in constipation since stopping her prior regimen (and Hoffmans apple juice continues to help when she is constipated)  Overall she is tolerating the bevacizumab well, but she is nervous about the potential side effects of the olaparib  She is concerned about "chemo brain"  Patient has a cane and a walker but prefers to go without either  Counseled  PDMP shows no concerns  The following portions of the medical history were reviewed: past medical history, problem list, medication list, and social history        Current Outpatient Medications:     acetaminophen (TYLENOL) 325 mg tablet, Take 3 tablets (975 mg total) by mouth every 8 (eight) hours, Disp: , Rfl:     albuterol (PROVENTIL HFA,VENTOLIN HFA) 90 mcg/act inhaler, Inhale 2 puffs every 6 (six) hours as needed for wheezing, Disp: , Rfl:     AMLODIPINE BENZOATE PO, Take by mouth  , Disp: , Rfl:     amLODIPine-benazepril (LOTREL) 10-20 MG per capsule, Take 1 capsule by mouth daily, Disp: , Rfl:     butalbital-aspirin-caffeine (FIORINAL) -40 mg per capsule, Take 1 capsule by mouth every 4 (four) hours as needed for headaches  , Disp: , Rfl:     fluticasone (FLONASE) 50 mcg/act nasal spray, 1 spray into each nostril daily, Disp: , Rfl:     gabapentin (Neurontin) 300 mg capsule, Take 1 capsule (300 mg total) by mouth every 12 (twelve) hours, Disp: 180 capsule, Rfl: 0    Liniments (Blue-Emu Super Strength) CREA, Apply topically as needed (joint pain), Disp: , Rfl:     LORazepam (ATIVAN) 1 mg tablet, Take 0 5 tablets (0 5 mg total) by mouth every 8 (eight) hours as needed (nausea or anxiety or insomnia), Disp: 45 tablet, Rfl: 0    olaparib (LYNPARZA) tablet, Take 2 tablets PO BID, Disp: 120 tablet, Rfl: 3    omeprazole (PriLOSEC) 20 mg delayed release capsule, Take 20 mg by mouth 2 (two) times a day, Disp: , Rfl:     ondansetron (ZOFRAN) 8 mg tablet, Take 1 tablet (8 mg total) by mouth every 8 (eight) hours as needed for nausea or vomiting, Disp: 20 tablet, Rfl: 1    oxyCODONE (Roxicodone) 5 immediate release tablet, Take 0 5-1 tablets (2 5-5 mg total) by mouth every 4 (four) hours as needed for moderate pain or severe pain Max Daily Amount: 30 mg, Disp: 180 tablet, Rfl: 0    polyethylene glycol (MiraLax) 17 GM/SCOOP powder, Mix with 64 oz Gatorade, begin 4 PM day before surgery per bowel prep instructions  , Disp: 238 g, Rfl: 0    pravastatin (PRAVACHOL) 10 mg tablet, Take 10 mg by mouth daily, Disp: , Rfl:     benzonatate (TESSALON) 200 MG capsule, Take 1 capsule (200 mg total) by mouth 3 (three) times a day as needed for cough (Patient not taking: Reported on 2/1/2022 ), Disp: 20 capsule, Rfl: 0    docusate sodium (COLACE) 100 mg capsule, Take 1 capsule (100 mg total) by mouth 2 (two) times a day (Patient not taking: Reported on 11/2/2021), Disp: , Rfl:     ibuprofen (MOTRIN) 600 mg tablet, Take 1 tablet (600 mg total) by mouth every 6 (six) hours (Patient not taking: Reported on 1/4/2022 ), Disp: 30 tablet, Rfl: 0    naloxone (NARCAN) 4 mg/0 1 mL nasal spray, 0 1 mL (4 mg total) by Alternating Nares route every 3 (three) minutes as needed (accidental opioid overdose or respiratory depression), Disp: 1 each, Rfl: 1    Naproxen Sodium (Aleve) 220 MG CAPS, Take 220 mg by mouth daily 2 caps AM (Patient not taking: Reported on 10/13/2021), Disp: , Rfl:     sodium chloride, PF, 0 9 %, 10 mL by Intracatheter route daily (Patient not taking: Reported on 11/3/2021), Disp: 300 mL, Rfl: 3  No current facility-administered medications for this visit  Review of Systems   Constitutional: Positive for activity change, appetite change ("comes and goes", "better than before") and fatigue  Negative for unexpected weight change  HENT: Negative for trouble swallowing      Eyes: Negative for pain and redness  Respiratory: Negative for shortness of breath  Cardiovascular: Negative for chest pain  Gastrointestinal: Positive for constipation (improved)  Negative for diarrhea, nausea and vomiting  Reflux (managed)  Endocrine: Negative for polydipsia and polyphagia  Musculoskeletal: Positive for arthralgias and back pain  Allergic/Immunologic: Positive for immunocompromised state  Neurological: Negative for facial asymmetry and speech difficulty  Painful neuropathy, paresthesia  Psychiatric/Behavioral: Positive for decreased concentration and sleep disturbance  Negative for confusion and dysphoric mood  The patient is nervous/anxious  OBJECTIVE:  /55 (BP Location: Right arm, Patient Position: Sitting, Cuff Size: Standard)   Pulse 99   Temp (!) 97 4 °F (36 3 °C) (Temporal)   Resp 18   Wt 76 1 kg (167 lb 12 8 oz)   SpO2 96%   BMI 33 87 kg/m²   Physical Exam  Vitals reviewed  Constitutional:       General: She is not in acute distress  Appearance: She is well-groomed and overweight  She is ill-appearing (chronically)  She is not toxic-appearing  HENT:      Head: Normocephalic and atraumatic  Right Ear: External ear normal       Left Ear: External ear normal    Eyes:      General: No scleral icterus  Right eye: No discharge  Left eye: No discharge  Extraocular Movements: Extraocular movements intact  Conjunctiva/sclera: Conjunctivae normal       Pupils: Pupils are equal, round, and reactive to light  Cardiovascular:      Rate and Rhythm: Normal rate  Pulmonary:      Effort: Pulmonary effort is normal  No tachypnea, bradypnea, accessory muscle usage or respiratory distress  Abdominal:      General: There is no distension  Tenderness: There is no guarding  Musculoskeletal:      Cervical back: Normal range of motion  Right lower leg: No edema  Left lower leg: No edema     Skin:     General: Skin is dry  Coloration: Skin is not pale  Neurological:      Mental Status: She is alert and oriented to person, place, and time  Cranial Nerves: No dysarthria or facial asymmetry  Psychiatric:         Attention and Perception: Attention normal          Mood and Affect: Mood and affect normal          Speech: Speech normal          Behavior: Behavior normal  Behavior is cooperative  Thought Content: Thought content normal          Cognition and Memory: Cognition and memory normal          Judgment: Judgment normal         Diego Neumann MD  [facility]      Portions of this document may have been created using dictation software and as such some "sound alike" terms may have been generated by the system  Do not hesitate to contact me with any questions or clarifications

## 2022-03-15 NOTE — PATIENT INSTRUCTIONS
It was good to see you today  Thank you for coming in  · Continue current medications  · Return in about 6 weeks  · Call us for refills on medications that we supply, as needed  · If something changes and you need to come in sooner, please call our office  PRESCRIPTION REFILL REMINDER:  All medication refills should be requested prior to [facility] on Friday  Any refill requests after noon on Friday would be addressed the following Monday

## 2022-03-21 ENCOUNTER — PATIENT MESSAGE (OUTPATIENT)
Dept: GYNECOLOGIC ONCOLOGY | Facility: CLINIC | Age: 72
End: 2022-03-21

## 2022-03-28 ENCOUNTER — OFFICE VISIT (OUTPATIENT)
Dept: GYNECOLOGIC ONCOLOGY | Facility: CLINIC | Age: 72
End: 2022-03-28
Payer: COMMERCIAL

## 2022-03-28 VITALS
BODY MASS INDEX: 33.87 KG/M2 | SYSTOLIC BLOOD PRESSURE: 130 MMHG | HEIGHT: 59 IN | RESPIRATION RATE: 18 BRPM | DIASTOLIC BLOOD PRESSURE: 84 MMHG | WEIGHT: 168 LBS | TEMPERATURE: 97.4 F | OXYGEN SATURATION: 98 % | HEART RATE: 91 BPM

## 2022-03-28 DIAGNOSIS — C56.3 MALIGNANT NEOPLASM OF BOTH OVARIES (HCC): Primary | ICD-10-CM

## 2022-03-28 PROCEDURE — 99215 OFFICE O/P EST HI 40 MIN: CPT | Performed by: PHYSICIAN ASSISTANT

## 2022-03-28 NOTE — PATIENT INSTRUCTIONS
Reduce olaparib to 1 pill in AM and 2 pills in PM and monitor fatigue  Will also reduce dose of avastin, and closely monitor bone/muscle pain

## 2022-03-28 NOTE — PROGRESS NOTES
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 chemotherapy, surgical debulking and adjuvant chemotherapy, who currently continues on maintenance therapy with avastin 15 mg/kg every 21 days and olaparib 300 mg PO BID  She has grade 2 treatment-related fatigue, as well as arthralgias and myalgias requiring both OTC and narcotic pain medications that intermittent interfere with ADLs  Her hematologic and metabolic values have been stable and adequate for treatment  Due to treatment related side-effects, will plan to dose-reduce avastin to 10 mg/kg every 21 days as well as dose-reduction of olaparib to 150 mg AM and 300 mg PM      Plan for repeat CT imaging now, and continue to closely monitor metabolic and hematologic values  Return to the office as per her chemotherapy calendar  Continue follow-up with palliative care for symptoms management               Relevant Orders    CT chest abdomen pelvis w contrast            CHIEF COMPLAINT:   Pre-chemotherapy evaluation    Problem:  Cancer Staging  Ovarian cancer ([place] [address] )  Staging form: Ovary, Fallopian Tube, Primary Peritoneal, AJCC 8th Edition  - Clinical: FIGO Stage IIIC (cT3c) - Signed by Chadwick Harry MD on 9/16/2021        Previous therapy:  Oncology History   Ovarian cancer ([place] [address] )   8/6/2021 Surgery    PROMISE/BSO/tumor debulking to optimal cytoreduction     8/25/2021 Initial Diagnosis    Ovarian cancer ([place] [address] )     9/16/2021 -  Cancer Staged    Staging form: Ovary, Fallopian Tube, Primary Peritoneal, AJCC 8th Edition  - Clinical: FIGO Stage IIIC (cT3c) - Signed by Chadwick Harry MD on 9/16/2021  Stage prefix: Initial diagnosis  Cancer antigen 125 () (U/mL): 543       9/28/2021 - 2/2022 Chemotherapy    Carbo AUC6, Taxol 175mg/m2 q3 weeks  Avastin 15mg/m2 added cycle 3    Toxicities:  Cycle4: carbo AUC 5 for neutropenia, added neulasta 10/2021 Genomic Testing    SEAN high  CPS >2     12/9/2021 Genetic Testing    POLD VUS     2/22/2022 -  Chemotherapy    Maintenance: Avastin 15mg/m2  Olaparib 300mg BID             Patient ID: Micah Ventura is a 80 y o  female  who presents to the office for pre-chemotherapy evaluation  Overall, she is tolerating treatment well without new or acute complaints  She has been afebrile  She denies n/v/abdominal pain  Appetite is appropriate  Normal bowel/bladder function  She notes treatment related fatigue which occasionally affects her ADLs  She notes this is more significant than chemotherapy with taxol/carboplain  She also notes that her bone/muscle discomfort has not improved  It is still bothersome, and requires regular dosing of tylenol and oxycodone  She feel as though the pain is limiting her ability to return to work  Bone/muscle pain  Regular tylenol and oxycodone dosing  CBC/Diff, CMP, Mg, UPC ratio and  from 3/11/22 reviewed  The following portions of the patient's history were reviewed and updated as appropriate: allergies, current medications, past medical history, past surgical history and problem list     Review of Systems   Constitutional: Positive for fatigue  Negative for appetite change, fever and unexpected weight change  HENT: Negative  Eyes: Negative  Respiratory: Negative  Cardiovascular: Negative  Gastrointestinal: Negative  Genitourinary: Negative  Musculoskeletal: Positive for arthralgias and myalgias  Skin: Negative  Neurological: Negative  Psychiatric/Behavioral: Negative          Current Outpatient Medications   Medication Sig Dispense Refill    acetaminophen (TYLENOL) 325 mg tablet Take 3 tablets (975 mg total) by mouth every 8 (eight) hours      albuterol (PROVENTIL HFA,VENTOLIN HFA) 90 mcg/act inhaler Inhale 2 puffs every 6 (six) hours as needed for wheezing      AMLODIPINE BENZOATE PO Take by mouth        amLODIPine-benazepril (LOTREL) 10-20 MG per capsule Take 1 capsule by mouth daily      benzonatate (TESSALON) 200 MG capsule Take 1 capsule (200 mg total) by mouth 3 (three) times a day as needed for cough 20 capsule 0    butalbital-aspirin-caffeine (FIORINAL) -40 mg per capsule Take 1 capsule by mouth every 4 (four) hours as needed for headaches        fluticasone (FLONASE) 50 mcg/act nasal spray 1 spray into each nostril daily      gabapentin (Neurontin) 300 mg capsule Take 1 capsule (300 mg total) by mouth every 12 (twelve) hours 180 capsule 0    ibuprofen (MOTRIN) 600 mg tablet Take 1 tablet (600 mg total) by mouth every 6 (six) hours 30 tablet 0    Liniments (Blue-Emu Super Strength) CREA Apply topically as needed (joint pain)      LORazepam (ATIVAN) 1 mg tablet Take 0 5 tablets (0 5 mg total) by mouth every 8 (eight) hours as needed (nausea or anxiety or insomnia) 45 tablet 0    naloxone (NARCAN) 4 mg/0 1 mL nasal spray 0 1 mL (4 mg total) by Alternating Nares route every 3 (three) minutes as needed (accidental opioid overdose or respiratory depression) 1 each 1    Naproxen Sodium (Aleve) 220 MG CAPS Take 220 mg by mouth daily 2 caps AM        olaparib (LYNPARZA) tablet Take 2 tablets PO  tablet 3    omeprazole (PriLOSEC) 20 mg delayed release capsule Take 20 mg by mouth 2 (two) times a day      ondansetron (ZOFRAN) 8 mg tablet Take 1 tablet (8 mg total) by mouth every 8 (eight) hours as needed for nausea or vomiting 20 tablet 1    oxyCODONE (Roxicodone) 5 immediate release tablet Take 0 5-1 tablets (2 5-5 mg total) by mouth every 4 (four) hours as needed for moderate pain or severe pain Max Daily Amount: 30 mg 180 tablet 0    polyethylene glycol (MiraLax) 17 GM/SCOOP powder Mix with 64 oz Gatorade, begin 4 PM day before surgery per bowel prep instructions   238 g 0    pravastatin (PRAVACHOL) 10 mg tablet Take 10 mg by mouth daily      docusate sodium (COLACE) 100 mg capsule Take 1 capsule (100 mg total) by mouth 2 (two) times a day (Patient not taking: Reported on 11/2/2021)      sodium chloride, PF, 0 9 % 10 mL by Intracatheter route daily (Patient not taking: Reported on 11/3/2021) 300 mL 3     No current facility-administered medications for this visit  Objective:    Blood pressure 130/84, pulse 91, temperature (!) 97 4 °F (36 3 °C), resp  rate 18, height 4' 11 02" (1 499 m), weight 76 2 kg (168 lb), SpO2 98 %  Body mass index is 33 91 kg/m²  Body surface area is 1 71 meters squared  Physical Exam  Vitals reviewed  Constitutional:       General: She is not in acute distress  Appearance: Normal appearance  She is not ill-appearing  HENT:      Head: Normocephalic and atraumatic  Mouth/Throat:      Mouth: Mucous membranes are moist    Eyes:      General: No scleral icterus  Right eye: No discharge  Left eye: No discharge  Conjunctiva/sclera: Conjunctivae normal    Pulmonary:      Effort: Pulmonary effort is normal    Musculoskeletal:      Right lower leg: No edema  Left lower leg: No edema  Skin:     General: Skin is warm and dry  Coloration: Skin is not jaundiced  Findings: No rash  Neurological:      General: No focal deficit present  Mental Status: She is alert and oriented to person, place, and time  Cranial Nerves: No cranial nerve deficit  Sensory: No sensory deficit  Motor: No weakness  Gait: Gait normal    Psychiatric:         Mood and Affect: Mood normal          Behavior: Behavior normal          Thought Content: Thought content normal          Judgment: Judgment normal      Performance status is zero      Lab Results   Component Value Date     7 6 03/11/2022     Lab Results   Component Value Date    K 4 2 03/11/2022     03/11/2022    CO2 26 03/11/2022    BUN 18 03/11/2022    CREATININE 0 82 03/11/2022    GLUCOSE 195 (H) 08/06/2021    GLUF 102 (H) 07/20/2021    CALCIUM 8 6 03/11/2022    CORRECTEDCA 9 2 03/04/2022 AST 16 03/11/2022    ALT 33 03/11/2022    ALKPHOS 97 03/11/2022    EGFR 72 03/11/2022     Lab Results   Component Value Date    WBC 6 31 03/11/2022    HGB 11 0 (L) 03/11/2022    HCT 33 8 (L) 03/11/2022     (H) 03/11/2022     03/11/2022     Lab Results   Component Value Date    NEUTROABS 2 61 03/11/2022        Trend:  Lab Results   Component Value Date     7 6 03/11/2022     10 5 02/11/2022     15 5 01/10/2022     12 5 12/17/2021     11 6 11/26/2021     19 4 11/05/2021     69 9 (H) 10/15/2021     94 1 (H) 09/24/2021     543 7 (H) 07/20/2021

## 2022-03-29 NOTE — ASSESSMENT & PLAN NOTE
Stage IIIC high-grade serous ovarian cancer s/p neoadjuvant chemotherapy, surgical debulking and adjuvant chemotherapy, who currently continues on maintenance therapy with avastin 15 mg/kg every 21 days and olaparib 300 mg PO BID  She has grade 2 treatment-related fatigue, as well as arthralgias and myalgias requiring both OTC and narcotic pain medications that intermittent interfere with ADLs  Her hematologic and metabolic values have been stable and adequate for treatment  Due to treatment related side-effects, will plan to dose-reduce avastin to 10 mg/kg every 21 days as well as dose-reduction of olaparib to 150 mg AM and 300 mg PM      Plan for repeat CT imaging now, and continue to closely monitor metabolic and hematologic values  Return to the office as per her chemotherapy calendar  Continue follow-up with palliative care for symptoms management

## 2022-04-01 ENCOUNTER — HOSPITAL ENCOUNTER (OUTPATIENT)
Dept: INFUSION CENTER | Facility: HOSPITAL | Age: 72
Discharge: HOME | End: 2022-04-01
Payer: COMMERCIAL

## 2022-04-01 ENCOUNTER — HOSPITAL ENCOUNTER (OUTPATIENT)
Dept: RADIOLOGY | Facility: HOSPITAL | Age: 72
Discharge: HOME | End: 2022-04-01
Payer: COMMERCIAL

## 2022-04-01 VITALS — TEMPERATURE: 99.5 F

## 2022-04-01 DIAGNOSIS — C56.3 MALIGNANT NEOPLASM OF BOTH OVARIES (HCC): ICD-10-CM

## 2022-04-01 DIAGNOSIS — Z45.2 ENCOUNTER FOR CENTRAL LINE CARE: Primary | ICD-10-CM

## 2022-04-01 LAB
ALBUMIN SERPL BCP-MCNC: 3.6 G/DL (ref 3.5–5)
ALP SERPL-CCNC: 87 U/L (ref 46–116)
ALT SERPL W P-5'-P-CCNC: 23 U/L (ref 12–78)
ANION GAP SERPL CALCULATED.3IONS-SCNC: 10 MMOL/L (ref 4–13)
AST SERPL W P-5'-P-CCNC: 17 U/L (ref 5–45)
BASOPHILS # BLD AUTO: 0.07 THOUSANDS/ΜL (ref 0–0.1)
BASOPHILS NFR BLD AUTO: 1 % (ref 0–1)
BILIRUB SERPL-MCNC: 0.39 MG/DL (ref 0.2–1)
BUN SERPL-MCNC: 15 MG/DL (ref 5–25)
CALCIUM SERPL-MCNC: 9.1 MG/DL (ref 8.3–10.1)
CANCER AG125 SERPL-ACNC: 9.5 U/ML (ref 0–30)
CHLORIDE SERPL-SCNC: 101 MMOL/L (ref 100–108)
CO2 SERPL-SCNC: 26 MMOL/L (ref 21–32)
CREAT SERPL-MCNC: 0.62 MG/DL (ref 0.6–1.3)
CREAT UR-MCNC: 19.1 MG/DL
EOSINOPHIL # BLD AUTO: 0.21 THOUSAND/ΜL (ref 0–0.61)
EOSINOPHIL NFR BLD AUTO: 3 % (ref 0–6)
ERYTHROCYTE [DISTWIDTH] IN BLOOD BY AUTOMATED COUNT: 14.5 % (ref 11.6–15.1)
GFR SERPL CREATININE-BSD FRML MDRD: 91 ML/MIN/1.73SQ M
GLUCOSE P FAST SERPL-MCNC: 88 MG/DL (ref 65–99)
GLUCOSE SERPL-MCNC: 88 MG/DL (ref 65–140)
HCT VFR BLD AUTO: 35.8 % (ref 34.8–46.1)
HGB BLD-MCNC: 11.7 G/DL (ref 11.5–15.4)
IMM GRANULOCYTES # BLD AUTO: 0.01 THOUSAND/UL (ref 0–0.2)
IMM GRANULOCYTES NFR BLD AUTO: 0 % (ref 0–2)
LYMPHOCYTES # BLD AUTO: 2.6 THOUSANDS/ΜL (ref 0.6–4.47)
LYMPHOCYTES NFR BLD AUTO: 36 % (ref 14–44)
MAGNESIUM SERPL-MCNC: 1.9 MG/DL (ref 1.6–2.6)
MCH RBC QN AUTO: 32.3 PG (ref 26.8–34.3)
MCHC RBC AUTO-ENTMCNC: 32.7 G/DL (ref 31.4–37.4)
MCV RBC AUTO: 99 FL (ref 82–98)
MONOCYTES # BLD AUTO: 1.2 THOUSAND/ΜL (ref 0.17–1.22)
MONOCYTES NFR BLD AUTO: 17 % (ref 4–12)
NEUTROPHILS # BLD AUTO: 3.08 THOUSANDS/ΜL (ref 1.85–7.62)
NEUTS SEG NFR BLD AUTO: 43 % (ref 43–75)
NRBC BLD AUTO-RTO: 4 /100 WBCS
PLATELET # BLD AUTO: 302 THOUSANDS/UL (ref 149–390)
PMV BLD AUTO: 10 FL (ref 8.9–12.7)
POTASSIUM SERPL-SCNC: 3.5 MMOL/L (ref 3.5–5.3)
PROT SERPL-MCNC: 7.4 G/DL (ref 6.4–8.2)
PROT UR-MCNC: <6 MG/DL
PROT/CREAT UR: <0.31 MG/G{CREAT} (ref 0–0.1)
RBC # BLD AUTO: 3.62 MILLION/UL (ref 3.81–5.12)
SODIUM SERPL-SCNC: 137 MMOL/L (ref 136–145)
WBC # BLD AUTO: 7.17 THOUSAND/UL (ref 4.31–10.16)

## 2022-04-01 PROCEDURE — 80053 COMPREHEN METABOLIC PANEL: CPT

## 2022-04-01 PROCEDURE — 82570 ASSAY OF URINE CREATININE: CPT

## 2022-04-01 PROCEDURE — 84156 ASSAY OF PROTEIN URINE: CPT

## 2022-04-01 PROCEDURE — G1004 CDSM NDSC: HCPCS

## 2022-04-01 PROCEDURE — 71260 CT THORAX DX C+: CPT

## 2022-04-01 PROCEDURE — 83735 ASSAY OF MAGNESIUM: CPT

## 2022-04-01 PROCEDURE — 85025 COMPLETE CBC W/AUTO DIFF WBC: CPT

## 2022-04-01 PROCEDURE — 86304 IMMUNOASSAY TUMOR CA 125: CPT

## 2022-04-01 PROCEDURE — 74177 CT ABD & PELVIS W/CONTRAST: CPT

## 2022-04-01 RX ADMIN — IOHEXOL 100 ML: 350 INJECTION, SOLUTION INTRAVENOUS at 09:44

## 2022-04-01 NOTE — PLAN OF CARE
Problem: Knowledge Deficit  Goal: Patient/family/caregiver demonstrates understanding of disease process, treatment plan, medications, and discharge instructions  Description: Complete learning assessment and assess knowledge base    Interventions:  - Provide teaching at level of understanding  - Provide teaching via preferred learning methods  Outcome: Progressing     Problem: Potential for Falls  Goal: Patient will remain free of falls  Description: INTERVENTIONS:  - Educate patient/family on patient safety including physical limitations  - Instruct patient to call for assistance with activity   - Keep Call bell within reach  Outcome: Progressing

## 2022-04-04 RX ORDER — SODIUM CHLORIDE 9 MG/ML
20 INJECTION, SOLUTION INTRAVENOUS ONCE
Status: CANCELLED | OUTPATIENT
Start: 2022-04-05

## 2022-04-05 ENCOUNTER — HOSPITAL ENCOUNTER (OUTPATIENT)
Dept: INFUSION CENTER | Facility: HOSPITAL | Age: 72
Discharge: HOME | End: 2022-04-05
Payer: COMMERCIAL

## 2022-04-05 VITALS
OXYGEN SATURATION: 97 % | HEIGHT: 59 IN | TEMPERATURE: 97.2 F | DIASTOLIC BLOOD PRESSURE: 62 MMHG | RESPIRATION RATE: 20 BRPM | BODY MASS INDEX: 33.64 KG/M2 | WEIGHT: 166.89 LBS | HEART RATE: 96 BPM | SYSTOLIC BLOOD PRESSURE: 128 MMHG

## 2022-04-05 DIAGNOSIS — C56.3 MALIGNANT NEOPLASM OF BOTH OVARIES (HCC): ICD-10-CM

## 2022-04-05 DIAGNOSIS — T45.1X5A CHEMOTHERAPY INDUCED NEUTROPENIA (HCC): Primary | ICD-10-CM

## 2022-04-05 DIAGNOSIS — D70.1 CHEMOTHERAPY INDUCED NEUTROPENIA (HCC): Primary | ICD-10-CM

## 2022-04-05 PROCEDURE — 96413 CHEMO IV INFUSION 1 HR: CPT

## 2022-04-05 RX ORDER — SODIUM CHLORIDE 9 MG/ML
20 INJECTION, SOLUTION INTRAVENOUS ONCE
Status: COMPLETED | OUTPATIENT
Start: 2022-04-05 | End: 2022-04-05

## 2022-04-05 RX ADMIN — BEVACIZUMAB-AWWB 745 MG: 400 INJECTION, SOLUTION INTRAVENOUS at 13:15

## 2022-04-05 RX ADMIN — SODIUM CHLORIDE 20 ML/HR: 0.9 INJECTION, SOLUTION INTRAVENOUS at 12:42

## 2022-04-15 ENCOUNTER — PATIENT MESSAGE (OUTPATIENT)
Dept: GYNECOLOGIC ONCOLOGY | Facility: CLINIC | Age: 72
End: 2022-04-15

## 2022-04-15 DIAGNOSIS — C56.3 MALIGNANT NEOPLASM OF BOTH OVARIES (HCC): ICD-10-CM

## 2022-04-19 ENCOUNTER — TELEPHONE (OUTPATIENT)
Dept: GYNECOLOGIC ONCOLOGY | Facility: CLINIC | Age: 72
End: 2022-04-19

## 2022-04-19 NOTE — TELEPHONE ENCOUNTER
Patient called in to reschedule her appointment on June 20th with Myriam Ewing, she is also looking to reschedule the appointment for may 9th as well

## 2022-04-21 ENCOUNTER — PATIENT MESSAGE (OUTPATIENT)
Dept: GYNECOLOGIC ONCOLOGY | Facility: CLINIC | Age: 72
End: 2022-04-21

## 2022-04-22 ENCOUNTER — HOSPITAL ENCOUNTER (OUTPATIENT)
Dept: INFUSION CENTER | Facility: HOSPITAL | Age: 72
Discharge: HOME | End: 2022-04-22
Payer: COMMERCIAL

## 2022-04-22 VITALS — TEMPERATURE: 97 F

## 2022-04-22 DIAGNOSIS — C56.3 MALIGNANT NEOPLASM OF BOTH OVARIES (HCC): ICD-10-CM

## 2022-04-22 DIAGNOSIS — Z45.2 ENCOUNTER FOR CENTRAL LINE CARE: Primary | ICD-10-CM

## 2022-04-22 DIAGNOSIS — K13.79 MOUTH SORES: Primary | ICD-10-CM

## 2022-04-22 LAB
ALBUMIN SERPL BCP-MCNC: 3.4 G/DL (ref 3.5–5)
ALP SERPL-CCNC: 91 U/L (ref 46–116)
ALT SERPL W P-5'-P-CCNC: 23 U/L (ref 12–78)
ANION GAP SERPL CALCULATED.3IONS-SCNC: 11 MMOL/L (ref 4–13)
AST SERPL W P-5'-P-CCNC: 15 U/L (ref 5–45)
BASOPHILS # BLD AUTO: 0.06 THOUSANDS/ΜL (ref 0–0.1)
BASOPHILS NFR BLD AUTO: 1 % (ref 0–1)
BILIRUB SERPL-MCNC: 0.24 MG/DL (ref 0.2–1)
BUN SERPL-MCNC: 15 MG/DL (ref 5–25)
CALCIUM ALBUM COR SERPL-MCNC: 9.3 MG/DL (ref 8.3–10.1)
CALCIUM SERPL-MCNC: 8.8 MG/DL (ref 8.3–10.1)
CHLORIDE SERPL-SCNC: 103 MMOL/L (ref 100–108)
CO2 SERPL-SCNC: 26 MMOL/L (ref 21–32)
CREAT SERPL-MCNC: 0.59 MG/DL (ref 0.6–1.3)
CREAT UR-MCNC: 78.4 MG/DL
EOSINOPHIL # BLD AUTO: 0.16 THOUSAND/ΜL (ref 0–0.61)
EOSINOPHIL NFR BLD AUTO: 2 % (ref 0–6)
ERYTHROCYTE [DISTWIDTH] IN BLOOD BY AUTOMATED COUNT: 16.5 % (ref 11.6–15.1)
GFR SERPL CREATININE-BSD FRML MDRD: 92 ML/MIN/1.73SQ M
GLUCOSE SERPL-MCNC: 110 MG/DL (ref 65–140)
HCT VFR BLD AUTO: 32.4 % (ref 34.8–46.1)
HGB BLD-MCNC: 10.6 G/DL (ref 11.5–15.4)
IMM GRANULOCYTES # BLD AUTO: 0.01 THOUSAND/UL (ref 0–0.2)
IMM GRANULOCYTES NFR BLD AUTO: 0 % (ref 0–2)
LYMPHOCYTES # BLD AUTO: 2.65 THOUSANDS/ΜL (ref 0.6–4.47)
LYMPHOCYTES NFR BLD AUTO: 40 % (ref 14–44)
MAGNESIUM SERPL-MCNC: 1.8 MG/DL (ref 1.6–2.6)
MCH RBC QN AUTO: 33 PG (ref 26.8–34.3)
MCHC RBC AUTO-ENTMCNC: 32.7 G/DL (ref 31.4–37.4)
MCV RBC AUTO: 101 FL (ref 82–98)
MONOCYTES # BLD AUTO: 1.03 THOUSAND/ΜL (ref 0.17–1.22)
MONOCYTES NFR BLD AUTO: 15 % (ref 4–12)
NEUTROPHILS # BLD AUTO: 2.77 THOUSANDS/ΜL (ref 1.85–7.62)
NEUTS SEG NFR BLD AUTO: 42 % (ref 43–75)
NRBC BLD AUTO-RTO: 5 /100 WBCS
PLATELET # BLD AUTO: 261 THOUSANDS/UL (ref 149–390)
PMV BLD AUTO: 10 FL (ref 8.9–12.7)
POTASSIUM SERPL-SCNC: 3.6 MMOL/L (ref 3.5–5.3)
PROT SERPL-MCNC: 6.9 G/DL (ref 6.4–8.2)
PROT UR-MCNC: 13 MG/DL
PROT/CREAT UR: 0.17 MG/G{CREAT} (ref 0–0.1)
RBC # BLD AUTO: 3.21 MILLION/UL (ref 3.81–5.12)
SODIUM SERPL-SCNC: 140 MMOL/L (ref 136–145)
WBC # BLD AUTO: 6.68 THOUSAND/UL (ref 4.31–10.16)

## 2022-04-22 PROCEDURE — 80053 COMPREHEN METABOLIC PANEL: CPT

## 2022-04-22 PROCEDURE — 82570 ASSAY OF URINE CREATININE: CPT

## 2022-04-22 PROCEDURE — 84156 ASSAY OF PROTEIN URINE: CPT

## 2022-04-22 PROCEDURE — 86304 IMMUNOASSAY TUMOR CA 125: CPT

## 2022-04-22 PROCEDURE — 83735 ASSAY OF MAGNESIUM: CPT

## 2022-04-22 PROCEDURE — 85025 COMPLETE CBC W/AUTO DIFF WBC: CPT

## 2022-04-23 LAB — CANCER AG125 SERPL-ACNC: 8.9 U/ML (ref 0–30)

## 2022-04-25 RX ORDER — SODIUM CHLORIDE 9 MG/ML
20 INJECTION, SOLUTION INTRAVENOUS ONCE
Status: CANCELLED | OUTPATIENT
Start: 2022-04-26

## 2022-04-26 ENCOUNTER — HOSPITAL ENCOUNTER (OUTPATIENT)
Dept: INFUSION CENTER | Facility: HOSPITAL | Age: 72
Discharge: HOME | End: 2022-04-26
Payer: COMMERCIAL

## 2022-04-26 ENCOUNTER — OFFICE VISIT (OUTPATIENT)
Dept: PALLIATIVE MEDICINE | Facility: CLINIC | Age: 72
End: 2022-04-26
Payer: COMMERCIAL

## 2022-04-26 VITALS
WEIGHT: 167.2 LBS | OXYGEN SATURATION: 96 % | TEMPERATURE: 97 F | SYSTOLIC BLOOD PRESSURE: 129 MMHG | BODY MASS INDEX: 33.75 KG/M2 | HEART RATE: 104 BPM | RESPIRATION RATE: 18 BRPM | DIASTOLIC BLOOD PRESSURE: 55 MMHG

## 2022-04-26 VITALS
WEIGHT: 166.89 LBS | DIASTOLIC BLOOD PRESSURE: 68 MMHG | TEMPERATURE: 97.1 F | HEIGHT: 59 IN | RESPIRATION RATE: 18 BRPM | BODY MASS INDEX: 33.64 KG/M2 | HEART RATE: 89 BPM | SYSTOLIC BLOOD PRESSURE: 135 MMHG | OXYGEN SATURATION: 96 %

## 2022-04-26 DIAGNOSIS — F41.9 ANXIOUSNESS: ICD-10-CM

## 2022-04-26 DIAGNOSIS — G89.18 JOINT PAIN FOLLOWING CHEMOTHERAPY: ICD-10-CM

## 2022-04-26 DIAGNOSIS — Z51.5 PALLIATIVE CARE PATIENT: ICD-10-CM

## 2022-04-26 DIAGNOSIS — G62.0 CHEMOTHERAPY-INDUCED PERIPHERAL NEUROPATHY (HCC): ICD-10-CM

## 2022-04-26 DIAGNOSIS — T45.1X5A CHEMOTHERAPY INDUCED NEUTROPENIA (HCC): Primary | ICD-10-CM

## 2022-04-26 DIAGNOSIS — T45.1X5A CHEMOTHERAPY-INDUCED PERIPHERAL NEUROPATHY (HCC): ICD-10-CM

## 2022-04-26 DIAGNOSIS — G47.01 INSOMNIA DUE TO MEDICAL CONDITION: ICD-10-CM

## 2022-04-26 DIAGNOSIS — C56.3 MALIGNANT NEOPLASM OF BOTH OVARIES (HCC): Primary | ICD-10-CM

## 2022-04-26 DIAGNOSIS — K12.31 ORAL MUCOSITIS (ULCERATIVE) DUE TO ANTINEOPLASTIC THERAPY: ICD-10-CM

## 2022-04-26 DIAGNOSIS — C56.3 MALIGNANT NEOPLASM OF BOTH OVARIES (HCC): ICD-10-CM

## 2022-04-26 DIAGNOSIS — R53.0 NEOPLASTIC MALIGNANT RELATED FATIGUE: ICD-10-CM

## 2022-04-26 DIAGNOSIS — R11.0 NAUSEA: ICD-10-CM

## 2022-04-26 DIAGNOSIS — M25.50 JOINT PAIN FOLLOWING CHEMOTHERAPY: ICD-10-CM

## 2022-04-26 DIAGNOSIS — D70.1 CHEMOTHERAPY INDUCED NEUTROPENIA (HCC): Primary | ICD-10-CM

## 2022-04-26 DIAGNOSIS — K59.03 DRUG INDUCED CONSTIPATION: ICD-10-CM

## 2022-04-26 DIAGNOSIS — G89.3 CANCER RELATED PAIN: ICD-10-CM

## 2022-04-26 PROCEDURE — 99214 OFFICE O/P EST MOD 30 MIN: CPT | Performed by: INTERNAL MEDICINE

## 2022-04-26 PROCEDURE — 96413 CHEMO IV INFUSION 1 HR: CPT

## 2022-04-26 RX ORDER — SODIUM CHLORIDE 9 MG/ML
20 INJECTION, SOLUTION INTRAVENOUS ONCE
Status: COMPLETED | OUTPATIENT
Start: 2022-04-26 | End: 2022-04-26

## 2022-04-26 RX ORDER — GABAPENTIN 300 MG/1
300 CAPSULE ORAL 3 TIMES DAILY
Qty: 270 CAPSULE | Refills: 0 | Status: SHIPPED | OUTPATIENT
Start: 2022-04-26

## 2022-04-26 RX ORDER — OXYCODONE HYDROCHLORIDE 5 MG/1
5 TABLET ORAL EVERY 4 HOURS PRN
Qty: 180 TABLET | Refills: 0 | Status: SHIPPED | OUTPATIENT
Start: 2022-04-26 | End: 2022-06-07 | Stop reason: SDUPTHER

## 2022-04-26 RX ADMIN — BEVACIZUMAB-AWWB 757.5 MG: 400 INJECTION, SOLUTION INTRAVENOUS at 12:48

## 2022-04-26 RX ADMIN — SODIUM CHLORIDE 20 ML/HR: 9 INJECTION, SOLUTION INTRAVENOUS at 12:48

## 2022-04-26 NOTE — PROGRESS NOTES
Follow-up with Palliative and [facility] Tono Sims 71 y o  female [id number]    ASSESSMENT & PLAN:  1  Malignant neoplasm of both ovaries ([facility] [address] )    2  Cancer related pain    3  Anxiousness    4  Chemotherapy-induced peripheral neuropathy ([facility] [address] )    5  Drug induced constipation    6  Insomnia due to medical condition    7  Joint pain following chemotherapy    8  Nausea    9  Neoplastic malignant related fatigue    10  Oral mucositis (ulcerative) due to antineoplastic therapy    11  Palliative care patient           Continue disease-directed therapies   For cancer-related and other chronic pain:  o Continue Tylenol  o Continue oxyIR but at 5mg q4h PRN; patient may take 10mg if pain is severe, if needed  Effective   o Increase gabapentin 300mg from BID to TID ATC  Goal is better control of chemo-induced joint pain and chemo-induced neuropathy  o Continue OTC Blue Emu oil for chemo-induced joint pain   Naloxone Rx provided in prior visit   Continue Zofran PRN N/V (1st line)  Not needed recently   Continue lorazepam 0 5mg q8h PRN anxiousness, insomnia, 2nd-line for N/V  Effective   Continue dietary modifications ([employer]'s apple juice) PRN constipation   Continue Magic Mouthwash (MMW) for treatment-induced oral mucositis  If not improving in the next week we can consider dexamethasone swish + spit   Patient had reported receiving [address] vaccinations   Reviewed notes (Gynecologic Oncology), labs (4/22/22 Cr 0 59, alb 3 4,  8 9, Hb 10 6), imaging + procedures (4/1/22 [facility])  Return in about 6 weeks (around 6/7/2022)   Emotional support provided   Medication safety issues addressed - no driving under the influence of narcotics, watch for adverse effects including AMS and respiratory depression, keep medications stored in a safe/locked environment        Requested Prescriptions     Signed Prescriptions Disp Refills    gabapentin (Neurontin) 300 mg capsule 270 capsule 0     Sig: Take 1 capsule (300 mg total) by mouth 3 (three) times a day    oxyCODONE (Roxicodone) 5 immediate release tablet 180 tablet 0     Sig: Take 1 tablet (5 mg total) by mouth every 4 (four) hours as needed for moderate pain or severe pain Max Daily Amount: 30 mg       Medications Discontinued During This Encounter   Medication Reason    sodium chloride, PF, 0 9 %     gabapentin (Neurontin) 300 mg capsule Reorder    oxyCODONE (Roxicodone) 5 immediate release tablet Reorder       Representatives have queried the patient's controlled substance dispensing history in the Prescription Drug Monitoring Program in compliance with regulations before I have prescribed any controlled substances  The prescription history is consistent with prescribed therapy and our practice policies  40 minutes were spent in this ambulatory visit with greater than 50% of the time spent face to face with patient and family ( Analilia Win) in counseling or coordination of care including discussions of symptom assessment and management, medication review and adjustment, psychosocial support, chart review, imaging review, lab review, supportive listening and anticipatory guidance  All of the patient's questions were answered during this discussion  SUBJECTIVE:  Chief Complaint   Patient presents with    Cancer    Cancer Pain    Anxiety    Counseling    Follow-up    Constipation    Fatigue        HPI    Torsten Tobar is a 71 y o  female w/ 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 s/p PROMISE+BSO and tumor debulking s/p chemo w/ paclitaxel + carboplatin, now receiving maintenance bevacizumab + olaparib (dose reduced s/t adverse effects)  She follows w/ Dr Yessi Navarro (Gynecologic Oncology)  Patient is known to [facility] clinic; seen 3/15/22 for symptom management, psychosocial support  Patient states she has been "hanging in there" since her last visit   Her maintenance immunotherapy regimen has been dose-reduced twice d/t adverse effects, including pain and progressive fatigue and epistaxis and oral mucositis  Still, her pain can be "annoying" and is worse in her back, ribs, and joints since prior visit  Neuropathy has changed and feels more like discomfort in the "balls of my feet" than in her toes  Gabapentin is helpful  She is taking 5mg oxyIR about every 4 hours with adequate pain control  MMW has been helpful for drug-induced oral mucositis  Patient reports her appetite continues to wax and wane  She has been defending her weight, though  She reports no recent N/V  She is moving her bowels regularly, only occasionally needing [place]'s apple juice (her go-to for constipation)  Patient denies dysphoric mood  She feels occasional anxiousness but only rarely feels she needs lorazepam  She notes she can be "cranky" sometimes d/t symptoms  She would very much like to return to work as the environment at [employer] is stimulating and her friends there are supportive, but her fatigue and debility prevent that at this time  PDMP shows no concerns  The following portions of the medical history were reviewed: past medical history, surgical history, problem list, medication list, family history, and social history        Current Outpatient Medications:     acetaminophen (TYLENOL) 325 mg tablet, Take 3 tablets (975 mg total) by mouth every 8 (eight) hours, Disp: , Rfl:     al mag oxide-diphenhydramine-lidocaine viscous (MAGIC MOUTHWASH) 1:1:1 suspension, Swish and spit 10 mL every 4 (four) hours as needed for mouth pain or discomfort, Disp: 300 mL, Rfl: 0    albuterol (PROVENTIL HFA,VENTOLIN HFA) 90 mcg/act inhaler, Inhale 2 puffs every 6 (six) hours as needed for wheezing, Disp: , Rfl:     amLODIPine-benazepril (LOTREL) 10-20 MG per capsule, Take 1 capsule by mouth daily, Disp: , Rfl:     fluticasone (FLONASE) 50 mcg/act nasal spray, 1 spray into each nostril daily, Disp: , Rfl:    gabapentin (Neurontin) 300 mg capsule, Take 1 capsule (300 mg total) by mouth 3 (three) times a day, Disp: 270 capsule, Rfl: 0    LORazepam (ATIVAN) 1 mg tablet, Take 0 5 tablets (0 5 mg total) by mouth every 8 (eight) hours as needed (nausea or anxiety or insomnia), Disp: 45 tablet, Rfl: 0    olaparib (LYNPARZA) tablet, Take 1 tablet PO in AM and 2 tablets PO in PM, Disp: 90 tablet, Rfl: 3    omeprazole (PriLOSEC) 20 mg delayed release capsule, Take 20 mg by mouth 2 (two) times a day, Disp: , Rfl:     oxyCODONE (Roxicodone) 5 immediate release tablet, Take 1 tablet (5 mg total) by mouth every 4 (four) hours as needed for moderate pain or severe pain Max Daily Amount: 30 mg, Disp: 180 tablet, Rfl: 0    pravastatin (PRAVACHOL) 10 mg tablet, Take 10 mg by mouth daily, Disp: , Rfl:     AMLODIPINE BENZOATE PO, Take by mouth  , Disp: , Rfl:     benzonatate (TESSALON) 200 MG capsule, Take 1 capsule (200 mg total) by mouth 3 (three) times a day as needed for cough (Patient not taking: Reported on 4/26/2022 ), Disp: 20 capsule, Rfl: 0    butalbital-aspirin-caffeine (FIORINAL) -40 mg per capsule, Take 1 capsule by mouth every 4 (four) hours as needed for headaches   (Patient not taking: Reported on 4/26/2022 ), Disp: , Rfl:     docusate sodium (COLACE) 100 mg capsule, Take 1 capsule (100 mg total) by mouth 2 (two) times a day (Patient not taking: Reported on 11/2/2021), Disp: , Rfl:     ibuprofen (MOTRIN) 600 mg tablet, Take 1 tablet (600 mg total) by mouth every 6 (six) hours (Patient not taking: Reported on 4/26/2022 ), Disp: 30 tablet, Rfl: 0    Liniments (Blue-Emu Super Strength) CREA, Apply topically as needed (joint pain), Disp: , Rfl:     naloxone (NARCAN) 4 mg/0 1 mL nasal spray, 0 1 mL (4 mg total) by Alternating Nares route every 3 (three) minutes as needed (accidental opioid overdose or respiratory depression), Disp: 1 each, Rfl: 1    Naproxen Sodium (Aleve) 220 MG CAPS, Take 220 mg by mouth daily 2 caps AM   (Patient not taking: Reported on 4/26/2022 ), Disp: , Rfl:     ondansetron (ZOFRAN) 8 mg tablet, Take 1 tablet (8 mg total) by mouth every 8 (eight) hours as needed for nausea or vomiting (Patient not taking: Reported on 4/26/2022 ), Disp: 20 tablet, Rfl: 1    polyethylene glycol (MiraLax) 17 GM/SCOOP powder, Mix with 64 oz Gatorade, begin 4 PM day before surgery per bowel prep instructions  , Disp: 238 g, Rfl: 0  No current facility-administered medications for this visit  Facility-Administered Medications Ordered in Other Visits:     bevacizumab-awwb (MVASI) 757 5 mg in sodium chloride 0 9 % 100 mL IVPB, 10 mg/kg (Treatment Plan Recorded), Intravenous, Once, Garfield Buckner MD    Review of Systems   Constitutional: Positive for activity change, appetite change (waxes and wanes) and fatigue (progressively worse)  Negative for unexpected weight change  Eyes: Negative for pain and redness  Gastrointestinal: Positive for constipation  Negative for diarrhea, nausea and vomiting  Reflux (managed)  Endocrine: Negative for polydipsia and polyphagia  Musculoskeletal: Positive for arthralgias, back pain and gait problem (she considers using her cane but has not, recently)  Allergic/Immunologic: Positive for immunocompromised state  Neurological: Negative for facial asymmetry and speech difficulty  Neuropathic pain  Paresthesia  Psychiatric/Behavioral: Positive for sleep disturbance  Negative for dysphoric mood  The patient is nervous/anxious  Occasionally "cranky"  OBJECTIVE:  /55 (BP Location: Right arm, Patient Position: Sitting, Cuff Size: Standard)   Pulse 104   Temp (!) 97 °F (36 1 °C) (Temporal)   Resp 18   Wt 75 8 kg (167 lb 3 2 oz)   SpO2 96%   BMI 33 75 kg/m²   Physical Exam  Vitals reviewed  Constitutional:       General: She is not in acute distress  Appearance: She is well-groomed and overweight  She is ill-appearing (chronically)  She is not toxic-appearing  HENT:      Head: Normocephalic and atraumatic  Right Ear: External ear normal       Left Ear: External ear normal    Eyes:      General: No scleral icterus  Right eye: No discharge  Left eye: No discharge  Extraocular Movements: Extraocular movements intact  Conjunctiva/sclera: Conjunctivae normal       Pupils: Pupils are equal, round, and reactive to light  Cardiovascular:      Rate and Rhythm: Tachycardia present  Pulmonary:      Effort: Pulmonary effort is normal  No tachypnea, bradypnea, accessory muscle usage or respiratory distress  Abdominal:      General: There is no distension  Tenderness: There is no guarding  Musculoskeletal:      Cervical back: Normal range of motion  Right lower leg: No edema  Left lower leg: No edema  Skin:     General: Skin is dry  Coloration: Skin is not pale  Neurological:      Mental Status: She is alert and oriented to person, place, and time  Cranial Nerves: No dysarthria or facial asymmetry  Gait: Gait normal    Psychiatric:         Attention and Perception: Attention normal          Mood and Affect: Mood and affect normal          Speech: Speech normal          Behavior: Behavior normal  Behavior is cooperative  Thought Content: Thought content normal          Cognition and Memory: Cognition and memory normal          Judgment: Judgment normal         Chantelle Drummond MD  [facility]      Portions of this document may have been created using dictation software and as such some "sound alike" terms may have been generated by the system  Do not hesitate to contact me with any questions or clarifications

## 2022-04-26 NOTE — PATIENT INSTRUCTIONS
It was good to see you today  Thank you for coming in  · Let's try increasing the gabapentin to 300mg three times per day to see if pain improves  · If the oral mucositis doesn't improve in a few days please let me know, and we can try a oral steroid rinse  · Continue other medications  · Return in about 6 weeks  · Call us for refills on medications that we supply, as needed  · If something changes and you need to come in sooner, please call our office  PRESCRIPTION REFILL REMINDER:  All medication refills should be requested prior to [facility] on Friday  Any refill requests after noon on Friday would be addressed the following Monday

## 2022-05-09 ENCOUNTER — OFFICE VISIT (OUTPATIENT)
Dept: GYNECOLOGIC ONCOLOGY | Facility: CLINIC | Age: 72
End: 2022-05-09
Payer: COMMERCIAL

## 2022-05-09 VITALS
TEMPERATURE: 97.1 F | BODY MASS INDEX: 33.87 KG/M2 | HEART RATE: 79 BPM | DIASTOLIC BLOOD PRESSURE: 80 MMHG | OXYGEN SATURATION: 98 % | SYSTOLIC BLOOD PRESSURE: 120 MMHG | RESPIRATION RATE: 16 BRPM | WEIGHT: 168 LBS | HEIGHT: 59 IN

## 2022-05-09 DIAGNOSIS — G62.0 CHEMOTHERAPY-INDUCED PERIPHERAL NEUROPATHY (HCC): ICD-10-CM

## 2022-05-09 DIAGNOSIS — M25.50 JOINT PAIN FOLLOWING CHEMOTHERAPY: ICD-10-CM

## 2022-05-09 DIAGNOSIS — G89.18 JOINT PAIN FOLLOWING CHEMOTHERAPY: ICD-10-CM

## 2022-05-09 DIAGNOSIS — G89.3 CANCER RELATED PAIN: ICD-10-CM

## 2022-05-09 DIAGNOSIS — T45.1X5A CHEMOTHERAPY-INDUCED PERIPHERAL NEUROPATHY (HCC): ICD-10-CM

## 2022-05-09 DIAGNOSIS — C56.3 MALIGNANT NEOPLASM OF BOTH OVARIES (HCC): Primary | ICD-10-CM

## 2022-05-09 DIAGNOSIS — K12.31 ORAL MUCOSITIS (ULCERATIVE) DUE TO ANTINEOPLASTIC THERAPY: ICD-10-CM

## 2022-05-09 DIAGNOSIS — R53.0 NEOPLASTIC MALIGNANT RELATED FATIGUE: ICD-10-CM

## 2022-05-09 PROCEDURE — 99213 OFFICE O/P EST LOW 20 MIN: CPT | Performed by: OBSTETRICS & GYNECOLOGY

## 2022-05-09 NOTE — ASSESSMENT & PLAN NOTE
78yo with 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, PROMISE/BSO/tumor debulking and adjuvant chemotherapy now on avastin/olaparib presents for follow up  I have discussed with patient her options going forward given significant toxicity in the maintenance phase  We discussed discontinuing both drugs versus Avastin or olaparib versus olaparib dose reduction  Given CTs imaging with pleural based nodularity and liver lesion and hesitant to discontinue without repeat imaging to suggest complete response  Patient would like to try dose reduction on olaparib and re-evaluate after repeat CT scan    Will plan on repeat CT scan next month

## 2022-05-09 NOTE — ASSESSMENT & PLAN NOTE
Continue sleep hygiene and symptom relief    Will dose reduce olaparib to 400mg total daily and reasses

## 2022-05-09 NOTE — PROGRESS NOTES
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, PROMISE/BSO/tumor debulking and adjuvant chemotherapy now on avastin/olaparib presents for follow up  I have discussed with patient her options going forward given significant toxicity in the maintenance phase  We discussed discontinuing both drugs versus Avastin or olaparib versus olaparib dose reduction  Given CTs imaging with pleural based nodularity and liver lesion and hesitant to discontinue without repeat imaging to suggest complete response  Patient would like to try dose reduction on olaparib and re-evaluate after repeat CT scan    Will plan on repeat CT scan next month  Relevant Orders    CT chest abdomen pelvis w contrast       Nervous and Auditory    Chemotherapy-induced peripheral neuropathy (HCC)     Increased gabapentin by pall care            Other    Cancer related pain    Joint pain following chemotherapy     Dose reduced avastin with minimal relief  Declines avastin break or discontinuation at this time          Neoplastic malignant related fatigue     Continue sleep hygiene and symptom relief    Will dose reduce olaparib to 400mg total daily and reasses           Oral mucositis (ulcerative) due to antineoplastic therapy            CHIEF COMPLAINT: follow up      Problem:  Cancer Staging  Ovarian cancer [facility])  Staging form: Ovary, Fallopian Tube, Primary Peritoneal, AJCC 8th Edition  - Clinical: FIGO Stage IIIC (cT3c) - Signed by Erlinda Oviedo MD on 9/16/2021        Previous therapy:  Oncology History   Ovarian cancer ([facility] [address] )   8/6/2021 Surgery    PROMISE/BSO/tumor debulking to optimal cytoreduction     8/25/2021 Initial Diagnosis    Ovarian cancer ([facility] [address] )     9/16/2021 -  Cancer Staged    Staging form: Ovary, Fallopian Tube, Primary Peritoneal, AJCC 8th Edition  - Clinical: FIGO Stage IIIC (cT3c) - Signed by Jose Liz MD on 9/16/2021  Stage prefix: Initial diagnosis  Cancer antigen 125 () (U/mL): 543       9/28/2021 - 2/2022 Chemotherapy    Carbo AUC6, Taxol 175mg/m2 q3 weeks  Avastin 15mg/m2 added cycle 3    Toxicities:  Cycle4: carbo AUC 5 for neutropenia, added neulasta     10/2021 Genomic Testing    SEAN high  CPS >2     12/9/2021 Genetic Testing    POLD VUS     2/22/2022 -  Chemotherapy    Maintenance: Avastin 15mg/m2  Olaparib 300mg BID             Patient ID: Talya Loo is a 71 y o  female  78yo with 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, PROMISE/BSO/tumor debulking and adjuvant chemotherapy now on avastin/olaparib presents for follow up  Patient continues to have significant fatigue as well joint pains  She reports the fatigue is worse on some days than others however it significantly impacts her quality of life  She feels she is unable to go back to work at this time  She continues to have mild peripheral neuropathy for which gabapentin was increased by palliative care  The following portions of the patient's history were reviewed and updated as appropriate: allergies, current medications, past family history, past medical history, past social history, past surgical history and problem list     Review of Systems   Constitutional: Positive for fatigue  Negative for appetite change, chills and fever  Respiratory: Negative for chest tightness and shortness of breath  Gastrointestinal: Negative for abdominal distention, abdominal pain, constipation, diarrhea and nausea  Genitourinary: Negative for difficulty urinating, flank pain, frequency, urgency, vaginal bleeding, vaginal discharge and vaginal pain     Musculoskeletal: Positive for arthralgias and myalgias  Negative for back pain and joint swelling  Skin: Negative for rash  Neurological: Negative for dizziness, light-headedness, numbness and headaches  Psychiatric/Behavioral: Positive for sleep disturbance         Current Outpatient Medications   Medication Sig Dispense Refill    acetaminophen (TYLENOL) 325 mg tablet Take 3 tablets (975 mg total) by mouth every 8 (eight) hours      al mag oxide-diphenhydramine-lidocaine viscous (MAGIC MOUTHWASH) 1:1:1 suspension Swish and spit 10 mL every 4 (four) hours as needed for mouth pain or discomfort 300 mL 0    albuterol (PROVENTIL HFA,VENTOLIN HFA) 90 mcg/act inhaler Inhale 2 puffs every 6 (six) hours as needed for wheezing      AMLODIPINE BENZOATE PO Take by mouth        amLODIPine-benazepril (LOTREL) 10-20 MG per capsule Take 1 capsule by mouth daily      benzonatate (TESSALON) 200 MG capsule Take 1 capsule (200 mg total) by mouth 3 (three) times a day as needed for cough (Patient not taking: Reported on 4/26/2022 ) 20 capsule 0    butalbital-aspirin-caffeine (FIORINAL) -40 mg per capsule Take 1 capsule by mouth every 4 (four) hours as needed for headaches   (Patient not taking: Reported on 4/26/2022 )      docusate sodium (COLACE) 100 mg capsule Take 1 capsule (100 mg total) by mouth 2 (two) times a day (Patient not taking: Reported on 11/2/2021)      fluticasone (FLONASE) 50 mcg/act nasal spray 1 spray into each nostril daily      gabapentin (Neurontin) 300 mg capsule Take 1 capsule (300 mg total) by mouth 3 (three) times a day 270 capsule 0    ibuprofen (MOTRIN) 600 mg tablet Take 1 tablet (600 mg total) by mouth every 6 (six) hours (Patient not taking: Reported on 4/26/2022 ) 30 tablet 0    Liniments (Blue-Emu Super Strength) CREA Apply topically as needed (joint pain)      LORazepam (ATIVAN) 1 mg tablet Take 0 5 tablets (0 5 mg total) by mouth every 8 (eight) hours as needed (nausea or anxiety or insomnia) 45 tablet 0    naloxone (NARCAN) 4 mg/0 1 mL nasal spray 0 1 mL (4 mg total) by Alternating Nares route every 3 (three) minutes as needed (accidental opioid overdose or respiratory depression) 1 each 1    Naproxen Sodium (Aleve) 220 MG CAPS Take 220 mg by mouth daily 2 caps AM   (Patient not taking: Reported on 4/26/2022 )      olaparib (LYNPARZA) tablet Take 1 tablet PO in AM and 2 tablets PO in PM 90 tablet 3    omeprazole (PriLOSEC) 20 mg delayed release capsule Take 20 mg by mouth 2 (two) times a day      ondansetron (ZOFRAN) 8 mg tablet Take 1 tablet (8 mg total) by mouth every 8 (eight) hours as needed for nausea or vomiting (Patient not taking: Reported on 4/26/2022 ) 20 tablet 1    oxyCODONE (Roxicodone) 5 immediate release tablet Take 1 tablet (5 mg total) by mouth every 4 (four) hours as needed for moderate pain or severe pain Max Daily Amount: 30 mg 180 tablet 0    polyethylene glycol (MiraLax) 17 GM/SCOOP powder Mix with 64 oz Gatorade, begin 4 PM day before surgery per bowel prep instructions  238 g 0    pravastatin (PRAVACHOL) 10 mg tablet Take 10 mg by mouth daily       No current facility-administered medications for this visit  Objective: There were no vitals taken for this visit  There is no height or weight on file to calculate BMI  There is no height or weight on file to calculate BSA  Physical Exam  HENT:      Head: Normocephalic and atraumatic  Nose: Nose normal    Cardiovascular:      Rate and Rhythm: Normal rate and regular rhythm  Pulmonary:      Effort: Pulmonary effort is normal    Abdominal:      General: There is no distension  Palpations: Abdomen is soft  There is no mass  Genitourinary:     Comments: defer  Musculoskeletal:         General: No swelling  Normal range of motion  Cervical back: Normal range of motion  Skin:     General: Skin is warm and dry  Neurological:      General: No focal deficit present        Mental Status: She is alert    Psychiatric:         Mood and Affect: Mood normal            Lab Results   Component Value Date    K 3 6 04/22/2022     04/22/2022    CO2 26 04/22/2022    BUN 15 04/22/2022    CREATININE 0 59 (L) 04/22/2022    GLUCOSE 195 (H) 08/06/2021    GLUF 88 04/01/2022    CALCIUM 8 8 04/22/2022    CORRECTEDCA 9 3 04/22/2022    AST 15 04/22/2022    ALT 23 04/22/2022    ALKPHOS 91 04/22/2022    EGFR 92 04/22/2022     Lab Results   Component Value Date    WBC 6 68 04/22/2022    HGB 10 6 (L) 04/22/2022    HCT 32 4 (L) 04/22/2022     (H) 04/22/2022     04/22/2022     Lab Results   Component Value Date    NEUTROABS 2 77 04/22/2022        Trend:  Lab Results   Component Value Date     8 9 04/22/2022     9 5 04/01/2022     7 6 03/11/2022     10 5 02/11/2022     15 5 01/10/2022     12 5 12/17/2021     11 6 11/26/2021     19 4 11/05/2021     69 9 (H) 10/15/2021     94 1 (H) 09/24/2021     543 7 (H) 07/20/2021

## 2022-05-10 ENCOUNTER — TELEPHONE (OUTPATIENT)
Dept: GYNECOLOGIC ONCOLOGY | Facility: CLINIC | Age: 72
End: 2022-05-10

## 2022-05-10 DIAGNOSIS — C56.3 MALIGNANT NEOPLASM OF BOTH OVARIES (HCC): ICD-10-CM

## 2022-05-10 NOTE — TELEPHONE ENCOUNTER
Spoke with patient in regard to appt date change after the MRI date as per Dr Stephany Nixon would like to see patient after MRI  Patient is going to call back to confirm July 5th appt in the [facility] location

## 2022-05-13 ENCOUNTER — HOSPITAL ENCOUNTER (OUTPATIENT)
Dept: INFUSION CENTER | Facility: HOSPITAL | Age: 72
Discharge: HOME | End: 2022-05-13
Payer: COMMERCIAL

## 2022-05-13 VITALS — TEMPERATURE: 97.9 F

## 2022-05-13 DIAGNOSIS — Z45.2 ENCOUNTER FOR CENTRAL LINE CARE: Primary | ICD-10-CM

## 2022-05-13 DIAGNOSIS — C56.3 MALIGNANT NEOPLASM OF BOTH OVARIES (HCC): ICD-10-CM

## 2022-05-13 LAB
ALBUMIN SERPL BCP-MCNC: 3.4 G/DL (ref 3.5–5)
ALP SERPL-CCNC: 84 U/L (ref 46–116)
ALT SERPL W P-5'-P-CCNC: 26 U/L (ref 12–78)
ANION GAP SERPL CALCULATED.3IONS-SCNC: 8 MMOL/L (ref 4–13)
AST SERPL W P-5'-P-CCNC: 16 U/L (ref 5–45)
BASOPHILS # BLD MANUAL: 0.06 THOUSAND/UL (ref 0–0.1)
BASOPHILS NFR MAR MANUAL: 1 % (ref 0–1)
BILIRUB SERPL-MCNC: 0.27 MG/DL (ref 0.2–1)
BUN SERPL-MCNC: 16 MG/DL (ref 5–25)
CALCIUM ALBUM COR SERPL-MCNC: 9.4 MG/DL (ref 8.3–10.1)
CALCIUM SERPL-MCNC: 8.9 MG/DL (ref 8.3–10.1)
CANCER AG125 SERPL-ACNC: 7.9 U/ML (ref 0–30)
CHLORIDE SERPL-SCNC: 102 MMOL/L (ref 100–108)
CO2 SERPL-SCNC: 28 MMOL/L (ref 21–32)
CREAT SERPL-MCNC: 0.75 MG/DL (ref 0.6–1.3)
CREAT UR-MCNC: 78.1 MG/DL
EOSINOPHIL # BLD MANUAL: 0 THOUSAND/UL (ref 0–0.4)
EOSINOPHIL NFR BLD MANUAL: 0 % (ref 0–6)
ERYTHROCYTE [DISTWIDTH] IN BLOOD BY AUTOMATED COUNT: 18.7 % (ref 11.6–15.1)
GFR SERPL CREATININE-BSD FRML MDRD: 80 ML/MIN/1.73SQ M
GIANT PLATELETS BLD QL SMEAR: PRESENT
GLUCOSE SERPL-MCNC: 110 MG/DL (ref 65–140)
HCT VFR BLD AUTO: 32.6 % (ref 34.8–46.1)
HGB BLD-MCNC: 10.8 G/DL (ref 11.5–15.4)
HOWELL-JOLLY BOD BLD QL SMEAR: PRESENT
LYMPHOCYTES # BLD AUTO: 2.33 THOUSAND/UL (ref 0.6–4.47)
LYMPHOCYTES # BLD AUTO: 36 % (ref 14–44)
MACROCYTES BLD QL AUTO: PRESENT
MAGNESIUM SERPL-MCNC: 1.6 MG/DL (ref 1.6–2.6)
MCH RBC QN AUTO: 34.4 PG (ref 26.8–34.3)
MCHC RBC AUTO-ENTMCNC: 33.1 G/DL (ref 31.4–37.4)
MCV RBC AUTO: 104 FL (ref 82–98)
MONOCYTES # BLD AUTO: 1.04 THOUSAND/UL (ref 0–1.22)
MONOCYTES NFR BLD: 16 % (ref 4–12)
NEUTROPHILS # BLD MANUAL: 2.65 THOUSAND/UL (ref 1.85–7.62)
NEUTS BAND NFR BLD MANUAL: 1 % (ref 0–8)
NEUTS SEG NFR BLD AUTO: 40 % (ref 43–75)
PLATELET # BLD AUTO: 307 THOUSANDS/UL (ref 149–390)
PLATELET BLD QL SMEAR: ADEQUATE
PMV BLD AUTO: 10 FL (ref 8.9–12.7)
POLYCHROMASIA BLD QL SMEAR: PRESENT
POTASSIUM SERPL-SCNC: 3.6 MMOL/L (ref 3.5–5.3)
PROT SERPL-MCNC: 6.8 G/DL (ref 6.4–8.2)
PROT UR-MCNC: 7 MG/DL
PROT/CREAT UR: 0.09 MG/G{CREAT} (ref 0–0.1)
RBC # BLD AUTO: 3.14 MILLION/UL (ref 3.81–5.12)
RBC MORPH BLD: PRESENT
SODIUM SERPL-SCNC: 138 MMOL/L (ref 136–145)
VARIANT LYMPHS # BLD AUTO: 6 %
WBC # BLD AUTO: 6.47 THOUSAND/UL (ref 4.31–10.16)

## 2022-05-13 PROCEDURE — 85027 COMPLETE CBC AUTOMATED: CPT

## 2022-05-13 PROCEDURE — 85007 BL SMEAR W/DIFF WBC COUNT: CPT

## 2022-05-13 PROCEDURE — 80053 COMPREHEN METABOLIC PANEL: CPT

## 2022-05-13 PROCEDURE — 83735 ASSAY OF MAGNESIUM: CPT

## 2022-05-13 PROCEDURE — 82570 ASSAY OF URINE CREATININE: CPT

## 2022-05-13 PROCEDURE — 84156 ASSAY OF PROTEIN URINE: CPT

## 2022-05-13 PROCEDURE — 86304 IMMUNOASSAY TUMOR CA 125: CPT

## 2022-05-16 RX ORDER — SODIUM CHLORIDE 9 MG/ML
20 INJECTION, SOLUTION INTRAVENOUS ONCE
Status: CANCELLED | OUTPATIENT
Start: 2022-05-17

## 2022-05-17 ENCOUNTER — HOSPITAL ENCOUNTER (OUTPATIENT)
Dept: INFUSION CENTER | Facility: HOSPITAL | Age: 72
Discharge: HOME | End: 2022-05-17
Payer: COMMERCIAL

## 2022-05-17 VITALS
WEIGHT: 167.55 LBS | SYSTOLIC BLOOD PRESSURE: 134 MMHG | DIASTOLIC BLOOD PRESSURE: 59 MMHG | HEART RATE: 87 BPM | BODY MASS INDEX: 33.78 KG/M2 | TEMPERATURE: 98.1 F | RESPIRATION RATE: 18 BRPM | OXYGEN SATURATION: 97 % | HEIGHT: 59 IN

## 2022-05-17 DIAGNOSIS — D70.1 CHEMOTHERAPY INDUCED NEUTROPENIA (HCC): Primary | ICD-10-CM

## 2022-05-17 DIAGNOSIS — T45.1X5A CHEMOTHERAPY INDUCED NEUTROPENIA (HCC): Primary | ICD-10-CM

## 2022-05-17 DIAGNOSIS — C56.3 MALIGNANT NEOPLASM OF BOTH OVARIES (HCC): ICD-10-CM

## 2022-05-17 PROCEDURE — 96413 CHEMO IV INFUSION 1 HR: CPT

## 2022-05-17 RX ORDER — SODIUM CHLORIDE 9 MG/ML
20 INJECTION, SOLUTION INTRAVENOUS ONCE
Status: COMPLETED | OUTPATIENT
Start: 2022-05-17 | End: 2022-05-17

## 2022-05-17 RX ADMIN — SODIUM CHLORIDE 20 ML/HR: 9 INJECTION, SOLUTION INTRAVENOUS at 13:07

## 2022-05-17 RX ADMIN — BEVACIZUMAB-AWWB 757.5 MG: 400 INJECTION, SOLUTION INTRAVENOUS at 13:07

## 2022-05-20 ENCOUNTER — PATIENT MESSAGE (OUTPATIENT)
Dept: GYNECOLOGIC ONCOLOGY | Facility: CLINIC | Age: 72
End: 2022-05-20

## 2022-05-20 DIAGNOSIS — K13.79 MOUTH SORES: ICD-10-CM

## 2022-06-03 ENCOUNTER — HOSPITAL ENCOUNTER (OUTPATIENT)
Dept: INFUSION CENTER | Facility: HOSPITAL | Age: 72
Discharge: HOME | End: 2022-06-03
Payer: COMMERCIAL

## 2022-06-03 DIAGNOSIS — C56.3 MALIGNANT NEOPLASM OF BOTH OVARIES (HCC): ICD-10-CM

## 2022-06-03 DIAGNOSIS — Z45.2 ENCOUNTER FOR CENTRAL LINE CARE: Primary | ICD-10-CM

## 2022-06-03 LAB
ALBUMIN SERPL BCP-MCNC: 3.6 G/DL (ref 3.5–5)
ALP SERPL-CCNC: 89 U/L (ref 46–116)
ALT SERPL W P-5'-P-CCNC: 28 U/L (ref 12–78)
ANION GAP SERPL CALCULATED.3IONS-SCNC: 10 MMOL/L (ref 4–13)
AST SERPL W P-5'-P-CCNC: 18 U/L (ref 5–45)
BASOPHILS # BLD AUTO: 0.08 THOUSANDS/ΜL (ref 0–0.1)
BASOPHILS NFR BLD AUTO: 1 % (ref 0–1)
BILIRUB SERPL-MCNC: 0.35 MG/DL (ref 0.2–1)
BUN SERPL-MCNC: 15 MG/DL (ref 5–25)
CALCIUM SERPL-MCNC: 9 MG/DL (ref 8.3–10.1)
CANCER AG125 SERPL-ACNC: 7.5 U/ML (ref 0–30)
CHLORIDE SERPL-SCNC: 104 MMOL/L (ref 100–108)
CO2 SERPL-SCNC: 26 MMOL/L (ref 21–32)
CREAT SERPL-MCNC: 0.72 MG/DL (ref 0.6–1.3)
CREAT UR-MCNC: 40.8 MG/DL
EOSINOPHIL # BLD AUTO: 0.16 THOUSAND/ΜL (ref 0–0.61)
EOSINOPHIL NFR BLD AUTO: 2 % (ref 0–6)
ERYTHROCYTE [DISTWIDTH] IN BLOOD BY AUTOMATED COUNT: 19.6 % (ref 11.6–15.1)
GFR SERPL CREATININE-BSD FRML MDRD: 84 ML/MIN/1.73SQ M
GLUCOSE SERPL-MCNC: 107 MG/DL (ref 65–140)
HCT VFR BLD AUTO: 35 % (ref 34.8–46.1)
HGB BLD-MCNC: 11.4 G/DL (ref 11.5–15.4)
IMM GRANULOCYTES # BLD AUTO: 0.02 THOUSAND/UL (ref 0–0.2)
IMM GRANULOCYTES NFR BLD AUTO: 0 % (ref 0–2)
LYMPHOCYTES # BLD AUTO: 2.69 THOUSANDS/ΜL (ref 0.6–4.47)
LYMPHOCYTES NFR BLD AUTO: 33 % (ref 14–44)
MAGNESIUM SERPL-MCNC: 1.9 MG/DL (ref 1.6–2.6)
MCH RBC QN AUTO: 34.5 PG (ref 26.8–34.3)
MCHC RBC AUTO-ENTMCNC: 32.6 G/DL (ref 31.4–37.4)
MCV RBC AUTO: 106 FL (ref 82–98)
MONOCYTES # BLD AUTO: 1.37 THOUSAND/ΜL (ref 0.17–1.22)
MONOCYTES NFR BLD AUTO: 17 % (ref 4–12)
NEUTROPHILS # BLD AUTO: 3.74 THOUSANDS/ΜL (ref 1.85–7.62)
NEUTS SEG NFR BLD AUTO: 47 % (ref 43–75)
NRBC BLD AUTO-RTO: 5 /100 WBCS
PLATELET # BLD AUTO: 308 THOUSANDS/UL (ref 149–390)
PMV BLD AUTO: 9.7 FL (ref 8.9–12.7)
POTASSIUM SERPL-SCNC: 3.9 MMOL/L (ref 3.5–5.3)
PROT SERPL-MCNC: 6.9 G/DL (ref 6.4–8.2)
PROT UR-MCNC: 8 MG/DL
PROT/CREAT UR: 0.2 MG/G{CREAT} (ref 0–0.1)
RBC # BLD AUTO: 3.3 MILLION/UL (ref 3.81–5.12)
SODIUM SERPL-SCNC: 140 MMOL/L (ref 136–145)
WBC # BLD AUTO: 8.06 THOUSAND/UL (ref 4.31–10.16)

## 2022-06-03 PROCEDURE — 83735 ASSAY OF MAGNESIUM: CPT

## 2022-06-03 PROCEDURE — 85025 COMPLETE CBC W/AUTO DIFF WBC: CPT

## 2022-06-03 PROCEDURE — 84156 ASSAY OF PROTEIN URINE: CPT

## 2022-06-03 PROCEDURE — 86304 IMMUNOASSAY TUMOR CA 125: CPT

## 2022-06-03 PROCEDURE — 82570 ASSAY OF URINE CREATININE: CPT

## 2022-06-03 PROCEDURE — 80053 COMPREHEN METABOLIC PANEL: CPT

## 2022-06-06 ENCOUNTER — HOSPITAL ENCOUNTER (OUTPATIENT)
Dept: RADIOLOGY | Facility: HOSPITAL | Age: 72
Discharge: HOME | End: 2022-06-06
Payer: COMMERCIAL

## 2022-06-06 DIAGNOSIS — C56.3 MALIGNANT NEOPLASM OF BOTH OVARIES (HCC): ICD-10-CM

## 2022-06-06 PROCEDURE — 71250 CT THORAX DX C-: CPT

## 2022-06-06 PROCEDURE — G1004 CDSM NDSC: HCPCS

## 2022-06-06 RX ORDER — SODIUM CHLORIDE 9 MG/ML
20 INJECTION, SOLUTION INTRAVENOUS ONCE
Status: CANCELLED | OUTPATIENT
Start: 2022-06-07

## 2022-06-07 ENCOUNTER — OFFICE VISIT (OUTPATIENT)
Dept: PALLIATIVE MEDICINE | Facility: CLINIC | Age: 72
End: 2022-06-07
Payer: COMMERCIAL

## 2022-06-07 ENCOUNTER — HOSPITAL ENCOUNTER (OUTPATIENT)
Dept: INFUSION CENTER | Facility: HOSPITAL | Age: 72
Discharge: HOME | End: 2022-06-07
Payer: COMMERCIAL

## 2022-06-07 VITALS
SYSTOLIC BLOOD PRESSURE: 146 MMHG | HEART RATE: 104 BPM | OXYGEN SATURATION: 97 % | DIASTOLIC BLOOD PRESSURE: 62 MMHG | WEIGHT: 169 LBS | BODY MASS INDEX: 34.12 KG/M2 | TEMPERATURE: 97.4 F

## 2022-06-07 VITALS
DIASTOLIC BLOOD PRESSURE: 64 MMHG | BODY MASS INDEX: 34 KG/M2 | HEIGHT: 59 IN | OXYGEN SATURATION: 97 % | HEART RATE: 77 BPM | SYSTOLIC BLOOD PRESSURE: 138 MMHG | WEIGHT: 168.65 LBS | RESPIRATION RATE: 18 BRPM | TEMPERATURE: 98.8 F

## 2022-06-07 DIAGNOSIS — K59.03 DRUG INDUCED CONSTIPATION: ICD-10-CM

## 2022-06-07 DIAGNOSIS — G89.3 CANCER RELATED PAIN: ICD-10-CM

## 2022-06-07 DIAGNOSIS — F41.9 ANXIOUSNESS: ICD-10-CM

## 2022-06-07 DIAGNOSIS — G47.01 INSOMNIA DUE TO MEDICAL CONDITION: ICD-10-CM

## 2022-06-07 DIAGNOSIS — G89.18 JOINT PAIN FOLLOWING CHEMOTHERAPY: ICD-10-CM

## 2022-06-07 DIAGNOSIS — C56.3 MALIGNANT NEOPLASM OF BOTH OVARIES (HCC): Primary | ICD-10-CM

## 2022-06-07 DIAGNOSIS — T45.1X5A CHEMOTHERAPY INDUCED NEUTROPENIA (HCC): Primary | ICD-10-CM

## 2022-06-07 DIAGNOSIS — Z51.5 PALLIATIVE CARE PATIENT: ICD-10-CM

## 2022-06-07 DIAGNOSIS — T45.1X5A CHEMOTHERAPY-INDUCED PERIPHERAL NEUROPATHY (HCC): ICD-10-CM

## 2022-06-07 DIAGNOSIS — C56.3 MALIGNANT NEOPLASM OF BOTH OVARIES (HCC): ICD-10-CM

## 2022-06-07 DIAGNOSIS — M25.50 JOINT PAIN FOLLOWING CHEMOTHERAPY: ICD-10-CM

## 2022-06-07 DIAGNOSIS — R53.0 NEOPLASTIC MALIGNANT RELATED FATIGUE: ICD-10-CM

## 2022-06-07 DIAGNOSIS — G62.0 CHEMOTHERAPY-INDUCED PERIPHERAL NEUROPATHY (HCC): ICD-10-CM

## 2022-06-07 DIAGNOSIS — D70.1 CHEMOTHERAPY INDUCED NEUTROPENIA (HCC): Primary | ICD-10-CM

## 2022-06-07 PROCEDURE — 96413 CHEMO IV INFUSION 1 HR: CPT

## 2022-06-07 PROCEDURE — 99214 OFFICE O/P EST MOD 30 MIN: CPT | Performed by: INTERNAL MEDICINE

## 2022-06-07 RX ORDER — SODIUM CHLORIDE 9 MG/ML
20 INJECTION, SOLUTION INTRAVENOUS ONCE
Status: COMPLETED | OUTPATIENT
Start: 2022-06-07 | End: 2022-06-07

## 2022-06-07 RX ORDER — OXYCODONE HCL 10 MG/1
10 TABLET, FILM COATED, EXTENDED RELEASE ORAL
Qty: 30 TABLET | Refills: 0 | Status: SHIPPED | OUTPATIENT
Start: 2022-06-07 | End: 2022-06-28 | Stop reason: SDUPTHER

## 2022-06-07 RX ORDER — DULOXETIN HYDROCHLORIDE 20 MG/1
20 CAPSULE, DELAYED RELEASE ORAL DAILY
Qty: 30 CAPSULE | Refills: 0 | Status: SHIPPED | OUTPATIENT
Start: 2022-06-07 | End: 2022-06-28 | Stop reason: SDUPTHER

## 2022-06-07 RX ORDER — OXYCODONE HYDROCHLORIDE 5 MG/1
5 TABLET ORAL EVERY 4 HOURS PRN
Qty: 180 TABLET | Refills: 0 | Status: SHIPPED | OUTPATIENT
Start: 2022-06-07 | End: 2022-06-28 | Stop reason: SDUPTHER

## 2022-06-07 RX ADMIN — SODIUM CHLORIDE 20 ML/HR: 9 INJECTION, SOLUTION INTRAVENOUS at 13:02

## 2022-06-07 RX ADMIN — BEVACIZUMAB-AWWB 757.5 MG: 400 INJECTION, SOLUTION INTRAVENOUS at 13:02

## 2022-06-07 NOTE — PATIENT INSTRUCTIONS
It was good to see you today  Thank you for coming in  Let's try long-acting OxyContin (oxycodone ER) 10mg at bedtime  This may enable you to sleep thorugh the night without needing to wake to take oxycodone IR, while having adequate pain control in the morning  This may require a prior authorization; if so our office will help get this covered by your insurer  If this works for you and is tolerated well we can increase to twice per day, after an appropriate time  Start lowest-dose Cymbalta (duloxetine) to help w/ mood issues / frustration  May also help w/ sleep and neuropathy  Return in about 4 weeks  Call us for refills on medications that we supply, as needed  If something changes and you need to come in sooner, please call our office  PRESCRIPTION REFILL REMINDER:  All medication refills should be requested prior to [facility] on Friday  Any refill requests after noon on Friday would be addressed the following Monday

## 2022-06-07 NOTE — PROGRESS NOTES
Per David Murphy, pt has no further infusion appts until after OV with Dr Precious Torres on 7/5/22

## 2022-06-07 NOTE — PLAN OF CARE
Problem: Potential for Falls  Goal: Patient will remain free of falls  Description: INTERVENTIONS:  - Educate patient/family on patient safety including physical limitations  - Instruct patient to call for assistance with activity   - Keep Call bell within reach  Outcome: Progressing        Problem: Knowledge Deficit  Goal: Patient/family/caregiver demonstrates understanding of disease process, treatment plan, medications, and discharge instructions  Description: Complete learning assessment and assess knowledge base    Interventions:  - Provide teaching at level of understanding  - Provide teaching via preferred learning methods  Outcome: Progressing

## 2022-06-07 NOTE — PROGRESS NOTES
Follow-up with Palliative and [address] [place] [address] Tono Sims 71 y o  female [id number]    ASSESSMENT & PLAN:  1  Malignant neoplasm of both ovaries ([facility] [address] )    2  Cancer related pain    3  Anxiousness    4  Chemotherapy-induced peripheral neuropathy ([facility] [address] )    5  Insomnia due to medical condition    6  Joint pain following chemotherapy    7  Neoplastic malignant related fatigue    8  Drug induced constipation    9  Palliative care patient           Continue disease-directed cares   For cancer-related and other chronic pain:  o Continue Tylenol  o Continue oxyIR 5mg q4h PRN; patient may take 10mg if pain is severe, if needed  Effective but patient is having to wake overnight to take this medication otherwise pain is intolerable in the AM   o Start oxyER 10mg QHS to help w/ long-acting analgesia  If tolerated, may increase to BID in coming weeks  - Anticipate patient will need a prior authorization for initiation of long-acting opioid therapy (change to an existing opioid therapy)  - Patient has a documented allergy to morphine so MSER is contraindicated   - [facility] will perform a UDS if patient is suspected of abuse  - This patient is a patient of [facility] with a goal of symptom management and improved quality of life  o Continue gabapentin 300mg TID ATC  Recent increase has been effective for chemo-induced joint pain and chemo-induced neuropathy  o Continue OTC Blue Emu oil for chemo-induced joint pain   Naloxone Rx provided in prior visit   Continue Zofran PRN N/V (1st line)   Continue lorazepam 0 5mg q8h PRN anxiousness, insomnia, 2nd-line for N/V  Effective   Start lowest-dose duloxetine for anxiousness, depressed mood, frustration, insomnia   Continue dietary modifications ([employer] apple juice) PRN constipation   Continue PPI for GERD   Patient had reported receiving [address] vaccinations     Reviewed notes (Gynecologic Oncology), labs (5/13/22 Cr 0 75, alb 3 4,  7 9, Hb 10 8), imaging + procedures (4/1/22 [facility])  Return in about 4 weeks (around 7/5/2022)   Emotional support provided   Medication safety issues addressed - no driving under the influence of narcotics, watch for adverse effects including AMS and respiratory depression, keep medications stored in a safe/locked environment  Requested Prescriptions     Signed Prescriptions Disp Refills    oxyCODONE (OxyCONTIN) 10 mg 12 hr tablet 30 tablet 0     Sig: Take 1 tablet (10 mg total) by mouth daily at bedtime Max Daily Amount: 10 mg    DULoxetine (CYMBALTA) 20 mg capsule 30 capsule 0     Sig: Take 1 capsule (20 mg total) by mouth daily    oxyCODONE (Roxicodone) 5 immediate release tablet 180 tablet 0     Sig: Take 1 tablet (5 mg total) by mouth every 4 (four) hours as needed for moderate pain or severe pain Max Daily Amount: 30 mg       Medications Discontinued During This Encounter   Medication Reason    oxyCODONE (Roxicodone) 5 immediate release tablet Reorder       Representatives have queried the patient's controlled substance dispensing history in the Prescription Drug Monitoring Program in compliance with regulations before I have prescribed any controlled substances  The prescription history is consistent with prescribed therapy and our practice policies  40 minutes were spent in this ambulatory visit with greater than 50% of the time spent face to face with patient in counseling or coordination of care including discussions of symptom assessment and management, medication review and adjustment, psychosocial support, chart review, imaging review, lab review, supportive listening and anticipatory guidance  All of the patient's questions were answered during this discussion      SUBJECTIVE:  Chief Complaint   Patient presents with    Cancer    Joint Pain    Anxiety    Counseling    Follow-up    Fatigue        AZAEL Whitman is a 71 y o  female w/ 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 s/p PROMISE+BSO and tumor debulking s/p chemo w/ paclitaxel + carboplatin, now receiving bevacizumab + olaparib (dose reduced s/t adverse effects)  She follows w/ Dr Dionicio Lara (Gynecologic Oncology)  Patient is known to [facility]; seen 4/26/22 for symptom management, psychosocial support  Patient reports good pain control on her current regimen - in particular, she notes a slight decrease in overall pain since increasing gabapentin from BID to TID - but she requires ATC dosing of oxyIR 5mg  She will set an alarm to wake and take oxyIR 5mg otherwise her pain can be severe upon waking  She has had some tolerance issues w/ medications, and has a listed allergy to morphine, but is amenable to a trial of oxyER QHS to reduce need to wake at night and possibly increase QOL  Patient reports ongoing issues w/ frustration and feeling "cranky", d/t life stressors  She appreciates the opportunity to discuss stressors today  She notes that rare use of lorazepam (3x in the past 6 weeks) has been useful; she is now amenable to starting a long-acting medication for mood  Patient denies recent N/V  Appetite waxes and wanes, w/ no recent weight loss noted  Constipation improves w/ dietary modifications (apples, [employer] apple juice)  PDMP shows no concerns  The following portions of the medical history were reviewed: past medical history, surgical history, problem list, medication list, family history, and social history        Current Outpatient Medications:     acetaminophen (TYLENOL) 325 mg tablet, Take 3 tablets (975 mg total) by mouth every 8 (eight) hours, Disp: , Rfl:     al mag oxide-diphenhydramine-lidocaine viscous (MAGIC MOUTHWASH) 1:1:1 suspension, Swish and spit 10 mL every 4 (four) hours as needed for mouth pain or discomfort, Disp: 300 mL, Rfl: 0    albuterol (PROVENTIL HFA,VENTOLIN HFA) 90 mcg/act inhaler, Inhale 2 puffs every 6 (six) hours as needed for wheezing, Disp: , Rfl:     amLODIPine-benazepril (LOTREL) 10-20 MG per capsule, Take 1 capsule by mouth daily, Disp: , Rfl:     DULoxetine (CYMBALTA) 20 mg capsule, Take 1 capsule (20 mg total) by mouth daily, Disp: 30 capsule, Rfl: 0    fluticasone (FLONASE) 50 mcg/act nasal spray, 1 spray into each nostril daily, Disp: , Rfl:     gabapentin (Neurontin) 300 mg capsule, Take 1 capsule (300 mg total) by mouth 3 (three) times a day, Disp: 270 capsule, Rfl: 0    LORazepam (ATIVAN) 1 mg tablet, Take 0 5 tablets (0 5 mg total) by mouth every 8 (eight) hours as needed (nausea or anxiety or insomnia), Disp: 45 tablet, Rfl: 0    olaparib (LYNPARZA) tablet, Take 2 tablets PO BID, Disp: 120 tablet, Rfl: 5    omeprazole (PriLOSEC) 20 mg delayed release capsule, Take 20 mg by mouth 2 (two) times a day, Disp: , Rfl:     oxyCODONE (OxyCONTIN) 10 mg 12 hr tablet, Take 1 tablet (10 mg total) by mouth daily at bedtime Max Daily Amount: 10 mg, Disp: 30 tablet, Rfl: 0    oxyCODONE (Roxicodone) 5 immediate release tablet, Take 1 tablet (5 mg total) by mouth every 4 (four) hours as needed for moderate pain or severe pain Max Daily Amount: 30 mg, Disp: 180 tablet, Rfl: 0    pravastatin (PRAVACHOL) 10 mg tablet, Take 10 mg by mouth daily, Disp: , Rfl:     AMLODIPINE BENZOATE PO, Take by mouth  , Disp: , Rfl:     benzonatate (TESSALON) 200 MG capsule, Take 1 capsule (200 mg total) by mouth 3 (three) times a day as needed for cough (Patient not taking: No sig reported), Disp: 20 capsule, Rfl: 0    butalbital-aspirin-caffeine (FIORINAL) -40 mg per capsule, Take 1 capsule by mouth every 4 (four) hours as needed for headaches   (Patient not taking: No sig reported), Disp: , Rfl:     docusate sodium (COLACE) 100 mg capsule, Take 1 capsule (100 mg total) by mouth 2 (two) times a day (Patient not taking: No sig reported), Disp: , Rfl:     ibuprofen (MOTRIN) 600 mg tablet, Take 1 tablet (600 mg total) by mouth every 6 (six) hours (Patient not taking: No sig reported), Disp: 30 tablet, Rfl: 0    Liniments (Blue-Emu Super Strength) CREA, Apply topically as needed (joint pain), Disp: , Rfl:     naloxone (NARCAN) 4 mg/0 1 mL nasal spray, 0 1 mL (4 mg total) by Alternating Nares route every 3 (three) minutes as needed (accidental opioid overdose or respiratory depression), Disp: 1 each, Rfl: 1    Naproxen Sodium 220 MG CAPS, Take 220 mg by mouth daily 2 caps AM   (Patient not taking: No sig reported), Disp: , Rfl:     ondansetron (ZOFRAN) 8 mg tablet, Take 1 tablet (8 mg total) by mouth every 8 (eight) hours as needed for nausea or vomiting (Patient not taking: No sig reported), Disp: 20 tablet, Rfl: 1    polyethylene glycol (MiraLax) 17 GM/SCOOP powder, Mix with 64 oz Gatorade, begin 4 PM day before surgery per bowel prep instructions  (Patient not taking: No sig reported), Disp: 238 g, Rfl: 0    Review of Systems   Constitutional: Positive for activity change, appetite change (waxing/waning) and fatigue  Gastrointestinal: Positive for constipation  GERD  Musculoskeletal: Positive for arthralgias, back pain and gait problem (not recently using her cane)  Skin: Positive for pallor  Allergic/Immunologic: Positive for immunocompromised state  Neurological:        Chemo-induced neuropathic pain  Paresthesia  Psychiatric/Behavioral: Positive for dysphoric mood and sleep disturbance  The patient is nervous/anxious  Occasionally "cranky" / frustrated  All other systems reviewed and are negative  OBJECTIVE:  /62 (BP Location: Right arm, Patient Position: Sitting, Cuff Size: Standard)   Pulse 104   Temp (!) 97 4 °F (36 3 °C) (Temporal)   Wt 76 7 kg (169 lb)   SpO2 97%   BMI 34 12 kg/m²   Physical Exam  Vitals reviewed  Constitutional:       General: She is not in acute distress  Appearance: She is overweight   She is ill-appearing (chronically)  She is not toxic-appearing  HENT:      Head: Normocephalic and atraumatic  Right Ear: External ear normal       Left Ear: External ear normal    Eyes:      General: No scleral icterus  Right eye: No discharge  Left eye: No discharge  Extraocular Movements: Extraocular movements intact  Conjunctiva/sclera: Conjunctivae normal       Pupils: Pupils are equal, round, and reactive to light  Cardiovascular:      Rate and Rhythm: Tachycardia present  Pulmonary:      Effort: Pulmonary effort is normal  No tachypnea, bradypnea, accessory muscle usage or respiratory distress  Abdominal:      General: There is no distension  Tenderness: There is no guarding  Musculoskeletal:      Cervical back: Normal range of motion  Right lower leg: No edema  Left lower leg: No edema  Skin:     General: Skin is dry  Coloration: Skin is pale  Neurological:      Mental Status: She is alert and oriented to person, place, and time  Cranial Nerves: No dysarthria or facial asymmetry  Psychiatric:         Attention and Perception: Attention normal          Mood and Affect: Mood and affect normal          Speech: Speech normal          Behavior: Behavior normal  Behavior is cooperative  Thought Content: Thought content normal          Cognition and Memory: Cognition and memory normal          Judgment: Judgment normal         Brie Pepper MD  [facility]  [phone]    Portions of this document may have been created using dictation software and as such some "sound alike" terms may have been generated by the system  Do not hesitate to contact me with any questions or clarifications

## 2022-06-20 ENCOUNTER — HOSPITAL ENCOUNTER (OUTPATIENT)
Dept: RADIOLOGY | Facility: HOSPITAL | Age: 72
Discharge: HOME | End: 2022-06-20
Payer: COMMERCIAL

## 2022-06-20 DIAGNOSIS — C56.3 MALIGNANT NEOPLASM OF BOTH OVARIES (HCC): ICD-10-CM

## 2022-06-20 PROCEDURE — 74183 MRI ABD W/O CNTR FLWD CNTR: CPT

## 2022-06-20 PROCEDURE — 72197 MRI PELVIS W/O & W/DYE: CPT

## 2022-06-20 PROCEDURE — G1004 CDSM NDSC: HCPCS

## 2022-06-20 PROCEDURE — A9585 GADOBUTROL INJECTION: HCPCS | Performed by: OBSTETRICS & GYNECOLOGY

## 2022-06-20 RX ADMIN — GADOBUTROL 7 ML: 604.72 INJECTION INTRAVENOUS at 13:16

## 2022-06-23 ENCOUNTER — TELEPHONE (OUTPATIENT)
Dept: SURGICAL ONCOLOGY | Facility: CLINIC | Age: 72
End: 2022-06-23

## 2022-06-28 ENCOUNTER — OFFICE VISIT (OUTPATIENT)
Dept: PALLIATIVE MEDICINE | Facility: CLINIC | Age: 72
End: 2022-06-28
Payer: COMMERCIAL

## 2022-06-28 VITALS
HEART RATE: 103 BPM | BODY MASS INDEX: 33.79 KG/M2 | OXYGEN SATURATION: 96 % | TEMPERATURE: 97.9 F | WEIGHT: 167.4 LBS | SYSTOLIC BLOOD PRESSURE: 122 MMHG | DIASTOLIC BLOOD PRESSURE: 76 MMHG

## 2022-06-28 DIAGNOSIS — K59.03 DRUG INDUCED CONSTIPATION: ICD-10-CM

## 2022-06-28 DIAGNOSIS — C56.3 MALIGNANT NEOPLASM OF BOTH OVARIES (HCC): Primary | ICD-10-CM

## 2022-06-28 DIAGNOSIS — G62.0 CHEMOTHERAPY-INDUCED PERIPHERAL NEUROPATHY (HCC): ICD-10-CM

## 2022-06-28 DIAGNOSIS — R11.0 NAUSEA: ICD-10-CM

## 2022-06-28 DIAGNOSIS — G43.909 MIGRAINE: ICD-10-CM

## 2022-06-28 DIAGNOSIS — G47.01 INSOMNIA DUE TO MEDICAL CONDITION: ICD-10-CM

## 2022-06-28 DIAGNOSIS — T45.1X5A CHEMOTHERAPY-INDUCED PERIPHERAL NEUROPATHY (HCC): ICD-10-CM

## 2022-06-28 DIAGNOSIS — G89.3 CANCER RELATED PAIN: ICD-10-CM

## 2022-06-28 DIAGNOSIS — R53.0 NEOPLASTIC MALIGNANT RELATED FATIGUE: ICD-10-CM

## 2022-06-28 DIAGNOSIS — M25.50 JOINT PAIN FOLLOWING CHEMOTHERAPY: ICD-10-CM

## 2022-06-28 DIAGNOSIS — F41.9 ANXIOUSNESS: ICD-10-CM

## 2022-06-28 DIAGNOSIS — G89.18 JOINT PAIN FOLLOWING CHEMOTHERAPY: ICD-10-CM

## 2022-06-28 DIAGNOSIS — Z51.5 PALLIATIVE CARE PATIENT: ICD-10-CM

## 2022-06-28 PROCEDURE — 99214 OFFICE O/P EST MOD 30 MIN: CPT | Performed by: INTERNAL MEDICINE

## 2022-06-28 RX ORDER — OXYCODONE HYDROCHLORIDE 5 MG/1
5 TABLET ORAL EVERY 4 HOURS PRN
Qty: 180 TABLET | Refills: 0 | Status: SHIPPED | OUTPATIENT
Start: 2022-07-05

## 2022-06-28 RX ORDER — OXYCODONE HCL 10 MG/1
10 TABLET, FILM COATED, EXTENDED RELEASE ORAL EVERY 12 HOURS SCHEDULED
Qty: 60 TABLET | Refills: 0 | Status: SHIPPED | OUTPATIENT
Start: 2022-07-02 | End: 2022-08-09 | Stop reason: SDUPTHER

## 2022-06-28 RX ORDER — BUTALBITAL, ASPIRIN, AND CAFFEINE 50; 325; 40 MG/1; MG/1; MG/1
1 CAPSULE ORAL EVERY 4 HOURS PRN
Qty: 30 CAPSULE | Refills: 0 | Status: SHIPPED | OUTPATIENT
Start: 2022-06-28

## 2022-06-28 RX ORDER — DULOXETIN HYDROCHLORIDE 20 MG/1
20 CAPSULE, DELAYED RELEASE ORAL DAILY
Qty: 30 CAPSULE | Refills: 2 | Status: SHIPPED | OUTPATIENT
Start: 2022-06-28

## 2022-06-28 NOTE — PATIENT INSTRUCTIONS
It was good to see you today  Thank you for coming in  Let's try increasing the oxycodone ER (OxyContin / extended release) to one 10mg tablet every 12 hours (instead of one tab at bedtime)  Watch for fatigue / lethargy / change in bowel habits as discussed  Continue other medications  If you don't hear from Gynecologic Oncology by tomorrow (about the Avastin) I recommend calling  Return in about 6 weeks  Call us for refills on medications that we supply, as needed  If something changes and you need to come in sooner, please call our office  PRESCRIPTION REFILL REMINDER:  All medication refills should be requested prior to [facility] on Friday  Any refill requests after noon on Friday would be addressed the following Monday

## 2022-06-28 NOTE — Clinical Note
Patient has an acute oral injury ("cracked tooth") and has a message in to Gynecologic Oncology about how long she needs to be off Avastin before receiving oral surgery

## 2022-06-28 NOTE — PROGRESS NOTES
Follow-up with Palliative and 8 [facility] Tono Sims 71 y o  female [id number]    ASSESSMENT & PLAN:  1  Malignant neoplasm of both ovaries ([facility] [address] )    2  Cancer related pain    3  Joint pain following chemotherapy    4  Chemotherapy-induced peripheral neuropathy ([facility] [address] )    5  Neoplastic malignant related fatigue    6  Insomnia due to medical condition    7  Anxiousness    8  Drug induced constipation    9  Nausea    10  Palliative care patient    6  Migraine           Continue disease-directed cares  Per recent imaging her disease may have progressed, and patient has some (appropriate) concerns   Patient has an acute oral injury ("cracked tooth") and has a message in to Gynecologic Oncology about how long she needs to be off Avastin before receiving oral surgery   Patient feels recent addition of oxyER 10mg QHS has improved sleep and decreased pain present upon waking  Will increase to q12h for better ATC control of cancer-related pain  o Anticipate patient will need a prior authorization for change to an existing opioid therapy  o Patient has a documented allergy to morphine so MSER is contraindicated   o [facility] will perform a UDS if patient is suspected of abuse  o This patient is a patient of [facility] with a goal of symptom management and improved quality of life   Continue oxyIR PRN breakthrough pain   Addition of duloxetine has helped with mood, sleep  Continue   Continue dietary modifications (Asif's apple juice) for constipation  Effective   Continue PPI for GERD   Continue Zofran PRN N/V (1st line)   Continue lorazepam PRN anxiousness, insomnia, 2nd-line N/V  Not used recently   Naloxone Rx provided in previous visit   Patient had reported receiving [address] vaccinations     Reviewed notes (Gynecologic Oncology), labs (6/3/22 Cr 0 72, alb 3 6,  7 5, Hb 11 4), imaging + procedures (6/20/22 MRI abdomen + pelvis showing progression of disease in the liver)   Return in about 6 weeks (around 8/9/2022)   Emotional support provided   Medication safety issues addressed - no driving under the influence of narcotics, watch for adverse effects including AMS and respiratory depression, keep medications stored in a safe/locked environment  Requested Prescriptions     Signed Prescriptions Disp Refills    oxyCODONE (OxyCONTIN) 10 mg 12 hr tablet 60 tablet 0     Sig: Take 1 tablet (10 mg total) by mouth every 12 (twelve) hours Max Daily Amount: 20 mg    oxyCODONE (Roxicodone) 5 immediate release tablet 180 tablet 0     Sig: Take 1 tablet (5 mg total) by mouth every 4 (four) hours as needed for moderate pain or severe pain Max Daily Amount: 30 mg    DULoxetine (CYMBALTA) 20 mg capsule 30 capsule 2     Sig: Take 1 capsule (20 mg total) by mouth daily    butalbital-aspirin-caffeine (FIORINAL) -40 mg per capsule 30 capsule 0     Sig: Take 1 capsule by mouth every 4 (four) hours as needed for headaches or migraine       Medications Discontinued During This Encounter   Medication Reason    oxyCODONE (OxyCONTIN) 10 mg 12 hr tablet Reorder    DULoxetine (CYMBALTA) 20 mg capsule Reorder    oxyCODONE (Roxicodone) 5 immediate release tablet Reorder    butalbital-aspirin-caffeine (FIORINAL) -40 mg per capsule Reorder       Representatives have queried the patient's controlled substance dispensing history in the Prescription Drug Monitoring Program in compliance with regulations before I have prescribed any controlled substances  The prescription history is consistent with prescribed therapy and our practice policies        40 minutes were spent in this ambulatory visit with greater than 50% of the time spent face to face with patient in counseling or coordination of care including discussions of symptom assessment and management, medication review, medication adjustment, psychosocial support, chart review, imaging review, lab review, goals of care, opioid titration, supportive listening and anticipatory guidance  All of the patient's questions were answered during this discussion  SUBJECTIVE:  Chief Complaint   Patient presents with    Cancer    Cancer Pain    Anxiety    Counseling    Constipation    Follow-up    Nausea    Joint Pain    Peripheral Neuropathy        AZAEL Bergman is a 71 y o  female w/ 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 s/p PROMISE+BSO and tumor debulking s/p chemo w/ paclitaxel + carboplatin, now receiving bevacizumab + olaparib (dose reduced s/t adverse effects)  She follows w/ Dr Gay Buckner (Gynecologic Oncology)  Patient is known to [facility] clinic; seen 6/7/22 for symptom management, psychosocial support  Patient states that the addition of oxyER 10mg QHS to her regimen has made a positive impact  Sleep improved, and she has less pain upon waking  She is amenable to trying q12h dosing to see if daytime pain improves as well, if tolerated  She continues to use the oxyIR 5mg about every 4 hours while awake, continues to use gabapentin  Patient feels that the addition of duloxetine to her regimen has helped w/ her mood and frustration, noting she feels "less edgy"  She wishes to continue  She has not used lorazepam since before her prior visit w/ [facility]  Patient reports she "cracked a tooth" recently while eating a hard pretzel  She states tooth problems have been an issues since before her cancer diagnosis  She notes a return of one oral lesion  MMW provides some relief  She states she needs to know how long she needs to be off Avastin before she can start oral surgery  Patient has seen the read of her 6/20/22 MRI, and has concerns about the possible progression of disease  PDMP shows no concerns      The following portions of the medical history were reviewed: past medical history, surgical history, problem list, medication list, family history, and social history        Current Outpatient Medications:     acetaminophen (TYLENOL) 325 mg tablet, Take 3 tablets (975 mg total) by mouth every 8 (eight) hours, Disp: , Rfl:     al mag oxide-diphenhydramine-lidocaine viscous (MAGIC MOUTHWASH) 1:1:1 suspension, Swish and spit 10 mL every 4 (four) hours as needed for mouth pain or discomfort, Disp: 300 mL, Rfl: 0    albuterol (PROVENTIL HFA,VENTOLIN HFA) 90 mcg/act inhaler, Inhale 2 puffs every 6 (six) hours as needed for wheezing, Disp: , Rfl:     amLODIPine-benazepril (LOTREL) 10-20 MG per capsule, Take 1 capsule by mouth daily, Disp: , Rfl:     butalbital-aspirin-caffeine (FIORINAL) -40 mg per capsule, Take 1 capsule by mouth every 4 (four) hours as needed for headaches or migraine, Disp: 30 capsule, Rfl: 0    DULoxetine (CYMBALTA) 20 mg capsule, Take 1 capsule (20 mg total) by mouth daily, Disp: 30 capsule, Rfl: 2    fluticasone (FLONASE) 50 mcg/act nasal spray, 1 spray into each nostril daily, Disp: , Rfl:     gabapentin (Neurontin) 300 mg capsule, Take 1 capsule (300 mg total) by mouth 3 (three) times a day, Disp: 270 capsule, Rfl: 0    LORazepam (ATIVAN) 1 mg tablet, Take 0 5 tablets (0 5 mg total) by mouth every 8 (eight) hours as needed (nausea or anxiety or insomnia), Disp: 45 tablet, Rfl: 0    olaparib (LYNPARZA) tablet, Take 2 tablets PO BID, Disp: 120 tablet, Rfl: 5    omeprazole (PriLOSEC) 20 mg delayed release capsule, Take 20 mg by mouth 2 (two) times a day, Disp: , Rfl:     [START ON 7/2/2022] oxyCODONE (OxyCONTIN) 10 mg 12 hr tablet, Take 1 tablet (10 mg total) by mouth every 12 (twelve) hours Max Daily Amount: 20 mg, Disp: 60 tablet, Rfl: 0    [START ON 7/5/2022] oxyCODONE (Roxicodone) 5 immediate release tablet, Take 1 tablet (5 mg total) by mouth every 4 (four) hours as needed for moderate pain or severe pain Max Daily Amount: 30 mg, Disp: 180 tablet, Rfl: 0    pravastatin (PRAVACHOL) 10 mg tablet, Take 10 mg by mouth daily, Disp: , Rfl:     AMLODIPINE BENZOATE PO, Take by mouth  , Disp: , Rfl:     benzonatate (TESSALON) 200 MG capsule, Take 1 capsule (200 mg total) by mouth 3 (three) times a day as needed for cough (Patient not taking: No sig reported), Disp: 20 capsule, Rfl: 0    docusate sodium (COLACE) 100 mg capsule, Take 1 capsule (100 mg total) by mouth 2 (two) times a day (Patient not taking: No sig reported), Disp: , Rfl:     ibuprofen (MOTRIN) 600 mg tablet, Take 1 tablet (600 mg total) by mouth every 6 (six) hours (Patient not taking: No sig reported), Disp: 30 tablet, Rfl: 0    Liniments (Blue-Emu Super Strength) CREA, Apply topically as needed (joint pain), Disp: , Rfl:     naloxone (NARCAN) 4 mg/0 1 mL nasal spray, 0 1 mL (4 mg total) by Alternating Nares route every 3 (three) minutes as needed (accidental opioid overdose or respiratory depression), Disp: 1 each, Rfl: 1    Naproxen Sodium 220 MG CAPS, Take 220 mg by mouth daily 2 caps AM   (Patient not taking: No sig reported), Disp: , Rfl:     ondansetron (ZOFRAN) 8 mg tablet, Take 1 tablet (8 mg total) by mouth every 8 (eight) hours as needed for nausea or vomiting (Patient not taking: No sig reported), Disp: 20 tablet, Rfl: 1    polyethylene glycol (MiraLax) 17 GM/SCOOP powder, Mix with 64 oz Gatorade, begin 4 PM day before surgery per bowel prep instructions  (Patient not taking: No sig reported), Disp: 238 g, Rfl: 0    Review of Systems   Constitutional: Positive for activity change (improved), appetite change (waxes and wanes) and fatigue (decreased)  HENT: Positive for dental problem ("cracked" tooth)  Gastrointestinal: Positive for constipation (improves w/ Clark's apple juice)  GERD  Musculoskeletal: Positive for arthralgias and back pain  Allergic/Immunologic: Positive for immunocompromised state  Neurological:        Painful neuropathy w/ paresthesia     Psychiatric/Behavioral: Positive for dysphoric mood (improved) and sleep disturbance (improved)  The patient is nervous/anxious (overall improved)  All other systems reviewed and are negative  OBJECTIVE:  /76 (BP Location: Right arm, Patient Position: Sitting, Cuff Size: Standard)   Pulse 103   Temp 97 9 °F (36 6 °C) (Temporal)   Wt 75 9 kg (167 lb 6 4 oz)   SpO2 96%   BMI 33 79 kg/m²   Physical Exam  Vitals reviewed  Constitutional:       General: She is not in acute distress  Appearance: She is well-developed, well-groomed and overweight  She is ill-appearing (chronically)  She is not toxic-appearing  HENT:      Head: Normocephalic and atraumatic  Right Ear: External ear normal       Left Ear: External ear normal       Mouth/Throat:      Comments: Patient remained masked throughout visit  Eyes:      General: No scleral icterus  Right eye: No discharge  Left eye: No discharge  Extraocular Movements: Extraocular movements intact  Conjunctiva/sclera: Conjunctivae normal       Pupils: Pupils are equal, round, and reactive to light  Cardiovascular:      Rate and Rhythm: Tachycardia present  Pulmonary:      Effort: Pulmonary effort is normal  No tachypnea, bradypnea, accessory muscle usage or respiratory distress  Abdominal:      General: There is no distension  Tenderness: There is no guarding  Musculoskeletal:      Cervical back: Normal range of motion  Right lower leg: No edema  Left lower leg: No edema  Skin:     General: Skin is dry  Coloration: Skin is not pale  Neurological:      Mental Status: She is alert and oriented to person, place, and time  Cranial Nerves: No dysarthria or facial asymmetry  Gait: Gait is intact  Psychiatric:         Attention and Perception: Attention normal          Mood and Affect: Mood and affect normal          Speech: Speech normal          Behavior: Behavior normal  Behavior is cooperative           Thought Content: Thought content normal          Cognition and Memory: Cognition and memory normal          Judgment: Judgment normal         Naima Robbins MD  [facility]      Portions of this document may have been created using dictation software and as such some "sound alike" terms may have been generated by the system  Do not hesitate to contact me with any questions or clarifications

## 2022-07-05 ENCOUNTER — OFFICE VISIT (OUTPATIENT)
Dept: GYNECOLOGIC ONCOLOGY | Facility: CLINIC | Age: 72
End: 2022-07-05
Payer: COMMERCIAL

## 2022-07-05 VITALS
HEIGHT: 59 IN | SYSTOLIC BLOOD PRESSURE: 140 MMHG | BODY MASS INDEX: 34.17 KG/M2 | DIASTOLIC BLOOD PRESSURE: 84 MMHG | WEIGHT: 169.5 LBS | RESPIRATION RATE: 17 BRPM | TEMPERATURE: 97.6 F | HEART RATE: 70 BPM | OXYGEN SATURATION: 97 %

## 2022-07-05 DIAGNOSIS — C56.3 MALIGNANT NEOPLASM OF BOTH OVARIES (HCC): Primary | ICD-10-CM

## 2022-07-05 DIAGNOSIS — R53.0 NEOPLASTIC MALIGNANT RELATED FATIGUE: ICD-10-CM

## 2022-07-05 DIAGNOSIS — G89.3 CANCER RELATED PAIN: ICD-10-CM

## 2022-07-05 PROCEDURE — 99215 OFFICE O/P EST HI 40 MIN: CPT | Performed by: OBSTETRICS & GYNECOLOGY

## 2022-07-05 NOTE — ASSESSMENT & PLAN NOTE
78yo with 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 presents for follow up  Patient was unable to tolerate Avastin given significant toxicity and she is continuing on olaparib with significant fatigue but manageable  Patient had repeat imaging given known liver metastasis  Given contrast shortage an MRI was obtained which showed small volume liver lesions  These do not appear to be present in previous CT scan and her most likely disease progression  Her  was last checked a month ago and was still normal   She remains asymptomatic  We discussed options moving forward including restarting systemic chemotherapy versus continued observation  Unfortunately she has not a secondary debulking candidate given the location of these lesions  However, the lesions remained small and few radiation could be considered  I have discussed the  trial on intervention based on imaging/ca125 vs clinical symptoms which showed no difference in OS but rather worse QOL  However, there were limitations to the study including no standardized therapy and/or secondary cytoreduction stratification  Patient has agreed to proceed with repeat CT scan in 3 months to assess diseas status and possible initiation of chemotherapy  She also had a  drawn at this time and repeated in 1 month

## 2022-07-05 NOTE — PROGRESS NOTES
Assessment/Plan:    Problem List Items Addressed This Visit        Endocrine    Ovarian cancer ([facility] [address] ) - Primary     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 presents for follow up  Patient was unable to tolerate Avastin given significant toxicity and she is continuing on olaparib with significant fatigue but manageable  Patient had repeat imaging given known liver metastasis  Given contrast shortage an MRI was obtained which showed small volume liver lesions  These do not appear to be present in previous CT scan and her most likely disease progression  Her  was last checked a month ago and was still normal   She remains asymptomatic  We discussed options moving forward including restarting systemic chemotherapy versus continued observation  Unfortunately she has not a secondary debulking candidate given the location of these lesions  However, the lesions remained small and few radiation could be considered  I have discussed the  trial on intervention based on imaging/ca125 vs clinical symptoms which showed no difference in OS but rather worse QOL  However, there were limitations to the study including no standardized therapy and/or secondary cytoreduction stratification  Patient has agreed to proceed with repeat CT scan in 3 months to assess diseas status and possible initiation of chemotherapy  She also had a  drawn at this time and repeated in 1 month  Relevant Orders    CBC and differential        Comprehensive metabolic panel    Magnesium    Magnesium    Comprehensive metabolic panel        CBC and differential    CT chest abdomen pelvis w contrast       Other    Cancer related pain     Continues to follow with palliative Care  Some improvement noted but persistent  Neoplastic malignant related fatigue     Persistent even with olaparib dose reduction    Will consider discontinuation in the setting of likely progressive disease  Will continue discussion pending                    CHIEF COMPLAINT: follow up       Problem:  Cancer Staging  [facility]  Staging form: Ovary, Fallopian Tube, Primary Peritoneal, AJCC 8th Edition  - Clinical: FIGO Stage IIIC (cT3c) - Signed by Nuzhat Julian MD on 9/16/2021        Previous therapy:  Oncology History   Ovarian cancer ([facility] [address] )   8/6/2021 Surgery    PROMISE/BSO/tumor debulking to optimal cytoreduction     8/25/2021 Initial Diagnosis    Ovarian [facility] ([facility] )     9/16/2021 -  Cancer Staged    Staging form: Ovary, Fallopian Tube, Primary Peritoneal, AJCC 8th Edition  - Clinical: FIGO Stage IIIC (cT3c) - Signed by Nuzhat Julian MD on 9/16/2021  Stage prefix: Initial diagnosis  Cancer antigen 125 () (U/mL): 543       9/28/2021 - 2/2022 Chemotherapy    Carbo AUC6, Taxol 175mg/m2 q3 weeks  Avastin 15mg/m2 added cycle 3    Toxicities:  Cycle4: carbo AUC 5 for neutropenia, added neulasta     10/2021 Genomic Testing    SEAN high  CPS >2     12/9/2021 Genetic Testing    POLD VUS     2/22/2022 -  Chemotherapy    Maintenance: Avastin 15mg/m2  Olaparib 300mg BID    Toxicity:  3/28/22: dose-reduce avastin to 10 mg/kg every 21 days as well as dose-reduction of olaparib to 150 mg AM and 300 mg PM  5/9/22: decrease olaparib to 200mg BID             Patient ID: Karen Kendrick is a 71 y o  female  78yo with 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 presents for follow up  Avastin was stopped last month and patient continues to have joint and bone pain but does note significant improvement  Patient notes that her physical function has improved over the last month and she is considering returning to work  She otherwise has no complaints        The following portions of the patient's history were reviewed and updated as appropriate: allergies, current medications, past family history, past medical history, past social history, past surgical history and problem list     Review of Systems   Constitutional: Positive for fatigue  Negative for appetite change, chills and fever  Respiratory: Negative for chest tightness and shortness of breath  Gastrointestinal: Negative for abdominal distention, abdominal pain, constipation, diarrhea and nausea  Genitourinary: Negative for difficulty urinating, flank pain, frequency, urgency, vaginal bleeding, vaginal discharge and vaginal pain  Musculoskeletal: Positive for arthralgias and myalgias  Negative for back pain and joint swelling  Skin: Negative for rash  Neurological: Negative for dizziness, light-headedness, numbness and headaches         Current Outpatient Medications   Medication Sig Dispense Refill    acetaminophen (TYLENOL) 325 mg tablet Take 3 tablets (975 mg total) by mouth every 8 (eight) hours      al mag oxide-diphenhydramine-lidocaine viscous (MAGIC MOUTHWASH) 1:1:1 suspension Swish and spit 10 mL every 4 (four) hours as needed for mouth pain or discomfort 300 mL 0    albuterol (PROVENTIL HFA,VENTOLIN HFA) 90 mcg/act inhaler Inhale 2 puffs every 6 (six) hours as needed for wheezing      amLODIPine-benazepril (LOTREL) 10-20 MG per capsule Take 1 capsule by mouth daily      butalbital-aspirin-caffeine (FIORINAL) -40 mg per capsule Take 1 capsule by mouth every 4 (four) hours as needed for headaches or migraine 30 capsule 0    docusate sodium (COLACE) 100 mg capsule Take 1 capsule (100 mg total) by mouth 2 (two) times a day      DULoxetine (CYMBALTA) 20 mg capsule Take 1 capsule (20 mg total) by mouth daily 30 capsule 2    fluticasone (FLONASE) 50 mcg/act nasal spray 1 spray into each nostril daily      gabapentin (Neurontin) 300 mg capsule Take 1 capsule (300 mg total) by mouth 3 (three) times a day 270 capsule 0    Liniments (Blue-Emu Super Strength) CREA Apply topically as needed (joint pain)      LORazepam (ATIVAN) 1 mg tablet Take 0 5 tablets (0 5 mg total) by mouth every 8 (eight) hours as needed (nausea or anxiety or insomnia) 45 tablet 0    naloxone (NARCAN) 4 mg/0 1 mL nasal spray 0 1 mL (4 mg total) by Alternating Nares route every 3 (three) minutes as needed (accidental opioid overdose or respiratory depression) 1 each 1    olaparib (LYNPARZA) tablet Take 2 tablets PO  tablet 5    omeprazole (PriLOSEC) 20 mg delayed release capsule Take 20 mg by mouth 2 (two) times a day      oxyCODONE (OxyCONTIN) 10 mg 12 hr tablet Take 1 tablet (10 mg total) by mouth every 12 (twelve) hours Max Daily Amount: 20 mg 60 tablet 0    oxyCODONE (Roxicodone) 5 immediate release tablet Take 1 tablet (5 mg total) by mouth every 4 (four) hours as needed for moderate pain or severe pain Max Daily Amount: 30 mg 180 tablet 0    pravastatin (PRAVACHOL) 10 mg tablet Take 10 mg by mouth daily      AMLODIPINE BENZOATE PO Take by mouth        benzonatate (TESSALON) 200 MG capsule Take 1 capsule (200 mg total) by mouth 3 (three) times a day as needed for cough (Patient not taking: No sig reported) 20 capsule 0    ibuprofen (MOTRIN) 600 mg tablet Take 1 tablet (600 mg total) by mouth every 6 (six) hours (Patient not taking: No sig reported) 30 tablet 0    Naproxen Sodium 220 MG CAPS Take 220 mg by mouth daily 2 caps AM   (Patient not taking: No sig reported)      ondansetron (ZOFRAN) 8 mg tablet Take 1 tablet (8 mg total) by mouth every 8 (eight) hours as needed for nausea or vomiting (Patient not taking: No sig reported) 20 tablet 1    polyethylene glycol (MiraLax) 17 GM/SCOOP powder Mix with 64 oz Gatorade, begin 4 PM day before surgery per bowel prep instructions  (Patient not taking: No sig reported) 238 g 0     No current facility-administered medications for this visit  Objective:    Blood pressure 140/84, pulse 70, temperature 97 6 °F (36 4 °C), resp  rate 17, height 4' 11 02" (1 499 m), weight 76 9 kg (169 lb 8 oz), SpO2 97 %  Body mass index is 34 21 kg/m²    Body surface area is 1 72 meters squared  Physical Exam  HENT:      Head: Normocephalic and atraumatic  Nose: Nose normal    Cardiovascular:      Rate and Rhythm: Normal rate and regular rhythm  Pulmonary:      Effort: Pulmonary effort is normal    Abdominal:      General: There is no distension  Palpations: Abdomen is soft  There is no mass  Genitourinary:     Comments: defer  Musculoskeletal:         General: No swelling  Normal range of motion  Cervical back: Normal range of motion  Skin:     General: Skin is warm and dry  Neurological:      General: No focal deficit present  Mental Status: She is alert     Psychiatric:         Mood and Affect: Mood normal            Lab Results   Component Value Date    K 3 9 06/03/2022     06/03/2022    CO2 26 06/03/2022    BUN 15 06/03/2022    CREATININE 0 72 06/03/2022    GLUCOSE 195 (H) 08/06/2021    GLUF 88 04/01/2022    CALCIUM 9 0 06/03/2022    CORRECTEDCA 9 4 05/13/2022    AST 18 06/03/2022    ALT 28 06/03/2022    ALKPHOS 89 06/03/2022    EGFR 84 06/03/2022     Lab Results   Component Value Date    WBC 8 06 06/03/2022    HGB 11 4 (L) 06/03/2022    HCT 35 0 06/03/2022     (H) 06/03/2022     06/03/2022     Lab Results   Component Value Date    NEUTROABS 3 74 06/03/2022        Trend:  Lab Results   Component Value Date     7 5 06/03/2022     7 9 05/13/2022     8 9 04/22/2022     9 5 04/01/2022     7 6 03/11/2022     10 5 02/11/2022     15 5 01/10/2022     12 5 12/17/2021     11 6 11/26/2021     19 4 11/05/2021     69 9 (H) 10/15/2021     94 1 (H) 09/24/2021     543 7 (H) 07/20/2021

## 2022-07-05 NOTE — ASSESSMENT & PLAN NOTE
Persistent even with olaparib dose reduction  Will consider discontinuation in the setting of likely progressive disease    Will continue discussion pending

## 2022-07-06 DIAGNOSIS — C56.3 MALIGNANT NEOPLASM OF BOTH OVARIES (HCC): Primary | ICD-10-CM

## 2022-07-11 ENCOUNTER — HOSPITAL ENCOUNTER (OUTPATIENT)
Dept: INFUSION CENTER | Facility: HOSPITAL | Age: 72
Discharge: HOME | End: 2022-07-11
Payer: COMMERCIAL

## 2022-07-11 VITALS — TEMPERATURE: 97.8 F

## 2022-07-11 DIAGNOSIS — Z45.2 ENCOUNTER FOR CENTRAL LINE CARE: Primary | ICD-10-CM

## 2022-07-11 DIAGNOSIS — C56.3 MALIGNANT NEOPLASM OF BOTH OVARIES (HCC): ICD-10-CM

## 2022-07-11 LAB
ALBUMIN SERPL BCP-MCNC: 3.6 G/DL (ref 3.5–5)
ALP SERPL-CCNC: 93 U/L (ref 46–116)
ALT SERPL W P-5'-P-CCNC: 35 U/L (ref 12–78)
ANION GAP SERPL CALCULATED.3IONS-SCNC: 10 MMOL/L (ref 4–13)
AST SERPL W P-5'-P-CCNC: 25 U/L (ref 5–45)
BASOPHILS # BLD MANUAL: 0 THOUSAND/UL (ref 0–0.1)
BASOPHILS NFR MAR MANUAL: 0 % (ref 0–1)
BILIRUB SERPL-MCNC: 0.4 MG/DL (ref 0.2–1)
BUN SERPL-MCNC: 17 MG/DL (ref 5–25)
CALCIUM SERPL-MCNC: 9.2 MG/DL (ref 8.3–10.1)
CHLORIDE SERPL-SCNC: 102 MMOL/L (ref 100–108)
CO2 SERPL-SCNC: 29 MMOL/L (ref 21–32)
CREAT SERPL-MCNC: 0.83 MG/DL (ref 0.6–1.3)
EOSINOPHIL # BLD MANUAL: 0.08 THOUSAND/UL (ref 0–0.4)
EOSINOPHIL NFR BLD MANUAL: 1 % (ref 0–6)
ERYTHROCYTE [DISTWIDTH] IN BLOOD BY AUTOMATED COUNT: 16.6 % (ref 11.6–15.1)
GFR SERPL CREATININE-BSD FRML MDRD: 71 ML/MIN/1.73SQ M
GLUCOSE SERPL-MCNC: 125 MG/DL (ref 65–140)
HCT VFR BLD AUTO: 35.3 % (ref 34.8–46.1)
HGB BLD-MCNC: 11.4 G/DL (ref 11.5–15.4)
HOWELL-JOLLY BOD BLD QL SMEAR: PRESENT
HYPERCHROMIA BLD QL SMEAR: PRESENT
LYMPHOCYTES # BLD AUTO: 2.78 THOUSAND/UL (ref 0.6–4.47)
LYMPHOCYTES # BLD AUTO: 33 % (ref 14–44)
MACROCYTES BLD QL AUTO: PRESENT
MAGNESIUM SERPL-MCNC: 1.7 MG/DL (ref 1.6–2.6)
MCH RBC QN AUTO: 35.1 PG (ref 26.8–34.3)
MCHC RBC AUTO-ENTMCNC: 32.3 G/DL (ref 31.4–37.4)
MCV RBC AUTO: 109 FL (ref 82–98)
MONOCYTES # BLD AUTO: 1.43 THOUSAND/UL (ref 0–1.22)
MONOCYTES NFR BLD: 17 % (ref 4–12)
NEUTROPHILS # BLD MANUAL: 3.95 THOUSAND/UL (ref 1.85–7.62)
NEUTS SEG NFR BLD AUTO: 47 % (ref 43–75)
NRBC BLD AUTO-RTO: 3 /100 WBC (ref 0–2)
PLATELET # BLD AUTO: 285 THOUSANDS/UL (ref 149–390)
PLATELET BLD QL SMEAR: ADEQUATE
PMV BLD AUTO: 10.4 FL (ref 8.9–12.7)
POLYCHROMASIA BLD QL SMEAR: PRESENT
POTASSIUM SERPL-SCNC: 3.6 MMOL/L (ref 3.5–5.3)
PROT SERPL-MCNC: 7.2 G/DL (ref 6.4–8.2)
RBC # BLD AUTO: 3.25 MILLION/UL (ref 3.81–5.12)
RBC MORPH BLD: PRESENT
SCHISTOCYTES BLD QL SMEAR: PRESENT
SODIUM SERPL-SCNC: 141 MMOL/L (ref 136–145)
VARIANT LYMPHS # BLD AUTO: 2 %
WBC # BLD AUTO: 8.41 THOUSAND/UL (ref 4.31–10.16)

## 2022-07-11 PROCEDURE — 86304 IMMUNOASSAY TUMOR CA 125: CPT

## 2022-07-11 PROCEDURE — 83735 ASSAY OF MAGNESIUM: CPT

## 2022-07-11 PROCEDURE — 85007 BL SMEAR W/DIFF WBC COUNT: CPT

## 2022-07-11 PROCEDURE — 80053 COMPREHEN METABOLIC PANEL: CPT

## 2022-07-11 PROCEDURE — 85027 COMPLETE CBC AUTOMATED: CPT

## 2022-07-12 LAB — CANCER AG125 SERPL-ACNC: 10.6 U/ML (ref 0–30)

## 2022-07-15 ENCOUNTER — OFFICE VISIT (OUTPATIENT)
Dept: URGENT CARE | Facility: CLINIC | Age: 72
End: 2022-07-15
Payer: COMMERCIAL

## 2022-07-15 VITALS
OXYGEN SATURATION: 97 % | DIASTOLIC BLOOD PRESSURE: 88 MMHG | HEART RATE: 75 BPM | BODY MASS INDEX: 34.27 KG/M2 | HEIGHT: 59 IN | SYSTOLIC BLOOD PRESSURE: 154 MMHG | RESPIRATION RATE: 18 BRPM | WEIGHT: 170 LBS | TEMPERATURE: 97.7 F

## 2022-07-15 DIAGNOSIS — J20.9 ACUTE BRONCHITIS, UNSPECIFIED ORGANISM: ICD-10-CM

## 2022-07-15 DIAGNOSIS — R05.9 COUGH: Primary | ICD-10-CM

## 2022-07-15 PROBLEM — R11.0 NAUSEA: Status: RESOLVED | Noted: 2021-11-02 | Resolved: 2022-07-15

## 2022-07-15 PROCEDURE — 87636 SARSCOV2 & INF A&B AMP PRB: CPT | Performed by: FAMILY MEDICINE

## 2022-07-15 PROCEDURE — 99213 OFFICE O/P EST LOW 20 MIN: CPT | Performed by: FAMILY MEDICINE

## 2022-07-15 RX ORDER — BENZONATATE 200 MG/1
200 CAPSULE ORAL 2 TIMES DAILY PRN
Qty: 20 CAPSULE | Refills: 0 | Status: SHIPPED | OUTPATIENT
Start: 2022-07-15

## 2022-07-15 RX ORDER — ALBUTEROL SULFATE 90 UG/1
2 AEROSOL, METERED RESPIRATORY (INHALATION) EVERY 6 HOURS PRN
Qty: 18 G | Refills: 1 | Status: SHIPPED | OUTPATIENT
Start: 2022-07-15

## 2022-07-15 RX ORDER — GUAIFENESIN/DEXTROMETHORPHAN 100-10MG/5
10 SYRUP ORAL 3 TIMES DAILY PRN
Qty: 237 ML | Refills: 1 | Status: SHIPPED | OUTPATIENT
Start: 2022-07-15

## 2022-07-15 RX ORDER — FLUTICASONE PROPIONATE 44 UG/1
2 AEROSOL, METERED RESPIRATORY (INHALATION) 2 TIMES DAILY
Qty: 10.6 G | Refills: 0 | Status: SHIPPED | OUTPATIENT
Start: 2022-07-15

## 2022-07-15 NOTE — PROGRESS NOTES
[employer] [facility]    NAME: Marco A Cevallos is a 71 y o  female  : 1950    MRN: [id number]  DATE: July 15, 2022  TIME: 5:10 PM    Assessment and Plan   Cough [R05 9]  1  Cough  dextromethorphan-guaiFENesin (ROBITUSSIN DM)  mg/5 mL syrup    Covid/Flu-Office Collect    benzonatate (TESSALON) 200 MG capsule   2  Acute bronchitis, unspecified organism  benzonatate (TESSALON) 200 MG capsule    albuterol (PROVENTIL HFA,VENTOLIN HFA) 90 mcg/act inhaler    fluticasone (Flovent HFA) 44 mcg/act inhaler     Unremarkable examination  Will manage the patient conservatively  Provided the patient with precautionary measures  Laboratory evaluation as noted above    Patient Instructions     Follow up with PCP in 3-5 days  Proceed to ER if symptoms worsen  Chief Complaint     Chief Complaint   Patient presents with    Cough     Pt reports of worsening cough and congestion x3 days  History of Present Illness   Cough  This is a new problem  The current episode started in the past 7 days  The problem has been unchanged  The cough is productive of sputum  Pertinent negatives include no chest pain, chills, ear congestion, ear pain, fever, postnasal drip, rhinorrhea, shortness of breath or wheezing  Nothing aggravates the symptoms  She has tried a beta-agonist inhaler for the symptoms  The treatment provided mild relief  Her past medical history is significant for bronchitis  Review of Systems   Review of Systems   Constitutional: Negative for chills and fever  HENT: Negative for ear pain, postnasal drip and rhinorrhea  Respiratory: Positive for cough  Negative for shortness of breath and wheezing  Cardiovascular: Negative for chest pain  All other systems reviewed and are negative      The following portions of the patient's history were reviewed and updated as appropriate: allergies, current medications, past family history, past medical history, past social history, past surgical history and problem list      Medications have been verified  Objective   /88   Pulse 75   Temp 97 7 °F (36 5 °C)   Resp 18   Ht 4' 11" (1 499 m)   Wt 77 1 kg (170 lb)   SpO2 97%   BMI 34 34 kg/m²     Physical Exam  Vitals reviewed  Constitutional:       General: She is not in acute distress  Appearance: She is well-developed  She is not diaphoretic  HENT:      Head: Normocephalic and atraumatic  Right Ear: Tympanic membrane, ear canal and external ear normal  There is no impacted cerumen  Left Ear: Tympanic membrane, ear canal and external ear normal  There is no impacted cerumen  Nose: Nose normal  No congestion or rhinorrhea  Mouth/Throat:      Mouth: Mucous membranes are moist       Pharynx: No oropharyngeal exudate or posterior oropharyngeal erythema  Eyes:      Conjunctiva/sclera: Conjunctivae normal       Pupils: Pupils are equal, round, and reactive to light  Cardiovascular:      Rate and Rhythm: Normal rate and regular rhythm  Heart sounds: Normal heart sounds  No murmur heard  No friction rub  No gallop  Pulmonary:      Effort: Pulmonary effort is normal  No respiratory distress  Breath sounds: Normal breath sounds  No wheezing, rhonchi or rales  Chest:      Chest wall: No tenderness  Abdominal:      General: Bowel sounds are normal  There is no distension  Palpations: Abdomen is soft  Tenderness: There is no abdominal tenderness  Musculoskeletal:         General: Normal range of motion  Cervical back: Normal range of motion and neck supple  Skin:     General: Skin is warm and dry  Findings: No erythema or rash  Neurological:      Mental Status: She is alert and oriented to person, place, and time         Meredith Rodriguez MD

## 2022-07-16 LAB
FLUAV RNA RESP QL NAA+PROBE: NEGATIVE
FLUBV RNA RESP QL NAA+PROBE: NEGATIVE
SARS-COV-2 RNA RESP QL NAA+PROBE: NEGATIVE

## 2022-08-08 ENCOUNTER — HOSPITAL ENCOUNTER (OUTPATIENT)
Dept: INFUSION CENTER | Facility: HOSPITAL | Age: 72
Discharge: HOME | End: 2022-08-08
Payer: COMMERCIAL

## 2022-08-08 VITALS — TEMPERATURE: 97.8 F

## 2022-08-08 DIAGNOSIS — Z45.2 ENCOUNTER FOR CENTRAL LINE CARE: Primary | ICD-10-CM

## 2022-08-08 DIAGNOSIS — C56.3 MALIGNANT NEOPLASM OF BOTH OVARIES (HCC): ICD-10-CM

## 2022-08-08 LAB
ALBUMIN SERPL BCP-MCNC: 3.6 G/DL (ref 3.5–5)
ALP SERPL-CCNC: 88 U/L (ref 46–116)
ALT SERPL W P-5'-P-CCNC: 25 U/L (ref 12–78)
ANION GAP SERPL CALCULATED.3IONS-SCNC: 11 MMOL/L (ref 4–13)
AST SERPL W P-5'-P-CCNC: 18 U/L (ref 5–45)
BASOPHILS # BLD MANUAL: 0 THOUSAND/UL (ref 0–0.1)
BASOPHILS NFR MAR MANUAL: 0 % (ref 0–1)
BILIRUB SERPL-MCNC: 0.36 MG/DL (ref 0.2–1)
BUN SERPL-MCNC: 15 MG/DL (ref 5–25)
CALCIUM SERPL-MCNC: 8.9 MG/DL (ref 8.3–10.1)
CHLORIDE SERPL-SCNC: 104 MMOL/L (ref 96–108)
CO2 SERPL-SCNC: 26 MMOL/L (ref 21–32)
CREAT SERPL-MCNC: 0.65 MG/DL (ref 0.6–1.3)
EOSINOPHIL # BLD MANUAL: 0.08 THOUSAND/UL (ref 0–0.4)
EOSINOPHIL NFR BLD MANUAL: 1 % (ref 0–6)
ERYTHROCYTE [DISTWIDTH] IN BLOOD BY AUTOMATED COUNT: 15.8 % (ref 11.6–15.1)
GFR SERPL CREATININE-BSD FRML MDRD: 89 ML/MIN/1.73SQ M
GLUCOSE SERPL-MCNC: 97 MG/DL (ref 65–140)
HCT VFR BLD AUTO: 35.5 % (ref 34.8–46.1)
HGB BLD-MCNC: 11.9 G/DL (ref 11.5–15.4)
HOWELL-JOLLY BOD BLD QL SMEAR: PRESENT
HYPERCHROMIA BLD QL SMEAR: PRESENT
LYMPHOCYTES # BLD AUTO: 2.22 THOUSAND/UL (ref 0.6–4.47)
LYMPHOCYTES # BLD AUTO: 29 % (ref 14–44)
MACROCYTES BLD QL AUTO: PRESENT
MCH RBC QN AUTO: 36.6 PG (ref 26.8–34.3)
MCHC RBC AUTO-ENTMCNC: 33.5 G/DL (ref 31.4–37.4)
MCV RBC AUTO: 109 FL (ref 82–98)
MONOCYTES # BLD AUTO: 1.15 THOUSAND/UL (ref 0–1.22)
MONOCYTES NFR BLD: 15 % (ref 4–12)
NEUTROPHILS # BLD MANUAL: 4.05 THOUSAND/UL (ref 1.85–7.62)
NEUTS SEG NFR BLD AUTO: 53 % (ref 43–75)
NRBC BLD AUTO-RTO: 9 /100 WBC (ref 0–2)
PLATELET # BLD AUTO: 278 THOUSANDS/UL (ref 149–390)
PLATELET BLD QL SMEAR: ADEQUATE
PMV BLD AUTO: 10.3 FL (ref 8.9–12.7)
POLYCHROMASIA BLD QL SMEAR: PRESENT
POTASSIUM SERPL-SCNC: 3.8 MMOL/L (ref 3.5–5.3)
PROT SERPL-MCNC: 7.2 G/DL (ref 6.4–8.4)
RBC # BLD AUTO: 3.25 MILLION/UL (ref 3.81–5.12)
RBC MORPH BLD: PRESENT
SODIUM SERPL-SCNC: 141 MMOL/L (ref 135–147)
VARIANT LYMPHS # BLD AUTO: 2 %
WBC # BLD AUTO: 7.65 THOUSAND/UL (ref 4.31–10.16)

## 2022-08-08 PROCEDURE — 85007 BL SMEAR W/DIFF WBC COUNT: CPT

## 2022-08-08 PROCEDURE — 80053 COMPREHEN METABOLIC PANEL: CPT

## 2022-08-08 PROCEDURE — 85027 COMPLETE CBC AUTOMATED: CPT

## 2022-08-08 PROCEDURE — 86304 IMMUNOASSAY TUMOR CA 125: CPT

## 2022-08-09 ENCOUNTER — OFFICE VISIT (OUTPATIENT)
Dept: PALLIATIVE MEDICINE | Facility: CLINIC | Age: 72
End: 2022-08-09
Payer: COMMERCIAL

## 2022-08-09 ENCOUNTER — TELEPHONE (OUTPATIENT)
Dept: PALLIATIVE MEDICINE | Facility: CLINIC | Age: 72
End: 2022-08-09

## 2022-08-09 VITALS
DIASTOLIC BLOOD PRESSURE: 81 MMHG | TEMPERATURE: 98.1 F | SYSTOLIC BLOOD PRESSURE: 149 MMHG | OXYGEN SATURATION: 94 % | HEART RATE: 103 BPM | BODY MASS INDEX: 34.13 KG/M2 | WEIGHT: 169 LBS

## 2022-08-09 DIAGNOSIS — R53.0 NEOPLASTIC MALIGNANT RELATED FATIGUE: ICD-10-CM

## 2022-08-09 DIAGNOSIS — M25.50 JOINT PAIN FOLLOWING CHEMOTHERAPY: ICD-10-CM

## 2022-08-09 DIAGNOSIS — G89.3 CANCER RELATED PAIN: ICD-10-CM

## 2022-08-09 DIAGNOSIS — F41.9 ANXIOUSNESS: ICD-10-CM

## 2022-08-09 DIAGNOSIS — K21.9 ESOPHAGEAL REFLUX: ICD-10-CM

## 2022-08-09 DIAGNOSIS — G62.0 CHEMOTHERAPY-INDUCED PERIPHERAL NEUROPATHY (HCC): ICD-10-CM

## 2022-08-09 DIAGNOSIS — G89.18 JOINT PAIN FOLLOWING CHEMOTHERAPY: ICD-10-CM

## 2022-08-09 DIAGNOSIS — Z51.5 PALLIATIVE CARE PATIENT: ICD-10-CM

## 2022-08-09 DIAGNOSIS — M54.32 SCIATICA OF LEFT SIDE: ICD-10-CM

## 2022-08-09 DIAGNOSIS — G47.01 INSOMNIA DUE TO MEDICAL CONDITION: ICD-10-CM

## 2022-08-09 DIAGNOSIS — C56.3 MALIGNANT NEOPLASM OF BOTH OVARIES (HCC): Primary | ICD-10-CM

## 2022-08-09 DIAGNOSIS — T45.1X5A CHEMOTHERAPY-INDUCED PERIPHERAL NEUROPATHY (HCC): ICD-10-CM

## 2022-08-09 LAB — CANCER AG125 SERPL-ACNC: 18.2 U/ML (ref 0–30)

## 2022-08-09 PROCEDURE — 99214 OFFICE O/P EST MOD 30 MIN: CPT | Performed by: INTERNAL MEDICINE

## 2022-08-09 RX ORDER — LIDOCAINE 50 MG/G
1 PATCH TOPICAL DAILY
Qty: 30 PATCH | Refills: 0 | Status: SHIPPED | OUTPATIENT
Start: 2022-08-09

## 2022-08-09 RX ORDER — OXYCODONE HCL 10 MG/1
10 TABLET, FILM COATED, EXTENDED RELEASE ORAL EVERY 12 HOURS SCHEDULED
Qty: 60 TABLET | Refills: 0 | Status: SHIPPED | OUTPATIENT
Start: 2022-08-09 | End: 2022-09-13 | Stop reason: SDUPTHER

## 2022-08-09 NOTE — PATIENT INSTRUCTIONS
It was good to see you today  Thank you for coming in  For sciatic flare: prescribing 5% lidocaine patch, apply to painful area (to left side of lower spine) for 12 hours per day  If this is not covered by insurance you may need to use the 4% lidocaine patch (available over-the-counter; brand names include Salonpas, [address], [employer], [address], etc)  Continue other medications  Return in about 5 weeks  Call us for refills on medications that we supply, as needed  If something changes and you need to come in sooner, please call our office  PRESCRIPTION REFILL REMINDER:  All medication refills should be requested prior to [facility] on Friday  Any refill requests after noon on Friday would be addressed the following Monday

## 2022-08-09 NOTE — TELEPHONE ENCOUNTER
Prior Authorization needed for Lidocaine 5% patches     KEY# [id number]    Pharmacy: [place] [id number]  [phone]

## 2022-08-09 NOTE — PROGRESS NOTES
Follow-up with Palliative and [address] [facility] Tono Sims 71 y o  female [id number]    ASSESSMENT & PLAN:  1  Malignant neoplasm of both ovaries ([address] )    2  Esophageal reflux    3  Cancer related pain    4  Chemotherapy-induced peripheral neuropathy (HCC)    5  Joint pain following chemotherapy    6  Neoplastic malignant related fatigue    7  Anxiousness    8  Insomnia due to medical condition    9  Sciatica of left side    10  Palliative care patient           Continue disease-directed cares  Plan is for imaging 8/26/22, then revisit plan w/ Gynecologic Oncology  May consider RT   Patient c/o flare of sciatic pain; will prescribe 5% lidocaine patch  If not covered by insurer, use 4% topical lidocaine OTC   Continue oxyER 10mg BID ATC for cancer-related and other chronic pain  Continue oxyIR PRN breakthrough pain  Not needed as often, since starting oxyER BID  Analgesic regimen effective   Continue duloxetine for mood, insomnia, stress  Effective   Continue dietary modifications (Dennard's apple juice) for constipation  Effective   Continue PPI for GERD; used PRN  Effective   Continue Zofran PRN N/V (1st line)   Continue lorazepam PRN anxiousness, insomnia, 2nd-line N/V  Has not been needed in recent weeks   Naloxone Rx provided in previous visit   Patient had reported receiving [address] vaccinations   Reviewed notes (FM, Gynecologic Oncology), labs (7/11/22 Cr 0 83, alb 3 6,  10 6, Hb 11 4), imaging + procedures (6/20/22 MRI abdomen + pelvis)   Return in about 5 weeks (around 9/13/2022)   Emotional support provided   Medication safety issues addressed - no driving under the influence of narcotics, watch for adverse effects including AMS and respiratory depression, keep medications stored in a safe/locked environment        Requested Prescriptions     Signed Prescriptions Disp Refills    oxyCODONE (OxyCONTIN) 10 mg 12 hr tablet 60 tablet 0     Sig: Take 1 tablet (10 mg total) by mouth every 12 (twelve) hours Max Daily Amount: 20 mg    lidocaine (Lidoderm) 5 % 30 patch 0     Sig: Apply 1 patch topically daily Remove & Discard patch within 12 hours - apply to painful area       Medications Discontinued During This Encounter   Medication Reason    oxyCODONE (OxyCONTIN) 10 mg 12 hr tablet Reorder       Representatives have queried the patient's controlled substance dispensing history in the Prescription Drug Monitoring Program in compliance with regulations before I have prescribed any controlled substances  The prescription history is consistent with prescribed therapy and our practice policies  40 minutes were spent in this ambulatory visit with greater than 50% of the time spent face to face with patient in counseling or coordination of care including discussions of symptom assessment and management, medication review, medication adjustment, psychosocial support, chart review, imaging review, lab review, goals of care, supportive listening and anticipatory guidance  All of the patient's questions were answered during this discussion  SUBJECTIVE:  Chief Complaint   Patient presents with    Cancer    Cancer Pain    Peripheral Neuropathy    Joint Pain    Anxiety    Insomnia    Counseling    Follow-up    CARLOS ADDISON    Gilberto Marion is a 71 y o  female w/ 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 s/p PROMISE+BSO and tumor debulking s/p chemo w/ paclitaxel + carboplatin, now receiving bevacizumab + olaparib (dose reduced s/t adverse effects)  She follows w/ Dr Prosper James (Gynecologic Oncology)  Patient is known to [facility] clinic; seen 6/28/22 for symptom management, psychosocial support  Patient is in good spirits today, though she continues to have appropriate concerns about her chronic health issues  She endorses some worry  Overall though she feels her mood is improved now compared to last visit   She is appreciative of medications such as duloxetine, and emotional support provided by our team  She has not needed to use lorazepam in recent weeks  Patient states she is experiencing a sciatic flare, w/ pain starting in her lower spine and radiating down her LLE  This is similar to a pain she felt "30 or 40 years ago"; she feels it may be s/t overexerting herself  Her chronic cancer-related pain is fairly well controlled  She has needed less oxyIR now that she is taking 10mg oxyER BID ATC  Patient reports GERD is well managed using PRN omeprazole  She has not had recent N/V  She has no bowel complaints today  Her appetite will continue to wax and wane; today she enjoys talking about some of her favorite summer foods such as fresh corn, [place] tomatoes, ice cream     Patient states she is scheduled for imaging on 8/26/22, and plans to discuss options w/ Dr Nadja Clayton once the results are available  She is considering radiation therapy as an option, but wishes to do more research on the topic  Patient admits to having some "pill-itis", and at times will forego some of her medications such as her antihypertensives, s/t polypharmacy fatigue  She accepts counseling on this today  PDMP shows no concerns  The following portions of the medical history were reviewed: past medical history, surgical history, problem list, medication list, family history, and social history        Current Outpatient Medications:     acetaminophen (TYLENOL) 325 mg tablet, Take 3 tablets (975 mg total) by mouth every 8 (eight) hours, Disp: , Rfl:     al mag oxide-diphenhydramine-lidocaine viscous (MAGIC MOUTHWASH) 1:1:1 suspension, Swish and spit 10 mL every 4 (four) hours as needed for mouth pain or discomfort, Disp: 300 mL, Rfl: 0    albuterol (PROVENTIL HFA,VENTOLIN HFA) 90 mcg/act inhaler, Inhale 2 puffs every 6 (six) hours as needed for wheezing, Disp: 18 g, Rfl: 1    amLODIPine-benazepril (LOTREL) 10-20 MG per capsule, Take 1 capsule by mouth daily, Disp: , Rfl:     benzonatate (TESSALON) 200 MG capsule, Take 1 capsule (200 mg total) by mouth 2 (two) times a day as needed for cough, Disp: 20 capsule, Rfl: 0    butalbital-aspirin-caffeine (FIORINAL) -40 mg per capsule, Take 1 capsule by mouth every 4 (four) hours as needed for headaches or migraine, Disp: 30 capsule, Rfl: 0    dextromethorphan-guaiFENesin (ROBITUSSIN DM)  mg/5 mL syrup, Take 10 mL by mouth 3 (three) times a day as needed for cough, Disp: 237 mL, Rfl: 1    docusate sodium (COLACE) 100 mg capsule, Take 1 capsule (100 mg total) by mouth 2 (two) times a day, Disp: , Rfl:     DULoxetine (CYMBALTA) 20 mg capsule, Take 1 capsule (20 mg total) by mouth daily, Disp: 30 capsule, Rfl: 2    fluticasone (FLONASE) 50 mcg/act nasal spray, 1 spray into each nostril daily, Disp: , Rfl:     fluticasone (Flovent HFA) 44 mcg/act inhaler, Inhale 2 puffs 2 (two) times a day Rinse mouth after use , Disp: 10 6 g, Rfl: 0    gabapentin (Neurontin) 300 mg capsule, Take 1 capsule (300 mg total) by mouth 3 (three) times a day, Disp: 270 capsule, Rfl: 0    lidocaine (Lidoderm) 5 %, Apply 1 patch topically daily Remove & Discard patch within 12 hours - apply to painful area, Disp: 30 patch, Rfl: 0    LORazepam (ATIVAN) 1 mg tablet, Take 0 5 tablets (0 5 mg total) by mouth every 8 (eight) hours as needed (nausea or anxiety or insomnia), Disp: 45 tablet, Rfl: 0    oxyCODONE (OxyCONTIN) 10 mg 12 hr tablet, Take 1 tablet (10 mg total) by mouth every 12 (twelve) hours Max Daily Amount: 20 mg, Disp: 60 tablet, Rfl: 0    oxyCODONE (Roxicodone) 5 immediate release tablet, Take 1 tablet (5 mg total) by mouth every 4 (four) hours as needed for moderate pain or severe pain Max Daily Amount: 30 mg, Disp: 180 tablet, Rfl: 0    pravastatin (PRAVACHOL) 10 mg tablet, Take 10 mg by mouth daily, Disp: , Rfl:     AMLODIPINE BENZOATE PO, Take by mouth  , Disp: , Rfl:     ibuprofen (MOTRIN) 600 mg tablet, Take 1 tablet (600 mg total) by mouth every 6 (six) hours (Patient not taking: No sig reported), Disp: 30 tablet, Rfl: 0    Liniments (Blue-Emu Super Strength) CREA, Apply topically as needed (joint pain), Disp: , Rfl:     naloxone (NARCAN) 4 mg/0 1 mL nasal spray, 0 1 mL (4 mg total) by Alternating Nares route every 3 (three) minutes as needed (accidental opioid overdose or respiratory depression), Disp: 1 each, Rfl: 1    Naproxen Sodium 220 MG CAPS, Take 220 mg by mouth daily 2 caps AM   (Patient not taking: No sig reported), Disp: , Rfl:     olaparib (LYNPARZA) tablet, Take 2 tablets PO BID, Disp: 120 tablet, Rfl: 5    omeprazole (PriLOSEC) 20 mg delayed release capsule, Take 20 mg by mouth 2 (two) times a day, Disp: , Rfl:     ondansetron (ZOFRAN) 8 mg tablet, Take 1 tablet (8 mg total) by mouth every 8 (eight) hours as needed for nausea or vomiting (Patient not taking: No sig reported), Disp: 20 tablet, Rfl: 1    polyethylene glycol (MiraLax) 17 GM/SCOOP powder, Mix with 64 oz Gatorade, begin 4 PM day before surgery per bowel prep instructions  (Patient not taking: No sig reported), Disp: 238 g, Rfl: 0    Review of Systems   Constitutional: Positive for activity change (increased activity), appetite change (waxes and wanes) and fatigue  Gastrointestinal:        GERD (managed)  Musculoskeletal: Positive for arthralgias and back pain  Allergic/Immunologic: Positive for immunocompromised state  Neurological:        Neuropathic pain  Sciatic pain down LLE  Psychiatric/Behavioral: Positive for dysphoric mood ( slightly improved) and sleep disturbance ("a little better")  The patient is nervous/anxious (slightly improved)  All other systems reviewed and are negative        OBJECTIVE:  /81 (BP Location: Left arm, Patient Position: Sitting, Cuff Size: Standard)   Pulse 103   Temp 98 1 °F (36 7 °C) (Temporal)   Wt 76 7 kg (169 lb)   SpO2 94%   BMI 34 13 kg/m²   Physical Exam  Vitals reviewed  Constitutional:       General: She is not in acute distress  Appearance: She is well-developed, well-groomed and overweight  She is ill-appearing (chronically)  She is not toxic-appearing  HENT:      Head: Normocephalic and atraumatic  Right Ear: External ear normal       Left Ear: External ear normal    Eyes:      General: No scleral icterus  Right eye: No discharge  Left eye: No discharge  Extraocular Movements: Extraocular movements intact  Conjunctiva/sclera: Conjunctivae normal       Pupils: Pupils are equal, round, and reactive to light  Cardiovascular:      Rate and Rhythm: Tachycardia present  Pulmonary:      Effort: Pulmonary effort is normal  No tachypnea, bradypnea, accessory muscle usage or respiratory distress  Abdominal:      General: There is no distension  Musculoskeletal:      Cervical back: Normal range of motion  Right lower leg: No edema  Left lower leg: No edema  Skin:     General: Skin is dry  Coloration: Skin is not pale  Neurological:      Mental Status: She is alert and oriented to person, place, and time  Cranial Nerves: No dysarthria or facial asymmetry  Psychiatric:         Attention and Perception: Attention normal          Mood and Affect: Mood and affect normal          Speech: Speech normal          Behavior: Behavior normal  Behavior is cooperative  Thought Content: Thought content normal          Cognition and Memory: Cognition and memory normal          Judgment: Judgment normal       Comments: Positive mood  Debbie Jones MD  [facility]      Portions of this document may have been created using dictation software and as such some "sound alike" terms may have been generated by the system  Do not hesitate to contact me with any questions or clarifications

## 2022-08-11 NOTE — TELEPHONE ENCOUNTER
Prior authorization for pt's   LIDOCAINE 5% PATCH has been completed VIA CM        Awaiting determination  IF DENIED PT IS TO BE INSTRUCTED TO USE LIDOCAINE 4% OTC PATCHES

## 2022-08-25 DIAGNOSIS — T45.1X5A CHEMOTHERAPY-INDUCED PERIPHERAL NEUROPATHY (HCC): ICD-10-CM

## 2022-08-25 DIAGNOSIS — F41.9 ANXIOUSNESS: ICD-10-CM

## 2022-08-25 DIAGNOSIS — G62.0 CHEMOTHERAPY-INDUCED PERIPHERAL NEUROPATHY (HCC): ICD-10-CM

## 2022-08-25 DIAGNOSIS — M25.50 JOINT PAIN FOLLOWING CHEMOTHERAPY: ICD-10-CM

## 2022-08-25 DIAGNOSIS — G43.909 MIGRAINE: ICD-10-CM

## 2022-08-25 DIAGNOSIS — G89.18 JOINT PAIN FOLLOWING CHEMOTHERAPY: ICD-10-CM

## 2022-08-25 DIAGNOSIS — G89.3 CANCER RELATED PAIN: ICD-10-CM

## 2022-08-25 DIAGNOSIS — G47.01 INSOMNIA DUE TO MEDICAL CONDITION: ICD-10-CM

## 2022-08-25 DIAGNOSIS — Z51.5 PALLIATIVE CARE PATIENT: ICD-10-CM

## 2022-08-25 DIAGNOSIS — C56.3 MALIGNANT NEOPLASM OF BOTH OVARIES (HCC): ICD-10-CM

## 2022-08-25 RX ORDER — GABAPENTIN 300 MG/1
300 CAPSULE ORAL 3 TIMES DAILY
Qty: 270 CAPSULE | Refills: 0 | Status: SHIPPED | OUTPATIENT
Start: 2022-08-25

## 2022-08-25 RX ORDER — OXYCODONE HYDROCHLORIDE 5 MG/1
5 TABLET ORAL EVERY 4 HOURS PRN
Qty: 180 TABLET | Refills: 0 | Status: SHIPPED | OUTPATIENT
Start: 2022-08-25 | End: 2022-10-27 | Stop reason: SDUPTHER

## 2022-08-25 RX ORDER — BUTALBITAL, ASPIRIN, AND CAFFEINE 50; 325; 40 MG/1; MG/1; MG/1
1 CAPSULE ORAL EVERY 4 HOURS PRN
Qty: 30 CAPSULE | Refills: 0 | Status: SHIPPED | OUTPATIENT
Start: 2022-08-25 | End: 2022-09-13 | Stop reason: SDUPTHER

## 2022-08-29 ENCOUNTER — TELEPHONE (OUTPATIENT)
Dept: GYNECOLOGIC ONCOLOGY | Facility: CLINIC | Age: 72
End: 2022-08-29

## 2022-08-29 NOTE — TELEPHONE ENCOUNTER
Pt called in to speak with Suzette Sheldon regarding the  r/s her ct scan again  Patient states that she was feeling unwell and she is still feeling the effects of COVID  Patient was sent over to CS   Thank you     [phone]- hailey

## 2022-08-30 ENCOUNTER — TELEPHONE (OUTPATIENT)
Dept: GYNECOLOGIC ONCOLOGY | Facility: CLINIC | Age: 72
End: 2022-08-30

## 2022-08-30 NOTE — TELEPHONE ENCOUNTER
Spoke with patient regarding her CT appointment, Patient still not feeling well  She would like to cancel her CT appointment  Patient will call back when she feels better to rescheduled her CT

## 2022-09-02 ENCOUNTER — HOSPITAL ENCOUNTER (OUTPATIENT)
Dept: INFUSION CENTER | Facility: HOSPITAL | Age: 72
Discharge: HOME | End: 2022-09-02

## 2022-09-02 DIAGNOSIS — C56.3 MALIGNANT NEOPLASM OF BOTH OVARIES (HCC): Primary | ICD-10-CM

## 2022-09-09 ENCOUNTER — HOSPITAL ENCOUNTER (OUTPATIENT)
Dept: RADIOLOGY | Facility: HOSPITAL | Age: 72
Discharge: HOME | End: 2022-09-09
Payer: COMMERCIAL

## 2022-09-09 DIAGNOSIS — C56.3 MALIGNANT NEOPLASM OF BOTH OVARIES (HCC): ICD-10-CM

## 2022-09-09 PROCEDURE — 71260 CT THORAX DX C+: CPT

## 2022-09-09 PROCEDURE — G1004 CDSM NDSC: HCPCS

## 2022-09-09 PROCEDURE — 74177 CT ABD & PELVIS W/CONTRAST: CPT

## 2022-09-09 RX ADMIN — IOHEXOL 85 ML: 350 INJECTION, SOLUTION INTRAVENOUS at 11:15

## 2022-09-13 ENCOUNTER — TELEMEDICINE (OUTPATIENT)
Dept: PALLIATIVE MEDICINE | Facility: CLINIC | Age: 72
End: 2022-09-13
Payer: COMMERCIAL

## 2022-09-13 DIAGNOSIS — M25.50 JOINT PAIN FOLLOWING CHEMOTHERAPY: ICD-10-CM

## 2022-09-13 DIAGNOSIS — G89.3 CANCER RELATED PAIN: ICD-10-CM

## 2022-09-13 DIAGNOSIS — F41.9 ANXIOUSNESS: ICD-10-CM

## 2022-09-13 DIAGNOSIS — G47.01 INSOMNIA DUE TO MEDICAL CONDITION: ICD-10-CM

## 2022-09-13 DIAGNOSIS — K21.9 ESOPHAGEAL REFLUX: ICD-10-CM

## 2022-09-13 DIAGNOSIS — G43.909 MIGRAINE: ICD-10-CM

## 2022-09-13 DIAGNOSIS — M54.32 SCIATICA OF LEFT SIDE: ICD-10-CM

## 2022-09-13 DIAGNOSIS — T45.1X5A CHEMOTHERAPY-INDUCED PERIPHERAL NEUROPATHY (HCC): ICD-10-CM

## 2022-09-13 DIAGNOSIS — G89.18 JOINT PAIN FOLLOWING CHEMOTHERAPY: ICD-10-CM

## 2022-09-13 DIAGNOSIS — Z51.5 PALLIATIVE CARE PATIENT: ICD-10-CM

## 2022-09-13 DIAGNOSIS — G62.0 CHEMOTHERAPY-INDUCED PERIPHERAL NEUROPATHY (HCC): ICD-10-CM

## 2022-09-13 DIAGNOSIS — Z86.16 PERSONAL HISTORY OF COVID-19: ICD-10-CM

## 2022-09-13 DIAGNOSIS — C56.3 MALIGNANT NEOPLASM OF BOTH OVARIES (HCC): Primary | ICD-10-CM

## 2022-09-13 PROCEDURE — 99214 OFFICE O/P EST MOD 30 MIN: CPT | Performed by: INTERNAL MEDICINE

## 2022-09-13 RX ORDER — BUTALBITAL, ASPIRIN, AND CAFFEINE 50; 325; 40 MG/1; MG/1; MG/1
1 CAPSULE ORAL EVERY 4 HOURS PRN
Qty: 30 CAPSULE | Refills: 0 | Status: SHIPPED | OUTPATIENT
Start: 2022-09-13

## 2022-09-13 RX ORDER — OXYCODONE HCL 10 MG/1
10 TABLET, FILM COATED, EXTENDED RELEASE ORAL EVERY 12 HOURS SCHEDULED
Qty: 60 TABLET | Refills: 0 | Status: SHIPPED | OUTPATIENT
Start: 2022-09-13 | End: 2022-10-14 | Stop reason: SDUPTHER

## 2022-09-13 RX ORDER — DULOXETIN HYDROCHLORIDE 20 MG/1
20 CAPSULE, DELAYED RELEASE ORAL DAILY
Qty: 30 CAPSULE | Refills: 2 | Status: SHIPPED | OUTPATIENT
Start: 2022-09-13 | End: 2022-09-22 | Stop reason: SDUPTHER

## 2022-09-13 NOTE — PROGRESS NOTES
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 presents for follow up    MRI previously showed slight growth in hepatic lesions but patient remained asymptomatic and decision was made to repeat imaging in 3 months  Repeat CT images reviewed with progression of liver metastatic disease  I have personally reviewed patient's imaging as well as discussed her case with surgical oncology  Based on CT these lesions appear resectable however MRI is pending  I would an extensive discussion with patient and her  regarding progression of disease in her liver lesions  Her course has been atypical in that the lesions progressed immediately after surgery with good response but have not been fully eradicated with chemotherapy  However, patient has proven herself in that no other disease has recurred since upfront therapy starting in August of 2021  Therefore, I believe it is reasonable to attempt to completely eradicate/resect these liver lesions  We discussed alternative being resumption of systemic chemotherapy and verse no further therapy  Patient would like to continue with all aggressive treatment and would like to discuss surgical intervention  Should surgical intervention not be deemed feasible after MRI would consider radiation therapy however my concerns that the lesion is too large for complete curative intent  We briefly discussed that after surgical intervention further chemotherapy may be necessary versus continuation of a PARP inhibitor  I plan to bring her case to pathology conference for further review and discussion  Patient has MRI scheduled in follow-up with surgical oncology for further discussion            Other    Personal history of COVID-19     Unfortunately she was recently diagnosed with COVID    Ideally we would wait 7 weeks after diagnosis she performed surgical intervention  However, this is not an elective procedure and therefore believe that the risk of waiting outweighs the benefit  We will need to communicate with anesthesia  I have spent 45 minutes with Patient and family today in which greater than 50% of this time was spent in counseling/coordination of care regarding Diagnostic results, Risks and benefits of tx options and Impressions  CHIEF COMPLAINT: follow up       Problem:  Cancer Staging  Ovarian cancer ([facility] [address] )  Staging form: Ovary, Fallopian Tube, Primary Peritoneal, AJCC 8th Edition  - Clinical: FIGO Stage IIIC (cT3c) - Signed by Eneida Zazueta MD on 9/16/2021        Previous therapy:  Oncology History   Ovarian cancer ([facility] [address] )   8/6/2021 Surgery    PROMISE/BSO/tumor debulking to optimal cytoreduction     8/25/2021 Initial Diagnosis    [facility]varian [facility] ([facility] )     9/16/2021 -  Cancer Staged    Staging form: Ovary, Fallopian Tube, Primary Peritoneal, AJCC 8th Edition  - Clinical: FIGO Stage IIIC (cT3c) - Signed by Eneida Zazueta MD on 9/16/2021  Stage prefix: Initial diagnosis  Cancer antigen 125 () (U/mL): 543       9/28/2021 - 1/11/2022 Chemotherapy    Carbo AUC6, Taxol 175mg/m2 q3 weeks  Avastin 15mg/m2 added cycle 3    Toxicities:  Cycle4: carbo AUC 5 for neutropenia, added neulasta     10/2021 Genomic Testing    SEAN high  CPS >2     12/9/2021 Genetic Testing    POLD VUS     2/22/2022 -  Chemotherapy    Maintenance: Avastin 15mg/m2  Olaparib 300mg BID    Toxicity:  3/28/22: dose-reduce avastin to 10 mg/kg every 21 days as well as dose-reduction of olaparib to 150 mg AM and 300 mg PM  5/9/22: decrease olaparib to 200mg BID  6/7/2022: stopped avastin for bone pain             Patient ID: Gilberto Marion is a 71 y o  female  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 presents for follow up  Avastin was stopped given toxicity  Patient has recently recovered from [facility] has minimal symptoms at this point  Her olaparib had been held during the height  She had however tolerated olaparib with minimal side effects prior  History:  Pt underwent primary debulking and had a prolonged hospitalization complicated by pancreatic leak and subsequent abscess, intraabdominal bleeding s/p IR drain placement  During hospitalization repeat imaging revealed new liver lesions despite R0 resection  Patient was started on adjuvant therapy with good response and was transitioned to Avastin and olaparib  Patient did not tolerate Avastin secondary to significant myalgias and arthralgias and she was continued on olaparib alone  Patient had noted progression in liver lesion in July of 2022 which were monitored with subsequent growth noted in September 2022  The following portions of the patient's history were reviewed and updated as appropriate: allergies, current medications, past family history, past medical history, past social history, past surgical history and problem list     Review of Systems   Constitutional: Positive for fatigue  Negative for appetite change, chills and fever  Respiratory: Positive for chest tightness  Negative for shortness of breath  Gastrointestinal: Negative for abdominal distention, abdominal pain, constipation, diarrhea and nausea  Genitourinary: Negative for difficulty urinating, flank pain, frequency, urgency, vaginal bleeding, vaginal discharge and vaginal pain  Musculoskeletal: Negative for back pain, joint swelling and myalgias  Skin: Negative for rash  Neurological: Negative for dizziness, light-headedness, numbness and headaches         Current Outpatient Medications   Medication Sig Dispense Refill    gabapentin (Neurontin) 300 mg capsule Take 1 capsule (300 mg total) by mouth 3 (three) times a day 270 capsule 0    oxyCODONE (Roxicodone) 5 immediate release tablet Take 1 tablet (5 mg total) by mouth every 4 (four) hours as needed for moderate pain or severe pain Max Daily Amount: 30 mg 180 tablet 0    acetaminophen (TYLENOL) 325 mg tablet Take 3 tablets (975 mg total) by mouth every 8 (eight) hours      al mag oxide-diphenhydramine-lidocaine viscous (MAGIC MOUTHWASH) 1:1:1 suspension Swish and spit 10 mL every 4 (four) hours as needed for mouth pain or discomfort 300 mL 0    albuterol (PROVENTIL HFA,VENTOLIN HFA) 90 mcg/act inhaler Inhale 2 puffs every 6 (six) hours as needed for wheezing 18 g 1    AMLODIPINE BENZOATE PO Take by mouth        amLODIPine-benazepril (LOTREL) 10-20 MG per capsule Take 1 capsule by mouth daily      benzonatate (TESSALON) 200 MG capsule Take 1 capsule (200 mg total) by mouth 2 (two) times a day as needed for cough 20 capsule 0    butalbital-aspirin-caffeine (FIORINAL) -40 mg per capsule Take 1 capsule by mouth every 4 (four) hours as needed for headaches or migraine 30 capsule 0    dextromethorphan-guaiFENesin (ROBITUSSIN DM)  mg/5 mL syrup Take 10 mL by mouth 3 (three) times a day as needed for cough 237 mL 1    docusate sodium (COLACE) 100 mg capsule Take 1 capsule (100 mg total) by mouth 2 (two) times a day      DULoxetine (CYMBALTA) 20 mg capsule Take 1 capsule (20 mg total) by mouth daily 30 capsule 2    fluticasone (FLONASE) 50 mcg/act nasal spray 1 spray into each nostril daily      fluticasone (Flovent HFA) 44 mcg/act inhaler Inhale 2 puffs 2 (two) times a day Rinse mouth after use   10 6 g 0    ibuprofen (MOTRIN) 600 mg tablet Take 1 tablet (600 mg total) by mouth every 6 (six) hours (Patient not taking: No sig reported) 30 tablet 0    lidocaine (Lidoderm) 5 % Apply 1 patch topically daily Remove & Discard patch within 12 hours - apply to painful area 30 patch 0    Liniments (Blue-Emu Super Strength) CREA Apply topically as needed (joint pain)      LORazepam (ATIVAN) 1 mg tablet Take 0 5 tablets (0 5 mg total) by mouth every 8 (eight) hours as needed (nausea or anxiety or insomnia) 45 tablet 0    naloxone (NARCAN) 4 mg/0 1 mL nasal spray 0 1 mL (4 mg total) by Alternating Nares route every 3 (three) minutes as needed (accidental opioid overdose or respiratory depression) 1 each 1    Naproxen Sodium 220 MG CAPS Take 220 mg by mouth daily 2 caps AM   (Patient not taking: No sig reported)      olaparib (LYNPARZA) tablet Take 2 tablets PO  tablet 5    omeprazole (PriLOSEC) 20 mg delayed release capsule Take 20 mg by mouth 2 (two) times a day      ondansetron (ZOFRAN) 8 mg tablet Take 1 tablet (8 mg total) by mouth every 8 (eight) hours as needed for nausea or vomiting (Patient not taking: No sig reported) 20 tablet 1    oxyCODONE (OxyCONTIN) 10 mg 12 hr tablet Take 1 tablet (10 mg total) by mouth every 12 (twelve) hours Max Daily Amount: 20 mg 60 tablet 0    polyethylene glycol (MiraLax) 17 GM/SCOOP powder Mix with 64 oz Gatorade, begin 4 PM day before surgery per bowel prep instructions  (Patient not taking: No sig reported) 238 g 0    pravastatin (PRAVACHOL) 10 mg tablet Take 10 mg by mouth daily       No current facility-administered medications for this visit  Objective: There were no vitals taken for this visit  There is no height or weight on file to calculate BMI  There is no height or weight on file to calculate BSA  Physical Exam  HENT:      Head: Normocephalic and atraumatic  Nose: Nose normal    Cardiovascular:      Rate and Rhythm: Normal rate and regular rhythm  Pulmonary:      Effort: Pulmonary effort is normal    Abdominal:      General: There is no distension  Palpations: Abdomen is soft  There is no mass  Genitourinary:     Comments: defer  Musculoskeletal:         General: No swelling  Normal range of motion  Cervical back: Normal range of motion  Skin:     General: Skin is warm and dry  Neurological:      General: No focal deficit present  Mental Status: She is alert     Psychiatric:         Mood and Affect: Mood normal            Lab Results   Component Value Date    K 3 8 08/08/2022     08/08/2022    CO2 26 08/08/2022    BUN 15 08/08/2022    CREATININE 0 65 08/08/2022    GLUCOSE 195 (H) 08/06/2021    GLUF 88 04/01/2022    CALCIUM 8 9 08/08/2022    CORRECTEDCA 9 4 05/13/2022    AST 18 08/08/2022    ALT 25 08/08/2022    ALKPHOS 88 08/08/2022    EGFR 89 08/08/2022     Lab Results   Component Value Date    WBC 7 65 08/08/2022    HGB 11 9 08/08/2022    HCT 35 5 08/08/2022     (H) 08/08/2022     08/08/2022     Lab Results   Component Value Date    NEUTROABS 3 74 06/03/2022        Trend:  Lab Results   Component Value Date     18 2 08/08/2022     10 6 07/11/2022     7 5 06/03/2022     7 9 05/13/2022     8 9 04/22/2022     9 5 04/01/2022     7 6 03/11/2022     10 5 02/11/2022     15 5 01/10/2022     12 5 12/17/2021     11 6 11/26/2021     19 4 11/05/2021     69 9 (H) 10/15/2021     94 1 (H) 09/24/2021     543 7 (H) 07/20/2021

## 2022-09-13 NOTE — PATIENT INSTRUCTIONS
It was good to see you today  Thank you for coming in  Continue current medications  If for some reason the long-acting oxycodone ER needs a prior authorization or is not in stock, continue what you were doing before (taking 2 5mg of the oxycodone IR every 4 hours)  Return in about 4-5 weeks  Call us for refills on medications that we supply, as needed  If something changes and you need to come in sooner, please call our office  PRESCRIPTION REFILL REMINDER:  All medication refills should be requested prior to [facility] on Friday  Any refill requests after noon on Friday would be addressed the following Monday

## 2022-09-13 NOTE — ASSESSMENT & PLAN NOTE
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 presents for follow up    MRI previously showed slight growth in hepatic lesions but patient remained asymptomatic and decision was made to repeat imaging in 3 months  Repeat CT images reviewed with progression of liver metastatic disease  I have personally reviewed patient's imaging as well as discussed her case with surgical oncology  Based on CT these lesions appear resectable however MRI is pending  I would an extensive discussion with patient and her  regarding progression of disease in her liver lesions  Her course has been atypical in that the lesions progressed immediately after surgery with good response but have not been fully eradicated with chemotherapy  However, patient has proven herself in that no other disease has recurred since upfront therapy starting in August of 2021  Therefore, I believe it is reasonable to attempt to completely eradicate/resect these liver lesions  We discussed alternative being resumption of systemic chemotherapy and verse no further therapy  Patient would like to continue with all aggressive treatment and would like to discuss surgical intervention  Should surgical intervention not be deemed feasible after MRI would consider radiation therapy however my concerns that the lesion is too large for complete curative intent  We briefly discussed that after surgical intervention further chemotherapy may be necessary versus continuation of a PARP inhibitor  I plan to bring her case to pathology conference for further review and discussion      Patient has MRI scheduled in follow-up with surgical oncology for further discussion

## 2022-09-13 NOTE — PROGRESS NOTES
Virtual Regular Visit    Verification of patient location:    Patient is located in the following state in which I hold an active license [place]      Outpatient Virtual Visit - Follow-up with Palliative and [address] 71 y o  female [phone]    Intake and Identification:    Reason(s) for virtual visit: televideo, follow-up, symptom management and psychosocial support  The patient is unable to visit the clinic safely due to the coronavirus pandemic  Encounter provider Kvng Figueroa MD, located at:  [address] [place] [id number] [phone]    Recent Visits  No visits were found meeting these conditions  Showing recent visits within past 7 days and meeting all other requirements  Today's Visits  Date Type Provider Dept   09/13/22 Telemedicine Kvng Figueroa MD [address] [address] [facility] today's visits and meeting all other requirements  Future Appointments  No visits were found meeting these conditions  Showing future appointments within next 150 days and meeting all other requirements       Patient agrees to participate in a virtual check in via telephone or video visit instead of presenting to the office to address urgent/immediate medical needs, or to respect quarantine and public health guidelines  Patient is aware this is a billable service  After connecting through [employer], the patient was identified by name and date of birth  Matt Lozano was informed that this was a telemedicine visit and that the visit is being conducted through [address] and patient was informed this is a secure, HIPAA-complaint platform  She agrees to proceed  My office door was closed  No one else was in the room  She acknowledged consent and understanding of privacy and security of the video platform   I informed the patient that I have reviewed her record in [facility] and presented the opportunity for her to ask any questions regarding the visit today  The patient has agreed to participate and understands they can discontinue the visit at any time  ASSESSMENT & PLAN:    Video assessment of patient:   Physical Exam  Constitutional:       General: She is not in acute distress  Appearance: She is well-groomed  She is ill-appearing (chronically)  She is not toxic-appearing  HENT:      Head: Normocephalic and atraumatic  Right Ear: External ear normal       Left Ear: External ear normal    Eyes:      General: No scleral icterus  Right eye: No discharge  Left eye: No discharge  Extraocular Movements: Extraocular movements intact  Conjunctiva/sclera: Conjunctivae normal    Pulmonary:      Effort: Pulmonary effort is normal  No tachypnea, bradypnea, accessory muscle usage, prolonged expiration or respiratory distress  Musculoskeletal:      Cervical back: Normal range of motion  Skin:     Coloration: Skin is not pale  Neurological:      General: No focal deficit present  Mental Status: She is alert and oriented to person, place, and time  Cranial Nerves: No dysarthria or facial asymmetry  Psychiatric:         Attention and Perception: Attention normal          Mood and Affect: Mood normal          Speech: Speech normal          Behavior: Behavior normal  Behavior is cooperative  Thought Content: Thought content normal          Cognition and Memory: Cognition and memory normal          Judgment: Judgment normal          1  Malignant neoplasm of both ovaries ([address] )    2  Esophageal reflux    3  Migraine    4  Sciatica of left side    5  Cancer related pain    6  Chemotherapy-induced peripheral neuropathy (HCC)    7  Joint pain following chemotherapy    8  Insomnia due to medical condition    9  Anxiousness    10  Palliative care patient    6  Personal history of COVID-19           Continue disease-directed cares   She plans to discuss recent 9/9/22 imaging w/ Gynecologic Oncology this week and possibly revisit her treatment plan  Stephanie Mckeon was on hold briefly while she took Paxlovid for [address] issues mostly resolved and she is back at baseline   Patient may use 5% lidocaine patch (or 4% topical lidocaine OTC) for sciatic flares   Continue oxyER 10mg BID ATC for cancer-related and other chronic pain  Continue oxyIR PRN breakthrough pain  She is managing her pain well   Continue duloxetine for mood, insomnia, stress  Effective   Patient may continue dietary modifications (Asif's apple juice) for constipation   Continue PPI for GERD; used PRN  Effective   Continue Zofran PRN N/V (1st line)   Continue lorazepam PRN anxiousness, insomnia, 2nd-line N/V  Has not been needed in recent months   Naloxone Rx provided in previous visit   Patient had reported receiving [address] vaccinations   Reviewed notes (Gynecologic Oncology), labs (8/8/22 Cr 0 65, alb 3 6,  18 2, Hb 11 9; 7/11/22  10 6), imaging + procedures (9/9/22 [facility])  Return in about 5 weeks (around 10/18/2022)   Emotional support provided   Medication safety issues addressed - no driving under the influence of narcotics, watch for adverse effects including AMS and respiratory depression, keep medications stored in a safe/locked environment        Requested Prescriptions     Signed Prescriptions Disp Refills    oxyCODONE (OxyCONTIN) 10 mg 12 hr tablet 60 tablet 0     Sig: Take 1 tablet (10 mg total) by mouth every 12 (twelve) hours Max Daily Amount: 20 mg    butalbital-aspirin-caffeine (FIORINAL) -40 mg per capsule 30 capsule 0     Sig: Take 1 capsule by mouth every 4 (four) hours as needed for headaches or migraine    DULoxetine (CYMBALTA) 20 mg capsule 30 capsule 2     Sig: Take 1 capsule (20 mg total) by mouth daily       Medications Discontinued During This Encounter   Medication Reason    oxyCODONE (OxyCONTIN) 10 mg 12 hr tablet Reorder    butalbital-aspirin-caffeine (FIORINAL) -40 mg per capsule Reorder    DULoxetine (CYMBALTA) 20 mg capsule Reorder       Representatives have queried the patient's controlled substance dispensing history in the Prescription Drug Monitoring Program in compliance with regulations before I have prescribed any controlled substances  The prescription history is consistent with prescribed therapy and our practice policies  30+ minutes were spent in this video telecommunications visit, with greater than 50% of the time spent face-to-face with patient in counseling or coordination of care including discussions of symptom assessment and management, medication review, psychosocial support, chart review, imaging review, lab review, supportive listening and anticipatory guidance  All of the patient's questions were answered during this discussion  She is invited to continue to follow with us  If there are questions or concerns, she knows to contact us through our clinic/answering service 24 hours a day, seven days a week  SUBJECTIVE:  Chief Complaint   Patient presents with    Cancer    Cancer Pain    COVID19    Diarrhea     resolved    Counseling    GERD    Virtual Regular Visit        HPI    Shante Gill is a 71 y o  female w/ 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 s/p PROMISE+BSO and tumor debulking s/p chemo w/ paclitaxel + carboplatin, now receiving olaparib (dose reduced s/t adverse effects)  She follows w/ Dr Lm Mancera (Gynecologic Oncology)  Patient is [facility] to [facility]; seen 8/9/22 for symptom assessment and management, medication review, medication adjustment, psychosocial support, chart review, imaging review, lab review, goals of care, supportive listening and anticipatory guidance  Patient states she and Joan Hammond had contracted Dasia Nicole in late August 2022; she is now s/p treatment with Paxlovid   Her appetite is nearly back at baseline; her breathing has recovered  She reports some diarrhea from Paxlovid (now resolved)  Patient states her chronic pain continues to be well-managed, though she has exhausted her supply of oxyER  To cover the gap she has been taking oxyIR 2 5mg every 4 hours, with good results  She is amenable to continuing oxyER - with 10mg BID dosing she rarely needed breakthrough oxyIR  Patient denies N/V  GERD is well managed  Mood is stable and she has not needed lorazepam in months (though she considered using it for some nausea during her recent infection, she was able to manage without it)  PDMP shows no concerns  The following portions of the medical history were reviewed: past medical history, surgical history, problem list, medication list, family history, and social history      Current Outpatient Medications:     butalbital-aspirin-caffeine (FIORINAL) -40 mg per capsule, Take 1 capsule by mouth every 4 (four) hours as needed for headaches or migraine, Disp: 30 capsule, Rfl: 0    DULoxetine (CYMBALTA) 20 mg capsule, Take 1 capsule (20 mg total) by mouth daily, Disp: 30 capsule, Rfl: 2    gabapentin (Neurontin) 300 mg capsule, Take 1 capsule (300 mg total) by mouth 3 (three) times a day, Disp: 270 capsule, Rfl: 0    oxyCODONE (OxyCONTIN) 10 mg 12 hr tablet, Take 1 tablet (10 mg total) by mouth every 12 (twelve) hours Max Daily Amount: 20 mg, Disp: 60 tablet, Rfl: 0    oxyCODONE (Roxicodone) 5 immediate release tablet, Take 1 tablet (5 mg total) by mouth every 4 (four) hours as needed for moderate pain or severe pain Max Daily Amount: 30 mg, Disp: 180 tablet, Rfl: 0    acetaminophen (TYLENOL) 325 mg tablet, Take 3 tablets (975 mg total) by mouth every 8 (eight) hours, Disp: , Rfl:     al mag oxide-diphenhydramine-lidocaine viscous (MAGIC MOUTHWASH) 1:1:1 suspension, Swish and spit 10 mL every 4 (four) hours as needed for mouth pain or discomfort, Disp: 300 mL, Rfl: 0    albuterol (PROVENTIL HFA,VENTOLIN HFA) 90 mcg/act inhaler, Inhale 2 puffs every 6 (six) hours as needed for wheezing, Disp: 18 g, Rfl: 1    AMLODIPINE BENZOATE PO, Take by mouth  , Disp: , Rfl:     amLODIPine-benazepril (LOTREL) 10-20 MG per capsule, Take 1 capsule by mouth daily, Disp: , Rfl:     benzonatate (TESSALON) 200 MG capsule, Take 1 capsule (200 mg total) by mouth 2 (two) times a day as needed for cough, Disp: 20 capsule, Rfl: 0    dextromethorphan-guaiFENesin (ROBITUSSIN DM)  mg/5 mL syrup, Take 10 mL by mouth 3 (three) times a day as needed for cough, Disp: 237 mL, Rfl: 1    docusate sodium (COLACE) 100 mg capsule, Take 1 capsule (100 mg total) by mouth 2 (two) times a day, Disp: , Rfl:     fluticasone (FLONASE) 50 mcg/act nasal spray, 1 spray into each nostril daily, Disp: , Rfl:     fluticasone (Flovent HFA) 44 mcg/act inhaler, Inhale 2 puffs 2 (two) times a day Rinse mouth after use , Disp: 10 6 g, Rfl: 0    ibuprofen (MOTRIN) 600 mg tablet, Take 1 tablet (600 mg total) by mouth every 6 (six) hours (Patient not taking: No sig reported), Disp: 30 tablet, Rfl: 0    lidocaine (Lidoderm) 5 %, Apply 1 patch topically daily Remove & Discard patch within 12 hours - apply to painful area, Disp: 30 patch, Rfl: 0    Liniments (Blue-Emu Super Strength) CREA, Apply topically as needed (joint pain), Disp: , Rfl:     LORazepam (ATIVAN) 1 mg tablet, Take 0 5 tablets (0 5 mg total) by mouth every 8 (eight) hours as needed (nausea or anxiety or insomnia), Disp: 45 tablet, Rfl: 0    naloxone (NARCAN) 4 mg/0 1 mL nasal spray, 0 1 mL (4 mg total) by Alternating Nares route every 3 (three) minutes as needed (accidental opioid overdose or respiratory depression), Disp: 1 each, Rfl: 1    Naproxen Sodium 220 MG CAPS, Take 220 mg by mouth daily 2 caps AM   (Patient not taking: No sig reported), Disp: , Rfl:     olaparib (LYNPARZA) tablet, Take 2 tablets PO BID, Disp: 120 tablet, Rfl: 5    omeprazole (PriLOSEC) 20 mg delayed release capsule, Take 20 mg by mouth 2 (two) times a day, Disp: , Rfl:     ondansetron (ZOFRAN) 8 mg tablet, Take 1 tablet (8 mg total) by mouth every 8 (eight) hours as needed for nausea or vomiting (Patient not taking: No sig reported), Disp: 20 tablet, Rfl: 1    polyethylene glycol (MiraLax) 17 GM/SCOOP powder, Mix with 64 oz Gatorade, begin 4 PM day before surgery per bowel prep instructions  (Patient not taking: No sig reported), Disp: 238 g, Rfl: 0    pravastatin (PRAVACHOL) 10 mg tablet, Take 10 mg by mouth daily, Disp: , Rfl:     Review of Systems   Constitutional: Positive for activity change (improving in recent days) and fatigue (improving in recent days)  Gastrointestinal: Positive for diarrhea (some diarrhea from Paxlovid, now resolved)  GERD (managed)  Musculoskeletal: Positive for arthralgias and back pain  Allergic/Immunologic: Positive for immunocompromised state  Neurological:        Neuropathic pain, sciatic pain  Psychiatric/Behavioral: Positive for dysphoric mood (stable) and sleep disturbance (stable)  The patient is nervous/anxious (stable)  All other systems reviewed and are negative  Dolly Roldan MD  [facility]      Portions of this document may have been created using dictation software and as such some "sound alike" terms may have been generated by the system  Do not hesitate to contact me with any questions or clarifications  VIRTUAL VISIT DISCLAIMER    Shante Gill acknowledges that she has consented to an online visit or consultation  She understands that the online visit is based solely on information provided by her, and that, in the absence of a face-to-face physical evaluation by the physician, the diagnosis she receives is both limited and provisional in terms of accuracy and completeness  This is not intended to replace a full medical face-to-face evaluation by the physician   Shante iGll understands and accepts these terms

## 2022-09-14 ENCOUNTER — TELEPHONE (OUTPATIENT)
Dept: GYNECOLOGIC ONCOLOGY | Facility: CLINIC | Age: 72
End: 2022-09-14

## 2022-09-15 ENCOUNTER — HOSPITAL ENCOUNTER (OUTPATIENT)
Dept: INFUSION CENTER | Facility: HOSPITAL | Age: 72
Discharge: HOME | End: 2022-09-15
Payer: COMMERCIAL

## 2022-09-15 DIAGNOSIS — C56.3 MALIGNANT NEOPLASM OF BOTH OVARIES (HCC): Primary | ICD-10-CM

## 2022-09-15 DIAGNOSIS — Z45.2 ENCOUNTER FOR CENTRAL LINE CARE: Primary | ICD-10-CM

## 2022-09-15 DIAGNOSIS — C56.3 MALIGNANT NEOPLASM OF BOTH OVARIES (HCC): ICD-10-CM

## 2022-09-15 LAB
ALBUMIN SERPL BCP-MCNC: 3.4 G/DL (ref 3.5–5)
ALP SERPL-CCNC: 155 U/L (ref 46–116)
ALT SERPL W P-5'-P-CCNC: 44 U/L (ref 12–78)
ANION GAP SERPL CALCULATED.3IONS-SCNC: 11 MMOL/L (ref 4–13)
AST SERPL W P-5'-P-CCNC: 33 U/L (ref 5–45)
BASOPHILS # BLD AUTO: 0.04 THOUSANDS/ΜL (ref 0–0.1)
BASOPHILS NFR BLD AUTO: 0 % (ref 0–1)
BILIRUB SERPL-MCNC: 0.17 MG/DL (ref 0.2–1)
BUN SERPL-MCNC: 18 MG/DL (ref 5–25)
CALCIUM ALBUM COR SERPL-MCNC: 9.5 MG/DL (ref 8.3–10.1)
CALCIUM SERPL-MCNC: 9 MG/DL (ref 8.3–10.1)
CANCER AG125 SERPL-ACNC: 41.1 U/ML (ref 0–30)
CHLORIDE SERPL-SCNC: 104 MMOL/L (ref 96–108)
CO2 SERPL-SCNC: 26 MMOL/L (ref 21–32)
CREAT SERPL-MCNC: 0.73 MG/DL (ref 0.6–1.3)
EOSINOPHIL # BLD AUTO: 0.18 THOUSAND/ΜL (ref 0–0.61)
EOSINOPHIL NFR BLD AUTO: 2 % (ref 0–6)
ERYTHROCYTE [DISTWIDTH] IN BLOOD BY AUTOMATED COUNT: 15.2 % (ref 11.6–15.1)
GFR SERPL CREATININE-BSD FRML MDRD: 83 ML/MIN/1.73SQ M
GLUCOSE SERPL-MCNC: 123 MG/DL (ref 65–140)
HCT VFR BLD AUTO: 40 % (ref 34.8–46.1)
HGB BLD-MCNC: 12.6 G/DL (ref 11.5–15.4)
IMM GRANULOCYTES # BLD AUTO: 0.06 THOUSAND/UL (ref 0–0.2)
IMM GRANULOCYTES NFR BLD AUTO: 1 % (ref 0–2)
LYMPHOCYTES # BLD AUTO: 3.88 THOUSANDS/ΜL (ref 0.6–4.47)
LYMPHOCYTES NFR BLD AUTO: 37 % (ref 14–44)
MCH RBC QN AUTO: 33.6 PG (ref 26.8–34.3)
MCHC RBC AUTO-ENTMCNC: 31.5 G/DL (ref 31.4–37.4)
MCV RBC AUTO: 107 FL (ref 82–98)
MONOCYTES # BLD AUTO: 1.17 THOUSAND/ΜL (ref 0.17–1.22)
MONOCYTES NFR BLD AUTO: 11 % (ref 4–12)
NEUTROPHILS # BLD AUTO: 5.13 THOUSANDS/ΜL (ref 1.85–7.62)
NEUTS SEG NFR BLD AUTO: 49 % (ref 43–75)
NRBC BLD AUTO-RTO: 0 /100 WBCS
PLATELET # BLD AUTO: 467 THOUSANDS/UL (ref 149–390)
PMV BLD AUTO: 10.1 FL (ref 8.9–12.7)
POTASSIUM SERPL-SCNC: 3.4 MMOL/L (ref 3.5–5.3)
PROT SERPL-MCNC: 7.5 G/DL (ref 6.4–8.4)
RBC # BLD AUTO: 3.75 MILLION/UL (ref 3.81–5.12)
SODIUM SERPL-SCNC: 141 MMOL/L (ref 135–147)
WBC # BLD AUTO: 10.46 THOUSAND/UL (ref 4.31–10.16)

## 2022-09-15 PROCEDURE — 85025 COMPLETE CBC W/AUTO DIFF WBC: CPT

## 2022-09-15 PROCEDURE — 86304 IMMUNOASSAY TUMOR CA 125: CPT

## 2022-09-15 PROCEDURE — 80053 COMPREHEN METABOLIC PANEL: CPT

## 2022-09-16 ENCOUNTER — OFFICE VISIT (OUTPATIENT)
Dept: GYNECOLOGIC ONCOLOGY | Facility: CLINIC | Age: 72
End: 2022-09-16
Payer: COMMERCIAL

## 2022-09-16 VITALS
TEMPERATURE: 98 F | OXYGEN SATURATION: 98 % | HEART RATE: 108 BPM | HEIGHT: 59 IN | BODY MASS INDEX: 33.26 KG/M2 | WEIGHT: 165 LBS | DIASTOLIC BLOOD PRESSURE: 82 MMHG | SYSTOLIC BLOOD PRESSURE: 124 MMHG | RESPIRATION RATE: 18 BRPM

## 2022-09-16 DIAGNOSIS — C56.3 MALIGNANT NEOPLASM OF BOTH OVARIES (HCC): Primary | ICD-10-CM

## 2022-09-16 DIAGNOSIS — Z86.16 PERSONAL HISTORY OF COVID-19: ICD-10-CM

## 2022-09-16 PROCEDURE — 99215 OFFICE O/P EST HI 40 MIN: CPT | Performed by: OBSTETRICS & GYNECOLOGY

## 2022-09-16 RX ORDER — NIRMATRELVIR AND RITONAVIR 300-100 MG
KIT ORAL
COMMUNITY
Start: 2022-08-26

## 2022-09-16 NOTE — ASSESSMENT & PLAN NOTE
Unfortunately she was recently diagnosed with COVID  Ideally we would wait 7 weeks after diagnosis she performed surgical intervention  However, this is not an elective procedure and therefore believe that the risk of waiting outweighs the benefit  We will need to communicate with anesthesia

## 2022-09-19 ENCOUNTER — HOSPITAL ENCOUNTER (OUTPATIENT)
Dept: RADIOLOGY | Facility: HOSPITAL | Age: 72
Discharge: HOME | End: 2022-09-19
Payer: COMMERCIAL

## 2022-09-19 DIAGNOSIS — C56.3 MALIGNANT NEOPLASM OF BOTH OVARIES (HCC): ICD-10-CM

## 2022-09-19 PROBLEM — C78.7 LIVER METASTASES (HCC): Status: ACTIVE | Noted: 2022-09-19

## 2022-09-19 PROCEDURE — 74183 MRI ABD W/O CNTR FLWD CNTR: CPT

## 2022-09-19 PROCEDURE — A9585 GADOBUTROL INJECTION: HCPCS | Performed by: OBSTETRICS & GYNECOLOGY

## 2022-09-19 PROCEDURE — G1004 CDSM NDSC: HCPCS

## 2022-09-19 RX ADMIN — GADOBUTROL 8 ML: 604.72 INJECTION INTRAVENOUS at 16:02

## 2022-09-20 ENCOUNTER — CONSULT (OUTPATIENT)
Dept: SURGICAL ONCOLOGY | Facility: CLINIC | Age: 72
End: 2022-09-20
Payer: COMMERCIAL

## 2022-09-20 VITALS
HEART RATE: 100 BPM | TEMPERATURE: 97.3 F | WEIGHT: 165 LBS | SYSTOLIC BLOOD PRESSURE: 142 MMHG | BODY MASS INDEX: 33.26 KG/M2 | DIASTOLIC BLOOD PRESSURE: 90 MMHG | RESPIRATION RATE: 18 BRPM | HEIGHT: 59 IN | OXYGEN SATURATION: 100 %

## 2022-09-20 DIAGNOSIS — C56.3 MALIGNANT NEOPLASM OF BOTH OVARIES (HCC): ICD-10-CM

## 2022-09-20 DIAGNOSIS — C78.7 LIVER METASTASES (HCC): Primary | ICD-10-CM

## 2022-09-20 PROCEDURE — 99205 OFFICE O/P NEW HI 60 MIN: CPT | Performed by: STUDENT IN AN ORGANIZED HEALTH CARE EDUCATION/TRAINING PROGRAM

## 2022-09-20 NOTE — PROGRESS NOTES
Surgical Oncology Consultation    [address]  [facility]  [address] [place] [address]    Patient:  Lucinda Jurado  1950  [id number]    Primary Care provider:  Tameka Beaver MD  [address] [facility] [address] [address]    Referring provider:  Blanche Blake MD  [address] [facility],  [facility]    Diagnoses and all orders for this visit:    Liver metastases [facility])    Malignant neoplasm of both ovaries [facility])  -     Ambulatory referral to Surgical Oncology        Chief Complaint   Patient presents with    Consult       No follow-ups on file  Oncology History   Ovarian cancer ([facility] [address] )   8/6/2021 Surgery    PROMISE/BSO/tumor debulking to optimal cytoreduction     8/25/2021 Initial Diagnosis    Ovarian cancer ([facility] [address] )     9/16/2021 -  Cancer Staged    Staging form: Ovary, Fallopian Tube, Primary Peritoneal, AJCC 8th Edition  - Clinical: FIGO Stage IIIC (cT3c) - Signed by Blanche Blake MD on 9/16/2021  Stage prefix: Initial diagnosis  Cancer antigen 125 () (U/mL): 543       9/28/2021 - 1/11/2022 Chemotherapy    Carbo AUC6, Taxol 175mg/m2 q3 weeks  Avastin 15mg/m2 added cycle 3    Toxicities:  Cycle4: carbo AUC 5 for neutropenia, added neulasta     10/2021 Genomic Testing    SEAN high  CPS >2     12/9/2021 Genetic Testing    POLD VUS     2/22/2022 -  Chemotherapy    Maintenance: Avastin 15mg/m2  Olaparib 300mg BID    Toxicity:  3/28/22: dose-reduce avastin to 10 mg/kg every 21 days as well as dose-reduction of olaparib to 150 mg AM and 300 mg PM  5/9/22: decrease olaparib to 200mg BID  6/7/2022: stopped avastin for bone pain           History of Present Illness  :   70 yo female with metastatic ovarian ca  Stage IIIC after TAHBSO debulking 8/2021  Adjuvant chemo completed and currently on maintenance PARP-I  Was also on avastin but this was d/c'ed for bone pain    Liver metastases were noted on her cross-sectional imaging obtained in the postoperative setting  Her  and liver metastases were responsive to treatment in the adjuvant setting, these were closely monitored  Since she has been on maintenance therapy, there has been interval progression of her liver metastases  These have increased in size, however, there are no new lesions  Her  recently became elevated  She has no symptoms at this time  She has no abdominal pain, no nausea vomiting, no early satiety, no weight loss, no headache, no current bone pain  Her PARP inhibitors currently held due to recent COVID infection  She has not returned to work in the last year since her surgery, however, she is active in increasing her daily activity with dog walks  Review of Systems  Complete ROS Surg Onc:   Constitutional: The patient denies new or recent history of general fatigue, no recent weight loss, no change in appetite  Eyes: No complaints of visual problems, no scleral icterus  ENT: No complaints of ear pain, no hoarseness, no difficulty swallowing,  no tinnitus and no new masses in head, oral cavity, or neck  Cardiovascular: No complaints of chest pain, no palpitations, no ankle edema  Respiratory: No complaints of shortness of breath, no cough  Gastrointestinal: No complaints of jaundice, no bloody stools, no pale stools  Genitourinary: No complaints of dysuria, no hematuria, no nocturia, no frequent urination, no urethral discharge  Musculoskeletal: No complaints of weakness, paralysis, joint stiffness or arthralgias  Integumentary: No complaints of rash, no new lesions  Neurological: No complaints of convulsions, no seizures, no dizziness  Hematologic/Lymphatic: No complaints of easy bruising  Endocrine:  No hot or cold intolerance  No polydipsia, polyphagia, or polyuria  Allergy/immunology:  No environmental allergies  No food allergies  Not immunocompromised        Patient Active Problem List   Diagnosis    Hypertension    Migraine    Cancer related pain    Unspecified protein-calorie malnutrition ([place] [address] )    S/P bladder repair    Status post small bowel resection    S/P splenectomy    Hyponatremia    Ovarian cancer ([place] [address] )    Encounter for central line care    Drug induced constipation    Palliative care patient    Joint pain following chemotherapy    Anxiousness    Insomnia due to medical condition    Chemotherapy induced neutropenia (HCC)    Chemotherapy-induced peripheral neuropathy (HCC)    Esophageal reflux    Neoplastic malignant related fatigue    Oral mucositis (ulcerative) due to antineoplastic therapy    Sciatica of left side    Personal history of COVID-19    Liver metastases (HCC)     Past Medical History:   Diagnosis Date    Acid reflux     Asthma     Cancer ([place] [address] )     ovarian, peritoneal carcinomatosis    GERD (gastroesophageal reflux disease)     Hypercholesteremia     Hypertension     Migraine     Ovarian cancer ([place] [address] )      Past Surgical History:   Procedure Laterality Date    APPENDECTOMY N/A 8/6/2021    Procedure: APPENDECTOMY;  Surgeon: Kasandra Dinh MD;  [facility];  Service: Gynecology Oncology    CHOLECYSTECTOMY      COLONOSCOPY      FL CYSTOGRAM  8/18/2021    GALLBLADDER SURGERY      HYSTERECTOMY N/A 8/6/2021    Procedure: ENBLOCK HYSTERECTOMY, BILATERAL SALPINGO-OOPHORECTOMY, SIGMOIDECTOMY WITH LOW RECTAL REANASTAMOSIS, BLADDER PERITONEAL STRIPPING AND CYSTOTOMY REPAIR, DIAPHRAGM STRIPPING, SMALL BOWEL RESECTION WITH RE-ANASTAMOSIS;  Surgeon: Kasandra Dinh MD;  [facility];  Service: Gynecology Oncology    IR ASPIRATION ONLY  8/31/2021    IR DRAINAGE TUBE CHECK AND/OR REMOVAL  9/17/2021    IR DRAINAGE TUBE CHECK/CHANGE/REPOSITION/REINSERTION/UPSIZE  8/27/2021    IR DRAINAGE TUBE CHECK/CHANGE/REPOSITION/REINSERTION/UPSIZE  9/3/2021    IR DRAINAGE TUBE PLACEMENT  8/18/2021    IR PORT PLACEMENT  9/17/2021    IR THORACENTESIS  8/18/2021    IR THORACENTESIS  9/3/2021    LAPAROTOMY N/A 8/6/2021    Procedure: LAPAROTOMY EXPLORATORY; OVER SEW PANCREAS TAIL;  Surgeon: Bernadette Simon MD;  [facility];  Service: Gynecology Oncology    OMENTECTOMY N/A 8/6/2021    Procedure: RADICAL OMENTECTOMY;  Surgeon: Bernadette Simon MD;  [facility];  Service: Gynecology Oncology    TX LAP,DIAGNOSTIC ABDOMEN N/A 8/6/2021    Procedure: LAPAROSCOPY DIAGNOSTIC;  Surgeon: Bernadette Simon MD;  [facility];  Service: Gynecology Oncology    RIGHT OOPHORECTOMY  1986    SPLENECTOMY, TOTAL N/A 8/6/2021    Procedure: SPLENECTOMY;  Surgeon: Bernadette Simon MD;  [facility];  Service: Gynecology Oncology    TONSILLECTOMY       No family history on file  Social History     Socioeconomic History    Marital status: /Civil Union     Spouse name: Not on file    Number of children: Not on file    Years of education: Not on file    Highest education level: Not on file   Occupational History    Not on file   Tobacco Use    Smoking status: Never Smoker    Smokeless tobacco: Never Used   Vaping Use    Vaping Use: Never used   Substance and Sexual Activity    Alcohol use: Yes     Comment: socially    Drug use: Not Currently    Sexual activity: Not Currently   Other Topics Concern    Not on file   Social History Narrative    Patient has been  to [place] for over 50 years  They have one 37yo daughter  Patient has 3 brothers and 1 sister; she had lost 2 other sisters  Family is supportive        Social Determinants of Health     Financial Resource Strain: Not on file   Food Insecurity: Not on file   Transportation Needs: Not on file   Physical Activity: Not on file   Stress: Not on file   Social Connections: Not on file   Intimate Partner Violence: Not on file   Housing Stability: Not on file       Current Outpatient Medications:     acetaminophen (TYLENOL) 325 mg tablet, Take 3 tablets (975 mg total) by mouth every 8 (eight) hours, Disp: , Rfl:     ginny Turner oxide-diphenhydramine-lidocaine viscous (MAGIC MOUTHWASH) 1:1:1 suspension, Swish and spit 10 mL every 4 (four) hours as needed for mouth pain or discomfort, Disp: 300 mL, Rfl: 0    albuterol (PROVENTIL HFA,VENTOLIN HFA) 90 mcg/act inhaler, Inhale 2 puffs every 6 (six) hours as needed for wheezing, Disp: 18 g, Rfl: 1    amLODIPine-benazepril (LOTREL) 10-20 MG per capsule, Take 1 capsule by mouth daily, Disp: , Rfl:     benzonatate (TESSALON) 200 MG capsule, Take 1 capsule (200 mg total) by mouth 2 (two) times a day as needed for cough, Disp: 20 capsule, Rfl: 0    butalbital-aspirin-caffeine (FIORINAL) -40 mg per capsule, Take 1 capsule by mouth every 4 (four) hours as needed for headaches or migraine, Disp: 30 capsule, Rfl: 0    dextromethorphan-guaiFENesin (ROBITUSSIN DM)  mg/5 mL syrup, Take 10 mL by mouth 3 (three) times a day as needed for cough, Disp: 237 mL, Rfl: 1    docusate sodium (COLACE) 100 mg capsule, Take 1 capsule (100 mg total) by mouth 2 (two) times a day, Disp: , Rfl:     DULoxetine (CYMBALTA) 20 mg capsule, Take 1 capsule (20 mg total) by mouth daily, Disp: 30 capsule, Rfl: 2    fluticasone (FLONASE) 50 mcg/act nasal spray, 1 spray into each nostril daily, Disp: , Rfl:     fluticasone (Flovent HFA) 44 mcg/act inhaler, Inhale 2 puffs 2 (two) times a day Rinse mouth after use , Disp: 10 6 g, Rfl: 0    gabapentin (Neurontin) 300 mg capsule, Take 1 capsule (300 mg total) by mouth 3 (three) times a day, Disp: 270 capsule, Rfl: 0    ibuprofen (MOTRIN) 600 mg tablet, Take 1 tablet (600 mg total) by mouth every 6 (six) hours, Disp: 30 tablet, Rfl: 0    lidocaine (Lidoderm) 5 %, Apply 1 patch topically daily Remove & Discard patch within 12 hours - apply to painful area, Disp: 30 patch, Rfl: 0    Liniments (Blue-Emu Super Strength) CREA, Apply topically as needed (joint pain), Disp: , Rfl:     LORazepam (ATIVAN) 1 mg tablet, Take 0 5 tablets (0 5 mg total) by mouth every 8 (eight) hours as needed (nausea or anxiety or insomnia), Disp: 45 tablet, Rfl: 0    naloxone (NARCAN) 4 mg/0 1 mL nasal spray, 0 1 mL (4 mg total) by Alternating Nares route every 3 (three) minutes as needed (accidental opioid overdose or respiratory depression), Disp: 1 each, Rfl: 1    Naproxen Sodium 220 MG CAPS, Take 220 mg by mouth daily 2 caps AM, Disp: , Rfl:     olaparib (LYNPARZA) tablet, Take 2 tablets PO BID, Disp: 120 tablet, Rfl: 5    omeprazole (PriLOSEC) 20 mg delayed release capsule, Take 20 mg by mouth 2 (two) times a day, Disp: , Rfl:     ondansetron (ZOFRAN) 8 mg tablet, Take 1 tablet (8 mg total) by mouth every 8 (eight) hours as needed for nausea or vomiting, Disp: 20 tablet, Rfl: 1    oxyCODONE (OxyCONTIN) 10 mg 12 hr tablet, Take 1 tablet (10 mg total) by mouth every 12 (twelve) hours Max Daily Amount: 20 mg, Disp: 60 tablet, Rfl: 0    oxyCODONE (Roxicodone) 5 immediate release tablet, Take 1 tablet (5 mg total) by mouth every 4 (four) hours as needed for moderate pain or severe pain Max Daily Amount: 30 mg, Disp: 180 tablet, Rfl: 0    polyethylene glycol (MiraLax) 17 GM/SCOOP powder, Mix with 64 oz Gatorade, begin 4 PM day before surgery per bowel prep instructions  , Disp: 238 g, Rfl: 0    pravastatin (PRAVACHOL) 10 mg tablet, Take 10 mg by mouth daily, Disp: , Rfl:     AMLODIPINE BENZOATE PO, Take by mouth  , Disp: , Rfl:     Paxlovid, 300/100, tablet therapy pack, , Disp: , Rfl:   No current facility-administered medications for this visit    Allergies   Allergen Reactions    Erythromycin Nausea Only    Morphine Headache       Vitals:    09/20/22 1303   BP: 142/90   Pulse: 100   Resp: 18   Temp: (!) 97 3 °F (36 3 °C)   SpO2: 100%       Physical Exam   General: Appears well, appears stated age  Skin: Warm, anicteric  HEENT: Normocephalic, atraumatic; sclera aniceteric, mucous membranes moist; cervical nodes without adenopathy  Cardiopulmonary: RRR, Easy WOB, no BLE edema  Abd: Flat and soft, nontender, no masses appreciated, no hepatosplenomegaly  Incision from sternum to pubis  MSK: Symmetric, no cyanosis, no overt weakness  Lymphatic: No cervical, axillary or inguinal lymphadenopathy  Neuro: Affect appropriate, no gross motor abnormalities      Labs: Reviewed in EPIC    Imaging  MRI abdomen w wo contrast    Result Date: 9/20/2022  Narrative: MRI - ABDOMEN - WITH AND WITHOUT CONTRAST INDICATION: 71 years / Female  C56 3: Malignant neoplasm of bilateral ovaries  COMPARISON: CT 9/9/2022  MRI 6/20/2022  TECHNIQUE:  The following pulse sequences were obtained:  axial T1 weighted in/out of phase images, multiplanar T2 weighted images, axial DWI/ADC, pre-contrast axial T1 with fat saturation and dynamic multiphase post-contrast fat suppressed T1 weighted images    IV Contrast:  8 mL of Gadobutrol injection (SINGLE-DOSE) FINDINGS: LOWER CHEST:   Unremarkable  LIVER: Normal in size and configuration  As noted on a recent CT, there has been interval progression of hepatic metastatic disease with the largest lesion now measuring 5 6 x 3 1 cm, on MRI of 6/20/2022 this measured 2 7 x 1 3 cm  Additional lesions have likewise increased in size  The hepatic veins and portal veins are patent  BILE DUCTS: No intrahepatic or extrahepatic bile duct dilation  GALLBLADDER:  Normal  PANCREAS:  Unremarkable  ADRENAL GLANDS:  Normal  SPLEEN:  Normal  KIDNEYS/PROXIMAL URETERS: No hydroureteronephrosis  No suspicious renal mass  BOWEL:   No dilated loops of bowel  PERITONEUM/RETROPERITONEUM: No mass  No ascites  LYMPH NODES: No abdominal lymphadenopathy  VASCULAR STRUCTURES:  No aneurysm  ABDOMINAL WALL:  Unremarkable  OSSEOUS STRUCTURES:  No suspicious osseous lesion  Impression: Interval progression of hepatic metastatic disease as noted on CT of 9/9/2022   Workstation performed: [id number]     CT chest abdomen pelvis w contrast    Result Date: 9/14/2022  Narrative: CT CHEST, ABDOMEN AND PELVIS WITH IV CONTRAST INDICATION:   C56 3: Malignant neoplasm of bilateral ovaries  COMPARISON:  CT chest 7/6/2022; MRI abdomen pelvis 6/20/2022; CT chest abdomen pelvis 4/1/2022 TECHNIQUE: CT examination of the chest, abdomen and pelvis was performed  Axial, sagittal, and coronal 2D reformatted images were created from the source data and submitted for interpretation  Radiation dose length product (DLP) for this visit:  845 98 mGy-cm   This examination, like all CT scans performed in the [facility], was performed utilizing techniques to minimize radiation dose exposure, including the use of iterative  reconstruction and automated exposure control  IV Contrast:  85 mL of iohexol (OMNIPAQUE) Enteric Contrast: Enteric contrast was administered  FINDINGS: CHEST LUNGS:  Stable small likely postinflammatory posterior left lower lobe subpleural 6 mm nodule again noted  Lungs are otherwise clear  There is no tracheal or endobronchial lesion  PLEURA:  Unremarkable  HEART/GREAT VESSELS: Heart is unremarkable for patient's age  No thoracic aortic aneurysm  MEDIASTINUM AND SABRINA:  Unremarkable  CHEST WALL AND LOWER NECK:  Implantable right chest wall port again noted with catheter entering the internal jugular vein and terminating at the cavoatrial junction  ABDOMEN LIVER/BILIARY TREE:  Again identified are multiple hepatic metastasis which have increased in size when compared to the MRI dated 6/20/2022  These include a peripheral segment 4A lesion (series 2 image 50) measuring 3 3 x 5 3 cm (previously 1 9 x 1 2 cm), a segment 7 lesion (series 2 image 46) measuring 2 6 x 2 5 cm (previously 0 9 cm), and a posterior segment 5 lesion (series 2 image 52) measuring 1 4 x 1 3 cm (not seen previously)  The other previously identified subcentimeter lesions in segment 6  and 7 are not currently identified  GALLBLADDER:  Gallbladder is surgically absent  SPLEEN:  There has been prior splenectomy    No suspicious abnormality in the splenectomy space  PANCREAS:  Stable postoperative change reflecting resection of the pancreatic tail  ADRENAL GLANDS:  Unremarkable  KIDNEYS/URETERS:  Unremarkable  No hydronephrosis  STOMACH AND BOWEL:  Unremarkable  APPENDIX:  There are expected postoperative changes of appendectomy  ABDOMINOPELVIC CAVITY:  No ascites  No pneumoperitoneum  No lymphadenopathy  VESSELS:  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  Abdominal aorta and inferior vena cava are normal in course and caliber  Mild atherosclerotic change present  PELVIS REPRODUCTIVE ORGANS:  Status post PROMISE/BSO  URINARY BLADDER:  Unremarkable  ABDOMINAL WALL/INGUINAL REGIONS:  Unremarkable  OSSEOUS STRUCTURES:  No acute fracture or destructive osseous lesion  Impression: 1  Interim increase in size of hepatic metastasis as noted when compared to the prior MRI dated 6/20/2022  2   Stable postoperative change in the abdomen and pelvis as noted  3   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  4   Additional stable findings as noted  The study was marked in EPIC for significant notification  Workstation performed: [id number]       I independently reviewed and interpreted the above laboratory and imaging data, including gynecologic oncology operative notes, consultation, chemotherapy plan, CT scans, MRIs  I have discussed this patient with Dr Per Maher  Discussion/Summary: This is a 62-year-old female with ovarian cancer metastatic to the liver  She underwent optimal debulking over a year ago now, and completed adjuvant therapy  She is currently on maintenance therapy, and his whole hand has oligometastatic disease that is progressing in terms of size but not in the number of her liver lesions  I have reviewed her films and discussed with my partners in believe that we can render her liver disease free with a combination of resection and ablation  I discussed this procedure with the patient today    Due to her previous debulking and involvement of the hemidiaphragms and peritoneum, her risks are increased for bleeding and infection and perioperative complications due to scar formation  We discussed the use of ultrasound and determine age of intraoperative liver health as well as the size of the lesions to determine resection versus ablation  We discussed the risk of postoperative temporary or permanent liver failure  We also discussed the risks of bleeding, infection, damage to nearby structures, mi, PE, pneumonia, death  She is in good health and will continue to increase her physical activity leading up to surgery  We discussed the postoperative course  Again the patient is an above average risk due to her surgical history  She and her  expressed understanding and would like to proceed  All questions answered

## 2022-09-21 ENCOUNTER — TELEPHONE (OUTPATIENT)
Dept: SURGICAL ONCOLOGY | Facility: CLINIC | Age: 72
End: 2022-09-21

## 2022-09-21 NOTE — TELEPHONE ENCOUNTER
Tc to pt to offer surgery date of 11/4 at [facility]  Pt agreeable to plan as she has prior commitments mid Oct  Instructed pt to get lab work and EKG done at any [address] [place] [address] center 2 weeks before surgery  Pt verbalized understanding and appreciative of call

## 2022-09-22 DIAGNOSIS — T45.1X5A CHEMOTHERAPY-INDUCED PERIPHERAL NEUROPATHY (HCC): ICD-10-CM

## 2022-09-22 DIAGNOSIS — G62.0 CHEMOTHERAPY-INDUCED PERIPHERAL NEUROPATHY (HCC): ICD-10-CM

## 2022-09-22 DIAGNOSIS — G89.18 JOINT PAIN FOLLOWING CHEMOTHERAPY: ICD-10-CM

## 2022-09-22 DIAGNOSIS — F41.9 ANXIOUSNESS: ICD-10-CM

## 2022-09-22 DIAGNOSIS — C56.3 MALIGNANT NEOPLASM OF BOTH OVARIES (HCC): ICD-10-CM

## 2022-09-22 DIAGNOSIS — M25.50 JOINT PAIN FOLLOWING CHEMOTHERAPY: ICD-10-CM

## 2022-09-22 DIAGNOSIS — G89.3 CANCER RELATED PAIN: ICD-10-CM

## 2022-09-22 DIAGNOSIS — G47.01 INSOMNIA DUE TO MEDICAL CONDITION: ICD-10-CM

## 2022-09-22 DIAGNOSIS — Z51.5 PALLIATIVE CARE PATIENT: ICD-10-CM

## 2022-09-22 RX ORDER — DULOXETIN HYDROCHLORIDE 20 MG/1
20 CAPSULE, DELAYED RELEASE ORAL DAILY
Qty: 30 CAPSULE | Refills: 3 | Status: SHIPPED | OUTPATIENT
Start: 2022-09-22

## 2022-09-27 ENCOUNTER — ANESTHESIA EVENT (OUTPATIENT)
Dept: PERIOP | Facility: HOSPITAL | Age: 72
End: 2022-09-27

## 2022-10-06 ENCOUNTER — TELEPHONE (OUTPATIENT)
Dept: SURGICAL ONCOLOGY | Facility: CLINIC | Age: 72
End: 2022-10-06

## 2022-10-06 NOTE — TELEPHONE ENCOUNTER
Tc to pt and explained to her that the forms sent via e-mail were not able to be printed  Forms need to be scanned and sent as an attachment  Pt states she will ask her  to try to scan them in or she will have him drop them off at either our [facility] or [place] office to be completed  Instructed her that I will look for the forms when I am back in the office on Monday 10/10  Pt verbalized understanding and appreciative of call

## 2022-10-13 ENCOUNTER — PATIENT MESSAGE (OUTPATIENT)
Dept: GYNECOLOGIC ONCOLOGY | Facility: CLINIC | Age: 72
End: 2022-10-13

## 2022-10-14 DIAGNOSIS — G62.0 CHEMOTHERAPY-INDUCED PERIPHERAL NEUROPATHY (HCC): ICD-10-CM

## 2022-10-14 DIAGNOSIS — T45.1X5A CHEMOTHERAPY-INDUCED PERIPHERAL NEUROPATHY (HCC): ICD-10-CM

## 2022-10-14 DIAGNOSIS — C56.3 MALIGNANT NEOPLASM OF BOTH OVARIES (HCC): ICD-10-CM

## 2022-10-14 DIAGNOSIS — G89.3 CANCER RELATED PAIN: ICD-10-CM

## 2022-10-14 DIAGNOSIS — Z51.5 PALLIATIVE CARE PATIENT: ICD-10-CM

## 2022-10-14 DIAGNOSIS — M25.50 JOINT PAIN FOLLOWING CHEMOTHERAPY: ICD-10-CM

## 2022-10-14 DIAGNOSIS — M54.32 SCIATICA OF LEFT SIDE: ICD-10-CM

## 2022-10-14 DIAGNOSIS — G89.18 JOINT PAIN FOLLOWING CHEMOTHERAPY: ICD-10-CM

## 2022-10-14 RX ORDER — OXYCODONE HCL 10 MG/1
10 TABLET, FILM COATED, EXTENDED RELEASE ORAL EVERY 12 HOURS SCHEDULED
Qty: 60 TABLET | Refills: 0 | Status: SHIPPED | OUTPATIENT
Start: 2022-10-14

## 2022-10-14 NOTE — TELEPHONE ENCOUNTER
Primary palliative medicine provider:   José Miguel Calle     Medication requested:  OxyContin 10 mg ER    If for pain, how has the patient been taking their pain medicine?      Last appointment: 09/13/22    Next scheduled appointment: [facility]     PDMP review:  [place]

## 2022-10-24 NOTE — PRE-PROCEDURE INSTRUCTIONS
Pre-Surgery Instructions:   Medication Instructions   • acetaminophen (TYLENOL) 325 mg tablet Uses PRN- OK to take day of surgery   • al mag oxide-diphenhydramine-lidocaine viscous (MAGIC MOUTHWASH) 1:1:1 suspension Uses PRN- DO NOT take day of surgery   • albuterol (PROVENTIL HFA,VENTOLIN HFA) 90 mcg/act inhaler Uses PRN- OK to take day of surgery   • amLODIPine-benazepril (LOTREL) 10-20 MG per capsule Hold day of surgery  • butalbital-aspirin-caffeine (FIORINAL) -40 mg per capsule Stop taking 7 days prior to surgery  • DULoxetine (CYMBALTA) 20 mg capsule Take day of surgery  • fluticasone (FLONASE) 50 mcg/act nasal spray Take day of surgery  • gabapentin (Neurontin) 300 mg capsule Take day of surgery  • ibuprofen (MOTRIN) 600 mg tablet Stop taking 7 days prior to surgery  • lidocaine (Lidoderm) 5 % Hold day of surgery  • Liniments (Blue-Emu Super Strength) CREA Hold day of surgery  • LORazepam (ATIVAN) 1 mg tablet Uses PRN- OK to take day of surgery   • olaparib [facility] [address]) tablet Take day of surgery  • omeprazole (PriLOSEC) 20 mg delayed release capsule Take day of surgery  • ondansetron (ZOFRAN) 8 mg tablet Uses PRN- OK to take day of surgery   • oxyCODONE (OxyCONTIN) 10 mg 12 hr tablet Uses PRN- OK to take day of surgery   • oxyCODONE (Roxicodone) 5 immediate release tablet Uses PRN- OK to take day of surgery   • pravastatin (PRAVACHOL) 10 mg tablet Take night before surgery   Covid screening negative as per patient  Reviewed with patient via phone all medication instructions  Advised not to take any NSAID's, Vitamins or Herbal products prior to the DOS  Acetaminophen products are ok to take  Reviewed showering instructions as given by surgical office  Confirmed 3 Carb drinks and instructions given by Surgical office  Instructed to call office with any questions or concerns    Instructed about NPO after midnight the night before DOS, except 3rd Carb drink as directed and sips of water with allowed medications in AM on DOS  Informed about call from [place] [address] with the time to arrive for the scheduled surgery  Patient verbalized understanding  See Geriatric Assessment below       • Cognitive Assessment:   • CAM:   • TUG <15 sec:  • Falls (last 6 months): 0  • Hand  score:  -Attempt 1:  -Attempt 2:  -Attempt 3:  • Silas Total Score: 21  • PHQ- 9 Depression Scale:0  • Nutrition Assessment Score:14  • METS: 9 89  • Health goals:  -What are your overall health goals? (quit smoking, wt  loss, rest, decrease stress)  Get rid of all cancer  -What brings you strength? (family, friends, Orthodoxy, health)   Family, kristian and Chocolate  -What activities are important to you? (exercise, reading, travel, work)                 Keep working

## 2022-10-27 ENCOUNTER — OFFICE VISIT (OUTPATIENT)
Dept: LAB | Facility: HOSPITAL | Age: 72
End: 2022-10-27
Payer: COMMERCIAL

## 2022-10-27 ENCOUNTER — HOSPITAL ENCOUNTER (OUTPATIENT)
Dept: INFUSION CENTER | Facility: HOSPITAL | Age: 72
Discharge: HOME | End: 2022-10-27

## 2022-10-27 VITALS — TEMPERATURE: 97.8 F

## 2022-10-27 DIAGNOSIS — Z45.2 ENCOUNTER FOR CENTRAL LINE CARE: ICD-10-CM

## 2022-10-27 DIAGNOSIS — C56.3 MALIGNANT NEOPLASM OF BOTH OVARIES (HCC): ICD-10-CM

## 2022-10-27 DIAGNOSIS — C56.3 MALIGNANT NEOPLASM OF BOTH OVARIES (HCC): Primary | ICD-10-CM

## 2022-10-27 DIAGNOSIS — G89.18 JOINT PAIN FOLLOWING CHEMOTHERAPY: ICD-10-CM

## 2022-10-27 DIAGNOSIS — G89.3 CANCER RELATED PAIN: ICD-10-CM

## 2022-10-27 DIAGNOSIS — C78.7 LIVER METASTASES (HCC): ICD-10-CM

## 2022-10-27 DIAGNOSIS — Z51.5 PALLIATIVE CARE PATIENT: ICD-10-CM

## 2022-10-27 DIAGNOSIS — M25.50 JOINT PAIN FOLLOWING CHEMOTHERAPY: ICD-10-CM

## 2022-10-27 DIAGNOSIS — G62.0 CHEMOTHERAPY-INDUCED PERIPHERAL NEUROPATHY (HCC): ICD-10-CM

## 2022-10-27 DIAGNOSIS — T45.1X5A CHEMOTHERAPY-INDUCED PERIPHERAL NEUROPATHY (HCC): ICD-10-CM

## 2022-10-27 LAB
ALBUMIN SERPL BCP-MCNC: 3.4 G/DL (ref 3.5–5)
ALP SERPL-CCNC: 129 U/L (ref 46–116)
ALT SERPL W P-5'-P-CCNC: 37 U/L (ref 12–78)
ANION GAP SERPL CALCULATED.3IONS-SCNC: 8 MMOL/L (ref 4–13)
APTT PPP: 26 SECONDS (ref 23–37)
AST SERPL W P-5'-P-CCNC: 24 U/L (ref 5–45)
BASOPHILS # BLD AUTO: 0.05 THOUSANDS/ÂΜL (ref 0–0.1)
BASOPHILS NFR BLD AUTO: 1 % (ref 0–1)
BILIRUB SERPL-MCNC: 0.36 MG/DL (ref 0.2–1)
BUN SERPL-MCNC: 12 MG/DL (ref 5–25)
CALCIUM ALBUM COR SERPL-MCNC: 9.3 MG/DL (ref 8.3–10.1)
CALCIUM SERPL-MCNC: 8.8 MG/DL (ref 8.3–10.1)
CHLORIDE SERPL-SCNC: 107 MMOL/L (ref 96–108)
CO2 SERPL-SCNC: 26 MMOL/L (ref 21–32)
CREAT SERPL-MCNC: 0.61 MG/DL (ref 0.6–1.3)
EOSINOPHIL # BLD AUTO: 0.29 THOUSAND/ÂΜL (ref 0–0.61)
EOSINOPHIL NFR BLD AUTO: 3 % (ref 0–6)
ERYTHROCYTE [DISTWIDTH] IN BLOOD BY AUTOMATED COUNT: 14.2 % (ref 11.6–15.1)
EST. AVERAGE GLUCOSE BLD GHB EST-MCNC: 123 MG/DL
GFR SERPL CREATININE-BSD FRML MDRD: 91 ML/MIN/1.73SQ M
GLUCOSE P FAST SERPL-MCNC: 97 MG/DL (ref 65–99)
GLUCOSE SERPL-MCNC: 97 MG/DL (ref 65–140)
HBA1C MFR BLD: 5.9 %
HCT VFR BLD AUTO: 33.2 % (ref 34.8–46.1)
HGB BLD-MCNC: 10.5 G/DL (ref 11.5–15.4)
IMM GRANULOCYTES # BLD AUTO: 0.02 THOUSAND/UL (ref 0–0.2)
IMM GRANULOCYTES NFR BLD AUTO: 0 % (ref 0–2)
INR PPP: 0.95 (ref 0.84–1.19)
LYMPHOCYTES # BLD AUTO: 3.02 THOUSANDS/ÂΜL (ref 0.6–4.47)
LYMPHOCYTES NFR BLD AUTO: 35 % (ref 14–44)
MCH RBC QN AUTO: 32.7 PG (ref 26.8–34.3)
MCHC RBC AUTO-ENTMCNC: 31.6 G/DL (ref 31.4–37.4)
MCV RBC AUTO: 103 FL (ref 82–98)
MONOCYTES # BLD AUTO: 1 THOUSAND/ÂΜL (ref 0.17–1.22)
MONOCYTES NFR BLD AUTO: 12 % (ref 4–12)
NEUTROPHILS # BLD AUTO: 4.14 THOUSANDS/ÂΜL (ref 1.85–7.62)
NEUTS SEG NFR BLD AUTO: 49 % (ref 43–75)
NRBC BLD AUTO-RTO: 4 /100 WBCS
PLATELET # BLD AUTO: 309 THOUSANDS/UL (ref 149–390)
PMV BLD AUTO: 10 FL (ref 8.9–12.7)
POTASSIUM SERPL-SCNC: 3.4 MMOL/L (ref 3.5–5.3)
PROT SERPL-MCNC: 6.7 G/DL (ref 6.4–8.4)
PROTHROMBIN TIME: 12.8 SECONDS (ref 11.6–14.5)
RBC # BLD AUTO: 3.21 MILLION/UL (ref 3.81–5.12)
SODIUM SERPL-SCNC: 141 MMOL/L (ref 135–147)
WBC # BLD AUTO: 8.52 THOUSAND/UL (ref 4.31–10.16)

## 2022-10-27 PROCEDURE — 93005 ELECTROCARDIOGRAM TRACING: CPT

## 2022-10-27 RX ORDER — OXYCODONE HYDROCHLORIDE 5 MG/1
5 TABLET ORAL EVERY 4 HOURS PRN
Qty: 180 TABLET | Refills: 0 | Status: ON HOLD | OUTPATIENT
Start: 2022-10-27

## 2022-10-27 NOTE — TELEPHONE ENCOUNTER
Primary palliative medicine provider:   Jeremy De León    Medication requested:  Oxycodone 5 mg IR     If for pain, how has the patient been taking their pain medicine?      Last appointment: 9/13/22 tele    Next scheduled appointment: [email or web address]     PDMP review: [address]: [id number]

## 2022-10-28 LAB
ATRIAL RATE: 86 BPM
CANCER AG125 SERPL-ACNC: 72 U/ML (ref 0–30)
P AXIS: 56 DEGREES
PR INTERVAL: 174 MS
QRS AXIS: 75 DEGREES
QRSD INTERVAL: 82 MS
QT INTERVAL: 350 MS
QTC INTERVAL: 418 MS
T WAVE AXIS: 37 DEGREES
VENTRICULAR RATE: 86 BPM

## 2022-11-03 NOTE — ANESTHESIA PREPROCEDURE EVALUATION
Procedure:  RESECTION OR ABLATION OF MULTIPLE HEPATIC METASTASES WITH INTRAOPERATIVE ULTRASOUND (N/A Abdomen)  MICROWAVE ABLATION (N/A Abdomen)    Relevant Problems   CARDIO   (+) Hypertension   (+) Migraine      GI/HEPATIC   (+) Esophageal reflux   (+) Liver metastases (HCC)      GYN   (+) Ovarian cancer (HCC)      MUSCULOSKELETAL   (+) Sciatica of left side      NEURO/PSYCH   (+) Anxiousness   (+) Migraine   (+) Personal history of COVID-19        Physical Exam    Airway    Mallampati score: II  TM Distance: <3 FB  Neck ROM: limited     Dental       Cardiovascular      Pulmonary      Other Findings        Anesthesia Plan  ASA Score- 3     Anesthesia Type- general with ASA Monitors  Additional Monitors: arterial line  Airway Plan: ETT  Comment: Epidural or TAP block for post pain control is indicated  Plan Factors-    Chart reviewed  EKG reviewed  Imaging results reviewed  Existing labs reviewed  Patient summary reviewed  Patient is not a current smoker  Patient did not smoke on day of surgery  Induction- intravenous  Postoperative Plan-     Informed Consent- Anesthetic plan and risks discussed with patient  I personally reviewed this patient with the CRNA  Discussed and agreed on the Anesthesia Plan with the CRNA  Esequiel Hussein

## 2022-11-04 ENCOUNTER — HOSPITAL ENCOUNTER (INPATIENT)
Facility: HOSPITAL | Age: 72
LOS: 4 days | Discharge: HOME | End: 2022-11-08
Attending: STUDENT IN AN ORGANIZED HEALTH CARE EDUCATION/TRAINING PROGRAM | Admitting: STUDENT IN AN ORGANIZED HEALTH CARE EDUCATION/TRAINING PROGRAM

## 2022-11-04 ENCOUNTER — ANESTHESIA (OUTPATIENT)
Dept: PERIOP | Facility: HOSPITAL | Age: 72
End: 2022-11-04

## 2022-11-04 ENCOUNTER — TELEPHONE (OUTPATIENT)
Dept: GYNECOLOGIC ONCOLOGY | Facility: CLINIC | Age: 72
End: 2022-11-04

## 2022-11-04 DIAGNOSIS — G89.3 CANCER RELATED PAIN: Primary | ICD-10-CM

## 2022-11-04 DIAGNOSIS — C78.7 LIVER METASTASES (HCC): ICD-10-CM

## 2022-11-04 LAB
ABO GROUP BLD: NORMAL
BLD GP AB SCN SERPL QL: NEGATIVE
RH BLD: POSITIVE
SPECIMEN EXPIRATION DATE: NORMAL

## 2022-11-04 PROCEDURE — 0FB00ZX EXCISION OF LIVER, OPEN APPROACH, DIAGNOSTIC: ICD-10-PCS | Performed by: STUDENT IN AN ORGANIZED HEALTH CARE EDUCATION/TRAINING PROGRAM

## 2022-11-04 PROCEDURE — 0DNE0ZZ RELEASE LARGE INTESTINE, OPEN APPROACH: ICD-10-PCS | Performed by: STUDENT IN AN ORGANIZED HEALTH CARE EDUCATION/TRAINING PROGRAM

## 2022-11-04 PROCEDURE — 0FN00ZZ RELEASE LIVER, OPEN APPROACH: ICD-10-PCS | Performed by: STUDENT IN AN ORGANIZED HEALTH CARE EDUCATION/TRAINING PROGRAM

## 2022-11-04 PROCEDURE — 0DNU0ZZ RELEASE OMENTUM, OPEN APPROACH: ICD-10-PCS | Performed by: STUDENT IN AN ORGANIZED HEALTH CARE EDUCATION/TRAINING PROGRAM

## 2022-11-04 RX ORDER — PROPOFOL 10 MG/ML
INJECTION, EMULSION INTRAVENOUS AS NEEDED
Status: DISCONTINUED | OUTPATIENT
Start: 2022-11-04 | End: 2022-11-04

## 2022-11-04 RX ORDER — EPHEDRINE SULFATE 50 MG/ML
INJECTION INTRAVENOUS AS NEEDED
Status: DISCONTINUED | OUTPATIENT
Start: 2022-11-04 | End: 2022-11-04

## 2022-11-04 RX ORDER — ALBUTEROL SULFATE 90 UG/1
2 AEROSOL, METERED RESPIRATORY (INHALATION) EVERY 6 HOURS PRN
Status: DISCONTINUED | OUTPATIENT
Start: 2022-11-04 | End: 2022-11-08 | Stop reason: HOSPADM

## 2022-11-04 RX ORDER — HYDROMORPHONE HCL/PF 1 MG/ML
0.5 SYRINGE (ML) INJECTION EVERY 2 HOUR PRN
Status: DISCONTINUED | OUTPATIENT
Start: 2022-11-04 | End: 2022-11-06

## 2022-11-04 RX ORDER — FLUTICASONE PROPIONATE 50 MCG
1 SPRAY, SUSPENSION (ML) NASAL DAILY
Status: DISCONTINUED | OUTPATIENT
Start: 2022-11-04 | End: 2022-11-08 | Stop reason: HOSPADM

## 2022-11-04 RX ORDER — MAGNESIUM HYDROXIDE 1200 MG/15ML
LIQUID ORAL AS NEEDED
Status: DISCONTINUED | OUTPATIENT
Start: 2022-11-04 | End: 2022-11-04 | Stop reason: HOSPADM

## 2022-11-04 RX ORDER — FENTANYL CITRATE 50 UG/ML
INJECTION, SOLUTION INTRAMUSCULAR; INTRAVENOUS AS NEEDED
Status: DISCONTINUED | OUTPATIENT
Start: 2022-11-04 | End: 2022-11-04

## 2022-11-04 RX ORDER — HYDROMORPHONE HCL/PF 1 MG/ML
0.5 SYRINGE (ML) INJECTION
Status: DISCONTINUED | OUTPATIENT
Start: 2022-11-04 | End: 2022-11-04 | Stop reason: HOSPADM

## 2022-11-04 RX ORDER — SODIUM CHLORIDE 9 MG/ML
INJECTION, SOLUTION INTRAVENOUS CONTINUOUS PRN
Status: DISCONTINUED | OUTPATIENT
Start: 2022-11-04 | End: 2022-11-04

## 2022-11-04 RX ORDER — ROCURONIUM BROMIDE 10 MG/ML
INJECTION, SOLUTION INTRAVENOUS AS NEEDED
Status: DISCONTINUED | OUTPATIENT
Start: 2022-11-04 | End: 2022-11-04

## 2022-11-04 RX ORDER — CEFAZOLIN SODIUM 1 G/3ML
INJECTION, POWDER, FOR SOLUTION INTRAMUSCULAR; INTRAVENOUS AS NEEDED
Status: DISCONTINUED | OUTPATIENT
Start: 2022-11-04 | End: 2022-11-04

## 2022-11-04 RX ORDER — LABETALOL HYDROCHLORIDE 5 MG/ML
INJECTION, SOLUTION INTRAVENOUS AS NEEDED
Status: DISCONTINUED | OUTPATIENT
Start: 2022-11-04 | End: 2022-11-04

## 2022-11-04 RX ORDER — DIPHENHYDRAMINE HYDROCHLORIDE 50 MG/ML
12.5 INJECTION INTRAMUSCULAR; INTRAVENOUS ONCE AS NEEDED
Status: DISCONTINUED | OUTPATIENT
Start: 2022-11-04 | End: 2022-11-04 | Stop reason: HOSPADM

## 2022-11-04 RX ORDER — FLUTICASONE PROPIONATE 44 UG/1
2 AEROSOL, METERED RESPIRATORY (INHALATION) 2 TIMES DAILY
Status: DISCONTINUED | OUTPATIENT
Start: 2022-11-04 | End: 2022-11-08 | Stop reason: HOSPADM

## 2022-11-04 RX ORDER — ONDANSETRON 2 MG/ML
4 INJECTION INTRAMUSCULAR; INTRAVENOUS EVERY 4 HOURS PRN
Status: DISCONTINUED | OUTPATIENT
Start: 2022-11-04 | End: 2022-11-08 | Stop reason: HOSPADM

## 2022-11-04 RX ORDER — ONDANSETRON 2 MG/ML
4 INJECTION INTRAMUSCULAR; INTRAVENOUS ONCE AS NEEDED
Status: DISCONTINUED | OUTPATIENT
Start: 2022-11-04 | End: 2022-11-04 | Stop reason: HOSPADM

## 2022-11-04 RX ORDER — DULOXETIN HYDROCHLORIDE 20 MG/1
20 CAPSULE, DELAYED RELEASE ORAL DAILY
Status: DISCONTINUED | OUTPATIENT
Start: 2022-11-04 | End: 2022-11-08 | Stop reason: HOSPADM

## 2022-11-04 RX ORDER — SODIUM CHLORIDE, SODIUM LACTATE, POTASSIUM CHLORIDE, CALCIUM CHLORIDE 600; 310; 30; 20 MG/100ML; MG/100ML; MG/100ML; MG/100ML
125 INJECTION, SOLUTION INTRAVENOUS CONTINUOUS
Status: DISCONTINUED | OUTPATIENT
Start: 2022-11-04 | End: 2022-11-05

## 2022-11-04 RX ORDER — CEFAZOLIN SODIUM 2 G/50ML
2000 SOLUTION INTRAVENOUS ONCE
Status: DISCONTINUED | OUTPATIENT
Start: 2022-11-04 | End: 2022-11-04 | Stop reason: HOSPADM

## 2022-11-04 RX ORDER — PANTOPRAZOLE SODIUM 40 MG/10ML
40 INJECTION, POWDER, LYOPHILIZED, FOR SOLUTION INTRAVENOUS
Status: DISCONTINUED | OUTPATIENT
Start: 2022-11-04 | End: 2022-11-05

## 2022-11-04 RX ORDER — ROPIVACAINE HYDROCHLORIDE 2 MG/ML
INJECTION, SOLUTION EPIDURAL; INFILTRATION; PERINEURAL AS NEEDED
Status: DISCONTINUED | OUTPATIENT
Start: 2022-11-04 | End: 2022-11-04

## 2022-11-04 RX ORDER — HYDROMORPHONE HCL/PF 1 MG/ML
SYRINGE (ML) INJECTION AS NEEDED
Status: DISCONTINUED | OUTPATIENT
Start: 2022-11-04 | End: 2022-11-04

## 2022-11-04 RX ORDER — SODIUM CHLORIDE, SODIUM LACTATE, POTASSIUM CHLORIDE, CALCIUM CHLORIDE 600; 310; 30; 20 MG/100ML; MG/100ML; MG/100ML; MG/100ML
INJECTION, SOLUTION INTRAVENOUS CONTINUOUS PRN
Status: DISCONTINUED | OUTPATIENT
Start: 2022-11-04 | End: 2022-11-04

## 2022-11-04 RX ORDER — DEXAMETHASONE SODIUM PHOSPHATE 10 MG/ML
INJECTION, SOLUTION INTRAMUSCULAR; INTRAVENOUS AS NEEDED
Status: DISCONTINUED | OUTPATIENT
Start: 2022-11-04 | End: 2022-11-04

## 2022-11-04 RX ORDER — ACETAMINOPHEN 325 MG/1
650 TABLET ORAL EVERY 6 HOURS PRN
Status: DISCONTINUED | OUTPATIENT
Start: 2022-11-04 | End: 2022-11-06

## 2022-11-04 RX ORDER — GABAPENTIN 300 MG/1
300 CAPSULE ORAL 3 TIMES DAILY
Status: DISCONTINUED | OUTPATIENT
Start: 2022-11-04 | End: 2022-11-08 | Stop reason: HOSPADM

## 2022-11-04 RX ORDER — ROPIVACAINE HYDROCHLORIDE 2 MG/ML
INJECTION, SOLUTION EPIDURAL; INFILTRATION; PERINEURAL CONTINUOUS PRN
Status: DISCONTINUED | OUTPATIENT
Start: 2022-11-04 | End: 2022-11-04

## 2022-11-04 RX ORDER — ONDANSETRON 2 MG/ML
INJECTION INTRAMUSCULAR; INTRAVENOUS AS NEEDED
Status: DISCONTINUED | OUTPATIENT
Start: 2022-11-04 | End: 2022-11-04

## 2022-11-04 RX ORDER — LIDOCAINE HYDROCHLORIDE 10 MG/ML
INJECTION, SOLUTION EPIDURAL; INFILTRATION; INTRACAUDAL; PERINEURAL AS NEEDED
Status: DISCONTINUED | OUTPATIENT
Start: 2022-11-04 | End: 2022-11-04

## 2022-11-04 RX ORDER — ALBUMIN, HUMAN INJ 5% 5 %
SOLUTION INTRAVENOUS CONTINUOUS PRN
Status: DISCONTINUED | OUTPATIENT
Start: 2022-11-04 | End: 2022-11-04

## 2022-11-04 RX ORDER — HEPARIN SODIUM 5000 [USP'U]/ML
5000 INJECTION, SOLUTION INTRAVENOUS; SUBCUTANEOUS EVERY 8 HOURS SCHEDULED
Status: DISCONTINUED | OUTPATIENT
Start: 2022-11-04 | End: 2022-11-08 | Stop reason: HOSPADM

## 2022-11-04 RX ORDER — ALBUTEROL SULFATE 2.5 MG/3ML
2.5 SOLUTION RESPIRATORY (INHALATION) ONCE AS NEEDED
Status: DISCONTINUED | OUTPATIENT
Start: 2022-11-04 | End: 2022-11-04 | Stop reason: HOSPADM

## 2022-11-04 RX ORDER — MIDAZOLAM HYDROCHLORIDE 2 MG/2ML
INJECTION, SOLUTION INTRAMUSCULAR; INTRAVENOUS AS NEEDED
Status: DISCONTINUED | OUTPATIENT
Start: 2022-11-04 | End: 2022-11-04

## 2022-11-04 RX ADMIN — HEPARIN SODIUM 5000 UNITS: 5000 INJECTION INTRAVENOUS; SUBCUTANEOUS at 21:54

## 2022-11-04 RX ADMIN — SUGAMMADEX 200 MG: 100 INJECTION, SOLUTION INTRAVENOUS at 09:38

## 2022-11-04 RX ADMIN — HYDROMORPHONE HYDROCHLORIDE 0.5 MG: 1 INJECTION, SOLUTION INTRAMUSCULAR; INTRAVENOUS; SUBCUTANEOUS at 09:59

## 2022-11-04 RX ADMIN — EPHEDRINE SULFATE 5 MG: 50 INJECTION INTRAVENOUS at 09:23

## 2022-11-04 RX ADMIN — FENTANYL CITRATE 100 MCG: 50 INJECTION INTRAMUSCULAR; INTRAVENOUS at 08:46

## 2022-11-04 RX ADMIN — CEFAZOLIN 2000 MG: 1 INJECTION, POWDER, FOR SOLUTION INTRAMUSCULAR; INTRAVENOUS at 08:00

## 2022-11-04 RX ADMIN — LABETALOL HYDROCHLORIDE 10 MG: 5 INJECTION, SOLUTION INTRAVENOUS at 09:55

## 2022-11-04 RX ADMIN — LIDOCAINE HYDROCHLORIDE 50 MG: 10 INJECTION, SOLUTION EPIDURAL; INFILTRATION; INTRACAUDAL; PERINEURAL at 07:46

## 2022-11-04 RX ADMIN — HYDROMORPHONE HYDROCHLORIDE 0.5 MG: 1 INJECTION, SOLUTION INTRAMUSCULAR; INTRAVENOUS; SUBCUTANEOUS at 10:20

## 2022-11-04 RX ADMIN — PHENYLEPHRINE HYDROCHLORIDE 20 MCG/MIN: 10 INJECTION INTRAVENOUS at 08:24

## 2022-11-04 RX ADMIN — HYDROMORPHONE HYDROCHLORIDE 0.5 MG: 1 INJECTION, SOLUTION INTRAMUSCULAR; INTRAVENOUS; SUBCUTANEOUS at 08:32

## 2022-11-04 RX ADMIN — SODIUM CHLORIDE: 0.9 INJECTION, SOLUTION INTRAVENOUS at 07:50

## 2022-11-04 RX ADMIN — GABAPENTIN 300 MG: 300 CAPSULE ORAL at 17:17

## 2022-11-04 RX ADMIN — HYDROMORPHONE HYDROCHLORIDE 0.5 MG: 1 INJECTION, SOLUTION INTRAMUSCULAR; INTRAVENOUS; SUBCUTANEOUS at 10:05

## 2022-11-04 RX ADMIN — ALBUMIN HUMAN: 0.05 INJECTION, SOLUTION INTRAVENOUS at 07:56

## 2022-11-04 RX ADMIN — HYDROMORPHONE HYDROCHLORIDE 0.5 MG: 1 INJECTION, SOLUTION INTRAMUSCULAR; INTRAVENOUS; SUBCUTANEOUS at 21:53

## 2022-11-04 RX ADMIN — MIDAZOLAM 2 MG: 1 INJECTION INTRAMUSCULAR; INTRAVENOUS at 07:15

## 2022-11-04 RX ADMIN — EPHEDRINE SULFATE 5 MG: 50 INJECTION INTRAVENOUS at 08:08

## 2022-11-04 RX ADMIN — HEPARIN SODIUM 5000 UNITS: 5000 INJECTION INTRAVENOUS; SUBCUTANEOUS at 17:18

## 2022-11-04 RX ADMIN — SODIUM CHLORIDE: 0.9 INJECTION, SOLUTION INTRAVENOUS at 09:40

## 2022-11-04 RX ADMIN — FENTANYL CITRATE 100 MCG: 50 INJECTION, SOLUTION INTRAMUSCULAR; INTRAVENOUS at 07:43

## 2022-11-04 RX ADMIN — Medication: at 10:15

## 2022-11-04 RX ADMIN — LABETALOL HYDROCHLORIDE 5 MG: 5 INJECTION, SOLUTION INTRAVENOUS at 09:49

## 2022-11-04 RX ADMIN — DEXAMETHASONE SODIUM PHOSPHATE 10 MG: 10 INJECTION, SOLUTION INTRAMUSCULAR; INTRAVENOUS at 07:47

## 2022-11-04 RX ADMIN — ROCURONIUM BROMIDE 20 MG: 50 INJECTION INTRAVENOUS at 08:53

## 2022-11-04 RX ADMIN — EPHEDRINE SULFATE 5 MG: 50 INJECTION INTRAVENOUS at 08:28

## 2022-11-04 RX ADMIN — PROPOFOL 200 MG: 10 INJECTION, EMULSION INTRAVENOUS at 07:46

## 2022-11-04 RX ADMIN — ROPIVACAINE HYDROCHLORIDE 6 ML/HR: 2 INJECTION, SOLUTION EPIDURAL; INFILTRATION; PERINEURAL at 09:13

## 2022-11-04 RX ADMIN — HYDROMORPHONE HYDROCHLORIDE 0.5 MG: 1 INJECTION, SOLUTION INTRAMUSCULAR; INTRAVENOUS; SUBCUTANEOUS at 10:07

## 2022-11-04 RX ADMIN — FENTANYL CITRATE 100 MCG: 50 INJECTION INTRAMUSCULAR; INTRAVENOUS at 07:46

## 2022-11-04 RX ADMIN — ROCURONIUM BROMIDE 50 MG: 50 INJECTION INTRAVENOUS at 07:47

## 2022-11-04 RX ADMIN — ROPIVACAINE HYDROCHLORIDE 10 ML: 2 INJECTION, SOLUTION EPIDURAL; INFILTRATION at 08:29

## 2022-11-04 RX ADMIN — ACETAMINOPHEN 650 MG: 325 TABLET ORAL at 20:06

## 2022-11-04 RX ADMIN — GABAPENTIN 300 MG: 300 CAPSULE ORAL at 21:54

## 2022-11-04 RX ADMIN — ONDANSETRON 4 MG: 2 INJECTION INTRAMUSCULAR; INTRAVENOUS at 09:19

## 2022-11-04 RX ADMIN — SODIUM CHLORIDE, SODIUM LACTATE, POTASSIUM CHLORIDE, AND CALCIUM CHLORIDE 125 ML/HR: .6; .31; .03; .02 INJECTION, SOLUTION INTRAVENOUS at 12:15

## 2022-11-04 RX ADMIN — SODIUM CHLORIDE, SODIUM LACTATE, POTASSIUM CHLORIDE, AND CALCIUM CHLORIDE: .6; .31; .03; .02 INJECTION, SOLUTION INTRAVENOUS at 07:05

## 2022-11-04 RX ADMIN — LABETALOL HYDROCHLORIDE 5 MG: 5 INJECTION, SOLUTION INTRAVENOUS at 08:49

## 2022-11-04 RX ADMIN — ROPIVACAINE HYDROCHLORIDE 5 ML: 2 INJECTION, SOLUTION EPIDURAL; INFILTRATION at 08:42

## 2022-11-04 NOTE — QUICK NOTE
Postoperative evaluation:    81-year-old female with liver metastasis, now status past post aborted liver ablation with findings several capsule liver Mets and increase in the size of dominant mets  Doing well postoperatively  Patient's pain is well controlled  She denies nausea and vomiting  She is tolerating a clear liquid diet  She denies shortness of breath  Abdomen is soft, nondistended, appropriately tender incision covered with dressing was small area of serosanguineous drainage        Plan  Clear liquid diet  IV fluids  Follow-up palliative care consult  DVT prophylaxis  Continue PCEA for pain control      Siobhan Vazquez MD  2:29 PM  11/04/22

## 2022-11-04 NOTE — ANESTHESIA POSTPROCEDURE EVALUATION
Post-Op Assessment Note    CV Status:  Stable  Pain Score: 10    Multimodal analgesia used between 6 hours prior to anesthesia start to PACU discharge    Mental Status:  Alert and awake   Hydration Status:  Stable   PONV Controlled:  None   Airway Patency:  Patent      Post Op Vitals Reviewed: Yes      Staff: CRNA, Anesthesiologist   Comments: Patient in PACU, airway patent, VSS  C/o abdominal pain, epidural restarted and receiving pain meidicne IVP  Post-op block assessment: no complications      No complications documented      BP   157/85 (116)   Temp   98 1   Pulse   79   Resp   12   SpO2   95% on RA

## 2022-11-04 NOTE — H&P
H&P Exam - Demetrios Apley 71 y o  female MRN: [id number]    Unit/Bed#: OR POOL Encounter: [id number]    Surgical Oncology Consultation     [address] [facility]  [address] [facility] [address]     Patient:  Demetrios Apley  1950  [id number]     Primary Care provider:  Javier Osman MD  [address] [address]     Referring provider:  Germaine Fox MD  [address]     Diagnoses and all orders for this visit:     Liver metastases [facility])     Malignant neoplasm of both ovaries [facility])  -     Ambulatory referral to Surgical Oncology               Chief Complaint   Patient presents with   • Consult         No follow-ups on file          Oncology History   Ovarian cancer ([facility] [address] )   8/6/2021 Surgery     PROMISE/BSO/tumor debulking to optimal cytoreduction      8/25/2021 Initial Diagnosis     Ovarian cancer ([facility] [address] )      9/16/2021 -  Cancer Staged     Staging form: Ovary, Fallopian Tube, Primary Peritoneal, AJCC 8th Edition  - Clinical: FIGO Stage IIIC (cT3c) - Signed by Germaine Fox MD on 9/16/2021  Stage prefix: Initial diagnosis  Cancer antigen 125 () (U/mL): 543         9/28/2021 - 1/11/2022 Chemotherapy     Carbo AUC6, Taxol 175mg/m2 q3 weeks  Avastin 15mg/m2 added cycle 3     Toxicities:  Cycle4: carbo AUC 5 for neutropenia, added neulasta      10/2021 Genomic Testing     SEAN high  CPS >2      12/9/2021 Genetic Testing     POLD VUS      2/22/2022 -  Chemotherapy     Maintenance: Avastin 15mg/m2  Olaparib 300mg BID     Toxicity:  3/28/22: dose-reduce avastin to 10 mg/kg every 21 days as well as dose-reduction of olaparib to 150 mg AM and 300 mg PM  5/9/22: decrease olaparib to 200mg BID  6/7/2022: stopped avastin for bone pain               History of Present Illness  :   70 yo female with metastatic ovarian ca  Stage IIIC after TAHBSO debulking 8/2021   Adjuvant chemo completed and currently on maintenance PARP-I  Was also on avastin but this was d/c'ed for bone pain  Liver metastases were noted on her cross-sectional imaging obtained in the postoperative setting  Her  and liver metastases were responsive to treatment in the adjuvant setting, these were closely monitored  Since she has been on maintenance therapy, there has been interval progression of her liver metastases  These have increased in size, however, there are no new lesions  Her  recently became elevated  She has no symptoms at this time  She has no abdominal pain, no nausea vomiting, no early satiety, no weight loss, no headache, no current bone pain  Her PARP inhibitors currently held due to recent COVID infection  She has not returned to work in the last year since her surgery, however, she is active in increasing her daily activity with dog walks      Review of Systems  Complete ROS Surg Onc:   Constitutional: The patient denies new or recent history of general fatigue, no recent weight loss, no change in appetite  Eyes: No complaints of visual problems, no scleral icterus  ENT: No complaints of ear pain, no hoarseness, no difficulty swallowing,  no tinnitus and no new masses in head, oral cavity, or neck  Cardiovascular: No complaints of chest pain, no palpitations, no ankle edema  Respiratory: No complaints of shortness of breath, no cough  Gastrointestinal: No complaints of jaundice, no bloody stools, no pale stools  Genitourinary: No complaints of dysuria, no hematuria, no nocturia, no frequent urination, no urethral discharge  Musculoskeletal: No complaints of weakness, paralysis, joint stiffness or arthralgias  Integumentary: No complaints of rash, no new lesions  Neurological: No complaints of convulsions, no seizures, no dizziness  Hematologic/Lymphatic: No complaints of easy bruising  Endocrine:  No hot or cold intolerance  No polydipsia, polyphagia, or polyuria    Allergy/immunology:  No environmental allergies  No food allergies    Not immunocompromised             Patient Active Problem List   Diagnosis   • Hypertension   • Migraine   • Cancer related pain   • Unspecified protein-calorie malnutrition (HCC)   • S/P bladder repair   • Status post small bowel resection   • S/P splenectomy   • Hyponatremia   • Ovarian cancer ([address] )   • Encounter for central line care   • Drug induced constipation   • Palliative care patient   • Joint pain following chemotherapy   • Anxiousness   • Insomnia due to medical condition   • Chemotherapy induced neutropenia (HCC)   • Chemotherapy-induced peripheral neuropathy (HCC)   • Esophageal reflux   • Neoplastic malignant related fatigue   • Oral mucositis (ulcerative) due to antineoplastic therapy   • Sciatica of left side   • Personal history of COVID-19   • Liver metastases [facility])      Medical History        Past Medical History:   Diagnosis Date   • Acid reflux     • Asthma     • Cancer (HCC)       ovarian, peritoneal carcinomatosis   • GERD (gastroesophageal reflux disease)     • Hypercholesteremia     • Hypertension     • Migraine     • Ovarian cancer [facility])           Surgical History         Past Surgical History:   Procedure Laterality Date   • APPENDECTOMY N/A 8/6/2021     Procedure: APPENDECTOMY;  Surgeon: Saulo Magana MD;  [facility];  Service: Gynecology Oncology   • CHOLECYSTECTOMY       • COLONOSCOPY       • FL CYSTOGRAM   8/18/2021   • GALLBLADDER SURGERY       • HYSTERECTOMY N/A 8/6/2021     Procedure: ENBLOCK HYSTERECTOMY, BILATERAL SALPINGO-OOPHORECTOMY, SIGMOIDECTOMY WITH LOW RECTAL REANASTAMOSIS, BLADDER PERITONEAL STRIPPING AND CYSTOTOMY REPAIR, DIAPHRAGM STRIPPING, SMALL BOWEL RESECTION WITH RE-ANASTAMOSIS;  Surgeon: Saulo Magana MD;  [facility];  Service: Gynecology Oncology   • IR ASPIRATION ONLY   8/31/2021   • IR DRAINAGE TUBE CHECK AND/OR REMOVAL   9/17/2021   • IR DRAINAGE TUBE CHECK/CHANGE/REPOSITION/REINSERTION/UPSIZE   8/27/2021   • IR DRAINAGE TUBE CHECK/CHANGE/REPOSITION/REINSERTION/UPSIZE   9/3/2021   • IR DRAINAGE TUBE PLACEMENT   8/18/2021   • IR PORT PLACEMENT   9/17/2021   • IR THORACENTESIS   8/18/2021   • IR THORACENTESIS   9/3/2021   • LAPAROTOMY N/A 8/6/2021     Procedure: LAPAROTOMY EXPLORATORY; OVER SEW PANCREAS TAIL;  Surgeon: Magdy Lozano MD;  [facility];  Service: Gynecology Oncology   • OMENTECTOMY N/A 8/6/2021     Procedure: RADICAL OMENTECTOMY;  Surgeon: Magdy Lozano MD;  [facility];  Service: Gynecology Oncology   • PA LAP,DIAGNOSTIC ABDOMEN N/A 8/6/2021     Procedure: LAPAROSCOPY DIAGNOSTIC;  Surgeon: Magdy Lozano MD;  [facility];  Service: Gynecology Oncology   • RIGHT OOPHORECTOMY   1986   • SPLENECTOMY, TOTAL N/A 8/6/2021     Procedure: SPLENECTOMY;  Surgeon: Magdy Lozano MD;  [facility];  Service: Gynecology Oncology   • TONSILLECTOMY             Family History   No family history on file  Social History               Socioeconomic History   • Marital status: /Civil Union       Spouse name: Not on file   • Number of children: Not on file   • Years of education: Not on file   • Highest education level: Not on file   Occupational History   • Not on file   Tobacco Use   • Smoking status: Never Smoker   • Smokeless tobacco: Never Used   Vaping Use   • Vaping Use: Never used   Substance and Sexual Activity   • Alcohol use: Yes       Comment: socially   • Drug use: Not Currently   • Sexual activity: Not Currently   Other Topics Concern   • Not on file   Social History Narrative     Patient has been  to Benedict Mcqueen for over 50 years  They have one 37yo daughter  Patient has 3 brothers and 1 sister; she had lost 2 other sisters   Family is supportive        Social Determinants of Health      Financial Resource Strain: Not on file   Food Insecurity: Not on file   Transportation Needs: Not on file   Physical Activity: Not on file   Stress: Not on file   Social Connections: Not on file   Intimate Partner Violence: Not on file   Housing Stability: Not on file            Current Outpatient Medications:   •  acetaminophen (TYLENOL) 325 mg tablet, Take 3 tablets (975 mg total) by mouth every 8 (eight) hours, Disp: , Rfl:   •  al mag oxide-diphenhydramine-lidocaine viscous (MAGIC MOUTHWASH) 1:1:1 suspension, Swish and spit 10 mL every 4 (four) hours as needed for mouth pain or discomfort, Disp: 300 mL, Rfl: 0  •  albuterol (PROVENTIL HFA,VENTOLIN HFA) 90 mcg/act inhaler, Inhale 2 puffs every 6 (six) hours as needed for wheezing, Disp: 18 g, Rfl: 1  •  amLODIPine-benazepril (LOTREL) 10-20 MG per capsule, Take 1 capsule by mouth daily, Disp: , Rfl:   •  benzonatate (TESSALON) 200 MG capsule, Take 1 capsule (200 mg total) by mouth 2 (two) times a day as needed for cough, Disp: 20 capsule, Rfl: 0  •  butalbital-aspirin-caffeine (FIORINAL) -40 mg per capsule, Take 1 capsule by mouth every 4 (four) hours as needed for headaches or migraine, Disp: 30 capsule, Rfl: 0  •  dextromethorphan-guaiFENesin (ROBITUSSIN DM)  mg/5 mL syrup, Take 10 mL by mouth 3 (three) times a day as needed for cough, Disp: 237 mL, Rfl: 1  •  docusate sodium (COLACE) 100 mg capsule, Take 1 capsule (100 mg total) by mouth 2 (two) times a day, Disp: , Rfl:   •  DULoxetine (CYMBALTA) 20 mg capsule, Take 1 capsule (20 mg total) by mouth daily, Disp: 30 capsule, Rfl: 2  •  fluticasone (FLONASE) 50 mcg/act nasal spray, 1 spray into each nostril daily, Disp: , Rfl:   •  fluticasone (Flovent HFA) 44 mcg/act inhaler, Inhale 2 puffs 2 (two) times a day Rinse mouth after use , Disp: 10 6 g, Rfl: 0  •  gabapentin (Neurontin) 300 mg capsule, Take 1 capsule (300 mg total) by mouth 3 (three) times a day, Disp: 270 capsule, Rfl: 0  •  ibuprofen (MOTRIN) 600 mg tablet, Take 1 tablet (600 mg total) by mouth every 6 (six) hours, Disp: 30 tablet, Rfl: 0  •  lidocaine (Lidoderm) 5 %, Apply 1 patch topically daily Remove & Discard patch within 12 hours - apply to painful area, Disp: 30 patch, Rfl: 0  •  Liniments (Blue-Emu Super Strength) CREA, Apply topically as needed (joint pain), Disp: , Rfl:   •  LORazepam (ATIVAN) 1 mg tablet, Take 0 5 tablets (0 5 mg total) by mouth every 8 (eight) hours as needed (nausea or anxiety or insomnia), Disp: 45 tablet, Rfl: 0  •  naloxone (NARCAN) 4 mg/0 1 mL nasal spray, 0 1 mL (4 mg total) by Alternating Nares route every 3 (three) minutes as needed (accidental opioid overdose or respiratory depression), Disp: 1 each, Rfl: 1  •  Naproxen Sodium 220 MG CAPS, Take 220 mg by mouth daily 2 caps AM, Disp: , Rfl:   •  olaparib (LYNPARZA) tablet, Take 2 tablets PO BID, Disp: 120 tablet, Rfl: 5  •  omeprazole (PriLOSEC) 20 mg delayed release capsule, Take 20 mg by mouth 2 (two) times a day, Disp: , Rfl:   •  ondansetron (ZOFRAN) 8 mg tablet, Take 1 tablet (8 mg total) by mouth every 8 (eight) hours as needed for nausea or vomiting, Disp: 20 tablet, Rfl: 1  •  oxyCODONE (OxyCONTIN) 10 mg 12 hr tablet, Take 1 tablet (10 mg total) by mouth every 12 (twelve) hours Max Daily Amount: 20 mg, Disp: 60 tablet, Rfl: 0  •  oxyCODONE (Roxicodone) 5 immediate release tablet, Take 1 tablet (5 mg total) by mouth every 4 (four) hours as needed for moderate pain or severe pain Max Daily Amount: 30 mg, Disp: 180 tablet, Rfl: 0  •  polyethylene glycol (MiraLax) 17 GM/SCOOP powder, Mix with 64 oz Gatorade, begin 4 PM day before surgery per bowel prep instructions  , Disp: 238 g, Rfl: 0  •  pravastatin (PRAVACHOL) 10 mg tablet, Take 10 mg by mouth daily, Disp: , Rfl:   •  AMLODIPINE BENZOATE PO, Take by mouth  , Disp: , Rfl:   •  Paxlovid, 300/100, tablet therapy pack, , Disp: , Rfl:   No current facility-administered medications for this visit         Allergies   Allergen Reactions   • Erythromycin Nausea Only   • Morphine Headache             Vitals:     09/20/22 1303   BP: 142/90   Pulse: 100   Resp: 18   Temp: (!) 97 3 °F (36 3 °C)   SpO2: 100%         Physical Exam   General: Appears well, appears stated age  Skin: Warm, anicteric  HEENT: Normocephalic, atraumatic; sclera aniceteric, mucous membranes moist; cervical nodes without adenopathy  Cardiopulmonary: RRR, Easy WOB, no BLE edema  Abd: Flat and soft, nontender, no masses appreciated, no hepatosplenomegaly  Incision from sternum to pubis  MSK: Symmetric, no cyanosis, no overt weakness  Lymphatic: No cervical, axillary or inguinal lymphadenopathy  Neuro: Affect appropriate, no gross motor abnormalities        Labs: Reviewed in EPIC     Imaging  MRI abdomen w wo contrast     Result Date: 9/20/2022  Narrative: MRI - ABDOMEN - WITH AND WITHOUT CONTRAST INDICATION: 71 years / Female  C56 3: Malignant neoplasm of bilateral ovaries  COMPARISON: CT 9/9/2022  MRI 6/20/2022  TECHNIQUE:  The following pulse sequences were obtained:  axial T1 weighted in/out of phase images, multiplanar T2 weighted images, axial DWI/ADC, pre-contrast axial T1 with fat saturation and dynamic multiphase post-contrast fat suppressed T1 weighted images    IV Contrast:  8 mL of Gadobutrol injection (SINGLE-DOSE) FINDINGS: LOWER CHEST:   Unremarkable  LIVER: Normal in size and configuration  As noted on a recent CT, there has been interval progression of hepatic metastatic disease with the largest lesion now measuring 5 6 x 3 1 cm, on MRI of 6/20/2022 this measured 2 7 x 1 3 cm  Additional lesions have likewise increased in size  The hepatic veins and portal veins are patent  BILE DUCTS: No intrahepatic or extrahepatic bile duct dilation  GALLBLADDER:  Normal  PANCREAS:  Unremarkable  ADRENAL GLANDS:  Normal  SPLEEN:  Normal  KIDNEYS/PROXIMAL URETERS: No hydroureteronephrosis  No suspicious renal mass  BOWEL:   No dilated loops of bowel  PERITONEUM/RETROPERITONEUM: No mass  No ascites  LYMPH NODES: No abdominal lymphadenopathy  VASCULAR STRUCTURES:  No aneurysm  ABDOMINAL WALL:  Unremarkable   OSSEOUS STRUCTURES:  No suspicious osseous lesion       Impression: Interval progression of hepatic metastatic disease as noted on CT of 9/9/2022  Workstation performed: [id number]      CT chest abdomen pelvis w contrast     Result Date: 9/14/2022  Narrative: CT CHEST, ABDOMEN AND PELVIS WITH IV CONTRAST INDICATION:   C56 3: Malignant neoplasm of bilateral ovaries  COMPARISON:  CT chest 7/6/2022; MRI abdomen pelvis 6/20/2022; CT chest abdomen pelvis 4/1/2022 TECHNIQUE: CT examination of the chest, abdomen and pelvis was performed  Axial, sagittal, and coronal 2D reformatted images were created from the source data and submitted for interpretation  Radiation dose length product (DLP) for this visit:  845 98 mGy-cm   This examination, like all CT scans performed in the [facility], was performed utilizing techniques to minimize radiation dose exposure, including the use of iterative  reconstruction and automated exposure control  IV Contrast:  85 mL of iohexol (OMNIPAQUE) Enteric Contrast: Enteric contrast was administered  FINDINGS: CHEST LUNGS:  Stable small likely postinflammatory posterior left lower lobe subpleural 6 mm nodule again noted  Lungs are otherwise clear  There is no tracheal or endobronchial lesion  PLEURA:  Unremarkable  HEART/GREAT VESSELS: Heart is unremarkable for patient's age  No thoracic aortic aneurysm  MEDIASTINUM AND SABRINA:  Unremarkable  CHEST WALL AND LOWER NECK:  Implantable right chest wall port again noted with catheter entering the internal jugular vein and terminating at the cavoatrial junction  ABDOMEN LIVER/BILIARY TREE:  Again identified are multiple hepatic metastasis which have increased in size when compared to the MRI dated 6/20/2022    These include a peripheral segment 4A lesion (series 2 image 50) measuring 3 3 x 5 3 cm (previously 1 9 x 1 2 cm), a segment 7 lesion (series 2 image 46) measuring 2 6 x 2 5 cm (previously 0 9 cm), and a posterior segment 5 lesion (series 2 image 52) measuring 1 4 x 1 3 cm (not seen previously)  The other previously identified subcentimeter lesions in segment 6  and 7 are not currently identified  GALLBLADDER:  Gallbladder is surgically absent  SPLEEN:  There has been prior splenectomy  No suspicious abnormality in the splenectomy space  PANCREAS:  Stable postoperative change reflecting resection of the pancreatic tail  ADRENAL GLANDS:  Unremarkable  KIDNEYS/URETERS:  Unremarkable  No hydronephrosis  STOMACH AND BOWEL:  Unremarkable  APPENDIX:  There are expected postoperative changes of appendectomy  ABDOMINOPELVIC CAVITY:  No ascites  No pneumoperitoneum  No lymphadenopathy  VESSELS:  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  Abdominal aorta and inferior vena cava are normal in course and caliber  Mild atherosclerotic change present  PELVIS REPRODUCTIVE ORGANS:  Status post PROMISE/BSO  URINARY BLADDER:  Unremarkable  ABDOMINAL WALL/INGUINAL REGIONS:  Unremarkable  OSSEOUS STRUCTURES:  No acute fracture or destructive osseous lesion       Impression: 1  Interim increase in size of hepatic metastasis as noted when compared to the prior MRI dated 6/20/2022  2   Stable postoperative change in the abdomen and pelvis as noted  3   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  4   Additional stable findings as noted  The study was marked in EPIC for significant notification  Workstation performed: [id number]         I independently reviewed and interpreted the above laboratory and imaging data, including gynecologic oncology operative notes, consultation, chemotherapy plan, CT scans, MRIs  I have discussed this patient with Dr Dionicio Lara         Discussion/Summary: This is a 78-year-old female with ovarian cancer metastatic to the liver  She underwent optimal debulking over a year ago now, and completed adjuvant therapy    She is currently on maintenance therapy, and his whole hand has oligometastatic disease that is progressing in terms of size but not in the number of her liver lesions  I have reviewed her films and discussed with my partners in believe that we can render her liver disease free with a combination of resection and ablation  I discussed this procedure with the patient today  Due to her previous debulking and involvement of the hemidiaphragms and peritoneum, her risks are increased for bleeding and infection and perioperative complications due to scar formation  We discussed the use of ultrasound and determine age of intraoperative liver health as well as the size of the lesions to determine resection versus ablation  We discussed the risk of postoperative temporary or permanent liver failure  We also discussed the risks of bleeding, infection, damage to nearby structures, mi, PE, pneumonia, death  She is in good health and will continue to increase her physical activity leading up to surgery  We discussed the postoperative course  Again the patient is an above average risk due to her surgical history  She and her  expressed understanding and would like to proceed  All questions answered

## 2022-11-04 NOTE — OP NOTE
OPERATIVE REPORT  PATIENT NAME: Sorin Miranda    :  1950  MRN: [id number]  Pt Location: [facility] OR ROOM 15    SURGERY DATE: 2022    Surgeon(s) and Role:     * Ernestina Mosher MD - Primary     * MARTIN Herrera-TORY - Assisting     * Sherif Varner MD - Co-surgeon    Preop Diagnosis:  Liver metastases ([address] ) [C78 7]    Post-Op Diagnosis Codes:     * Liver metastases ([address] ) [C78 7]    Procedure(s) (LRB):  LAPAROTOMY EXPLORATORY WITH ULTRASOUND GUIDANCE (N/A)    Specimen(s):  ID Type Source Tests Collected by Time Destination   1 : Segment 6 Liver Segment Tissue Liver TISSUE EXAM Ernestina Mosher MD 2022 0910        Estimated Blood Loss:   75 mL    Drains:  Closed/Suction Drain LLQ 19 Fr  (Active)   Number of days: 455       Urethral Catheter Latex 16 Fr  (Active)   Number of days: 0       Anesthesia Type:   General w/ Epidural    Operative Indications:  Liver metastases ([address] ) [C78 7]    Pt with what appeared to be stable liver-only ovarian cancer metastases    Operative Findings:  Dense adhesive disease; several capsular liver mets and significant increase in size of dominant mets; also with evidence of fatty liver    Complications:   None    Procedure and Technique:  The patient was identified in the operating suite and general endotracheal intubation achieved  All lines were placed by Anesthesia  A Paulino was placed  All pressure points were padded and SCDs placed  The abdomen was prepped and draped in the usual sterile fashion  Time-out was performed  Utilizing the left upper quadrant at styles's point, a stab incision was made and the 0 degree 5 mm scope introduced  Upon visual inspection, it became clear that dense adhesive disease would not allow for inspection of the abdomen  This portion of the procedure was thus concluded  A right subcostal incision was then made sharply and carried down through the anterior-posterior fascia    The abdomen was entered at the superior aspect of the abdomen, and again dense adhesive disease to the liver was detected  This was taken down carefully sharply and with electrocautery where appropriate  At the inferior aspect of the liver, there was also dense omental and colon adhesive disease  This was also taken down carefully with sharp dissection and electrocautery were appropriate people  Right upper quadrant adhesions to the liver were taken down with electrocautery with careful retraction  It then became evident that the liver appeared quite fatty  Likewise, the previously detected on imaging Mets appeared to be on palpation much larger than suggested by imaging  Finally, there appeared to be several small subcapsular metastatic lesions  A surface implant was resected with electrocautery and sent for frozen analysis  This ultimately returned as a granuloma  The ultrasound was brought into the field  The segment 4A lesion, previously measuring 5 3 cm, now measured closed to 9 cm  The segment 7 lesion, previously measuring 2 6 cm now measured close to 5 5 cm  Likewise, throughout the liver parenchyma, both on the surface and deep within the parenchyma of both the right and left lobes were several 3-4 mm lesions consistent with metastatic disease  In conferring with my partner Dr Trent Victoria who was present, the decision was made to conclude the case  The liver did not appear to be very healthy, it did not appear that her left liver could support a right hepatectomy, there were several small lesions within the left lobe of the liver, and she had progressed significantly in the right liver  The abdomen was copiously irrigated and found to be hemostatic  The fascia was then closed in 2 layers with 1  PDS  The skin was closed with staples  The patient tolerated the procedure well       I was present for the entire procedure, A co-surgeon was required because of skills and techniques relevant to speciality and A physician assistant was required during the procedure for retraction tissue handling,dissection and suturing    Patient Disposition:  PACU         SIGNATURE: Lars Piedra MD  DATE: November 4, 2022  TIME: 10:30 AM

## 2022-11-04 NOTE — CONSULTS
Consultation - Palliative and Supportive Care   Zaina Carvalho 71 y o  female [id number]    Patient Active Problem List   Diagnosis   • Hypertension   • Migraine   • Cancer related pain   • Unspecified protein-calorie malnutrition (HCC)   • S/P bladder repair   • Status post small bowel resection   • S/P splenectomy   • Hyponatremia   • Ovarian cancer ([facility] )   • Encounter for central line care   • Drug induced constipation   • Palliative care patient   • Joint pain following chemotherapy   • Anxiousness   • Insomnia due to medical condition   • Chemotherapy induced neutropenia (HCC)   • Chemotherapy-induced peripheral neuropathy (HCC)   • Esophageal reflux   • Neoplastic malignant related fatigue   • Oral mucositis (ulcerative) due to antineoplastic therapy   • Sciatica of left side   • Personal history of COVID-19   • Liver metastases ([facility] )     Active issues specifically addressed today include:   Ovarian cancer with liver metastases  POD 0- exploratory laparotomy  Cancer associated pain  Anxiety due to health   Insomnia   Malignant fatigue   Palliative care patient      Plan:  1  Symptom management -    -Pain:  Currently being managed with epidural by APS  Will plan to work with acute pain to determine timing of removal of epidural and at that time palliative will take on pain management     -patient's home regimen is OxyContin 10 mg b i d  with oxycodone p r n    -Anxiety:  Continue home dose of duloxetine and lorazepam     2  Goals - full cares, no limits  - 5 wishes completed and is on file  - In short, patient wishes to continue with life prolonging care and efforts to maximize functional independence so long as treatments are tolerated  - She wishes to remain full code for this time but would not want prolonged life support  May choose to readdress code status if her condition changes  3  Psychosocial support - time spent providing supportive listening       4   PSC follow-up- will continue closely follow       Code Status:  Full - Level 1   Decisional apparatus:  Patient is competent on my exam today  If competence is lost, patient's substitute decision maker would default to spouse, Tyrel Brochure [place] 169  Advance Directive / Living Will / POLST:  Yes, 5 wishes in chart and reviewed     I have reviewed the patient's controlled substance dispensing history in the Prescription Drug Monitoring Program in compliance with the [facility] regulations before prescribing any controlled substances  We appreciate the invitation to be involved in this patient's care  We will follow  Please do not hesitate to reach our on call provider through our clinic answering service at  should you have acute symptom control concerns  [address] [employer] Service: [phone]  You can find me on [email or web address]! IDENTIFICATION:  Inpatient consult to Palliative Care  Consult performed by: Leyda Nevarez DO  Consult ordered by: Rangel Zimmer MD        Physician Requesting Consult: Hien Mayorga MD  Reason for Consult / Principal Problem:  Cancer pain, known to palliative  Hx and PE limited by:  None    HISTORY OF PRESENT ILLNESS:       Nola Barahona is a 71 y o  female who presented today for elective surgery  Patient is known to palliative care as she follows with our team for symptoms related to metastatic ovarian cancer  Patient was first diagnosed in August of 2021 with staging at that time of 3 C and she went underwent a PROMISE BSO with debulking at that time  She has completed adjuvant chemotherapy and has been on maintenance therapy since  Imaging demonstrated increased size of liver metastases and patient was seen by Surgical Oncology with plan for operative intervention for liver resection  Patient went to the operating room today for exploratory laparotomy with ultrasound guidance    Liver metastases noted to be much larger than imaging had demonstrated furthermore it was noted that patient had fatty liver and given that the liver did not appear healthy it was decided to end the case without further intervention  Patient currently with epidural in place with ropivacaine and fentanyl infusing at a continuous rate of 10 mL an hour and a PCEA dose of 5 mL  Patient reports feeling well, epidural is effective  She is appreciative of palliative support both inpatient and outpatient  Andressa Zamudio is understandably disappointed regarding the progression of disease and limitation of intervention due to scar tissue  However, she eagerly awaits consultation with Dr Amandeep Carpio to discuss next steps  Ideally Andressa Zamudio would like to return to work in [employer], but is realistic that this may be limited by her need to continue chemo for disease control  She is pleased to still have the ability to do house work but needs to take frequent naps and breaks  She is very supported by her spouse, children, and sister  Review of Systems   Constitutional: Positive for decreased appetite (intermittent)  Respiratory: Negative for shortness of breath  Musculoskeletal: Negative for back pain  Gastrointestinal: Positive for abdominal pain (mild, RUQ)  Negative for nausea  Genitourinary: Negative  Neurological: Negative  Psychiatric/Behavioral: The patient has insomnia (intermittent)  All other systems reviewed and are negative        Past Medical History:   Diagnosis Date   • Acid reflux    • Asthma    • Cancer (HCC)     ovarian, peritoneal carcinomatosis   • GERD (gastroesophageal reflux disease)    • Hypercholesteremia    • Hypertension    • Migraine    • Ovarian cancer [facility])      Past Surgical History:   Procedure Laterality Date   • APPENDECTOMY N/A 8/6/2021    Procedure: APPENDECTOMY;  Surgeon: Yelena Anderson MD;  Location: [facility] MAIN OR;  Service: Gynecology Oncology   • CHOLECYSTECTOMY     • COLONOSCOPY     • FL CYSTOGRAM  8/18/2021   • GALLBLADDER SURGERY     • HYSTERECTOMY N/A 8/6/2021    Procedure: ENBLOCK HYSTERECTOMY, BILATERAL SALPINGO-OOPHORECTOMY, SIGMOIDECTOMY WITH LOW RECTAL REANASTAMOSIS, BLADDER PERITONEAL STRIPPING AND CYSTOTOMY REPAIR, DIAPHRAGM STRIPPING, SMALL BOWEL RESECTION WITH RE-ANASTAMOSIS;  Surgeon: Pardeep Watson MD;  Location: [facility] MAIN OR;  Service: Gynecology Oncology   • IR ASPIRATION ONLY  8/31/2021   • IR DRAINAGE TUBE CHECK AND/OR REMOVAL  9/17/2021   • IR DRAINAGE TUBE CHECK/CHANGE/REPOSITION/REINSERTION/UPSIZE  8/27/2021   • IR DRAINAGE TUBE CHECK/CHANGE/REPOSITION/REINSERTION/UPSIZE  9/3/2021   • IR DRAINAGE TUBE PLACEMENT  8/18/2021   • IR PORT PLACEMENT  9/17/2021   • IR THORACENTESIS  8/18/2021   • IR THORACENTESIS  9/3/2021   • LAPAROTOMY N/A 8/6/2021    Procedure: LAPAROTOMY EXPLORATORY; OVER SEW PANCREAS TAIL;  Surgeon: Pardeep Watson MD;  Location: [facility] MAIN OR;  Service: Gynecology Oncology   • OMENTECTOMY N/A 8/6/2021    Procedure: RADICAL OMENTECTOMY;  Surgeon: Pardeep Watson MD;  Location: [facility] MA[facility] OR;  Service: Gynecology Oncology   • WA LAP,DIAGNOSTIC ABDOMEN N/A 8/6/2021    Procedure: LAPAROSCOPY DIAGNOSTIC;  Surgeon: Pardeep Watson MD;  Location: [facility] MA[facility] OR;  Service: Gynecology Oncology   • RIGHT OOPHORECTOMY  1986   • SPLENECTOMY, TOTAL N/A 8/6/2021    Procedure: SPLENECTOMY;  Surgeon: Pardeep Watson MD;  Location: [facility] MA[facility] OR;  Service: Gynecology Oncology   • TONSILLECTOMY       Social History     Socioeconomic History   • Marital status: /Civil Union     Spouse name: Not on file   • Number of children: Not on file   • Years of education: Not on file   • Highest education level: Not on file   Occupational History   • Not on file   Tobacco Use   • Smoking status: Never Smoker   • Smokeless tobacco: Never Used   Vaping Use   • Vaping Use: Never used   Substance and Sexual Activity   • Alcohol use: Yes     Comment: socially   • Drug use: Yes     Frequency: 7 0 times per week     Types: Marijuana     Comment: edible • Sexual activity: Not Currently   Other Topics Concern   • Not on file   Social History Narrative    Patient has been  to [place] for over 50 years  They have one 35yo daughter  Patient has 3 brothers and 1 sister; she had lost 2 other sisters  Family is supportive  Social Determinants of Health     Financial Resource Strain: Not on file   Food Insecurity: Not on file   Transportation Needs: Not on file   Physical Activity: Not on file   Stress: Not on file   Social Connections: Not on file   Intimate Partner Violence: Not on file   Housing Stability: Not on file     History reviewed  No pertinent family history  MEDICATIONS / ALLERGIES:    all current active meds have been reviewed and current meds:   Current Facility-Administered Medications   Medication Dose Route Frequency   • albuterol (PROVENTIL HFA,VENTOLIN HFA) inhaler 2 puff  2 puff Inhalation Q6H PRN   • DULoxetine (CYMBALTA) delayed release capsule 20 mg  20 mg Oral Daily   • fluticasone (FLONASE) 50 mcg/act nasal spray 1 spray  1 spray Nasal Daily   • fluticasone (FLOVENT HFA) 44 mcg/act inhaler 2 puff  2 puff Inhalation BID   • gabapentin (NEURONTIN) capsule 300 mg  300 mg Oral TID   • heparin (porcine) subcutaneous injection 5,000 Units  5,000 Units Subcutaneous Q8H [address]   • HYDROmorphone (DILAUDID) injection 0 5 mg  0 5 mg Intravenous Q2H PRN   • lactated ringers infusion  125 mL/hr Intravenous Continuous   • lactated ringers infusion  125 mL/hr Intravenous Continuous   • ondansetron (ZOFRAN) injection 4 mg  4 mg Intravenous Q4H PRN   • pantoprazole (PROTONIX) injection 40 mg  40 mg Intravenous Q24H ANA MARIA   • ropivacaine 0 1% and fentaNYL 2 mcg/mL PCEA   Epidural Continuous       Allergies   Allergen Reactions   • Erythromycin Nausea Only   • Morphine Headache       OBJECTIVE:    Physical Exam  Physical Exam  HENT:      Head: Normocephalic and atraumatic     Eyes:      Conjunctiva/sclera: Conjunctivae normal    Cardiovascular:      Rate and Rhythm: Normal rate  Pulmonary:      Effort: No respiratory distress  Abdominal:      Tenderness: There is no guarding  Musculoskeletal:         General: No swelling  Skin:     General: Skin is warm and dry  Neurological:      General: No focal deficit present  Mental Status: She is alert and oriented to person, place, and time  Mental status is at baseline  Psychiatric:         Mood and Affect: Mood normal          Behavior: Behavior normal          Thought Content: Thought content normal          Judgment: Judgment normal          Lab Results: I have personally reviewed pertinent labs  , CBC: No results found for: WBC, HGB, HCT, MCV, PLT, ADJUSTEDWBC, MCH, MCHC, RDW, MPV, NRBC, CMP: No results found for: SODIUM, K, CL, CO2, ANIONGAP, BUN, CREATININE, GLUCOSE, CALCIUM, AST, ALT, ALKPHOS, PROT, BILITOT, EGFR, PT/PTT:No results found for: PT, PTT  Imaging Studies:  Reviewed on PACS  EKG, Pathology, and Other Studies:  Reviewed    Counseling / Coordination of Care    Total floor / unit time spent today 45 minutes  Greater than 50% of total time was spent with the patient and / or family counseling and / or coordination of care  A description of the counseling / coordination of care:  Chart review, medication review, symptom assessment, supportive listening,  Portions of this document may have been created using dictation software and as such some "sound alike" terms may have been generated by the system  Do not hesitate to contact me with any questions or clarifications

## 2022-11-04 NOTE — ANESTHESIA PROCEDURE NOTES
Epidural Block    Start time: 11/4/2022 7:24 AM  Staffing  Performed: Anesthesiologist   Anesthesiologist: Joseph Agarwal MD  Preanesthetic Checklist  Completed: patient identified, IV checked, site marked, risks and benefits discussed, surgical consent, monitors and equipment checked, pre-op evaluation and timeout performed  Epidural  Patient position: sitting  Prep: Betadine  Patient monitoring: heart rate and continuous pulse ox  Approach: midline  Location: lumbar  Injection technique: BRISEYDA saline  Needle  Needle type: Tuohy   Needle gauge: 18 G  Catheter type: side hole  Catheter size: 20 G  Catheter securement method: clear occlusive dressing  Test dose: negative  Assessment  Number of attempts: 1negative aspiration for CSF, negative aspiration for heme and no paresthesia on injection  patient tolerated the procedure well with no immediate complications

## 2022-11-04 NOTE — ANESTHESIA PROCEDURE NOTES
Arterial Line Insertion  Performed by: Oskar Crocker CRNA  Authorized by: Cheryle Kells, MD   Consent: Verbal consent obtained  Written consent obtained  Risks and benefits: risks, benefits and alternatives were discussed  Consent given by: patient  Patient understanding: patient states understanding of the procedure being performed  Patient consent: the patient's understanding of the procedure matches consent given  Procedure consent: procedure consent matches procedure scheduled  Relevant documents: relevant documents present and verified  Required items: required blood products, implants, devices, and special equipment available  Patient identity confirmed: arm band and hospital-assigned identification number  Preparation: Patient was prepped and draped in the usual sterile fashion    Indications: hemodynamic monitoring  Orientation:  Right  Location: radial artery  Procedure Details:  Mauricio's test normal: yes  Needle gauge: 20  Number of attempts: 1    Post-procedure:  Post-procedure: dressing applied  Waveform: good waveform  Post-procedure CNS: unchanged  Patient tolerance: Patient tolerated the procedure well with no immediate complications and patient tolerated the procedure well with no immediate complications

## 2022-11-05 LAB
ALBUMIN SERPL BCP-MCNC: 3.7 G/DL (ref 3.5–5)
ALP SERPL-CCNC: 144 U/L (ref 46–116)
ALT SERPL W P-5'-P-CCNC: 130 U/L (ref 12–78)
ANION GAP SERPL CALCULATED.3IONS-SCNC: 8 MMOL/L (ref 4–13)
AST SERPL W P-5'-P-CCNC: 89 U/L (ref 5–45)
BASOPHILS # BLD AUTO: 0.07 THOUSANDS/ÂΜL (ref 0–0.1)
BASOPHILS NFR BLD AUTO: 1 % (ref 0–1)
BILIRUB SERPL-MCNC: 0.67 MG/DL (ref 0.2–1)
BUN SERPL-MCNC: 8 MG/DL (ref 5–25)
CALCIUM SERPL-MCNC: 9.5 MG/DL (ref 8.3–10.1)
CHLORIDE SERPL-SCNC: 104 MMOL/L (ref 96–108)
CO2 SERPL-SCNC: 25 MMOL/L (ref 21–32)
CREAT SERPL-MCNC: 0.62 MG/DL (ref 0.6–1.3)
EOSINOPHIL # BLD AUTO: 0.17 THOUSAND/ÂΜL (ref 0–0.61)
EOSINOPHIL NFR BLD AUTO: 1 % (ref 0–6)
ERYTHROCYTE [DISTWIDTH] IN BLOOD BY AUTOMATED COUNT: 14.9 % (ref 11.6–15.1)
GFR SERPL CREATININE-BSD FRML MDRD: 91 ML/MIN/1.73SQ M
GLUCOSE SERPL-MCNC: 118 MG/DL (ref 65–140)
HCT VFR BLD AUTO: 34.8 % (ref 34.8–46.1)
HGB BLD-MCNC: 11.4 G/DL (ref 11.5–15.4)
IMM GRANULOCYTES # BLD AUTO: 0.06 THOUSAND/UL (ref 0–0.2)
IMM GRANULOCYTES NFR BLD AUTO: 0 % (ref 0–2)
LYMPHOCYTES # BLD AUTO: 2.78 THOUSANDS/ÂΜL (ref 0.6–4.47)
LYMPHOCYTES NFR BLD AUTO: 20 % (ref 14–44)
MCH RBC QN AUTO: 33 PG (ref 26.8–34.3)
MCHC RBC AUTO-ENTMCNC: 32.8 G/DL (ref 31.4–37.4)
MCV RBC AUTO: 101 FL (ref 82–98)
MONOCYTES # BLD AUTO: 1.94 THOUSAND/ÂΜL (ref 0.17–1.22)
MONOCYTES NFR BLD AUTO: 14 % (ref 4–12)
NEUTROPHILS # BLD AUTO: 9.13 THOUSANDS/ÂΜL (ref 1.85–7.62)
NEUTS SEG NFR BLD AUTO: 64 % (ref 43–75)
NRBC BLD AUTO-RTO: 1 /100 WBCS
PLATELET # BLD AUTO: 374 THOUSANDS/UL (ref 149–390)
PMV BLD AUTO: 10.3 FL (ref 8.9–12.7)
POTASSIUM SERPL-SCNC: 3.5 MMOL/L (ref 3.5–5.3)
PROT SERPL-MCNC: 7.9 G/DL (ref 6.4–8.4)
RBC # BLD AUTO: 3.45 MILLION/UL (ref 3.81–5.12)
SODIUM SERPL-SCNC: 137 MMOL/L (ref 135–147)
WBC # BLD AUTO: 14.15 THOUSAND/UL (ref 4.31–10.16)

## 2022-11-05 RX ORDER — OXYCODONE HCL 10 MG/1
10 TABLET, FILM COATED, EXTENDED RELEASE ORAL EVERY 12 HOURS SCHEDULED
Status: DISCONTINUED | OUTPATIENT
Start: 2022-11-05 | End: 2022-11-08 | Stop reason: HOSPADM

## 2022-11-05 RX ORDER — DEXTROSE AND SODIUM CHLORIDE 5; .9 G/100ML; G/100ML
100 INJECTION, SOLUTION INTRAVENOUS CONTINUOUS
Status: DISCONTINUED | OUTPATIENT
Start: 2022-11-05 | End: 2022-11-06

## 2022-11-05 RX ORDER — POTASSIUM CHLORIDE 20 MEQ/1
40 TABLET, EXTENDED RELEASE ORAL ONCE
Status: COMPLETED | OUTPATIENT
Start: 2022-11-05 | End: 2022-11-05

## 2022-11-05 RX ORDER — OXYCODONE HYDROCHLORIDE 5 MG/1
5 TABLET ORAL
Status: DISCONTINUED | OUTPATIENT
Start: 2022-11-05 | End: 2022-11-05

## 2022-11-05 RX ORDER — OXYCODONE HYDROCHLORIDE 5 MG/1
5 TABLET ORAL
Status: DISCONTINUED | OUTPATIENT
Start: 2022-11-05 | End: 2022-11-06

## 2022-11-05 RX ORDER — PANTOPRAZOLE SODIUM 40 MG/1
40 TABLET, DELAYED RELEASE ORAL
Status: DISCONTINUED | OUTPATIENT
Start: 2022-11-06 | End: 2022-11-08 | Stop reason: HOSPADM

## 2022-11-05 RX ORDER — BUTALBITAL, ACETAMINOPHEN AND CAFFEINE 50; 325; 40 MG/1; MG/1; MG/1
1 TABLET ORAL EVERY 4 HOURS PRN
Status: DISCONTINUED | OUTPATIENT
Start: 2022-11-05 | End: 2022-11-08 | Stop reason: HOSPADM

## 2022-11-05 RX ADMIN — HEPARIN SODIUM 5000 UNITS: 5000 INJECTION INTRAVENOUS; SUBCUTANEOUS at 05:41

## 2022-11-05 RX ADMIN — Medication: at 08:03

## 2022-11-05 RX ADMIN — GABAPENTIN 300 MG: 300 CAPSULE ORAL at 08:23

## 2022-11-05 RX ADMIN — OXYCODONE HYDROCHLORIDE 5 MG: 5 TABLET ORAL at 20:04

## 2022-11-05 RX ADMIN — OXYCODONE HYDROCHLORIDE 10 MG: 10 TABLET, FILM COATED, EXTENDED RELEASE ORAL at 12:48

## 2022-11-05 RX ADMIN — POTASSIUM CHLORIDE 40 MEQ: 1500 TABLET, EXTENDED RELEASE ORAL at 18:20

## 2022-11-05 RX ADMIN — OXYCODONE HYDROCHLORIDE 10 MG: 10 TABLET, FILM COATED, EXTENDED RELEASE ORAL at 22:13

## 2022-11-05 RX ADMIN — HEPARIN SODIUM 5000 UNITS: 5000 INJECTION INTRAVENOUS; SUBCUTANEOUS at 12:49

## 2022-11-05 RX ADMIN — GABAPENTIN 300 MG: 300 CAPSULE ORAL at 22:14

## 2022-11-05 RX ADMIN — HYDROMORPHONE HYDROCHLORIDE 0.5 MG: 1 INJECTION, SOLUTION INTRAMUSCULAR; INTRAVENOUS; SUBCUTANEOUS at 03:05

## 2022-11-05 RX ADMIN — DULOXETINE 20 MG: 20 CAPSULE, DELAYED RELEASE ORAL at 08:22

## 2022-11-05 RX ADMIN — INFLUENZA A VIRUS A/VICTORIA/2570/2019 IVR-215 (H1N1) ANTIGEN (FORMALDEHYDE INACTIVATED), INFLUENZA A VIRUS A/DARWIN/9/2021 SAN-010 (H3N2) ANTIGEN (FORMALDEHYDE INACTIVATED), INFLUENZA B VIRUS B/PHUKET/3073/2013 ANTIGEN (FORMALDEHYDE INACTIVATED), AND INFLUENZA B VIRUS B/MICHIGAN/01/2021 ANTIGEN (FORMALDEHYDE INACTIVATED) 0.7 ML: 60; 60; 60; 60 INJECTION, SUSPENSION INTRAMUSCULAR at 08:23

## 2022-11-05 RX ADMIN — HYDROMORPHONE HYDROCHLORIDE 0.5 MG: 1 INJECTION, SOLUTION INTRAMUSCULAR; INTRAVENOUS; SUBCUTANEOUS at 20:46

## 2022-11-05 RX ADMIN — GABAPENTIN 300 MG: 300 CAPSULE ORAL at 18:20

## 2022-11-05 RX ADMIN — FLUTICASONE PROPIONATE 1 SPRAY: 50 SPRAY, METERED NASAL at 08:32

## 2022-11-05 RX ADMIN — SODIUM CHLORIDE, SODIUM LACTATE, POTASSIUM CHLORIDE, AND CALCIUM CHLORIDE 125 ML/HR: .6; .31; .03; .02 INJECTION, SOLUTION INTRAVENOUS at 12:50

## 2022-11-05 RX ADMIN — HEPARIN SODIUM 5000 UNITS: 5000 INJECTION INTRAVENOUS; SUBCUTANEOUS at 22:14

## 2022-11-05 RX ADMIN — HYDROMORPHONE HYDROCHLORIDE 0.5 MG: 1 INJECTION, SOLUTION INTRAMUSCULAR; INTRAVENOUS; SUBCUTANEOUS at 07:46

## 2022-11-05 RX ADMIN — DEXTROSE AND SODIUM CHLORIDE 100 ML/HR: 5; .9 INJECTION, SOLUTION INTRAVENOUS at 18:19

## 2022-11-05 RX ADMIN — PANTOPRAZOLE SODIUM 40 MG: 40 INJECTION, POWDER, FOR SOLUTION INTRAVENOUS at 08:22

## 2022-11-05 NOTE — ADDENDUM NOTE
Addendum  created 11/05/22 1954 by Charles Lazcano MD    Clinical Note Signed, Order list changed, Pend clinical note

## 2022-11-05 NOTE — PROGRESS NOTES
Progress note - Palliative and Supportive Care   Jazmyne Gold 71 y o  female [id number]    Patient Active Problem List   Diagnosis   • Hypertension   • Migraine   • Cancer related pain   • Unspecified protein-calorie malnutrition (HCC)   • S/P bladder repair   • Status post small bowel resection   • S/P splenectomy   • Hyponatremia   • Ovarian cancer ([facility] [address] )   • Encounter for central line care   • Drug induced constipation   • Palliative care patient   • Joint pain following chemotherapy   • Anxiousness   • Insomnia due to medical condition   • Chemotherapy induced neutropenia (HCC)   • Chemotherapy-induced peripheral neuropathy (HCC)   • Esophageal reflux   • Neoplastic malignant related fatigue   • Oral mucositis (ulcerative) due to antineoplastic therapy   • Sciatica of left side   • Personal history of COVID-19   • Liver metastases ([facility] [place] [address] )     Active issues specifically addressed today include:   - cancer pain  - defer post-op pain to APS professionals  - OIC  - migraine    Plan:  1  Symptom management - reschedule home meds, now that pt is allowed a diet   - defer epidural management to APS team; discussed directly with Dr Jessica Sanders on the floor today  - Defer steroid dosing at this time  Will await post-surgical healing, and removal of epidural cath  Steroids may be helpful to reduce hepatic capsular distention by inflamed tumor, but steroids also inappropriately raise risk of infection, and reduce wound healing, in the current scenario  - Palliative and Supportive Care will continue to follow, and will help transition back to Dr Margarita brunson for pain management in outpatient setting    2  Goals - full cares, no limits   - pt alert, pleasant, competent, teachable and insightful today  Code Status: FULL - Level 1   Decisional apparatus:  Patient is competent on my exam today    If competence is lost, patient's substitute decision maker would default to  Vinod HannonCaitlin by [employer] on file    Advance Directive / Living Will / POLST:  Reviewed and inform our plan    Anna Barrett MD  Palliative and Supportive Care  Clinic/Answering Service: [phone]  You can find me on [employer]! Interval history:       Since last visit, she has been able to ambulate a short distance to toilet  Bowel and bladdder working well  Strength in legs unimpeded  Epidural appears to be controlling post-op pain well, but -- in bedside discussion with pt and Dr Den Flores of APS -- the epidural is likely too low to provide sensory level to cover the liver capsule  Agreed with pt that for pains and discomfort above her sensory level -- liver pleural pains and migraine -- we will use her usual home medicines to start    Additional meds may be helpful, available, but we will proceed proportionally at this time,    MEDICATIONS / ALLERGIES:     all current active meds have been reviewed and current meds:   Current Facility-Administered Medications   Medication Dose Route Frequency   • acetaminophen (TYLENOL) tablet 650 mg  650 mg Oral Q6H PRN   • albuterol (PROVENTIL HFA,VENTOLIN HFA) inhaler 2 puff  2 puff Inhalation Q6H PRN   • butalbital-acetaminophen-caffeine (FIORICET,ESGIC) -40 mg per tablet 1 tablet  1 tablet Oral Q4H PRN   • DULoxetine (CYMBALTA) delayed release capsule 20 mg  20 mg Oral Daily   • fluticasone (FLONASE) 50 mcg/act nasal spray 1 spray  1 spray Nasal Daily   • fluticasone (FLOVENT HFA) 44 mcg/act inhaler 2 puff  2 puff Inhalation BID   • gabapentin (NEURONTIN) capsule 300 mg  300 mg Oral TID   • heparin (porcine) subcutaneous injection 5,000 Units  5,000 Units Subcutaneous Q8H [facility]   • HYDROmorphone (DILAUDID) injection 0 5 mg  0 5 mg Intravenous Q2H PRN   • lactated ringers infusion  125 mL/hr Intravenous Continuous   • lactated ringers infusion  125 mL/hr Intravenous Continuous   • ondansetron (ZOFRAN) injection 4 mg  4 mg Intravenous Q4H PRN   • pantoprazole (PROTONIX) injection 40 mg  40 mg Intravenous Q24H [address]   • ropivacaine 0 1% and fentaNYL 2 mcg/mL PCEA   Epidural Continuous       Allergies   Allergen Reactions   • Erythromycin Nausea Only   • Morphine Headache       OBJECTIVE:    Physical Exam  Physical Exam  Constitutional:       General: She is not in acute distress  Appearance: She is well-developed  She is obese  She is not ill-appearing, toxic-appearing or diaphoretic  HENT:      Head: Normocephalic and atraumatic  Right Ear: External ear normal       Left Ear: External ear normal    Eyes:      General:         Right eye: No discharge  Left eye: No discharge  Conjunctiva/sclera: Conjunctivae normal       Pupils: Pupils are equal, round, and reactive to light  Neck:      Trachea: No tracheal deviation  Cardiovascular:      Rate and Rhythm: Normal rate and regular rhythm  Pulmonary:      Effort: Pulmonary effort is normal  No respiratory distress  Breath sounds: No stridor  Abdominal:      General: There is no distension  Palpations: Abdomen is soft  Comments: obese   Musculoskeletal:         General: No deformity  Cervical back: Normal range of motion  Skin:     General: Skin is warm and dry  Coloration: Skin is not pale  Findings: No erythema or rash  Comments: Post-surgical wounds not examined  Deferred exam of epidural site to Dr Aman Bedoya  Neurological:      General: No focal deficit present  Mental Status: She is alert and oriented to person, place, and time  Mental status is at baseline  Cranial Nerves: No cranial nerve deficit  Psychiatric:         Mood and Affect: Mood normal          Behavior: Behavior normal          Thought Content: Thought content normal          Judgment: Judgment normal          Lab Results: I have personally reviewed pertinent labs  , CBC: No results found for: WBC, HGB, HCT, MCV, PLT, ADJUSTEDWBC, MCH, MCHC, RDW, MPV, NRBC, CMP: No results found for: SODIUM, K, CL, CO2, ANIONGAP, BUN, CREATININE, GLUCOSE, CALCIUM, AST, ALT, ALKPHOS, PROT, BILITOT, EGFR, BMP:No results found for: SODIUM, K, CL, CO2, BUN, CREATININE, GLUC, GLUF, CALCIUM, AGAP, EGFR, PT/PTT:No results found for: PT, PTT  Imaging Studies: none new  EKG, Pathology, and Other Studies: none new    Counseling / Coordination of Care    Total floor / unit time spent today 30+ minutes  Greater than 50% of total time was spent with the patient and / or family counseling and / or coordination of care  A description of the counseling / coordination of care: chart review; symptom pursuit; supportive listening; anticipatory guidance

## 2022-11-05 NOTE — UTILIZATION REVIEW
Initial Clinical Review    Elective  Inpatient  surgical procedure  Age/Sex: 71 y o  female     Surgery Date: 11/4/22    Procedure: LAPAROTOMY EXPLORATORY WITH ULTRASOUND GUIDANCE     Anesthesia:  General     Operative Findings: Dense adhesive disease; several capsular liver mets and significant increase in size of dominant mets; also with evidence of fatty liver  Procedure and Technique:  The patient was identified in the operating suite and general endotracheal intubation achieved  All lines were placed by Anesthesia  A Paulino was placed  All pressure points were padded and SCDs placed  The abdomen was prepped and draped in the usual sterile fashion  Time-out was performed  Utilizing the left upper quadrant at styles's point, a stab incision was made and the 0 degree 5 mm scope introduced  Upon visual inspection, it became clear that dense adhesive disease would not allow for inspection of the abdomen  This portion of the procedure was thus concluded  A right subcostal incision was then made sharply and carried down through the anterior-posterior fascia  The abdomen was entered at the superior aspect of the abdomen, and again dense adhesive disease to the liver was detected  This was taken down carefully sharply and with electrocautery where appropriate  At the inferior aspect of the liver, there was also dense omental and colon adhesive disease  This was also taken down carefully with sharp dissection and electrocautery were appropriate people  Right upper quadrant adhesions to the liver were taken down with electrocautery with careful retraction  It then became evident that the liver appeared quite fatty  Likewise, the previously detected on imaging Mets appeared to be on palpation much larger than suggested by imaging  Finally, there appeared to be several small subcapsular metastatic lesions  A surface implant was resected with electrocautery and sent for frozen analysis    This ultimately returned as a granuloma  The ultrasound was brought into the field  The segment 4A lesion, previously measuring 5 3 cm, now measured closed to 9 cm  The segment 7 lesion, previously measuring 2 6 cm now measured close to 5 5 cm  Likewise, throughout the liver parenchyma, both on the surface and deep within the parenchyma of both the right and left lobes were several 3-4 mm lesions consistent with metastatic disease  In conferring with my partner Dr Mateo Ballard who was present, the decision was made to conclude the case  The liver did not appear to be very healthy, it did not appear that her left liver could support a right hepatectomy, there were several small lesions within the left lobe of the liver, and she had progressed significantly in the right liver  The abdomen was copiously irrigated and found to be hemostatic  The fascia was then closed in 2 layers with 1  PDS  The skin was closed with staples  The patient tolerated the procedure well           POD#1 Progress Note: 11/5/22 Epidural leaking overnight, APS to evaluate  She reports pain not well controlled requiring prn Dilaudid doses  + headache this am        Acute Pain Service  Consult - 11/5 -  POD 1 aborted liver ablation with findings several capsule liver metastases  She is s/p epidural for post op pain control  Dr Erika Ayala ordered long aqcting opioids to help coverher upper abd pain     Continue PCEA with current settings, OON & ambulate  Admission Orders: Date/Time/Statement:   Admission Orders (From admission, onward)     Ordered        11/04/22 1122  Inpatient Admission  Once                      Orders Placed This Encounter   Procedures   • Inpatient Admission     Standing Status:   Standing     Number of Occurrences:   1     Order Specific Question:   Level of Care     Answer:   Med Surg [16]     Order Specific Question:   Estimated length of stay     Answer:   Inpatient Only Surgery     Vital Signs: /75 (BP Location: Right arm) Pulse 91   Temp 97 7 °F (36 5 °C) (Temporal)   Resp 18   Ht 4' 11" (1 499 m)   Wt 76 7 kg (169 lb)   SpO2 93%   BMI 34 13 kg/m²   Date/Time Temp Pulse Resp BP MAP (mmHg) SpO2 Calculated FIO2 (%) - Nasal Cannula Nasal Cannula O2 Flow Rate (L/min) O2 Device Cardiac (WDL)   11/05/22 08:40:29 -- 91 -- -- -- 93 % -- -- -- --   11/05/22 0819 97 7 °F (36 5 °C) 88 18 157/75 -- -- -- -- -- --   11/05/22 0750 -- -- -- -- -- -- -- -- None (Room air) --   11/05/22 02:54:34 98 1 °F (36 7 °C) 87 18 166/92 117 97 % -- -- -- --   11/04/22 22:50:50 97 5 °F (36 4 °C) 86 18 167/94 118 96 % -- -- -- --   11/04/22 20:09:02 98 1 °F (36 7 °C) 81 -- 151/81 104 95 % -- -- -- --   11/04/22 19:09:45 97 3 °F (36 3 °C) Abnormal  84 18 135/70 92 97 % -- -- -- --   11/04/22 14:36:53 98 1 °F (36 7 °C) 88 -- 93/37 Abnormal  56 Abnormal  93 % -- -- -- --   11/04/22 13:01:20 97 7 °F (36 5 °C) 82 -- 111/65 80 98 % -- -- -- --   11/04/22 1200 -- 74 15 113/65 77 97 % -- -- -- --   11/04/22 1145 -- 80 21 121/69 88 99 % -- -- -- --   11/04/22 1130 -- 68 18 99/52 71 99 % -- -- -- WDL   11/04/22 1115 -- 70 18 109/54 77 97 % -- -- -- --   11/04/22 1100 98 °F (36 7 °C) 70 18 113/52 79 100 % -- -- -- WDL   11/04/22 1045 -- 80 15 125/59 84 94 % -- -- -- --   11/04/22 1030 -- 82 21 135/86 109 96 % -- -- -- --   11/04/22 1015 -- 76 18 157/85 89 100 % -- -- -- --   11/04/22 1007 98 1 °F (36 7 °C) 78 18 139/70 89 98 % 28 2 L/min Nasal cannula WDL       Pertinent Labs/Diagnostic Test Results:   No  Radiology orders to display       No EKG     Results from last 7 days   Lab Units 11/05/22  1221   WBC Thousand/uL 14 15*   HEMOGLOBIN g/dL 11 4*   HEMATOCRIT % 34 8   PLATELETS Thousands/uL 374   NEUTROS ABS Thousands/µL 9 13*         Results from last 7 days   Lab Units 11/05/22  1221   SODIUM mmol/L 137   POTASSIUM mmol/L 3 5   CHLORIDE mmol/L 104   CO2 mmol/L 25   ANION GAP mmol/L 8   BUN mg/dL 8   CREATININE mg/dL 0 62   EGFR ml/min/1 73sq m 91   CALCIUM mg/dL 9 5     Results from last 7 days   Lab Units 11/05/22  1221   AST U/L 89*   ALT U/L 130*   ALK PHOS U/L 144*   TOTAL PROTEIN g/dL 7 9   ALBUMIN g/dL 3 7   TOTAL BILIRUBIN mg/dL 0 67         Results from last 7 days   Lab Units 11/05/22  1221   GLUCOSE RANDOM mg/dL 118         Diet: Clear liquids no carbonation , ensure,  nutritional supplements     Mobility:  Up with assistance as tolerated     DVT Prophylaxis: SCD's to le's - Heparin sc q8     Medications/Pain Control:   Scheduled Medications:  DULoxetine, 20 mg, Oral, Daily  fluticasone, 1 spray, Nasal, Daily  fluticasone, 2 puff, Inhalation, BID  gabapentin, 300 mg, Oral, TID  heparin (porcine), 5,000 Units, Subcutaneous, Q8H ANA MARIA  oxyCODONE, 10 mg, Oral, Q12H [facility]  [START ON 11/6/2022] pantoprazole, 40 mg, Oral, Early Morning  Fluzone IM  Once - 11/5 x 1       Continuous IV Infusions:  lactated ringers, 125 mL/hr, Intravenous, Continuous  ropivacaine 0 1% and fentaNYL 2 mcg/mL, , Epidural, Continuous - PCEA       PRN Meds:  acetaminophen, 650 mg, Oral, Q6H PRN-11/ 4 x 1   albuterol, 2 puff, Inhalation, Q6H PRN  butalbital-acetaminophen-caffeine, 1 tablet, Oral, Q4H PRN  HYDROmorphone, 0 5 mg, Intravenous, Q2H PRN-11/4 x 1 - 11/5 x 2   ondansetron, 4 mg, Intravenous, Q4H PRN  oxyCODONE, 5 mg, Oral, Q3H PRN        Network Utilization Review Department  ATTENTION: Please call with any questions or concerns to [phone] and carefully listen to the prompts so that you are directed to the right person  All voicemails are confidential   Giovani Morales all requests for admission clinical reviews, approved or denied determinations and any other requests to dedicated fax number below belonging to the campus where the patient is receiving treatment   List of dedicated fax numbers for the Facilities:  FACILITY NAME UR FAX NUMBER   ADMISSION DENIALS (Administrative/Medical Necessity) [address] [facility] (Maternity/NICU/Pediatrics) [phone]   [facility] [address] [facility] [phone]   [facility] [phone]   [address] [place] [address] [facility] [place] [address] [phone] [address] Randi Guillen [facility] 134 815 [facility] [phone]

## 2022-11-05 NOTE — QUICK NOTE
Called to bedside to evaluate the epidural site  Patient states that all day her epidural has been leaking, leaving her back soaked  Patient stated that her pain relief was adequate and that she felt as if the epidural was working properly  She was assisted to the sitting position and I was able to immediately observe that under the tegaderm and tape, that there was fluid accumulation and that some of that fluid was leaking around the taped perimeter of the sterile dressing  The epidural placement note stated that the catheter was secured at about 8cm however as the dressing was carefully being taken down, the approximate site for the 8cm marking on the catheter was noted to be out of the skin and taped in place  Lifting the securing sponge showed that the epidural catheter was completely out of the skin and the blue tip was under the sponge  The entire apparatus was removed and the nursing staff informed that the catheter must have come out earlier in the day  Patient's pain medications were reviewed and deemed to be appropriate for her level of pain relief  Since the epidural catheter was likely going to be discontinued tomorrow, it will likely not be replaced since her PRN meds are controlling her pain  Pain service to see the patient in the morning

## 2022-11-05 NOTE — UTILIZATION REVIEW
NOTIFICATION OF INPATIENT ADMISSION   AUTHORIZATION REQUEST   SERVICING FACILITY:   [facility]  Address: [address] [place] [address], [address] [place] [address]  Tax ID: [id number]  NPI: [id number] ATTENDING PROVIDER:  Attending Name and NPI#: Ernesto Montez Md [[id number]]  Address: [address] [place] [address], [address] [place] [address]  Phone: [phone]   ADMISSION INFORMATION:  Place of Service: [place] [address]  Place of Service Code: 21  Inpatient Admission Date/Time: 11/4/22 11:23 AM  Discharge Date/Time: No discharge date for patient encounter  Admitting Diagnosis Code/Description:  Liver metastases ([facility] [address] ) [[address] 7]     UTILIZATION REVIEW CONTACT:  Hortensia Harvey, Utilization   Network Utilization Review Department  Phone: [phone]  Fax: [phone]  Email: [email or web address]  Contact for approvals/pending authorizations, clinical reviews, and discharge  PHYSICIAN ADVISORY SERVICES:  Medical Necessity Denial & Ippw-to-Bcjp Review  Phone: [phone]  Fax: [phone]  Email: [email or web address]

## 2022-11-05 NOTE — PROGRESS NOTES
Progress Note - Surgical Oncology  Willaim Makenna 71 y o  female MRN: [id number]  Unit/Bed#: [facility] 916-01 Encounter: [id number]    Assessment:  46-year-old female with liver metastasis, now status past post aborted liver ablation with findings several capsule liver Mets and increase in the size of dominant mets on 11/4  Afebrile,VSS      UOP: 1 L    Plan:  Advance diet as tolerated  DC IVF  Appreciate palliative care recommendations  Epidural for pain control-APS to evaluate this morning  DVT px  Encourage out of bed and ambulation  PT/OT    Subjective/Objective     Subjective:   Epidural leaking overnight  APS to evaluate this morning  Patient's pain not well controlled requiring p r n  Dilaudid doses  Having headache this morning  Otherwise no nausea or vomiting not having bowel function and    Objective:    Blood pressure 167/94, pulse 86, temperature 97 5 °F (36 4 °C), resp  rate 18, height 4' 11" (1 499 m), weight 76 7 kg (169 lb), SpO2 96 %  ,Body mass index is 34 13 kg/m²  Intake/Output Summary (Last 24 hours) at 11/4/2022 2259  Last data filed at 11/4/2022 1549  Gross per 24 hour   Intake 2250 ml   Output 575 ml   Net 1675 ml       Invasive Devices  Report    Central Venous Catheter Line  Duration           Port A Cath Right Subclavian -- days          Peripheral Intravenous Line  Duration           Peripheral IV 11/04/22 Left Hand <1 day    Peripheral IV 11/04/22 Left Wrist <1 day    Peripheral IV 11/04/22 Right Hand <1 day          Epidural Line  Duration           Epidural Catheter 11/04/22 <1 day          Drain  Duration           Closed/Suction Drain LLQ 19 Fr  455 days                Physical Exam  Vitals reviewed  Constitutional:       General: She is not in acute distress  Appearance: She is not ill-appearing, toxic-appearing or diaphoretic  HENT:      Head: Normocephalic and atraumatic  Eyes:      Extraocular Movements: Extraocular movements intact     Cardiovascular:      Rate and Rhythm: Normal rate  Pulmonary:      Effort: Pulmonary effort is normal  No respiratory distress  Abdominal:      General: There is distension (Mild)  Palpations: Abdomen is soft  Tenderness: There is abdominal tenderness  There is no guarding or rebound  Comments: Incisions intact with dressing   Skin:     General: Skin is warm and dry  Neurological:      Mental Status: She is alert and oriented to person, place, and time     Psychiatric:         Mood and Affect: Mood normal          Behavior: Behavior normal                        Invalid input(s): LABGLOM

## 2022-11-05 NOTE — PROGRESS NOTES
Chente texted anesthesiology to let then know that the patients epidural is leaking  They will be here in the morning to see the patient

## 2022-11-05 NOTE — PROGRESS NOTES
The pantoprazole has been converted to Oral per [facility] IV-to-PO Auto-Conversion Protocol for Adults as approved by the Pharmacy and Therapeutics Committee  The patient met all eligible criteria:  3 Age = 25years old   2) Received at least one dose of the IV form   3) Receiving at least one other scheduled oral/enteral medication   4) Tolerating an oral/enteral diet   and did not have any exclusions:   1) Critical care patient   2) Active GI bleed (IF assessing H2RAs or PPIs)   3) Continuous tube feeding (IF assessing cipro, doxycycline, levofloxacin, minocycline, rifampin, or voriconazole)   4) Receiving PO vancomycin (IF assessing metronidazole)   5) Persistent nausea and/or vomiting   6) Ileus or gastrointestinal obstruction   7) Nish/nasogastric tube set for continuous suction   8) Specific order not to automatically convert to PO (in the order's comments or if discussed in the most recent Infectious Disease or primary team's progress notes)       Dori Kyle, PharmD, [address] [facility] Clinical Pharmacist  [phone] or via [facility] [place]

## 2022-11-05 NOTE — CASE MANAGEMENT
Case Management Assessment & Discharge Planning Note    Patient name Heriberto Estimable  Location [facility] 916/[facility] 745-01 MRN [id number]  : 1950 Date 2022       Current Admission Date: 2022  Current Admission Diagnosis:Liver metastases [facility])   Patient Active Problem List    Diagnosis Date Noted   • Liver metastases ([address] ) 2022   • Personal history of COVID-19 2022   • Sciatica of left side 2022   • Oral mucositis (ulcerative) due to antineoplastic therapy 2022   • Neoplastic malignant related fatigue 03/15/2022   • Chemotherapy-induced peripheral neuropathy ([address] ) 2022   • Chemotherapy induced neutropenia ([facility] ) 2021   • Palliative care patient 2021   • Joint pain following chemotherapy 2021   • Anxiousness 2021   • Insomnia due to medical condition 2021   • Drug induced constipation 10/13/2021   • Encounter for central line care 2021   • Hyponatremia 2021   • Ovarian cancer ([address] ) 2021   • S/P splenectomy 2021   • S/P bladder repair 2021   • Status post small bowel resection 2021   • Unspecified protein-calorie malnutrition ([address] ) 08/10/2021   • Migraine 2021   • Cancer related pain 2021   • Hypertension 2021   • Esophageal reflux 2013      LOS (days): 1  Geometric Mean LOS (GMLOS) (days): 3 30  Days to GMLOS:2 1     OBJECTIVE:    Risk of Unplanned Readmission Score: 10 94         Current admission status: Inpatient       Preferred Pharmacy:   [id number] Hector Zimmer, [phone]  [address] [place] [address] [address] [id number]  Phone: [phone] Fax: [phone] [facility], [place]  [facility] [place] [address] [place] [address] 210 [facility]  Phone: [phone] Fax: [phone]    Meagan Agarwal, [address] [place] [address]  Phone: [phone] Fax: [phone]    Primary Care Provider: Mila Blackwell MD    Primary Insurance: MEDICARE  Secondary Insurance: BLUE CROSS    ASSESSMENT:  [address] - Spouse   Primary Phone: [phone] [facility]  Home Phone: [phone]  Work Phone: [phone]                 Patient Information  Admitted from[de-identified] Home  Mental Status: Alert  During Assessment patient was accompanied by: Not accompanied during assessment  Assessment information provided by[de-identified] Patient  Primary Caregiver: Self  Support Systems: Spouse/significant other, Family members  [place] of Residence: [address] [place] [address] do you live in?: [address] entry access options   Select all that apply : No steps to enter home  Type of Current Residence: [facility]  In the last 12 months, was there a time when you were not able to pay the mortgage or rent on time?: No  In the last 12 months, how many places have you lived?: 1  In the last 12 months, was there a time when you did not have a steady place to sleep or slept in a shelter (including now)?: No  Homeless/housing insecurity resource given?: N/A  Living Arrangements: Lives w/ Spouse/significant other  Is patient a ?: No    Activities of Daily Living Prior to Admission  Functional Status: Independent  Completes ADLs independently?: Yes  Ambulates independently?: Yes  Does patient use assisted devices?: No  Does patient currently own DME?: Yes  What DME does the patient currently own?: [employer]  Does patient have a history of Outpatient Therapy (PT/OT)?: No  Does the patient have a history of Short-Term Rehab?: No  Does patient have a history of HHC?: Yes ([facility])  Does patient currently have [address]?: No         Patient Information Continued  Income Source: Employed (on [employer])  Does patient have prescription coverage?: Yes  Within the past 12 months, you worried that your food would run out before you got the money to buy more : Never true  Within the past 12 months, the food you bought just didn't last and you didn't have money to get more : Never true  Food insecurity resource given?: N/A  Does patient receive dialysis treatments?: No  Does patient have a history of substance abuse?: No  Does patient have a history of Mental Health Diagnosis?: No         Means of Transportation  Means of Transport to Appts[de-identified] Drives Self  In the past 12 months, has lack of transportation kept you from medical appointments or from getting medications?: No  In the past 12 months, has lack of transportation kept you from meetings, work, or from getting things needed for daily living?: No  Was application for public transport provided?: N/A        DISCHARGE DETAILS:    Discharge planning discussed with[de-identified] Pt  Freedom of Choice: Yes  Comments - Freedom of Choice: pending recs                                                                                      Additional Comments: Pt confirmed Modern[employer]

## 2022-11-05 NOTE — PLAN OF CARE
Problem: PAIN - ADULT  Goal: Verbalizes/displays adequate comfort level or baseline comfort level  Description: Interventions:  - Encourage patient to monitor pain and request assistance  - Assess pain using appropriate pain scale  - Administer analgesics based on type and severity of pain and evaluate response  - Implement non-pharmacological measures as appropriate and evaluate response  - Consider cultural and social influences on pain and pain management  - Notify physician/advanced practitioner if interventions unsuccessful or patient reports new pain  Outcome: Progressing     Problem: INFECTION - ADULT  Goal: Absence or prevention of progression during hospitalization  Description: INTERVENTIONS:  - Assess and monitor for signs and symptoms of infection  - Monitor lab/diagnostic results  - Monitor all insertion sites, i e  indwelling lines, tubes, and drains  - Monitor endotracheal if appropriate and nasal secretions for changes in amount and color  - [place] appropriate cooling/warming therapies per order  - Administer medications as ordered  - Instruct and encourage patient and family to use good hand hygiene technique  - Identify and instruct in appropriate isolation precautions for identified infection/condition  Outcome: Progressing     Problem: SAFETY ADULT  Goal: Patient will remain free of falls  Description: INTERVENTIONS:  - Educate patient/family on patient safety including physical limitations  - Instruct patient to call for assistance with activity   - Consult OT/PT to assist with strengthening/mobility   - Keep Call bell within reach  - Keep bed low and locked with side rails adjusted as appropriate  - Keep care items and personal belongings within reach  - Initiate and maintain comfort rounds  - Make Fall Risk Sign visible to staff  - Consider moving patient to room near nurses station  Outcome: Progressing

## 2022-11-05 NOTE — CONSULTS
Epidural Follow-up Note - Acute Pain Service    Jeanine Christopher 71 y o  female MRN: [id number]  Unit/Bed#: [facility] 916-01 Encounter: [id number]      Assessment:   Principal Problem:    Liver metastases ([facility] )  Active Problems:    Ovarian cancer ([facility] [address] )      Jeanine Christopher is a 71y o  year old female with liver metastasis, now POD 1 aborted liver ablation with findings several capsule liver metastases  Patient had an epidural placed for postoperative pain control  Today she states the epidural is working well however she has pain in the upper abdomen that is not covered by the epidural  Patient was seen in conjunction with Dr Tyron Jefferson of palliative care  Dr Tyron Jefferson will order patient's long acting opioids and resume her home pain regimen for hopeful coverage of her pain in the upper abdomen  Plan:  Analgesia:  - Continue PCEA with current settings, managing pain well and patient states demand boluses are helping  - Encourage OOB and ambulation  - Defer to palliative care team for management of chronic pain regimen    Pain History  Current pain location(s): Abdomen  Pain Scale:   3-6/10  Quality: Sharp, stabbing  24 hour history: See above    Meds/Allergies     Allergies   Allergen Reactions   • Erythromycin Nausea Only   • Morphine Headache       Objective     Temp:  [97 3 °F (36 3 °C)-98 1 °F (36 7 °C)] 97 7 °F (36 5 °C)  HR:  [74-91] 91  Resp:  [15-21] 18  BP: ()/(37-94) 157/75    Physical Exam  Vitals and nursing note reviewed  Constitutional:       Appearance: Normal appearance  She is normal weight  HENT:      Head: Normocephalic and atraumatic  Right Ear: External ear normal       Nose: Nose normal       Mouth/Throat:      Mouth: Mucous membranes are moist    Eyes:      Conjunctiva/sclera: Conjunctivae normal    Cardiovascular:      Rate and Rhythm: Normal rate and regular rhythm  Pulses: Normal pulses  Heart sounds: Normal heart sounds     Pulmonary:      Effort: Pulmonary effort is normal       Breath sounds: Normal breath sounds  Abdominal:      General: Bowel sounds are normal       Palpations: Abdomen is soft  Tenderness: There is abdominal tenderness  Musculoskeletal:         General: Normal range of motion  Cervical back: Normal range of motion and neck supple  Skin:     General: Skin is warm and dry  Neurological:      General: No focal deficit present  Mental Status: She is alert and oriented to person, place, and time  Mental status is at baseline  Psychiatric:         Mood and Affect: Mood normal        Epidural: Catheter in good position, site clean, not tender to palpation    Counseling / Coordination of Care  Total floor / unit time spent today 15 minutes  Greater than 50% of total time was spent with the patient and / or family counseling and / or coordination of care  Please note that the APS provides consultative services regarding pain management only  With the exception of ketamine, peripheral nerve catheters, and epidural infusions (and except when indicated), final decisions regarding starting or changing doses of analgesic medications are at the discretion of the consulting service  Off hours consultation and/or medication management is generally not available      Amarilis Urbano  Acute Pain Service

## 2022-11-05 NOTE — PHYSICAL THERAPY NOTE
Physical Therapy Evaluation    Patient's Name: Marizol Bergman    Admitting Diagnosis  Liver metastases [facility]) [C78 7]    Problem List  Patient Active Problem List   Diagnosis    Hypertension    Migraine    Cancer related pain    Unspecified protein-calorie malnutrition (HCC)    S/P bladder repair    Status post small bowel resection    S/P splenectomy    Hyponatremia    Ovarian cancer ([facility] )    Encounter for central line care    Drug induced constipation    Palliative care patient    Joint pain following chemotherapy    Anxiousness    Insomnia due to medical condition    Chemotherapy induced neutropenia (HCC)    Chemotherapy-induced peripheral neuropathy (HCC)    Esophageal reflux    Neoplastic malignant related fatigue    Oral mucositis (ulcerative) due to antineoplastic therapy    Sciatica of left side    Personal history of COVID-19    Liver metastases [facility])       Past Medical History  Past Medical History:   Diagnosis Date    Acid reflux     Asthma     Cancer ([place] [address] )     ovarian, peritoneal carcinomatosis    GERD (gastroesophageal reflux disease)     Hypercholesteremia     Hypertension     Migraine     Ovarian cancer ([place] [address] )        Past Surgical History  Past Surgical History:   Procedure Laterality Date    APPENDECTOMY N/A 8/6/2021    Procedure: APPENDECTOMY;  Surgeon: Ladarius Trinh MD;  [facility]: [facility];  Service: Gynecology Oncology    CHOLECYSTECTOMY      COLONOSCOPY      FL CYSTOGRAM  8/18/2021    GALLBLADDER SURGERY      HYSTERECTOMY N/A 8/6/2021    Procedure: ENBLOCK HYSTERECTOMY, BILATERAL SALPINGO-OOPHORECTOMY, SIGMOIDECTOMY WITH LOW RECTAL REANASTAMOSIS, BLADDER PERITONEAL STRIPPING AND CYSTOTOMY REPAIR, DIAPHRAGM STRIPPING, SMALL BOWEL RESECTION WITH RE-ANASTAMOSIS;  Surgeon: Ladarius Trinh MD;  [facility];  Service: Gynecology Oncology    IR ASPIRATION ONLY  8/31/2021    IR DRAINAGE TUBE CHECK AND/OR REMOVAL  9/17/2021    IR DRAINAGE TUBE CHECK/CHANGE/REPOSITION/REINSERTION/UPSIZE 8/27/2021    IR DRAINAGE TUBE CHECK/CHANGE/REPOSITION/REINSERTION/UPSIZE  9/3/2021    IR DRAINAGE TUBE PLACEMENT  8/18/2021    IR PORT PLACEMENT  9/17/2021    IR THORACENTESIS  8/18/2021    IR THORACENTESIS  9/3/2021    LAPAROTOMY N/A 8/6/2021    Procedure: LAPAROTOMY EXPLORATORY; OVER SEW PANCREAS TAIL;  Surgeon: Germaine Fox MD;  [facility];  Service: Gynecology Oncology    OMENTECTOMY N/A 8/6/2021    Procedure: RADICAL OMENTECTOMY;  Surgeon: Germaine Fox MD;  [facility];  Service: Gynecology Oncology    VA LAP,DIAGNOSTIC ABDOMEN N/A 8/6/2021    Procedure: LAPAROSCOPY DIAGNOSTIC;  Surgeon: Germaine Fox MD;  [facility];  Service: Gynecology Oncology    RIGHT OOPHORECTOMY  1986    SPLENECTOMY, TOTAL N/A 8/6/2021    Procedure: SPLENECTOMY;  Surgeon: Germaine Fox MD;  [facility];  Service: Gynecology Oncology    TONSILLECTOMY          11/05/22 1456   PT Last Visit   PT Visit Date 11/05/22   Note Type   Note type Evaluation   Pain Assessment   Pain Assessment Tool 0-10   Pain Score 8   Pain Location/Orientation Location: Abdomen   Hospital Pain Intervention(s) Ambulation/increased activity; Emotional support;Splinting;Relaxation technique  (pt had access to PCA throughout session, educated in log roll technique)   Restrictions/Precautions   Weight Bearing Precautions Per Order No   Other Precautions Contact/isolation;Multiple lines;Pain   Home Living   Type of [address] [place] [address] One level  (0 KAROL)   Bathroom Shower/Tub   (has both walk-in + tub shower)   [address] Walker;Cane   Prior Function   Level of [place] Independent with ADLs; Independent with functional mobility; Independent with IADLS  (I w/ ambulation community distances w/o AD)   Lives With Spouse   Receives Help From Family   IADLs Independent with driving; Independent with meal prep; Independent with medication management   Falls in the last 6 months 0   General Family/Caregiver Present No   Cognition   Overall Cognitive Status WFL   Arousal/Participation Alert   Orientation Level Oriented X4   Comments pleasant + cooperative   Subjective   Subjective Pt agreeable to mobilize  RLE Assessment   RLE Assessment WFL   LLE Assessment   LLE Assessment WFL   Bed Mobility   Supine to Sit 6  Modified independent   Additional items Increased time required;HOB elevated   Sit to Supine Unable to assess   Additional Comments Pt greeted in supine  Pt educated in log roll technique if coming supine>sit from a flat bed - pt verbalized understanding   Transfers   Sit to Stand 7  Independent   Stand to Sit 7  Independent   Toilet transfer 7  Independent   Additional items Standard toilet   Additional Comments no AD   Ambulation/Elevation   Gait pattern Excessively slow   Gait Assistance 6  Modified independent   Assistive Device None   Distance 300'   Balance   Static Sitting Good   Dynamic Sitting Fair +   Static Standing Fair   Dynamic Standing Fair -   Ambulatory Fair -   Endurance Deficit   Endurance Deficit No   Activity Tolerance   Activity Tolerance Patient tolerated treatment well   Medical Staff Made Aware nsg   Nurse Made Aware yes - cleared for PT   Assessment   Assessment Pt is 71 y o  female seen for a moderate complexity PT evaluation s/p admit to [facility] on 11/4/2022  Pt presenting w/ ovarian caner w/ liver mets, s/p exploratory laparotomy 11/4  Please see above for other active problem list / PMH  PT now consulted to assess functional mobility and needs for safe d/c planning  Prior to admission, pt was I + active, lives w/ spouse in a house w/o stairs  Currently pt is Jaren for bed skills; I for functional transfers; Jaren for ambulation w/o AD  Pt presents functioning near baseline  No further skilled acute PT needs  PT encouraged to ambulate TID to prevent complications of immobility - can ambulate w/ family / restorative staff to manage lines   Will d/c from caseload  Barriers to Discharge None   Goals   Patient Goals go home   PT Treatment Day 0   Recommendation   PT Discharge Recommendation (S)  No rehabilitation needs   AM-PAC Basic Mobility Inpatient   Turning in Bed Without Bedrails 4   Lying on Back to Sitting on Edge of Flat Bed 4   Moving Bed to Chair 4   Standing Up From Chair 4   Walk in Room 4   Climb 3-5 Stairs 4   Basic Mobility Inpatient Raw Score 24   Basic Mobility Standardized Score 57 68   Highest Level Of Mobility   JH-HLM Goal 8: Walk 250 feet or more   JH-HLM Achieved 8: Walk 250 feet ot more   End of Consult   Patient Position at End of Consult Bedside chair; All needs within reach  (on waffle cushion, all lines in tact, BLE elevated)     Darian Pardo, PT, DPT

## 2022-11-06 LAB
ALBUMIN SERPL BCP-MCNC: 2.9 G/DL (ref 3.5–5)
ALP SERPL-CCNC: 130 U/L (ref 46–116)
ALT SERPL W P-5'-P-CCNC: 97 U/L (ref 12–78)
ANION GAP SERPL CALCULATED.3IONS-SCNC: 7 MMOL/L (ref 4–13)
AST SERPL W P-5'-P-CCNC: 48 U/L (ref 5–45)
BILIRUB SERPL-MCNC: 0.62 MG/DL (ref 0.2–1)
BUN SERPL-MCNC: 7 MG/DL (ref 5–25)
CALCIUM ALBUM COR SERPL-MCNC: 9.8 MG/DL (ref 8.3–10.1)
CALCIUM SERPL-MCNC: 8.9 MG/DL (ref 8.3–10.1)
CHLORIDE SERPL-SCNC: 103 MMOL/L (ref 96–108)
CO2 SERPL-SCNC: 26 MMOL/L (ref 21–32)
CREAT SERPL-MCNC: 0.54 MG/DL (ref 0.6–1.3)
ERYTHROCYTE [DISTWIDTH] IN BLOOD BY AUTOMATED COUNT: 14.9 % (ref 11.6–15.1)
GFR SERPL CREATININE-BSD FRML MDRD: 95 ML/MIN/1.73SQ M
GLUCOSE SERPL-MCNC: 142 MG/DL (ref 65–140)
HCT VFR BLD AUTO: 31.2 % (ref 34.8–46.1)
HGB BLD-MCNC: 10.2 G/DL (ref 11.5–15.4)
MCH RBC QN AUTO: 33.4 PG (ref 26.8–34.3)
MCHC RBC AUTO-ENTMCNC: 32.7 G/DL (ref 31.4–37.4)
MCV RBC AUTO: 102 FL (ref 82–98)
PLATELET # BLD AUTO: 329 THOUSANDS/UL (ref 149–390)
PMV BLD AUTO: 10.8 FL (ref 8.9–12.7)
POTASSIUM SERPL-SCNC: 3.5 MMOL/L (ref 3.5–5.3)
PROT SERPL-MCNC: 6.8 G/DL (ref 6.4–8.4)
RBC # BLD AUTO: 3.05 MILLION/UL (ref 3.81–5.12)
SODIUM SERPL-SCNC: 136 MMOL/L (ref 135–147)
WBC # BLD AUTO: 12.64 THOUSAND/UL (ref 4.31–10.16)

## 2022-11-06 RX ORDER — POTASSIUM CHLORIDE 20 MEQ/1
40 TABLET, EXTENDED RELEASE ORAL ONCE
Status: COMPLETED | OUTPATIENT
Start: 2022-11-06 | End: 2022-11-06

## 2022-11-06 RX ORDER — HYDROMORPHONE HCL/PF 1 MG/ML
1 SYRINGE (ML) INJECTION EVERY 2 HOUR PRN
Status: DISCONTINUED | OUTPATIENT
Start: 2022-11-06 | End: 2022-11-07

## 2022-11-06 RX ORDER — ACETAMINOPHEN 325 MG/1
975 TABLET ORAL EVERY 8 HOURS SCHEDULED
Status: DISCONTINUED | OUTPATIENT
Start: 2022-11-06 | End: 2022-11-08 | Stop reason: HOSPADM

## 2022-11-06 RX ORDER — OXYCODONE HYDROCHLORIDE 10 MG/1
10 TABLET ORAL
Status: DISCONTINUED | OUTPATIENT
Start: 2022-11-06 | End: 2022-11-08 | Stop reason: HOSPADM

## 2022-11-06 RX ADMIN — OXYCODONE HYDROCHLORIDE 5 MG: 5 TABLET ORAL at 06:21

## 2022-11-06 RX ADMIN — GABAPENTIN 300 MG: 300 CAPSULE ORAL at 17:12

## 2022-11-06 RX ADMIN — OXYCODONE HYDROCHLORIDE 10 MG: 10 TABLET, FILM COATED, EXTENDED RELEASE ORAL at 10:41

## 2022-11-06 RX ADMIN — OXYCODONE HYDROCHLORIDE 10 MG: 10 TABLET ORAL at 15:00

## 2022-11-06 RX ADMIN — HEPARIN SODIUM 5000 UNITS: 5000 INJECTION INTRAVENOUS; SUBCUTANEOUS at 13:14

## 2022-11-06 RX ADMIN — DULOXETINE 20 MG: 20 CAPSULE, DELAYED RELEASE ORAL at 10:41

## 2022-11-06 RX ADMIN — ACETAMINOPHEN 975 MG: 325 TABLET ORAL at 21:07

## 2022-11-06 RX ADMIN — OXYCODONE HYDROCHLORIDE 10 MG: 10 TABLET ORAL at 10:41

## 2022-11-06 RX ADMIN — OXYCODONE HYDROCHLORIDE 10 MG: 10 TABLET ORAL at 21:07

## 2022-11-06 RX ADMIN — HEPARIN SODIUM 5000 UNITS: 5000 INJECTION INTRAVENOUS; SUBCUTANEOUS at 05:24

## 2022-11-06 RX ADMIN — OXYCODONE HYDROCHLORIDE 5 MG: 5 TABLET ORAL at 02:49

## 2022-11-06 RX ADMIN — GABAPENTIN 300 MG: 300 CAPSULE ORAL at 10:41

## 2022-11-06 RX ADMIN — GABAPENTIN 300 MG: 300 CAPSULE ORAL at 21:07

## 2022-11-06 RX ADMIN — FLUTICASONE PROPIONATE 1 SPRAY: 50 SPRAY, METERED NASAL at 10:44

## 2022-11-06 RX ADMIN — DEXTROSE AND SODIUM CHLORIDE 100 ML/HR: 5; .9 INJECTION, SOLUTION INTRAVENOUS at 02:50

## 2022-11-06 RX ADMIN — HEPARIN SODIUM 5000 UNITS: 5000 INJECTION INTRAVENOUS; SUBCUTANEOUS at 21:12

## 2022-11-06 RX ADMIN — POTASSIUM CHLORIDE 40 MEQ: 1500 TABLET, EXTENDED RELEASE ORAL at 10:41

## 2022-11-06 RX ADMIN — OXYCODONE HYDROCHLORIDE 10 MG: 10 TABLET, FILM COATED, EXTENDED RELEASE ORAL at 21:07

## 2022-11-06 RX ADMIN — PANTOPRAZOLE SODIUM 40 MG: 40 TABLET, DELAYED RELEASE ORAL at 05:24

## 2022-11-06 NOTE — PROGRESS NOTES
Progress Note - Surgical Oncology  Payal Gillespie 71 y o  female MRN: [id number]  Unit/Bed#: [facility] 916-01 Encounter: [id number]    Assessment:  71-year-old female with liver metastasis, now status past post aborted liver ablation with findings several capsule liver Mets and increase in the size of dominant mets on 11/4  Afebrile,VSS      UOP:2 8 L    Plan:  Advance diet as tolerated  DC IVF  Appreciate palliative care recommendations  DVT px  Possible dc today vs tomorrow   Encourage out of bed and ambulation  PT/OT    Subjective/Objective     Subjective:  No acute events overnight  Tolerating a diet, denies nausea; vomiting  Pain is controlled      Objective:    Blood pressure 150/84, pulse 89, temperature 97 9 °F (36 6 °C), resp  rate 18, height 4' 11" (1 499 m), weight 76 7 kg (169 lb), SpO2 96 %  ,Body mass index is 34 13 kg/m²  Intake/Output Summary (Last 24 hours) at 11/6/2022 0707  Last data filed at 11/6/2022 0501  Gross per 24 hour   Intake 1727 55 ml   Output 2850 ml   Net -1122 45 ml       Invasive Devices  Report    Central Venous Catheter Line  Duration           Port A Cath Right Subclavian -- days          Peripheral Intravenous Line  Duration           Peripheral IV 11/04/22 Left Hand 2 days    Peripheral IV 11/04/22 Left Wrist 2 days    Peripheral IV 11/04/22 Right Hand 2 days                Physical Exam  Vitals reviewed  Constitutional:       General: She is not in acute distress  Appearance: She is not ill-appearing, toxic-appearing or diaphoretic  HENT:      Head: Normocephalic and atraumatic  Eyes:      Extraocular Movements: Extraocular movements intact  Cardiovascular:      Rate and Rhythm: Normal rate  Pulmonary:      Effort: Pulmonary effort is normal  No respiratory distress  Abdominal:      General: There is no distension  Palpations: Abdomen is soft  Tenderness: There is abdominal tenderness  There is no guarding or rebound        Comments: Incisions clean, dry and intact   Skin:     General: Skin is warm and dry  Neurological:      Mental Status: She is alert and oriented to person, place, and time     Psychiatric:         Mood and Affect: Mood normal          Behavior: Behavior normal              Results from last 7 days   Lab Units 11/05/22  1221   WBC Thousand/uL 14 15*   HEMOGLOBIN g/dL 11 4*   HEMATOCRIT % 34 8   PLATELETS Thousands/uL 374     Results from last 7 days   Lab Units 11/05/22  1221   POTASSIUM mmol/L 3 5   CHLORIDE mmol/L 104   CO2 mmol/L 25   BUN mg/dL 8   CREATININE mg/dL 0 62   CALCIUM mg/dL 9 5

## 2022-11-06 NOTE — QUICK NOTE
11/6/2022 12:25 PM -  Abad Sims's chart and case were reviewed by Sherwin Vuong  Mode of review included electronic chart check  Per review, symptoms remain controlled on current regimen and no changes are made at this time  Please continue the regimen in place, and review our last note for details  For dispo plan, please review Case Management notes  Pt's epidural has been dislodged, and removed by an APS/Anesthesia colleague  Palliative and Supportive Care will continue to follow for all pain control needs  We respect and appreciate the upgrade from 5mg to 10mg oxyIR for this pt's pain control  The equianalgesic dose of 10mg oxyIR is 0 75mg IV hydromorphone; based on this, and pt's difficult pain in the immediate post-op period without epdiural control today, will increase hydromorphone to 1mg even  Palliative care will return on Monday, 11/7  Patient is appropriate for outpatient follow-up  We will make arrangements through our office  For urgent issues or any questions/concerns, please notify on-call provider via [address] [place] [address]  You may also call our answering service 24/7 at   Sehrwin Vuong  Palliative and Supportive Care  Clinic/Answering Service: [phone]  You can find me on [place]!

## 2022-11-06 NOTE — PROGRESS NOTES
Progress Note - Acute Pain Service    Stephen Reddy 71 y o  female MRN: [id number]  Unit/Bed#: [facility] 916-01 Encounter: [id number]      Assessment:   Principal Problem:    Liver metastases ([facility] [address] )  Active Problems:    Ovarian cancer ([facility] [address] )    Stephen Reddy is a 71 y o  female with PMHx of metastatic ovarian cancer c/b liver metastasis with opioid dependence (takes PO oxycodone ER 10mg BID, oxycodone 5mg q4-6hr PRN) who is POD2 s/p aborted liver ablation due to findings of several capsular metastases  She had a pre-operative lumbar epidural placed for post-operative analgesia but was removed on 11/5 due to excessive leaking  APS consulted for post-operative pain control  Upon bedside evaluation, Onesimo Hill was resting in bed  She noted some rebound pain after epidural was removed yesterday but she is able to get OOB and walk to bathroom  Denies opioid-induced side effects including nausea/vomiting/itching/constipation  Nearing discharge per primary team (likely tomorrow)  Able to move both LE bilaterally  Epidural site c/d/i  Plan:   - Increase PRN oxycodone to 10mg q3hr PRN for moderate-severe pain  - Continue IV dilaudid 2mg PRN and home PO oxycodone ER 10mg BID    Multimodal analgesia:   - Tylenol 975 mg PO q8hrs standing  - Gabapentin 300 mg PO TID   - Duloxetine 20mg daily    Bowel Regimen:  - Bowel regimen when appropriate from surgical perspective    APS will sign off at this time  Thank you for the consult  All opioids and other analgesics to be written at discretion of primary team  Please contact Acute Pain Service - [facility] via [facility] from [phone] with additional questions or concerns  See Jax or Umm for additional contacts and after hours information      Pain History  Current pain location(s): ABdomen  Pain Scale:   5-7/10  Quality: Diffuse, sharp  24 hour history: See above    Opioid requirement previous 24 hours: PO oxycodone 65mg, IV dilaudid 1mg    Meds/Allergies   all current active meds have been reviewed    Allergies   Allergen Reactions   • Erythromycin Nausea Only   • Morphine Headache       Objective     Temp:  [97 9 °F (36 6 °C)-98 8 °F (37 1 °C)] 98 1 °F (36 7 °C)  HR:  [] 93  Resp:  [18] 18  BP: (127-153)/(66-84) 127/66    Physical Exam  HENT:      Head: Normocephalic  Mouth/Throat:      Mouth: Mucous membranes are moist    Eyes:      Pupils: Pupils are equal, round, and reactive to light  Cardiovascular:      Rate and Rhythm: Normal rate  Pulses: Normal pulses  Pulmonary:      Effort: Pulmonary effort is normal    Abdominal:      Palpations: Abdomen is soft  Tenderness: There is abdominal tenderness  Musculoskeletal:         General: Normal range of motion  Skin:     General: Skin is dry  Neurological:      General: No focal deficit present  Mental Status: She is alert and oriented to person, place, and time  Psychiatric:         Mood and Affect: Mood normal          Lab Results:   Results from last 7 days   Lab Units 11/06/22  0604   WBC Thousand/uL 12 64*   HEMOGLOBIN g/dL 10 2*   HEMATOCRIT % 31 2*   PLATELETS Thousands/uL 329      Results from last 7 days   Lab Units 11/06/22  0604   POTASSIUM mmol/L 3 5   CHLORIDE mmol/L 103   CO2 mmol/L 26   BUN mg/dL 7   CREATININE mg/dL 0 54*   CALCIUM mg/dL 8 9   ALK PHOS U/L 130*   ALT U/L 97*   AST U/L 48*       Imaging Studies: I have personally reviewed pertinent reports  EKG, Pathology, and Other Studies: I have personally reviewed pertinent reports  Counseling / Coordination of Care  Total floor / unit time spent today 20 minutes  Greater than 50% of total time was spent with the patient and / or family counseling and / or coordination of care  Please note that the APS provides consultative services regarding pain management only    With the exception of ketamine and epidural infusions and except when indicated, final decisions regarding starting or changing doses of analgesic medications are at the discretion of the consulting service  Off hours consultation and/or medication management is generally not available      [address]  Acute Pain Service

## 2022-11-06 NOTE — PLAN OF CARE
Problem: PAIN - ADULT  Goal: Verbalizes/displays adequate comfort level or baseline comfort level  Description: Interventions:  - Encourage patient to monitor pain and request assistance  - Assess pain using appropriate pain scale  - Administer analgesics based on type and severity of pain and evaluate response  - Implement non-pharmacological measures as appropriate and evaluate response  - Consider cultural and social influences on pain and pain management  - Notify physician/advanced practitioner if interventions unsuccessful or patient reports new pain  11/6/2022 1225 by Kalpana Magana RN  Outcome: Progressing  11/6/2022 1225 by Kalpana Magana RN  Outcome: Adequate for Discharge     Problem: INFECTION - ADULT  Goal: Absence or prevention of progression during hospitalization  Description: INTERVENTIONS:  - Assess and monitor for signs and symptoms of infection  - Monitor lab/diagnostic results  - Monitor all insertion sites, i e  indwelling lines, tubes, and drains  - Monitor endotracheal if appropriate and nasal secretions for changes in amount and color  - [place] appropriate cooling/warming therapies per order  - Administer medications as ordered  - Instruct and encourage patient and family to use good hand hygiene technique  - Identify and instruct in appropriate isolation precautions for identified infection/condition  11/6/2022 1225 by Kalpana Magana RN  Outcome: Progressing  11/6/2022 1225 by Kalpana Magana RN  Outcome: Adequate for Discharge     Problem: SAFETY ADULT  Goal: Patient will remain free of falls  Description: INTERVENTIONS:  - Educate patient/family on patient safety including physical limitations  - Instruct patient to call for assistance with activity   - Consult OT/PT to assist with strengthening/mobility   - Keep Call bell within reach  - Keep bed low and locked with side rails adjusted as appropriate  - Keep care items and personal belongings within reach  - Initiate and maintain comfort rounds  - Make Fall Risk Sign visible to staff  - Consider moving patient to room near nurses station  11/6/2022 1225 by Vladimir Pulliam, RN  Outcome: Progressing  11/6/2022 1225 by Vladimir Pulliam, RN  Outcome: Adequate for Discharge     Problem: Potential for Falls  Goal: Patient will remain free of falls  Description: INTERVENTIONS:  - Educate patient/family on patient safety including physical limitations  - Instruct patient to call for assistance with activity   - Consult OT/PT to assist with strengthening/mobility   - Keep Call bell within reach  - Keep bed low and locked with side rails adjusted as appropriate  - Keep care items and personal belongings within reach  - Initiate and maintain comfort rounds  - Make Fall Risk Sign visible to staff  - Consider moving patient to room near nurses station  11/6/2022 1225 by Vladimir Pulliam, RN  Outcome: Progressing  11/6/2022 1225 by Vladimir Pulliam, RN  Outcome: Adequate for Discharge

## 2022-11-07 LAB
ANION GAP SERPL CALCULATED.3IONS-SCNC: 7 MMOL/L (ref 4–13)
BUN SERPL-MCNC: 13 MG/DL (ref 5–25)
CALCIUM SERPL-MCNC: 9.2 MG/DL (ref 8.3–10.1)
CHLORIDE SERPL-SCNC: 105 MMOL/L (ref 96–108)
CO2 SERPL-SCNC: 24 MMOL/L (ref 21–32)
CREAT SERPL-MCNC: 0.68 MG/DL (ref 0.6–1.3)
ERYTHROCYTE [DISTWIDTH] IN BLOOD BY AUTOMATED COUNT: 14.8 % (ref 11.6–15.1)
GFR SERPL CREATININE-BSD FRML MDRD: 88 ML/MIN/1.73SQ M
GLUCOSE SERPL-MCNC: 149 MG/DL (ref 65–140)
HCT VFR BLD AUTO: 32.3 % (ref 34.8–46.1)
HGB BLD-MCNC: 10.3 G/DL (ref 11.5–15.4)
MCH RBC QN AUTO: 32.8 PG (ref 26.8–34.3)
MCHC RBC AUTO-ENTMCNC: 31.9 G/DL (ref 31.4–37.4)
MCV RBC AUTO: 103 FL (ref 82–98)
PLATELET # BLD AUTO: 350 THOUSANDS/UL (ref 149–390)
PMV BLD AUTO: 10 FL (ref 8.9–12.7)
POTASSIUM SERPL-SCNC: 3.9 MMOL/L (ref 3.5–5.3)
RBC # BLD AUTO: 3.14 MILLION/UL (ref 3.81–5.12)
SODIUM SERPL-SCNC: 136 MMOL/L (ref 135–147)
WBC # BLD AUTO: 11.82 THOUSAND/UL (ref 4.31–10.16)

## 2022-11-07 RX ORDER — HYDROMORPHONE HCL/PF 1 MG/ML
0.5 SYRINGE (ML) INJECTION EVERY 6 HOURS PRN
Status: DISCONTINUED | OUTPATIENT
Start: 2022-11-07 | End: 2022-11-08 | Stop reason: HOSPADM

## 2022-11-07 RX ADMIN — HEPARIN SODIUM 5000 UNITS: 5000 INJECTION INTRAVENOUS; SUBCUTANEOUS at 17:43

## 2022-11-07 RX ADMIN — GABAPENTIN 300 MG: 300 CAPSULE ORAL at 20:50

## 2022-11-07 RX ADMIN — GABAPENTIN 300 MG: 300 CAPSULE ORAL at 17:42

## 2022-11-07 RX ADMIN — OXYCODONE HYDROCHLORIDE 10 MG: 10 TABLET ORAL at 05:10

## 2022-11-07 RX ADMIN — DULOXETINE 20 MG: 20 CAPSULE, DELAYED RELEASE ORAL at 10:02

## 2022-11-07 RX ADMIN — OXYCODONE HYDROCHLORIDE 10 MG: 10 TABLET ORAL at 14:43

## 2022-11-07 RX ADMIN — OXYCODONE HYDROCHLORIDE 10 MG: 10 TABLET ORAL at 00:35

## 2022-11-07 RX ADMIN — ACETAMINOPHEN 975 MG: 325 TABLET ORAL at 05:10

## 2022-11-07 RX ADMIN — FLUTICASONE PROPIONATE 2 PUFF: 44 AEROSOL, METERED RESPIRATORY (INHALATION) at 10:02

## 2022-11-07 RX ADMIN — GABAPENTIN 300 MG: 300 CAPSULE ORAL at 10:01

## 2022-11-07 RX ADMIN — PANTOPRAZOLE SODIUM 40 MG: 40 TABLET, DELAYED RELEASE ORAL at 05:10

## 2022-11-07 RX ADMIN — ACETAMINOPHEN 975 MG: 325 TABLET ORAL at 17:42

## 2022-11-07 RX ADMIN — OXYCODONE HYDROCHLORIDE 10 MG: 10 TABLET, FILM COATED, EXTENDED RELEASE ORAL at 20:51

## 2022-11-07 RX ADMIN — OXYCODONE HYDROCHLORIDE 10 MG: 10 TABLET ORAL at 20:51

## 2022-11-07 RX ADMIN — OXYCODONE HYDROCHLORIDE 10 MG: 10 TABLET, FILM COATED, EXTENDED RELEASE ORAL at 10:01

## 2022-11-07 RX ADMIN — HEPARIN SODIUM 5000 UNITS: 5000 INJECTION INTRAVENOUS; SUBCUTANEOUS at 05:10

## 2022-11-07 NOTE — PLAN OF CARE
Problem: PAIN - ADULT  Goal: Verbalizes/displays adequate comfort level or baseline comfort level  Description: Interventions:  - Encourage patient to monitor pain and request assistance  - Assess pain using appropriate pain scale  - Administer analgesics based on type and severity of pain and evaluate response  - Implement non-pharmacological measures as appropriate and evaluate response  - Consider cultural and social influences on pain and pain management  - Notify physician/advanced practitioner if interventions unsuccessful or patient reports new pain  Outcome: Progressing     Problem: INFECTION - ADULT  Goal: Absence or prevention of progression during hospitalization  Description: INTERVENTIONS:  - Assess and monitor for signs and symptoms of infection  - Monitor lab/diagnostic results  - Monitor all insertion sites, i e  indwelling lines, tubes, and drains  - Monitor endotracheal if appropriate and nasal secretions for changes in amount and color  - [place] appropriate cooling/warming therapies per order  - Administer medications as ordered  - Instruct and encourage patient and family to use good hand hygiene technique  - Identify and instruct in appropriate isolation precautions for identified infection/condition  Outcome: Progressing     Problem: SAFETY ADULT  Goal: Patient will remain free of falls  Description: INTERVENTIONS:  - Educate patient/family on patient safety including physical limitations  - Instruct patient to call for assistance with activity   - Consult OT/PT to assist with strengthening/mobility   - Keep Call bell within reach  - Keep bed low and locked with side rails adjusted as appropriate  - Keep care items and personal belongings within reach  - Initiate and maintain comfort rounds  - Make Fall Risk Sign visible to staff  - Consider moving patient to room near nurses station  Outcome: Progressing

## 2022-11-07 NOTE — PROGRESS NOTES
Progress Note - Surgical Oncology  Geovanni Garcia 71 y o  female MRN: [id number]  Unit/Bed#: [facility] 916-01 Encounter: [id number]    Assessment:  73-year-old female with liver metastasis, now status past post aborted liver ablation with findings several capsule liver Mets and increase in the size of dominant mets on 11/4  Afebrile,VSS      UOP:1 4 L    Plan:  Advance diet as tolerated  Appreciate APS recs for pain control  DVT px  Possible dc today vs tomorrow   Encourage out of bed and ambulation  PT/OT    Subjective/Objective     Subjective:  Pain better controlled  Tolerating diet  Urinating and passing flatus  Denies nausea,vomiting      Objective:    Blood pressure 127/66, pulse 93, temperature 98 1 °F (36 7 °C), resp  rate 18, height 4' 11" (1 499 m), weight 76 7 kg (169 lb), SpO2 95 %  ,Body mass index is 34 13 kg/m²  Intake/Output Summary (Last 24 hours) at 11/6/2022 1924  Last data filed at 11/6/2022 0501  Gross per 24 hour   Intake 1090 ml   Output 1550 ml   Net -460 ml       Invasive Devices  Report    Central Venous Catheter Line  Duration           Port A Cath Right Subclavian -- days          Peripheral Intravenous Line  Duration           Peripheral IV 11/04/22 Left Hand 2 days    Peripheral IV 11/04/22 Left Wrist 2 days    Peripheral IV 11/04/22 Right Hand 2 days                Physical Exam  Vitals reviewed  Constitutional:       General: She is not in acute distress  Appearance: She is not ill-appearing, toxic-appearing or diaphoretic  HENT:      Head: Normocephalic and atraumatic  Eyes:      Extraocular Movements: Extraocular movements intact  Cardiovascular:      Rate and Rhythm: Normal rate  Pulmonary:      Effort: Pulmonary effort is normal  No respiratory distress  Abdominal:      General: There is no distension  Palpations: Abdomen is soft  Tenderness: There is abdominal tenderness (appropriate)  There is no guarding or rebound        Comments: Incisions clean, dry and intact   Skin:     General: Skin is warm and dry  Neurological:      Mental Status: She is alert and oriented to person, place, and time     Psychiatric:         Mood and Affect: Mood normal          Behavior: Behavior normal              Results from last 7 days   Lab Units 11/06/22  0604 11/05/22  1221   WBC Thousand/uL 12 64* 14 15*   HEMOGLOBIN g/dL 10 2* 11 4*   HEMATOCRIT % 31 2* 34 8   PLATELETS Thousands/uL 329 374     Results from last 7 days   Lab Units 11/06/22  0604 11/05/22  1221   POTASSIUM mmol/L 3 5 3 5   CHLORIDE mmol/L 103 104   CO2 mmol/L 26 25   BUN mg/dL 7 8   CREATININE mg/dL 0 54* 0 62   CALCIUM mg/dL 8 9 9 5

## 2022-11-07 NOTE — DISCHARGE SUMMARY
Discharge Summary -surgical oncology  Torsten Tobar 71 y o  female MRN: [id number]  Unit/Bed#: [facility] 916-01 Encounter: [id number]    Admission Date: 11/4/2022     Discharge Date: 11/8/2022    Admitting Diagnosis: Liver metastases [facility]) [C78 7]    Discharge Diagnosis:  Liver metastases status post aborted hepatic ablation by Dr Clint Schroeder    Attending and Service: Dr Clint Schroeder, surgical oncology    Consulting Physician(s):  NENA    Imaging and Procedures Performed:   11/04/2022:  Aborted hepatic ablation by Dr Tino Najera Course: Torsten Tobar is a 55-year-old female with a past medical history of HTN, GERD, ovarian cancer status post exploratory laparotomy hysterectomy/omentectomy, splenectomy now status post aborted hepatic ablation by Dr Clint Schroeder  Intraoperative findings included "Operative Findings: Dense adhesive disease; several capsular liver mets and significant increase in size of dominant mets; also with evidence of fatty liver "    Postoperatively she was transferred to the medical-surgical floor was started on clear liquid diet, IV fluids for hydration, had an epidural in place for pain control, DVT prophylaxis, and was encouraged to be out of bed and ambulating as well as utilizing her incentive spirometer  Over her hospital stay her diet was slowly advanced as tolerated without nausea or vomiting, and her epidural remained in place until postoperative day 2, when it was removed secondary to leaking at the site  She was transitioned to p o  pain control regimen for which palliative care was controlling  By postoperative day 3 patient was tolerating out of bed and ambulation with current pain control regimen, she was tolerating her diet without nausea or vomiting and with flatus  She was utilizing her incentive spirometer and she was urinating without any difficulties  She was cleared for discharge on postoperative day 4    All discharge instructions as well as postoperative follow-up appointments were discussed with the patient, she was in agreement with plan  Palliative Care is to control and prescribe any pain control regimens on upon discharge  On discharge, the patient is instructed to follow-up with the patient's primary care provider within the next 2 weeks to review the events of the recent hospitalization  The patient is instructed to follow-up with Dr Ariadna Ivey on 11/22 at 11:30AM  The patient is instructed to follow the provided discharge instructions  Condition at Discharge: good     Discharge instructions/Information to patient and family:   See after visit summary for information provided to patient and family  Provisions for Follow-Up Care:  See after visit summary for information related to follow-up care and any pertinent home health orders  Disposition: Home    Planned Readmission: No    Discharge Statement   I spent 30 minutes discharging the patient  This time was spent on the day of discharge  I had direct contact with the patient on the day of discharge  Additional documentation is required if more than 30 minutes were spent on discharge  Discharge Medications:  See after visit summary for reconciled discharge medications provided to patient and family      Jose Neumann  11/7/2022  2:03 PM

## 2022-11-07 NOTE — OCCUPATIONAL THERAPY NOTE
Occupational Therapy Screen        Patient Name: Simi Nguyen  LSWJB'V Date: 11/7/2022 11/07/22 0928   OT Last Visit   OT Visit Date 11/07/22   Note Type   Note type Screen         OT orders received  Chart reviewed  Per PT notes and RN report, pt ambulating independently in room w/o AD  Upon arrival, pt able to demonstrate transfers and LBD without any assistance  Pt reports  is home and able to assist as needed  No questions or concerns at this time  Will d/c OT orders  Please re-consult if needed  Thank you!        Analia Rowan MS, OTR/L

## 2022-11-07 NOTE — CASE MANAGEMENT
Case Management Discharge Planning Note    Patient name Keo Perdomo  Location [facility] 916/[facility] 594-33 MRN [id number]  : 1950 Date 2022       Current Admission Date: 2022  Current Admission Diagnosis:Liver metastases [facility])   Patient Active Problem List    Diagnosis Date Noted   • Liver metastases ([address] ) 2022   • Personal history of COVID-19 2022   • Sciatica of left side 2022   • Oral mucositis (ulcerative) due to antineoplastic therapy 2022   • Neoplastic malignant related fatigue 03/15/2022   • Chemotherapy-induced peripheral neuropathy ([facility] [address] ) 2022   • Chemotherapy induced neutropenia ([facility] [address] ) 2021   • Palliative care patient 2021   • Joint pain following chemotherapy 2021   • Anxiousness 2021   • Insomnia due to medical condition 2021   • Drug induced constipation 10/13/2021   • Encounter for central line care 2021   • Hyponatremia 2021   • Ovarian cancer ([facility] [address] ) 2021   • S/P splenectomy 2021   • S/P bladder repair 2021   • Status post small bowel resection 2021   • Unspecified protein-calorie malnutrition ([address] ) 08/10/2021   • Migraine 2021   • Cancer related pain 2021   • Hypertension 2021   • Esophageal reflux 2013      LOS (days): 3  Geometric Mean LOS (GMLOS) (days): 3 30  Days to GMLOS:0 3     OBJECTIVE:  Risk of Unplanned Readmission Score: 12 25         Current admission status: Inpatient   Preferred Pharmacy:   [facility], [address] [facility] [place] [address] [facility]  [address] [facility],  [address] [address]  Phone: [phone] Fax: [address], [address] [place] [address] [address] [facility]  Phone: [phone] Fax: [phone]    Skylar Motta, [address] [place] [address]  Phone: [phone] Fax: [phone]    [facility]: Bridger Stephens MD    Primary Insurance: MEDICARE  Secondary Insurance: BLUE CROSS    DISCHARGE DETAILS:             Additional Comments: CM spoke with provider - patient is a tentative d/c within the next 24 hours pending pain control

## 2022-11-07 NOTE — DISCHARGE INSTR - AVS FIRST PAGE
DISCHARGE INSTRUCTIONS    Follow Up: Follow up with Dr Raffaele Ovalle on 11/22 at 11:30 a m  Diet: You may resume a regular diet    Pain:  Tylenol 975 mg every 8 hours for 7 days  Gabapentin 300 mg 3 times a day for 7 days  Continue home OxyContin as scheduled  Shower: You may shower over the wound  Do not bathe or use a pool or hot tub until cleared by the physician  Activity: As tolerated  You may go up and down stairs, walk as much as you are comfortable, but walk at least 3 times each day  Do not lift anything heavier than 15 pounds for at least 2-4 weeks, unless cleared by the doctor  Driving: Do not drive or make any important decisions while on narcotic pain medication  Generally, you may drive when your off all narcotic pain medications  Medications: Resume all of your previous medications, unless told otherwise by the doctor  You do not need to take the narcotic pain medications unless you are having significant pain and discomfort  Call the office: If you are experiencing any of the following, fevers above 101 5° or chills, significant nausea or vomiting, increase in abdominal pain, if the wound develops drainage and/or is excessive redness around the wound, or if you have significant diarrhea or other worsening symptoms

## 2022-11-07 NOTE — PROGRESS NOTES
Progress note - Palliative and Supportive Care   Rashaun Holcomb 71 y o  female [id number]    Patient Active Problem List   Diagnosis   • Hypertension   • Migraine   • Cancer related pain   • Unspecified protein-calorie malnutrition (HCC)   • S/P bladder repair   • Status post small bowel resection   • S/P splenectomy   • Hyponatremia   • Ovarian cancer ([facility] [address] )   • Encounter for central line care   • Drug induced constipation   • Palliative care patient   • Joint pain following chemotherapy   • Anxiousness   • Insomnia due to medical condition   • Chemotherapy induced neutropenia (HCC)   • Chemotherapy-induced peripheral neuropathy (HCC)   • Esophageal reflux   • Neoplastic malignant related fatigue   • Oral mucositis (ulcerative) due to antineoplastic therapy   • Sciatica of left side   • Personal history of COVID-19   • [facility] )     Active issues specifically addressed today include:   Metastatic ovarian cancer   Cancer associated pain   Opioid induced constipation   Palliative care encounter    Plan:  1  Symptom management -   • Acetaminophen 975 mg p o  q 8 hours scheduled  • OxyContin 10 mg p o  q 12 hours scheduled   • Oxy IR 10 mg p o  q 3 hours p r n  moderate-to-severe pain  • Gabapentin 300 mg t i d   • Cymbalta 20 mg daily  • Will add Dilaudid 0 5 mg IV q 6 hours p r n  Back to regimen  However, had lengthy discussion regarding patient attempting to avoid if possible as this will not be available to her at home  2  Goals -   • Diseased focused care without limits at this time, the patient is clear that she would not want sustained life support if not suspected to recover  However, hopeful to resume outpatient cancer treatments  Hopeful to return home tomorrow     Code Status:  Full - Level 1   Decisional apparatus:  Patient is competent on my exam today  If competence is lost, patient's substitute decision maker would default to spouse [place] 169     Advance Directive / Living Will / POLST:  On file    3  Social support-  • Time spent providing supportive listening   • Patient reports having good support from her spouse and children    4  Follow-up  • Palliative Care will continue to follow   • Recommend close follow up with Dr Ale Bae on outpatient basis for continued pain management    Interval history:       24H Pain History  - scores 4-7/10 in past 24H  - Location:  Incision,   - Quality:  Mostly incisional, some deeper pain  - Alleviation:  Current regimen  - Exacerbation:  Movement  - 5 PRNs  - OME 90    Patient reports feeling generally well  She admits to incision discomfort  Feels like she “over did it “getting to the bathroom and recognizes that she needs to ask for assistance more often  Otherwise, hopeful to return home tomorrow  MEDICATIONS / ALLERGIES:     all current active meds have been reviewed    Allergies   Allergen Reactions   • Erythromycin Nausea Only   • Morphine Headache       OBJECTIVE:    Physical Exam  Physical Exam  HENT:      Head: Normocephalic and atraumatic  Eyes:      Conjunctiva/sclera: Conjunctivae normal    Cardiovascular:      Rate and Rhythm: Normal rate  Pulmonary:      Effort: No respiratory distress  Abdominal:      General: There is no distension  Tenderness: There is no guarding  Comments: Incision CDI  2 staples lose but no dehiscence   Musculoskeletal:         General: No swelling  Skin:     General: Skin is warm and dry  Neurological:      General: No focal deficit present  Mental Status: She is alert  Mental status is at baseline  Psychiatric:         Mood and Affect: Mood normal          Behavior: Behavior normal          Thought Content:  Thought content normal          Judgment: Judgment normal          Lab Results:   Results from last 7 days   Lab Units 11/07/22  1013 11/06/22  0604 11/05/22  1221   WBC Thousand/uL 11 82* 12 64* 14 15*   HEMOGLOBIN g/dL 10 3* 10 2* 11 4*   HEMATOCRIT % 32 3* 31 2* 34 8 PLATELETS Thousands/uL 350 329 374   NEUTROS PCT %  --   --  64   MONOS PCT %  --   --  14*     Results from last 7 days   Lab Units 11/07/22  1013 11/06/22  0604 11/05/22  1221   POTASSIUM mmol/L 3 9 3 5 3 5   CHLORIDE mmol/L 105 103 104   CO2 mmol/L 24 26 25   BUN mg/dL 13 7 8   CREATININE mg/dL 0 68 0 54* 0 62   CALCIUM mg/dL 9 2 8 9 9 5   ALK PHOS U/L  --  130* 144*   ALT U/L  --  97* 130*   AST U/L  --  48* 89*       Imaging Studies: reviewed pertinent studies   EKG, Pathology, and Other Studies: reviewed pertinent studies    Counseling / Coordination of Care    Total floor / unit time spent today 25 minutes  Greater than 50% of total time was spent with the patient and / or family counseling and / or coordination of care   A description of the counseling / coordination of care:  Time spent assessing patient, providing support, complex symptom management, coordination with primary team

## 2022-11-07 NOTE — DISCHARGE INSTRUCTIONS
DISCHARGE INSTRUCTIONS    Follow Up: Follow up with Dr Maximo Agarwal on 11/22 at 11:30 a m  Diet: You may resume a regular diet    Pain:  Tylenol 975 mg every 8 hours for 7 days  Gabapentin 300 mg 3 times a day for 7 days  Continue home OxyContin as scheduled  Shower: You may shower over the wound  Do not bathe or use a pool or hot tub until cleared by the physician  Activity: As tolerated  You may go up and down stairs, walk as much as you are comfortable, but walk at least 3 times each day  Do not lift anything heavier than 15 pounds for at least 2-4 weeks, unless cleared by the doctor  Driving: Do not drive or make any important decisions while on narcotic pain medication  Generally, you may drive when your off all narcotic pain medications  Medications: Resume all of your previous medications, unless told otherwise by the doctor  You do not need to take the narcotic pain medications unless you are having significant pain and discomfort  Call the office: If you are experiencing any of the following, fevers above 101 5° or chills, significant nausea or vomiting, increase in abdominal pain, if the wound develops drainage and/or is excessive redness around the wound, or if you have significant diarrhea or other worsening symptoms

## 2022-11-08 ENCOUNTER — PATIENT MESSAGE (OUTPATIENT)
Dept: GYNECOLOGIC ONCOLOGY | Facility: CLINIC | Age: 72
End: 2022-11-08

## 2022-11-08 VITALS
DIASTOLIC BLOOD PRESSURE: 59 MMHG | HEART RATE: 95 BPM | RESPIRATION RATE: 18 BRPM | BODY MASS INDEX: 34.07 KG/M2 | TEMPERATURE: 97.9 F | HEIGHT: 59 IN | SYSTOLIC BLOOD PRESSURE: 112 MMHG | OXYGEN SATURATION: 95 % | WEIGHT: 169 LBS

## 2022-11-08 RX ORDER — POLYETHYLENE GLYCOL 3350 17 G/17G
17 POWDER, FOR SOLUTION ORAL DAILY PRN
Status: DISCONTINUED | OUTPATIENT
Start: 2022-11-08 | End: 2022-11-08 | Stop reason: HOSPADM

## 2022-11-08 RX ORDER — SENNOSIDES 8.6 MG
1 TABLET ORAL DAILY PRN
Status: DISCONTINUED | OUTPATIENT
Start: 2022-11-08 | End: 2022-11-08 | Stop reason: HOSPADM

## 2022-11-08 RX ORDER — POLYETHYLENE GLYCOL 3350 17 G/17G
17 POWDER, FOR SOLUTION ORAL DAILY
Qty: 119 G | Refills: 0 | Status: SHIPPED | OUTPATIENT
Start: 2022-11-08 | End: 2022-11-15

## 2022-11-08 RX ORDER — POLYETHYLENE GLYCOL 3350 17 G/17G
17 POWDER, FOR SOLUTION ORAL DAILY
Status: DISCONTINUED | OUTPATIENT
Start: 2022-11-08 | End: 2022-11-08 | Stop reason: HOSPADM

## 2022-11-08 RX ORDER — DOCUSATE SODIUM 100 MG/1
100 CAPSULE, LIQUID FILLED ORAL 2 TIMES DAILY
Status: DISCONTINUED | OUTPATIENT
Start: 2022-11-08 | End: 2022-11-08 | Stop reason: HOSPADM

## 2022-11-08 RX ORDER — SENNOSIDES 8.6 MG
8.6 TABLET ORAL DAILY PRN
Qty: 7 TABLET | Refills: 0 | Status: SHIPPED | OUTPATIENT
Start: 2022-11-08 | End: 2022-11-15

## 2022-11-08 RX ADMIN — DULOXETINE 20 MG: 20 CAPSULE, DELAYED RELEASE ORAL at 09:56

## 2022-11-08 RX ADMIN — GABAPENTIN 300 MG: 300 CAPSULE ORAL at 09:57

## 2022-11-08 RX ADMIN — OXYCODONE HYDROCHLORIDE 10 MG: 10 TABLET ORAL at 05:08

## 2022-11-08 RX ADMIN — HEPARIN SODIUM 5000 UNITS: 5000 INJECTION INTRAVENOUS; SUBCUTANEOUS at 13:39

## 2022-11-08 RX ADMIN — POLYETHYLENE GLYCOL 3350 17 G: 17 POWDER, FOR SOLUTION ORAL at 09:57

## 2022-11-08 RX ADMIN — DOCUSATE SODIUM 100 MG: 100 CAPSULE, LIQUID FILLED ORAL at 09:56

## 2022-11-08 RX ADMIN — ACETAMINOPHEN 975 MG: 325 TABLET ORAL at 13:38

## 2022-11-08 RX ADMIN — PANTOPRAZOLE SODIUM 40 MG: 40 TABLET, DELAYED RELEASE ORAL at 05:08

## 2022-11-08 RX ADMIN — HEPARIN SODIUM 5000 UNITS: 5000 INJECTION INTRAVENOUS; SUBCUTANEOUS at 05:09

## 2022-11-08 RX ADMIN — ACETAMINOPHEN 975 MG: 325 TABLET ORAL at 05:08

## 2022-11-08 RX ADMIN — OXYCODONE HYDROCHLORIDE 10 MG: 10 TABLET ORAL at 13:38

## 2022-11-08 RX ADMIN — FLUTICASONE PROPIONATE 1 SPRAY: 50 SPRAY, METERED NASAL at 09:57

## 2022-11-08 RX ADMIN — OXYCODONE HYDROCHLORIDE 10 MG: 10 TABLET, FILM COATED, EXTENDED RELEASE ORAL at 09:56

## 2022-11-08 NOTE — CASE MANAGEMENT
Case Management Discharge Planning Note    Patient name Bon Vallejo  Location [facility] 916/[facility] 972-95 MRN [id number]  : 1950 Date 2022       Current Admission Date: 2022  Current Admission Diagnosis:Liver metastases [facility])   Patient Active Problem List    Diagnosis Date Noted   • Liver metastases ([address] ) 2022   • Personal history of COVID-19 2022   • Sciatica of left side 2022   • Oral mucositis (ulcerative) due to antineoplastic therapy 2022   • Neoplastic malignant related fatigue 03/15/2022   • Chemotherapy-induced peripheral neuropathy ([address] ) 2022   • Chemotherapy induced neutropenia ([facility] [address] ) 2021   • Palliative care patient 2021   • Joint pain following chemotherapy 2021   • Anxiousness 2021   • Insomnia due to medical condition 2021   • Drug induced constipation 10/13/2021   • Encounter for central line care 2021   • Hyponatremia 2021   • Ovarian cancer ([address] ) 2021   • S/P splenectomy 2021   • S/P bladder repair 2021   • Status post small bowel resection 2021   • Unspecified protein-calorie malnutrition ([address] ) 08/10/2021   • Migraine 2021   • Cancer related pain 2021   • Hypertension 2021   • Esophageal reflux 2013      LOS (days): 4  Geometric Mean LOS (GMLOS) (days): 3 30  Days to GMLOS:-0 7     OBJECTIVE:  Risk of Unplanned Readmission Score: 12 86         Current admission status: Inpatient   Preferred Pharmacy:   [facility] [place], [place] - [address] [facility] [address] [facility]  [address] [facility],  [address] [address]  Phone: [phone] Fax: [address] [address] [address] [facility]  Phone: [phone] Fax: [phone]    Mary Jane Mittal, [address] [place] [address]  Phone: [phone] Fax: [phone]    [facility] Provider: Nestor Orellana MD    Primary Insurance: BLUE CROSS  Secondary Insurance: MEDICARE    DISCHARGE DETAILS:      Other Referral/Resources/Interventions Provided:  Referral Comments: Patient was seen by therapy and they are stating that the patient has no rehab needs for time of discharge  Additional Comments: CM spoke with the provider  Patient is medically cleared for discharge today

## 2022-11-08 NOTE — PROGRESS NOTES
Progress Note - General Surgery   Bridget Graham 71 y o  female MRN: [id number]  Unit/Bed#: [facility] 916-01 Encounter: [id number]    Assessment:  27-year-old female with liver metastasis S/P aborted liver ablation with Dr Maximo Agarwal on 11/4     Afebrile  VSS  UOP:1 5L    Plan:  D/C today  Advance diet as tolerated  DVT px with [facility]  Encourage out of bed and ambulation  Continue to use incentive spirometer  Continue current pain control regimen       Subjective/Objective     Subjective: Pain better controlled, no IV pain meds overnight  Tolerating diet  Urinating  No BM, +flatus  Abdominal tenderness around incision site  Using incentive spirometer  Denies CP, SOB, N/V    Objective:   Blood pressure 117/63, pulse 85, temperature 98 1 °F (36 7 °C), resp  rate 17, height 4' 11" (1 499 m), weight 76 7 kg (169 lb), SpO2 96 %  ,Body mass index is 34 13 kg/m²  Intake/Output Summary (Last 24 hours) at 11/8/2022 0444  Last data filed at 11/7/2022 1601  Gross per 24 hour   Intake --   Output 1200 ml   Net -1200 ml       Invasive Devices  Report    Central Venous Catheter Line  Duration           Port A Cath Right Subclavian -- days          Peripheral Intravenous Line  Duration           Peripheral IV 11/04/22 Left Hand 3 days    Peripheral IV 11/04/22 Left Wrist 3 days    Peripheral IV 11/04/22 Right Hand 3 days                Physical Exam  Vitals reviewed  Constitutional:       General: She is in NAD     Appearance: She is not ill-appearing, toxic-appearing or diaphoretic  HENT:      Head: Normocephalic and atraumatic  Eyes:      Extraocular Movements: Extraocular movements intact  Cardiovascular:      Rate and Rhythm: Normal rate  Pulmonary:      Effort: Pulmonary effort is normal  No respiratory distress  Abdominal:      General: There is no distension  Palpations: Abdomen is soft  Tenderness: There is abdominal tenderness around incision site  No guarding or rebound        Comments: Incisions clean, dry and intact    Skin:     General: Skin is warm and dry  Psychiatric:         Mood and Affect: Mood normal          Behavior: Behavior normal        Lab, Imaging and other studies:  I have personally reviewed pertinent lab results    , CBC:   Lab Results   Component Value Date    WBC 11 82 (H) 11/07/2022    HGB 10 3 (L) 11/07/2022    HCT 32 3 (L) 11/07/2022     (H) 11/07/2022     11/07/2022    MCH 32 8 11/07/2022    MCHC 31 9 11/07/2022    RDW 14 8 11/07/2022    MPV 10 0 11/07/2022   , CMP:   Lab Results   Component Value Date    SODIUM 136 11/07/2022    K 3 9 11/07/2022     11/07/2022    CO2 24 11/07/2022    BUN 13 11/07/2022    CREATININE 0 68 11/07/2022    CALCIUM 9 2 11/07/2022    EGFR 88 11/07/2022       VTE Pharmacologic Prophylaxis: Heparin    Yadi Reina PA-C

## 2022-11-08 NOTE — PROGRESS NOTES
Progress note - Palliative and Supportive Care   Sorin Miranda 71 y o  female [id number]    Patient Active Problem List   Diagnosis   • Hypertension   • Migraine   • Cancer related pain   • Unspecified protein-calorie malnutrition (HCC)   • S/P bladder repair   • Status post small bowel resection   • S/P splenectomy   • Hyponatremia   • Ovarian cancer ([facility] [address] )   • Encounter for central line care   • Drug induced constipation   • Palliative care patient   • Joint pain following chemotherapy   • Anxiousness   • Insomnia due to medical condition   • Chemotherapy induced neutropenia (HCC)   • Chemotherapy-induced peripheral neuropathy (HCC)   • Esophageal reflux   • Neoplastic malignant related fatigue   • Oral mucositis (ulcerative) due to antineoplastic therapy   • Sciatica of left side   • Personal history of COVID-19   • [facility]     Active issues specifically addressed today include:   Metastatic ovarian cancer   Cancer associated pain   Opioid induced constipation   Palliative care encounter    Plan:  1  Symptom management -   • Acetaminophen 975 mg p o  q 8 hours scheduled  • OxyContin 10 mg p o  q 12 hours scheduled   • Oxy IR 10 mg p o  q 3 hours p r n  moderate-to-severe pain  • Gabapentin 300 mg t i d   • Cymbalta 20 mg daily  •  Dilaudid 0 5 mg IV q 6 hours p r n  BT pain (no uses x24H)  • Bowel regimen:  Patient prefers to make dietary modifications such as fruit and fruit juice  However, recommended senna p r n  with MiraLax second-line for constipation  Both added to discharge recommendations  • Patient reports she has sufficient OxyContin and Oxy IR at home, therefore no prescriptions needed upon discharge  2  Goals -   • Diseased focused care without limits at this time, the patient is clear that she would not want sustained life support if not suspected to recover  However, hopeful to resume outpatient cancer treatments    Hopeful to return home today     Code Status:  Full - Level 1   Decisional apparatus:  Patient is competent on my exam today  If competence is lost, patient's substitute decision maker would default to spouse by [place] 169  Advance Directive / Living Will / POLST:  On file    3  Social support-  • Time spent providing supportive listening   • Patient reports having good support from her spouse and children    4  Follow-up  • Palliative Care will sign off at this time as discharges in dissipated this afternoon  • Recommend close follow up with Dr Siddharth Lew on outpatient basis for continued pain management    Interval history:       24H Pain History  - scores 0-9/10 in past 24H  - Location:  Upper abdomen  - Quality:  Incisional and deep  - Alleviation:  Current regimen  - Exacerbation:  Activity  - 5 PRNs  - OME 75    Patient reports sleeping well last night, did not require any p r n  doses of Dilaudid  Tolerated breakfast well this morning  She is satisfied with her pain control and is hopeful to discharge later today  She has not had a bowel movement since admission, but is passing flatus  She is agreeable to trying sips of MiraLax along with pairs and apple juice with hope to have a bowel movement  MEDICATIONS / ALLERGIES:     all current active meds have been reviewed    Allergies   Allergen Reactions   • Erythromycin Nausea Only   • Morphine Headache       OBJECTIVE:    Physical Exam  Physical Exam  HENT:      Head: Normocephalic and atraumatic  Eyes:      Conjunctiva/sclera: Conjunctivae normal    Cardiovascular:      Rate and Rhythm: Normal rate  Pulmonary:      Effort: Pulmonary effort is normal  No respiratory distress  Abdominal:      Tenderness: There is no guarding  Musculoskeletal:         General: No swelling  Skin:     General: Skin is warm and dry  Neurological:      General: No focal deficit present  Mental Status: She is alert  Mental status is at baseline     Psychiatric:         Mood and Affect: Mood normal          Behavior: Behavior normal          Thought Content: Thought content normal          Judgment: Judgment normal          Lab Results:   Results from last 7 days   Lab Units 11/07/22  1013 11/06/22  0604 11/05/22  1221   WBC Thousand/uL 11 82* 12 64* 14 15*   HEMOGLOBIN g/dL 10 3* 10 2* 11 4*   HEMATOCRIT % 32 3* 31 2* 34 8   PLATELETS Thousands/uL 350 329 374   NEUTROS PCT %  --   --  64   MONOS PCT %  --   --  14*     Results from last 7 days   Lab Units 11/07/22  1013 11/06/22  0604 11/05/22  1221   POTASSIUM mmol/L 3 9 3 5 3 5   CHLORIDE mmol/L 105 103 104   CO2 mmol/L 24 26 25   BUN mg/dL 13 7 8   CREATININE mg/dL 0 68 0 54* 0 62   CALCIUM mg/dL 9 2 8 9 9 5   ALK PHOS U/L  --  130* 144*   ALT U/L  --  97* 130*   AST U/L  --  48* 89*       Imaging Studies: reviewed pertinent studies   EKG, Pathology, and Other Studies: reviewed pertinent studies    Counseling / Coordination of Care    Total floor / unit time spent today 25 minutes  Greater than 50% of total time was spent with the patient and / or family counseling and / or coordination of care   A description of the counseling / coordination of care:  Time spent assessing patient, communicating with primary team, patient education

## 2022-11-09 DIAGNOSIS — C56.3 MALIGNANT NEOPLASM OF BOTH OVARIES (HCC): Primary | ICD-10-CM

## 2022-11-09 NOTE — UTILIZATION REVIEW
NOTIFICATION OF ADMISSION DISCHARGE   This is a Notification of Discharge from [facility]  Please be advised that this patient has been discharge from our facility  Below you will find the admission and discharge date and time including the patient’s disposition  UTILIZATION REVIEW CONTACT:  Susy Zambrano  Utilization   Network Utilization Review Department  Phone: [phone] x carefully listen to the prompts  All voicemails are confidential   Email: [email or web address]     ADMISSION INFORMATION  PRESENTATION DATE: 11/4/2022  5:25 AM  OBERVATION ADMISSION DATE:   INPATIENT ADMISSION DATE: 11/4/22 11:23 AM   DISCHARGE DATE: 11/8/2022  3:41 PM  DISPOSITION: Home/Self Care Home/Self Care      IMPORTANT INFORMATION:  Send all requests for admission clinical reviews, approved or denied determinations and any other requests to dedicated fax number below belonging to the campus where the patient is receiving treatment   List of dedicated fax numbers:  [address] DENIALS (Administrative/Medical Necessity) [phone]   [address] (Maternity/NICU/Pediatrics) [phone]   [facility] [phone]   [facility] 87 [phone]   [address] [phone]   220 [facility] [phone] 90 [facility] [phone]   [address] [place] [address] 119 [phone]   [facility]  [phone]   405 [facility] [phone]   412 [facility] 850 [facility] [phone]

## 2022-11-12 ENCOUNTER — TELEPHONE (OUTPATIENT)
Dept: OTHER | Facility: OTHER | Age: 72
End: 2022-11-12

## 2022-11-12 DIAGNOSIS — N39.0 URINARY TRACT INFECTION WITHOUT HEMATURIA, SITE UNSPECIFIED: Primary | ICD-10-CM

## 2022-11-12 RX ORDER — NITROFURANTOIN 25; 75 MG/1; MG/1
100 CAPSULE ORAL 2 TIMES DAILY
Qty: 10 CAPSULE | Refills: 0 | Status: SHIPPED | OUTPATIENT
Start: 2022-11-12 | End: 2022-11-17

## 2022-11-12 NOTE — TELEPHONE ENCOUNTER
Patient of Dr Geovanni Nevarez is calling to report that she had  surgery on 11/04 and now is experiencing  pain with urination and a  low grade temp    Paged to on call provider via [facility]

## 2022-11-12 NOTE — TELEPHONE ENCOUNTER
Returned pt call  Reports burning and pain w/ urination 8/10 in severity  Denies increased urinary frequency, hematuria  Reports T max 99 7  Reports that this feels like prior UTIs  Will treat empirically for presumed UTI  Reviewed pt allergies, macrobid 100mg BID x5d sent to pharmacy  Pt to call if T> 100 4 or worsening sx despite treatment

## 2022-11-14 ENCOUNTER — HOSPITAL ENCOUNTER (OUTPATIENT)
Dept: RADIOLOGY | Facility: HOSPITAL | Age: 72
Discharge: HOME | End: 2022-11-14

## 2022-11-14 DIAGNOSIS — C56.3 MALIGNANT NEOPLASM OF BOTH OVARIES (HCC): ICD-10-CM

## 2022-11-14 RX ADMIN — IOHEXOL 100 ML: 350 INJECTION, SOLUTION INTRAVENOUS at 11:52

## 2022-11-15 ENCOUNTER — TELEPHONE (OUTPATIENT)
Dept: HEMATOLOGY ONCOLOGY | Facility: CLINIC | Age: 72
End: 2022-11-15

## 2022-11-15 NOTE — TELEPHONE ENCOUNTER
Patient is scheduled for treatment on 11/18  Per insurance, patient must try and fail Kytril before they will approve Aloxi  Can this please be changed? I also see that she is scheduled to start Bevacizumab again with cycle 2  Can the Avastin please be changed to the preferred MVASI?

## 2022-11-16 ENCOUNTER — HOSPITAL ENCOUNTER (OUTPATIENT)
Dept: INFUSION CENTER | Facility: HOSPITAL | Age: 72
Discharge: HOME | End: 2022-11-16

## 2022-11-16 VITALS — TEMPERATURE: 97.6 F

## 2022-11-16 DIAGNOSIS — C56.3 MALIGNANT NEOPLASM OF BOTH OVARIES (HCC): ICD-10-CM

## 2022-11-16 DIAGNOSIS — Z45.2 ENCOUNTER FOR CENTRAL LINE CARE: Primary | ICD-10-CM

## 2022-11-16 LAB
ALBUMIN SERPL BCP-MCNC: 3.3 G/DL (ref 3.5–5)
ALP SERPL-CCNC: 216 U/L (ref 46–116)
ALT SERPL W P-5'-P-CCNC: 27 U/L (ref 12–78)
ANION GAP SERPL CALCULATED.3IONS-SCNC: 8 MMOL/L (ref 4–13)
AST SERPL W P-5'-P-CCNC: 21 U/L (ref 5–45)
BASOPHILS # BLD AUTO: 0.08 THOUSANDS/ÂΜL (ref 0–0.1)
BASOPHILS NFR BLD AUTO: 1 % (ref 0–1)
BILIRUB SERPL-MCNC: 0.44 MG/DL (ref 0.2–1)
BUN SERPL-MCNC: 12 MG/DL (ref 5–25)
CALCIUM ALBUM COR SERPL-MCNC: 9.7 MG/DL (ref 8.3–10.1)
CALCIUM SERPL-MCNC: 9.1 MG/DL (ref 8.3–10.1)
CHLORIDE SERPL-SCNC: 100 MMOL/L (ref 96–108)
CO2 SERPL-SCNC: 27 MMOL/L (ref 21–32)
CREAT SERPL-MCNC: 0.69 MG/DL (ref 0.6–1.3)
EOSINOPHIL # BLD AUTO: 0.64 THOUSAND/ÂΜL (ref 0–0.61)
EOSINOPHIL NFR BLD AUTO: 6 % (ref 0–6)
ERYTHROCYTE [DISTWIDTH] IN BLOOD BY AUTOMATED COUNT: 14.6 % (ref 11.6–15.1)
GFR SERPL CREATININE-BSD FRML MDRD: 87 ML/MIN/1.73SQ M
GLUCOSE SERPL-MCNC: 135 MG/DL (ref 65–140)
HCT VFR BLD AUTO: 31.5 % (ref 34.8–46.1)
HGB BLD-MCNC: 10.1 G/DL (ref 11.5–15.4)
IMM GRANULOCYTES # BLD AUTO: 0.05 THOUSAND/UL (ref 0–0.2)
IMM GRANULOCYTES NFR BLD AUTO: 1 % (ref 0–2)
LYMPHOCYTES # BLD AUTO: 2.84 THOUSANDS/ÂΜL (ref 0.6–4.47)
LYMPHOCYTES NFR BLD AUTO: 26 % (ref 14–44)
MAGNESIUM SERPL-MCNC: 1.8 MG/DL (ref 1.6–2.6)
MCH RBC QN AUTO: 32.8 PG (ref 26.8–34.3)
MCHC RBC AUTO-ENTMCNC: 32.1 G/DL (ref 31.4–37.4)
MCV RBC AUTO: 102 FL (ref 82–98)
MONOCYTES # BLD AUTO: 1.37 THOUSAND/ÂΜL (ref 0.17–1.22)
MONOCYTES NFR BLD AUTO: 13 % (ref 4–12)
NEUTROPHILS # BLD AUTO: 5.8 THOUSANDS/ÂΜL (ref 1.85–7.62)
NEUTS SEG NFR BLD AUTO: 53 % (ref 43–75)
NRBC BLD AUTO-RTO: 2 /100 WBCS
PLATELET # BLD AUTO: 524 THOUSANDS/UL (ref 149–390)
PMV BLD AUTO: 9.7 FL (ref 8.9–12.7)
POTASSIUM SERPL-SCNC: 3.2 MMOL/L (ref 3.5–5.3)
PROT SERPL-MCNC: 7.3 G/DL (ref 6.4–8.4)
RBC # BLD AUTO: 3.08 MILLION/UL (ref 3.81–5.12)
SODIUM SERPL-SCNC: 135 MMOL/L (ref 135–147)
WBC # BLD AUTO: 10.78 THOUSAND/UL (ref 4.31–10.16)

## 2022-11-17 ENCOUNTER — TELEPHONE (OUTPATIENT)
Dept: SURGICAL ONCOLOGY | Facility: CLINIC | Age: 72
End: 2022-11-17

## 2022-11-17 DIAGNOSIS — E87.6 HYPOKALEMIA: Primary | ICD-10-CM

## 2022-11-17 LAB — CANCER AG125 SERPL-ACNC: 129 U/ML (ref 0–30)

## 2022-11-17 RX ORDER — DEXTROSE MONOHYDRATE 50 MG/ML
20 INJECTION, SOLUTION INTRAVENOUS ONCE
Status: CANCELLED | OUTPATIENT
Start: 2022-11-18

## 2022-11-17 RX ORDER — POTASSIUM CHLORIDE 1.5 G/1.77G
20 POWDER, FOR SOLUTION ORAL 2 TIMES DAILY
Qty: 60 EACH | Refills: 1 | Status: SHIPPED | OUTPATIENT
Start: 2022-11-17

## 2022-11-17 RX ORDER — SODIUM CHLORIDE 9 MG/ML
20 INJECTION, SOLUTION INTRAVENOUS ONCE
Status: CANCELLED | OUTPATIENT
Start: 2022-11-18

## 2022-11-17 NOTE — TELEPHONE ENCOUNTER
Sj called to inquire about the oral potassium that was ordered for the patient  Per Aleksandar Verdugo she meant the powder  This information was relayed to Sj

## 2022-11-18 ENCOUNTER — HOSPITAL ENCOUNTER (OUTPATIENT)
Dept: INFUSION CENTER | Facility: HOSPITAL | Age: 72
End: 2022-11-18

## 2022-11-18 VITALS
DIASTOLIC BLOOD PRESSURE: 64 MMHG | SYSTOLIC BLOOD PRESSURE: 134 MMHG | WEIGHT: 165.79 LBS | OXYGEN SATURATION: 95 % | HEART RATE: 95 BPM | BODY MASS INDEX: 33.42 KG/M2 | TEMPERATURE: 97.5 F | RESPIRATION RATE: 18 BRPM | HEIGHT: 59 IN

## 2022-11-18 DIAGNOSIS — G89.3 CANCER RELATED PAIN: ICD-10-CM

## 2022-11-18 DIAGNOSIS — T45.1X5A CHEMOTHERAPY-INDUCED PERIPHERAL NEUROPATHY (HCC): ICD-10-CM

## 2022-11-18 DIAGNOSIS — G43.909 MIGRAINE: ICD-10-CM

## 2022-11-18 DIAGNOSIS — C56.3 MALIGNANT NEOPLASM OF BOTH OVARIES (HCC): Primary | ICD-10-CM

## 2022-11-18 DIAGNOSIS — C56.3 MALIGNANT NEOPLASM OF BOTH OVARIES (HCC): ICD-10-CM

## 2022-11-18 DIAGNOSIS — Z51.5 PALLIATIVE CARE PATIENT: ICD-10-CM

## 2022-11-18 DIAGNOSIS — G62.0 CHEMOTHERAPY-INDUCED PERIPHERAL NEUROPATHY (HCC): ICD-10-CM

## 2022-11-18 DIAGNOSIS — M54.32 SCIATICA OF LEFT SIDE: ICD-10-CM

## 2022-11-18 DIAGNOSIS — M25.50 JOINT PAIN FOLLOWING CHEMOTHERAPY: ICD-10-CM

## 2022-11-18 DIAGNOSIS — G89.18 JOINT PAIN FOLLOWING CHEMOTHERAPY: ICD-10-CM

## 2022-11-18 RX ORDER — BUTALBITAL, ASPIRIN, AND CAFFEINE 50; 325; 40 MG/1; MG/1; MG/1
1 CAPSULE ORAL EVERY 4 HOURS PRN
Qty: 30 CAPSULE | Refills: 0 | Status: SHIPPED | OUTPATIENT
Start: 2022-11-18

## 2022-11-18 RX ORDER — OXYCODONE HCL 10 MG/1
10 TABLET, FILM COATED, EXTENDED RELEASE ORAL EVERY 12 HOURS SCHEDULED
Qty: 60 TABLET | Refills: 0 | Status: SHIPPED | OUTPATIENT
Start: 2022-11-18

## 2022-11-18 RX ORDER — DEXTROSE MONOHYDRATE 50 MG/ML
20 INJECTION, SOLUTION INTRAVENOUS ONCE
Status: COMPLETED | OUTPATIENT
Start: 2022-11-18 | End: 2022-11-18

## 2022-11-18 RX ORDER — SODIUM CHLORIDE 9 MG/ML
20 INJECTION, SOLUTION INTRAVENOUS ONCE
Status: COMPLETED | OUTPATIENT
Start: 2022-11-18 | End: 2022-11-18

## 2022-11-18 RX ADMIN — FAMOTIDINE 20 MG: 10 INJECTION, SOLUTION INTRAVENOUS at 09:22

## 2022-11-18 RX ADMIN — DOXORUBICIN HYDROCHLORIDE 50 MG: 2 INJECTABLE, LIPOSOMAL INTRAVENOUS at 10:27

## 2022-11-18 RX ADMIN — SODIUM CHLORIDE 20 ML/HR: 0.9 INJECTION, SOLUTION INTRAVENOUS at 08:28

## 2022-11-18 RX ADMIN — DEXAMETHASONE SODIUM PHOSPHATE 20 MG: 10 INJECTION, SOLUTION INTRAMUSCULAR; INTRAVENOUS at 08:28

## 2022-11-18 RX ADMIN — DEXTROSE MONOHYDRATE 20 ML/HR: 50 INJECTION, SOLUTION INTRAVENOUS at 10:25

## 2022-11-18 RX ADMIN — CARBOPLATIN 474 MG: 10 INJECTION, SOLUTION INTRAVENOUS at 11:38

## 2022-11-18 RX ADMIN — GRANISETRON HYDROCHLORIDE 1 MG: 1 INJECTION, SOLUTION INTRAVENOUS at 08:51

## 2022-11-18 RX ADMIN — FOSAPREPITANT 150 MG: 150 INJECTION, POWDER, LYOPHILIZED, FOR SOLUTION INTRAVENOUS at 09:51

## 2022-11-22 ENCOUNTER — OFFICE VISIT (OUTPATIENT)
Dept: SURGICAL ONCOLOGY | Facility: CLINIC | Age: 72
End: 2022-11-22

## 2022-11-22 VITALS
DIASTOLIC BLOOD PRESSURE: 80 MMHG | RESPIRATION RATE: 18 BRPM | SYSTOLIC BLOOD PRESSURE: 162 MMHG | HEIGHT: 59 IN | HEART RATE: 98 BPM | WEIGHT: 165 LBS | BODY MASS INDEX: 33.26 KG/M2 | TEMPERATURE: 97.9 F | OXYGEN SATURATION: 96 %

## 2022-11-22 DIAGNOSIS — C56.3 MALIGNANT NEOPLASM OF BOTH OVARIES (HCC): ICD-10-CM

## 2022-11-22 DIAGNOSIS — C78.7 LIVER METASTASES (HCC): Primary | ICD-10-CM

## 2022-11-22 NOTE — PROGRESS NOTES
Surgical Oncology Consultation    [address] [facility]  [facility] [address] [place] [address]    Patient:  Thais Lovelace  1950  [id number]    Primary Care provider:  Geovani Araya MD  [address] [facility] [address] [facility] [address]    Referring provider:  No referring provider defined for this encounter  Diagnoses and all orders for this visit:    Liver metastases [facility])    Malignant neoplasm of both ovaries [facility])        Chief Complaint   Patient presents with   • Post-op       Return per Dr Daxa Ricci  Oncology History   Ovarian cancer ([facility] [address] )   8/6/2021 Surgery    PROMISE/BSO/tumor debulking to optimal cytoreduction     8/25/2021 Initial Diagnosis    Ovarian cancer ([facility] [address] )     9/16/2021 -  Cancer Staged    Staging form: Ovary, Fallopian Tube, Primary Peritoneal, AJCC 8th Edition  - Clinical: FIGO Stage IIIC (cT3c) - Signed by Fredi Mederos MD on 9/16/2021  Stage prefix: Initial diagnosis  Cancer antigen 125 () (U/mL): 543       9/28/2021 - 1/11/2022 Chemotherapy    Carbo AUC6, Taxol 175mg/m2 q3 weeks  Avastin 15mg/m2 added cycle 3    Toxicities:  Cycle4: carbo AUC 5 for neutropenia, added neulasta     10/2021 Genomic Testing    SEAN high  CPS >2     12/9/2021 Genetic Testing    POLD VUS     2/22/2022 -  Chemotherapy    Maintenance:   Avastin 15mg/m2  Olaparib 300mg BID    Toxicity:  3/28/22: dose-reduce avastin to 10 mg/kg every 21 days as well as dose-reduction of olaparib to 150 mg AM and 300 mg PM  5/9/22: decrease olaparib to 200mg BID  6/7/2022: stopped avastin for bone pain       11/18/2022 -  Chemotherapy    fosaprepitant (EMEND) IVPB, 150 mg, Intravenous, Once, 1 of 4 cycles  Administration: 150 mg (11/18/2022)  CARBOplatin (PARAPLATIN) IVPB (GOG AUC DOSING), 474 mg, Intravenous, Once, 1 of 4 cycles  Administration: 474 mg (11/18/2022)  DOXOrubicin liposomal (DOXIL) chemo infusion, 51 6 mg, Intravenous, Once, 1 of 4 cycles  Administration: 50 mg (11/18/2022)  bevacizumab-awwb (MVASI) IVPB, 10 mg/kg = 767 5 mg (original dose ), Intravenous, Once, 0 of 3 cycles  Dose modification: 15 mg/kg (Cycle 2), 10 mg/kg (Cycle 2, Reason: Dose modified as per discussion with consulting physician), 10 mg/kg (Cycle 2), 10 mg/kg (Cycle 3)         History of Present Illness  :   72 yo female with metastatic ovarian ca  Stage IIIC after TAHBSO debulking 8/2021  Adjuvant chemo completed and currently on maintenance PARP-I  Was also on avastin but this was d/c'ed for bone pain  Liver metastases were noted on her cross-sectional imaging obtained in the postoperative setting  Her  and liver metastases were responsive to treatment in the adjuvant setting, these were closely monitored  Since she has been on maintenance therapy, there has been interval progression of her liver metastases  These have increased in size, however, there are no new lesions  Her  recently became elevated  We took her to IR for an attempt at resection but extent of disease too advanced  Did well postop  No concerns today  Review of Systems  Complete ROS Surg Onc:   Constitutional: The patient denies new or recent history of general fatigue, no recent weight loss, no change in appetite  Eyes: No complaints of visual problems, no scleral icterus  ENT: No complaints of ear pain, no hoarseness, no difficulty swallowing,  no tinnitus and no new masses in head, oral cavity, or neck  Cardiovascular: No complaints of chest pain, no palpitations, no ankle edema  Respiratory: No complaints of shortness of breath, no cough  Gastrointestinal: No complaints of jaundice, no bloody stools, no pale stools  Genitourinary: No complaints of dysuria, no hematuria, no nocturia, no frequent urination, no urethral discharge  Musculoskeletal: No complaints of weakness, paralysis, joint stiffness or arthralgias  Integumentary: No complaints of rash, no new lesions     Neurological: No complaints of convulsions, no seizures, no dizziness  Hematologic/Lymphatic: No complaints of easy bruising  Endocrine:  No hot or cold intolerance  No polydipsia, polyphagia, or polyuria  Allergy/immunology:  No environmental allergies  No food allergies  Not immunocompromised        Patient Active Problem List   Diagnosis   • Hypertension   • Migraine   • Cancer related pain   • Unspecified protein-calorie malnutrition (HCC)   • S/P bladder repair   • Status post small bowel resection   • S/P splenectomy   • Hyponatremia   • Ovarian cancer ([facility] [address] )   • Encounter for central line care   • Drug induced constipation   • Palliative care patient   • Joint pain following chemotherapy   • Anxiousness   • Insomnia due to medical condition   • Chemotherapy induced neutropenia (HCC)   • Chemotherapy-induced peripheral neuropathy (HCC)   • Esophageal reflux   • Neoplastic malignant related fatigue   • Oral mucositis (ulcerative) due to antineoplastic therapy   • Sciatica of left side   • Personal history of COVID-19   • Liver metastases [facility])     Past Medical History:   Diagnosis Date   • Acid reflux    • Asthma    • Cancer (HCC)     ovarian, peritoneal carcinomatosis   • GERD (gastroesophageal reflux disease)    • Hypercholesteremia    • Hypertension    • Migraine    • Ovarian cancer [facility])      Past Surgical History:   Procedure Laterality Date   • APPENDECTOMY N/A 8/6/2021    Procedure: APPENDECTOMY;  Surgeon: Fredi Mederos MD;  [facility];  Service: Gynecology Oncology   • CHOLECYSTECTOMY     • COLONOSCOPY     • FL CYSTOGRAM  8/18/2021   • GALLBLADDER SURGERY     • HYSTERECTOMY N/A 8/6/2021    Procedure: ENBLOCK HYSTERECTOMY, BILATERAL SALPINGO-OOPHORECTOMY, SIGMOIDECTOMY WITH LOW RECTAL REANASTAMOSIS, BLADDER PERITONEAL STRIPPING AND CYSTOTOMY REPAIR, DIAPHRAGM STRIPPING, SMALL BOWEL RESECTION WITH RE-ANASTAMOSIS;  Surgeon: Fredi Mederos MD;  [facility];  Service: Gynecology Oncology   • IR ASPIRATION ONLY 8/31/2021   • IR DRAINAGE TUBE CHECK AND/OR REMOVAL  9/17/2021   • IR DRAINAGE TUBE CHECK/CHANGE/REPOSITION/REINSERTION/UPSIZE  8/27/2021   • IR DRAINAGE TUBE CHECK/CHANGE/REPOSITION/REINSERTION/UPSIZE  9/3/2021   • IR DRAINAGE TUBE PLACEMENT  8/18/2021   • IR PORT PLACEMENT  9/17/2021   • IR THORACENTESIS  8/18/2021   • IR THORACENTESIS  9/3/2021   • LAPAROTOMY N/A 8/6/2021    Procedure: LAPAROTOMY EXPLORATORY; OVER SEW PANCREAS TAIL;  Surgeon: Rafita Mora MD;  [facility];  Service: Gynecology Oncology   • LAPAROTOMY N/A 11/4/2022    Procedure: LAPAROTOMY EXPLORATORY WITH ULTRASOUND GUIDANCE;  Surgeon: Tammi Pete MD;  [facility];  Service: Surgical Oncology   • OMENTECTOMY N/A 8/6/2021    Procedure: RADICAL OMENTECTOMY;  Surgeon: Rafita Mora MD;  [facility];  Service: Gynecology Oncology   • CA LAP,DIAGNOSTIC ABDOMEN N/A 8/6/2021    Procedure: LAPAROSCOPY DIAGNOSTIC;  Surgeon: Rafita Mora MD;  [facility];  Service: Gynecology Oncology   • RIGHT OOPHORECTOMY  1986   • SPLENECTOMY, TOTAL N/A 8/6/2021    Procedure: SPLENECTOMY;  Surgeon: Rafita Mora MD;  [facility];  Service: Gynecology Oncology   • TONSILLECTOMY       No family history on file  Social History     Socioeconomic History   • Marital status: /Civil Union     Spouse name: Not on file   • Number of children: Not on file   • Years of education: Not on file   • Highest education level: Not on file   Occupational History   • Not on file   Tobacco Use   • Smoking status: Never   • Smokeless tobacco: Never   Vaping Use   • Vaping Use: Never used   Substance and Sexual Activity   • Alcohol use: Yes     Comment: socially   • Drug use: Yes     Frequency: 7 0 times per week     Types: Marijuana     Comment: edible   • Sexual activity: Not Currently   Other Topics Concern   • Not on file   Social History Narrative    Patient has been  to [place] for over 50 years   They have one 37yo daughter  Patient has 3 brothers and 1 sister; she had lost 2 other sisters  Family is supportive  Social Determinants of Health     Financial Resource Strain: Not on file   Food Insecurity: No Food Insecurity   • Worried About [address] in the Last Year: Never true   • Ran Out of Food in the Last Year: Never true   Transportation Needs: No Transportation Needs   • Lack of Transportation (Medical): No   • Lack of Transportation (Non-Medical):  No   Physical Activity: Not on file   Stress: Not on file   Social Connections: Not on file   Intimate Partner Violence: Not on file   Housing Stability: Low Risk    • Unable to Pay for Housing in the Last Year: No   • Number of Places Lived in the Last Year: 1   • Unstable Housing in the Last Year: No       Current Outpatient Medications:   •  acetaminophen (TYLENOL) 325 mg tablet, Take 3 tablets (975 mg total) by mouth every 8 (eight) hours, Disp: , Rfl:   •  al mag oxide-diphenhydramine-lidocaine viscous (MAGIC MOUTHWASH) 1:1:1 suspension, Swish and spit 10 mL every 4 (four) hours as needed for mouth pain or discomfort (Patient taking differently: Swish and spit 10 mL every 4 (four) hours as needed for mouth pain or discomfort As needed), Disp: 300 mL, Rfl: 0  •  albuterol (PROVENTIL HFA,VENTOLIN HFA) 90 mcg/act inhaler, Inhale 2 puffs every 6 (six) hours as needed for wheezing, Disp: 18 g, Rfl: 1  •  amLODIPine-benazepril (LOTREL) 10-20 MG per capsule, Take 1 capsule by mouth daily, Disp: , Rfl:   •  butalbital-aspirin-caffeine (FIORINAL) -40 mg per capsule, Take 1 capsule by mouth every 4 (four) hours as needed for headaches or migraine, Disp: 30 capsule, Rfl: 0  •  docusate sodium (COLACE) 100 mg capsule, Take 1 capsule (100 mg total) by mouth 2 (two) times a day, Disp: , Rfl:   •  DULoxetine (CYMBALTA) 20 mg capsule, Take 1 capsule (20 mg total) by mouth daily, Disp: 30 capsule, Rfl: 3  •  fluticasone (FLONASE) 50 mcg/act nasal spray, 1 spray into each nostril daily, Disp: , Rfl:   •  fluticasone (Flovent HFA) 44 mcg/act inhaler, Inhale 2 puffs 2 (two) times a day Rinse mouth after use , Disp: 10 6 g, Rfl: 0  •  gabapentin (Neurontin) 300 mg capsule, Take 1 capsule (300 mg total) by mouth 3 (three) times a day, Disp: 270 capsule, Rfl: 0  •  lidocaine (Lidoderm) 5 %, Apply 1 patch topically daily Remove & Discard patch within 12 hours - apply to painful area (Patient taking differently: Apply 1 patch topically daily Remove & Discard patch within 12 hours - apply to painful area-taking as needed), Disp: 30 patch, Rfl: 0  •  Liniments (Blue-Emu Super Strength) CREA, Apply topically as needed (joint pain), Disp: , Rfl:   •  LORazepam (ATIVAN) 1 mg tablet, Take 0 5 tablets (0 5 mg total) by mouth every 8 (eight) hours as needed (nausea or anxiety or insomnia), Disp: 45 tablet, Rfl: 0  •  naloxone (NARCAN) 4 mg/0 1 mL nasal spray, 0 1 mL (4 mg total) by Alternating Nares route every 3 (three) minutes as needed (accidental opioid overdose or respiratory depression), Disp: 1 each, Rfl: 1  •  olaparib (LYNPARZA) tablet, Take 2 tablets PO BID, Disp: 120 tablet, Rfl: 5  •  omeprazole (PriLOSEC) 20 mg delayed release capsule, Take 20 mg by mouth 2 (two) times a day, Disp: , Rfl:   •  ondansetron (ZOFRAN) 8 mg tablet, Take 1 tablet (8 mg total) by mouth every 8 (eight) hours as needed for nausea or vomiting, Disp: 20 tablet, Rfl: 1  •  oxyCODONE (OxyCONTIN) 10 mg 12 hr tablet, Take 1 tablet (10 mg total) by mouth every 12 (twelve) hours Max Daily Amount: 20 mg, Disp: 60 tablet, Rfl: 0  •  oxyCODONE (Roxicodone) 5 immediate release tablet, Take 1 tablet (5 mg total) by mouth every 4 (four) hours as needed for moderate pain or severe pain Max Daily Amount: 30 mg, Disp: 180 tablet, Rfl: 0  •  potassium chloride (KLOR-CON) 20 mEq packet, Take 20 mEq by mouth 2 (two) times a day, Disp: 60 each, Rfl: 1  •  pravastatin (PRAVACHOL) 10 mg tablet, Take 10 mg by mouth daily As needed, Disp: , Rfl:   •  polyethylene glycol (MIRALAX) 17 g packet, Take 17 g by mouth daily for 7 days, Disp: 119 g, Rfl: 0  •  senna (SENOKOT) 8 6 mg, Take 1 tablet (8 6 mg total) by mouth daily as needed for constipation for up to 7 days, Disp: 7 tablet, Rfl: 0  Allergies   Allergen Reactions   • Erythromycin Nausea Only   • Morphine Headache       Vitals:    11/22/22 1133   BP: 162/80   Pulse: 98   Resp: 18   Temp: 97 9 °F (36 6 °C)   SpO2: 96%       Physical Exam   General: Appears well, appears stated age  Skin: Warm, anicteric  HEENT: Normocephalic, atraumatic; sclera aniceteric, mucous membranes moist; cervical nodes without adenopathy  Cardiopulmonary: RRR, Easy WOB, no BLE edema  Abd: Flat and soft, nontender, no masses appreciated, no hepatosplenomegaly  RUQ incision healed well  MSK: Symmetric, no cyanosis, no overt weakness  Lymphatic: No cervical, axillary or inguinal lymphadenopathy  Neuro: Affect appropriate, no gross motor abnormalities      Labs: Reviewed in EPIC    Imaging  MRI abdomen w wo contrast    Result Date: 9/20/2022  Narrative: MRI - ABDOMEN - WITH AND WITHOUT CONTRAST INDICATION: 71 years / Female  C56 3: Malignant neoplasm of bilateral ovaries  COMPARISON: CT 9/9/2022  MRI 6/20/2022  TECHNIQUE:  The following pulse sequences were obtained:  axial T1 weighted in/out of phase images, multiplanar T2 weighted images, axial DWI/ADC, pre-contrast axial T1 with fat saturation and dynamic multiphase post-contrast fat suppressed T1 weighted images    IV Contrast:  8 mL of Gadobutrol injection (SINGLE-DOSE) FINDINGS: LOWER CHEST:   Unremarkable  LIVER: Normal in size and configuration  As noted on a recent CT, there has been interval progression of hepatic metastatic disease with the largest lesion now measuring 5 6 x 3 1 cm, on MRI of 6/20/2022 this measured 2 7 x 1 3 cm  Additional lesions have likewise increased in size  The hepatic veins and portal veins are patent   BILE DUCTS: No intrahepatic or extrahepatic bile duct dilation  GALLBLADDER:  Normal  PANCREAS:  Unremarkable  ADRENAL GLANDS:  Normal  SPLEEN:  Normal  KIDNEYS/PROXIMAL URETERS: No hydroureteronephrosis  No suspicious renal mass  BOWEL:   No dilated loops of bowel  PERITONEUM/RETROPERITONEUM: No mass  No ascites  LYMPH NODES: No abdominal lymphadenopathy  VASCULAR STRUCTURES:  No aneurysm  ABDOMINAL WALL:  Unremarkable  OSSEOUS STRUCTURES:  No suspicious osseous lesion  Impression: Interval progression of hepatic metastatic disease as noted on CT of 9/9/2022  Workstation performed: [id number]     CT chest abdomen pelvis w contrast    Result Date: 9/14/2022  Narrative: CT CHEST, ABDOMEN AND PELVIS WITH IV CONTRAST INDICATION:   C56 3: Malignant neoplasm of bilateral ovaries  COMPARISON:  CT chest 7/6/2022; MRI abdomen pelvis 6/20/2022; CT chest abdomen pelvis 4/1/2022 TECHNIQUE: CT examination of the chest, abdomen and pelvis was performed  Axial, sagittal, and coronal 2D reformatted images were created from the source data and submitted for interpretation  Radiation dose length product (DLP) for this visit:  845 98 mGy-cm   This examination, like all CT scans [facility], was performed utilizing techniques to minimize radiation dose exposure, including the use of iterative  reconstruction and automated exposure control  IV Contrast:  85 mL of iohexol (OMNIPAQUE) Enteric Contrast: Enteric contrast was administered  FINDINGS: CHEST LUNGS:  Stable small likely postinflammatory posterior left lower lobe subpleural 6 mm nodule again noted  Lungs are otherwise clear  There is no tracheal or endobronchial lesion  PLEURA:  Unremarkable  HEART/GREAT VESSELS: Heart is unremarkable for patient's age  No thoracic aortic aneurysm  MEDIASTINUM AND SABRINA:  Unremarkable   CHEST WALL AND LOWER NECK:  Implantable right chest wall port again noted with catheter entering the internal jugular vein and terminating at the cavoatrial junction  ABDOMEN LIVER/BILIARY TREE:  Again identified are multiple hepatic metastasis which have increased in size when compared to the MRI dated 6/20/2022  These include a peripheral segment 4A lesion (series 2 image 50) measuring 3 3 x 5 3 cm (previously 1 9 x 1 2 cm), a segment 7 lesion (series 2 image 46) measuring 2 6 x 2 5 cm (previously 0 9 cm), and a posterior segment 5 lesion (series 2 image 52) measuring 1 4 x 1 3 cm (not seen previously)  The other previously identified subcentimeter lesions in segment 6  and 7 are not currently identified  GALLBLADDER:  Gallbladder is surgically absent  SPLEEN:  There has been prior splenectomy  No suspicious abnormality in the splenectomy space  PANCREAS:  Stable postoperative change reflecting resection of the pancreatic tail  ADRENAL GLANDS:  Unremarkable  KIDNEYS/URETERS:  Unremarkable  No hydronephrosis  STOMACH AND BOWEL:  Unremarkable  APPENDIX:  There are expected postoperative changes of appendectomy  ABDOMINOPELVIC CAVITY:  No ascites  No pneumoperitoneum  No lymphadenopathy  VESSELS:  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  Abdominal aorta and inferior vena cava are normal in course and caliber  Mild atherosclerotic change present  PELVIS REPRODUCTIVE ORGANS:  Status post PROMISE/BSO  URINARY BLADDER:  Unremarkable  ABDOMINAL WALL/INGUINAL REGIONS:  Unremarkable  OSSEOUS STRUCTURES:  No acute fracture or destructive osseous lesion  Impression: 1  Interim increase in size of hepatic metastasis as noted when compared to the prior MRI dated 6/20/2022  2   Stable postoperative change in the abdomen and pelvis as noted  3   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  4   Additional stable findings as noted  The study was marked in EPIC for significant notification   Workstation performed: [id number]       I independently reviewed and interpreted the above laboratory and imaging data, including gynecologic oncology operative notes, consultation, chemotherapy plan, CT scans, MRIs  I have discussed this patient with Dr Elvia Beck  Discussion/Summary:   Aborted resection due to extent of disease  Doing well  Staples d/c'ed  To see Dr Elvia Beck about ongoing treatment  Can f/u PRN    This is a 43-year-old female with ovarian cancer metastatic to the liver  She underwent optimal debulking over a year ago now, and completed adjuvant therapy  She is currently on maintenance therapy, and his whole hand has oligometastatic disease that is progressing in terms of size but not in the number of her liver lesions  I have reviewed her films and discussed with my partners in believe that we can render her liver disease free with a combination of resection and ablation  I discussed this procedure with the patient today  Due to her previous debulking and involvement of the hemidiaphragms and peritoneum, her risks are increased for bleeding and infection and perioperative complications due to scar formation  We discussed the use of ultrasound and determine age of intraoperative liver health as well as the size of the lesions to determine resection versus ablation  We discussed the risk of postoperative temporary or permanent liver failure  We also discussed the risks of bleeding, infection, damage to nearby structures, mi, PE, pneumonia, death  She is in good health and will continue to increase her physical activity leading up to surgery  We discussed the postoperative course  Again the patient is an above average risk due to her surgical history  She and her  expressed understanding and would like to proceed  All questions answered

## 2022-11-23 ENCOUNTER — HOSPITAL ENCOUNTER (OUTPATIENT)
Dept: INFUSION CENTER | Facility: HOSPITAL | Age: 72
Discharge: HOME | End: 2022-11-23

## 2022-11-23 DIAGNOSIS — Z45.2 ENCOUNTER FOR CENTRAL LINE CARE: ICD-10-CM

## 2022-11-23 DIAGNOSIS — C56.3 MALIGNANT NEOPLASM OF BOTH OVARIES (HCC): Primary | ICD-10-CM

## 2022-11-23 LAB
ALBUMIN SERPL BCP-MCNC: 3.1 G/DL (ref 3.5–5)
ALP SERPL-CCNC: 176 U/L (ref 46–116)
ALT SERPL W P-5'-P-CCNC: 35 U/L (ref 12–78)
ANION GAP SERPL CALCULATED.3IONS-SCNC: 10 MMOL/L (ref 4–13)
AST SERPL W P-5'-P-CCNC: 20 U/L (ref 5–45)
BASOPHILS # BLD AUTO: 0.03 THOUSANDS/ÂΜL (ref 0–0.1)
BASOPHILS NFR BLD AUTO: 0 % (ref 0–1)
BILIRUB SERPL-MCNC: 0.43 MG/DL (ref 0.2–1)
BUN SERPL-MCNC: 17 MG/DL (ref 5–25)
CALCIUM ALBUM COR SERPL-MCNC: 9.6 MG/DL (ref 8.3–10.1)
CALCIUM SERPL-MCNC: 8.9 MG/DL (ref 8.3–10.1)
CHLORIDE SERPL-SCNC: 103 MMOL/L (ref 96–108)
CO2 SERPL-SCNC: 26 MMOL/L (ref 21–32)
CREAT SERPL-MCNC: 0.81 MG/DL (ref 0.6–1.3)
EOSINOPHIL # BLD AUTO: 0.38 THOUSAND/ÂΜL (ref 0–0.61)
EOSINOPHIL NFR BLD AUTO: 4 % (ref 0–6)
ERYTHROCYTE [DISTWIDTH] IN BLOOD BY AUTOMATED COUNT: 14.8 % (ref 11.6–15.1)
GFR SERPL CREATININE-BSD FRML MDRD: 73 ML/MIN/1.73SQ M
GLUCOSE SERPL-MCNC: 166 MG/DL (ref 65–140)
HCT VFR BLD AUTO: 32 % (ref 34.8–46.1)
HGB BLD-MCNC: 10.3 G/DL (ref 11.5–15.4)
IMM GRANULOCYTES # BLD AUTO: 0.03 THOUSAND/UL (ref 0–0.2)
IMM GRANULOCYTES NFR BLD AUTO: 0 % (ref 0–2)
LYMPHOCYTES # BLD AUTO: 1.7 THOUSANDS/ÂΜL (ref 0.6–4.47)
LYMPHOCYTES NFR BLD AUTO: 19 % (ref 14–44)
MAGNESIUM SERPL-MCNC: 1.6 MG/DL (ref 1.6–2.6)
MCH RBC QN AUTO: 32.6 PG (ref 26.8–34.3)
MCHC RBC AUTO-ENTMCNC: 32.2 G/DL (ref 31.4–37.4)
MCV RBC AUTO: 101 FL (ref 82–98)
MONOCYTES # BLD AUTO: 1.03 THOUSAND/ÂΜL (ref 0.17–1.22)
MONOCYTES NFR BLD AUTO: 11 % (ref 4–12)
NEUTROPHILS # BLD AUTO: 6.02 THOUSANDS/ÂΜL (ref 1.85–7.62)
NEUTS SEG NFR BLD AUTO: 66 % (ref 43–75)
NRBC BLD AUTO-RTO: 2 /100 WBCS
PLATELET # BLD AUTO: 389 THOUSANDS/UL (ref 149–390)
PMV BLD AUTO: 9 FL (ref 8.9–12.7)
POTASSIUM SERPL-SCNC: 3.3 MMOL/L (ref 3.5–5.3)
PROT SERPL-MCNC: 6.9 G/DL (ref 6.4–8.4)
RBC # BLD AUTO: 3.16 MILLION/UL (ref 3.81–5.12)
SODIUM SERPL-SCNC: 139 MMOL/L (ref 135–147)
WBC # BLD AUTO: 9.19 THOUSAND/UL (ref 4.31–10.16)

## 2022-11-23 RX ADMIN — ALTEPLASE 2 MG: 2.2 INJECTION, POWDER, LYOPHILIZED, FOR SOLUTION INTRAVENOUS at 14:35

## 2022-11-29 ENCOUNTER — OFFICE VISIT (OUTPATIENT)
Dept: PALLIATIVE MEDICINE | Facility: CLINIC | Age: 72
End: 2022-11-29

## 2022-11-29 VITALS
TEMPERATURE: 97.4 F | OXYGEN SATURATION: 90 % | DIASTOLIC BLOOD PRESSURE: 62 MMHG | SYSTOLIC BLOOD PRESSURE: 139 MMHG | BODY MASS INDEX: 33.61 KG/M2 | HEART RATE: 15 BPM | WEIGHT: 166.4 LBS

## 2022-11-29 DIAGNOSIS — M25.50 JOINT PAIN FOLLOWING CHEMOTHERAPY: ICD-10-CM

## 2022-11-29 DIAGNOSIS — K13.79 MOUTH SORES: ICD-10-CM

## 2022-11-29 DIAGNOSIS — T45.1X5A CHEMOTHERAPY-INDUCED PERIPHERAL NEUROPATHY (HCC): ICD-10-CM

## 2022-11-29 DIAGNOSIS — G47.01 INSOMNIA DUE TO MEDICAL CONDITION: ICD-10-CM

## 2022-11-29 DIAGNOSIS — G62.0 CHEMOTHERAPY-INDUCED PERIPHERAL NEUROPATHY (HCC): ICD-10-CM

## 2022-11-29 DIAGNOSIS — F41.9 ANXIOUSNESS: ICD-10-CM

## 2022-11-29 DIAGNOSIS — G89.18 JOINT PAIN FOLLOWING CHEMOTHERAPY: ICD-10-CM

## 2022-11-29 DIAGNOSIS — Z86.16 PERSONAL HISTORY OF COVID-19: ICD-10-CM

## 2022-11-29 DIAGNOSIS — G89.18 POST-OP PAIN: ICD-10-CM

## 2022-11-29 DIAGNOSIS — C56.3 MALIGNANT NEOPLASM OF BOTH OVARIES (HCC): Primary | ICD-10-CM

## 2022-11-29 DIAGNOSIS — Z51.5 PALLIATIVE CARE PATIENT: ICD-10-CM

## 2022-11-29 DIAGNOSIS — M54.32 SCIATICA OF LEFT SIDE: ICD-10-CM

## 2022-11-29 DIAGNOSIS — G89.3 CANCER RELATED PAIN: ICD-10-CM

## 2022-11-29 DIAGNOSIS — R11.0 NAUSEA: ICD-10-CM

## 2022-11-29 DIAGNOSIS — R53.0 NEOPLASTIC MALIGNANT RELATED FATIGUE: ICD-10-CM

## 2022-11-29 DIAGNOSIS — K12.31 ORAL MUCOSITIS (ULCERATIVE) DUE TO ANTINEOPLASTIC THERAPY: ICD-10-CM

## 2022-11-29 DIAGNOSIS — K21.9 ESOPHAGEAL REFLUX: ICD-10-CM

## 2022-11-29 DIAGNOSIS — C78.7 LIVER METASTASES (HCC): Primary | ICD-10-CM

## 2022-11-29 DIAGNOSIS — K59.03 DRUG INDUCED CONSTIPATION: ICD-10-CM

## 2022-11-29 RX ORDER — LORAZEPAM 1 MG/1
0.5 TABLET ORAL EVERY 8 HOURS PRN
Qty: 45 TABLET | Refills: 0 | Status: SHIPPED | OUTPATIENT
Start: 2022-11-29

## 2022-11-29 RX ORDER — ONDANSETRON HYDROCHLORIDE 8 MG/1
8 TABLET, FILM COATED ORAL EVERY 8 HOURS PRN
Qty: 40 TABLET | Refills: 2 | Status: SHIPPED | OUTPATIENT
Start: 2022-11-29

## 2022-11-29 RX ORDER — OXYCODONE HCL 10 MG/1
10 TABLET, FILM COATED, EXTENDED RELEASE ORAL EVERY 8 HOURS SCHEDULED
Qty: 90 TABLET | Refills: 0 | Status: SHIPPED | OUTPATIENT
Start: 2022-11-29

## 2022-11-29 RX ORDER — GABAPENTIN 300 MG/1
300 CAPSULE ORAL 3 TIMES DAILY
Qty: 270 CAPSULE | Refills: 0 | Status: SHIPPED | OUTPATIENT
Start: 2022-11-29

## 2022-11-29 RX ORDER — OXYCODONE HYDROCHLORIDE 5 MG/1
5 TABLET ORAL EVERY 4 HOURS PRN
Qty: 180 TABLET | Refills: 0 | Status: SHIPPED | OUTPATIENT
Start: 2022-11-29

## 2022-11-29 NOTE — PATIENT INSTRUCTIONS
It was good to see you today  Thank you for coming in  Consider asking Dr Nedra Carrillo if an abdominal binder might help reduce post-operative abdominal pain  Increase long-acting oxycodone ER (OxyContin) from twice per day to three times per day (every 8 hours around the clock)  We can always go back to twice per day if/when pain improves  Continue other medications  Will refill Ativan, use as directed  Consider Queasy Pops to help with nausea - but of course you may use Zofran as needed  When we use opioids for pain control, constipation is common and patients should act to prevent it:  Drink PLENTY of water  This is important to keep the gut moving  Some people have success w/ using prunes, prune juice, certain fruits or vegetables (apples, bananas, prunes, pears, raspberries, and vegetables like string beans, broccoli, spinach, kale, squash, lentils, peas, and beans), or fiber gummies  Try a probiotic  This could be yogurt or kefir, or fermented beverages such as kombucha, but probiotics are also available in capsule form  Aim for 10-15 billion colony-forming-units, w/ bacteria such as Lactobacillus / Saccharomyces / Actinomyces  Osmotic laxatives (Miralax, magnesium citrate, Milk of Magnesia) can be very useful for opioid-induced constipation (OIC); take daily to prevent OIC  Bulk laxatives (Citrucel, Metamucil, Fibercon, Benefiber, wheat germ) are useful for constipation in patients who are not taking opioids, but are not recommended if you are taking opioids  Colace is good for softening hard stools, or preventing constipation when opioids are being used - but does not stimulate the bowel to move things along once constipation has occurred  You can use senna, 1 to 2 tabs, once or twice daily as needed for constipation  Use as directed on the box/bottle  Senna is also available in a tea ("Smooth Move")  Should that not be enough for your constipation, you can try Dulcolax    Should that not be enough, consider an enema  All of these medications are available over-the-counter  Return in about 4 weeks  Call us for refills on medications that we supply, as needed  If something changes and you need to come in sooner, please call our office  PRESCRIPTION REFILL REMINDER:  All medication refills should be requested prior to [facility] on Friday  Any refill requests after noon on Friday would be addressed the following Monday

## 2022-11-29 NOTE — Clinical Note
Patient c/o ongoing post-op abdominal pain, which I am happy to manage w/ medications - but might an abdominal binder help her? She feels putting pressure on her abdomen constantly (w/ her hands) is helpful

## 2022-11-29 NOTE — PROGRESS NOTES
Follow-up with Palliative and [address] [place] [address] Tono Sims 71 y o  female [id number]    ASSESSMENT & PLAN:  1  Malignant neoplasm of both ovaries ([address] )    2  Personal history of COVID-19    3  Esophageal reflux    4  Cancer related pain    5  Chemotherapy-induced peripheral neuropathy (HCC)    6  Joint pain following chemotherapy    7  Anxiousness    8  Drug induced constipation    9  Insomnia due to medical condition    10  Neoplastic malignant related fatigue    11  Sciatica of left side    12  Post-op pain    13  Nausea    14  Mouth sores    15  Oral mucositis (ulcerative) due to antineoplastic therapy    16  Palliative care patient          • Continue disease-directed cares  Patient is saddened that her recent surgery didn't have a better outcome, but she is tolerating her chemotherapy and wishes to continue  • Abdominal pain is worse post-operatively; other chronic pain is well controlled  o Patient may use 5% lidocaine patch (or 4% topical lidocaine OTC) for sciatic flares  o Increase oxyER 10mg from q12h to q8h for progressive abdominal pain, some of which is post-operative, some of which is cancer-related / chronic pain  o Continuee oxyIR PRN breakthrough pain  o Reached out to Dr Bon Arrieta of Surgical Oncology to see if an abdominal binder might help   o Continue gabapentin  • Continue MMW for chemo-induced oral mucositis  • Continue Fiorinal for migraines  • Continue duloxetine for mood, insomnia, stress  Effective  • Patient may continue dietary modifications (Asif's apple juice) for constipation, or use OTC products as directed  Counseled  • Continue PPI for GERD; used PRN  Effective  • Continue Zofran PRN N/V (1st line)  • Counseled on trying Queasy Pops to reduce/prevent nausea  • Continue lorazepam PRN anxiousness, insomnia, 2nd-line N/V   • Naloxone Rx provided in previous visit  • Patient had reported receiving [address] vaccinations    • Reviewed notes (Surgical Oncology, DC Summary, Op Note, Gynecologic Oncology), labs (11/23/22 Cr 0 81, , alb 3 1, Hb 10 3; 11/16/22  129 0; 8/8/22  18 2; 7/11/22  10 6), imaging + procedures (11/14/22 CTCAP, 9/19/22 MRI abdomen)  • Return in about 4 weeks (around 12/27/2022)  • Emotional support provided  • Medication safety issues addressed - no driving under the influence of narcotics, watch for adverse effects including AMS and respiratory depression, keep medications stored in a safe/locked environment        Requested Prescriptions     Signed Prescriptions Disp Refills   • al mag oxide-diphenhydramine-lidocaine viscous (MAGIC MOUTHWASH) 1:1:1 suspension 300 mL 0     Sig: Swish and spit 10 mL every 4 (four) hours as needed for mouth pain or discomfort   • oxyCODONE (OxyCONTIN) 10 mg 12 hr tablet 90 tablet 0     Sig: Take 1 tablet (10 mg total) by mouth every 8 (eight) hours Max Daily Amount: 30 mg   • oxyCODONE (Roxicodone) 5 immediate release tablet 180 tablet 0     Sig: Take 1 tablet (5 mg total) by mouth every 4 (four) hours as needed for moderate pain or severe pain Max Daily Amount: 30 mg   • ondansetron (ZOFRAN) 8 mg tablet 40 tablet 2     Sig: Take 1 tablet (8 mg total) by mouth every 8 (eight) hours as needed for nausea or vomiting   • gabapentin (Neurontin) 300 mg capsule 270 capsule 0     Sig: Take 1 capsule (300 mg total) by mouth 3 (three) times a day   • LORazepam (ATIVAN) 1 mg tablet 45 tablet 0     Sig: Take 0 5 tablets (0 5 mg total) by mouth every 8 (eight) hours as needed (nausea or anxiety or insomnia)       Medications Discontinued During This Encounter   Medication Reason   • olaparib (LYNPARZA) tablet Alternate therapy   • ondansetron (ZOFRAN) 8 mg tablet Reorder   • LORazepam (ATIVAN) 1 mg tablet Reorder   • al mag oxide-diphenhydramine-lidocaine viscous (MAGIC MOUTHWASH) 1:1:1 suspension Reorder   • gabapentin (Neurontin) 300 mg capsule Reorder   • oxyCODONE (Roxicodone) 5 immediate release tablet Reorder   • oxyCODONE (OxyCONTIN) 10 mg 12 hr tablet Reorder       Representatives have queried the patient's controlled substance dispensing history in the Prescription Drug Monitoring Program in compliance with regulations before I have prescribed any controlled substances  The prescription history is consistent with prescribed therapy and our practice policies  40 minutes were spent in this ambulatory visit with greater than 50% of the time spent face to face with patient and family (spouse, Analilia Balls) in counseling or coordination of care including discussions of symptom assessment and management, medication review, medication adjustment, psychosocial support, chart review, imaging review, lab review, goals of care, medical marijuana, opioid titration, supportive listening and anticipatory guidance  All of the patient's questions were answered during this discussion  SUBJECTIVE:  Chief Complaint   Patient presents with   • Cancer   • Pain   • Insomnia   • Anxiety   • Headache   • Counseling   • Follow-up   • Nausea        HPI    Torsten Tobar is a 71 y o  female w/ 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 s/p PROMISE+BSO and tumor debulking s/p chemo w/ paclitaxel + carboplatin, now receiving doxorubicin + carboplatin + bevacizumab  She follows w/ Dr Yessi Navarro (Gynecologic Oncology), Dr Clint Schroeder (Surgical Oncology)  Patient is known to [facility]; seen virtually 9/13/22 for symptom assessment and management, medication review, psychosocial support, chart review, imaging review, lab review, supportive listening and anticipatory guidance  Later seen during an 11/4-8/22 admission, during which ex-lap was performed and an (unsuccessful) attempt was made at hepatic ablation for growing liver lesions  Patient endorses significant post-operative abdominal pain which can be reduced when she applies direct manual pressure to the area   She is able to manage her pain somewhat adequately on her current analgesic regimen, but is amenable to an increase  Patient endorses resurgent nausea with her new treatment, which is so far well-managed with at least daily doses of Zofran  GERD is managed  Her appetite is low  Her bowel function is unchanged; constipation improving w/ apple juice or OTC products such as Colace  She states her sleep can be fragmented at night, though she sleeps often in the daytime  Headaches respond appropriately to Fiorinal      Patient's mood is relatively stable, though anxiousness has been more prominent recently  She states she can redirect with PO MJ products, with sleep, spending time with her grand-dogs, and by watching TV  PDMP shows no concerns  The following portions of the medical history were reviewed: past medical history, surgical history, problem list, medication list, family history, and social history        Current Outpatient Medications:   •  acetaminophen (TYLENOL) 325 mg tablet, Take 3 tablets (975 mg total) by mouth every 8 (eight) hours, Disp: , Rfl:   •  al mag oxide-diphenhydramine-lidocaine viscous (MAGIC MOUTHWASH) 1:1:1 suspension, Swish and spit 10 mL every 4 (four) hours as needed for mouth pain or discomfort, Disp: 300 mL, Rfl: 0  •  albuterol (PROVENTIL HFA,VENTOLIN HFA) 90 mcg/act inhaler, Inhale 2 puffs every 6 (six) hours as needed for wheezing, Disp: 18 g, Rfl: 1  •  amLODIPine-benazepril (LOTREL) 10-20 MG per capsule, Take 1 capsule by mouth daily, Disp: , Rfl:   •  butalbital-aspirin-caffeine (FIORINAL) -40 mg per capsule, Take 1 capsule by mouth every 4 (four) hours as needed for headaches or migraine, Disp: 30 capsule, Rfl: 0  •  DULoxetine (CYMBALTA) 20 mg capsule, Take 1 capsule (20 mg total) by mouth daily, Disp: 30 capsule, Rfl: 3  •  fluticasone (FLONASE) 50 mcg/act nasal spray, 1 spray into each nostril daily, Disp: , Rfl:   •  gabapentin (Neurontin) 300 mg capsule, Take 1 capsule (300 mg total) by mouth 3 (three) times a day, Disp: 270 capsule, Rfl: 0  •  LORazepam (ATIVAN) 1 mg tablet, Take 0 5 tablets (0 5 mg total) by mouth every 8 (eight) hours as needed (nausea or anxiety or insomnia), Disp: 45 tablet, Rfl: 0  •  omeprazole (PriLOSEC) 20 mg delayed release capsule, Take 20 mg by mouth 2 (two) times a day, Disp: , Rfl:   •  ondansetron (ZOFRAN) 8 mg tablet, Take 1 tablet (8 mg total) by mouth every 8 (eight) hours as needed for nausea or vomiting, Disp: 40 tablet, Rfl: 2  •  oxyCODONE (OxyCONTIN) 10 mg 12 hr tablet, Take 1 tablet (10 mg total) by mouth every 8 (eight) hours Max Daily Amount: 30 mg, Disp: 90 tablet, Rfl: 0  •  oxyCODONE (Roxicodone) 5 immediate release tablet, Take 1 tablet (5 mg total) by mouth every 4 (four) hours as needed for moderate pain or severe pain Max Daily Amount: 30 mg, Disp: 180 tablet, Rfl: 0  •  pravastatin (PRAVACHOL) 10 mg tablet, Take 10 mg by mouth daily As needed, Disp: , Rfl:   •  docusate sodium (COLACE) 100 mg capsule, Take 1 capsule (100 mg total) by mouth 2 (two) times a day (Patient not taking: Reported on 11/29/2022), Disp: , Rfl:   •  fluticasone (Flovent HFA) 44 mcg/act inhaler, Inhale 2 puffs 2 (two) times a day Rinse mouth after use   (Patient not taking: Reported on 11/29/2022), Disp: 10 6 g, Rfl: 0  •  lidocaine (Lidoderm) 5 %, Apply 1 patch topically daily Remove & Discard patch within 12 hours - apply to painful area (Patient taking differently: Apply 1 patch topically daily Remove & Discard patch within 12 hours - apply to painful area-taking as needed), Disp: 30 patch, Rfl: 0  •  Liniments (Blue-Emu Super Strength) CREA, Apply topically as needed (joint pain), Disp: , Rfl:   •  naloxone (NARCAN) 4 mg/0 1 mL nasal spray, 0 1 mL (4 mg total) by Alternating Nares route every 3 (three) minutes as needed (accidental opioid overdose or respiratory depression), Disp: 1 each, Rfl: 1  •  polyethylene glycol (MIRALAX) 17 g packet, Take 17 g by mouth daily for 7 days, Disp: 119 g, Rfl: 0  •  potassium chloride (KLOR-CON) 20 mEq packet, Take 20 mEq by mouth 2 (two) times a day (Patient not taking: Reported on 11/29/2022), Disp: 60 each, Rfl: 1  •  senna (SENOKOT) 8 6 mg, Take 1 tablet (8 6 mg total) by mouth daily as needed for constipation for up to 7 days, Disp: 7 tablet, Rfl: 0    Review of Systems   Constitutional: Positive for activity change, appetite change and fatigue  Gastrointestinal: Positive for abdominal pain, constipation (improves w/ apple juice, occ OTC products) and nausea (Zofran provides relief)  GERD  Musculoskeletal: Positive for arthralgias and back pain  Allergic/Immunologic: Positive for immunocompromised state  Neurological: Positive for headaches (intermittent)  Neuropathy  Sciatica  Psychiatric/Behavioral: Positive for dysphoric mood and sleep disturbance  The patient is nervous/anxious  All other systems reviewed and are negative  OBJECTIVE:  /62 (BP Location: Right arm, Patient Position: Sitting, Cuff Size: Standard)   Pulse (!) 15   Temp (!) 97 4 °F (36 3 °C) (Temporal)   Wt 75 5 kg (166 lb 6 4 oz)   SpO2 90%   BMI 33 61 kg/m²   Physical Exam  Vitals reviewed  Constitutional:       General: She is not in acute distress  Appearance: She is well-groomed  She is obese  She is ill-appearing (chronically)  She is not toxic-appearing  HENT:      Head: Normocephalic and atraumatic  Right Ear: External ear normal       Left Ear: External ear normal    Eyes:      General: No scleral icterus  Right eye: No discharge  Left eye: No discharge  Extraocular Movements: Extraocular movements intact  Conjunctiva/sclera: Conjunctivae normal       Pupils: Pupils are equal, round, and reactive to light  Cardiovascular:      Rate and Rhythm: Tachycardia present     Pulmonary:      Effort: Pulmonary effort is normal  No tachypnea, bradypnea, accessory muscle usage or respiratory distress  Abdominal:      Tenderness: There is abdominal tenderness  There is guarding  Musculoskeletal:      Cervical back: Normal range of motion  Right lower leg: No edema  Left lower leg: No edema  Skin:     General: Skin is dry  Coloration: Skin is not pale  Neurological:      Mental Status: She is alert and oriented to person, place, and time  Cranial Nerves: No dysarthria or facial asymmetry  Gait: Gait is intact  Psychiatric:         Attention and Perception: Attention normal          Mood and Affect: Mood is anxious (mild)  Affect is not inappropriate  Speech: Speech normal          Behavior: Behavior normal  Behavior is cooperative  Thought Content: Thought content normal          Cognition and Memory: Cognition and memory normal          Judgment: Judgment normal           Robinson Hester MD  [facility]      Portions of this document may have been created using dictation software and as such some "sound alike" terms may have been generated by the system  Do not hesitate to contact me with any questions or clarifications

## 2022-11-30 ENCOUNTER — HOSPITAL ENCOUNTER (OUTPATIENT)
Dept: INFUSION CENTER | Facility: HOSPITAL | Age: 72
Discharge: HOME | End: 2022-11-30

## 2022-11-30 ENCOUNTER — TELEPHONE (OUTPATIENT)
Dept: PALLIATIVE MEDICINE | Facility: CLINIC | Age: 72
End: 2022-11-30

## 2022-11-30 VITALS — TEMPERATURE: 97.2 F

## 2022-11-30 DIAGNOSIS — Z45.2 ENCOUNTER FOR CENTRAL LINE CARE: Primary | ICD-10-CM

## 2022-11-30 DIAGNOSIS — C56.3 MALIGNANT NEOPLASM OF BOTH OVARIES (HCC): ICD-10-CM

## 2022-11-30 LAB
ALBUMIN SERPL BCP-MCNC: 3 G/DL (ref 3.5–5)
ALP SERPL-CCNC: 214 U/L (ref 46–116)
ALT SERPL W P-5'-P-CCNC: 34 U/L (ref 12–78)
ANION GAP SERPL CALCULATED.3IONS-SCNC: 9 MMOL/L (ref 4–13)
AST SERPL W P-5'-P-CCNC: 29 U/L (ref 5–45)
BASOPHILS # BLD AUTO: 0.05 THOUSANDS/ÂΜL (ref 0–0.1)
BASOPHILS NFR BLD AUTO: 1 % (ref 0–1)
BILIRUB SERPL-MCNC: 0.25 MG/DL (ref 0.2–1)
BUN SERPL-MCNC: 20 MG/DL (ref 5–25)
CALCIUM ALBUM COR SERPL-MCNC: 9.7 MG/DL (ref 8.3–10.1)
CALCIUM SERPL-MCNC: 8.9 MG/DL (ref 8.3–10.1)
CHLORIDE SERPL-SCNC: 104 MMOL/L (ref 96–108)
CO2 SERPL-SCNC: 30 MMOL/L (ref 21–32)
CREAT SERPL-MCNC: 0.62 MG/DL (ref 0.6–1.3)
EOSINOPHIL # BLD AUTO: 0.15 THOUSAND/ÂΜL (ref 0–0.61)
EOSINOPHIL NFR BLD AUTO: 2 % (ref 0–6)
ERYTHROCYTE [DISTWIDTH] IN BLOOD BY AUTOMATED COUNT: 15.3 % (ref 11.6–15.1)
GFR SERPL CREATININE-BSD FRML MDRD: 91 ML/MIN/1.73SQ M
GLUCOSE SERPL-MCNC: 124 MG/DL (ref 65–140)
HCT VFR BLD AUTO: 29.4 % (ref 34.8–46.1)
HGB BLD-MCNC: 9.3 G/DL (ref 11.5–15.4)
IMM GRANULOCYTES # BLD AUTO: 0.02 THOUSAND/UL (ref 0–0.2)
IMM GRANULOCYTES NFR BLD AUTO: 0 % (ref 0–2)
LYMPHOCYTES # BLD AUTO: 2.94 THOUSANDS/ÂΜL (ref 0.6–4.47)
LYMPHOCYTES NFR BLD AUTO: 43 % (ref 14–44)
MCH RBC QN AUTO: 32.4 PG (ref 26.8–34.3)
MCHC RBC AUTO-ENTMCNC: 31.6 G/DL (ref 31.4–37.4)
MCV RBC AUTO: 102 FL (ref 82–98)
MONOCYTES # BLD AUTO: 0.6 THOUSAND/ÂΜL (ref 0.17–1.22)
MONOCYTES NFR BLD AUTO: 9 % (ref 4–12)
NEUTROPHILS # BLD AUTO: 3.12 THOUSANDS/ÂΜL (ref 1.85–7.62)
NEUTS SEG NFR BLD AUTO: 45 % (ref 43–75)
NRBC BLD AUTO-RTO: 0 /100 WBCS
PLATELET # BLD AUTO: 255 THOUSANDS/UL (ref 149–390)
PMV BLD AUTO: 10 FL (ref 8.9–12.7)
POTASSIUM SERPL-SCNC: 4 MMOL/L (ref 3.5–5.3)
PROT SERPL-MCNC: 6.8 G/DL (ref 6.4–8.4)
RBC # BLD AUTO: 2.87 MILLION/UL (ref 3.81–5.12)
SODIUM SERPL-SCNC: 143 MMOL/L (ref 135–147)
WBC # BLD AUTO: 6.88 THOUSAND/UL (ref 4.31–10.16)

## 2022-11-30 NOTE — TELEPHONE ENCOUNTER
Prior Authorization CMM    Medication: OxyCONTIN 10 mg ER    Phone# [phone]    [facility] : [address] [place] [phone]

## 2022-11-30 NOTE — TELEPHONE ENCOUNTER
Received prior authorization approval for  Oxycodone ER 10 mg (oxycontin)   through 05/29/23    Called  results to Pharmacy  Pharmacy has been asked to inform pt of outcome  Pharmacist  unable to run script  Asked nurse to call back in 1 hour so she can run again  Await official faxed letter  Please forward to media

## 2022-11-30 NOTE — TELEPHONE ENCOUNTER
Prior Authorization completed for Oxycodone ER ( Oxycontin ) 10 mg via [facility]    ID# [id number]  Phone# [phone]  RODRIGUEZ#  [id number]    Await response

## 2022-11-30 NOTE — TELEPHONE ENCOUNTER
Second call to pharmacy  Still stating cannot run script for OxyCodone ER 10 mg   Call placed to insurance at number provided  Spoke with representative  After careful review pt needed and Prior Auth for a Quantity limit  Able to complete second PA over phone  Received  approval for quantity limit  Case numbers   [id number]  Approval for medication   [id number]  Approval for Quantity Limit  Representative placed this nurse on hold  Inadvertently disconnected the call on this end  Will wait and call pharmacy in 30 minutes to see if able to run script

## 2022-12-02 ENCOUNTER — TELEPHONE (OUTPATIENT)
Dept: PALLIATIVE MEDICINE | Facility: CLINIC | Age: 72
End: 2022-12-02

## 2022-12-02 NOTE — TELEPHONE ENCOUNTER
Patient was on refill line late yesturday afternoon asking for a refill for her Zofran  I will be following up with patient this morning to let her know she has two refills at her pharmacy

## 2022-12-07 ENCOUNTER — HOSPITAL ENCOUNTER (OUTPATIENT)
Dept: INFUSION CENTER | Facility: HOSPITAL | Age: 72
Discharge: HOME | End: 2022-12-07

## 2022-12-07 DIAGNOSIS — Z45.2 ENCOUNTER FOR CENTRAL LINE CARE: Primary | ICD-10-CM

## 2022-12-07 DIAGNOSIS — C56.3 MALIGNANT NEOPLASM OF BOTH OVARIES (HCC): ICD-10-CM

## 2022-12-07 LAB
ALBUMIN SERPL BCP-MCNC: 3.1 G/DL (ref 3.5–5)
ALP SERPL-CCNC: 244 U/L (ref 46–116)
ALT SERPL W P-5'-P-CCNC: 46 U/L (ref 12–78)
ANION GAP SERPL CALCULATED.3IONS-SCNC: 8 MMOL/L (ref 4–13)
AST SERPL W P-5'-P-CCNC: 33 U/L (ref 5–45)
BASOPHILS # BLD AUTO: 0.04 THOUSANDS/ÂΜL (ref 0–0.1)
BASOPHILS NFR BLD AUTO: 1 % (ref 0–1)
BILIRUB SERPL-MCNC: 0.23 MG/DL (ref 0.2–1)
BUN SERPL-MCNC: 14 MG/DL (ref 5–25)
CALCIUM ALBUM COR SERPL-MCNC: 9.7 MG/DL (ref 8.3–10.1)
CALCIUM SERPL-MCNC: 9 MG/DL (ref 8.3–10.1)
CANCER AG125 SERPL-ACNC: 128.5 U/ML (ref 0–30)
CHLORIDE SERPL-SCNC: 104 MMOL/L (ref 96–108)
CO2 SERPL-SCNC: 30 MMOL/L (ref 21–32)
CREAT SERPL-MCNC: 0.6 MG/DL (ref 0.6–1.3)
EOSINOPHIL # BLD AUTO: 0.09 THOUSAND/ÂΜL (ref 0–0.61)
EOSINOPHIL NFR BLD AUTO: 2 % (ref 0–6)
ERYTHROCYTE [DISTWIDTH] IN BLOOD BY AUTOMATED COUNT: 16.6 % (ref 11.6–15.1)
GFR SERPL CREATININE-BSD FRML MDRD: 91 ML/MIN/1.73SQ M
GLUCOSE SERPL-MCNC: 128 MG/DL (ref 65–140)
HCT VFR BLD AUTO: 30.9 % (ref 34.8–46.1)
HGB BLD-MCNC: 9.6 G/DL (ref 11.5–15.4)
IMM GRANULOCYTES # BLD AUTO: 0.02 THOUSAND/UL (ref 0–0.2)
IMM GRANULOCYTES NFR BLD AUTO: 0 % (ref 0–2)
LYMPHOCYTES # BLD AUTO: 2.21 THOUSANDS/ÂΜL (ref 0.6–4.47)
LYMPHOCYTES NFR BLD AUTO: 36 % (ref 14–44)
MCH RBC QN AUTO: 32 PG (ref 26.8–34.3)
MCHC RBC AUTO-ENTMCNC: 31.1 G/DL (ref 31.4–37.4)
MCV RBC AUTO: 103 FL (ref 82–98)
MONOCYTES # BLD AUTO: 1.3 THOUSAND/ÂΜL (ref 0.17–1.22)
MONOCYTES NFR BLD AUTO: 21 % (ref 4–12)
NEUTROPHILS # BLD AUTO: 2.47 THOUSANDS/ÂΜL (ref 1.85–7.62)
NEUTS SEG NFR BLD AUTO: 40 % (ref 43–75)
NRBC BLD AUTO-RTO: 0 /100 WBCS
PLATELET # BLD AUTO: 189 THOUSANDS/UL (ref 149–390)
PMV BLD AUTO: 10.6 FL (ref 8.9–12.7)
POTASSIUM SERPL-SCNC: 3.5 MMOL/L (ref 3.5–5.3)
PROT SERPL-MCNC: 6.9 G/DL (ref 6.4–8.4)
RBC # BLD AUTO: 3 MILLION/UL (ref 3.81–5.12)
SODIUM SERPL-SCNC: 142 MMOL/L (ref 135–147)
WBC # BLD AUTO: 6.13 THOUSAND/UL (ref 4.31–10.16)

## 2022-12-12 ENCOUNTER — OFFICE VISIT (OUTPATIENT)
Dept: GYNECOLOGIC ONCOLOGY | Facility: CLINIC | Age: 72
End: 2022-12-12

## 2022-12-12 VITALS
HEIGHT: 59 IN | RESPIRATION RATE: 18 BRPM | OXYGEN SATURATION: 99 % | SYSTOLIC BLOOD PRESSURE: 126 MMHG | HEART RATE: 101 BPM | WEIGHT: 165 LBS | BODY MASS INDEX: 33.26 KG/M2 | DIASTOLIC BLOOD PRESSURE: 72 MMHG | TEMPERATURE: 96 F

## 2022-12-12 DIAGNOSIS — C56.3 MALIGNANT NEOPLASM OF BOTH OVARIES (HCC): Primary | ICD-10-CM

## 2022-12-12 DIAGNOSIS — G89.3 CANCER RELATED PAIN: ICD-10-CM

## 2022-12-12 DIAGNOSIS — G62.0 CHEMOTHERAPY-INDUCED PERIPHERAL NEUROPATHY (HCC): ICD-10-CM

## 2022-12-12 DIAGNOSIS — K12.31 ORAL MUCOSITIS (ULCERATIVE) DUE TO ANTINEOPLASTIC THERAPY: ICD-10-CM

## 2022-12-12 DIAGNOSIS — D70.1 CHEMOTHERAPY INDUCED NEUTROPENIA (HCC): ICD-10-CM

## 2022-12-12 DIAGNOSIS — T45.1X5A CHEMOTHERAPY INDUCED NEUTROPENIA (HCC): ICD-10-CM

## 2022-12-12 DIAGNOSIS — T45.1X5A CHEMOTHERAPY-INDUCED PERIPHERAL NEUROPATHY (HCC): ICD-10-CM

## 2022-12-12 PROBLEM — G89.18 POST-OP PAIN: Status: RESOLVED | Noted: 2022-11-29 | Resolved: 2022-12-12

## 2022-12-12 NOTE — PROGRESS NOTES
Assessment/Plan:    Problem List Items Addressed This Visit        Endocrine    Ovarian cancer ([facility] ) - Primary     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 on second line carbo/doxil presents for pre cycle 2    CBC, CMP, mag,  reviewed and all hematologic parameters support continued treatment    Will add avastin to cycle 2 - start at 10 mg/kg q2 weeks given previous toxicity and continue to monitor      Plan for repeat imaging after cycle 3  Proceed as planned                     Nervous and Auditory    Chemotherapy-induced peripheral neuropathy (HCC)     Still persistent  Increased gabapentin if needed             Other    Cancer related pain     Intermittent narcotics          Chemotherapy induced neutropenia (HCC)     stable         Oral mucositis (ulcerative) due to antineoplastic therapy     Grade 1   Continue to monitor  Symptom relief              CHIEF COMPLAINT: pre cycle 2      Problem:   Cancer Staging   Ovarian cancer ([place] [address] )  Staging form: Ovary, Fallopian Tube, Primary Peritoneal, AJCC 8th Edition  - Clinical: FIGO Stage IIIC (cT3c) - Signed by Alcides Oliver MD on 9/16/2021        Previous therapy:  Oncology History   Ovarian cancer ([place] [address] )   8/6/2021 Surgery    PROMISE/BSO/tumor debulking to optimal cytoreduction     8/25/2021 Initial Diagnosis    Ovarian cancer ([place] [address] )     9/16/2021 -  Cancer Staged    Staging form: Ovary, Fallopian Tube, Primary Peritoneal, AJCC 8th Edition  - Clinical: FIGO Stage IIIC (cT3c) - Signed by Alcides Oliver MD on 9/16/2021  Stage prefix: Initial diagnosis  Cancer antigen 125 () (U/mL): 543       9/28/2021 - 1/11/2022 Chemotherapy    Carbo AUC6, Taxol 175mg/m2 q3 weeks  Avastin 15mg/m2 added cycle 3    Toxicities:  Cycle4: carbo AUC 5 for neutropenia, added neulasta     10/2021 Genomic Testing    SEAN high  CPS >2     12/9/2021 Genetic Testing    POLD VUS     2/22/2022 - 11/2022 Chemotherapy    Maintenance:   Avastin 15mg/m2  Olaparib 300mg BID    Toxicity:  3/28/22: dose-reduce avastin to 10 mg/kg every 21 days as well as dose-reduction of olaparib to 150 mg AM and 300 mg PM  5/9/22: decrease olaparib to 200mg BID  6/7/2022: stopped avastin for bone pain       11/18/2022 -  Chemotherapy    Carbo AUC 5, Doxil 30 mg/m2, Avastin    Toxicities:            Patient ID: Ana Griffin is a 71 y o  female  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 on second line carbo/doxil presents for pre cycle 2  Pt reports on going grade 1 peripheral neuropathy  +mild nausea after chemo without emsis, resovled with meds  Intermittent LUQ pain  +grade 1 mucositis improved with magic mouth wash  No issues with BMs/urination  History:  Pt underwent primary debulking and had a prolonged hospitalization complicated by pancreatic leak and subsequent abscess, intraabdominal bleeding s/p IR drain placement  During hospitalization repeat imaging revealed new liver lesions despite R0 resection  Patient was started on adjuvant therapy with good response and was transitioned to Avastin and olaparib  Patient did not tolerate Avastin secondary to significant myalgias and arthralgias and she was continued on olaparib alone  Patient had noted progression in liver lesion in July of 2022 which were monitored with subsequent growth noted in September 2022  The following portions of the patient's history were reviewed and updated as appropriate: allergies, current medications, past family history, past medical history, past social history, past surgical history and problem list     Review of Systems   Constitutional: Positive for fatigue  Negative for appetite change, chills and fever  HENT: Positive for mouth sores  Respiratory: Negative for chest tightness and shortness of breath  Gastrointestinal: Negative for abdominal distention, abdominal pain, constipation, diarrhea and nausea     Genitourinary: Negative for difficulty urinating, flank pain, frequency, urgency, vaginal bleeding, vaginal discharge and vaginal pain  Musculoskeletal: Negative for back pain, joint swelling and myalgias  Skin: Negative for rash  Neurological: Positive for numbness  Negative for dizziness, light-headedness and headaches         Current Outpatient Medications   Medication Sig Dispense Refill   • acetaminophen (TYLENOL) 325 mg tablet Take 3 tablets (975 mg total) by mouth every 8 (eight) hours     • al mag oxide-diphenhydramine-lidocaine viscous (MAGIC MOUTHWASH) 1:1:1 suspension Swish and spit 10 mL every 4 (four) hours as needed for mouth pain or discomfort 300 mL 0   • albuterol (PROVENTIL HFA,VENTOLIN HFA) 90 mcg/act inhaler Inhale 2 puffs every 6 (six) hours as needed for wheezing 18 g 1   • amLODIPine-benazepril (LOTREL) 10-20 MG per capsule Take 1 capsule by mouth daily     • butalbital-aspirin-caffeine (FIORINAL) -40 mg per capsule Take 1 capsule by mouth every 4 (four) hours as needed for headaches or migraine 30 capsule 0   • DULoxetine (CYMBALTA) 20 mg capsule Take 1 capsule (20 mg total) by mouth daily 30 capsule 3   • fluticasone (FLONASE) 50 mcg/act nasal spray 1 spray into each nostril daily     • gabapentin (Neurontin) 300 mg capsule Take 1 capsule (300 mg total) by mouth 3 (three) times a day 270 capsule 0   • lidocaine (Lidoderm) 5 % Apply 1 patch topically daily Remove & Discard patch within 12 hours - apply to painful area (Patient taking differently: Apply 1 patch topically daily Remove & Discard patch within 12 hours - apply to painful area-taking as needed) 30 patch 0   • Liniments (Blue-Emu Super Strength) CREA Apply topically as needed (joint pain)     • LORazepam (ATIVAN) 1 mg tablet Take 0 5 tablets (0 5 mg total) by mouth every 8 (eight) hours as needed (nausea or anxiety or insomnia) 45 tablet 0   • naloxone (NARCAN) 4 mg/0 1 mL nasal spray 0 1 mL (4 mg total) by Alternating Nares route every 3 (three) minutes as needed (accidental opioid overdose or respiratory depression) 1 each 1   • omeprazole (PriLOSEC) 20 mg delayed release capsule Take 20 mg by mouth 2 (two) times a day     • ondansetron (ZOFRAN) 8 mg tablet Take 1 tablet (8 mg total) by mouth every 8 (eight) hours as needed for nausea or vomiting 40 tablet 2   • oxyCODONE (OxyCONTIN) 10 mg 12 hr tablet Take 1 tablet (10 mg total) by mouth every 8 (eight) hours Max Daily Amount: 30 mg 90 tablet 0   • oxyCODONE (Roxicodone) 5 immediate release tablet Take 1 tablet (5 mg total) by mouth every 4 (four) hours as needed for moderate pain or severe pain Max Daily Amount: 30 mg 180 tablet 0   • pravastatin (PRAVACHOL) 10 mg tablet Take 10 mg by mouth daily As needed     • docusate sodium (COLACE) 100 mg capsule Take 1 capsule (100 mg total) by mouth 2 (two) times a day (Patient not taking: Reported on 11/29/2022)     • fluticasone (Flovent HFA) 44 mcg/act inhaler Inhale 2 puffs 2 (two) times a day Rinse mouth after use  (Patient not taking: Reported on 11/29/2022) 10 6 g 0   • polyethylene glycol (MIRALAX) 17 g packet Take 17 g by mouth daily for 7 days 119 g 0   • potassium chloride (KLOR-CON) 20 mEq packet Take 20 mEq by mouth 2 (two) times a day (Patient not taking: Reported on 11/29/2022) 60 each 1   • senna (SENOKOT) 8 6 mg Take 1 tablet (8 6 mg total) by mouth daily as needed for constipation for up to 7 days 7 tablet 0     No current facility-administered medications for this visit  Objective:    Blood pressure 126/72, pulse 101, temperature (!) 96 °F (35 6 °C), temperature source Temporal, resp  rate 18, height 4' 11" (1 499 m), weight 74 8 kg (165 lb), SpO2 99 %  Body mass index is 33 33 kg/m²  Body surface area is 1 7 meters squared  Physical Exam  HENT:      Head: Normocephalic and atraumatic  Nose: Nose normal    Cardiovascular:      Rate and Rhythm: Normal rate and regular rhythm     Pulmonary:      Effort: Pulmonary effort is normal    Abdominal:      General: There is no distension  Palpations: Abdomen is soft  There is no mass  Genitourinary:     Comments: defer  Musculoskeletal:         General: No swelling  Normal range of motion  Cervical back: Normal range of motion  Skin:     General: Skin is warm and dry  Neurological:      General: No focal deficit present  Mental Status: She is alert     Psychiatric:         Mood and Affect: Mood normal            Lab Results   Component Value Date    K 3 5 12/07/2022     12/07/2022    CO2 30 12/07/2022    BUN 14 12/07/2022    CREATININE 0 60 12/07/2022    GLUCOSE 195 (H) 08/06/2021    GLUF 97 10/27/2022    CALCIUM 9 0 12/07/2022    CORRECTEDCA 9 7 12/07/2022    AST 33 12/07/2022    ALT 46 12/07/2022    ALKPHOS 244 (H) 12/07/2022    EGFR 91 12/07/2022     Lab Results   Component Value Date    WBC 6 13 12/07/2022    HGB 9 6 (L) 12/07/2022    HCT 30 9 (L) 12/07/2022     (H) 12/07/2022     12/07/2022     Lab Results   Component Value Date    NEUTROABS 2 47 12/07/2022        Trend:  Lab Results   Component Value Date     128 5 (H) 12/07/2022     129 0 (H) 11/16/2022     72 0 (H) 10/27/2022     41 1 (H) 09/15/2022     18 2 08/08/2022     10 6 07/11/2022     7 5 06/03/2022     7 9 05/13/2022     8 9 04/22/2022     9 5 04/01/2022     7 6 03/11/2022     10 5 02/11/2022     15 5 01/10/2022     12 5 12/17/2021     11 6 11/26/2021     19 4 11/05/2021     69 9 (H) 10/15/2021     94 1 (H) 09/24/2021     543 7 (H) 07/20/2021

## 2022-12-12 NOTE — ASSESSMENT & PLAN NOTE
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 on second line carbo/doxil presents for pre cycle 2    CBC, CMP, mag,  reviewed and all hematologic parameters support continued treatment    Will add avastin to cycle 2 - start at 10 mg/kg q2 weeks given previous toxicity and continue to monitor      Plan for repeat imaging after cycle 3  Proceed as planned

## 2022-12-14 ENCOUNTER — HOSPITAL ENCOUNTER (OUTPATIENT)
Dept: INFUSION CENTER | Facility: HOSPITAL | Age: 72
Discharge: HOME | End: 2022-12-14

## 2022-12-14 DIAGNOSIS — Z45.2 ENCOUNTER FOR CENTRAL LINE CARE: Primary | ICD-10-CM

## 2022-12-14 DIAGNOSIS — C56.3 MALIGNANT NEOPLASM OF BOTH OVARIES (HCC): ICD-10-CM

## 2022-12-14 LAB
ALBUMIN SERPL BCP-MCNC: 3.3 G/DL (ref 3.5–5)
ALP SERPL-CCNC: 256 U/L (ref 46–116)
ALT SERPL W P-5'-P-CCNC: 44 U/L (ref 12–78)
ANION GAP SERPL CALCULATED.3IONS-SCNC: 11 MMOL/L (ref 4–13)
AST SERPL W P-5'-P-CCNC: 39 U/L (ref 5–45)
BASOPHILS # BLD AUTO: 0.04 THOUSANDS/ÂΜL (ref 0–0.1)
BASOPHILS NFR BLD AUTO: 1 % (ref 0–1)
BILIRUB SERPL-MCNC: 0.33 MG/DL (ref 0.2–1)
BUN SERPL-MCNC: 13 MG/DL (ref 5–25)
CALCIUM ALBUM COR SERPL-MCNC: 9.7 MG/DL (ref 8.3–10.1)
CALCIUM SERPL-MCNC: 9.1 MG/DL (ref 8.3–10.1)
CHLORIDE SERPL-SCNC: 101 MMOL/L (ref 96–108)
CO2 SERPL-SCNC: 26 MMOL/L (ref 21–32)
CREAT SERPL-MCNC: 0.7 MG/DL (ref 0.6–1.3)
CREAT UR-MCNC: 39.5 MG/DL
EOSINOPHIL # BLD AUTO: 0.09 THOUSAND/ÂΜL (ref 0–0.61)
EOSINOPHIL NFR BLD AUTO: 1 % (ref 0–6)
ERYTHROCYTE [DISTWIDTH] IN BLOOD BY AUTOMATED COUNT: 16.7 % (ref 11.6–15.1)
GFR SERPL CREATININE-BSD FRML MDRD: 87 ML/MIN/1.73SQ M
GLUCOSE SERPL-MCNC: 163 MG/DL (ref 65–140)
HCT VFR BLD AUTO: 33 % (ref 34.8–46.1)
HGB BLD-MCNC: 10.4 G/DL (ref 11.5–15.4)
IMM GRANULOCYTES # BLD AUTO: 0.02 THOUSAND/UL (ref 0–0.2)
IMM GRANULOCYTES NFR BLD AUTO: 0 % (ref 0–2)
LYMPHOCYTES # BLD AUTO: 2.41 THOUSANDS/ÂΜL (ref 0.6–4.47)
LYMPHOCYTES NFR BLD AUTO: 31 % (ref 14–44)
MCH RBC QN AUTO: 32.2 PG (ref 26.8–34.3)
MCHC RBC AUTO-ENTMCNC: 31.5 G/DL (ref 31.4–37.4)
MCV RBC AUTO: 102 FL (ref 82–98)
MONOCYTES # BLD AUTO: 1.51 THOUSAND/ÂΜL (ref 0.17–1.22)
MONOCYTES NFR BLD AUTO: 20 % (ref 4–12)
NEUTROPHILS # BLD AUTO: 3.65 THOUSANDS/ÂΜL (ref 1.85–7.62)
NEUTS SEG NFR BLD AUTO: 47 % (ref 43–75)
NRBC BLD AUTO-RTO: 0 /100 WBCS
PLATELET # BLD AUTO: 367 THOUSANDS/UL (ref 149–390)
PMV BLD AUTO: 9.9 FL (ref 8.9–12.7)
POTASSIUM SERPL-SCNC: 3.3 MMOL/L (ref 3.5–5.3)
PROT SERPL-MCNC: 7.2 G/DL (ref 6.4–8.4)
PROT UR-MCNC: 6 MG/DL
PROT/CREAT UR: 0.15 MG/G{CREAT} (ref 0–0.1)
RBC # BLD AUTO: 3.23 MILLION/UL (ref 3.81–5.12)
SODIUM SERPL-SCNC: 138 MMOL/L (ref 135–147)
WBC # BLD AUTO: 7.72 THOUSAND/UL (ref 4.31–10.16)

## 2022-12-14 NOTE — PROGRESS NOTES
Urine sample & labs obtained via port  Good blood return noted, flushed per protocol   Offers no complaints

## 2022-12-15 RX ORDER — DEXTROSE MONOHYDRATE 50 MG/ML
20 INJECTION, SOLUTION INTRAVENOUS ONCE
Status: CANCELLED | OUTPATIENT
Start: 2022-12-16

## 2022-12-15 RX ORDER — SODIUM CHLORIDE 9 MG/ML
20 INJECTION, SOLUTION INTRAVENOUS ONCE
Status: CANCELLED | OUTPATIENT
Start: 2022-12-16

## 2022-12-16 ENCOUNTER — HOSPITAL ENCOUNTER (OUTPATIENT)
Dept: INFUSION CENTER | Facility: HOSPITAL | Age: 72
End: 2022-12-16

## 2022-12-16 VITALS
HEART RATE: 92 BPM | RESPIRATION RATE: 18 BRPM | WEIGHT: 164.46 LBS | OXYGEN SATURATION: 96 % | DIASTOLIC BLOOD PRESSURE: 63 MMHG | SYSTOLIC BLOOD PRESSURE: 133 MMHG | TEMPERATURE: 97.7 F | BODY MASS INDEX: 33.16 KG/M2 | HEIGHT: 59 IN

## 2022-12-16 DIAGNOSIS — C56.3 MALIGNANT NEOPLASM OF BOTH OVARIES (HCC): Primary | ICD-10-CM

## 2022-12-16 RX ORDER — SODIUM CHLORIDE 9 MG/ML
20 INJECTION, SOLUTION INTRAVENOUS ONCE
Status: COMPLETED | OUTPATIENT
Start: 2022-12-16 | End: 2022-12-16

## 2022-12-16 RX ORDER — DEXTROSE MONOHYDRATE 50 MG/ML
20 INJECTION, SOLUTION INTRAVENOUS ONCE
Status: COMPLETED | OUTPATIENT
Start: 2022-12-16 | End: 2022-12-16

## 2022-12-16 RX ADMIN — BEVACIZUMAB-AWWB 800 MG: 400 INJECTION, SOLUTION INTRAVENOUS at 13:48

## 2022-12-16 RX ADMIN — DEXAMETHASONE SODIUM PHOSPHATE 20 MG: 10 INJECTION, SOLUTION INTRAMUSCULAR; INTRAVENOUS at 09:44

## 2022-12-16 RX ADMIN — GRANISETRON HYDROCHLORIDE 1 MG: 1 INJECTION, SOLUTION INTRAVENOUS at 09:11

## 2022-12-16 RX ADMIN — CARBOPLATIN 474 MG: 10 INJECTION, SOLUTION INTRAVENOUS at 12:31

## 2022-12-16 RX ADMIN — SODIUM CHLORIDE 20 ML/HR: 0.9 INJECTION, SOLUTION INTRAVENOUS at 09:10

## 2022-12-16 RX ADMIN — FAMOTIDINE 20 MG: 10 INJECTION, SOLUTION INTRAVENOUS at 10:06

## 2022-12-16 RX ADMIN — DOXORUBICIN HYDROCHLORIDE 50 MG: 2 INJECTION, SUSPENSION, LIPOSOMAL INTRAVENOUS at 11:14

## 2022-12-16 RX ADMIN — FOSAPREPITANT 150 MG: 150 INJECTION, POWDER, LYOPHILIZED, FOR SOLUTION INTRAVENOUS at 10:28

## 2022-12-16 RX ADMIN — DEXTROSE 20 ML/HR: 50 INJECTION, SOLUTION INTRAVENOUS at 11:14

## 2022-12-21 ENCOUNTER — HOSPITAL ENCOUNTER (OUTPATIENT)
Dept: INFUSION CENTER | Facility: HOSPITAL | Age: 72
Discharge: HOME | End: 2022-12-21

## 2022-12-21 VITALS — TEMPERATURE: 97.8 F

## 2022-12-21 DIAGNOSIS — Z45.2 ENCOUNTER FOR CENTRAL LINE CARE: Primary | ICD-10-CM

## 2022-12-21 DIAGNOSIS — C56.3 MALIGNANT NEOPLASM OF BOTH OVARIES (HCC): ICD-10-CM

## 2022-12-21 LAB
ALBUMIN SERPL BCP-MCNC: 3.3 G/DL (ref 3.5–5)
ALP SERPL-CCNC: 264 U/L (ref 46–116)
ALT SERPL W P-5'-P-CCNC: 45 U/L (ref 12–78)
ANION GAP SERPL CALCULATED.3IONS-SCNC: 12 MMOL/L (ref 4–13)
AST SERPL W P-5'-P-CCNC: 33 U/L (ref 5–45)
BASOPHILS # BLD AUTO: 0.02 THOUSANDS/ÂΜL (ref 0–0.1)
BASOPHILS NFR BLD AUTO: 0 % (ref 0–1)
BILIRUB SERPL-MCNC: 0.42 MG/DL (ref 0.2–1)
BUN SERPL-MCNC: 18 MG/DL (ref 5–25)
CALCIUM ALBUM COR SERPL-MCNC: 9.6 MG/DL (ref 8.3–10.1)
CALCIUM SERPL-MCNC: 9 MG/DL (ref 8.3–10.1)
CHLORIDE SERPL-SCNC: 99 MMOL/L (ref 96–108)
CO2 SERPL-SCNC: 26 MMOL/L (ref 21–32)
CREAT SERPL-MCNC: 0.69 MG/DL (ref 0.6–1.3)
EOSINOPHIL # BLD AUTO: 0.05 THOUSAND/ÂΜL (ref 0–0.61)
EOSINOPHIL NFR BLD AUTO: 0 % (ref 0–6)
ERYTHROCYTE [DISTWIDTH] IN BLOOD BY AUTOMATED COUNT: 16.2 % (ref 11.6–15.1)
GFR SERPL CREATININE-BSD FRML MDRD: 87 ML/MIN/1.73SQ M
GLUCOSE SERPL-MCNC: 116 MG/DL (ref 65–140)
HCT VFR BLD AUTO: 36.4 % (ref 34.8–46.1)
HGB BLD-MCNC: 11.7 G/DL (ref 11.5–15.4)
IMM GRANULOCYTES # BLD AUTO: 0.05 THOUSAND/UL (ref 0–0.2)
IMM GRANULOCYTES NFR BLD AUTO: 0 % (ref 0–2)
LYMPHOCYTES # BLD AUTO: 2.74 THOUSANDS/ÂΜL (ref 0.6–4.47)
LYMPHOCYTES NFR BLD AUTO: 21 % (ref 14–44)
MAGNESIUM SERPL-MCNC: 2 MG/DL (ref 1.6–2.6)
MCH RBC QN AUTO: 32 PG (ref 26.8–34.3)
MCHC RBC AUTO-ENTMCNC: 32.1 G/DL (ref 31.4–37.4)
MCV RBC AUTO: 100 FL (ref 82–98)
MONOCYTES # BLD AUTO: 2.4 THOUSAND/ÂΜL (ref 0.17–1.22)
MONOCYTES NFR BLD AUTO: 19 % (ref 4–12)
NEUTROPHILS # BLD AUTO: 7.59 THOUSANDS/ÂΜL (ref 1.85–7.62)
NEUTS SEG NFR BLD AUTO: 60 % (ref 43–75)
NRBC BLD AUTO-RTO: 1 /100 WBCS
PLATELET # BLD AUTO: 427 THOUSANDS/UL (ref 149–390)
PMV BLD AUTO: 9.3 FL (ref 8.9–12.7)
POTASSIUM SERPL-SCNC: 3.3 MMOL/L (ref 3.5–5.3)
PROT SERPL-MCNC: 7.8 G/DL (ref 6.4–8.4)
RBC # BLD AUTO: 3.66 MILLION/UL (ref 3.81–5.12)
SODIUM SERPL-SCNC: 137 MMOL/L (ref 135–147)
WBC # BLD AUTO: 12.85 THOUSAND/UL (ref 4.31–10.16)

## 2022-12-23 ENCOUNTER — TELEPHONE (OUTPATIENT)
Dept: PALLIATIVE MEDICINE | Age: 72
End: 2022-12-23

## 2022-12-23 NOTE — TELEPHONE ENCOUNTER
PA need for Zofran 8 mg  Patient's insurance will only pay for 9 tablets  ID# [id number]  # [phone]    Pharmacy will dispense 9 pills today

## 2022-12-27 ENCOUNTER — OFFICE VISIT (OUTPATIENT)
Dept: PALLIATIVE MEDICINE | Facility: CLINIC | Age: 72
End: 2022-12-27

## 2022-12-27 VITALS
TEMPERATURE: 97.7 F | SYSTOLIC BLOOD PRESSURE: 155 MMHG | WEIGHT: 164.4 LBS | OXYGEN SATURATION: 94 % | DIASTOLIC BLOOD PRESSURE: 78 MMHG | HEART RATE: 84 BPM | BODY MASS INDEX: 33.19 KG/M2

## 2022-12-27 DIAGNOSIS — C56.3 MALIGNANT NEOPLASM OF BOTH OVARIES (HCC): Primary | ICD-10-CM

## 2022-12-27 DIAGNOSIS — Z51.5 PALLIATIVE CARE PATIENT: ICD-10-CM

## 2022-12-27 DIAGNOSIS — G89.3 CANCER RELATED PAIN: ICD-10-CM

## 2022-12-27 DIAGNOSIS — Z86.16 PERSONAL HISTORY OF COVID-19: ICD-10-CM

## 2022-12-27 DIAGNOSIS — K59.03 DRUG INDUCED CONSTIPATION: ICD-10-CM

## 2022-12-27 DIAGNOSIS — G89.18 JOINT PAIN FOLLOWING CHEMOTHERAPY: ICD-10-CM

## 2022-12-27 DIAGNOSIS — T45.1X5A CHEMOTHERAPY-INDUCED PERIPHERAL NEUROPATHY (HCC): ICD-10-CM

## 2022-12-27 DIAGNOSIS — G47.01 INSOMNIA DUE TO MEDICAL CONDITION: ICD-10-CM

## 2022-12-27 DIAGNOSIS — M25.50 JOINT PAIN FOLLOWING CHEMOTHERAPY: ICD-10-CM

## 2022-12-27 DIAGNOSIS — M54.32 SCIATICA OF LEFT SIDE: ICD-10-CM

## 2022-12-27 DIAGNOSIS — R53.0 NEOPLASTIC MALIGNANT RELATED FATIGUE: ICD-10-CM

## 2022-12-27 DIAGNOSIS — E46 PROTEIN-CALORIE MALNUTRITION, UNSPECIFIED SEVERITY (HCC): ICD-10-CM

## 2022-12-27 DIAGNOSIS — F41.9 ANXIOUSNESS: ICD-10-CM

## 2022-12-27 DIAGNOSIS — K21.9 ESOPHAGEAL REFLUX: ICD-10-CM

## 2022-12-27 DIAGNOSIS — G43.909 MIGRAINE: ICD-10-CM

## 2022-12-27 DIAGNOSIS — G62.0 CHEMOTHERAPY-INDUCED PERIPHERAL NEUROPATHY (HCC): ICD-10-CM

## 2022-12-27 RX ORDER — OXYCODONE HYDROCHLORIDE 5 MG/1
5 TABLET ORAL EVERY 4 HOURS PRN
Qty: 180 TABLET | Refills: 0 | Status: SHIPPED | OUTPATIENT
Start: 2022-12-27

## 2022-12-27 RX ORDER — OXYCODONE HCL 10 MG/1
10 TABLET, FILM COATED, EXTENDED RELEASE ORAL EVERY 8 HOURS SCHEDULED
Qty: 90 TABLET | Refills: 0 | Status: SHIPPED | OUTPATIENT
Start: 2022-12-27

## 2022-12-27 RX ORDER — BUTALBITAL, ASPIRIN, AND CAFFEINE 50; 325; 40 MG/1; MG/1; MG/1
1 CAPSULE ORAL EVERY 4 HOURS PRN
Qty: 30 CAPSULE | Refills: 0 | Status: SHIPPED | OUTPATIENT
Start: 2022-12-27

## 2022-12-27 RX ORDER — LORAZEPAM 1 MG/1
0.5 TABLET ORAL EVERY 8 HOURS PRN
Qty: 45 TABLET | Refills: 0 | Status: SHIPPED | OUTPATIENT
Start: 2022-12-27

## 2022-12-27 RX ORDER — DULOXETIN HYDROCHLORIDE 20 MG/1
20 CAPSULE, DELAYED RELEASE ORAL DAILY
Qty: 30 CAPSULE | Refills: 2 | Status: SHIPPED | OUTPATIENT
Start: 2022-12-27

## 2022-12-27 NOTE — PATIENT INSTRUCTIONS
It was good to see you today  Thank you for coming in  I'm glad you're getting some relief of nausea with your current regimen (including the marijuana products)  Happy New Year! Return in about 1 month  Call us for refills on medications that we supply, as needed  If something changes and you need to come in sooner, please call our office  PRESCRIPTION REFILL REMINDER:  All medication refills should be requested prior to [facility] on Friday  Any refill requests after noon on Friday would be addressed the following Monday

## 2022-12-27 NOTE — PROGRESS NOTES
Follow-up with Palliative and [facility] Tono Sims 71 y o  female [id number]    ASSESSMENT & PLAN:  1  Malignant neoplasm of both ovaries ([facility] [address] )    2  Personal history of COVID-19    3  Esophageal reflux    4  Cancer related pain    5  Chemotherapy-induced peripheral neuropathy ([facility] [address] )    6  Anxiousness    7  Insomnia due to medical condition    8  Drug induced constipation    9  Neoplastic malignant related fatigue    10  Protein-calorie malnutrition, unspecified severity ([facility] [address] )    11  Joint pain following chemotherapy    12  Migraine    13  Sciatica of left side    14  Palliative care patient          • Continue disease-directed cares  • Patient reports her chronic pain is well-managed on her current regimen  o Continue lidocaine patch (using 4% lidocaine patch OTC, PRN, as insurer did not cover 5% patch) for sciatic flares  o Continue oxyER 10mg q8h ATC for chronic pain, some of which is post-operative, some of which is cancer-related  o Continue oxyIR PRN breakthrough pain  o Continue gabapentin   o May use MMW for chemo-induced oral mucositis  o Continue Fiorinal for migraines  • Continue duloxetine for mood, insomnia, stress  Effective  • Continue dietary modifications (Clutier's apple juice) and/or OTC medications for constipation  Effective  • Continue PPI for GERD; effective  • Continue Zofran PRN N/V (1st line)  • Continue lorazepam PRN anxiousness, insomnia, 2nd-line N/V   • Continue Queasy Pops / Queasy Drops PRN  • Patient is getting some benefit from legal recreational marijuana, for nausea  • Naloxone Rx provided in previous visit  • Patient had reported receiving [address] vaccinations  • Reviewed notes (Gynecologic Oncology), labs (12/21/22 K 3 3, alb 3 3, Cr 0 69, , Hb 11 7; 12/7/22  128 5; 11/16/22  129 0; 8/8/22  18 2), imaging + procedures (11/14/22 CTCAP, 9/19/22 MRI abdomen)  • Return in about 1 month (around 1/27/2023)    • Emotional support provided  • Medication safety issues addressed - no driving under the influence of narcotics, watch for adverse effects including AMS and respiratory depression, keep medications stored in a safe/locked environment  Requested Prescriptions     Signed Prescriptions Disp Refills   • DULoxetine (CYMBALTA) 20 mg capsule 30 capsule 2     Sig: Take 1 capsule (20 mg total) by mouth daily   • LORazepam (ATIVAN) 1 mg tablet 45 tablet 0     Sig: Take 0 5 tablets (0 5 mg total) by mouth every 8 (eight) hours as needed (nausea or anxiety or insomnia)   • butalbital-aspirin-caffeine (FIORINAL) -40 mg per capsule 30 capsule 0     Sig: Take 1 capsule by mouth every 4 (four) hours as needed for headaches or migraine   • oxyCODONE (OxyCONTIN) 10 mg 12 hr tablet 90 tablet 0     Sig: Take 1 tablet (10 mg total) by mouth every 8 (eight) hours Max Daily Amount: 30 mg   • oxyCODONE (Roxicodone) 5 immediate release tablet 180 tablet 0     Sig: Take 1 tablet (5 mg total) by mouth every 4 (four) hours as needed for moderate pain or severe pain Max Daily Amount: 30 mg       Medications Discontinued During This Encounter   Medication Reason   • DULoxetine (CYMBALTA) 20 mg capsule Reorder   • butalbital-aspirin-caffeine (FIORINAL) -40 mg per capsule Reorder   • oxyCODONE (OxyCONTIN) 10 mg 12 hr tablet Reorder   • oxyCODONE (Roxicodone) 5 immediate release tablet Reorder   • LORazepam (ATIVAN) 1 mg tablet Reorder       Representatives have queried the patient's controlled substance dispensing history in the Prescription Drug Monitoring Program in compliance with regulations before I have prescribed any controlled substances  The prescription history is consistent with prescribed therapy and our practice policies        40 minutes were spent in this ambulatory visit with greater than 50% of the time spent face to face with patient and her sister Isrrael Choudhury in counseling or coordination of care including discussions of symptom assessment and management, medication review, psychosocial support, chart review, imaging review, lab review, goals of care, medical marijuana, supportive listening and anticipatory guidance  All of the patient's questions were answered during this discussion  SUBJECTIVE:  Chief Complaint   Patient presents with   • Cancer   • Pain   • Follow-up   • Nausea   • Counseling   • Anxiety        HPI    Abida Soliman is a 71 y o  female w/ 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 s/p PROMISE+BSO and tumor debulking s/p chemo w/ paclitaxel + carboplatin, now receiving doxorubicin + carboplatin + bevacizumab  She follows w/ Dr Rosa Isela Gambino (Gynecologic Oncology), Dr Dawson Perez (Surgical Oncology)  Patient is known to [facility]; seen 11/29/22 for symptom assessment and management, medication review, medication adjustment, psychosocial support, chart review, imaging review, lab review, goals of care, medical marijuana, opioid titration, supportive listening and anticipatory guidance  Patient reports nausea is ongoing, as she feels "queasy" often throughout the day  This can affect appetite and overall reduces QOL  She feels Zofran provides limited relief, but in conjunction w/ Queasy Pops / Queasy Drops, and marijuana edibles (marijuana chocolate) she can manage  She did vomit once last week, which is rare for her  She does not like to use lorazepam (2nd line for nausea) unless absolutely necessary, as it can be sedating  Otherwise, patient is tolerating her treatments fairly well  Her chronic pain is well controlled  See ROS below for more symptoms and their management  PDMP shows no concerns  The following portions of the medical history were reviewed: past medical history, surgical history, problem list, medication list, family history, and social history        Current Outpatient Medications:   •  acetaminophen (TYLENOL) 325 mg tablet, Take 3 tablets (975 mg total) by mouth every 8 (eight) hours, Disp: , Rfl:   •  al mag oxide-diphenhydramine-lidocaine viscous (MAGIC MOUTHWASH) 1:1:1 suspension, Swish and spit 10 mL every 4 (four) hours as needed for mouth pain or discomfort, Disp: 300 mL, Rfl: 0  •  albuterol (PROVENTIL HFA,VENTOLIN HFA) 90 mcg/act inhaler, Inhale 2 puffs every 6 (six) hours as needed for wheezing, Disp: 18 g, Rfl: 1  •  amLODIPine-benazepril (LOTREL) 10-20 MG per capsule, Take 1 capsule by mouth daily, Disp: , Rfl:   •  butalbital-aspirin-caffeine (FIORINAL) -40 mg per capsule, Take 1 capsule by mouth every 4 (four) hours as needed for headaches or migraine, Disp: 30 capsule, Rfl: 0  •  DULoxetine (CYMBALTA) 20 mg capsule, Take 1 capsule (20 mg total) by mouth daily, Disp: 30 capsule, Rfl: 2  •  fluticasone (FLONASE) 50 mcg/act nasal spray, 1 spray into each nostril daily, Disp: , Rfl:   •  gabapentin (Neurontin) 300 mg capsule, Take 1 capsule (300 mg total) by mouth 3 (three) times a day, Disp: 270 capsule, Rfl: 0  •  lidocaine (Lidoderm) 5 %, Apply 1 patch topically daily Remove & Discard patch within 12 hours - apply to painful area (Patient taking differently: Apply 1 patch topically daily Remove & Discard patch within 12 hours - apply to painful area-taking as needed), Disp: 30 patch, Rfl: 0  •  LORazepam (ATIVAN) 1 mg tablet, Take 0 5 tablets (0 5 mg total) by mouth every 8 (eight) hours as needed (nausea or anxiety or insomnia), Disp: 45 tablet, Rfl: 0  •  omeprazole (PriLOSEC) 20 mg delayed release capsule, Take 20 mg by mouth 2 (two) times a day, Disp: , Rfl:   •  ondansetron (ZOFRAN) 8 mg tablet, Take 1 tablet (8 mg total) by mouth every 8 (eight) hours as needed for nausea or vomiting, Disp: 40 tablet, Rfl: 2  •  oxyCODONE (OxyCONTIN) 10 mg 12 hr tablet, Take 1 tablet (10 mg total) by mouth every 8 (eight) hours Max Daily Amount: 30 mg, Disp: 90 tablet, Rfl: 0  •  oxyCODONE (Roxicodone) 5 immediate release tablet, Take 1 tablet (5 mg total) by mouth every 4 (four) hours as needed for moderate pain or severe pain Max Daily Amount: 30 mg, Disp: 180 tablet, Rfl: 0  •  pravastatin (PRAVACHOL) 10 mg tablet, Take 10 mg by mouth daily As needed, Disp: , Rfl:   •  docusate sodium (COLACE) 100 mg capsule, Take 1 capsule (100 mg total) by mouth 2 (two) times a day (Patient not taking: Reported on 11/29/2022), Disp: , Rfl:   •  fluticasone (Flovent HFA) 44 mcg/act inhaler, Inhale 2 puffs 2 (two) times a day Rinse mouth after use  (Patient not taking: Reported on 11/29/2022), Disp: 10 6 g, Rfl: 0  •  Liniments (Blue-Emu Super Strength) CREA, Apply topically as needed (joint pain), Disp: , Rfl:   •  naloxone (NARCAN) 4 mg/0 1 mL nasal spray, 0 1 mL (4 mg total) by Alternating Nares route every 3 (three) minutes as needed (accidental opioid overdose or respiratory depression), Disp: 1 each, Rfl: 1  •  polyethylene glycol (MIRALAX) 17 g packet, Take 17 g by mouth daily for 7 days, Disp: 119 g, Rfl: 0  •  potassium chloride (KLOR-CON) 20 mEq packet, Take 20 mEq by mouth 2 (two) times a day (Patient not taking: Reported on 11/29/2022), Disp: 60 each, Rfl: 1  •  senna (SENOKOT) 8 6 mg, Take 1 tablet (8 6 mg total) by mouth daily as needed for constipation for up to 7 days, Disp: 7 tablet, Rfl: 0    Review of Systems   Constitutional: Positive for activity change, appetite change (primarily d/t nausea) and fatigue  Gastrointestinal: Positive for abdominal pain, constipation (improves w/ apple juice, OTC products), nausea and vomiting  GERD  Musculoskeletal: Positive for arthralgias and back pain  Allergic/Immunologic: Positive for immunocompromised state  Neurological: Positive for headaches  Sciatica  Neuropathy  Psychiatric/Behavioral: Positive for dysphoric mood and sleep disturbance  The patient is nervous/anxious  All other systems reviewed and are negative          OBJECTIVE:  /78 (BP Location: Right arm, Patient Position: Sitting, Cuff Size: Standard)   Pulse 84   Temp 97 7 °F (36 5 °C) (Temporal)   Wt 74 6 kg (164 lb 6 4 oz)   SpO2 94%   BMI 33 19 kg/m²   Physical Exam  Vitals reviewed  Constitutional:       General: She is not in acute distress  Appearance: She is well-groomed and overweight  She is ill-appearing (chronically)  She is not toxic-appearing  HENT:      Head: Normocephalic and atraumatic  Right Ear: External ear normal       Left Ear: External ear normal    Eyes:      General: No scleral icterus  Right eye: No discharge  Left eye: No discharge  Extraocular Movements: Extraocular movements intact  Conjunctiva/sclera: Conjunctivae normal       Pupils: Pupils are equal, round, and reactive to light  Cardiovascular:      Rate and Rhythm: Normal rate  Pulmonary:      Effort: Pulmonary effort is normal  No tachypnea, bradypnea, accessory muscle usage or respiratory distress  Abdominal:      General: There is no distension  Tenderness: There is no guarding  Musculoskeletal:      Cervical back: Normal range of motion  Right lower leg: No edema  Left lower leg: No edema  Skin:     General: Skin is dry  Coloration: Skin is not pale  Neurological:      Mental Status: She is alert and oriented to person, place, and time  Cranial Nerves: No dysarthria or facial asymmetry  Gait: Gait is intact  Psychiatric:         Attention and Perception: Attention normal          Mood and Affect: Mood and affect normal          Speech: Speech normal          Behavior: Behavior normal  Behavior is cooperative  Thought Content:  Thought content normal          Cognition and Memory: Cognition and memory normal          Judgment: Judgment normal           Lianne Melo MD  [facility]  [phone]    Portions of this document may have been created using dictation software and as such some "sound alike" terms may have been generated by the system  Do not hesitate to contact me with any questions or clarifications

## 2022-12-28 ENCOUNTER — HOSPITAL ENCOUNTER (OUTPATIENT)
Dept: INFUSION CENTER | Facility: HOSPITAL | Age: 72
Discharge: HOME | End: 2022-12-28

## 2022-12-28 DIAGNOSIS — Z45.2 ENCOUNTER FOR CENTRAL LINE CARE: Primary | ICD-10-CM

## 2022-12-28 DIAGNOSIS — C56.3 MALIGNANT NEOPLASM OF BOTH OVARIES (HCC): ICD-10-CM

## 2022-12-28 LAB
ALBUMIN SERPL BCP-MCNC: 2.6 G/DL (ref 3.5–5)
ALP SERPL-CCNC: 366 U/L (ref 46–116)
ALT SERPL W P-5'-P-CCNC: 35 U/L (ref 12–78)
ANION GAP SERPL CALCULATED.3IONS-SCNC: 8 MMOL/L (ref 4–13)
AST SERPL W P-5'-P-CCNC: 31 U/L (ref 5–45)
BASOPHILS # BLD AUTO: 0.05 THOUSANDS/ÂΜL (ref 0–0.1)
BASOPHILS NFR BLD AUTO: 1 % (ref 0–1)
BILIRUB SERPL-MCNC: 0.2 MG/DL (ref 0.2–1)
BUN SERPL-MCNC: 9 MG/DL (ref 5–25)
CALCIUM ALBUM COR SERPL-MCNC: 9.9 MG/DL (ref 8.3–10.1)
CALCIUM SERPL-MCNC: 8.8 MG/DL (ref 8.3–10.1)
CHLORIDE SERPL-SCNC: 99 MMOL/L (ref 96–108)
CO2 SERPL-SCNC: 29 MMOL/L (ref 21–32)
CREAT SERPL-MCNC: 0.46 MG/DL (ref 0.6–1.3)
CREAT UR-MCNC: 99.1 MG/DL
EOSINOPHIL # BLD AUTO: 0.02 THOUSAND/ÂΜL (ref 0–0.61)
EOSINOPHIL NFR BLD AUTO: 0 % (ref 0–6)
ERYTHROCYTE [DISTWIDTH] IN BLOOD BY AUTOMATED COUNT: 16 % (ref 11.6–15.1)
GFR SERPL CREATININE-BSD FRML MDRD: 100 ML/MIN/1.73SQ M
GLUCOSE SERPL-MCNC: 102 MG/DL (ref 65–140)
HCT VFR BLD AUTO: 30.6 % (ref 34.8–46.1)
HGB BLD-MCNC: 9.9 G/DL (ref 11.5–15.4)
IMM GRANULOCYTES # BLD AUTO: 0.05 THOUSAND/UL (ref 0–0.2)
IMM GRANULOCYTES NFR BLD AUTO: 1 % (ref 0–2)
LYMPHOCYTES # BLD AUTO: 1.47 THOUSANDS/ÂΜL (ref 0.6–4.47)
LYMPHOCYTES NFR BLD AUTO: 25 % (ref 14–44)
MCH RBC QN AUTO: 32.4 PG (ref 26.8–34.3)
MCHC RBC AUTO-ENTMCNC: 32.4 G/DL (ref 31.4–37.4)
MCV RBC AUTO: 100 FL (ref 82–98)
MONOCYTES # BLD AUTO: 0.56 THOUSAND/ÂΜL (ref 0.17–1.22)
MONOCYTES NFR BLD AUTO: 9 % (ref 4–12)
NEUTROPHILS # BLD AUTO: 3.82 THOUSANDS/ÂΜL (ref 1.85–7.62)
NEUTS SEG NFR BLD AUTO: 64 % (ref 43–75)
NRBC BLD AUTO-RTO: 0 /100 WBCS
PLATELET # BLD AUTO: 255 THOUSANDS/UL (ref 149–390)
PMV BLD AUTO: 9.5 FL (ref 8.9–12.7)
POTASSIUM SERPL-SCNC: 3.5 MMOL/L (ref 3.5–5.3)
PROT SERPL-MCNC: 6.9 G/DL (ref 6.4–8.4)
PROT UR-MCNC: 24 MG/DL
PROT/CREAT UR: 0.24 MG/G{CREAT} (ref 0–0.1)
RBC # BLD AUTO: 3.06 MILLION/UL (ref 3.81–5.12)
SODIUM SERPL-SCNC: 136 MMOL/L (ref 135–147)
WBC # BLD AUTO: 5.97 THOUSAND/UL (ref 4.31–10.16)

## 2022-12-28 NOTE — TELEPHONE ENCOUNTER
Pa completed via cmm  Per  Note Drug is covered by current benefit plan  No further PA activity needed       Call to pharmacy to confirm  After review, medication is covered (40 tabs) but pt may only receive 9 tablets ( 3 days) each time      LMOM for pt re same

## 2022-12-29 RX ORDER — SODIUM CHLORIDE 9 MG/ML
20 INJECTION, SOLUTION INTRAVENOUS ONCE
Status: CANCELLED | OUTPATIENT
Start: 2022-12-30

## 2022-12-30 ENCOUNTER — HOSPITAL ENCOUNTER (OUTPATIENT)
Dept: INFUSION CENTER | Facility: HOSPITAL | Age: 72
End: 2022-12-30

## 2022-12-30 VITALS
WEIGHT: 162.92 LBS | BODY MASS INDEX: 32.89 KG/M2 | OXYGEN SATURATION: 96 % | DIASTOLIC BLOOD PRESSURE: 72 MMHG | SYSTOLIC BLOOD PRESSURE: 133 MMHG | TEMPERATURE: 97.2 F | HEART RATE: 89 BPM | RESPIRATION RATE: 18 BRPM

## 2022-12-30 DIAGNOSIS — T45.1X5A CHEMOTHERAPY INDUCED NEUTROPENIA (HCC): ICD-10-CM

## 2022-12-30 DIAGNOSIS — D70.1 CHEMOTHERAPY INDUCED NEUTROPENIA (HCC): ICD-10-CM

## 2022-12-30 DIAGNOSIS — C56.3 MALIGNANT NEOPLASM OF BOTH OVARIES (HCC): Primary | ICD-10-CM

## 2022-12-30 RX ORDER — SODIUM CHLORIDE 9 MG/ML
20 INJECTION, SOLUTION INTRAVENOUS ONCE
Status: COMPLETED | OUTPATIENT
Start: 2022-12-30 | End: 2022-12-30

## 2022-12-30 RX ADMIN — BEVACIZUMAB-AWWB 745 MG: 400 INJECTION, SOLUTION INTRAVENOUS at 11:25

## 2022-12-30 RX ADMIN — SODIUM CHLORIDE 20 ML/HR: 9 INJECTION, SOLUTION INTRAVENOUS at 11:25

## 2023-01-04 ENCOUNTER — HOSPITAL ENCOUNTER (OUTPATIENT)
Dept: INFUSION CENTER | Facility: HOSPITAL | Age: 73
Discharge: HOME | End: 2023-01-04

## 2023-01-04 VITALS — TEMPERATURE: 96.7 F

## 2023-01-04 DIAGNOSIS — C56.3 MALIGNANT NEOPLASM OF BOTH OVARIES (HCC): ICD-10-CM

## 2023-01-04 DIAGNOSIS — Z45.2 ENCOUNTER FOR CENTRAL LINE CARE: Primary | ICD-10-CM

## 2023-01-04 LAB
ALBUMIN SERPL BCP-MCNC: 2.7 G/DL (ref 3.5–5)
ALP SERPL-CCNC: 468 U/L (ref 46–116)
ALT SERPL W P-5'-P-CCNC: 39 U/L (ref 12–78)
ANION GAP SERPL CALCULATED.3IONS-SCNC: 9 MMOL/L (ref 4–13)
AST SERPL W P-5'-P-CCNC: 36 U/L (ref 5–45)
BASOPHILS # BLD AUTO: 0.03 THOUSANDS/ÂΜL (ref 0–0.1)
BASOPHILS NFR BLD AUTO: 1 % (ref 0–1)
BILIRUB SERPL-MCNC: 0.21 MG/DL (ref 0.2–1)
BUN SERPL-MCNC: 12 MG/DL (ref 5–25)
CALCIUM ALBUM COR SERPL-MCNC: 10 MG/DL (ref 8.3–10.1)
CALCIUM SERPL-MCNC: 9 MG/DL (ref 8.3–10.1)
CHLORIDE SERPL-SCNC: 98 MMOL/L (ref 96–108)
CO2 SERPL-SCNC: 27 MMOL/L (ref 21–32)
CREAT SERPL-MCNC: 0.7 MG/DL (ref 0.6–1.3)
EOSINOPHIL # BLD AUTO: 0.03 THOUSAND/ÂΜL (ref 0–0.61)
EOSINOPHIL NFR BLD AUTO: 1 % (ref 0–6)
ERYTHROCYTE [DISTWIDTH] IN BLOOD BY AUTOMATED COUNT: 16.9 % (ref 11.6–15.1)
GFR SERPL CREATININE-BSD FRML MDRD: 87 ML/MIN/1.73SQ M
GLUCOSE SERPL-MCNC: 144 MG/DL (ref 65–140)
HCT VFR BLD AUTO: 32.1 % (ref 34.8–46.1)
HGB BLD-MCNC: 10.2 G/DL (ref 11.5–15.4)
IMM GRANULOCYTES # BLD AUTO: 0.02 THOUSAND/UL (ref 0–0.2)
IMM GRANULOCYTES NFR BLD AUTO: 0 % (ref 0–2)
LYMPHOCYTES # BLD AUTO: 2.01 THOUSANDS/ÂΜL (ref 0.6–4.47)
LYMPHOCYTES NFR BLD AUTO: 32 % (ref 14–44)
MAGNESIUM SERPL-MCNC: 1.6 MG/DL (ref 1.6–2.6)
MCH RBC QN AUTO: 31.9 PG (ref 26.8–34.3)
MCHC RBC AUTO-ENTMCNC: 31.8 G/DL (ref 31.4–37.4)
MCV RBC AUTO: 100 FL (ref 82–98)
MONOCYTES # BLD AUTO: 1.49 THOUSAND/ÂΜL (ref 0.17–1.22)
MONOCYTES NFR BLD AUTO: 24 % (ref 4–12)
NEUTROPHILS # BLD AUTO: 2.69 THOUSANDS/ÂΜL (ref 1.85–7.62)
NEUTS SEG NFR BLD AUTO: 42 % (ref 43–75)
NRBC BLD AUTO-RTO: 0 /100 WBCS
PLATELET # BLD AUTO: 312 THOUSANDS/UL (ref 149–390)
PMV BLD AUTO: 10.1 FL (ref 8.9–12.7)
POTASSIUM SERPL-SCNC: 3.7 MMOL/L (ref 3.5–5.3)
PROT SERPL-MCNC: 7.2 G/DL (ref 6.4–8.4)
RBC # BLD AUTO: 3.2 MILLION/UL (ref 3.81–5.12)
SODIUM SERPL-SCNC: 134 MMOL/L (ref 135–147)
WBC # BLD AUTO: 6.27 THOUSAND/UL (ref 4.31–10.16)

## 2023-01-06 LAB — CANCER AG125 SERPL-ACNC: 146.7 U/ML (ref 0–30)

## 2023-01-06 NOTE — ASSESSMENT & PLAN NOTE
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 on second line carbo/doxil presents for pre cycle 3  CBC, CMP, mag,  reviewed and all hematologic parameters support continued treatment   rising slightly  D/w pt that sometimes doxil may take a little longer to work however given rise in  we will get a CT after cycle 3 to assess and determine next steps       Proceed as planned

## 2023-01-06 NOTE — PROGRESS NOTES
Assessment/Plan:    Problem List Items Addressed This Visit        Digestive    Drug induced constipation     Managed with meds             Endocrine    [facility] ([facility] ) - Primary     73yo with 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 on second line carbo/doxil presents for pre cycle 3  CBC, CMP, mag,  reviewed and all hematologic parameters support continued treatment   rising slightly  D/w pt that sometimes doxil may take a little longer to work however given rise in  we will get a CT after cycle 3 to assess and determine next steps       Proceed as planned          Relevant Orders    CT chest abdomen pelvis w contrast       Nervous and Auditory    Chemotherapy-induced peripheral neuropathy (HCC)     Grade 1             Other    Chemotherapy-induced nausea     Persistent despite premedication  Will add pre/post steroids     Continue antiemetics          Neoplastic malignant related fatigue    Oral mucositis (ulcerative) due to antineoplastic therapy     Intermittent  Magic mouth wash with some relief              CHIEF COMPLAINT: pre cycle 3      Problem:   Cancer Staging   Ovarian cancer ([facility] )  Staging form: Ovary, Fallopian Tube, Primary Peritoneal, AJCC 8th Edition  - Clinical: FIGO Stage IIIC (cT3c) - Signed by Judy Sparks MD on 9/16/2021        Previous therapy:  Oncology History   [facility]   8/6/2021 Surgery    PROMISE/BSO/tumor debulking to optimal cytoreduction     8/25/2021 Initial Diagnosis    Ovarian cancer ([place] [address] )     9/16/2021 -  Cancer Staged    Staging form: Ovary, Fallopian Tube, Primary Peritoneal, AJCC 8th Edition  - Clinical: FIGO Stage IIIC (cT3c) - Signed by Judy Sparks MD on 9/16/2021  Stage prefix: Initial diagnosis  Cancer antigen 125 () (U/mL): 543       9/28/2021 - 1/11/2022 Chemotherapy    Carbo AUC6, Taxol 175mg/m2 q3 weeks  Avastin 15mg/m2 added cycle 3    Toxicities:  Cycle4: carbo AUC 5 for neutropenia, added neulasta     10/2021 Genomic Testing    SEAN high  PDL1+ CPS >2     12/9/2021 Genetic Testing    POLD VUS     2/22/2022 - 11/2022 Chemotherapy    Maintenance: Avastin 15mg/m2  Olaparib 300mg BID    Toxicity:  3/28/22: dose-reduce avastin to 10 mg/kg every 21 days as well as dose-reduction of olaparib to 150 mg AM and 300 mg PM  5/9/22: decrease olaparib to 200mg BID  6/7/2022: stopped avastin for bone pain       11/18/2022 -  Chemotherapy    Carbo AUC 5, Doxil 30 mg/m2, Avastin    Toxicities:            Patient ID: Bernice Alcocer is a 71 y o  female  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 on second line carbo/doxil presents for pre cycle 3  Patient reports that she has intermittent mouth sores that are relieved with Magic mouthwash  She had an episode of severe constipation after eating too much cheese but was relieved with MiraLAX  She has continued peripheral neuropathy grade 1  Her appetite is decreased and she has significant nausea/upset stomach that persists throughout the cycle  She also reports worsening fatigue but tries to remain active  History:  Pt underwent primary debulking and had a prolonged hospitalization complicated by pancreatic leak and subsequent abscess, intraabdominal bleeding s/p IR drain placement  During hospitalization repeat imaging revealed new liver lesions despite R0 resection  Patient was started on adjuvant therapy with good response and was transitioned to Avastin and olaparib  Patient did not tolerate Avastin secondary to significant myalgias and arthralgias and she was continued on olaparib alone  Patient had noted progression in liver lesion in July of 2022 which were monitored with subsequent growth noted in September 2022        The following portions of the patient's history were reviewed and updated as appropriate: allergies, current medications, past family history, past medical history, past social history, past surgical history and problem list     Review of Systems   Constitutional: Positive for appetite change and fatigue  Negative for chills and fever  Respiratory: Negative for chest tightness and shortness of breath  Gastrointestinal: Positive for constipation  Negative for abdominal distention, abdominal pain, diarrhea and nausea  Genitourinary: Negative for difficulty urinating, flank pain, frequency, urgency, vaginal bleeding, vaginal discharge and vaginal pain  Musculoskeletal: Negative for back pain, joint swelling and myalgias  Skin: Negative for rash  Neurological: Positive for numbness  Negative for dizziness, light-headedness and headaches         Current Outpatient Medications   Medication Sig Dispense Refill   • acetaminophen (TYLENOL) 325 mg tablet Take 3 tablets (975 mg total) by mouth every 8 (eight) hours     • al mag oxide-diphenhydramine-lidocaine viscous (MAGIC MOUTHWASH) 1:1:1 suspension Swish and spit 10 mL every 4 (four) hours as needed for mouth pain or discomfort 300 mL 0   • albuterol (PROVENTIL HFA,VENTOLIN HFA) 90 mcg/act inhaler Inhale 2 puffs every 6 (six) hours as needed for wheezing 18 g 1   • amLODIPine-benazepril (LOTREL) 10-20 MG per capsule Take 1 capsule by mouth daily     • butalbital-aspirin-caffeine (FIORINAL) -40 mg per capsule Take 1 capsule by mouth every 4 (four) hours as needed for headaches or migraine 30 capsule 0   • DULoxetine (CYMBALTA) 20 mg capsule Take 1 capsule (20 mg total) by mouth daily 30 capsule 2   • fluticasone (FLONASE) 50 mcg/act nasal spray 1 spray into each nostril daily     • gabapentin (Neurontin) 300 mg capsule Take 1 capsule (300 mg total) by mouth 3 (three) times a day 270 capsule 0   • lidocaine (Lidoderm) 5 % Apply 1 patch topically daily Remove & Discard patch within 12 hours - apply to painful area (Patient taking differently: Apply 1 patch topically daily Remove & Discard patch within 12 hours - apply to painful area-taking as needed) 30 patch 0   • Liniments (Blue-Emu Super Strength) CREA Apply topically as needed (joint pain)     • LORazepam (ATIVAN) 1 mg tablet Take 0 5 tablets (0 5 mg total) by mouth every 8 (eight) hours as needed (nausea or anxiety or insomnia) 45 tablet 0   • naloxone (NARCAN) 4 mg/0 1 mL nasal spray 0 1 mL (4 mg total) by Alternating Nares route every 3 (three) minutes as needed (accidental opioid overdose or respiratory depression) 1 each 1   • omeprazole (PriLOSEC) 20 mg delayed release capsule Take 20 mg by mouth 2 (two) times a day     • ondansetron (ZOFRAN) 8 mg tablet Take 1 tablet (8 mg total) by mouth every 8 (eight) hours as needed for nausea or vomiting 40 tablet 2   • oxyCODONE (OxyCONTIN) 10 mg 12 hr tablet Take 1 tablet (10 mg total) by mouth every 8 (eight) hours Max Daily Amount: 30 mg 90 tablet 0   • oxyCODONE (Roxicodone) 5 immediate release tablet Take 1 tablet (5 mg total) by mouth every 4 (four) hours as needed for moderate pain or severe pain Max Daily Amount: 30 mg 180 tablet 0   • polyethylene glycol (MIRALAX) 17 g packet Take 17 g by mouth daily for 7 days (Patient taking differently: Take 17 g by mouth daily PRN) 119 g 0   • pravastatin (PRAVACHOL) 10 mg tablet Take 10 mg by mouth daily As needed     • senna (SENOKOT) 8 6 mg Take 1 tablet (8 6 mg total) by mouth daily as needed for constipation for up to 7 days (Patient taking differently: Take 8 6 mg by mouth daily as needed for constipation PRN) 7 tablet 0   • docusate sodium (COLACE) 100 mg capsule Take 1 capsule (100 mg total) by mouth 2 (two) times a day (Patient not taking: Reported on 11/29/2022)     • fluticasone (Flovent HFA) 44 mcg/act inhaler Inhale 2 puffs 2 (two) times a day Rinse mouth after use   (Patient not taking: Reported on 11/29/2022) 10 6 g 0   • potassium chloride (KLOR-CON) 20 mEq packet Take 20 mEq by mouth 2 (two) times a day (Patient not taking: Reported on 11/29/2022) 60 each 1     No current facility-administered medications for this visit  Objective:    Blood pressure 118/78, pulse (!) 106, temperature (!) 97 4 °F (36 3 °C), temperature source Temporal, weight 73 kg (161 lb), SpO2 96 %  Body mass index is 32 5 kg/m²  Body surface area is 1 68 meters squared  Physical Exam  HENT:      Head: Normocephalic and atraumatic  Nose: Nose normal    Cardiovascular:      Rate and Rhythm: Normal rate and regular rhythm  Pulmonary:      Effort: Pulmonary effort is normal    Abdominal:      General: There is no distension  Palpations: Abdomen is soft  There is no mass  Genitourinary:     Comments: defer  Musculoskeletal:         General: No swelling  Normal range of motion  Cervical back: Normal range of motion  Skin:     General: Skin is warm and dry  Neurological:      General: No focal deficit present  Mental Status: She is alert     Psychiatric:         Mood and Affect: Mood normal            Lab Results   Component Value Date    K 3 7 01/04/2023    CL 98 01/04/2023    CO2 27 01/04/2023    BUN 12 01/04/2023    CREATININE 0 70 01/04/2023    GLUCOSE 195 (H) 08/06/2021    GLUF 97 10/27/2022    CALCIUM 9 0 01/04/2023    CORRECTEDCA 10 0 01/04/2023    AST 36 01/04/2023    ALT 39 01/04/2023    ALKPHOS 468 (H) 01/04/2023    EGFR 87 01/04/2023     Lab Results   Component Value Date    WBC 6 27 01/04/2023    HGB 10 2 (L) 01/04/2023    HCT 32 1 (L) 01/04/2023     (H) 01/04/2023     01/04/2023     Lab Results   Component Value Date    NEUTROABS 2 69 01/04/2023        Trend:  Lab Results   Component Value Date     146 7 (H) 01/04/2023     128 5 (H) 12/07/2022     129 0 (H) 11/16/2022     72 0 (H) 10/27/2022     41 1 (H) 09/15/2022     18 2 08/08/2022     10 6 07/11/2022     7 5 06/03/2022     7 9 05/13/2022     8 9 04/22/2022     9 5 04/01/2022     7 6 03/11/2022     10 5 02/11/2022     15 5 01/10/2022     12 5 12/17/2021     11 6 11/26/2021     19 4 11/05/2021     69 9 (H) 10/15/2021     94 1 (H) 09/24/2021     543 7 (H) 07/20/2021

## 2023-01-09 ENCOUNTER — OFFICE VISIT (OUTPATIENT)
Dept: GYNECOLOGIC ONCOLOGY | Facility: CLINIC | Age: 73
End: 2023-01-09

## 2023-01-09 VITALS
OXYGEN SATURATION: 96 % | HEART RATE: 106 BPM | BODY MASS INDEX: 32.5 KG/M2 | TEMPERATURE: 97.4 F | WEIGHT: 161 LBS | SYSTOLIC BLOOD PRESSURE: 118 MMHG | DIASTOLIC BLOOD PRESSURE: 78 MMHG

## 2023-01-09 DIAGNOSIS — R53.0 NEOPLASTIC MALIGNANT RELATED FATIGUE: ICD-10-CM

## 2023-01-09 DIAGNOSIS — K12.31 ORAL MUCOSITIS (ULCERATIVE) DUE TO ANTINEOPLASTIC THERAPY: ICD-10-CM

## 2023-01-09 DIAGNOSIS — G62.0 CHEMOTHERAPY-INDUCED PERIPHERAL NEUROPATHY (HCC): ICD-10-CM

## 2023-01-09 DIAGNOSIS — R11.0 CHEMOTHERAPY-INDUCED NAUSEA: ICD-10-CM

## 2023-01-09 DIAGNOSIS — T45.1X5A CHEMOTHERAPY-INDUCED NAUSEA: Primary | ICD-10-CM

## 2023-01-09 DIAGNOSIS — R11.0 CHEMOTHERAPY-INDUCED NAUSEA: Primary | ICD-10-CM

## 2023-01-09 DIAGNOSIS — C56.3 MALIGNANT NEOPLASM OF BOTH OVARIES (HCC): Primary | ICD-10-CM

## 2023-01-09 DIAGNOSIS — K59.03 DRUG INDUCED CONSTIPATION: ICD-10-CM

## 2023-01-09 DIAGNOSIS — T45.1X5A CHEMOTHERAPY-INDUCED PERIPHERAL NEUROPATHY (HCC): ICD-10-CM

## 2023-01-09 DIAGNOSIS — T45.1X5A CHEMOTHERAPY-INDUCED NAUSEA: ICD-10-CM

## 2023-01-09 RX ORDER — DEXAMETHASONE 2 MG/1
TABLET ORAL
Qty: 24 TABLET | Refills: 1 | Status: SHIPPED | OUTPATIENT
Start: 2023-01-09

## 2023-01-12 ENCOUNTER — HOSPITAL ENCOUNTER (OUTPATIENT)
Dept: INFUSION CENTER | Facility: HOSPITAL | Age: 73
Discharge: HOME | End: 2023-01-12

## 2023-01-12 VITALS — TEMPERATURE: 97 F

## 2023-01-12 DIAGNOSIS — C56.3 MALIGNANT NEOPLASM OF BOTH OVARIES (HCC): Primary | ICD-10-CM

## 2023-01-12 DIAGNOSIS — Z45.2 ENCOUNTER FOR CENTRAL LINE CARE: ICD-10-CM

## 2023-01-12 LAB
ALBUMIN SERPL BCP-MCNC: 2.8 G/DL (ref 3.5–5)
ALP SERPL-CCNC: 474 U/L (ref 46–116)
ALT SERPL W P-5'-P-CCNC: 31 U/L (ref 12–78)
ANION GAP SERPL CALCULATED.3IONS-SCNC: 10 MMOL/L (ref 4–13)
AST SERPL W P-5'-P-CCNC: 37 U/L (ref 5–45)
BASOPHILS # BLD AUTO: 0.05 THOUSANDS/ÂΜL (ref 0–0.1)
BASOPHILS NFR BLD AUTO: 0 % (ref 0–1)
BILIRUB SERPL-MCNC: 0.39 MG/DL (ref 0.2–1)
BUN SERPL-MCNC: 10 MG/DL (ref 5–25)
CALCIUM ALBUM COR SERPL-MCNC: 10.4 MG/DL (ref 8.3–10.1)
CALCIUM SERPL-MCNC: 9.4 MG/DL (ref 8.3–10.1)
CHLORIDE SERPL-SCNC: 99 MMOL/L (ref 96–108)
CO2 SERPL-SCNC: 28 MMOL/L (ref 21–32)
CREAT SERPL-MCNC: 0.67 MG/DL (ref 0.6–1.3)
EOSINOPHIL # BLD AUTO: 0.04 THOUSAND/ÂΜL (ref 0–0.61)
EOSINOPHIL NFR BLD AUTO: 0 % (ref 0–6)
ERYTHROCYTE [DISTWIDTH] IN BLOOD BY AUTOMATED COUNT: 16.9 % (ref 11.6–15.1)
GFR SERPL CREATININE-BSD FRML MDRD: 88 ML/MIN/1.73SQ M
GLUCOSE SERPL-MCNC: 134 MG/DL (ref 65–140)
HCT VFR BLD AUTO: 35.4 % (ref 34.8–46.1)
HGB BLD-MCNC: 11.3 G/DL (ref 11.5–15.4)
IMM GRANULOCYTES # BLD AUTO: 0.05 THOUSAND/UL (ref 0–0.2)
IMM GRANULOCYTES NFR BLD AUTO: 0 % (ref 0–2)
LYMPHOCYTES # BLD AUTO: 2.64 THOUSANDS/ÂΜL (ref 0.6–4.47)
LYMPHOCYTES NFR BLD AUTO: 21 % (ref 14–44)
MCH RBC QN AUTO: 31.5 PG (ref 26.8–34.3)
MCHC RBC AUTO-ENTMCNC: 31.9 G/DL (ref 31.4–37.4)
MCV RBC AUTO: 99 FL (ref 82–98)
MONOCYTES # BLD AUTO: 2.51 THOUSAND/ÂΜL (ref 0.17–1.22)
MONOCYTES NFR BLD AUTO: 20 % (ref 4–12)
NEUTROPHILS # BLD AUTO: 7.26 THOUSANDS/ÂΜL (ref 1.85–7.62)
NEUTS SEG NFR BLD AUTO: 59 % (ref 43–75)
NRBC BLD AUTO-RTO: 0 /100 WBCS
PLATELET # BLD AUTO: 590 THOUSANDS/UL (ref 149–390)
PMV BLD AUTO: 9.8 FL (ref 8.9–12.7)
POTASSIUM SERPL-SCNC: 3.8 MMOL/L (ref 3.5–5.3)
PROT SERPL-MCNC: 7.3 G/DL (ref 6.4–8.4)
RBC # BLD AUTO: 3.59 MILLION/UL (ref 3.81–5.12)
SODIUM SERPL-SCNC: 137 MMOL/L (ref 135–147)
WBC # BLD AUTO: 12.55 THOUSAND/UL (ref 4.31–10.16)

## 2023-01-13 ENCOUNTER — TELEPHONE (OUTPATIENT)
Dept: GYNECOLOGIC ONCOLOGY | Facility: CLINIC | Age: 73
End: 2023-01-13

## 2023-01-13 LAB
CREAT UR-MCNC: 56.1 MG/DL
PROT UR-MCNC: 10 MG/DL
PROT/CREAT UR: 0.18 MG/G{CREAT} (ref 0–0.1)

## 2023-01-13 RX ORDER — SODIUM CHLORIDE 9 MG/ML
20 INJECTION, SOLUTION INTRAVENOUS ONCE
Status: CANCELLED | OUTPATIENT
Start: 2023-01-16

## 2023-01-13 RX ORDER — DEXTROSE MONOHYDRATE 50 MG/ML
20 INJECTION, SOLUTION INTRAVENOUS ONCE
Status: CANCELLED | OUTPATIENT
Start: 2023-01-16

## 2023-01-13 NOTE — TELEPHONE ENCOUNTER
Called and spoke to patient regarding her appointment with Dr Rhina Darby on 2/6/23   Since Dr Rhina Darby will be unavailable at 10 am, we rescheduled her appointment time to 9 am

## 2023-01-16 ENCOUNTER — HOSPITAL ENCOUNTER (OUTPATIENT)
Dept: INFUSION CENTER | Facility: HOSPITAL | Age: 73
Discharge: HOME | End: 2023-01-16

## 2023-01-16 VITALS
HEIGHT: 59 IN | TEMPERATURE: 96.2 F | WEIGHT: 158.73 LBS | RESPIRATION RATE: 18 BRPM | SYSTOLIC BLOOD PRESSURE: 136 MMHG | DIASTOLIC BLOOD PRESSURE: 72 MMHG | HEART RATE: 99 BPM | BODY MASS INDEX: 32 KG/M2 | OXYGEN SATURATION: 97 %

## 2023-01-16 DIAGNOSIS — C56.3 MALIGNANT NEOPLASM OF BOTH OVARIES (HCC): Primary | ICD-10-CM

## 2023-01-16 RX ORDER — DEXTROSE MONOHYDRATE 50 MG/ML
20 INJECTION, SOLUTION INTRAVENOUS ONCE
Status: COMPLETED | OUTPATIENT
Start: 2023-01-16 | End: 2023-01-16

## 2023-01-16 RX ORDER — SODIUM CHLORIDE 9 MG/ML
20 INJECTION, SOLUTION INTRAVENOUS ONCE
Status: COMPLETED | OUTPATIENT
Start: 2023-01-16 | End: 2023-01-16

## 2023-01-16 RX ADMIN — DOXORUBICIN HYDROCHLORIDE 50 MG: 2 INJECTION, SUSPENSION, LIPOSOMAL INTRAVENOUS at 12:46

## 2023-01-16 RX ADMIN — GRANISETRON HYDROCHLORIDE 1 MG: 1 INJECTION, SOLUTION INTRAVENOUS at 09:01

## 2023-01-16 RX ADMIN — SODIUM CHLORIDE 20 ML/HR: 0.9 INJECTION, SOLUTION INTRAVENOUS at 08:59

## 2023-01-16 RX ADMIN — FOSAPREPITANT 150 MG: 150 INJECTION, POWDER, LYOPHILIZED, FOR SOLUTION INTRAVENOUS at 10:20

## 2023-01-16 RX ADMIN — BEVACIZUMAB-AWWB 745 MG: 400 INJECTION, SOLUTION INTRAVENOUS at 11:10

## 2023-01-16 RX ADMIN — FAMOTIDINE 20 MG: 10 INJECTION, SOLUTION INTRAVENOUS at 09:58

## 2023-01-16 RX ADMIN — CARBOPLATIN 469 MG: 10 INJECTION, SOLUTION INTRAVENOUS at 13:52

## 2023-01-16 RX ADMIN — DEXTROSE 20 ML/HR: 50 INJECTION, SOLUTION INTRAVENOUS at 12:44

## 2023-01-16 RX ADMIN — DEXAMETHASONE SODIUM PHOSPHATE 20 MG: 10 INJECTION, SOLUTION INTRAMUSCULAR; INTRAVENOUS at 09:34

## 2023-01-16 NOTE — PROGRESS NOTES
Relevant clinical data and pertinent labs reviewed:    Recent Labs     01/12/23  1614   NEUTROABS 7 26   *   HGB 11 3*   CREATININE 0 67   ALT 31   AST 37   TBILI 0 39       Treatment date and cycle number verified? yes  Treatment regimen verified and compared to standard reference? yes  Review of chemotherapy order completed and dosage confirmed? yes  Patient-specific laboratory parameters reviewed?  Yes    Jaya Nieves, Pharmacist

## 2023-01-19 ENCOUNTER — HOSPITAL ENCOUNTER (OUTPATIENT)
Dept: INFUSION CENTER | Facility: HOSPITAL | Age: 73
Discharge: HOME | End: 2023-01-19

## 2023-01-19 VITALS — TEMPERATURE: 96.5 F

## 2023-01-19 DIAGNOSIS — Z45.2 ENCOUNTER FOR CENTRAL LINE CARE: Primary | ICD-10-CM

## 2023-01-19 DIAGNOSIS — C56.3 MALIGNANT NEOPLASM OF BOTH OVARIES (HCC): ICD-10-CM

## 2023-01-19 LAB
ALBUMIN SERPL BCP-MCNC: 2.8 G/DL (ref 3.5–5)
ALP SERPL-CCNC: 391 U/L (ref 46–116)
ALT SERPL W P-5'-P-CCNC: 40 U/L (ref 12–78)
ANION GAP SERPL CALCULATED.3IONS-SCNC: 10 MMOL/L (ref 4–13)
AST SERPL W P-5'-P-CCNC: 37 U/L (ref 5–45)
BASOPHILS # BLD AUTO: 0.01 THOUSANDS/ÂΜL (ref 0–0.1)
BASOPHILS NFR BLD AUTO: 0 % (ref 0–1)
BILIRUB SERPL-MCNC: 0.29 MG/DL (ref 0.2–1)
BUN SERPL-MCNC: 19 MG/DL (ref 5–25)
CALCIUM ALBUM COR SERPL-MCNC: 10.1 MG/DL (ref 8.3–10.1)
CALCIUM SERPL-MCNC: 9.1 MG/DL (ref 8.3–10.1)
CHLORIDE SERPL-SCNC: 97 MMOL/L (ref 96–108)
CO2 SERPL-SCNC: 28 MMOL/L (ref 21–32)
CREAT SERPL-MCNC: 0.66 MG/DL (ref 0.6–1.3)
EOSINOPHIL # BLD AUTO: 0 THOUSAND/ÂΜL (ref 0–0.61)
EOSINOPHIL NFR BLD AUTO: 0 % (ref 0–6)
ERYTHROCYTE [DISTWIDTH] IN BLOOD BY AUTOMATED COUNT: 17 % (ref 11.6–15.1)
GFR SERPL CREATININE-BSD FRML MDRD: 89 ML/MIN/1.73SQ M
GLUCOSE SERPL-MCNC: 128 MG/DL (ref 65–140)
HCT VFR BLD AUTO: 36.8 % (ref 34.8–46.1)
HGB BLD-MCNC: 11.9 G/DL (ref 11.5–15.4)
IMM GRANULOCYTES # BLD AUTO: 0.06 THOUSAND/UL (ref 0–0.2)
IMM GRANULOCYTES NFR BLD AUTO: 1 % (ref 0–2)
LYMPHOCYTES # BLD AUTO: 2.67 THOUSANDS/ÂΜL (ref 0.6–4.47)
LYMPHOCYTES NFR BLD AUTO: 21 % (ref 14–44)
MAGNESIUM SERPL-MCNC: 1.7 MG/DL (ref 1.6–2.6)
MCH RBC QN AUTO: 31.8 PG (ref 26.8–34.3)
MCHC RBC AUTO-ENTMCNC: 32.3 G/DL (ref 31.4–37.4)
MCV RBC AUTO: 98 FL (ref 82–98)
MONOCYTES # BLD AUTO: 1.97 THOUSAND/ÂΜL (ref 0.17–1.22)
MONOCYTES NFR BLD AUTO: 16 % (ref 4–12)
NEUTROPHILS # BLD AUTO: 8.03 THOUSANDS/ÂΜL (ref 1.85–7.62)
NEUTS SEG NFR BLD AUTO: 62 % (ref 43–75)
NRBC BLD AUTO-RTO: 0 /100 WBCS
PLATELET # BLD AUTO: 567 THOUSANDS/UL (ref 149–390)
PMV BLD AUTO: 9.5 FL (ref 8.9–12.7)
POTASSIUM SERPL-SCNC: 3.3 MMOL/L (ref 3.5–5.3)
PROT SERPL-MCNC: 7.4 G/DL (ref 6.4–8.4)
RBC # BLD AUTO: 3.74 MILLION/UL (ref 3.81–5.12)
SODIUM SERPL-SCNC: 135 MMOL/L (ref 135–147)
WBC # BLD AUTO: 12.74 THOUSAND/UL (ref 4.31–10.16)

## 2023-01-26 ENCOUNTER — HOSPITAL ENCOUNTER (OUTPATIENT)
Dept: INFUSION CENTER | Facility: HOSPITAL | Age: 73
Discharge: HOME | End: 2023-01-26

## 2023-01-26 DIAGNOSIS — C56.3 MALIGNANT NEOPLASM OF BOTH OVARIES (HCC): ICD-10-CM

## 2023-01-26 DIAGNOSIS — Z45.2 ENCOUNTER FOR CENTRAL LINE CARE: Primary | ICD-10-CM

## 2023-01-26 LAB
ALBUMIN SERPL BCP-MCNC: 2.8 G/DL (ref 3.5–5)
ALP SERPL-CCNC: 464 U/L (ref 46–116)
ALT SERPL W P-5'-P-CCNC: 41 U/L (ref 12–78)
ANION GAP SERPL CALCULATED.3IONS-SCNC: 8 MMOL/L (ref 4–13)
AST SERPL W P-5'-P-CCNC: 39 U/L (ref 5–45)
BASOPHILS # BLD AUTO: 0.02 THOUSANDS/ÂΜL (ref 0–0.1)
BASOPHILS NFR BLD AUTO: 0 % (ref 0–1)
BILIRUB SERPL-MCNC: 0.32 MG/DL (ref 0.2–1)
BUN SERPL-MCNC: 15 MG/DL (ref 5–25)
CALCIUM ALBUM COR SERPL-MCNC: 9.8 MG/DL (ref 8.3–10.1)
CALCIUM SERPL-MCNC: 8.8 MG/DL (ref 8.3–10.1)
CHLORIDE SERPL-SCNC: 99 MMOL/L (ref 96–108)
CO2 SERPL-SCNC: 27 MMOL/L (ref 21–32)
CREAT SERPL-MCNC: 0.61 MG/DL (ref 0.6–1.3)
CREAT UR-MCNC: 96.7 MG/DL
EOSINOPHIL # BLD AUTO: 0.09 THOUSAND/ÂΜL (ref 0–0.61)
EOSINOPHIL NFR BLD AUTO: 1 % (ref 0–6)
ERYTHROCYTE [DISTWIDTH] IN BLOOD BY AUTOMATED COUNT: 17.6 % (ref 11.6–15.1)
GFR SERPL CREATININE-BSD FRML MDRD: 91 ML/MIN/1.73SQ M
GLUCOSE SERPL-MCNC: 124 MG/DL (ref 65–140)
HCT VFR BLD AUTO: 33.3 % (ref 34.8–46.1)
HGB BLD-MCNC: 10.8 G/DL (ref 11.5–15.4)
IMM GRANULOCYTES # BLD AUTO: 0.05 THOUSAND/UL (ref 0–0.2)
IMM GRANULOCYTES NFR BLD AUTO: 1 % (ref 0–2)
LYMPHOCYTES # BLD AUTO: 2.21 THOUSANDS/ÂΜL (ref 0.6–4.47)
LYMPHOCYTES NFR BLD AUTO: 25 % (ref 14–44)
MAGNESIUM SERPL-MCNC: 1.6 MG/DL (ref 1.6–2.6)
MCH RBC QN AUTO: 32 PG (ref 26.8–34.3)
MCHC RBC AUTO-ENTMCNC: 32.4 G/DL (ref 31.4–37.4)
MCV RBC AUTO: 99 FL (ref 82–98)
MONOCYTES # BLD AUTO: 1.09 THOUSAND/ÂΜL (ref 0.17–1.22)
MONOCYTES NFR BLD AUTO: 12 % (ref 4–12)
NEUTROPHILS # BLD AUTO: 5.43 THOUSANDS/ÂΜL (ref 1.85–7.62)
NEUTS SEG NFR BLD AUTO: 61 % (ref 43–75)
NRBC BLD AUTO-RTO: 0 /100 WBCS
PLATELET # BLD AUTO: 363 THOUSANDS/UL (ref 149–390)
PMV BLD AUTO: 9.7 FL (ref 8.9–12.7)
POTASSIUM SERPL-SCNC: 3.5 MMOL/L (ref 3.5–5.3)
PROT SERPL-MCNC: 7 G/DL (ref 6.4–8.4)
PROT UR-MCNC: 14 MG/DL
PROT/CREAT UR: 0.14 MG/G{CREAT} (ref 0–0.1)
RBC # BLD AUTO: 3.38 MILLION/UL (ref 3.81–5.12)
SODIUM SERPL-SCNC: 134 MMOL/L (ref 135–147)
WBC # BLD AUTO: 8.89 THOUSAND/UL (ref 4.31–10.16)

## 2023-01-27 RX ORDER — SODIUM CHLORIDE 9 MG/ML
20 INJECTION, SOLUTION INTRAVENOUS ONCE
Status: CANCELLED | OUTPATIENT
Start: 2023-01-30

## 2023-01-30 ENCOUNTER — HOSPITAL ENCOUNTER (OUTPATIENT)
Dept: INFUSION CENTER | Facility: HOSPITAL | Age: 73
Discharge: HOME | End: 2023-01-30

## 2023-01-30 ENCOUNTER — HOSPITAL ENCOUNTER (OUTPATIENT)
Dept: RADIOLOGY | Facility: HOSPITAL | Age: 73
Discharge: HOME | End: 2023-01-30

## 2023-01-30 VITALS
BODY MASS INDEX: 32.18 KG/M2 | HEART RATE: 92 BPM | OXYGEN SATURATION: 97 % | TEMPERATURE: 96.8 F | WEIGHT: 159.39 LBS | SYSTOLIC BLOOD PRESSURE: 176 MMHG | DIASTOLIC BLOOD PRESSURE: 94 MMHG | RESPIRATION RATE: 18 BRPM

## 2023-01-30 DIAGNOSIS — C56.3 MALIGNANT NEOPLASM OF BOTH OVARIES (HCC): ICD-10-CM

## 2023-01-30 DIAGNOSIS — C56.3 MALIGNANT NEOPLASM OF BOTH OVARIES (HCC): Primary | ICD-10-CM

## 2023-01-30 RX ORDER — SODIUM CHLORIDE 9 MG/ML
20 INJECTION, SOLUTION INTRAVENOUS ONCE
Status: COMPLETED | OUTPATIENT
Start: 2023-01-30 | End: 2023-01-30

## 2023-01-30 RX ADMIN — SODIUM CHLORIDE 20 ML/HR: 0.9 INJECTION, SOLUTION INTRAVENOUS at 09:47

## 2023-01-30 RX ADMIN — IOHEXOL 100 ML: 350 INJECTION, SOLUTION INTRAVENOUS at 12:02

## 2023-01-30 NOTE — PROGRESS NOTES
Pt arrived for infusion tx w/elevated BP  Pt did not take BP meds today  Rechecked several times manually & it kept increasing  Notified Caleb Staff, PA  Will hold tx today & resume tx in 2 weeks   Instructed pt to take BP meds every morning especially on tx day

## 2023-02-02 ENCOUNTER — HOSPITAL ENCOUNTER (OUTPATIENT)
Dept: INFUSION CENTER | Facility: HOSPITAL | Age: 73
Discharge: HOME | End: 2023-02-02

## 2023-02-02 DIAGNOSIS — Z45.2 ENCOUNTER FOR CENTRAL LINE CARE: ICD-10-CM

## 2023-02-02 DIAGNOSIS — C56.3 MALIGNANT NEOPLASM OF BOTH OVARIES (HCC): Primary | ICD-10-CM

## 2023-02-02 LAB
ALBUMIN SERPL BCP-MCNC: 2.7 G/DL (ref 3.5–5)
ALP SERPL-CCNC: 399 U/L (ref 46–116)
ALT SERPL W P-5'-P-CCNC: 40 U/L (ref 12–78)
ANION GAP SERPL CALCULATED.3IONS-SCNC: 9 MMOL/L (ref 4–13)
AST SERPL W P-5'-P-CCNC: 43 U/L (ref 5–45)
BASOPHILS # BLD AUTO: 0.02 THOUSANDS/ÂΜL (ref 0–0.1)
BASOPHILS NFR BLD AUTO: 0 % (ref 0–1)
BILIRUB SERPL-MCNC: 0.56 MG/DL (ref 0.2–1)
BUN SERPL-MCNC: 19 MG/DL (ref 5–25)
CALCIUM ALBUM COR SERPL-MCNC: 9.5 MG/DL (ref 8.3–10.1)
CALCIUM SERPL-MCNC: 8.5 MG/DL (ref 8.3–10.1)
CHLORIDE SERPL-SCNC: 102 MMOL/L (ref 96–108)
CO2 SERPL-SCNC: 26 MMOL/L (ref 21–32)
CREAT SERPL-MCNC: 0.67 MG/DL (ref 0.6–1.3)
EOSINOPHIL # BLD AUTO: 0.05 THOUSAND/ÂΜL (ref 0–0.61)
EOSINOPHIL NFR BLD AUTO: 1 % (ref 0–6)
ERYTHROCYTE [DISTWIDTH] IN BLOOD BY AUTOMATED COUNT: 18.6 % (ref 11.6–15.1)
GFR SERPL CREATININE-BSD FRML MDRD: 88 ML/MIN/1.73SQ M
GLUCOSE SERPL-MCNC: 118 MG/DL (ref 65–140)
HCT VFR BLD AUTO: 30.6 % (ref 34.8–46.1)
HGB BLD-MCNC: 10 G/DL (ref 11.5–15.4)
IMM GRANULOCYTES # BLD AUTO: 0.04 THOUSAND/UL (ref 0–0.2)
IMM GRANULOCYTES NFR BLD AUTO: 0 % (ref 0–2)
LYMPHOCYTES # BLD AUTO: 2.49 THOUSANDS/ÂΜL (ref 0.6–4.47)
LYMPHOCYTES NFR BLD AUTO: 24 % (ref 14–44)
MAGNESIUM SERPL-MCNC: 1.6 MG/DL (ref 1.6–2.6)
MCH RBC QN AUTO: 32.4 PG (ref 26.8–34.3)
MCHC RBC AUTO-ENTMCNC: 32.7 G/DL (ref 31.4–37.4)
MCV RBC AUTO: 99 FL (ref 82–98)
MONOCYTES # BLD AUTO: 2.49 THOUSAND/ÂΜL (ref 0.17–1.22)
MONOCYTES NFR BLD AUTO: 24 % (ref 4–12)
NEUTROPHILS # BLD AUTO: 5.44 THOUSANDS/ÂΜL (ref 1.85–7.62)
NEUTS SEG NFR BLD AUTO: 51 % (ref 43–75)
NRBC BLD AUTO-RTO: 0 /100 WBCS
PLATELET # BLD AUTO: 290 THOUSANDS/UL (ref 149–390)
PMV BLD AUTO: 10.4 FL (ref 8.9–12.7)
POTASSIUM SERPL-SCNC: 3.7 MMOL/L (ref 3.5–5.3)
PROT SERPL-MCNC: 6.9 G/DL (ref 6.4–8.4)
RBC # BLD AUTO: 3.09 MILLION/UL (ref 3.81–5.12)
SODIUM SERPL-SCNC: 137 MMOL/L (ref 135–147)
WBC # BLD AUTO: 10.53 THOUSAND/UL (ref 4.31–10.16)

## 2023-02-03 LAB — CANCER AG125 SERPL-ACNC: 306.1 U/ML (ref 0–30)

## 2023-02-06 ENCOUNTER — OFFICE VISIT (OUTPATIENT)
Dept: GYNECOLOGIC ONCOLOGY | Facility: CLINIC | Age: 73
End: 2023-02-06

## 2023-02-06 VITALS
HEART RATE: 108 BPM | SYSTOLIC BLOOD PRESSURE: 152 MMHG | BODY MASS INDEX: 32.5 KG/M2 | HEIGHT: 59 IN | DIASTOLIC BLOOD PRESSURE: 80 MMHG | OXYGEN SATURATION: 98 % | RESPIRATION RATE: 18 BRPM | WEIGHT: 161.2 LBS | TEMPERATURE: 98.4 F

## 2023-02-06 DIAGNOSIS — C78.7 LIVER METASTASES (HCC): ICD-10-CM

## 2023-02-06 DIAGNOSIS — C56.3 MALIGNANT NEOPLASM OF BOTH OVARIES (HCC): Primary | ICD-10-CM

## 2023-02-06 NOTE — ASSESSMENT & PLAN NOTE
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 on second line carbo/doxil s/p 3 cycles with subsequent imaging presents for discussion    CBC, CMP, mag,  reviewed and all hematologic parameters support continued treatment   continues to climb  Imaging is consistent with disease progression in the lungs and liver  Patient remains asymptomatic from a disease standpoint  We discussed the options going forward in the setting of platinum resistant progressive ovarian cancer  Patient understands that all treatment is palliative  We discussed third line options including Gemzar, Pembro/Cytoxan/Avastin, mirvituximab as well as enrollment in clinical trials  I do not think that we would be able to obtain antifolate receptor antibody testing in an expedited fashion and this would most likely delay treatment  However this is not unreasonable in the setting of asymptomatic disease burden  Further given her CPS score is greater than 1 it is possible she could have a response to Pembro  Patient does not wish to travel to see Clinical trials  As such she has agreed to proceed with Pembro 200 mg, Avastin 15 mg/kg and Cytoxan 50 mg p o  Side effect profile including dermatitis, gastroenteritis, pneumonitis, GI upset, Avastin specific including hypertension, nephropathy and GI perforation were discussed    Consents were signed

## 2023-02-06 NOTE — ASSESSMENT & PLAN NOTE
2 large dominant liver lesions  No abnormalities in LFTs at this time  Discussed with patient that if she becomes symptomatic or LFTs rise to consider directed radiotherapy for relief  We will continue with systemic therapy in the setting of metastatic disease at this time

## 2023-02-06 NOTE — PROGRESS NOTES
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 on second line carbo/doxil s/p 3 cycles with subsequent imaging presents for discussion    CBC, CMP, mag,  reviewed and all hematologic parameters support continued treatment   continues to climb  Imaging is consistent with disease progression in the lungs and liver  Patient remains asymptomatic from a disease standpoint  We discussed the options going forward in the setting of platinum resistant progressive ovarian cancer  Patient understands that all treatment is palliative  We discussed third line options including Gemzar, Pembro/Cytoxan/Avastin, mirvituximab as well as enrollment in clinical trials  I do not think that we would be able to obtain antifolate receptor antibody testing in an expedited fashion and this would most likely delay treatment  However this is not unreasonable in the setting of asymptomatic disease burden  Further given her CPS score is greater than 1 it is possible she could have a response to Pembro  Patient does not wish to travel to see Clinical trials  As such she has agreed to proceed with Pembro 200 mg, Avastin 15 mg/kg and Cytoxan 50 mg p o  Side effect profile including dermatitis, gastroenteritis, pneumonitis, GI upset, Avastin specific including hypertension, nephropathy and GI perforation were discussed    Consents were signed                CHIEF COMPLAINT: post imaging discussion      Problem:   Cancer Staging   Ovarian cancer [facility])  Staging form: Ovary, Fallopian Tube, Primary Peritoneal, AJCC 8th Edition  - Clinical: FIGO Stage IIIC (cT3c) - Signed by Joe Garg MD on 9/16/2021        Previous therapy:  Oncology History   Ovarian cancer ([facility] [address] )   8/6/2021 Surgery    PROMISE/BSO/tumor debulking to optimal cytoreduction     8/25/2021 Initial Diagnosis    Ovarian cancer ([facility] [address] )     9/16/2021 -  Cancer Staged    Staging form: Ovary, Fallopian Tube, Primary Peritoneal, AJCC 8th Edition  - Clinical: FIGO Stage IIIC (cT3c) - Signed by Joe Garg MD on 9/16/2021  Stage prefix: Initial diagnosis  Cancer antigen 125 () (U/mL): 543       9/28/2021 - 1/11/2022 Chemotherapy    Carbo AUC6, Taxol 175mg/m2 q3 weeks  Avastin 15mg/m2 added cycle 3    Toxicities:  Cycle4: carbo AUC 5 for neutropenia, added neulasta     10/2021 Genomic Testing    SEAN high  PDL1+ CPS >2     12/9/2021 Genetic Testing    POLD VUS     2/22/2022 - 11/2022 Chemotherapy    Maintenance: Avastin 15mg/m2  Olaparib 300mg BID    Toxicity:  3/28/22: dose-reduce avastin to 10 mg/kg every 21 days as well as dose-reduction of olaparib to 150 mg AM and 300 mg PM  5/9/22: decrease olaparib to 200mg BID  6/7/2022: stopped avastin for bone pain       11/18/2022 - 1/30/2023 Chemotherapy    Carbo AUC 5, Doxil 30 mg/m2, Avastin    Progression after 3 cycles            Patient ID: Mason Dominguez is a 71 y o  female  78yo with 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 on second line carbo/doxil s/p 3 cycles with subsequent imaging presents for discussion  Imaging with pulmonary and liver progression  Patient continues to do well on chemotherapy with minimal side effect  She has fatigue but remains active  She did have diarrhea after barium for CT scan which resolved spontaneously  She denies fevers or chills  Intermittent nausea relieved with medication        History:  Pt underwent primary debulking and had a prolonged hospitalization complicated by pancreatic leak and subsequent abscess, intraabdominal bleeding s/p IR drain placement  During hospitalization repeat imaging revealed new liver lesions despite R0 resection  Patient was started on adjuvant therapy with good response and was transitioned to Avastin and olaparib  Patient did not tolerate Avastin secondary to significant myalgias and arthralgias and she was continued on olaparib alone  Patient had noted progression in liver lesion in July of 2022 which were monitored with subsequent growth noted in September 2022  Pt progressed through second line chemotherapy after 3 cycles       The following portions of the patient's history were reviewed and updated as appropriate: allergies, current medications, past family history, past medical history, past social history, past surgical history and problem list     Review of Systems   Constitutional: Positive for fatigue  Negative for appetite change, chills and fever  Respiratory: Negative for chest tightness and shortness of breath  Gastrointestinal: Negative for abdominal distention, abdominal pain, constipation, diarrhea and nausea  Genitourinary: Negative for difficulty urinating, flank pain, frequency, urgency, vaginal bleeding, vaginal discharge and vaginal pain  Musculoskeletal: Negative for back pain, joint swelling and myalgias  Skin: Negative for rash  Neurological: Negative for dizziness, light-headedness, numbness and headaches         Current Outpatient Medications   Medication Sig Dispense Refill   • acetaminophen (TYLENOL) 325 mg tablet Take 3 tablets (975 mg total) by mouth every 8 (eight) hours     • al mag oxide-diphenhydramine-lidocaine viscous (MAGIC MOUTHWASH) 1:1:1 suspension Swish and spit 10 mL every 4 (four) hours as needed for mouth pain or discomfort 300 mL 0   • albuterol (PROVENTIL HFA,VENTOLIN HFA) 90 mcg/act inhaler Inhale 2 puffs every 6 (six) hours as needed for wheezing 18 g 1   • amLODIPine-benazepril (LOTREL) 10-20 MG per capsule Take 1 capsule by mouth daily     • butalbital-aspirin-caffeine (FIORINAL) -40 mg per capsule Take 1 capsule by mouth every 4 (four) hours as needed for headaches or migraine 30 capsule 0   • dexamethasone (DECADRON) 2 mg tablet Take 1 tablet PO BID starting the day before chemo and continuing for 3 days following chemo 24 tablet 1   • DULoxetine (CYMBALTA) 20 mg capsule Take 1 capsule (20 mg total) by mouth daily 30 capsule 2   • fluticasone (FLONASE) 50 mcg/act nasal spray 1 spray into each nostril daily     • fluticasone (Flovent HFA) 44 mcg/act inhaler Inhale 2 puffs 2 (two) times a day Rinse mouth after use   10 6 g 0   • gabapentin (Neurontin) 300 mg capsule Take 1 capsule (300 mg total) by mouth 3 (three) times a day 270 capsule 0   • lidocaine (Lidoderm) 5 % Apply 1 patch topically daily Remove & Discard patch within 12 hours - apply to painful area (Patient taking differently: Apply 1 patch topically daily Remove & Discard patch within 12 hours - apply to painful area-taking as needed) 30 patch 0   • Liniments (Blue-Emu Super Strength) CREA Apply topically as needed (joint pain)     • LORazepam (ATIVAN) 1 mg tablet Take 0 5 tablets (0 5 mg total) by mouth every 8 (eight) hours as needed (nausea or anxiety or insomnia) 45 tablet 0   • naloxone (NARCAN) 4 mg/0 1 mL nasal spray 0 1 mL (4 mg total) by Alternating Nares route every 3 (three) minutes as needed (accidental opioid overdose or respiratory depression) 1 each 1   • omeprazole (PriLOSEC) 20 mg delayed release capsule Take 20 mg by mouth 2 (two) times a day     • ondansetron (ZOFRAN) 8 mg tablet Take 1 tablet (8 mg total) by mouth every 8 (eight) hours as needed for nausea or vomiting 40 tablet 2   • oxyCODONE (OxyCONTIN) 10 mg 12 hr tablet Take 1 tablet (10 mg total) by mouth every 8 (eight) hours Max Daily Amount: 30 mg 90 tablet 0   • oxyCODONE (Roxicodone) 5 immediate release tablet Take 1 tablet (5 mg total) by mouth every 4 (four) hours as needed for moderate pain or severe pain Max Daily Amount: 30 mg 180 tablet 0   • polyethylene glycol (MIRALAX) 17 g packet Take 17 g by mouth daily for 7 days (Patient taking differently: Take 17 g by mouth daily PRN) 119 g 0   • pravastatin (PRAVACHOL) 10 mg tablet Take 10 mg by mouth daily As needed     • docusate sodium (COLACE) 100 mg capsule Take 1 capsule (100 mg total) by mouth 2 (two) times a day (Patient not taking: Reported on 11/29/2022)     • potassium chloride (KLOR-CON) 20 mEq packet Take 20 mEq by mouth 2 (two) times a day (Patient not taking: Reported on 11/29/2022) 60 each 1   • senna (SENOKOT) 8 6 mg Take 1 tablet (8 6 mg total) by mouth daily as needed for constipation for up to 7 days (Patient taking differently: Take 8 6 mg by mouth daily as needed for constipation PRN) 7 tablet 0     No current facility-administered medications for this visit  Objective:    Blood pressure 152/80, pulse (!) 108, temperature 98 4 °F (36 9 °C), temperature source Temporal, resp  rate 18, height 4' 11 02" (1 499 m), weight 73 1 kg (161 lb 3 2 oz), SpO2 98 %  Body mass index is 32 54 kg/m²  Body surface area is 1 68 meters squared  Physical Exam  HENT:      Head: Normocephalic and atraumatic  Nose: Nose normal    Cardiovascular:      Rate and Rhythm: Normal rate and regular rhythm  Pulmonary:      Effort: Pulmonary effort is normal    Abdominal:      General: There is no distension  Palpations: Abdomen is soft  There is no mass  Genitourinary:     Comments: defer  Musculoskeletal:         General: No swelling  Normal range of motion  Cervical back: Normal range of motion  Skin:     General: Skin is warm and dry  Neurological:      General: No focal deficit present  Mental Status: She is alert     Psychiatric:         Mood and Affect: Mood normal            Lab Results   Component Value Date    K 3 7 02/02/2023     02/02/2023    CO2 26 02/02/2023    BUN 19 02/02/2023    CREATININE 0 67 02/02/2023    GLUCOSE 195 (H) 08/06/2021    GLUF 97 10/27/2022    CALCIUM 8 5 02/02/2023    CORRECTEDCA 9 5 02/02/2023    AST 43 02/02/2023    ALT 40 02/02/2023    ALKPHOS 399 (H) 02/02/2023    EGFR 88 02/02/2023     Lab Results   Component Value Date    WBC 10 53 (H) 02/02/2023    HGB 10 0 (L) 02/02/2023    HCT 30 6 (L) 02/02/2023    MCV 99 (H) 02/02/2023     02/02/2023     Lab Results   Component Value Date    NEUTROABS 5 44 02/02/2023        Trend:  Lab Results   Component Value Date     306 1 (H) 02/02/2023     146 7 (H) 01/04/2023     128 5 (H) 12/07/2022     129 0 (H) 11/16/2022     72 0 (H) 10/27/2022     41 1 (H) 09/15/2022     18 2 08/08/2022     10 6 07/11/2022     7 5 06/03/2022     7 9 05/13/2022     8 9 04/22/2022     9 5 04/01/2022     7 6 03/11/2022     10 5 02/11/2022     15 5 01/10/2022     12 5 12/17/2021     11 6 11/26/2021     19 4 11/05/2021     69 9 (H) 10/15/2021     94 1 (H) 09/24/2021     543 7 (H) 07/20/2021

## 2023-02-07 DIAGNOSIS — C56.3 MALIGNANT NEOPLASM OF BOTH OVARIES (HCC): Primary | ICD-10-CM

## 2023-02-07 RX ORDER — CYCLOPHOSPHAMIDE 50 MG/1
50 CAPSULE ORAL DAILY
Qty: 30 CAPSULE | Refills: 3 | Status: SHIPPED | OUTPATIENT
Start: 2023-02-07

## 2023-02-09 ENCOUNTER — HOSPITAL ENCOUNTER (OUTPATIENT)
Dept: INFUSION CENTER | Facility: HOSPITAL | Age: 73
Discharge: HOME | End: 2023-02-09

## 2023-02-09 DIAGNOSIS — Z45.2 ENCOUNTER FOR CENTRAL LINE CARE: Primary | ICD-10-CM

## 2023-02-09 DIAGNOSIS — C56.3 MALIGNANT NEOPLASM OF BOTH OVARIES (HCC): ICD-10-CM

## 2023-02-09 LAB
ALBUMIN SERPL BCP-MCNC: 2.8 G/DL (ref 3.5–5)
ALP SERPL-CCNC: 544 U/L (ref 46–116)
ALT SERPL W P-5'-P-CCNC: 35 U/L (ref 12–78)
AMYLASE SERPL-CCNC: 29 IU/L (ref 25–115)
ANION GAP SERPL CALCULATED.3IONS-SCNC: 9 MMOL/L (ref 4–13)
AST SERPL W P-5'-P-CCNC: 43 U/L (ref 5–45)
BASOPHILS # BLD AUTO: 0.04 THOUSANDS/ÂΜL (ref 0–0.1)
BASOPHILS NFR BLD AUTO: 0 % (ref 0–1)
BILIRUB SERPL-MCNC: 0.33 MG/DL (ref 0.2–1)
BUN SERPL-MCNC: 10 MG/DL (ref 5–25)
CALCIUM ALBUM COR SERPL-MCNC: 10.3 MG/DL (ref 8.3–10.1)
CALCIUM SERPL-MCNC: 9.3 MG/DL (ref 8.3–10.1)
CHLORIDE SERPL-SCNC: 98 MMOL/L (ref 96–108)
CO2 SERPL-SCNC: 28 MMOL/L (ref 21–32)
CREAT SERPL-MCNC: 0.7 MG/DL (ref 0.6–1.3)
CREAT UR-MCNC: 110 MG/DL
EOSINOPHIL # BLD AUTO: 0.07 THOUSAND/ÂΜL (ref 0–0.61)
EOSINOPHIL NFR BLD AUTO: 1 % (ref 0–6)
ERYTHROCYTE [DISTWIDTH] IN BLOOD BY AUTOMATED COUNT: 18.6 % (ref 11.6–15.1)
GFR SERPL CREATININE-BSD FRML MDRD: 87 ML/MIN/1.73SQ M
GLUCOSE SERPL-MCNC: 125 MG/DL (ref 65–140)
HCT VFR BLD AUTO: 30.9 % (ref 34.8–46.1)
HGB BLD-MCNC: 10.1 G/DL (ref 11.5–15.4)
IMM GRANULOCYTES # BLD AUTO: 0.04 THOUSAND/UL (ref 0–0.2)
IMM GRANULOCYTES NFR BLD AUTO: 0 % (ref 0–2)
LIPASE SERPL-CCNC: 33 U/L (ref 73–393)
LYMPHOCYTES # BLD AUTO: 2.66 THOUSANDS/ÂΜL (ref 0.6–4.47)
LYMPHOCYTES NFR BLD AUTO: 28 % (ref 14–44)
MAGNESIUM SERPL-MCNC: 1.4 MG/DL (ref 1.6–2.6)
MCH RBC QN AUTO: 31.8 PG (ref 26.8–34.3)
MCHC RBC AUTO-ENTMCNC: 32.7 G/DL (ref 31.4–37.4)
MCV RBC AUTO: 97 FL (ref 82–98)
MONOCYTES # BLD AUTO: 2.62 THOUSAND/ÂΜL (ref 0.17–1.22)
MONOCYTES NFR BLD AUTO: 28 % (ref 4–12)
NEUTROPHILS # BLD AUTO: 4.1 THOUSANDS/ÂΜL (ref 1.85–7.62)
NEUTS SEG NFR BLD AUTO: 43 % (ref 43–75)
NRBC BLD AUTO-RTO: 0 /100 WBCS
PLATELET # BLD AUTO: 478 THOUSANDS/UL (ref 149–390)
PMV BLD AUTO: 9.7 FL (ref 8.9–12.7)
POTASSIUM SERPL-SCNC: 3.6 MMOL/L (ref 3.5–5.3)
PROT SERPL-MCNC: 7.3 G/DL (ref 6.4–8.4)
PROT UR-MCNC: 15 MG/DL
PROT/CREAT UR: 0.14 MG/G{CREAT} (ref 0–0.1)
RBC # BLD AUTO: 3.18 MILLION/UL (ref 3.81–5.12)
SODIUM SERPL-SCNC: 135 MMOL/L (ref 135–147)
TSH SERPL DL<=0.05 MIU/L-ACNC: 1.03 UIU/ML (ref 0.45–4.5)
WBC # BLD AUTO: 9.53 THOUSAND/UL (ref 4.31–10.16)

## 2023-02-10 LAB — CANCER AG125 SERPL-ACNC: 313.2 U/ML (ref 0–30)

## 2023-02-10 RX ORDER — SODIUM CHLORIDE 9 MG/ML
20 INJECTION, SOLUTION INTRAVENOUS ONCE
Status: CANCELLED | OUTPATIENT
Start: 2023-02-15

## 2023-02-13 ENCOUNTER — HOSPITAL ENCOUNTER (OUTPATIENT)
Dept: INFUSION CENTER | Facility: HOSPITAL | Age: 73
End: 2023-02-13

## 2023-02-14 ENCOUNTER — TELEPHONE (OUTPATIENT)
Dept: PALLIATIVE MEDICINE | Facility: CLINIC | Age: 73
End: 2023-02-14

## 2023-02-14 ENCOUNTER — OFFICE VISIT (OUTPATIENT)
Dept: PALLIATIVE MEDICINE | Facility: CLINIC | Age: 73
End: 2023-02-14

## 2023-02-14 ENCOUNTER — TELEPHONE (OUTPATIENT)
Dept: HEMATOLOGY ONCOLOGY | Facility: CLINIC | Age: 73
End: 2023-02-14

## 2023-02-14 VITALS
TEMPERATURE: 100.9 F | SYSTOLIC BLOOD PRESSURE: 128 MMHG | HEART RATE: 95 BPM | WEIGHT: 154 LBS | BODY MASS INDEX: 31.08 KG/M2 | OXYGEN SATURATION: 95 % | DIASTOLIC BLOOD PRESSURE: 64 MMHG

## 2023-02-14 DIAGNOSIS — C56.3 MALIGNANT NEOPLASM OF BOTH OVARIES (HCC): Primary | ICD-10-CM

## 2023-02-14 DIAGNOSIS — G89.18 JOINT PAIN FOLLOWING CHEMOTHERAPY: ICD-10-CM

## 2023-02-14 DIAGNOSIS — G62.0 CHEMOTHERAPY-INDUCED PERIPHERAL NEUROPATHY (HCC): ICD-10-CM

## 2023-02-14 DIAGNOSIS — T45.1X5A CHEMOTHERAPY-INDUCED PERIPHERAL NEUROPATHY (HCC): ICD-10-CM

## 2023-02-14 DIAGNOSIS — G89.3 CANCER RELATED PAIN: ICD-10-CM

## 2023-02-14 DIAGNOSIS — K21.9 ESOPHAGEAL REFLUX: ICD-10-CM

## 2023-02-14 DIAGNOSIS — F41.9 ANXIOUSNESS: ICD-10-CM

## 2023-02-14 DIAGNOSIS — G47.01 INSOMNIA DUE TO MEDICAL CONDITION: ICD-10-CM

## 2023-02-14 DIAGNOSIS — R53.0 NEOPLASTIC MALIGNANT RELATED FATIGUE: ICD-10-CM

## 2023-02-14 DIAGNOSIS — M54.32 SCIATICA OF LEFT SIDE: ICD-10-CM

## 2023-02-14 DIAGNOSIS — Z51.5 PALLIATIVE CARE PATIENT: ICD-10-CM

## 2023-02-14 DIAGNOSIS — Z86.16 PERSONAL HISTORY OF COVID-19: ICD-10-CM

## 2023-02-14 DIAGNOSIS — M25.50 JOINT PAIN FOLLOWING CHEMOTHERAPY: ICD-10-CM

## 2023-02-14 DIAGNOSIS — T45.1X5A CHEMOTHERAPY-INDUCED NAUSEA: ICD-10-CM

## 2023-02-14 DIAGNOSIS — R11.0 CHEMOTHERAPY-INDUCED NAUSEA: ICD-10-CM

## 2023-02-14 DIAGNOSIS — G89.18 POST-OP PAIN: ICD-10-CM

## 2023-02-14 DIAGNOSIS — K59.03 DRUG INDUCED CONSTIPATION: ICD-10-CM

## 2023-02-14 DIAGNOSIS — E46 PROTEIN-CALORIE MALNUTRITION, UNSPECIFIED SEVERITY (HCC): ICD-10-CM

## 2023-02-14 DIAGNOSIS — F11.90 OPIOID USE: ICD-10-CM

## 2023-02-14 DIAGNOSIS — K12.31 ORAL MUCOSITIS (ULCERATIVE) DUE TO ANTINEOPLASTIC THERAPY: ICD-10-CM

## 2023-02-14 DIAGNOSIS — C78.6 PERITONEAL CARCINOMATOSIS (HCC): ICD-10-CM

## 2023-02-14 RX ORDER — OXYCODONE HYDROCHLORIDE 5 MG/1
5-10 TABLET ORAL EVERY 4 HOURS PRN
Qty: 180 TABLET | Refills: 0 | Status: SHIPPED | OUTPATIENT
Start: 2023-02-14

## 2023-02-14 RX ORDER — OXYCODONE HCL 10 MG/1
10 TABLET, FILM COATED, EXTENDED RELEASE ORAL DAILY
Qty: 30 TABLET | Refills: 0 | Status: SHIPPED | OUTPATIENT
Start: 2023-02-14 | End: 2023-02-20 | Stop reason: RX

## 2023-02-14 RX ORDER — GABAPENTIN 300 MG/1
300 CAPSULE ORAL 3 TIMES DAILY
Qty: 270 CAPSULE | Refills: 0 | Status: SHIPPED | OUTPATIENT
Start: 2023-02-14

## 2023-02-14 RX ORDER — LIDOCAINE 4 G/G
PATCH TOPICAL
COMMUNITY

## 2023-02-14 NOTE — PATIENT INSTRUCTIONS
It was good to see you today  Thank you for coming in  Since there is some inconsistency in wake/sleep times, will change the long acting oxycodone (OxyContin 10mg) to once daily - take at the same time each day, such as midnight since you are awake every day at midnight  Use the oxycodone IR 5mg tablets (short-acting), one table for moderate pain or two tablets for severe pain, up to every 4 hours as needed  Use lorazepam as needed for anxiousness or insomnia  Call us if/when you need a refill  Use the Lidoderm patch as needed for sciatica  If constipation is an issue:  Drink PLENTY of water  This is important to keep the gut moving  Some people have success w/ using prunes, prune juice, certain fruits or vegetables (apples, bananas, prunes, pears, raspberries, and vegetables like string beans, broccoli, spinach, kale, squash, lentils, peas, and beans), or fiber gummies  Try a probiotic  This could be yogurt or kefir, or fermented beverages such as kombucha, but probiotics are also available in capsule form  Aim for 10-15 billion colony-forming-units, w/ bacteria such as Lactobacillus / Saccharomyces / Actinomyces  Osmotic laxatives (Miralax, magnesium citrate, Milk of Magnesia) can be very useful for opioid-induced constipation (OIC); take daily to prevent OIC  Bulk laxatives (Citrucel, Metamucil, Fibercon, Benefiber, wheat germ) are useful for constipation in patients who are not taking opioids, but are not recommended if you are taking opioids  Colace is good for softening hard stools, or preventing constipation when opioids are being used - but does not stimulate the bowel to move things along once constipation has occurred  You can use senna, 1 to 2 tabs, once or twice daily as needed for constipation  Use as directed on the box/bottle  Senna is also available in a tea ("Smooth Move")  Should that not be enough for your constipation, you can try Dulcolax    Should that not be enough, consider an enema  All of these medications are available over-the-counter  Ask Dr Shamar Castano or Buddy Pillai about the fruits and vegetables with the cancer medications  Return in about 5-6 weeks  Call us for refills on medications that we supply, as needed  If something changes and you need to come in sooner, please call our office  PRESCRIPTION REFILL REMINDER:  All medication refills should be requested prior to [facility] on Friday  Any refill requests after noon on Friday would be addressed the following Monday  MEDICATION SAFETY ISSUES:  Do not drive under the influence of narcotics (including opioids), watch for adverse effects including confusion / altered mental status / respiratory depression (slowed breathing), keep medications stored in a safe/locked environment, do not use alcohol while opioids or other narcotics are in your system

## 2023-02-14 NOTE — TELEPHONE ENCOUNTER
Pharmacy left message needs prior auth or order xtampza instead  Oxycodone 5-10 mg needs prior auth  Call [phone]

## 2023-02-14 NOTE — TELEPHONE ENCOUNTER
Called patient to review current insurance denial for [facility]  Explained that the authorization team has an appeal submitted to the insurance company to get the denial overturned  Advised patient that while we wait for the insurance company to render a decision, an application to the drug company is going to be submitted for review of free drug  Reviewed patients upcoming appointments and agreed that getting the application to her in the infusion center on 02/15/2023 would be easiest for her  Will work with the [employer] staff to get signed application from patient  Patient verbalized understanding of the process and was appreciative of all that is being done for her

## 2023-02-14 NOTE — TELEPHONE ENCOUNTER
Prior Authorization needed for oxycontin 10 mg ER tablets       Adam: [facility]

## 2023-02-14 NOTE — TELEPHONE ENCOUNTER
Would prefer prior auth on the OxyContin as she has tolerated that well for months  Let me know how it goes with both prior auths  Thank you

## 2023-02-15 ENCOUNTER — DOCUMENTATION (OUTPATIENT)
Dept: HEMATOLOGY ONCOLOGY | Facility: CLINIC | Age: 73
End: 2023-02-15

## 2023-02-15 ENCOUNTER — HOSPITAL ENCOUNTER (OUTPATIENT)
Dept: INFUSION CENTER | Facility: HOSPITAL | Age: 73
Discharge: HOME | End: 2023-02-15

## 2023-02-15 VITALS
HEART RATE: 109 BPM | TEMPERATURE: 100.8 F | SYSTOLIC BLOOD PRESSURE: 126 MMHG | WEIGHT: 156.53 LBS | BODY MASS INDEX: 31.59 KG/M2 | DIASTOLIC BLOOD PRESSURE: 65 MMHG | OXYGEN SATURATION: 97 % | RESPIRATION RATE: 18 BRPM

## 2023-02-15 DIAGNOSIS — D70.1 CHEMOTHERAPY INDUCED NEUTROPENIA (HCC): ICD-10-CM

## 2023-02-15 DIAGNOSIS — C56.3 MALIGNANT NEOPLASM OF BOTH OVARIES (HCC): Primary | ICD-10-CM

## 2023-02-15 DIAGNOSIS — T45.1X5A CHEMOTHERAPY INDUCED NEUTROPENIA (HCC): ICD-10-CM

## 2023-02-15 RX ORDER — SODIUM CHLORIDE 9 MG/ML
20 INJECTION, SOLUTION INTRAVENOUS ONCE
Status: COMPLETED | OUTPATIENT
Start: 2023-02-15 | End: 2023-02-15

## 2023-02-15 RX ADMIN — SODIUM CHLORIDE 20 ML/HR: 9 INJECTION, SOLUTION INTRAVENOUS at 11:13

## 2023-02-15 RX ADMIN — BEVACIZUMAB-AWWB 1100 MG: 400 INJECTION, SOLUTION INTRAVENOUS at 11:13

## 2023-02-15 NOTE — PROGRESS NOTES
Temp 100 8  Pt reports no s/s of infection  C/o mild nausea  Temp yesterday at Dr Baumann Simpler office 100 9  Pt states she took home Covid test which was negative  Lina Solano PAC aware and ok to treat today with MVASI but pt is to follow up with her PCP  Pt made aware and expresses understanding

## 2023-02-15 NOTE — PROGRESS NOTES
Received completed provider and patient sections of [employer] Enrollment Form for Darrick Reddy  Application faxed to [employer] at [phone]

## 2023-02-16 ENCOUNTER — HOSPITAL ENCOUNTER (OUTPATIENT)
Dept: INFUSION CENTER | Facility: HOSPITAL | Age: 73
Discharge: HOME | End: 2023-02-16

## 2023-02-16 VITALS — TEMPERATURE: 98.5 F

## 2023-02-16 DIAGNOSIS — C56.3 MALIGNANT NEOPLASM OF BOTH OVARIES (HCC): ICD-10-CM

## 2023-02-16 DIAGNOSIS — Z45.2 ENCOUNTER FOR CENTRAL LINE CARE: Primary | ICD-10-CM

## 2023-02-16 LAB
ALBUMIN SERPL BCP-MCNC: 3.4 G/DL (ref 3.5–5)
ALP SERPL-CCNC: 421 U/L (ref 34–104)
ALT SERPL W P-5'-P-CCNC: 22 U/L (ref 7–52)
ANION GAP SERPL CALCULATED.3IONS-SCNC: 12 MMOL/L (ref 4–13)
AST SERPL W P-5'-P-CCNC: 35 U/L (ref 13–39)
BASOPHILS # BLD AUTO: 0.05 THOUSANDS/ÂΜL (ref 0–0.1)
BASOPHILS NFR BLD AUTO: 0 % (ref 0–1)
BILIRUB SERPL-MCNC: 0.56 MG/DL (ref 0.2–1)
BUN SERPL-MCNC: 12 MG/DL (ref 5–25)
CALCIUM ALBUM COR SERPL-MCNC: 9.5 MG/DL (ref 8.3–10.1)
CALCIUM SERPL-MCNC: 9 MG/DL (ref 8.4–10.2)
CHLORIDE SERPL-SCNC: 96 MMOL/L (ref 96–108)
CO2 SERPL-SCNC: 25 MMOL/L (ref 21–32)
CREAT SERPL-MCNC: 0.54 MG/DL (ref 0.6–1.3)
EOSINOPHIL # BLD AUTO: 0.02 THOUSAND/ÂΜL (ref 0–0.61)
EOSINOPHIL NFR BLD AUTO: 0 % (ref 0–6)
ERYTHROCYTE [DISTWIDTH] IN BLOOD BY AUTOMATED COUNT: 18 % (ref 11.6–15.1)
GFR SERPL CREATININE-BSD FRML MDRD: 95 ML/MIN/1.73SQ M
GLUCOSE SERPL-MCNC: 129 MG/DL (ref 65–140)
HCT VFR BLD AUTO: 31.1 % (ref 34.8–46.1)
HGB BLD-MCNC: 10.1 G/DL (ref 11.5–15.4)
IMM GRANULOCYTES # BLD AUTO: 0.05 THOUSAND/UL (ref 0–0.2)
IMM GRANULOCYTES NFR BLD AUTO: 0 % (ref 0–2)
LYMPHOCYTES # BLD AUTO: 2.58 THOUSANDS/ÂΜL (ref 0.6–4.47)
LYMPHOCYTES NFR BLD AUTO: 22 % (ref 14–44)
MAGNESIUM SERPL-MCNC: 1.6 MG/DL (ref 1.9–2.7)
MCH RBC QN AUTO: 31.3 PG (ref 26.8–34.3)
MCHC RBC AUTO-ENTMCNC: 32.5 G/DL (ref 31.4–37.4)
MCV RBC AUTO: 96 FL (ref 82–98)
MONOCYTES # BLD AUTO: 2.4 THOUSAND/ÂΜL (ref 0.17–1.22)
MONOCYTES NFR BLD AUTO: 20 % (ref 4–12)
NEUTROPHILS # BLD AUTO: 6.67 THOUSANDS/ÂΜL (ref 1.85–7.62)
NEUTS SEG NFR BLD AUTO: 58 % (ref 43–75)
NRBC BLD AUTO-RTO: 0 /100 WBCS
PLATELET # BLD AUTO: 507 THOUSANDS/UL (ref 149–390)
PMV BLD AUTO: 9.6 FL (ref 8.9–12.7)
POTASSIUM SERPL-SCNC: 3.1 MMOL/L (ref 3.5–5.3)
PROT SERPL-MCNC: 7.1 G/DL (ref 6.4–8.4)
RBC # BLD AUTO: 3.23 MILLION/UL (ref 3.81–5.12)
SODIUM SERPL-SCNC: 133 MMOL/L (ref 135–147)
WBC # BLD AUTO: 11.77 THOUSAND/UL (ref 4.31–10.16)

## 2023-02-16 NOTE — PROGRESS NOTES
Port was accessed by 2 RN's 2 separate times today and no blood return noted  Pt reports she has needed alteplase twice in the past but has a new insurance plan and is concerned about the cost of the alteplase and declined use today  I will email our insurance reps per pt request to look into if it will be covered  Pt understands her port cannot be used until a blood return is established  She denies any discomfort or problems with port site  Labs were then drawn via peripheral vein left arm

## 2023-02-17 ENCOUNTER — DOCUMENTATION (OUTPATIENT)
Dept: HEMATOLOGY ONCOLOGY | Facility: CLINIC | Age: 73
End: 2023-02-17

## 2023-02-17 NOTE — TELEPHONE ENCOUNTER
Received DENIAL  for    OXYCONTIN 10 MG     Per letter pt is to have tried alternatives  Patients palliative provider does not want pt to change medications as she is stable on current long acting opioid  This nurse will submit new Prior Auth via fax with supporting documentation   Pharmacy notified of status  Verified ID [id number]  PHONE [phone] pt re status  Pt reports she currently has about 15 tablets remaining from previous order  Reviewed new plan to take at hs only  Instructed pt to call office if this is not effective  Pt verbalized understanding

## 2023-02-17 NOTE — TELEPHONE ENCOUNTER
Completed new prior auth via form and supportive documentation   Faxed to [facility]  At   [phone]         Await response

## 2023-02-20 NOTE — TELEPHONE ENCOUNTER
Received prior authorization DENIAL for  OXYCONTIN 10 MG (OXYCODONE ER)       Per Denial Letter, pt is to have tried and failed 3 alterntives on formulary  or indicated pt cannot tolerate or contraindicated for alternatives  Alternatives  1  Fentanyl transdermal  2  Hydromorphone ER capsules   3  Methadone ER  4   Lucianne Epley ER    Alternatives may still require Prior Auth    Thank you

## 2023-02-20 NOTE — TELEPHONE ENCOUNTER
Will send Vallerie Tanya (other form of oxyER) at closest possible dose  · Fentanyl patch not appropriate given the low dose of long-acting analgesia (even the lowest patch might be too much of a dose)  · As patient tolerates oxyIR and oxyER well, best tolerance would be by containing oxyER (even if it is Xtampza and not OxyContin)  · Generic okay (if available)  · Long-acting hydromorphone inappropriate as there is no equivalent dose low enough  Please inform patient of proposed change  Rx sent

## 2023-02-21 ENCOUNTER — DOCUMENTATION (OUTPATIENT)
Dept: HEMATOLOGY ONCOLOGY | Facility: CLINIC | Age: 73
End: 2023-02-21

## 2023-02-21 NOTE — PROGRESS NOTES
Placed call to [employer] at [phone] looking for an update on patients application for Keytruda  Spoke with Ramo Lama who advised that as of 1/73/2356 application is under benefits investigation  Average turn-around time for completion of benefits investigation is a minimum of 5 business days

## 2023-02-22 ENCOUNTER — DOCUMENTATION (OUTPATIENT)
Dept: HEMATOLOGY ONCOLOGY | Facility: CLINIC | Age: 73
End: 2023-02-22

## 2023-02-22 NOTE — PROGRESS NOTES
Faxed received from [employer] Patient [employer] stating that they are still reviewing patients benefits and have not been able to get any updated information   Faxed most recent denial to patient assistance program

## 2023-02-23 ENCOUNTER — HOSPITAL ENCOUNTER (OUTPATIENT)
Dept: INFUSION CENTER | Facility: HOSPITAL | Age: 73
Discharge: HOME | End: 2023-02-23

## 2023-02-23 DIAGNOSIS — Z45.2 ENCOUNTER FOR CENTRAL LINE CARE: Primary | ICD-10-CM

## 2023-02-23 DIAGNOSIS — C56.3 MALIGNANT NEOPLASM OF BOTH OVARIES (HCC): ICD-10-CM

## 2023-02-23 LAB
ALBUMIN SERPL BCP-MCNC: 3.2 G/DL (ref 3.5–5)
ALP SERPL-CCNC: 543 U/L (ref 34–104)
ALT SERPL W P-5'-P-CCNC: 21 U/L (ref 7–52)
ANION GAP SERPL CALCULATED.3IONS-SCNC: 13 MMOL/L (ref 4–13)
AST SERPL W P-5'-P-CCNC: 35 U/L (ref 13–39)
BASOPHILS # BLD AUTO: 0.04 THOUSANDS/ÂΜL (ref 0–0.1)
BASOPHILS NFR BLD AUTO: 0 % (ref 0–1)
BILIRUB SERPL-MCNC: 0.93 MG/DL (ref 0.2–1)
BUN SERPL-MCNC: 15 MG/DL (ref 5–25)
CALCIUM ALBUM COR SERPL-MCNC: 9.6 MG/DL (ref 8.3–10.1)
CALCIUM SERPL-MCNC: 9 MG/DL (ref 8.4–10.2)
CHLORIDE SERPL-SCNC: 91 MMOL/L (ref 96–108)
CO2 SERPL-SCNC: 26 MMOL/L (ref 21–32)
CREAT SERPL-MCNC: 0.63 MG/DL (ref 0.6–1.3)
EOSINOPHIL # BLD AUTO: 0.02 THOUSAND/ÂΜL (ref 0–0.61)
EOSINOPHIL NFR BLD AUTO: 0 % (ref 0–6)
ERYTHROCYTE [DISTWIDTH] IN BLOOD BY AUTOMATED COUNT: 17.6 % (ref 11.6–15.1)
GFR SERPL CREATININE-BSD FRML MDRD: 90 ML/MIN/1.73SQ M
GLUCOSE SERPL-MCNC: 138 MG/DL (ref 65–140)
HCT VFR BLD AUTO: 29.2 % (ref 34.8–46.1)
HGB BLD-MCNC: 9.5 G/DL (ref 11.5–15.4)
IMM GRANULOCYTES # BLD AUTO: 0.18 THOUSAND/UL (ref 0–0.2)
IMM GRANULOCYTES NFR BLD AUTO: 1 % (ref 0–2)
LYMPHOCYTES # BLD AUTO: 1.82 THOUSANDS/ÂΜL (ref 0.6–4.47)
LYMPHOCYTES NFR BLD AUTO: 11 % (ref 14–44)
MAGNESIUM SERPL-MCNC: 1.6 MG/DL (ref 1.9–2.7)
MCH RBC QN AUTO: 30.6 PG (ref 26.8–34.3)
MCHC RBC AUTO-ENTMCNC: 32.5 G/DL (ref 31.4–37.4)
MCV RBC AUTO: 94 FL (ref 82–98)
MONOCYTES # BLD AUTO: 2.84 THOUSAND/ÂΜL (ref 0.17–1.22)
MONOCYTES NFR BLD AUTO: 17 % (ref 4–12)
NEUTROPHILS # BLD AUTO: 11.93 THOUSANDS/ÂΜL (ref 1.85–7.62)
NEUTS SEG NFR BLD AUTO: 71 % (ref 43–75)
NRBC BLD AUTO-RTO: 0 /100 WBCS
PLATELET # BLD AUTO: 525 THOUSANDS/UL (ref 149–390)
PMV BLD AUTO: 9.7 FL (ref 8.9–12.7)
POTASSIUM SERPL-SCNC: 3.1 MMOL/L (ref 3.5–5.3)
PROT SERPL-MCNC: 6.8 G/DL (ref 6.4–8.4)
RBC # BLD AUTO: 3.1 MILLION/UL (ref 3.81–5.12)
SODIUM SERPL-SCNC: 130 MMOL/L (ref 135–147)
WBC # BLD AUTO: 16.83 THOUSAND/UL (ref 4.31–10.16)

## 2023-02-24 ENCOUNTER — TELEPHONE (OUTPATIENT)
Dept: HEMATOLOGY ONCOLOGY | Facility: CLINIC | Age: 73
End: 2023-02-24

## 2023-02-24 NOTE — TELEPHONE ENCOUNTER
Called to check on the status of patient's Keytruda application  Was advised that they are in receipt of the denial letter and the case is pending review by a   Average time for review is 24-48 hours, was advised that they are in re-enrollment season and this time may be longer  Will continue to follow up

## 2023-02-28 ENCOUNTER — TELEPHONE (OUTPATIENT)
Dept: HEMATOLOGY ONCOLOGY | Facility: CLINIC | Age: 73
End: 2023-02-28

## 2023-02-28 NOTE — TELEPHONE ENCOUNTER
Called [employer] at [phone], spoke with Christina Lu who informed me that a referral was made this morning to the [employer] Patient Assistance Program abraham Deras  Advised that it will take at least 24 hours for patients information to uploaded at Kerry Deras  Called Kerry Deras at [phone] to see if they had anything on their end  Case has not yet been released and will allow for 24 hour processing time before reaching back out to Kerry Deras

## 2023-03-01 ENCOUNTER — PATIENT MESSAGE (OUTPATIENT)
Dept: GYNECOLOGIC ONCOLOGY | Facility: CLINIC | Age: 73
End: 2023-03-01

## 2023-03-01 ENCOUNTER — DOCUMENTATION (OUTPATIENT)
Dept: HEMATOLOGY ONCOLOGY | Facility: CLINIC | Age: 73
End: 2023-03-01

## 2023-03-01 NOTE — PROGRESS NOTES
Faxed completed prescription for Micheal Pugh to [facility] Patient Assistance Program at [phone]

## 2023-03-02 ENCOUNTER — TELEPHONE (OUTPATIENT)
Dept: GYNECOLOGIC ONCOLOGY | Facility: CLINIC | Age: 73
End: 2023-03-02

## 2023-03-02 ENCOUNTER — DOCUMENTATION (OUTPATIENT)
Dept: HEMATOLOGY ONCOLOGY | Facility: CLINIC | Age: 73
End: 2023-03-02

## 2023-03-02 ENCOUNTER — HOSPITAL ENCOUNTER (OUTPATIENT)
Dept: INFUSION CENTER | Facility: HOSPITAL | Age: 73
Discharge: HOME | End: 2023-03-02

## 2023-03-02 DIAGNOSIS — Z45.2 ENCOUNTER FOR CENTRAL LINE CARE: Primary | ICD-10-CM

## 2023-03-02 DIAGNOSIS — C56.3 MALIGNANT NEOPLASM OF BOTH OVARIES (HCC): ICD-10-CM

## 2023-03-02 LAB
ALBUMIN SERPL BCP-MCNC: 2.9 G/DL (ref 3.5–5)
ALP SERPL-CCNC: 717 U/L (ref 34–104)
ALT SERPL W P-5'-P-CCNC: 25 U/L (ref 7–52)
AMYLASE SERPL-CCNC: 34 IU/L (ref 29–103)
ANION GAP SERPL CALCULATED.3IONS-SCNC: 9 MMOL/L (ref 4–13)
AST SERPL W P-5'-P-CCNC: 46 U/L (ref 13–39)
BASOPHILS # BLD AUTO: 0.06 THOUSANDS/ÂΜL (ref 0–0.1)
BASOPHILS NFR BLD AUTO: 1 % (ref 0–1)
BILIRUB SERPL-MCNC: 0.65 MG/DL (ref 0.2–1)
BUN SERPL-MCNC: 11 MG/DL (ref 5–25)
CALCIUM ALBUM COR SERPL-MCNC: 9.3 MG/DL (ref 8.3–10.1)
CALCIUM SERPL-MCNC: 8.4 MG/DL (ref 8.4–10.2)
CHLORIDE SERPL-SCNC: 93 MMOL/L (ref 96–108)
CO2 SERPL-SCNC: 29 MMOL/L (ref 21–32)
CREAT SERPL-MCNC: 0.62 MG/DL (ref 0.6–1.3)
CREAT UR-MCNC: 101.6 MG/DL
EOSINOPHIL # BLD AUTO: 0.05 THOUSAND/ÂΜL (ref 0–0.61)
EOSINOPHIL NFR BLD AUTO: 0 % (ref 0–6)
ERYTHROCYTE [DISTWIDTH] IN BLOOD BY AUTOMATED COUNT: 18.2 % (ref 11.6–15.1)
GFR SERPL CREATININE-BSD FRML MDRD: 91 ML/MIN/1.73SQ M
GLUCOSE SERPL-MCNC: 115 MG/DL (ref 65–140)
HCT VFR BLD AUTO: 27.1 % (ref 34.8–46.1)
HGB BLD-MCNC: 8.8 G/DL (ref 11.5–15.4)
IMM GRANULOCYTES # BLD AUTO: 0.1 THOUSAND/UL (ref 0–0.2)
IMM GRANULOCYTES NFR BLD AUTO: 1 % (ref 0–2)
LIPASE SERPL-CCNC: 13 U/L (ref 11–82)
LYMPHOCYTES # BLD AUTO: 1.88 THOUSANDS/ÂΜL (ref 0.6–4.47)
LYMPHOCYTES NFR BLD AUTO: 16 % (ref 14–44)
MAGNESIUM SERPL-MCNC: 1.6 MG/DL (ref 1.9–2.7)
MCH RBC QN AUTO: 31.1 PG (ref 26.8–34.3)
MCHC RBC AUTO-ENTMCNC: 32.5 G/DL (ref 31.4–37.4)
MCV RBC AUTO: 96 FL (ref 82–98)
MONOCYTES # BLD AUTO: 1.53 THOUSAND/ÂΜL (ref 0.17–1.22)
MONOCYTES NFR BLD AUTO: 13 % (ref 4–12)
NEUTROPHILS # BLD AUTO: 8.41 THOUSANDS/ÂΜL (ref 1.85–7.62)
NEUTS SEG NFR BLD AUTO: 69 % (ref 43–75)
NRBC BLD AUTO-RTO: 0 /100 WBCS
PLATELET # BLD AUTO: 493 THOUSANDS/UL (ref 149–390)
PMV BLD AUTO: 10.1 FL (ref 8.9–12.7)
POTASSIUM SERPL-SCNC: 3.3 MMOL/L (ref 3.5–5.3)
PROT SERPL-MCNC: 6.4 G/DL (ref 6.4–8.4)
PROT UR-MCNC: 14 MG/DL
PROT/CREAT UR: 0.14 MG/G{CREAT} (ref 0–0.1)
RBC # BLD AUTO: 2.83 MILLION/UL (ref 3.81–5.12)
SODIUM SERPL-SCNC: 131 MMOL/L (ref 135–147)
TSH SERPL DL<=0.05 MIU/L-ACNC: 0.76 UIU/ML (ref 0.45–4.5)
WBC # BLD AUTO: 12.03 THOUSAND/UL (ref 4.31–10.16)

## 2023-03-02 NOTE — PROGRESS NOTES
Called [employer] and was able to schedule delivery of patients Keytruda for delivery tomorrow  This is for patients treatment on 3/6/2023  Shipment is coming via [employer] next day air, signature required  Email sent to care team, authorization team, pharmacy and infusion center

## 2023-03-02 NOTE — TELEPHONE ENCOUNTER
Pt would like to speak to you about her pre-cycle visit tomorrow, daughter has a situation and wont be able to make it but wasn't sure what to do, please advise

## 2023-03-03 ENCOUNTER — TELEMEDICINE (OUTPATIENT)
Dept: GYNECOLOGIC ONCOLOGY | Facility: CLINIC | Age: 73
End: 2023-03-03

## 2023-03-03 DIAGNOSIS — C56.3 MALIGNANT NEOPLASM OF BOTH OVARIES (HCC): Primary | ICD-10-CM

## 2023-03-03 DIAGNOSIS — C78.7 LIVER METASTASES (HCC): ICD-10-CM

## 2023-03-03 LAB — CANCER AG125 SERPL-ACNC: 410 U/ML (ref 0–30)

## 2023-03-03 RX ORDER — SODIUM CHLORIDE 9 MG/ML
20 INJECTION, SOLUTION INTRAVENOUS ONCE
Status: CANCELLED | OUTPATIENT
Start: 2023-03-06

## 2023-03-03 NOTE — ASSESSMENT & PLAN NOTE
Recurrent, platinum resistant stage IIIC high-grade serous ovarian cancer with disease progression on doxil/carboplatin  She is currently receiving keytruda 200 mg IV and avastin 15 mg/kg every 21 days, with oral cyclophosphamide 50 mg daily  Arden Babinski was held with cycle 1 due to insurance authorization/free drug enrollment  Her malignancy-related pain is well controlled  She has intermittent treatment-induced nausea and vomiting  We discussed taking her oral cyclophosphamide at night with ativan; as there is the possibility nausea will be masked during sleep  Continue with cycle 2 of treatment as planned  Keytruda free drug program secured and will be added  Labs from 3/2/23 reviewed and are adequate for treatment   uptrending  Will monitor closely and re-assess prior to cycle 3  Return to the office as per her chemotherapy calendar

## 2023-03-03 NOTE — PROGRESS NOTES
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 on doxil/carboplatin  She is currently receiving keytruda 200 mg IV and avastin 15 mg/kg every 21 days, with oral cyclophosphamide 50 mg daily  Nichole Lenz was held with cycle 1 due to insurance authorization/free drug enrollment  Her malignancy-related pain is well controlled  She has intermittent treatment-induced nausea and vomiting  We discussed taking her oral cyclophosphamide at night with ativan; as there is the possibility nausea will be masked during sleep  Continue with cycle 2 of treatment as planned  Keytruda free drug program secured and will be added  Labs from 3/2/23 reviewed and are adequate for treatment   uptrending  Will monitor closely and re-assess prior to cycle 3  Return to the office as per her chemotherapy calendar  Reason for visit is   Chief Complaint   Patient presents with   • Virtual Regular Visit        Encounter provider Angelica Vazquez PA-C    Provider located at [address] [place] [id number]      Recent Visits  No visits were found meeting these conditions  Showing recent visits within past 7 days and meeting all other requirements  Today's Visits  Date Type Provider Dept   03/03/23 Telemedicine JESSICA Lindsey [facility]   Showing today's visits and meeting all other requirements  Future Appointments  No visits were found meeting these conditions    Showing future appointments within next 150 days and meeting all other requirements       The patient was identified by name and date of birth  Nati Carpenter was informed that this is a telemedicine visit and that the visit is being conducted through Telephone  My office door was closed  No one else was in the room  She acknowledged consent and understanding of privacy and security of the video platform  The patient has agreed to participate and understands they can discontinue the visit at any time  Patient is aware this is a billable service  Subjective  Nati Carpenter is a 71 y o  female   who presents virtually for pre-chemotherapy evaluation  She has been afebrile  She is without acute complaints today  The patient notes normal bowel/bladder function  Her cancer-related pain is adequately controlled  She notes her appetite is appropriate  She has occasional nausea and vomiting  She is using ativan and medical marijunana  She denies headaches, nose bleeds  Her blood pressures are controlled  Her arthralgias are minimal  CBC/Diff, CMP, Mg, , TSH, Amylase, Lipase, UPC ratio from 3/2/23 reviewed  Oncology History   Ovarian cancer ([facility] [address] )   8/6/2021 Surgery    PROMISE/BSO/tumor debulking to optimal cytoreduction     8/25/2021 Initial Diagnosis    Ovarian cancer ([facility] [address] )     9/16/2021 -  Cancer Staged    Staging form: Ovary, Fallopian Tube, Primary Peritoneal, AJCC 8th Edition  - Clinical: FIGO Stage IIIC (cT3c) - Signed by Juancho Gracia MD on 9/16/2021  Stage prefix: Initial diagnosis  Cancer antigen 125 () (U/mL): 543       9/28/2021 - 1/11/2022 Chemotherapy    Carbo AUC6, Taxol 175mg/m2 q3 weeks  Avastin 15mg/m2 added cycle 3    Toxicities:  Cycle4: carbo AUC 5 for neutropenia, added neulasta     10/2021 Genomic Testing    SEAN high  PDL1+ CPS >2     12/9/2021 Genetic Testing    POLD VUS     2/22/2022 - 11/2022 Chemotherapy    Maintenance:   Avastin 15mg/m2  Olaparib 300mg BID    Toxicity:  3/28/22: dose-reduce avastin to 10 mg/kg every 21 days as well as dose-reduction of olaparib to 150 mg AM and 300 mg PM  5/9/22: decrease olaparib to 200mg BID  6/7/2022: stopped avastin for bone pain       11/18/2022 - 1/30/2023 Chemotherapy    Carbo AUC 5, Doxil 30 mg/m2, Avastin    Progression after 3 cycles      2/15/2023 -  Chemotherapy    Avastin 15 mg/kg and keytruda 200 mg IV every 21 days with daily oral cyclophosphamide 50 mg daily  Dora Bishop was held for cycle 1 due to insurance authorization/free drug program enrollment  She is scheduled for cycle 2                Past Medical History:   Diagnosis Date   • Acid reflux    • Asthma    • Cancer (HCC)     ovarian, peritoneal carcinomatosis   • GERD (gastroesophageal reflux disease)    • Hypercholesteremia    • Hypertension    • Migraine    • Ovarian cancer [facility])        Past Surgical History:   Procedure Laterality Date   • APPENDECTOMY N/A 8/6/2021    Procedure: APPENDECTOMY;  Surgeon: Joanna Ness MD;  [facility];  Service: Gynecology Oncology   • CHOLECYSTECTOMY     • COLONOSCOPY     • FL CYSTOGRAM  8/18/2021   • GALLBLADDER SURGERY     • HYSTERECTOMY N/A 8/6/2021    Procedure: ENBLOCK HYSTERECTOMY, BILATERAL SALPINGO-OOPHORECTOMY, SIGMOIDECTOMY WITH LOW RECTAL REANASTAMOSIS, BLADDER PERITONEAL STRIPPING AND CYSTOTOMY REPAIR, DIAPHRAGM STRIPPING, SMALL BOWEL RESECTION WITH RE-ANASTAMOSIS;  Surgeon: Joanna Ness MD;  [facility];  Service: Gynecology Oncology   • IR ASPIRATION ONLY  8/31/2021   • IR DRAINAGE TUBE CHECK AND/OR REMOVAL  9/17/2021   • IR DRAINAGE TUBE CHECK/CHANGE/REPOSITION/REINSERTION/UPSIZE  8/27/2021   • IR DRAINAGE TUBE CHECK/CHANGE/REPOSITION/REINSERTION/UPSIZE  9/3/2021   • IR DRAINAGE TUBE PLACEMENT  8/18/2021   • IR PORT PLACEMENT  9/17/2021   • IR THORACENTESIS  8/18/2021   • IR THORACENTESIS  9/3/2021   • LAPAROTOMY N/A 8/6/2021    Procedure: LAPAROTOMY EXPLORATORY; OVER SEW PANCREAS TAIL;  Surgeon: Joanna Ness MD;  [facility];  Service: Gynecology Oncology   • LAPAROTOMY N/A 11/4/2022    Procedure: LAPAROTOMY EXPLORATORY WITH ULTRASOUND GUIDANCE;  Surgeon: Elaine Garcia MD;  [facility];  Service: Surgical Oncology   • OMENTECTOMY N/A 8/6/2021    Procedure: RADICAL OMENTECTOMY;  Surgeon: Pilo Potter MD;  [facility]: [facility];  Service: Gynecology Oncology   • MN LAPS ABD PRTM&OMENTUM DX W/WO SPEC BR/ [facility] N/A 8/6/2021    Procedure: LAPAROSCOPY DIAGNOSTIC;  Surgeon: Pilo Potter MD;  [facility];  Service: Gynecology Oncology   • RIGHT OOPHORECTOMY  1986   • SPLENECTOMY, TOTAL N/A 8/6/2021    Procedure: SPLENECTOMY;  Surgeon: Pilo Potter MD;  [facility];  Service: Gynecology Oncology   • TONSILLECTOMY         Current Outpatient Medications   Medication Sig Dispense Refill   • acetaminophen (TYLENOL) 325 mg tablet Take 3 tablets (975 mg total) by mouth every 8 (eight) hours     • al mag oxide-diphenhydramine-lidocaine viscous (MAGIC MOUTHWASH) 1:1:1 suspension Swish and spit 10 mL every 4 (four) hours as needed for mouth pain or discomfort 300 mL 0   • albuterol (PROVENTIL HFA,VENTOLIN HFA) 90 mcg/act inhaler Inhale 2 puffs every 6 (six) hours as needed for wheezing 18 g 1   • amLODIPine-benazepril (LOTREL) 10-20 MG per capsule Take 1 capsule by mouth daily     • butalbital-aspirin-caffeine (FIORINAL) -40 mg per capsule Take 1 capsule by mouth every 4 (four) hours as needed for headaches or migraine 30 capsule 0   • cyclophosphamide (CYTOXAN) 50 mg capsule Take 1 capsule (50 mg total) by mouth daily 30 capsule 3   • dexamethasone (DECADRON) 2 mg tablet Take 1 tablet PO BID starting the day before chemo and continuing for 3 days following chemo 24 tablet 1   • docusate sodium (COLACE) 100 mg capsule Take 1 capsule (100 mg total) by mouth 2 (two) times a day (Patient not taking: Reported on 11/29/2022)     • DULoxetine (CYMBALTA) 20 mg capsule Take 1 capsule (20 mg total) by mouth daily 30 capsule 2   • fluticasone (FLONASE) 50 mcg/act nasal spray 1 spray into each nostril daily     • fluticasone (Flovent HFA) 44 mcg/act inhaler Inhale 2 puffs 2 (two) times a day Rinse mouth after use   10 6 g 0   • gabapentin (Neurontin) 300 mg capsule Take 1 capsule (300 mg total) by mouth 3 (three) times a day 270 capsule 0   • Lidocaine 4 % PTCH Apply topically     • Liniments (Blue-Emu Super Strength) CREA Apply topically as needed (joint pain)     • LORazepam (ATIVAN) 1 mg tablet Take 0 5 tablets (0 5 mg total) by mouth every 8 (eight) hours as needed (nausea or anxiety or insomnia) 45 tablet 0   • naloxone (NARCAN) 4 mg/0 1 mL nasal spray 0 1 mL (4 mg total) by Alternating Nares route every 3 (three) minutes as needed (accidental opioid overdose or respiratory depression) 1 each 1   • omeprazole (PriLOSEC) 20 mg delayed release capsule Take 20 mg by mouth 2 (two) times a day     • ondansetron (ZOFRAN) 8 mg tablet Take 1 tablet (8 mg total) by mouth every 8 (eight) hours as needed for nausea or vomiting 40 tablet 2   • oxyCODONE (Roxicodone) 5 immediate release tablet Take 1-2 tablets (5-10 mg total) by mouth every 4 (four) hours as needed for moderate pain or severe pain Max Daily Amount: 60 mg 180 tablet 0   • oxyCODONE ER (Xtampza ER) 9 mg extended release capsule Take 1 tablet (9 mg total) by mouth daily at bedtime Max Daily Amount: 9 mg 30 tablet 0   • polyethylene glycol (MIRALAX) 17 g packet Take 17 g by mouth daily for 7 days (Patient taking differently: Take 17 g by mouth daily PRN) 119 g 0   • potassium chloride (KLOR-CON) 20 mEq packet Take 20 mEq by mouth 2 (two) times a day (Patient not taking: Reported on 11/29/2022) 60 each 1   • pravastatin (PRAVACHOL) 10 mg tablet Take 10 mg by mouth daily As needed     • senna (SENOKOT) 8 6 mg Take 1 tablet (8 6 mg total) by mouth daily as needed for constipation for up to 7 days (Patient taking differently: Take 8 6 mg by mouth daily as needed for constipation PRN) 7 tablet 0     No current facility-administered medications for this visit  Facility-Administered Medications Ordered in Other Visits   Medication Dose Route Frequency Provider Last Rate Last Admin   • alteplase (CATHFLO) injection 2 mg  2 mg Intracatheter Q1MIN PRN Poli Morillo MD            Allergies   Allergen Reactions   • Erythromycin Nausea Only   • Morphine Headache       Review of Systems   Constitutional: Negative  HENT: Negative  Eyes: Negative  Respiratory: Negative  Cardiovascular: Negative  Gastrointestinal: Positive for nausea and vomiting (occasional)  Negative for abdominal pain  Genitourinary: Negative  Musculoskeletal: Negative  Skin: Negative  Neurological: Negative  Psychiatric/Behavioral: Negative  Video Exam    There were no vitals filed for this visit  Visit performed via audio only  No video capabilities available       I spent 20 minutes with patient today in which greater than 50% of the time was spent in counseling/coordination of care regarding chemotherapy

## 2023-03-06 ENCOUNTER — HOSPITAL ENCOUNTER (OUTPATIENT)
Dept: INFUSION CENTER | Facility: HOSPITAL | Age: 73
Discharge: HOME | End: 2023-03-06

## 2023-03-06 VITALS
SYSTOLIC BLOOD PRESSURE: 121 MMHG | BODY MASS INDEX: 30.4 KG/M2 | OXYGEN SATURATION: 96 % | HEART RATE: 107 BPM | DIASTOLIC BLOOD PRESSURE: 58 MMHG | HEIGHT: 59 IN | WEIGHT: 150.79 LBS | RESPIRATION RATE: 18 BRPM | TEMPERATURE: 96.2 F

## 2023-03-06 DIAGNOSIS — C56.3 MALIGNANT NEOPLASM OF BOTH OVARIES (HCC): Primary | ICD-10-CM

## 2023-03-06 RX ORDER — SODIUM CHLORIDE 9 MG/ML
20 INJECTION, SOLUTION INTRAVENOUS ONCE
Status: COMPLETED | OUTPATIENT
Start: 2023-03-06 | End: 2023-03-06

## 2023-03-06 RX ADMIN — BEVACIZUMAB-AWWB 1100 MG: 400 INJECTION, SOLUTION INTRAVENOUS at 11:57

## 2023-03-06 RX ADMIN — SODIUM CHLORIDE 20 ML/HR: 0.9 INJECTION, SOLUTION INTRAVENOUS at 10:59

## 2023-03-06 RX ADMIN — SODIUM CHLORIDE 200 MG: 9 INJECTION, SOLUTION INTRAVENOUS at 11:00

## 2023-03-09 ENCOUNTER — TELEPHONE (OUTPATIENT)
Dept: GYNECOLOGIC ONCOLOGY | Facility: CLINIC | Age: 73
End: 2023-03-09

## 2023-03-09 ENCOUNTER — HOSPITAL ENCOUNTER (OUTPATIENT)
Dept: INFUSION CENTER | Facility: HOSPITAL | Age: 73
Discharge: HOME | End: 2023-03-09

## 2023-03-09 NOTE — TELEPHONE ENCOUNTER
Medical Director 30 day Pulmonary Rehabilitation Review    I certify that I have met with Max Astorga face-to face to provide a 90 day progress review of his pulmonary rehabilitation program at [address]  I have reviewed the most recent individualized treatment plan (ITP), outcomes assessment, and provided opportunity for discussion with the patient    Comments:  Patient is doing the pulmonary  His dyspnea on exertion is improving      Continue with current treatment plan: Yes    Please provide the following modifications to the current treatment plan: Asher Aviles MD Patient called into the office regarding her infusion/ blood work appointment today @330pm  Patient states that she is unsure if it is the chemo treatment or if she has a big but patient does feel that going today is possible  Patient informed me that she  contacted the infusion center to cancel but advised her that our office would have to decide when she needs to be rescheduled   Patient can be reached back @[phone]

## 2023-03-09 NOTE — TELEPHONE ENCOUNTER
Return call placed to patient  She notes n/v  Minimal PO intake  She notes passing flatus and having normal bowel movements

## 2023-03-16 ENCOUNTER — HOSPITAL ENCOUNTER (OUTPATIENT)
Dept: INFUSION CENTER | Facility: HOSPITAL | Age: 73
Discharge: HOME | End: 2023-03-16

## 2023-03-16 DIAGNOSIS — Z45.2 ENCOUNTER FOR CENTRAL LINE CARE: Primary | ICD-10-CM

## 2023-03-16 DIAGNOSIS — C56.3 MALIGNANT NEOPLASM OF BOTH OVARIES (HCC): ICD-10-CM

## 2023-03-16 LAB
ALBUMIN SERPL BCP-MCNC: 2.8 G/DL (ref 3.5–5)
ALP SERPL-CCNC: 723 U/L (ref 34–104)
ALT SERPL W P-5'-P-CCNC: 20 U/L (ref 7–52)
ANION GAP SERPL CALCULATED.3IONS-SCNC: 9 MMOL/L (ref 4–13)
AST SERPL W P-5'-P-CCNC: 43 U/L (ref 13–39)
BASOPHILS # BLD AUTO: 0.07 THOUSANDS/ÂΜL (ref 0–0.1)
BASOPHILS NFR BLD AUTO: 1 % (ref 0–1)
BILIRUB SERPL-MCNC: 0.69 MG/DL (ref 0.2–1)
BUN SERPL-MCNC: 17 MG/DL (ref 5–25)
CALCIUM ALBUM COR SERPL-MCNC: 8.9 MG/DL (ref 8.3–10.1)
CALCIUM SERPL-MCNC: 7.9 MG/DL (ref 8.4–10.2)
CHLORIDE SERPL-SCNC: 97 MMOL/L (ref 96–108)
CO2 SERPL-SCNC: 27 MMOL/L (ref 21–32)
CREAT SERPL-MCNC: 0.76 MG/DL (ref 0.6–1.3)
EOSINOPHIL # BLD AUTO: 0.06 THOUSAND/ÂΜL (ref 0–0.61)
EOSINOPHIL NFR BLD AUTO: 1 % (ref 0–6)
ERYTHROCYTE [DISTWIDTH] IN BLOOD BY AUTOMATED COUNT: 20.3 % (ref 11.6–15.1)
GFR SERPL CREATININE-BSD FRML MDRD: 79 ML/MIN/1.73SQ M
GLUCOSE SERPL-MCNC: 115 MG/DL (ref 65–140)
HCT VFR BLD AUTO: 27.4 % (ref 34.8–46.1)
HGB BLD-MCNC: 8.9 G/DL (ref 11.5–15.4)
IMM GRANULOCYTES # BLD AUTO: 0.1 THOUSAND/UL (ref 0–0.2)
IMM GRANULOCYTES NFR BLD AUTO: 1 % (ref 0–2)
LYMPHOCYTES # BLD AUTO: 2.02 THOUSANDS/ÂΜL (ref 0.6–4.47)
LYMPHOCYTES NFR BLD AUTO: 18 % (ref 14–44)
MAGNESIUM SERPL-MCNC: 1.6 MG/DL (ref 1.9–2.7)
MCH RBC QN AUTO: 31.2 PG (ref 26.8–34.3)
MCHC RBC AUTO-ENTMCNC: 32.5 G/DL (ref 31.4–37.4)
MCV RBC AUTO: 96 FL (ref 82–98)
MONOCYTES # BLD AUTO: 2.2 THOUSAND/ÂΜL (ref 0.17–1.22)
MONOCYTES NFR BLD AUTO: 19 % (ref 4–12)
NEUTROPHILS # BLD AUTO: 7.12 THOUSANDS/ÂΜL (ref 1.85–7.62)
NEUTS SEG NFR BLD AUTO: 60 % (ref 43–75)
NRBC BLD AUTO-RTO: 1 /100 WBCS
PLATELET # BLD AUTO: 471 THOUSANDS/UL (ref 149–390)
PMV BLD AUTO: 10.3 FL (ref 8.9–12.7)
POTASSIUM SERPL-SCNC: 3.2 MMOL/L (ref 3.5–5.3)
PROT SERPL-MCNC: 5.9 G/DL (ref 6.4–8.4)
RBC # BLD AUTO: 2.85 MILLION/UL (ref 3.81–5.12)
SODIUM SERPL-SCNC: 133 MMOL/L (ref 135–147)
WBC # BLD AUTO: 11.57 THOUSAND/UL (ref 4.31–10.16)

## 2023-03-17 ENCOUNTER — TELEPHONE (OUTPATIENT)
Dept: SURGICAL ONCOLOGY | Facility: CLINIC | Age: 73
End: 2023-03-17

## 2023-03-17 NOTE — PROGRESS NOTES
Assessment/Plan:    Problem List Items Addressed This Visit        Endocrine    Ovarian cancer ([facility] [address] ) - Primary     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 presents for pre-cycle 3 visit  Pembro not added until this past cycle  CBC, CMP, mag,  reviewed and all hematologic parameters support continued treatment     trending up slightly but just added pembro  Will plan to reimage after this cycle  D/w pt that if stable disease, will proceed with current regimen  However, if progression, another line will be discussed  She understands that each subsequent line only has a 10-20% response rate  We will continue to discuss continuation and treatment options pending imaging  Relevant Orders    CT chest abdomen pelvis w contrast       Nervous and Auditory    Chemotherapy-induced peripheral neuropathy (HCC)     Stable on gabapentin            Other    Chemotherapy-induced nausea     Small frequent meals     Take pills with food          Neoplastic malignant related fatigue         CHIEF COMPLAINT: pre cycle 3      Problem:   Cancer Staging   Ovarian cancer (HCC)  Staging form: Ovary, Fallopian Tube, Primary Peritoneal, AJCC 8th Edition  - Clinical: FIGO Stage IIIC (cT3c) - Signed by Kane Santos MD on 9/16/2021        Previous therapy:  Oncology History   Ovarian cancer ([facility] [address] )   8/6/2021 Surgery    PROMISE/BSO/tumor debulking to optimal cytoreduction     8/25/2021 Initial Diagnosis    [facility]     9/16/2021 -  Cancer Staged    Staging form: Ovary, Fallopian Tube, Primary Peritoneal, AJCC 8th Edition  - Clinical: FIGO Stage IIIC (cT3c) - Signed by Kane Santos MD on 9/16/2021  Stage prefix: Initial diagnosis  Cancer antigen 125 () (U/mL): 543       9/28/2021 - 1/11/2022 Chemotherapy    Carbo AUC6, Taxol 175mg/m2 q3 weeks  Avastin 15mg/m2 added cycle 3    Toxicities:  Cycle4: carbo AUC 5 for neutropenia, added neulasta     10/2021 Genomic Testing    SEAN high  PDL1+ CPS >2     12/9/2021 Genetic Testing    POLD VUS     2/22/2022 - 11/2022 Chemotherapy    Maintenance: Avastin 15mg/m2  Olaparib 300mg BID    Toxicity:  3/28/22: dose-reduce avastin to 10 mg/kg every 21 days as well as dose-reduction of olaparib to 150 mg AM and 300 mg PM  5/9/22: decrease olaparib to 200mg BID  6/7/2022: stopped avastin for bone pain       11/18/2022 - 1/30/2023 Chemotherapy    Carbo AUC 5, Doxil 30 mg/m2, Avastin    Progression after 3 cycles      2/15/2023 -  Chemotherapy    Avastin 15 mg/kg and keytruda 200 mg IV every 21 days with daily oral cyclophosphamide 50 mg daily  Toxicities/delays:  Dariana Cerraton was held for cycle 1 due to insurance authorization/free drug program enrollment  Patient ID: Keith Maurer is a 71 y o  female  78yo with 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 presents for pre-cycle 3 visit  Patient reports ongoing fatigue as well as GI upset  She is tolerating small frequent meals  Nausea comes and goes intermittently  She is taking her Cytoxan with food for some relief  She follows with palliative care for ongoing abdominal pain discomfort  Bowel movements are at baseline  Denies any shortness of breath  No fevers or chills  History:  Pt underwent primary debulking and had a prolonged hospitalization complicated by pancreatic leak and subsequent abscess, intraabdominal bleeding s/p IR drain placement  During hospitalization repeat imaging revealed new liver lesions despite R0 resection  Patient was started on adjuvant therapy with good response and was transitioned to Avastin and olaparib  Patient did not tolerate Avastin secondary to significant myalgias and arthralgias and she was continued on olaparib alone  Patient had noted progression in liver lesion in July of 2022 which were monitored with subsequent growth noted in September 2022   Pt progressed through second line chemotherapy after 3 cycles       The following portions of the patient's history were reviewed and updated as appropriate: allergies, current medications, past family history, past medical history, past social history, past surgical history and problem list     Review of Systems   Constitutional: Positive for appetite change and fatigue  Negative for chills and fever  Respiratory: Negative for chest tightness and shortness of breath  Gastrointestinal: Positive for abdominal pain  Negative for abdominal distention, constipation, diarrhea and nausea  Genitourinary: Negative for difficulty urinating, flank pain, frequency, urgency, vaginal bleeding, vaginal discharge and vaginal pain  Musculoskeletal: Positive for back pain  Negative for joint swelling and myalgias  Skin: Negative for rash  Neurological: Positive for numbness  Negative for dizziness, light-headedness and headaches         Current Outpatient Medications   Medication Sig Dispense Refill   • acetaminophen (TYLENOL) 325 mg tablet Take 3 tablets (975 mg total) by mouth every 8 (eight) hours     • al mag oxide-diphenhydramine-lidocaine viscous (MAGIC MOUTHWASH) 1:1:1 suspension Swish and spit 10 mL every 4 (four) hours as needed for mouth pain or discomfort 300 mL 0   • albuterol (PROVENTIL HFA,VENTOLIN HFA) 90 mcg/act inhaler Inhale 2 puffs every 6 (six) hours as needed for wheezing 18 g 1   • amLODIPine-benazepril (LOTREL) 10-20 MG per capsule Take 1 capsule by mouth daily     • butalbital-aspirin-caffeine (FIORINAL) -40 mg per capsule Take 1 capsule by mouth every 4 (four) hours as needed for headaches or migraine 30 capsule 0   • cyclophosphamide (CYTOXAN) 50 mg capsule Take 1 capsule (50 mg total) by mouth daily 30 capsule 3   • dexamethasone (DECADRON) 2 mg tablet Take 1 tablet PO BID starting the day before chemo and continuing for 3 days following chemo 24 tablet 1   • DULoxetine (CYMBALTA) 20 mg capsule Take 1 capsule (20 mg total) by mouth daily 30 capsule 2   • fluticasone (FLONASE) 50 mcg/act nasal spray 1 spray into each nostril daily     • fluticasone (Flovent HFA) 44 mcg/act inhaler Inhale 2 puffs 2 (two) times a day Rinse mouth after use   10 6 g 0   • gabapentin (Neurontin) 300 mg capsule Take 1 capsule (300 mg total) by mouth 3 (three) times a day 270 capsule 0   • Lidocaine 4 % PTCH Apply topically     • Liniments (Blue-Emu Super Strength) CREA Apply topically as needed (joint pain)     • LORazepam (ATIVAN) 1 mg tablet Take 0 5 tablets (0 5 mg total) by mouth every 8 (eight) hours as needed (nausea or anxiety or insomnia) 45 tablet 0   • naloxone (NARCAN) 4 mg/0 1 mL nasal spray 0 1 mL (4 mg total) by Alternating Nares route every 3 (three) minutes as needed (accidental opioid overdose or respiratory depression) 1 each 1   • omeprazole (PriLOSEC) 20 mg delayed release capsule Take 20 mg by mouth 2 (two) times a day     • ondansetron (ZOFRAN) 8 mg tablet Take 1 tablet (8 mg total) by mouth every 8 (eight) hours as needed for nausea or vomiting 40 tablet 2   • oxyCODONE (Roxicodone) 5 immediate release tablet Take 1-2 tablets (5-10 mg total) by mouth every 4 (four) hours as needed for moderate pain or severe pain Max Daily Amount: 60 mg 180 tablet 0   • oxyCODONE ER (Xtampza ER) 9 mg extended release capsule Take 1 tablet (9 mg total) by mouth daily at bedtime Max Daily Amount: 9 mg 30 tablet 0   • polyethylene glycol (MIRALAX) 17 g packet Take 17 g by mouth daily for 7 days (Patient taking differently: Take 17 g by mouth daily PRN) 119 g 0   • pravastatin (PRAVACHOL) 10 mg tablet Take 10 mg by mouth daily As needed     • docusate sodium (COLACE) 100 mg capsule Take 1 capsule (100 mg total) by mouth 2 (two) times a day (Patient not taking: Reported on 11/29/2022)     • potassium chloride (KLOR-CON) 20 mEq packet Take 20 mEq by mouth 2 (two) times a day (Patient not taking: Reported on 11/29/2022) 60 each 1   • senna (SENOKOT) 8 6 mg Take 1 tablet (8 6 mg total) by mouth daily as needed for constipation for up to 7 days (Patient taking differently: Take 8 6 mg by mouth daily as needed for constipation PRN) 7 tablet 0     No current facility-administered medications for this visit  Objective:    Blood pressure 132/80, pulse 103, temperature 97 5 °F (36 4 °C), temperature source Temporal, resp  rate 18, height 4' 11 02" (1 499 m), weight 64 4 kg (142 lb), SpO2 98 %  Body mass index is 28 66 kg/m²  Body surface area is 1 59 meters squared  Physical Exam  HENT:      Head: Normocephalic and atraumatic  Nose: Nose normal    Cardiovascular:      Rate and Rhythm: Normal rate and regular rhythm  Pulmonary:      Effort: Pulmonary effort is normal    Abdominal:      General: There is no distension  Palpations: Abdomen is soft  There is no mass  Genitourinary:     Comments: defer  Musculoskeletal:         General: No swelling  Normal range of motion  Cervical back: Normal range of motion  Skin:     General: Skin is warm and dry  Neurological:      General: No focal deficit present  Mental Status: She is alert     Psychiatric:         Mood and Affect: Mood normal            Lab Results   Component Value Date    K 3 2 (L) 03/16/2023    CL 97 03/16/2023    CO2 27 03/16/2023    BUN 17 03/16/2023    CREATININE 0 76 03/16/2023    GLUCOSE 195 (H) 08/06/2021    GLUF 97 10/27/2022    CALCIUM 7 9 (L) 03/16/2023    CORRECTEDCA 8 9 03/16/2023    AST 43 (H) 03/16/2023    ALT 20 03/16/2023    ALKPHOS 723 (H) 03/16/2023    EGFR 79 03/16/2023     Lab Results   Component Value Date    WBC 11 57 (H) 03/16/2023    HGB 8 9 (L) 03/16/2023    HCT 27 4 (L) 03/16/2023    MCV 96 03/16/2023     (H) 03/16/2023     Lab Results   Component Value Date    NEUTROABS 7 12 03/16/2023        Trend:  Lab Results   Component Value Date     410 0 (H) 03/02/2023     313 2 (H) 02/09/2023     306 1 (H) 02/02/2023  146 7 (H) 01/04/2023     128 5 (H) 12/07/2022     129 0 (H) 11/16/2022     72 0 (H) 10/27/2022     41 1 (H) 09/15/2022     18 2 08/08/2022     10 6 07/11/2022     7 5 06/03/2022     7 9 05/13/2022     8 9 04/22/2022     9 5 04/01/2022     7 6 03/11/2022     10 5 02/11/2022     15 5 01/10/2022     12 5 12/17/2021     11 6 11/26/2021     19 4 11/05/2021     69 9 (H) 10/15/2021     94 1 (H) 09/24/2021     543 7 (H) 07/20/2021

## 2023-03-17 NOTE — ASSESSMENT & PLAN NOTE
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 presents for pre-cycle 3 visit  Pembro not added until this past cycle  CBC, CMP, mag,  reviewed and all hematologic parameters support continued treatment     trending up slightly but just added pembro  Will plan to reimage after this cycle  D/w pt that if stable disease, will proceed with current regimen  However, if progression, another line will be discussed  She understands that each subsequent line only has a 10-20% response rate  We will continue to discuss continuation and treatment options pending imaging

## 2023-03-17 NOTE — TELEPHONE ENCOUNTER
I attempted to place [employer] for the patient's Selena Gum and was told by [facility]  Aubrey Mcclain that it was too early to call in being that her next treatment date is 03/27/23  The earliest we can call would be Monday 03/20/23

## 2023-03-20 ENCOUNTER — OFFICE VISIT (OUTPATIENT)
Dept: GYNECOLOGIC ONCOLOGY | Facility: CLINIC | Age: 73
End: 2023-03-20

## 2023-03-20 ENCOUNTER — TELEPHONE (OUTPATIENT)
Dept: HEMATOLOGY ONCOLOGY | Facility: CLINIC | Age: 73
End: 2023-03-20

## 2023-03-20 ENCOUNTER — PATIENT OUTREACH (OUTPATIENT)
Dept: HEMATOLOGY ONCOLOGY | Facility: CLINIC | Age: 73
End: 2023-03-20

## 2023-03-20 VITALS
WEIGHT: 142 LBS | HEART RATE: 103 BPM | DIASTOLIC BLOOD PRESSURE: 80 MMHG | RESPIRATION RATE: 18 BRPM | OXYGEN SATURATION: 98 % | TEMPERATURE: 97.5 F | BODY MASS INDEX: 28.63 KG/M2 | HEIGHT: 59 IN | SYSTOLIC BLOOD PRESSURE: 132 MMHG

## 2023-03-20 DIAGNOSIS — R53.0 NEOPLASTIC MALIGNANT RELATED FATIGUE: ICD-10-CM

## 2023-03-20 DIAGNOSIS — T45.1X5A CHEMOTHERAPY-INDUCED PERIPHERAL NEUROPATHY (HCC): ICD-10-CM

## 2023-03-20 DIAGNOSIS — T45.1X5A CHEMOTHERAPY-INDUCED NAUSEA: ICD-10-CM

## 2023-03-20 DIAGNOSIS — G62.0 CHEMOTHERAPY-INDUCED PERIPHERAL NEUROPATHY (HCC): ICD-10-CM

## 2023-03-20 DIAGNOSIS — C56.3 MALIGNANT NEOPLASM OF BOTH OVARIES (HCC): Primary | ICD-10-CM

## 2023-03-20 DIAGNOSIS — R11.0 CHEMOTHERAPY-INDUCED NAUSEA: ICD-10-CM

## 2023-03-20 NOTE — PROGRESS NOTES
Women's Health Oncology Nurse Navigator:    Met with patient during follow up with Dr Pat Padron  Introduced myself and my role  Education provided regarding diagnosis and treatment options by Dr Pat Padron  Patient has no further questions at this time  Patient lives with supportive   She also has supportive daughter  She denies any transportation issues  She is notes some changes in appetite and fatigue  States she like to drink 2% milk, advised she can try carnation instant breakfast with peanut butter  She naps when needed will start to get outside for short walks as weather changes  She is seeing palliative care  Patient has family history of mother with colon cancer and father with lung and brain cancer  She did have genetic testing  Information provided on [address] along with my contact information  [employer] message sent to patient via [employer] along with information for cancer support community  Patient is aware she can reach out with any questions  General assessment complete

## 2023-03-20 NOTE — TELEPHONE ENCOUNTER
Placed call to [address] [place] [address] [facility] at [phone] to schedule shipment of patients Vj Gutierrez for 3/27/2023 DOS  Order will ship via [employer] overnight, signature required upon delivery  Email sent to infusion center, auth team, care  Infusion pharmacy and pharmacy business office

## 2023-03-23 ENCOUNTER — HOSPITAL ENCOUNTER (OUTPATIENT)
Dept: INFUSION CENTER | Facility: HOSPITAL | Age: 73
Discharge: HOME | End: 2023-03-23

## 2023-03-23 DIAGNOSIS — Z45.2 ENCOUNTER FOR CENTRAL LINE CARE: ICD-10-CM

## 2023-03-23 DIAGNOSIS — C56.3 MALIGNANT NEOPLASM OF BOTH OVARIES (HCC): Primary | ICD-10-CM

## 2023-03-23 LAB
ALBUMIN SERPL BCP-MCNC: 2.9 G/DL (ref 3.5–5)
ALP SERPL-CCNC: 883 U/L (ref 34–104)
ALT SERPL W P-5'-P-CCNC: 25 U/L (ref 7–52)
AMYLASE SERPL-CCNC: 25 IU/L (ref 29–103)
ANION GAP SERPL CALCULATED.3IONS-SCNC: 10 MMOL/L (ref 4–13)
AST SERPL W P-5'-P-CCNC: 64 U/L (ref 13–39)
BASOPHILS # BLD AUTO: 0.07 THOUSANDS/ÂΜL (ref 0–0.1)
BASOPHILS NFR BLD AUTO: 1 % (ref 0–1)
BILIRUB SERPL-MCNC: 1.42 MG/DL (ref 0.2–1)
BUN SERPL-MCNC: 11 MG/DL (ref 5–25)
CALCIUM ALBUM COR SERPL-MCNC: 9.3 MG/DL (ref 8.3–10.1)
CALCIUM SERPL-MCNC: 8.4 MG/DL (ref 8.4–10.2)
CHLORIDE SERPL-SCNC: 94 MMOL/L (ref 96–108)
CO2 SERPL-SCNC: 26 MMOL/L (ref 21–32)
CREAT SERPL-MCNC: 0.44 MG/DL (ref 0.6–1.3)
CREAT UR-MCNC: 210.3 MG/DL
EOSINOPHIL # BLD AUTO: 0.02 THOUSAND/ÂΜL (ref 0–0.61)
EOSINOPHIL NFR BLD AUTO: 0 % (ref 0–6)
ERYTHROCYTE [DISTWIDTH] IN BLOOD BY AUTOMATED COUNT: 22.2 % (ref 11.6–15.1)
GFR SERPL CREATININE-BSD FRML MDRD: 101 ML/MIN/1.73SQ M
GLUCOSE SERPL-MCNC: 122 MG/DL (ref 65–140)
HCT VFR BLD AUTO: 29.9 % (ref 34.8–46.1)
HGB BLD-MCNC: 9.8 G/DL (ref 11.5–15.4)
IMM GRANULOCYTES # BLD AUTO: 0.09 THOUSAND/UL (ref 0–0.2)
IMM GRANULOCYTES NFR BLD AUTO: 1 % (ref 0–2)
LIPASE SERPL-CCNC: 6 U/L (ref 11–82)
LYMPHOCYTES # BLD AUTO: 1.82 THOUSANDS/ÂΜL (ref 0.6–4.47)
LYMPHOCYTES NFR BLD AUTO: 14 % (ref 14–44)
MAGNESIUM SERPL-MCNC: 1.5 MG/DL (ref 1.9–2.7)
MCH RBC QN AUTO: 31.4 PG (ref 26.8–34.3)
MCHC RBC AUTO-ENTMCNC: 32.8 G/DL (ref 31.4–37.4)
MCV RBC AUTO: 96 FL (ref 82–98)
MONOCYTES # BLD AUTO: 2.12 THOUSAND/ÂΜL (ref 0.17–1.22)
MONOCYTES NFR BLD AUTO: 16 % (ref 4–12)
NEUTROPHILS # BLD AUTO: 9.33 THOUSANDS/ÂΜL (ref 1.85–7.62)
NEUTS SEG NFR BLD AUTO: 68 % (ref 43–75)
NRBC BLD AUTO-RTO: 0 /100 WBCS
PLATELET # BLD AUTO: 420 THOUSANDS/UL (ref 149–390)
PMV BLD AUTO: 10.8 FL (ref 8.9–12.7)
POTASSIUM SERPL-SCNC: 3.3 MMOL/L (ref 3.5–5.3)
PROT SERPL-MCNC: 6.6 G/DL (ref 6.4–8.4)
PROT UR-MCNC: 48 MG/DL
PROT/CREAT UR: 0.23 MG/G{CREAT} (ref 0–0.1)
RBC # BLD AUTO: 3.12 MILLION/UL (ref 3.81–5.12)
SODIUM SERPL-SCNC: 130 MMOL/L (ref 135–147)
TSH SERPL DL<=0.05 MIU/L-ACNC: 1.46 UIU/ML (ref 0.45–4.5)
WBC # BLD AUTO: 13.45 THOUSAND/UL (ref 4.31–10.16)

## 2023-03-24 LAB — CANCER AG125 SERPL-ACNC: 1353.7 U/ML (ref 0–30)

## 2023-03-25 DIAGNOSIS — G89.18 JOINT PAIN FOLLOWING CHEMOTHERAPY: ICD-10-CM

## 2023-03-25 DIAGNOSIS — T45.1X5A CHEMOTHERAPY-INDUCED PERIPHERAL NEUROPATHY (HCC): ICD-10-CM

## 2023-03-25 DIAGNOSIS — G89.3 CANCER RELATED PAIN: ICD-10-CM

## 2023-03-25 DIAGNOSIS — Z51.5 PALLIATIVE CARE PATIENT: ICD-10-CM

## 2023-03-25 DIAGNOSIS — M25.50 JOINT PAIN FOLLOWING CHEMOTHERAPY: ICD-10-CM

## 2023-03-25 DIAGNOSIS — G62.0 CHEMOTHERAPY-INDUCED PERIPHERAL NEUROPATHY (HCC): ICD-10-CM

## 2023-03-25 DIAGNOSIS — F41.9 ANXIOUSNESS: ICD-10-CM

## 2023-03-25 DIAGNOSIS — C56.3 MALIGNANT NEOPLASM OF BOTH OVARIES (HCC): ICD-10-CM

## 2023-03-25 DIAGNOSIS — G47.01 INSOMNIA DUE TO MEDICAL CONDITION: ICD-10-CM

## 2023-03-26 RX ORDER — DULOXETIN HYDROCHLORIDE 20 MG/1
CAPSULE, DELAYED RELEASE ORAL
Qty: 30 CAPSULE | Refills: 0 | Status: SHIPPED | OUTPATIENT
Start: 2023-03-26

## 2023-03-27 ENCOUNTER — HOSPITAL ENCOUNTER (OUTPATIENT)
Dept: INFUSION CENTER | Facility: HOSPITAL | Age: 73
Discharge: HOME | End: 2023-03-27

## 2023-03-27 ENCOUNTER — APPOINTMENT (EMERGENCY)
Dept: RADIOLOGY | Facility: HOSPITAL | Age: 73
End: 2023-03-27

## 2023-03-27 ENCOUNTER — HOSPITAL ENCOUNTER (INPATIENT)
Facility: HOSPITAL | Age: 73
LOS: 5 days | Discharge: HOME | End: 2023-04-01
Attending: EMERGENCY MEDICINE | Admitting: STUDENT IN AN ORGANIZED HEALTH CARE EDUCATION/TRAINING PROGRAM

## 2023-03-27 VITALS
WEIGHT: 145.94 LBS | HEIGHT: 59 IN | RESPIRATION RATE: 24 BRPM | SYSTOLIC BLOOD PRESSURE: 143 MMHG | BODY MASS INDEX: 29.42 KG/M2 | DIASTOLIC BLOOD PRESSURE: 63 MMHG | HEART RATE: 117 BPM | TEMPERATURE: 102.8 F | OXYGEN SATURATION: 94 %

## 2023-03-27 DIAGNOSIS — C56.3 MALIGNANT NEOPLASM OF BOTH OVARIES (HCC): Primary | ICD-10-CM

## 2023-03-27 DIAGNOSIS — T45.1X5A CHEMOTHERAPY INDUCED NEUTROPENIA (HCC): ICD-10-CM

## 2023-03-27 DIAGNOSIS — J18.9 PNEUMONIA: Primary | ICD-10-CM

## 2023-03-27 DIAGNOSIS — J20.9 ACUTE BRONCHITIS, UNSPECIFIED ORGANISM: ICD-10-CM

## 2023-03-27 DIAGNOSIS — C56.3 MALIGNANT NEOPLASM OF BOTH OVARIES (HCC): ICD-10-CM

## 2023-03-27 DIAGNOSIS — C78.7 MALIGNANT NEOPLASM METASTATIC TO LIVER (HCC): ICD-10-CM

## 2023-03-27 DIAGNOSIS — E87.6 HYPOKALEMIA: ICD-10-CM

## 2023-03-27 DIAGNOSIS — E87.1 HYPONATREMIA: ICD-10-CM

## 2023-03-27 DIAGNOSIS — D70.1 CHEMOTHERAPY INDUCED NEUTROPENIA (HCC): ICD-10-CM

## 2023-03-27 PROBLEM — R65.20 SEVERE SEPSIS (HCC): Status: ACTIVE | Noted: 2023-03-27

## 2023-03-27 PROBLEM — A41.9 SEPSIS (HCC): Status: ACTIVE | Noted: 2023-03-27

## 2023-03-27 PROBLEM — D64.9 ANEMIA: Status: ACTIVE | Noted: 2023-03-27

## 2023-03-27 PROBLEM — E87.20 LACTIC ACIDOSIS: Status: ACTIVE | Noted: 2023-03-27

## 2023-03-27 LAB
2HR DELTA HS TROPONIN: -2 NG/L
ALBUMIN SERPL BCP-MCNC: 2.6 G/DL (ref 3.5–5)
ALP SERPL-CCNC: 923 U/L (ref 34–104)
ALT SERPL W P-5'-P-CCNC: 34 U/L (ref 7–52)
ANION GAP SERPL CALCULATED.3IONS-SCNC: 10 MMOL/L (ref 4–13)
ANION GAP SERPL CALCULATED.3IONS-SCNC: 14 MMOL/L (ref 4–13)
AST SERPL W P-5'-P-CCNC: 106 U/L (ref 13–39)
ATRIAL RATE: 110 BPM
BACTERIA UR QL AUTO: ABNORMAL /HPF
BASOPHILS # BLD AUTO: 0.02 THOUSANDS/ÂΜL (ref 0–0.1)
BASOPHILS NFR BLD AUTO: 0 % (ref 0–1)
BILIRUB SERPL-MCNC: 0.92 MG/DL (ref 0.2–1)
BILIRUB UR QL STRIP: ABNORMAL
BUN SERPL-MCNC: 12 MG/DL (ref 5–25)
BUN SERPL-MCNC: 14 MG/DL (ref 5–25)
CALCIUM ALBUM COR SERPL-MCNC: 9 MG/DL (ref 8.3–10.1)
CALCIUM SERPL-MCNC: 7.6 MG/DL (ref 8.4–10.2)
CALCIUM SERPL-MCNC: 7.9 MG/DL (ref 8.4–10.2)
CARDIAC TROPONIN I PNL SERPL HS: 35 NG/L
CARDIAC TROPONIN I PNL SERPL HS: 37 NG/L
CHLORIDE SERPL-SCNC: 92 MMOL/L (ref 96–108)
CHLORIDE SERPL-SCNC: 96 MMOL/L (ref 96–108)
CLARITY UR: ABNORMAL
CO2 SERPL-SCNC: 22 MMOL/L (ref 21–32)
CO2 SERPL-SCNC: 22 MMOL/L (ref 21–32)
COLOR UR: ABNORMAL
CREAT SERPL-MCNC: 0.37 MG/DL (ref 0.6–1.3)
CREAT SERPL-MCNC: 0.47 MG/DL (ref 0.6–1.3)
EOSINOPHIL # BLD AUTO: 0.01 THOUSAND/ÂΜL (ref 0–0.61)
EOSINOPHIL NFR BLD AUTO: 0 % (ref 0–6)
ERYTHROCYTE [DISTWIDTH] IN BLOOD BY AUTOMATED COUNT: 22.7 % (ref 11.6–15.1)
FLUAV RNA RESP QL NAA+PROBE: NEGATIVE
FLUBV RNA RESP QL NAA+PROBE: NEGATIVE
GFR SERPL CREATININE-BSD FRML MDRD: 107 ML/MIN/1.73SQ M
GFR SERPL CREATININE-BSD FRML MDRD: 99 ML/MIN/1.73SQ M
GLUCOSE SERPL-MCNC: 128 MG/DL (ref 65–140)
GLUCOSE SERPL-MCNC: 133 MG/DL (ref 65–140)
GLUCOSE UR STRIP-MCNC: NEGATIVE MG/DL
HCT VFR BLD AUTO: 30.9 % (ref 34.8–46.1)
HGB BLD-MCNC: 10.3 G/DL (ref 11.5–15.4)
HGB UR QL STRIP.AUTO: NEGATIVE
HYALINE CASTS #/AREA URNS LPF: ABNORMAL /LPF
IMM GRANULOCYTES # BLD AUTO: 0.1 THOUSAND/UL (ref 0–0.2)
IMM GRANULOCYTES NFR BLD AUTO: 1 % (ref 0–2)
KETONES UR STRIP-MCNC: NEGATIVE MG/DL
LACTATE SERPL-SCNC: 2.4 MMOL/L (ref 0.5–2)
LACTATE SERPL-SCNC: 3 MMOL/L (ref 0.5–2)
LACTATE SERPL-SCNC: 4.6 MMOL/L (ref 0.5–2)
LEUKOCYTE ESTERASE UR QL STRIP: NEGATIVE
LYMPHOCYTES # BLD AUTO: 1.12 THOUSANDS/ÂΜL (ref 0.6–4.47)
LYMPHOCYTES NFR BLD AUTO: 11 % (ref 14–44)
MAGNESIUM SERPL-MCNC: 2.3 MG/DL (ref 1.9–2.7)
MCH RBC QN AUTO: 31.3 PG (ref 26.8–34.3)
MCHC RBC AUTO-ENTMCNC: 33.3 G/DL (ref 31.4–37.4)
MCV RBC AUTO: 94 FL (ref 82–98)
MONOCYTES # BLD AUTO: 0.85 THOUSAND/ÂΜL (ref 0.17–1.22)
MONOCYTES NFR BLD AUTO: 9 % (ref 4–12)
MUCOUS THREADS UR QL AUTO: ABNORMAL
NEUTROPHILS # BLD AUTO: 7.87 THOUSANDS/ÂΜL (ref 1.85–7.62)
NEUTS SEG NFR BLD AUTO: 79 % (ref 43–75)
NITRITE UR QL STRIP: NEGATIVE
NON-SQ EPI CELLS URNS QL MICRO: ABNORMAL /HPF
NRBC BLD AUTO-RTO: 1 /100 WBCS
P AXIS: 20 DEGREES
PH UR STRIP.AUTO: 6 [PH]
PLATELET # BLD AUTO: 384 THOUSANDS/UL (ref 149–390)
PMV BLD AUTO: 11 FL (ref 8.9–12.7)
POTASSIUM SERPL-SCNC: 2.9 MMOL/L (ref 3.5–5.3)
POTASSIUM SERPL-SCNC: 3.4 MMOL/L (ref 3.5–5.3)
PR INTERVAL: 140 MS
PROCALCITONIN SERPL-MCNC: 2.79 NG/ML
PROT SERPL-MCNC: 5.7 G/DL (ref 6.4–8.4)
PROT UR STRIP-MCNC: ABNORMAL MG/DL
QRS AXIS: 83 DEGREES
QRSD INTERVAL: 68 MS
QT INTERVAL: 348 MS
QTC INTERVAL: 470 MS
RBC # BLD AUTO: 3.29 MILLION/UL (ref 3.81–5.12)
RBC #/AREA URNS AUTO: ABNORMAL /HPF
RSV RNA RESP QL NAA+PROBE: NEGATIVE
SARS-COV-2 RNA RESP QL NAA+PROBE: NEGATIVE
SODIUM SERPL-SCNC: 128 MMOL/L (ref 135–147)
SODIUM SERPL-SCNC: 128 MMOL/L (ref 135–147)
SP GR UR STRIP.AUTO: >=1.03 (ref 1–1.03)
T WAVE AXIS: 58 DEGREES
UROBILINOGEN UR QL STRIP.AUTO: 4 E.U./DL
VENTRICULAR RATE: 110 BPM
WBC # BLD AUTO: 9.97 THOUSAND/UL (ref 4.31–10.16)
WBC #/AREA URNS AUTO: ABNORMAL /HPF

## 2023-03-27 RX ORDER — POTASSIUM CHLORIDE 14.9 MG/ML
20 INJECTION INTRAVENOUS ONCE
Status: COMPLETED | OUTPATIENT
Start: 2023-03-27 | End: 2023-03-28

## 2023-03-27 RX ORDER — DOCUSATE SODIUM 100 MG/1
100 CAPSULE, LIQUID FILLED ORAL 2 TIMES DAILY
Status: DISCONTINUED | OUTPATIENT
Start: 2023-03-28 | End: 2023-04-01 | Stop reason: HOSPADM

## 2023-03-27 RX ORDER — LIDOCAINE 50 MG/G
1 PATCH TOPICAL DAILY
Status: DISCONTINUED | OUTPATIENT
Start: 2023-03-28 | End: 2023-04-01 | Stop reason: HOSPADM

## 2023-03-27 RX ORDER — CYCLOPHOSPHAMIDE 50 MG/1
50 CAPSULE ORAL DAILY
Status: DISCONTINUED | OUTPATIENT
Start: 2023-03-28 | End: 2023-03-28

## 2023-03-27 RX ORDER — ACETAMINOPHEN 325 MG/1
650 TABLET ORAL ONCE
Status: COMPLETED | OUTPATIENT
Start: 2023-03-27 | End: 2023-03-27

## 2023-03-27 RX ORDER — FLUTICASONE PROPIONATE 44 UG/1
2 AEROSOL, METERED RESPIRATORY (INHALATION) 2 TIMES DAILY
Status: DISCONTINUED | OUTPATIENT
Start: 2023-03-27 | End: 2023-04-01 | Stop reason: HOSPADM

## 2023-03-27 RX ORDER — GUAIFENESIN/DEXTROMETHORPHAN 100-10MG/5
10 SYRUP ORAL EVERY 4 HOURS PRN
Status: DISCONTINUED | OUTPATIENT
Start: 2023-03-27 | End: 2023-04-01 | Stop reason: HOSPADM

## 2023-03-27 RX ORDER — ALBUTEROL SULFATE 2.5 MG/3ML
2.5 SOLUTION RESPIRATORY (INHALATION) EVERY 6 HOURS PRN
Status: DISCONTINUED | OUTPATIENT
Start: 2023-03-27 | End: 2023-03-28

## 2023-03-27 RX ORDER — ENOXAPARIN SODIUM 100 MG/ML
40 INJECTION SUBCUTANEOUS DAILY
Status: DISCONTINUED | OUTPATIENT
Start: 2023-03-28 | End: 2023-04-01 | Stop reason: HOSPADM

## 2023-03-27 RX ORDER — SODIUM CHLORIDE 9 MG/ML
20 INJECTION, SOLUTION INTRAVENOUS ONCE
Status: COMPLETED | OUTPATIENT
Start: 2023-03-27 | End: 2023-03-27

## 2023-03-27 RX ORDER — ONDANSETRON 2 MG/ML
4 INJECTION INTRAMUSCULAR; INTRAVENOUS EVERY 6 HOURS PRN
Status: DISCONTINUED | OUTPATIENT
Start: 2023-03-27 | End: 2023-04-01 | Stop reason: HOSPADM

## 2023-03-27 RX ORDER — PANTOPRAZOLE SODIUM 40 MG/1
40 TABLET, DELAYED RELEASE ORAL
Status: DISCONTINUED | OUTPATIENT
Start: 2023-03-28 | End: 2023-04-01 | Stop reason: HOSPADM

## 2023-03-27 RX ORDER — DIPHENHYDRAMINE HYDROCHLORIDE 50 MG/ML
50 INJECTION INTRAMUSCULAR; INTRAVENOUS ONCE
Status: COMPLETED | OUTPATIENT
Start: 2023-03-27 | End: 2023-03-27

## 2023-03-27 RX ORDER — GABAPENTIN 300 MG/1
300 CAPSULE ORAL 3 TIMES DAILY
Status: DISCONTINUED | OUTPATIENT
Start: 2023-03-27 | End: 2023-04-01 | Stop reason: HOSPADM

## 2023-03-27 RX ORDER — PRAVASTATIN SODIUM 10 MG
10 TABLET ORAL
Status: DISCONTINUED | OUTPATIENT
Start: 2023-03-27 | End: 2023-04-01 | Stop reason: HOSPADM

## 2023-03-27 RX ORDER — AMLODIPINE BESYLATE 5 MG/1
5 TABLET ORAL DAILY
Status: DISCONTINUED | OUTPATIENT
Start: 2023-03-28 | End: 2023-04-01 | Stop reason: HOSPADM

## 2023-03-27 RX ORDER — CEFTRIAXONE 1 G/50ML
1000 INJECTION, SOLUTION INTRAVENOUS EVERY 24 HOURS
Status: DISCONTINUED | OUTPATIENT
Start: 2023-03-27 | End: 2023-03-28

## 2023-03-27 RX ORDER — BUTALBITAL, ACETAMINOPHEN AND CAFFEINE 50; 325; 40 MG/1; MG/1; MG/1
1 TABLET ORAL EVERY 4 HOURS PRN
Status: DISCONTINUED | OUTPATIENT
Start: 2023-03-27 | End: 2023-04-01 | Stop reason: HOSPADM

## 2023-03-27 RX ORDER — FLUTICASONE PROPIONATE 50 MCG
1 SPRAY, SUSPENSION (ML) NASAL
Status: DISCONTINUED | OUTPATIENT
Start: 2023-03-27 | End: 2023-04-01 | Stop reason: HOSPADM

## 2023-03-27 RX ORDER — METHYLPREDNISOLONE SODIUM SUCCINATE 125 MG/2ML
125 INJECTION, POWDER, LYOPHILIZED, FOR SOLUTION INTRAMUSCULAR; INTRAVENOUS ONCE
Status: COMPLETED | OUTPATIENT
Start: 2023-03-27 | End: 2023-03-27

## 2023-03-27 RX ORDER — MAGNESIUM SULFATE HEPTAHYDRATE 40 MG/ML
2 INJECTION, SOLUTION INTRAVENOUS ONCE
Status: COMPLETED | OUTPATIENT
Start: 2023-03-27 | End: 2023-03-27

## 2023-03-27 RX ORDER — LORAZEPAM 0.5 MG/1
0.5 TABLET ORAL EVERY 8 HOURS PRN
Status: DISCONTINUED | OUTPATIENT
Start: 2023-03-27 | End: 2023-04-01 | Stop reason: HOSPADM

## 2023-03-27 RX ORDER — OXYCODONE HCL 10 MG/1
10 TABLET, FILM COATED, EXTENDED RELEASE ORAL
Status: DISCONTINUED | OUTPATIENT
Start: 2023-03-27 | End: 2023-04-01 | Stop reason: HOSPADM

## 2023-03-27 RX ORDER — FLUTICASONE PROPIONATE 50 MCG
1 SPRAY, SUSPENSION (ML) NASAL DAILY
Status: DISCONTINUED | OUTPATIENT
Start: 2023-03-28 | End: 2023-03-27

## 2023-03-27 RX ORDER — ACETAMINOPHEN 325 MG/1
975 TABLET ORAL EVERY 8 HOURS SCHEDULED
Status: DISCONTINUED | OUTPATIENT
Start: 2023-03-27 | End: 2023-04-01 | Stop reason: HOSPADM

## 2023-03-27 RX ORDER — DULOXETIN HYDROCHLORIDE 20 MG/1
20 CAPSULE, DELAYED RELEASE ORAL DAILY
Status: DISCONTINUED | OUTPATIENT
Start: 2023-03-28 | End: 2023-04-01 | Stop reason: HOSPADM

## 2023-03-27 RX ORDER — CEFEPIME HYDROCHLORIDE 2 G/50ML
2000 INJECTION, SOLUTION INTRAVENOUS ONCE
Status: COMPLETED | OUTPATIENT
Start: 2023-03-27 | End: 2023-03-27

## 2023-03-27 RX ORDER — OXYCODONE HYDROCHLORIDE 5 MG/1
5 TABLET ORAL EVERY 4 HOURS PRN
Status: DISCONTINUED | OUTPATIENT
Start: 2023-03-27 | End: 2023-04-01 | Stop reason: HOSPADM

## 2023-03-27 RX ADMIN — SODIUM CHLORIDE 20 ML/HR: 0.9 INJECTION, SOLUTION INTRAVENOUS at 11:20

## 2023-03-27 RX ADMIN — POTASSIUM CHLORIDE 20 MEQ: 14.9 INJECTION, SOLUTION INTRAVENOUS at 18:31

## 2023-03-27 RX ADMIN — ONDANSETRON 8 MG: 2 INJECTION INTRAMUSCULAR; INTRAVENOUS at 12:36

## 2023-03-27 RX ADMIN — ACETAMINOPHEN 650 MG: 325 TABLET, FILM COATED ORAL at 15:53

## 2023-03-27 RX ADMIN — VANCOMYCIN HYDROCHLORIDE 1500 MG: 10 INJECTION, POWDER, LYOPHILIZED, FOR SOLUTION INTRAVENOUS at 18:38

## 2023-03-27 RX ADMIN — METHYLPREDNISOLONE SODIUM SUCCINATE 125 MG: 125 INJECTION, POWDER, FOR SOLUTION INTRAMUSCULAR; INTRAVENOUS at 15:03

## 2023-03-27 RX ADMIN — PRAVASTATIN SODIUM 10 MG: 10 TABLET ORAL at 21:31

## 2023-03-27 RX ADMIN — CEFEPIME HYDROCHLORIDE 2000 MG: 2 INJECTION, SOLUTION INTRAVENOUS at 17:46

## 2023-03-27 RX ADMIN — DIPHENHYDRAMINE HYDROCHLORIDE 50 MG: 50 INJECTION, SOLUTION INTRAMUSCULAR; INTRAVENOUS at 15:01

## 2023-03-27 RX ADMIN — SODIUM CHLORIDE 1000 ML: 0.9 INJECTION, SOLUTION INTRAVENOUS at 15:37

## 2023-03-27 RX ADMIN — GABAPENTIN 300 MG: 300 CAPSULE ORAL at 21:31

## 2023-03-27 RX ADMIN — IOHEXOL 100 ML: 350 INJECTION, SOLUTION INTRAVENOUS at 16:44

## 2023-03-27 RX ADMIN — ACETAMINOPHEN 975 MG: 325 TABLET, FILM COATED ORAL at 21:31

## 2023-03-27 RX ADMIN — CEFTRIAXONE 1000 MG: 1 INJECTION, SOLUTION INTRAVENOUS at 21:30

## 2023-03-27 RX ADMIN — MAGNESIUM SULFATE HEPTAHYDRATE 2 G: 40 INJECTION, SOLUTION INTRAVENOUS at 11:19

## 2023-03-27 RX ADMIN — OXYCODONE HYDROCHLORIDE 10 MG: 10 TABLET, FILM COATED, EXTENDED RELEASE ORAL at 21:30

## 2023-03-27 NOTE — PROGRESS NOTES
"Pt to clinic for Keytruda and MVASI infusions  Pt c/o ongoing nausea  Pt stated she does NOT take Zofran at home because it makes her \"feel tired\"  Pt is instead taking Ativan for her nausea  Pt vomited once on [place] 3/26/23 and Friday 3/24/23  Pt's Potassium is 3 3 and Magnesium is 1 5 from lab work drawn on 3/23/23  You Lobo RN made aware  IV Zofran and IV Magnesium ordered  Order for oral Phenergan is being sent to pt's pharmacy  Pt made aware of plan at this time  Pt resting comfortably in recliner     "

## 2023-03-27 NOTE — ED PROVIDER NOTES
History  Chief Complaint   Patient presents with   • Fever - 9 weeks to 74 years     Pt was coming in for her infusion, but developed a fever and nausea after Mg and zofran was given  Patient is a 69-year-old female with a history of recurrent ovarian cancer, currently on chemo that presents emergency department from the infusion center with a complaint of a fever  Patient was noted to be febrile at the time of arrival, with a temp of 96 7, which increased to 102 2 and 102 8 after she received mag and Zofran  Patient also with a diffuse rash of which she was previously unaware prior to my initial evaluation  Patient did not receive today's dose of chemo  She states that she has felt somewhat ill in the past week, with intermittent episodes of vomiting  She denies somatic complaints  History provided by:  Patient   used: No    Fever - 9 weeks to 74 years  Max temp prior to arrival:  102 8  Temp source:  Oral  Severity:  Moderate  Onset quality:  Sudden  Timing:  Constant  Chronicity:  New  Relieved by:  Nothing  Worsened by:  Nothing  Ineffective treatments:  None tried  Associated symptoms: rash    Associated symptoms: no chills, no cough, no diarrhea, no dysuria, no nausea and no vomiting        Prior to Admission Medications   Prescriptions Last Dose Informant Patient Reported? Taking?    DULoxetine (CYMBALTA) 20 mg capsule   No Yes   Sig: TAKE ONE CAPSULE BY MOUTH EVERY DAY   LORazepam (ATIVAN) 1 mg tablet 3/27/2023  No Yes   Sig: Take 0 5 tablets (0 5 mg total) by mouth every 8 (eight) hours as needed (nausea or anxiety or insomnia)   Lidocaine 4 % PTCH   Yes Yes   Sig: Apply 1 patch topically   Liniments (Blue-Emu Super Strength) CREA   No Yes   Sig: Apply topically as needed (joint pain)   acetaminophen (TYLENOL) 325 mg tablet 3/27/2023  No Yes   Sig: Take 3 tablets (975 mg total) by mouth every 8 (eight) hours   al mag oxide-diphenhydramine-lidocaine viscous (MAGIC MOUTHWASH) 1:1:1 suspension   No Yes   Sig: Swish and spit 10 mL every 4 (four) hours as needed for mouth pain or discomfort   albuterol (PROVENTIL HFA,VENTOLIN HFA) 90 mcg/act inhaler   No Yes   Sig: Inhale 2 puffs every 6 (six) hours as needed for wheezing   amLODIPine-benazepril (LOTREL) 10-20 MG per capsule 3/27/2023  Yes Yes   Sig: Take 1 capsule by mouth daily   butalbital-aspirin-caffeine (FIORINAL) -40 mg per capsule   No Yes   Sig: Take 1 capsule by mouth every 4 (four) hours as needed for headaches or migraine   cyclophosphamide (CYTOXAN) 50 mg capsule   No Yes   Sig: Take 1 capsule (50 mg total) by mouth daily   dexamethasone (DECADRON) 2 mg tablet Not Taking  No No   Sig: Take 1 tablet PO BID starting the day before chemo and continuing for 3 days following chemo   Patient not taking: Reported on 3/27/2023   docusate sodium (COLACE) 100 mg capsule   No No   Sig: Take 1 capsule (100 mg total) by mouth 2 (two) times a day   Patient not taking: Reported on 2022   fluticasone (FLONASE) 50 mcg/act nasal spray   Yes Yes   Si spray into each nostril daily   fluticasone (Flovent HFA) 44 mcg/act inhaler   No No   Sig: Inhale 2 puffs 2 (two) times a day Rinse mouth after use     Patient taking differently: Inhale 2 puffs 2 (two) times a day as needed Rinse mouth after use    gabapentin (Neurontin) 300 mg capsule   No Yes   Sig: Take 1 capsule (300 mg total) by mouth 3 (three) times a day   naloxone (NARCAN) 4 mg/0 1 mL nasal spray   No Yes   Si 1 mL (4 mg total) by Alternating Nares route every 3 (three) minutes as needed (accidental opioid overdose or respiratory depression)   omeprazole (PriLOSEC) 20 mg delayed release capsule Not Taking  Yes No   Sig: Take 20 mg by mouth 2 (two) times a day   Patient not taking: Reported on 3/27/2023   ondansetron (ZOFRAN) 8 mg tablet   No Yes   Sig: Take 1 tablet (8 mg total) by mouth every 8 (eight) hours as needed for nausea or vomiting   oxyCODONE (Roxicodone) 5 immediate release tablet   No Yes   Sig: Take 1-2 tablets (5-10 mg total) by mouth every 4 (four) hours as needed for moderate pain or severe pain Max Daily Amount: 60 mg   oxyCODONE ER (Xtampza ER) 9 mg extended release capsule 3/26/2023  No Yes   Sig: Take 1 tablet (9 mg total) by mouth daily at bedtime Max Daily Amount: 9 mg   polyethylene glycol (MIRALAX) 17 g packet   No Yes   Sig: Take 17 g by mouth daily for 7 days   Patient taking differently: Take 17 g by mouth daily PRN   potassium chloride (KLOR-CON) 20 mEq packet Not Taking  No No   Sig: Take 20 mEq by mouth 2 (two) times a day   Patient not taking: Reported on 11/29/2022   pravastatin (PRAVACHOL) 10 mg tablet   Yes Yes   Sig: Take 10 mg by mouth daily at bedtime As needed   promethazine (PHENERGAN) 12 5 MG tablet Not Taking  No No   Sig: Take 1 tablet (12 5 mg total) by mouth every 6 (six) hours as needed for nausea or vomiting   Patient not taking: Reported on 3/27/2023   senna (SENOKOT) 8 6 mg   No No   Sig: Take 1 tablet (8 6 mg total) by mouth daily as needed for constipation for up to 7 days   Patient taking differently: Take 8 6 mg by mouth daily as needed for constipation PRN      Facility-Administered Medications: None       Past Medical History:   Diagnosis Date   • Acid reflux    • Asthma    • Cancer (HCC)     ovarian, peritoneal carcinomatosis   • GERD (gastroesophageal reflux disease)    • Hypercholesteremia    • Hypertension    • Migraine    • Ovarian cancer [facility])        Past Surgical History:   Procedure Laterality Date   • APPENDECTOMY N/A 8/6/2021    Procedure: APPENDECTOMY;  Surgeon: Tete Edwards MD;  [facility];  Service: Gynecology Oncology   • CHOLECYSTECTOMY     • COLONOSCOPY     • FL CYSTOGRAM  8/18/2021   • GALLBLADDER SURGERY     • HYSTERECTOMY N/A 8/6/2021    Procedure: ENBLOCK HYSTERECTOMY, BILATERAL SALPINGO-OOPHORECTOMY, SIGMOIDECTOMY WITH LOW RECTAL REANASTAMOSIS, BLADDER PERITONEAL STRIPPING AND CYSTOTOMY REPAIR, DIAPHRAGM STRIPPING, SMALL BOWEL RESECTION WITH RE-ANASTAMOSIS;  Surgeon: Miriam Zhou MD;  [facility];  Service: Gynecology Oncology   • IR ASPIRATION ONLY  8/31/2021   • IR DRAINAGE TUBE CHECK AND/OR REMOVAL  9/17/2021   • IR DRAINAGE TUBE CHECK/CHANGE/REPOSITION/REINSERTION/UPSIZE  8/27/2021   • IR DRAINAGE TUBE CHECK/CHANGE/REPOSITION/REINSERTION/UPSIZE  9/3/2021   • IR DRAINAGE TUBE PLACEMENT  8/18/2021   • IR PORT PLACEMENT  9/17/2021   • IR THORACENTESIS  8/18/2021   • IR THORACENTESIS  9/3/2021   • LAPAROTOMY N/A 8/6/2021    Procedure: LAPAROTOMY EXPLORATORY; OVER SEW PANCREAS TAIL;  Surgeon: Miriam Zhou MD;  [facility];  Service: Gynecology Oncology   • LAPAROTOMY N/A 11/4/2022    Procedure: LAPAROTOMY EXPLORATORY WITH ULTRASOUND GUIDANCE;  Surgeon: Sadi Medina MD;  [facility];  Service: Surgical Oncology   • OMENTECTOMY N/A 8/6/2021    Procedure: RADICAL OMENTECTOMY;  Surgeon: Miriam Zhou MD;  [facility];  Service: Gynecology Oncology   • MI LAPS ABD PRTM&OMENTUM DX W/WO [address]/ [facility] N/A 8/6/2021    Procedure: LAPAROSCOPY DIAGNOSTIC;  Surgeon: Miriam Zhou MD;  [facility];  Service: Gynecology Oncology   • RIGHT OOPHORECTOMY  1986   • SPLENECTOMY, TOTAL N/A 8/6/2021    Procedure: SPLENECTOMY;  Surgeon: Miriam Zhou MD;  [facility];  Service: Gynecology Oncology   • TONSILLECTOMY         No family history on file  I have reviewed and agree with the history as documented      E-Cigarette/Vaping   • E-Cigarette Use Never User      E-Cigarette/Vaping Substances   • Nicotine No    • THC No    • CBD No    • Flavoring No    • Other No    • Unknown No      Social History     Tobacco Use   • Smoking status: Never   • Smokeless tobacco: Never   Vaping Use   • Vaping Use: Never used   Substance Use Topics   • Alcohol use: Yes     Comment: socially   • Drug use: Yes     Frequency: 7 0 times per week     Types: Marijuana     Comment: edible Review of Systems   Constitutional: Positive for fever  Negative for chills  Respiratory: Negative for cough, chest tightness and shortness of breath  Gastrointestinal: Negative for abdominal pain, diarrhea, nausea and vomiting  Genitourinary: Negative for dysuria, frequency, hematuria and urgency  Musculoskeletal: Negative for back pain, neck pain and neck stiffness  Skin: Positive for rash  All other systems reviewed and are negative  Physical Exam  Physical Exam  Vitals and nursing note reviewed  Constitutional:       General: She is not in acute distress  Appearance: Normal appearance  She is well-developed  She is not diaphoretic  HENT:      Head: Normocephalic and atraumatic  Eyes:      Conjunctiva/sclera: Conjunctivae normal       Pupils: Pupils are equal, round, and reactive to light  Cardiovascular:      Rate and Rhythm: Normal rate and regular rhythm  Heart sounds: Normal heart sounds  No murmur heard  Pulmonary:      Effort: Pulmonary effort is normal  No respiratory distress  Breath sounds: Normal breath sounds  Abdominal:      General: Bowel sounds are normal  There is no distension  Palpations: Abdomen is soft  Tenderness: There is no abdominal tenderness  Musculoskeletal:         General: No deformity  Normal range of motion  Cervical back: Normal range of motion and neck supple  Skin:     General: Skin is warm and dry  Capillary Refill: Capillary refill takes less than 2 seconds  Coloration: Skin is not pale  Findings: Rash present  Rash is urticarial       Comments: Urticarial rash noted to face and torso  Neurological:      General: No focal deficit present  Mental Status: She is alert and oriented to person, place, and time  Cranial Nerves: No cranial nerve deficit     Psychiatric:         Behavior: Behavior normal          Vital Signs  ED Triage Vitals   Temperature Pulse Respirations Blood Pressure SpO2 03/27/23 1416 03/27/23 1346 03/27/23 1400 03/27/23 1346 03/27/23 1346   100 5 °F (38 1 °C) (!) 114 20 144/64 97 %      Temp Source Heart Rate Source Patient Position - Orthostatic VS BP Location FiO2 (%)   03/27/23 1416 03/27/23 1346 03/27/23 1346 03/27/23 1346 --   Oral Monitor Lying Left arm       Pain Score       03/27/23 1607       7           Vitals:    03/28/23 0736 03/28/23 0806 03/28/23 1546 03/28/23 2319   BP: 124/66  124/77 117/71   Pulse: 72 79 82 81   Patient Position - Orthostatic VS:             Visual Acuity      ED Medications  Medications   acetaminophen (TYLENOL) tablet 975 mg (975 mg Oral Given 3/28/23 2142)   al mag oxide-diphenhydramine-lidocaine viscous (MAGIC MOUTHWASH) suspension 10 mL (has no administration in time range)   amLODIPine (NORVASC) tablet 5 mg (5 mg Oral Not Given 3/28/23 0817)   butalbital-acetaminophen-caffeine (FIORICET,ESGIC) -40 mg per tablet 1 tablet (has no administration in time range)   docusate sodium (COLACE) capsule 100 mg (100 mg Oral Given 3/28/23 1702)   DULoxetine (CYMBALTA) delayed release capsule 20 mg (20 mg Oral Given 3/28/23 0823)   fluticasone (FLOVENT HFA) 44 mcg/act inhaler 2 puff (2 puffs Inhalation Refused 3/28/23 1702)   gabapentin (NEURONTIN) capsule 300 mg (300 mg Oral Given 3/28/23 2016)   lidocaine (LIDODERM) 5 % patch 1 patch (1 patch Topical Patch Removed 3/28/23 2017)   LORazepam (ATIVAN) tablet 0 5 mg (0 5 mg Oral Given 3/28/23 2016)   pantoprazole (PROTONIX) EC tablet 40 mg (40 mg Oral Given 3/28/23 1702)   oxyCODONE (ROXICODONE) IR tablet 5 mg (5 mg Oral Given 3/28/23 1552)   oxyCODONE (OxyCONTIN) 12 hr tablet 10 mg (10 mg Oral Given 3/28/23 2142)   pravastatin (PRAVACHOL) tablet 10 mg (10 mg Oral Given 3/28/23 2142)   ondansetron (ZOFRAN) injection 4 mg (has no administration in time range)   dextromethorphan-guaiFENesin (ROBITUSSIN DM) oral syrup 10 mL (has no administration in time range)   enoxaparin (LOVENOX) subcutaneous injection 40 mg (40 mg Subcutaneous Given 3/28/23 0817)   fluticasone (FLONASE) 50 mcg/act nasal spray 1 spray (1 spray Each Nare Given 3/28/23 2141)   pneumococcal 20-kyara conj vacc (PREVNAR 20) IM Injection 0 5 mL (has no administration in time range)   sodium chloride 0 9 % infusion (0 mL/hr Intravenous Stopped 3/28/23 2006)   ipratropium-albuterol (DUO-NEB) 0 5-2 5 mg/3 mL inhalation solution 3 mL (has no administration in time range)   cefTRIAXone (ROCEPHIN) IVPB (premix in dextrose) 2,000 mg 50 mL (2,000 mg Intravenous New Bag 3/28/23 2017)   diphenhydrAMINE (BENADRYL) injection 50 mg (50 mg Intravenous Given 3/27/23 1501)   methylPREDNISolone sodium succinate (Solu-MEDROL) injection 125 mg (125 mg Intravenous Given 3/27/23 1503)   sodium chloride 0 9 % bolus 1,000 mL (0 mL Intravenous Stopped 3/28/23 2015)   acetaminophen (TYLENOL) tablet 650 mg (650 mg Oral Given 3/27/23 1553)   potassium chloride 20 mEq IVPB (premix) (0 mEq Intravenous Stopped 3/28/23 2015)   iohexol (OMNIPAQUE) 350 MG/ML injection (SINGLE-DOSE) 100 mL (100 mL Intravenous Given 3/27/23 1644)   cefepime (MAXIPIME) IVPB (premix in dextrose) 2,000 mg 50 mL (0 mg Intravenous Stopped 3/27/23 1816)   vancomycin (VANCOCIN) 1500 mg in sodium chloride 0 9% 250 mL IVPB (0 mg Intravenous Stopped 3/27/23 2030)   potassium chloride (K-DUR,KLOR-CON) CR tablet 40 mEq (40 mEq Oral Given 3/28/23 0322)   sodium chloride 0 9 % bolus 500 mL (0 mL Intravenous Stopped 3/28/23 2015)       Diagnostic Studies  Results Reviewed     Procedure Component Value Units Date/Time    Blood culture #1 [[id number]] Collected: 03/27/23 1453    Lab Status: Preliminary result Specimen: Blood from Hand, Left Updated: 03/28/23 2201     Blood Culture No Growth at 24 hrs  Blood culture #2 [[id number]] Collected: 03/27/23 1453    Lab Status: Preliminary result Specimen: Blood from Arm, Right Updated: 03/28/23 2206     Blood Culture No Growth at 24 hrs      Legionella antigen, Urine [[id number]]  (Normal) Collected: 03/27/23 1605    Lab Status: Final result Specimen: Urine, Clean Catch Updated: 03/28/23 1002     Legionella Urinary Antigen Negative    Strep Pneumoniae, Urine [[id number]]  (Normal) Collected: 03/27/23 1605    Lab Status: Final result Specimen: Urine, Other Updated: 03/28/23 0959     Strep pneumoniae antigen, urine Negative    Lactic acid 2 Hours [[id number]]  (Abnormal) Collected: 03/27/23 1815    Lab Status: Final result Specimen: Blood from Line, Venous Updated: 03/27/23 1904     LACTIC ACID 3 0 mmol/L     Narrative:      Result may be elevated if tourniquet was used during collection      HS Troponin I 2hr [[id number]]  (Normal) Collected: 03/27/23 1728    Lab Status: Final result Specimen: Blood from Arm, Right Updated: 03/27/23 1820     hs TnI 2hr 35 ng/L      Delta 2hr hsTnI -2 ng/L     Urine Microscopic [[id number]]  (Abnormal) Collected: 03/27/23 1605    Lab Status: Final result Specimen: Urine, Other Updated: 03/27/23 1636     RBC, UA 1-2 /hpf      WBC, UA 2-4 /hpf      Epithelial Cells Occasional /hpf      Bacteria, UA Occasional /hpf      Hyaline Casts, UA 0-1 /lpf      MUCUS THREADS Moderate    UA w Reflex to Microscopic w Reflex to Culture [[id number]]  (Abnormal) Collected: 03/27/23 1605    Lab Status: Final result Specimen: Urine, Other Updated: 03/27/23 1613     Color, UA Anastasiia     Clarity, UA Slightly Cloudy     Specific Gravity, UA >=1 030     pH, UA 6 0     Leukocytes, UA Negative     Nitrite, UA Negative     Protein, UA 30 (1+) mg/dl      Glucose, UA Negative mg/dl      Ketones, UA Negative mg/dl      Urobilinogen, UA 4 0 E U /dl      Bilirubin, UA Small     Occult Blood, UA Negative    FLU/RSV/COVID - if FLU/RSV clinically relevant [[id number]]  (Normal) Collected: 03/27/23 1453    Lab Status: Final result Specimen: Nares from Nose Updated: 03/27/23 1540     SARS-CoV-2 Negative     INFLUENZA A PCR Negative     INFLUENZA B PCR Negative     RSV PCR Negative Narrative:      FOR PEDIATRIC PATIENTS - copy/paste COVID Guidelines URL to browser: [email or web address]    SARS-CoV-2 assay is a Nucleic Acid Amplification assay intended for the  qualitative detection of nucleic acid from SARS-CoV-2 in nasopharyngeal  swabs  Results are for the presumptive identification of SARS-CoV-2 RNA  Positive results are indicative of infection with SARS-CoV-2, the virus  causing COVID-19, but do not rule out bacterial infection or co-infection  with other viruses  Laboratories within the [place] and its  territories are required to report all positive results to the appropriate  public health authorities  Negative results do not preclude SARS-CoV-2  infection and should not be used as the sole basis for treatment or other  patient management decisions  Negative results must be combined with  clinical observations, patient history, and epidemiological information  This test has not been FDA cleared or approved  This test has been authorized by FDA under an Emergency Use Authorization  (EUA)  This test is only authorized for the duration of time the  declaration that circumstances exist justifying the authorization of the  emergency use of an in vitro diagnostic tests for detection of SARS-CoV-2  virus and/or diagnosis of COVID-19 infection under section 564(b)(1) of  the Act, 21 U  [place] C  613VAR-1(M)(7), unless the authorization is terminated  or revoked sooner  The test has been validated but independent review by FDA  and CLIA is pending  Test performed using "[employer] GeneXpert: This RT-PCR assay targets N2,  a region unique to SARS-CoV-2  A conserved region in the E-gene was chosen  for pan-Sarbecovirus detection which includes SARS-CoV-2  According to CMS-2020-01-R, this platform meets the definition of high-throughput technology      Lactic acid [[id number]]  (Abnormal) Collected: 03/27/23 2322    Lab Status: Final result Specimen: Blood from Line, Venous Updated: 03/27/23 1531     LACTIC ACID 4 6 mmol/L     Narrative:      Result may be elevated if tourniquet was used during collection      HS Troponin 0hr (reflex protocol) [[id number]]  (Normal) Collected: 03/27/23 1453    Lab Status: Final result Specimen: Blood from Line, Venous Updated: 03/27/23 1527     hs TnI 0hr 37 ng/L     Comprehensive metabolic panel [[id number]]  (Abnormal) Collected: 03/27/23 1453    Lab Status: Final result Specimen: Blood from Central Venous Line Updated: 03/27/23 1519     Sodium 128 mmol/L      Potassium 2 9 mmol/L      Chloride 92 mmol/L      CO2 22 mmol/L      ANION GAP 14 mmol/L      BUN 14 mg/dL      Creatinine 0 47 mg/dL      Glucose 128 mg/dL      Calcium 7 9 mg/dL      Corrected Calcium 9 0 mg/dL       U/L      ALT 34 U/L      Alkaline Phosphatase 923 U/L      Total Protein 5 7 g/dL      Albumin 2 6 g/dL      Total Bilirubin 0 92 mg/dL      eGFR 99 ml/min/1 73sq m     Narrative:      Sarah guidelines for Chronic Kidney Disease (CKD):   •  Stage 1 with normal or high GFR (GFR > 90 mL/min/1 73 square meters)  •  Stage 2 Mild CKD (GFR = 60-89 mL/min/1 73 square meters)  •  Stage 3A Moderate CKD (GFR = 45-59 mL/min/1 73 square meters)  •  Stage 3B Moderate CKD (GFR = 30-44 mL/min/1 73 square meters)  •  Stage 4 Severe CKD (GFR = 15-29 mL/min/1 73 square meters)  •  Stage 5 End Stage CKD (GFR <15 mL/min/1 73 square meters)  Note: GFR calculation is accurate only with a steady state creatinine    Magnesium [[id number]]  (Normal) Collected: 03/27/23 1453    Lab Status: Final result Specimen: Blood from Central Venous Line Updated: 03/27/23 1519     Magnesium 2 3 mg/dL     CBC and differential [[id number]]  (Abnormal) Collected: 03/27/23 1453    Lab Status: Final result Specimen: Blood from Line, Venous Updated: 03/27/23 1503     WBC 9 97 Thousand/uL      RBC 3 29 Million/uL      Hemoglobin 10 3 g/dL Hematocrit 30 9 %      MCV 94 fL      MCH 31 3 pg      MCHC 33 3 g/dL      RDW 22 7 %      MPV 11 0 fL      Platelets 804 Thousands/uL      nRBC 1 /100 WBCs      Neutrophils Relative 79 %      Immat GRANS % 1 %      Lymphocytes Relative 11 %      Monocytes Relative 9 %      Eosinophils Relative 0 %      Basophils Relative 0 %      Neutrophils Absolute 7 87 Thousands/µL      Immature Grans Absolute 0 10 Thousand/uL      Lymphocytes Absolute 1 12 Thousands/µL      Monocytes Absolute 0 85 Thousand/µL      Eosinophils Absolute 0 01 Thousand/µL      Basophils Absolute 0 02 Thousands/µL                  CT chest abdomen pelvis w contrast   Final Result by Barrett Shrestha MD (03/27 2639)         1  Interval increase in size and number of pulmonary hepatic metastases  2   Right lower lobe atelectasis and airspace consolidation could return pneumonia  3   Small right pleural effusion  4   Significant interval decrease in size of right anterior abdominal wall collection  Workstation performed: [id number]         XR chest 1 view portable   Final Result by Pedro Devries MD (03/27 1627)      Bilateral lung metastases  Right base opacity, at least in part due to atelectasis with elevation of the right hemidiaphragm  No effusion/pneumonia not excluded           Workstation performed: [id number]                    Procedures  ECG 12 Lead Documentation Only    Date/Time: 3/27/2023 2:18 PM  Performed by: Maliha Canseco DO  Authorized by: Maliha Canseco DO     Indications / Diagnosis:  Weakness  ECG reviewed by me, the ED Provider: yes    Patient location:  ED  Previous ECG:     Previous ECG:  Compared to current    Similarity:  No change    Comparison to cardiac monitor: Yes    Interpretation:     Interpretation: non-specific    Rate:     ECG rate:  110bpm    ECG rate assessment: tachycardic    Rhythm:     Rhythm: sinus tachycardia    Ectopy:     Ectopy: none    QRS:     QRS axis: Normal  Conduction:     Conduction: normal    ST segments:     ST segments:  Normal  T waves:     T waves: normal      CriticalCare Time    Date/Time: 3/29/2023 1:39 AM  Performed by: Kristie Jaramillo DO  Authorized by: Kristie Jaramillo DO     Critical care provider statement:     Critical care time (minutes):  65    Critical care time was exclusive of:  Separately billable procedures and treating other patients and teaching time    Critical care was necessary to treat or prevent imminent or life-threatening deterioration of the following conditions:  Sepsis    Critical care was time spent personally by me on the following activities:  Blood draw for specimens, obtaining history from patient or surrogate, development of treatment plan with patient or surrogate, evaluation of patient's response to treatment, examination of patient, interpretation of cardiac output measurements, ordering and performing treatments and interventions, ordering and review of laboratory studies, ordering and review of radiographic studies, re-evaluation of patient's condition and review of old charts    I assumed direction of critical care for this patient from another provider in my specialty: no               ED Course                               SBIRT 22yo+    Flowsheet Row Most Recent Value   SBIRT (25 yo +)    In order to provide better care to our patients, we are screening all of our patients for alcohol and drug use  Would it be okay to ask you these screening questions? No Filed at: 03/27/2023 1401                    Medical Decision Making  71-year-old female with complaint of sudden onset of fever and rash  Patient initially treated with steroids and Benadryl, as there was a concern for an allergic component  Labs ordered and reviewed  Hyponatremia/hypokalemia noted  Chest x-ray ordered and reviewed, concern for right lower lobe pneumonia  Empiric antibiotics administered  Pneumonia confirmed on CT    Patient with a lactate of 4 6, however the 30ml/kg fluid bolus was not given to the patient despite hypotension and/or significantly elevated lactate of = 4 and/or presence of septic shock due to: Concern for fluid/volume overload  The patient will be administered 1L of crystalloid fluid instead  Orders for this have been placed in Epic  The patient may receive additional colloid or crystalloid fluids thereafter based on clinical condition  Vani Leblanc DO    Pt will be admitted for further evaluation    Amount and/or Complexity of Data Reviewed  Labs: ordered  Radiology: ordered  Risk  OTC drugs  Prescription drug management  Decision regarding hospitalization  Disposition  Final diagnoses:   Pneumonia   Hyponatremia   Hypokalemia     Time reflects when diagnosis was documented in both MDM as applicable and the Disposition within this note     Time User Action Codes Description Comment    3/27/2023  6:28 PM Odilia Hall Add [J18 9] Pneumonia     3/27/2023  6:28 PM Odilia ZUÑIGA Add [E87 1] Hyponatremia     3/27/2023  6:28 PM Odilia ZUÑIGA Add [E87 6] Hypokalemia     3/28/2023  2:15 AM Loulou Laser Add [C56 3] Malignant neoplasm of both ovaries [facility])       ED Disposition     ED Disposition   Admit    Condition   Stable    Date/Time   Mon Mar 27, 2023  6:28 PM    Comment   Case was discussed with Dr Kaleb Cesar and the patient's admission status was agreed to be Admission Status: observation status to the service of Dr Kaleb Cesar              Follow-up Information     Follow up With Specialties Details Why Contact Info    Osvaldo Brenner MD Gynecologic Oncology Follow up please follow up in the office week of April 3 to [address]  [address] [place] [address]  [phone]            Current Discharge Medication List      CONTINUE these medications which have NOT CHANGED    Details   acetaminophen (TYLENOL) 325 mg tablet Take 3 tablets (975 mg total) by mouth every 8 (eight) hours    Associated Diagnoses: Peritoneal carcinomatosis (HCC)      al mag oxide-diphenhydramine-lidocaine viscous (MAGIC MOUTHWASH) 1:1:1 suspension Swish and spit 10 mL every 4 (four) hours as needed for mouth pain or discomfort  Qty: 300 mL, Refills: 0    Associated Diagnoses: Malignant neoplasm of both ovaries ([facility] ); Palliative care patient; Mouth sores; Oral mucositis (ulcerative) due to antineoplastic therapy      albuterol (PROVENTIL HFA,VENTOLIN HFA) 90 mcg/act inhaler Inhale 2 puffs every 6 (six) hours as needed for wheezing  Qty: 18 g, Refills: 1    Comments: Substitution to a formulary equivalent within the same pharmaceutical class is authorized  Associated Diagnoses: Acute bronchitis, unspecified organism      amLODIPine-benazepril (LOTREL) 10-20 MG per capsule Take 1 capsule by mouth daily      butalbital-aspirin-caffeine (FIORINAL) -40 mg per capsule Take 1 capsule by mouth every 4 (four) hours as needed for headaches or migraine  Qty: 30 capsule, Refills: 0    Associated Diagnoses: Palliative care patient; Migraine      cyclophosphamide (CYTOXAN) 50 mg capsule Take 1 capsule (50 mg total) by mouth daily  Qty: 30 capsule, Refills: 3    Associated Diagnoses: Malignant neoplasm of both ovaries (HCC)      DULoxetine (CYMBALTA) 20 mg capsule TAKE ONE CAPSULE BY MOUTH EVERY DAY  Qty: 30 capsule, Refills: 0    Associated Diagnoses: Malignant neoplasm of both ovaries ([facility] ); Cancer related pain; Chemotherapy-induced peripheral neuropathy ([facility] ); Anxiousness; Insomnia due to medical condition; Palliative care patient; Joint pain following chemotherapy      fluticasone (FLONASE) 50 mcg/act nasal spray 1 spray into each nostril daily      gabapentin (Neurontin) 300 mg capsule Take 1 capsule (300 mg total) by mouth 3 (three) times a day  Qty: 270 capsule, Refills: 0    Comments: For cancer-pain, chemo-induced neuropathy  Increasing to TID    Associated Diagnoses: Malignant neoplasm of both ovaries ([facility] ); Anxiousness; Cancer related pain; Chemotherapy-induced peripheral neuropathy ([facility] ); Insomnia due to medical condition; Joint pain following chemotherapy; Palliative care patient; Sciatica of left side      Lidocaine 4 % PTCH Apply 1 patch topically      Liniments (Blue-Emu Super Strength) CREA Apply topically as needed (joint pain)    Associated Diagnoses: Malignant neoplasm of both ovaries ([facility] ); Cancer related pain; Palliative care patient      LORazepam (ATIVAN) 1 mg tablet Take 0 5 tablets (0 5 mg total) by mouth every 8 (eight) hours as needed (nausea or anxiety or insomnia)  Qty: 45 tablet, Refills: 0    Associated Diagnoses: Malignant neoplasm of both ovaries ([facility] ); Anxiousness; Insomnia due to medical condition; Palliative care patient      naloxone (NARCAN) 4 mg/0 1 mL nasal spray 0 1 mL (4 mg total) by Alternating Nares route every 3 (three) minutes as needed (accidental opioid overdose or respiratory depression)  Qty: 1 each, Refills: 1    Associated Diagnoses: Malignant neoplasm of both ovaries ([facility] ); Palliative care patient; Opioid use      ondansetron (ZOFRAN) 8 mg tablet Take 1 tablet (8 mg total) by mouth every 8 (eight) hours as needed for nausea or vomiting  Qty: 40 tablet, Refills: 2    Associated Diagnoses: Palliative care patient; Nausea      oxyCODONE (Roxicodone) 5 immediate release tablet Take 1-2 tablets (5-10 mg total) by mouth every 4 (four) hours as needed for moderate pain or severe pain Max Daily Amount: 60 mg  Qty: 180 tablet, Refills: 0    Comments: Chronic intractable cancer-related pain, palliative patient  Tolerated w/o issue  Associated Diagnoses: Malignant neoplasm of both ovaries ([facility] ); Cancer related pain; Chemotherapy-induced peripheral neuropathy ([facility] );  Joint pain following chemotherapy; Palliative care patient; Sciatica of left side      oxyCODONE ER (Xtampza ER) 9 mg extended release capsule Take 1 tablet (9 mg total) by mouth daily at bedtime Max Daily Amount: 9 mg  Qty: 30 tablet, Refills: 0    Comments: Ongoing therapy for chronic intractable cancer-related pain in palliative patient  Insurance denied OxyContin, suggested this  Associated Diagnoses: Malignant neoplasm of both ovaries ([facility] ); Cancer related pain; Joint pain following chemotherapy; Chemotherapy-induced peripheral neuropathy ([facility] ); Palliative care patient; Sciatica of left side; Post-op pain; Peritoneal carcinomatosis ([facility] ); Oral mucositis (ulcerative) due to antineoplastic therapy; Opioid use      polyethylene glycol (MIRALAX) 17 g packet Take 17 g by mouth daily for 7 days  Qty: 119 g, Refills: 0    Associated Diagnoses: Liver metastases (HCC)      pravastatin (PRAVACHOL) 10 mg tablet Take 10 mg by mouth daily at bedtime As needed      dexamethasone (DECADRON) 2 mg tablet Take 1 tablet PO BID starting the day before chemo and continuing for 3 days following chemo  Qty: 24 tablet, Refills: 1    Associated Diagnoses: Chemotherapy-induced nausea      docusate sodium (COLACE) 100 mg capsule Take 1 capsule (100 mg total) by mouth 2 (two) times a day    Associated Diagnoses: Peritoneal carcinomatosis (HCC)      fluticasone (Flovent HFA) 44 mcg/act inhaler Inhale 2 puffs 2 (two) times a day Rinse mouth after use  Qty: 10 6 g, Refills: 0    Associated Diagnoses: Acute bronchitis, unspecified organism      omeprazole (PriLOSEC) 20 mg delayed release capsule Take 20 mg by mouth 2 (two) times a day      potassium chloride (KLOR-CON) 20 mEq packet Take 20 mEq by mouth 2 (two) times a day  Qty: 60 each, Refills: 1    Associated Diagnoses: Hypokalemia      promethazine (PHENERGAN) 12 5 MG tablet Take 1 tablet (12 5 mg total) by mouth every 6 (six) hours as needed for nausea or vomiting  Qty: 30 tablet, Refills: 0    Associated Diagnoses: Malignant neoplasm of both ovaries ([facility] );  Chemotherapy-induced nausea      senna (SENOKOT) 8 6 mg Take 1 tablet (8 6 mg total) by mouth daily as needed for constipation for up to 7 days  Qty: 7 tablet, Refills: 0    Associated Diagnoses: Liver metastases ([facility] )             No discharge procedures on file      PDMP Review       Value Time User    PDMP Reviewed  Yes 2/20/2023  3:20 PM Jennifer Jones MD          ED Provider  Electronically Signed by           Stoney Figueredo DO  03/29/23 0139

## 2023-03-28 PROBLEM — J45.909 ASTHMA: Status: ACTIVE | Noted: 2023-03-28

## 2023-03-28 LAB
ALBUMIN SERPL BCP-MCNC: 2.6 G/DL (ref 3.5–5)
ALP SERPL-CCNC: 809 U/L (ref 34–104)
ALT SERPL W P-5'-P-CCNC: 32 U/L (ref 7–52)
ANION GAP SERPL CALCULATED.3IONS-SCNC: 11 MMOL/L (ref 4–13)
AST SERPL W P-5'-P-CCNC: 95 U/L (ref 13–39)
BASOPHILS # BLD AUTO: 0.01 THOUSANDS/ÂΜL (ref 0–0.1)
BASOPHILS NFR BLD AUTO: 0 % (ref 0–1)
BILIRUB SERPL-MCNC: 0.77 MG/DL (ref 0.2–1)
BUN SERPL-MCNC: 12 MG/DL (ref 5–25)
CALCIUM ALBUM COR SERPL-MCNC: 8.9 MG/DL (ref 8.3–10.1)
CALCIUM SERPL-MCNC: 7.8 MG/DL (ref 8.4–10.2)
CHLORIDE SERPL-SCNC: 100 MMOL/L (ref 96–108)
CO2 SERPL-SCNC: 21 MMOL/L (ref 21–32)
CREAT SERPL-MCNC: 0.39 MG/DL (ref 0.6–1.3)
EOSINOPHIL # BLD AUTO: 0 THOUSAND/ÂΜL (ref 0–0.61)
EOSINOPHIL NFR BLD AUTO: 0 % (ref 0–6)
ERYTHROCYTE [DISTWIDTH] IN BLOOD BY AUTOMATED COUNT: 23.1 % (ref 11.6–15.1)
GFR SERPL CREATININE-BSD FRML MDRD: 106 ML/MIN/1.73SQ M
GLUCOSE SERPL-MCNC: 118 MG/DL (ref 65–140)
HCT VFR BLD AUTO: 31 % (ref 34.8–46.1)
HGB BLD-MCNC: 10.3 G/DL (ref 11.5–15.4)
IMM GRANULOCYTES # BLD AUTO: 0.04 THOUSAND/UL (ref 0–0.2)
IMM GRANULOCYTES NFR BLD AUTO: 0 % (ref 0–2)
L PNEUMO1 AG UR QL IA.RAPID: NEGATIVE
LACTATE SERPL-SCNC: 2.9 MMOL/L (ref 0.5–2)
LACTATE SERPL-SCNC: 3.5 MMOL/L (ref 0.5–2)
LACTATE SERPL-SCNC: 5.5 MMOL/L (ref 0.5–2)
LYMPHOCYTES # BLD AUTO: 2.03 THOUSANDS/ÂΜL (ref 0.6–4.47)
LYMPHOCYTES NFR BLD AUTO: 22 % (ref 14–44)
MAGNESIUM SERPL-MCNC: 2.1 MG/DL (ref 1.9–2.7)
MCH RBC QN AUTO: 31.1 PG (ref 26.8–34.3)
MCHC RBC AUTO-ENTMCNC: 33.2 G/DL (ref 31.4–37.4)
MCV RBC AUTO: 94 FL (ref 82–98)
MONOCYTES # BLD AUTO: 0.78 THOUSAND/ÂΜL (ref 0.17–1.22)
MONOCYTES NFR BLD AUTO: 9 % (ref 4–12)
NEUTROPHILS # BLD AUTO: 6.3 THOUSANDS/ÂΜL (ref 1.85–7.62)
NEUTS SEG NFR BLD AUTO: 69 % (ref 43–75)
NRBC BLD AUTO-RTO: 1 /100 WBCS
PLATELET # BLD AUTO: 337 THOUSANDS/UL (ref 149–390)
PMV BLD AUTO: 10.9 FL (ref 8.9–12.7)
POTASSIUM SERPL-SCNC: 4.1 MMOL/L (ref 3.5–5.3)
PROCALCITONIN SERPL-MCNC: 2.82 NG/ML
PROT SERPL-MCNC: 5.5 G/DL (ref 6.4–8.4)
RBC # BLD AUTO: 3.31 MILLION/UL (ref 3.81–5.12)
S PNEUM AG UR QL: NEGATIVE
SODIUM SERPL-SCNC: 132 MMOL/L (ref 135–147)
URATE SERPL-MCNC: 4.6 MG/DL (ref 2–7.5)
WBC # BLD AUTO: 9.16 THOUSAND/UL (ref 4.31–10.16)

## 2023-03-28 RX ORDER — POTASSIUM CHLORIDE 20 MEQ/1
40 TABLET, EXTENDED RELEASE ORAL ONCE
Status: COMPLETED | OUTPATIENT
Start: 2023-03-28 | End: 2023-03-28

## 2023-03-28 RX ORDER — SODIUM CHLORIDE 9 MG/ML
125 INJECTION, SOLUTION INTRAVENOUS CONTINUOUS
Status: DISPENSED | OUTPATIENT
Start: 2023-03-28 | End: 2023-03-28

## 2023-03-28 RX ORDER — CEFTRIAXONE 2 G/50ML
2000 INJECTION, SOLUTION INTRAVENOUS EVERY 24 HOURS
Status: DISCONTINUED | OUTPATIENT
Start: 2023-03-28 | End: 2023-03-31

## 2023-03-28 RX ORDER — IPRATROPIUM BROMIDE AND ALBUTEROL SULFATE 2.5; .5 MG/3ML; MG/3ML
3 SOLUTION RESPIRATORY (INHALATION) EVERY 6 HOURS PRN
Status: DISCONTINUED | OUTPATIENT
Start: 2023-03-28 | End: 2023-04-01 | Stop reason: HOSPADM

## 2023-03-28 RX ADMIN — ACETAMINOPHEN 975 MG: 325 TABLET, FILM COATED ORAL at 06:59

## 2023-03-28 RX ADMIN — LORAZEPAM 0.5 MG: 0.5 TABLET ORAL at 20:16

## 2023-03-28 RX ADMIN — DOCUSATE SODIUM 100 MG: 100 CAPSULE, LIQUID FILLED ORAL at 17:02

## 2023-03-28 RX ADMIN — PANTOPRAZOLE SODIUM 40 MG: 40 TABLET, DELAYED RELEASE ORAL at 06:59

## 2023-03-28 RX ADMIN — LIDOCAINE 5% 1 PATCH: 700 PATCH TOPICAL at 08:18

## 2023-03-28 RX ADMIN — CEFTRIAXONE 2000 MG: 2 INJECTION, SOLUTION INTRAVENOUS at 20:17

## 2023-03-28 RX ADMIN — GABAPENTIN 300 MG: 300 CAPSULE ORAL at 20:16

## 2023-03-28 RX ADMIN — PANTOPRAZOLE SODIUM 40 MG: 40 TABLET, DELAYED RELEASE ORAL at 17:02

## 2023-03-28 RX ADMIN — OXYCODONE HYDROCHLORIDE 5 MG: 5 TABLET ORAL at 15:52

## 2023-03-28 RX ADMIN — ACETAMINOPHEN 975 MG: 325 TABLET, FILM COATED ORAL at 21:42

## 2023-03-28 RX ADMIN — ENOXAPARIN SODIUM 40 MG: 40 INJECTION SUBCUTANEOUS at 08:17

## 2023-03-28 RX ADMIN — ACETAMINOPHEN 975 MG: 325 TABLET, FILM COATED ORAL at 13:13

## 2023-03-28 RX ADMIN — SODIUM CHLORIDE 50 ML/HR: 0.9 INJECTION, SOLUTION INTRAVENOUS at 03:18

## 2023-03-28 RX ADMIN — OXYCODONE HYDROCHLORIDE 10 MG: 10 TABLET, FILM COATED, EXTENDED RELEASE ORAL at 21:42

## 2023-03-28 RX ADMIN — PRAVASTATIN SODIUM 10 MG: 10 TABLET ORAL at 21:42

## 2023-03-28 RX ADMIN — DOCUSATE SODIUM 100 MG: 100 CAPSULE, LIQUID FILLED ORAL at 08:17

## 2023-03-28 RX ADMIN — POTASSIUM CHLORIDE 40 MEQ: 1500 TABLET, EXTENDED RELEASE ORAL at 03:22

## 2023-03-28 RX ADMIN — GABAPENTIN 300 MG: 300 CAPSULE ORAL at 08:17

## 2023-03-28 RX ADMIN — SODIUM CHLORIDE 500 ML: 0.9 INJECTION, SOLUTION INTRAVENOUS at 14:22

## 2023-03-28 RX ADMIN — FLUTICASONE PROPIONATE 1 SPRAY: 50 SPRAY, METERED NASAL at 21:41

## 2023-03-28 RX ADMIN — GABAPENTIN 300 MG: 300 CAPSULE ORAL at 17:02

## 2023-03-28 RX ADMIN — FLUTICASONE PROPIONATE 1 SPRAY: 50 SPRAY, METERED NASAL at 00:32

## 2023-03-28 RX ADMIN — DULOXETINE HYDROCHLORIDE 20 MG: 20 CAPSULE, DELAYED RELEASE ORAL at 08:23

## 2023-03-28 NOTE — CASE MANAGEMENT
Case Management Assessment & Discharge Planning Note    Patient name Rogers Ballard  Location [facility] [address] [place] [address] MRN [id number]  : 1950 Date 3/28/2023       Current Admission Date: 3/27/2023  Current Admission Diagnosis:Pneumonia   Patient Active Problem List    Diagnosis Date Noted   • Asthma 2023   • Pneumonia 2023   • Hypokalemia 2023   • Anemia 2023   • Severe sepsis ([address] ) 2023   • Liver metastases ([address] ) 2022   • Personal history of COVID-19 2022   • Sciatica of left side 2022   • Oral mucositis (ulcerative) due to antineoplastic therapy 2022   • Neoplastic malignant related fatigue 03/15/2022   • Chemotherapy-induced peripheral neuropathy ([address] ) 2022   • Chemotherapy induced neutropenia ([address] ) 2021   • Palliative care patient 2021   • Joint pain following chemotherapy 2021   • Chemotherapy-induced nausea 2021   • Anxiousness 2021   • Insomnia due to medical condition 2021   • Drug induced constipation 10/13/2021   • Encounter for central line care 2021   • Hyponatremia 2021   • Ovarian cancer ([address] ) 2021   • S/P splenectomy 2021   • S/P bladder repair 2021   • Status post small bowel resection 2021   • Unspecified protein-calorie malnutrition ([address] ) 08/10/2021   • Migraine 2021   • Cancer related pain 2021   • Hypertension 2021   • Esophageal reflux 2013      LOS (days): 1  Geometric Mean LOS (GMLOS) (days):   Days to GMLOS:     OBJECTIVE:    Risk of Unplanned Readmission Score: 27 31     Current admission status: Inpatient    Preferred Pharmacy:   [facility] [place], [address] [facility] [place] [address] [facility]  [address] [facility],  [address] [address]  Phone: [phone] Fax: [address] [place] [address] [facility]  Phone: [phone] Fax: [address], [address] [facility] [address]  [place] [address]  Phone: [phone] Fax: [phone]   [address]  [address] [facility] [address]  Phone: [phone] Fax: [phone]    Primary Care Provider: Faith Schneider MD    Primary Insurance: BLUE CROSS  Secondary Insurance: MEDICARE    ASSESSMENT:  [address] - Spouse   Primary Phone: [phone] [facility]  Home Phone: [phone]  Work Phone: [phone]               Advance Directives  Does patient have a [address]?: Yes  Does patient have Advance Directives?: Yes  Advance Directives:  (5 Wishes)  Primary Contact: Dago Bui    Readmission Root Cause  30 Day Readmission: No    Patient Information  Admitted from[de-identified] Home  Mental Status: Alert  During Assessment patient was accompanied by: Spouse  Assessment information provided by[de-identified] Patient, Spouse  Primary Caregiver: Self  Support Systems: Self, Spouse/significant other, Daughter, Family members  [place] of Residence: [address] [place] [address] do you live in?: [address] entry access options  Select all that apply : No steps to enter home  Type of Current Residence: [place]  In the last 12 months, was there a time when you were not able to pay the mortgage or rent on time?: No  In the last 12 months, how many places have you lived?: 1  In the last 12 months, was there a time when you did not have a steady place to sleep or slept in a shelter (including now)?: No  Homeless/housing insecurity resource given?: N/A  Living Arrangements: Lives w/ Spouse/significant other    Activities of Daily Living Prior to Admission  Functional Status: Independent  Completes ADLs independently?: Yes  Ambulates independently?: Yes  Does patient use assisted devices?: Yes  Assisted Devices (DME) used:  Shower Chair  Does patient currently own DME?: Yes  What DME does the patient currently own?: Shower Chair, [employer]  Does patient have a history of Outpatient Therapy (PT/OT)?: No  Does the patient have a history of Short-Term Rehab?: No  Does patient have a history of HHC?: Yes ([address])  Does patient currently have Ashutosh Padilla?: No    Patient Information Continued  Income Source: Pension/half-way  Does patient have prescription coverage?: Yes ([facility])  Within the past 12 months, you worried that your food would run out before you got the money to buy more : Never true  Within the past 12 months, the food you bought just didn't last and you didn't have money to get more : Never true  Food insecurity resource given?: N/A  Does patient receive dialysis treatments?: No  Does patient have a history of substance abuse?: No  Does patient have a history of Mental Health Diagnosis?: No    Means of Transportation  Means of Transport to Appts[de-identified] Family transport  In the past 12 months, has lack of transportation kept you from medical appointments or from getting medications?: No  In the past 12 months, has lack of transportation kept you from meetings, work, or from getting things needed for daily living?: No  Was application for public transport provided?: N/A      DISCHARGE DETAILS:    Discharge planning discussed with[de-identified] Patient and , Junette November of Choice: Yes  Comments - Freedom of Choice: SW met with pt and  to assess needs and discuss plans  Pt and  are planning on pt returning home when discharged  No discharge needs expressed or anticipated by pt or  at this time  SW offered assistance in future if needed    CM contacted family/caregiver?: Yes  Were Treatment Team discharge recommendations reviewed with patient/caregiver?: Yes  Did patient/caregiver verbalize understanding of patient care needs?: Yes  Were patient/caregiver advised of the risks associated with not following Treatment Team discharge recommendations?: Yes    Contacts  Patient Contacts: Eddy Love  Relationship to Patient[de-identified] Family ()  Contact Method:  In Person  Reason/Outcome: Discharge [address] [place]         Is the patient interested in [place] [address] at discharge?: No    DME Referral Provided  Referral made for DME?: No    Other Referral/Resources/Interventions Provided:  Interventions: None Indicated    Treatment Team Recommendation: Home  Discharge Destination Plan[de-identified] Home  Transport at Discharge : Family

## 2023-03-28 NOTE — ASSESSMENT & PLAN NOTE
· POA, as evidenced by fever, tachycardia, with source of infection right lower lobe pneumonia  · Initial lactic acid 4 6  Suspect partially due to cancer  BP stable  Received NS 1000ml in ED  Repeat improving  · Check procalcitonin  · Follow-up blood cultures  · Antibiotic as above  · Gentle IV hydration for 10 hours in view of sepsis and elevated lactic acid

## 2023-03-28 NOTE — ASSESSMENT & PLAN NOTE
Patient presents with low-grade fever on and off, feeling tired for about 1 week  Denies cough from congestion, reports chronic postnasal drip  Reports SOB at times  · History of recurrent ovarian cancer, currently on chemotherapy  Patient was sent to ED from infusion center prior to starting chemo due to fever and nausea  Tmax 102 8 in infusion center  · Chest x-ray showed - Bilateral lung metastases  Right base opacity, at least in part due to atelectasis with elevation of the right hemidiaphragm  · CT chest showed - Innumerable pulmonary nodules throughout the lungs, increased in size and number since the previous exam   Largest measuring up to 1 cm  Right lower lobe partial atelectasis and airspace consolidation  Small right pleural effusion  · WBC 11, no bands, temperature 100 5 in ED  Patient tachycardic  Meet sepsis criteria  See below  · Will treat as CAP  · Patient is immunocompromised from chemotherapy and status post splenectomy  · Patient received Vanco, cefepime in ED  Will de-escalate to Rocephin    · Check urine antigens / Check sputum Gram stain and culture if able/ Check procalcitonin  · Robitussin as needed  · Consult speech for swallow eval

## 2023-03-28 NOTE — PROGRESS NOTES
[address]  Progress Note  Name: Melina Viveros  MRN: [id number]  Unit/Bed#: 18 [facility] 216 I Date of Admission: 3/27/2023   Date of Service: 3/28/2023 I Hospital Day: 1    Assessment/Plan   * Pneumonia  Assessment & Plan  Patient presents with low-grade fever on and off, feeling tired for about 1 week  Denies cough from congestion, reports chronic postnasal drip  Reports SOB at times  · History of recurrent ovarian cancer, currently on chemotherapy  Patient was sent to ED from infusion center prior to starting chemo due to fever and nausea  Tmax 102 8 in infusion center  · Chest x-ray showed - Bilateral lung metastases  Right base opacity, at least in part due to atelectasis with elevation of the right hemidiaphragm  · CT chest showed - Innumerable pulmonary nodules throughout the lungs, increased in size and number since the previous exam   Largest measuring up to 1 cm  Right lower lobe partial atelectasis and airspace consolidation  Small right pleural effusion  · WBC 11, no bands, temperature 100 5 in ED  Patient tachycardic  Meet sepsis criteria  See below  · Will treat as CAP  · Patient is immunocompromised from chemotherapy and status post splenectomy  · Patient received Vanco, cefepime in ED  Will de-escalate to Rocephin  · Check urine antigens / Check sputum Gram stain and culture if able/ Check procalcitonin  · Robitussin as needed  · Consult speech for swallow eval      Ovarian cancer [facility])  Assessment & Plan  · Diagnosed with ovarian cancer and peritoneal carcinomatosis in 8/2021  Status post ex lap, hysterectomy with bilateral salpingoophorectomy, sigmoidectomy, low rectal reanastamosis, peritoneal stripping, cystotomy repair, small bowel resection with reanastomosis, radical omentectomy, splenectomy and appendectomy  Received chemotherapy postop  · Found to have  Liver metastasis in November 2022  Patient underwent aborted hepatic ablation    · Started chemotherapy in January 2023  Today is cycle 3   Patient did not receive chemo today due to fever  · CT chest abdomen pelvis with IV contrast today showed - Interval increase in size and number of pulmonary hepatic metastases  Significant interval decrease in size of right anterior abdominal wall collection  · IV Solu-Medrol, IV Benadryl in ED due to diffuse rash on ED arrival    · Followed by Dr Miky Jenkins women's oncologist: Hold Cytoxan from home  · Under palliative care as outpatient  Severe sepsis ([address] )  Assessment & Plan  · POA, as evidenced by fever, tachycardia, with source of infection right lower lobe pneumonia  · Initial lactic acid 4 6  Suspect partially due to cancer  BP stable  Received NS 1000ml in ED  Repeat improving  · Check procalcitonin  · Follow-up blood cultures  · Antibiotic as above  · Gentle IV hydration for 10 hours in view of sepsis and elevated lactic acid  Asthma  Assessment & Plan  · On Flovent as needed, albuterol as needed at home  · Will order Flovent twice daily  · DuoNeb as needed  · No wheezing on auscultation      Anemia  Assessment & Plan  · Based hemoglobin appears to be around 8-11's  · Hemoglobin stable  · Monitor    Hypokalemia  Assessment & Plan  · Potassium 2 9   · KCl 20 IV in ED  · Repeat potassium 3 4  Will order K-Dur 40 p o  · Repeat lab in the morning    Esophageal reflux  Assessment & Plan  · Continue PPI twice daily    Chemotherapy-induced peripheral neuropathy (HCC)  Assessment & Plan  · Continue gabapentin    Chemotherapy-induced nausea  Assessment & Plan  · Reports nausea intermittent vomiting since January after chemo started  · CT showed no evidence of bowel obstruction, colitis or diverticulitis  · Symptom management Phenergan    Hyponatremia  Assessment & Plan  · Sodium 128  Baseline sodium appears to be around 130-133 since February this year  · Suspect SIADH from cancer and N/V  · Check uric acid  · Received NS 1 L in ED  Repeat sodium 128  · Will order NS 50 mill per hour for 10 hours  · Repeat BMP in the morning    Cancer related pain  Assessment & Plan  · Reports chronic pain in epigastric, right hip, right sided sciatic pain  On oxycodone IR as needed and oxycodone extended release at bedtime at home  · continue oxycodone as needed  · order OxyContin at bedtime  · Verified at [facility] website  Hypertension  Assessment & Plan  · On Lotrel at home  · Will order Norvasc 5mg p o  daily with holding parameter in view of sepsis  · BP stable currently               VTE Pharmacologic Prophylaxis:   Moderate Risk (Score 3-4) - Pharmacological DVT Prophylaxis Ordered: enoxaparin (Lovenox)  Patient Centered Rounds: I performed bedside rounds with nursing staff today  Discussions with Specialists or Other Care Team Provider:Gyn-Onco    Education and Discussions with Family / Patient: Updated  (friend) at bedside  Total Time Spent on Date of Encounter in care of patient: 45 minutes This time was spent on one or more of the following: performing physical exam; counseling and coordination of care; obtaining or reviewing history; documenting in the medical record; reviewing/ordering tests, medications or procedures; communicating with other healthcare professionals and discussing with patient's family/caregivers  Current Length of Stay: 1 day(s)  Current Patient Status: Inpatient   Certification Statement: The patient will continue to require additional inpatient hospital stay due to Clincal course  Discharge Plan: Anticipate discharge in 24-48 hrs to discharge location to be determined pending rehab evaluations  Code Status: Level 1 - Full Code    Subjective:   Patient seen and examined at bedside with friend/family member present and nursing present  Patient reported that she has been getting her chemoinfusion every 3 weeks and currently taking p o  Cytoxan for 3 weeks    Patient denied any nausea, vomiting, was able to eat more than usual due to usually being weak  Patient reported no internal parts in her pelvic area with removal in   Patient stated she is followed by Dr Carlos Atkins at [address] [place] [address] for her chemo treatments and had concerns regarding continuing her medication  Patient's friend noted patient had blotchy red spots on her face that were new, patient denied any shortness of breath, airway dysphagia or breathing and it for maybe local irritation that will be monitored  Objective:     Vitals:   Temp (24hrs), Av 4 °F (36 3 °C), Min:97 2 °F (36 2 °C), Max:97 7 °F (36 5 °C)    Temp:  [97 2 °F (36 2 °C)-97 7 °F (36 5 °C)] 97 7 °F (36 5 °C)  HR:  [62-82] 82  Resp:  [16-18] 16  BP: (119-130)/(65-77) 124/77  SpO2:  [94 %-96 %] 96 %  Body mass index is 30 15 kg/m²  Input and Output Summary (last 24 hours): Intake/Output Summary (Last 24 hours) at 3/28/2023 1941  Last data filed at 3/28/2023 0600  Gross per 24 hour   Intake 135 ml   Output --   Net 135 ml       Physical Exam:   Physical Exam  Vitals and nursing note reviewed  Constitutional:       General: She is not in acute distress  Appearance: She is well-developed  HENT:      Head: Normocephalic and atraumatic  Comments: IJ left neck  Eyes:      Conjunctiva/sclera: Conjunctivae normal    Cardiovascular:      Rate and Rhythm: Normal rate and regular rhythm  Heart sounds: No murmur heard  No friction rub  No gallop  Pulmonary:      Effort: Pulmonary effort is normal  No respiratory distress  Breath sounds: Normal breath sounds  No stridor  No wheezing, rhonchi or rales  Abdominal:      Palpations: Abdomen is soft  Tenderness: There is no abdominal tenderness  There is no guarding or rebound  Musculoskeletal:         General: No swelling or tenderness  Cervical back: Neck supple  Right lower leg: No edema  Left lower leg: No edema  Skin:     General: Skin is warm and dry        Capillary Refill: Capillary refill takes less than 2 seconds  Findings: No bruising  Comments: Blotchy facial erythema   Neurological:      Mental Status: She is alert and oriented to person, place, and time  Motor: No weakness  Psychiatric:         Mood and Affect: Mood normal          Behavior: Behavior normal           Additional Data:     Labs:  Results from last 7 days   Lab Units 03/28/23  0813   WBC Thousand/uL 9 16   HEMOGLOBIN g/dL 10 3*   HEMATOCRIT % 31 0*   PLATELETS Thousands/uL 337   NEUTROS PCT % 69   LYMPHS PCT % 22   MONOS PCT % 9   EOS PCT % 0     Results from last 7 days   Lab Units 03/28/23  0657   SODIUM mmol/L 132*   POTASSIUM mmol/L 4 1   CHLORIDE mmol/L 100   CO2 mmol/L 21   BUN mg/dL 12   CREATININE mg/dL 0 39*   ANION GAP mmol/L 11   CALCIUM mg/dL 7 8*   ALBUMIN g/dL 2 6*   TOTAL BILIRUBIN mg/dL 0 77   ALK PHOS U/L 809*   ALT U/L 32   AST U/L 95*   GLUCOSE RANDOM mg/dL 118                 Results from last 7 days   Lab Units 03/28/23  1308 03/28/23  0813 03/28/23  0657 03/28/23  0029 03/27/23  2110   LACTIC ACID mmol/L 5 5* 3 5*  --  2 9* 2 4*   PROCALCITONIN ng/ml  --   --  2 82*  --  2 79*       Lines/Drains:  Invasive Devices     Central Venous Catheter Line  Duration           Port A Cath Right Subclavian -- days          Peripheral Intravenous Line  Duration           Peripheral IV 03/27/23 Left;Ventral (anterior) External Jugular 1 day                Central Line:  Goal for removal: Will discontinue when meds requiring line are completed  Imaging: Reviewed radiology reports from this admission including: abdominal/pelvic CT    Recent Cultures (last 7 days):   Results from last 7 days   Lab Units 03/27/23  1605 03/27/23  1453   BLOOD CULTURE   --  Received in Microbiology Lab  Culture in Progress  Received in Microbiology Lab  Culture in Progress     LEGIONELLA URINARY ANTIGEN  Negative  --        Last 24 Hours Medication List:   Current Facility-Administered Medications   Medication Dose Route Frequency Provider Last Rate   • acetaminophen  975 mg Oral Q8H [address] JOSI Barney     • al mag oxide-diphenhydramine-lidocaine viscous  10 mL Swish & Spit Q4H PRN JOSI Barney     • amLODIPine  5 mg Oral Daily JOSI Barney     • butalbital-acetaminophen-caffeine  1 tablet Oral Q4H PRN JOSI Barney     • cefTRIAXone  2,000 mg Intravenous Q24H Kirstin Christopher MD     • dextromethorphan-guaiFENesin  10 mL Oral Q4H PRN JOSI Barney     • docusate sodium  100 mg Oral BID JOSI Barney     • DULoxetine  20 mg Oral Daily JOSI Barney     • enoxaparin  40 mg Subcutaneous Daily JOSI Barney     • fluticasone  1 spray Each Nare HS JOSI Barney     • fluticasone  2 puff Inhalation BID JOSI Barney     • gabapentin  300 mg Oral TID JOSI Barney     • ipratropium-albuterol  3 mL Nebulization Q6H PRN JOSI Barney     • lidocaine  1 patch Topical Daily JOSI Barney     • LORazepam  0 5 mg Oral Q8H PRN JOSI Barney     • ondansetron  4 mg Intravenous Q6H PRN JOSI Barney     • oxyCODONE  10 mg Oral HS JOSI Barney     • oxyCODONE  5 mg Oral Q4H PRN JOSI Barney     • pantoprazole  40 mg Oral BID AC OJSI Barney     • [START ON 3/29/2023] pneumococcal 20-kyara conj vacc  0 5 mL Intramuscular Prior to discharge Randolph Santos MD     • pravastatin  10 mg Oral HS JOSI Barney       Facility-Administered Medications Ordered in Other Encounters   Medication Dose Route Frequency Provider Last Rate   • [MAR Hold] alteplase  2 mg Intracatheter Q1MIN PRN Kellne Baez MD     • Darcy Bunn Hold] bevacizumab-awwb (MVASI) IVPB  1,000 mg Intravenous Once Kellen Baez MD     • Darcy Bunn Hold] pembrolizumab [facility]) IVPB  200 mg Intravenous Once Kellen Baez MD          Today, Patient Was Seen By: Kirstin Christopher MD    **Please Note: This note may have been constructed using a voice recognition system  **

## 2023-03-28 NOTE — ASSESSMENT & PLAN NOTE
· Potassium 2 9   · KCl 20 IV in ED  · Repeat potassium 3 4  Will order K-Dur 40 p o    · Repeat lab in the morning

## 2023-03-28 NOTE — H&P
[address]  H&P  Name: Alexis Vila  MRN: [id number]  Unit/Bed#: 2 [address] Date of Admission: 3/27/2023   Date of Service: 3/28/2023 I Hospital Day: 1      Assessment/Plan   * Pneumonia  Assessment & Plan  Patient presents with low-grade fever on and off, feeling tired for about 1 week  Denies cough from congestion, reports chronic postnasal drip  Reports SOB at times  · History of recurrent ovarian cancer, currently on chemotherapy  Patient was sent to ED from infusion center prior to starting chemo due to fever and nausea  Tmax 102 8 in infusion center  · Chest x-ray showed - Bilateral lung metastases  Right base opacity, at least in part due to atelectasis with elevation of the right hemidiaphragm  · CT chest showed - Innumerable pulmonary nodules throughout the lungs, increased in size and number since the previous exam   Largest measuring up to 1 cm  Right lower lobe partial atelectasis and airspace consolidation  Small right pleural effusion  · WBC 11, no bands, temperature 100 5 in ED  Patient tachycardic  Meet sepsis criteria  See below  · Will treat as CAP  · Patient is immunocompromised from chemotherapy and status post splenectomy  · Patient received Vanco, cefepime in ED  Will de-escalate to Rocephin  · Check urine antigens  · Check sputum Gram stain and culture if able  · Check procalcitonin  · Robitussin as needed  · Consult speech for swallow eval  · Repeat  CBC with differential, procalcitonin in the morning    Severe sepsis [facility])  Assessment & Plan  · POA, as evidenced by fever, tachycardia, with source of infection right lower lobe pneumonia  · Initial lactic acid 4 6  Suspect partially due to cancer  BP stable  Received NS 1000ml in ED  Repeat improving  · Check procalcitonin  · Follow-up blood cultures  · Antibiotic as above  · Gentle IV hydration for 10 hours in view of sepsis and elevated lactic acid      Ovarian cancer [facility])  Assessment & Plan  · Diagnosed with ovarian cancer and peritoneal carcinomatosis in 8/2021  Status post ex lap, hysterectomy with bilateral salpingoophorectomy, sigmoidectomy, low rectal reanastamosis, peritoneal stripping, cystotomy repair, small bowel resection with reanastomosis, radical omentectomy, splenectomy and appendectomy  Received chemotherapy postop  · Found to have  Liver metastasis in November 2022  Patient underwent aborted hepatic ablation  · Started chemotherapy in January 2023  Today is cycle 3   Patient did not receive chemo today due to fever  · CT chest abdomen pelvis with IV contrast today showed - Interval increase in size and number of pulmonary hepatic metastases  Significant interval decrease in size of right anterior abdominal wall collection  · Continue Cytoxan from home  · Patient received IV Solu-Medrol, IV Benadryl in ED due to diffuse rash on ED arrival   No rash on exam currently  · Patient follows with women's oncologist   Will consult  · Under palliative care as outpatient  Hypokalemia  Assessment & Plan  · Potassium 2 9   · KCl 20 IV in ED  · Repeat potassium 3 4  Will order K-Dur 40 p o  · Repeat lab in the morning    Asthma  Assessment & Plan  · On Flovent as needed, albuterol as needed at home  · Will order Flovent twice daily  · DuoNeb as needed  · No wheezing on auscultation      Anemia  Assessment & Plan  · Based hemoglobin appears to be around 8-11's  · Hemoglobin stable  · Monitor    Esophageal reflux  Assessment & Plan  · Continue PPI twice daily    Chemotherapy-induced peripheral neuropathy (HCC)  Assessment & Plan  · Continue gabapentin    Chemotherapy-induced nausea  Assessment & Plan  · Reports nausea intermittent vomiting since January after chemo started  · CT showed no evidence of bowel obstruction, colitis or diverticulitis  · Symptom management    Hyponatremia  Assessment & Plan  · Sodium 128    Baseline sodium appears to be around 130-133 since February this year  · Suspect SIADH from cancer and N/V  · Check uric acid  · Received NS 1 L in ED  Repeat sodium 128  · Will order NS 50 mill per hour for 10 hours  · Repeat BMP in the morning    Cancer related pain  Assessment & Plan  · Reports chronic pain in epigastric, right hip, right sided sciatic pain  On oxycodone IR as needed and oxycodone extended release at bedtime at home  · We will continue oxycodone as needed  · We will order OxyContin at bedtime  · Verified at [facility] website  Hypertension  Assessment & Plan  · On Lotrel at home  · Will order Norvasc 5mg p o  daily with holding parameter in view of sepsis  · BP stable currently           VTE Prophylaxis: Enoxaparin (Lovenox)  / reason for no mechanical VTE prophylaxis on Lovenox   Code Status: full code  POLST: POLST form is not discussed and not completed at this time  Anticipated Length of Stay:  Patient will be admitted on an Inpatient basis with an anticipated length of stay of  > 2 midnights  Justification for Hospital Stay: Pneumonia, severe sepsis    Total Time for Visit, including Counseling / Coordination of Care: 45 minutes  Greater than 50% of this total time spent on direct patient counseling and coordination of care  Chief Complaint:   Low-grade fever on and off, feeling tired for about 1 week    History of Present Illness:    Tracy Gannon is a 71 y o  female with PMH of metastatic ovarian cancer, hypertension GERD, asthma who presents with low-grade fever on and off, feeling tired for about 1 week  Denies cough from congestion, reports chronic postnasal drip  Reports SOB at times  Reports nausea with intermittent vomiting since January when chemo started  Reports chronic pain in right hip, right sided sciatic, epigastric  Reports appetite is not good since January  Patient denies chest pain, headache, dizziness, diarrhea, constipation  Reports chills  Patient offered no complaints        Review of Systems:    Review of Systems   Constitutional: Positive for chills and fever  HENT: Positive for postnasal drip  Respiratory: Positive for cough and shortness of breath  Gastrointestinal: Positive for abdominal pain, nausea and vomiting  Musculoskeletal:        Right hip pain, sciatic pain   All other systems reviewed and are negative        Past Medical and Surgical History:     Past Medical History:   Diagnosis Date   • Acid reflux    • Asthma    • Cancer ([address] )     ovarian, peritoneal carcinomatosis   • GERD (gastroesophageal reflux disease)    • Hypercholesteremia    • Hypertension    • Migraine    • Ovarian cancer [facility])        Past Surgical History:   Procedure Laterality Date   • APPENDECTOMY N/A 8/6/2021    Procedure: APPENDECTOMY;  Surgeon: Jacques Aguilar MD;  [facility];  Service: Gynecology Oncology   • CHOLECYSTECTOMY     • COLONOSCOPY     • FL CYSTOGRAM  8/18/2021   • GALLBLADDER SURGERY     • HYSTERECTOMY N/A 8/6/2021    Procedure: ENBLOCK HYSTERECTOMY, BILATERAL SALPINGO-OOPHORECTOMY, SIGMOIDECTOMY WITH LOW RECTAL REANASTAMOSIS, BLADDER PERITONEAL STRIPPING AND CYSTOTOMY REPAIR, DIAPHRAGM STRIPPING, SMALL BOWEL RESECTION WITH RE-ANASTAMOSIS;  Surgeon: Jacques Aguilar MD;  [facility];  Service: Gynecology Oncology   • IR ASPIRATION ONLY  8/31/2021   • IR DRAINAGE TUBE CHECK AND/OR REMOVAL  9/17/2021   • IR DRAINAGE TUBE CHECK/CHANGE/REPOSITION/REINSERTION/UPSIZE  8/27/2021   • IR DRAINAGE TUBE CHECK/CHANGE/REPOSITION/REINSERTION/UPSIZE  9/3/2021   • IR DRAINAGE TUBE PLACEMENT  8/18/2021   • IR PORT PLACEMENT  9/17/2021   • IR THORACENTESIS  8/18/2021   • IR THORACENTESIS  9/3/2021   • LAPAROTOMY N/A 8/6/2021    Procedure: LAPAROTOMY EXPLORATORY; OVER SEW PANCREAS TAIL;  Surgeon: Jacques Aguilar MD;  [facility];  Service: Gynecology Oncology   • LAPAROTOMY N/A 11/4/2022    Procedure: LAPAROTOMY EXPLORATORY WITH ULTRASOUND GUIDANCE;  Surgeon: Melodie Hill MD; [facility];  Service: Surgical Oncology   • OMENTECTOMY N/A 8/6/2021    Procedure: RADICAL OMENTECTOMY;  Surgeon: Christopher Olson MD;  [facility];  Service: Gynecology Oncology   • NE LAPS ABD PRTM&OMENTUM DX W/WO SPEC BR/WA SPX N/A 8/6/2021    Procedure: LAPAROSCOPY DIAGNOSTIC;  Surgeon: Christopher Olson MD;  [facility];  Service: Gynecology Oncology   • RIGHT OOPHORECTOMY  1986   • SPLENECTOMY, TOTAL N/A 8/6/2021    Procedure: SPLENECTOMY;  Surgeon: Christopher Olson MD;  [facility];  Service: Gynecology Oncology   • TONSILLECTOMY         Meds/Allergies:    Prior to Admission medications    Medication Sig Start Date End Date Taking?  Authorizing Provider   acetaminophen (TYLENOL) 325 mg tablet Take 3 tablets (975 mg total) by mouth every 8 (eight) hours 9/10/21  Yes Kennedi Pace MD   al mag oxide-diphenhydramine-lidocaine viscous (MAGIC MOUTHWASH) 1:1:1 suspension Swish and spit 10 mL every 4 (four) hours as needed for mouth pain or discomfort 11/29/22  Yes Everardo Zimmer MD   albuterol (PROVENTIL HFA,VENTOLIN HFA) 90 mcg/act inhaler Inhale 2 puffs every 6 (six) hours as needed for wheezing 7/15/22  Yes Joe Goddard MD   amLODIPine-benazepril (LOTREL) 10-20 MG per capsule Take 1 capsule by mouth daily   Yes Historical Provider, MD   butalbital-aspirin-caffeine [facility]) -40 mg per capsule Take 1 capsule by mouth every 4 (four) hours as needed for headaches or migraine 12/27/22  Yes Everardo Zimmer MD   cyclophosphamide (CYTOXAN) 50 mg capsule Take 1 capsule (50 mg total) by mouth daily 2/7/23  Yes Gigi Winkler PA-C   DULoxetine (CYMBALTA) 20 mg capsule TAKE ONE CAPSULE BY MOUTH EVERY DAY 3/26/23  Yes Mahad Cox MD   fluticasone (FLONASE) 50 mcg/act nasal spray 1 spray into each nostril daily   Yes Historical Provider, MD   gabapentin (Neurontin) 300 mg capsule Take 1 capsule (300 mg total) by mouth 3 (three) times a day 2/14/23  Yes Everardo Zimmer MD Lidocaine 4 % PTCH Apply 1 patch topically   Yes Historical Provider, MD   Liniments (Blue-Emu Super Strength) CREA Apply topically as needed (joint pain) 11/2/21  Yes Michelle Gamino MD   LORazepam (ATIVAN) 1 mg tablet Take 0 5 tablets (0 5 mg total) by mouth every 8 (eight) hours as needed (nausea or anxiety or insomnia) 12/27/22  Yes Michelle Gamino MD   naloxone [facility]) 4 mg/0 1 mL nasal spray 0 1 mL (4 mg total) by Alternating Nares route every 3 (three) minutes as needed (accidental opioid overdose or respiratory depression) 11/2/21  Yes Michelle Gamino MD   ondansetron [facility]) 8 mg tablet Take 1 tablet (8 mg total) by mouth every 8 (eight) hours as needed for nausea or vomiting 11/29/22  Yes Michelle Gamino MD   oxyCODONE (Roxicodone) 5 immediate release tablet Take 1-2 tablets (5-10 mg total) by mouth every 4 (four) hours as needed for moderate pain or severe pain Max Daily Amount: 60 mg 2/14/23  Yes Michelle Gamino MD   oxyCODONE [facility]) 9 mg extended release capsule Take 1 tablet (9 mg total) by mouth daily at bedtime Max Daily Amount: 9 mg 2/20/23  Yes Michelle Gamino MD   polyethylene glycol (MIRALAX) 17 g packet Take 17 g by mouth daily for 7 days  Patient taking differently: Take 17 g by mouth daily PRN 11/8/22 2/6/24 Yes Aurea Gipson PA-C   pravastatin (PRAVACHOL) 10 mg tablet Take 10 mg by mouth daily at bedtime As needed   Yes Historical Provider, MD   dexamethasone (DECADRON) 2 mg tablet Take 1 tablet PO BID starting the day before chemo and continuing for 3 days following chemo  Patient not taking: Reported on 3/27/2023 1/9/23   Jose Winkler PA-C   docusate sodium (COLACE) 100 mg capsule Take 1 capsule (100 mg total) by mouth 2 (two) times a day  Patient not taking: Reported on 11/29/2022 9/10/21   Sivan Hayden MD   fluticasone (Flovent HFA) 44 mcg/act inhaler Inhale 2 puffs 2 (two) times a day Rinse mouth after use    Patient taking differently: Inhale 2 "puffs 2 (two) times a day as needed Rinse mouth after use  7/15/22   Kyra Roland MD   omeprazole (PriLOSEC) 20 mg delayed release capsule Take 20 mg by mouth 2 (two) times a day  Patient not taking: Reported on 3/27/2023    Historical Provider, MD   potassium chloride (KLOR-CON) 20 mEq packet Take 20 mEq by mouth 2 (two) times a day  Patient not taking: Reported on 11/29/2022 11/17/22   Russell Winkler PA-C   promethazine (PHENERGAN) 12 5 MG tablet Take 1 tablet (12 5 mg total) by mouth every 6 (six) hours as needed for nausea or vomiting  Patient not taking: Reported on 3/27/2023 3/27/23   JOSI Singh   senna (SENOKOT) 8 6 mg Take 1 tablet (8 6 mg total) by mouth daily as needed for constipation for up to 7 days  Patient taking differently: Take 8 6 mg by mouth daily as needed for constipation PRN 11/8/22 1/9/23  Prince Rosie PA-C     I have reviewed home medications with patient personally  Allergies:    Allergies   Allergen Reactions   • Erythromycin Nausea Only   • Morphine Headache       Social History:     Marital Status: /Civil Union   Occupation: Retired  Patient Pre-hospital Living Situation:   Patient Pre-hospital Level of Mobility: Unclear  Patient Pre-hospital Diet Restrictions: Regular diet  Substance Use History:   Social History     Substance and Sexual Activity   Alcohol Use Yes    Comment: socially     Social History     Tobacco Use   Smoking Status Never   Smokeless Tobacco Never     Social History     Substance and Sexual Activity   Drug Use Yes   • Frequency: 7 0 times per week   • Types: Marijuana    Comment: edible       Family History:    non-contributory    Physical Exam:     Vitals:   Blood Pressure: 130/67 (03/27/23 2259)  Pulse: 73 (03/27/23 2259)  Temperature: (!) 97 3 °F (36 3 °C) (03/27/23 2259)  Temp Source: Oral (03/27/23 1416)  Respirations: 18 (03/27/23 2259)  Height: 4' 11\" (149 9 cm) (03/27/23 2130)  Weight - Scale: 67 7 kg (149 lb 4 " oz) (03/27/23 2130)  SpO2: 96 % (03/27/23 2259)    Physical Exam  Vitals and nursing note reviewed  Constitutional:       Appearance: She is well-developed  Comments: Appears weak and tired  HENT:      Head: Normocephalic and atraumatic  Neck:      Thyroid: No thyromegaly  Vascular: No JVD  Trachea: No tracheal deviation  Cardiovascular:      Rate and Rhythm: Normal rate and regular rhythm  Heart sounds: Normal heart sounds  Pulmonary:      Effort: Pulmonary effort is normal  No respiratory distress  Breath sounds: No wheezing or rales  Comments: Diminished breath sounds on right side, on room air, respiration easy  Abdominal:      General: Bowel sounds are normal  There is no distension  Palpations: Abdomen is soft  Tenderness: There is abdominal tenderness  There is no guarding  Comments: Epigastric slight tender  Old surgical scar to abdomen  Musculoskeletal:         General: No swelling or deformity  Cervical back: Neck supple  Right lower leg: No edema  Left lower leg: No edema  Skin:     General: Skin is warm and dry  Neurological:      General: No focal deficit present  Mental Status: She is alert and oriented to person, place, and time  Psychiatric:         Mood and Affect: Mood normal          Judgment: Judgment normal          Additional Data:     Lab Results: I have personally reviewed pertinent reports  Results from last 7 days   Lab Units 03/27/23  1453   WBC Thousand/uL 9 97   HEMOGLOBIN g/dL 10 3*   HEMATOCRIT % 30 9*   PLATELETS Thousands/uL 384   NEUTROS PCT % 79*   LYMPHS PCT % 11*   MONOS PCT % 9   EOS PCT % 0     Results from last 7 days   Lab Units 03/27/23  2110 03/27/23  1453   POTASSIUM mmol/L 3 4* 2 9*   CHLORIDE mmol/L 96 92*   CO2 mmol/L 22 22   BUN mg/dL 12 14   CREATININE mg/dL 0 37* 0 47*   CALCIUM mg/dL 7 6* 7 9*   ALK PHOS U/L  --  923*   ALT U/L  --  34   AST U/L  --  106*           Imaging:  I have personally reviewed pertinent reports  XR chest 1 view portable    Result Date: 3/27/2023  Narrative: CHEST INDICATION:   fever  COMPARISON:  CXR 8/20/2021 and chest CT 1/30/2023  EXAM PERFORMED/VIEWS:  XR CHEST PORTABLE  FINDINGS:  Right port in mid SVC  Cardiomediastinal silhouette normal  Multiple lung metastases  Right base opacity  Elevation of the right hemidiaphragm  Upper abdomen normal  Bones normal for age  Impression: Bilateral lung metastases  Right base opacity, at least in part due to atelectasis with elevation of the right hemidiaphragm  No effusion/pneumonia not excluded  Workstation performed: [id number]     CT chest abdomen pelvis w contrast    Result Date: 3/27/2023  Narrative: CT CHEST, ABDOMEN AND PELVIS WITH IV CONTRAST INDICATION:   Fever and sepsis  COMPARISON:  1/30/2023  TECHNIQUE: CT examination of the chest, abdomen and pelvis was performed  Multiplanar 2D reformatted images were created from the source data  Radiation dose length product (DLP) for this visit:  421 mGy-cm   This examination, like all CT scans performed in the [facility], was performed utilizing techniques to minimize radiation dose exposure, including the use of iterative reconstruction and automated exposure control  IV Contrast:  100 mL of iohexol (OMNIPAQUE) Enteric Contrast: Enteric contrast was administered  FINDINGS: CHEST LUNGS:  Innumerable pulmonary nodules throughout the lungs, increased in size and number since the previous exam   Largest measuring up to 1 cm  Right lower lobe partial atelectasis and airspace consolidation There is no tracheal or endobronchial lesion  PLEURA:  Small right effusion  HEART/GREAT VESSELS: Normal heart size  No thoracic aortic aneurysm  MEDIASTINUM AND SABRINA:  Subcentimeter mediastinal lymph nodes  CHEST WALL AND LOWER NECK:  Unremarkable  ABDOMEN LIVER/BILIARY TREE:  Increasing hepatomegaly    Interval increase in size and number of numerous hepatic metastases  Largest measures approximately 10 cm in the right lobe  GALLBLADDER:  Cholecystectomy  SPLEEN:  Absent  PANCREAS:  Unremarkable  ADRENAL GLANDS:  Unremarkable  KIDNEYS/URETERS:  Symmetric nephrographic phase enhancement of the kidneys  No obstructive uropathy  STOMACH AND BOWEL:  Small hiatal hernia postsurgical change in the small bowel and partial left colectomy  No evidence of bowel obstruction, colitis or diverticulitis  APPENDIX:  Previous appendectomy  ABDOMINOPELVIC CAVITY:  Low-attenuation 1 3 cm left para-aortic lymph node, slightly larger VESSELS:  Patent portal and mesenteric veins  PELVIS REPRODUCTIVE ORGANS:  Prior hysterectomy  Donna Cody URINARY BLADDER:  Unremarkable  ABDOMINAL WALL/INGUINAL REGIONS:  Significant interval decrease in size of right anterior abdominal wall collection, now measuring 5 7 x 1 3 cm  OSSEOUS STRUCTURES:  No lytic or blastic lesion  Impression: 1  Interval increase in size and number of pulmonary hepatic metastases  2   Right lower lobe atelectasis and airspace consolidation could return pneumonia  3   Small right pleural effusion  4   Significant interval decrease in size of right anterior abdominal wall collection  Workstation performed: [id number]       EKG, Pathology, and Other Studies Reviewed on Admission:   · EKG: Sinus tachycardia    [employer] Records Reviewed: Yes     ** Please Note: Dragon 360 Dictation voice to text software may have been used in the creation of this document   **

## 2023-03-28 NOTE — UTILIZATION REVIEW
Initial Clinical Review    Admission: Date/Time/Statement:   Admission Orders (From admission, onward)     Ordered        03/27/23 818 [facility]  Once                   Orders Placed This Encounter   Procedures   • INPATIENT ADMISSION     Standing Status:   Standing     Number of Occurrences:   1     Order Specific Question:   Level of Care     Answer:   Med Surg [16]     Order Specific Question:   Estimated length of stay     Answer:   More than 2 Midnights     Order Specific Question:   Certification     Answer:   I certify that inpatient services are medically necessary for this patient for a duration of greater than two midnights  See H&P and MD Progress Notes for additional information about the patient's course of treatment  ED Arrival Information     Expected   -    Arrival   3/27/2023 13:31    Acuity   Urgent            Means of arrival   Wheelchair    Escorted by   [place]    Service   Hospitalist    Admission type   Emergency            Arrival complaint   Fever - from infusion           Chief Complaint   Patient presents with   • Fever - 9 weeks to 74 years     Pt was coming in for her infusion, but developed a fever and nausea after Mg and zofran was given  Initial Presentation:   71 yof to ER from [facility] for fever to 102 8 after receiving Mg & Zofran  Pt also with diffuse rash; did not receive today's chemo  Reports feeling ill past week with intermittent episodes of vomiting, fevers, SOB at times  Hx recurrent ovarian cancer, currently on chemo  Presents febrile, tachycardic, urticarial rash to face & torso  Admission work-up showing bilateral lung mets, increased number & size of pulmonary nodules, R base opacity, R pleural effusion on imaging, hyponatremia, hypokalemia, elevated procalcitonin, lactic acid  Admitted to inpatient status for pneumonia  Started on IVABT, cultures pending       Date: 3/28/23   Day 2:   IVABT in progress for pneumonia with Rocephin dose increased to 2000mg  Lungs with diminished breath sounds, NPC  K repletion given  Per surg: 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 admitted for fever and pneumonia  CT on admission revealed patient's pulmonary and hepatic metastases have increased in size and number as compared to imaging two months ago  Current chemotherapy regimen includes Keytruda and Avastin with oral cyclophosphamide once daily at home  She follows with palliative care for ongoing abdominal pain discomfort  Plan: No acute intervention indicated   Continue Ensure protein drink supplementation    ED Triage Vitals   Temperature Pulse Respirations Blood Pressure SpO2   03/27/23 1416 03/27/23 1346 03/27/23 1400 03/27/23 1346 03/27/23 1346   100 5 °F (38 1 °C) (!) 114 20 144/64 97 %      Temp Source Heart Rate Source Patient Position - Orthostatic VS BP Location FiO2 (%)   03/27/23 1416 03/27/23 1346 03/27/23 1346 03/27/23 1346 --   Oral Monitor Lying Left arm       Pain Score       03/27/23 1607       7          Wt Readings from Last 1 Encounters:   03/27/23 67 7 kg (149 lb 4 oz)     Additional Vital Signs:   03/28/23 0806 -- 79 -- -- -- 94 % -- --   03/28/23 07:36:48 97 5 °F (36 4 °C) 72 16 124/66 85 95 % -- --   03/28/23 0311 97 2 °F (36 2 °C) Abnormal  62 18 119/65 83 95 % None (Room air) Lying   03/27/23 22:59:27 97 3 °F (36 3 °C) Abnormal  73 18 130/67 88 96 % None (Room air) Lying   03/27/23 20:44:43 97 3 °F (36 3 °C) Abnormal  77 16 121/69 86 95 % None (Room air) Lying   03/27/23 1915 -- 74 -- -- -- 95 % -- --   03/27/23 1900 -- 84 -- -- -- 97 % -- --   03/27/23 1845 -- 80 20 -- -- 94 % -- --   03/27/23 1830 -- 83 -- 120/65 87 94 % -- --   03/27/23 1815 -- 86 -- -- -- 95 % -- --   03/27/23 1745 -- 95 -- -- -- 95 % -- --   03/27/23 1730 -- 92 -- 114/56 79 94 % -- --   03/27/23 1715 -- 94 -- -- -- 93 % -- --   03/27/23 1630 -- 101 -- 128/66 91 94 % -- --   03/27/23 1607 -- 109 Abnormal  20 151/70 -- 97 % None (Room air) Lying   03/27/23 1416 100 5 °F (38 1 °C) -- -- -- -- -- -- --     Pertinent Labs/Diagnostic Test Results:   CT chest abdomen pelvis w contrast   Final Result  (03/27 1749)         1  Interval increase in size and number of pulmonary hepatic metastases  2   Right lower lobe atelectasis and airspace consolidation could return pneumonia  3   Small right pleural effusion  4   Significant interval decrease in size of right anterior abdominal wall collection  XR chest 1 view portable   Final Result  (03/27 1627)      Bilateral lung metastases  Right base opacity, at least in part due to atelectasis with elevation of the right hemidiaphragm  No effusion/pneumonia not excluded       3/27 Ekg=  Sinus tachycardia    Results from last 7 days   Lab Units 03/27/23  1453   SARS-COV-2  Negative     Results from last 7 days   Lab Units 03/27/23  1453 03/23/23  1517   WBC Thousand/uL 9 97 13 45*   HEMOGLOBIN g/dL 10 3* 9 8*   HEMATOCRIT % 30 9* 29 9*   PLATELETS Thousands/uL 384 420*   NEUTROS ABS Thousands/µL 7 87* 9 33*     Results from last 7 days   Lab Units 03/28/23  0657 03/27/23  2110 03/27/23  1453 03/23/23  1517   SODIUM mmol/L 132* 128* 128* 130*   POTASSIUM mmol/L 4 1 3 4* 2 9* 3 3*   CHLORIDE mmol/L 100 96 92* 94*   CO2 mmol/L 21 22 22 26   ANION GAP mmol/L 11 10 14* 10   BUN mg/dL 12 12 14 11   CREATININE mg/dL 0 39* 0 37* 0 47* 0 44*   EGFR ml/min/1 73sq m 106 107 99 101   CALCIUM mg/dL 7 8* 7 6* 7 9* 8 4   MAGNESIUM mg/dL 2 1  --  2 3 1 5*     Results from last 7 days   Lab Units 03/28/23  0657 03/27/23  1453 03/23/23  1517   AST U/L 95* 106* 64*   ALT U/L 32 34 25   ALK PHOS U/L 809* 923* 883*   TOTAL PROTEIN g/dL 5 5* 5 7* 6 6   ALBUMIN g/dL 2 6* 2 6* 2 9*   TOTAL BILIRUBIN mg/dL 0 77 0 92 1 42*     Results from last 7 days   Lab Units 03/28/23  0657 03/27/23  2110 03/27/23  1453 03/23/23  1517   GLUCOSE RANDOM mg/dL 118 133 128 122     Results from last 7 days   Lab Units 03/27/23  1728 03/27/23  1453   HS TNI 0HR ng/L  --  37   HS TNI 2HR ng/L 35  --    HSTNI D2 ng/L -2  --      Results from last 7 days   Lab Units 03/23/23  1517   TSH 3RD GENERATON uIU/mL 1 464     Results from last 7 days   Lab Units 03/28/23  0657 03/27/23  2110   PROCALCITONIN ng/ml 2 82* 2 79*     Results from last 7 days   Lab Units 03/28/23  0029 03/27/23  2110 03/27/23  1815 03/27/23  1453   LACTIC ACID mmol/L 2 9* 2 4* 3 0* 4 6*     Results from last 7 days   Lab Units 03/23/23  1517   LIPASE u/L 6*   AMYLASE IU/L 25*       Results from last 7 days   Lab Units 03/27/23  1605 03/23/23  1532   CLARITY UA  Slightly Cloudy  --    COLOR UA  Anastasiia  --    SPEC GRAV UA  >=1 030  --    PH UA  6 0  --    GLUCOSE UA mg/dl Negative  --    KETONES UA mg/dl Negative  --    BLOOD UA  Negative  --    PROTEIN UA mg/dl 30 (1+)*  --    NITRITE UA  Negative  --    BILIRUBIN UA  Small*  --    UROBILINOGEN UA E U /dl 4 0*  --    LEUKOCYTES UA  Negative  --    WBC UA /hpf 2-4  --    RBC UA /hpf 1-2  --    BACTERIA UA /hpf Occasional  --    EPITHELIAL CELLS WET PREP /hpf Occasional  --    MUCUS THREADS  Moderate*  --    CREATININE UR mg/dL  --  210 3   PROTEIN UR mg/dL  --  48   PROT/CREAT RATIO UR   --  0 23*     Results from last 7 days   Lab Units 03/27/23  1453   INFLUENZA A PCR  Negative   INFLUENZA B PCR  Negative   RSV PCR  Negative     Results from last 7 days   Lab Units 03/27/23  1453   BLOOD CULTURE  Received in Microbiology Lab  Culture in Progress  Received in Microbiology Lab  Culture in Progress       ED Treatment:   Medication Administration from 03/27/2023 1331 to 03/27/2023 2034       Date/Time Order Dose Route Action     03/27/2023 1501 EDT diphenhydrAMINE (BENADRYL) injection 50 mg 50 mg Intravenous Given     03/27/2023 1503 EDT methylPREDNISolone sodium succinate (Solu-MEDROL) injection 125 mg 125 mg Intravenous Given     03/27/2023 1537 EDT sodium chloride 0 9 % bolus 1,000 mL 1,000 mL Intravenous New Bag 03/27/2023 1553 EDT acetaminophen (TYLENOL) tablet 650 mg 650 mg Oral Given     03/27/2023 1831 EDT potassium chloride 20 mEq IVPB (premix) 20 mEq Intravenous New Bag     03/27/2023 1644 EDT iohexol (OMNIPAQUE) 350 MG/ML injection (SINGLE-DOSE) 100 mL 100 mL Intravenous Given     03/27/2023 1746 EDT cefepime (MAXIPIME) IVPB (premix in dextrose) 2,000 mg 50 mL 2,000 mg Intravenous New Bag     03/27/2023 1838 EDT vancomycin (VANCOCIN) 1500 mg in sodium chloride 0 9% 250 mL IVPB 1,500 mg Intravenous New Bag        Past Medical History:   Diagnosis Date   • Acid reflux    • Asthma    • Cancer (HCC)     ovarian, peritoneal carcinomatosis   • GERD (gastroesophageal reflux disease)    • Hypercholesteremia    • Hypertension    • Migraine    • Ovarian cancer ([facility] [address] )      Present on Admission:  • Cancer related pain  • Chemotherapy-induced nausea  • Chemotherapy-induced peripheral neuropathy (HCC)  • Esophageal reflux  • Hypertension  • Hyponatremia  • Ovarian cancer (HCC)    Admitting Diagnosis: Hypokalemia [E87 6]  Hyponatremia [E87 1]  Pneumonia [J18 9]  Age/Sex: 71 y o  female  Admission Orders:  Cont pulse ox  Aspiration precautions  Pt/ot eval & tx  Consult gyn onc    Scheduled Medications:  acetaminophen, 975 mg, Oral, Q8H [address]  amLODIPine, 5 mg, Oral, Daily  cefTRIAXone, 1,000 mg, Intravenous, Q24H>increased to 2000 mg daily  cyclophosphamide, 50 mg, Oral, Daily  docusate sodium, 100 mg, Oral, BID  DULoxetine, 20 mg, Oral, Daily  enoxaparin, 40 mg, Subcutaneous, Daily  fluticasone, 1 spray, Each Nare, HS  fluticasone, 2 puff, Inhalation, BID  gabapentin, 300 mg, Oral, TID  lidocaine, 1 patch, Topical, Daily  oxyCODONE, 10 mg, Oral, HS  pantoprazole, 40 mg, Oral, BID AC  potassium chloride (K-DUR,KLOR-CON) CR tablet 40 mEq, Once 3/28  pravastatin, 10 mg, Oral, HS  sodium chloride 0 9 % bolus 500 mL, Once 3/28    Continuous IV Infusions:  sodium chloride 0 9 % infusion  Rate: 125 mL/hr Dose: 125 mL/hr  Freq: Continuous Route: IV  Indications of Use: IV Hydration  Last Dose: 125 mL/hr (03/28/23 0959)  Start: 03/28/23 0115 End: 03/28/23 1317    PRN Meds:  al mag oxide-diphenhydramine-lidocaine viscous, 10 mL, Swish & Spit, Q4H PRN  butalbital-acetaminophen-caffeine, 1 tablet, Oral, Q4H PRN  dextromethorphan-guaiFENesin, 10 mL, Oral, Q4H PRN  ipratropium-albuterol, 3 mL, Nebulization, Q6H PRN  LORazepam, 0 5 mg, Oral, Q8H PRN  ondansetron, 4 mg, Intravenous, Q6H PRN  oxyCODONE, 5 mg, Oral, Q4H PRN  [START ON 3/29/2023] pneumococcal 20-kyara conj vacc, 0 5 mL, Intramuscular, Prior to discharge    Network Utilization Review Department  ATTENTION: Please call with any questions or concerns to [phone] and carefully listen to the prompts so that you are directed to the right person  All voicemails are confidential   Almita Tirado all requests for admission clinical reviews, approved or denied determinations and any other requests to dedicated fax number below belonging to the campus where the patient is receiving treatment   List of dedicated fax numbers for the Facilities:  [address] DENIALS (Administrative/Medical Necessity) [phone]   [address] (Maternity/NICU/Pediatrics) [phone]   [address] [phone]   [facility] [place]  [phone]   [address] [address] [address] [place] [address] [facility] [place] [address] [phone]   [address] [facility] [place] [facility] [address] [phone]

## 2023-03-28 NOTE — ASSESSMENT & PLAN NOTE
· On Lotrel at home  · Will order Norvasc 5mg p o  daily with holding parameter in view of sepsis      · BP stable currently

## 2023-03-28 NOTE — PHYSICAL THERAPY NOTE
PHYSICAL THERAPY EVALUATION/TREATMENT     03/28/23 1435   PT Last Visit   PT Visit Date 03/28/23   Note Type   Note type Evaluation   Pain Assessment   Pain Assessment Tool 0-10   Pain Score No Pain   Restrictions/Precautions   Weight Bearing Precautions Per Order No   Other Precautions Contact/isolation   Home Living   Type of [facility] [place] One level  (1 KAROL)   Bathroom Shower/Tub Tub/shower unit   Bathroom Equipment Shower chair   Home Equipment Other (Comment)  (none)   Prior Function   Level of [place] Independent with ADLs; Independent with functional mobility; Needs assistance with IADLS   Lives With Spouse   Receives Help From Family   IADLs Family/Friend/Other provides transportation; Family/Friend/Other provides meals; Independent with medication management   General   Additional Pertinent History Pt is a 71year old female admitted with fever  History of ovarian cancer> chemo> mets to liver     Family/Caregiver Present Yes  ()   Cognition   Overall Cognitive Status WFL   Arousal/Participation Cooperative   Attention Within functional limits   Orientation Level Oriented X4   Following Commands Follows all commands and directions without difficulty   RLE Assessment   RLE Assessment WFL  (strength: WFLs)   LLE Assessment   LLE Assessment WFL  (strength: WFLs)   Bed Mobility   Additional Comments Pt presents walking out of bathroom   Transfers   Sit to Stand 5  Supervision   Additional items Verbal cues   Stand to Sit 5  Supervision   Additional items Verbal cues   Ambulation/Elevation   Gait pattern   (WFLs, steady with IV pole)   Gait Assistance 5  Supervision   Additional items Verbal cues   Assistive Device None  (holding/pushing IV pole)   Distance 12 feet   Stair Management Assistance Not tested   Balance   Static Sitting Fair +   Dynamic Sitting Fair +   Static Standing Fair   Dynamic Standing Fair   Ambulatory Fair   Activity Tolerance   Activity Tolerance Patient limited by fatigue   Nurse Made Aware yes   Assessment   Prognosis Good   Problem List Decreased strength;Decreased endurance; Impaired balance;Decreased mobility   Assessment Patient seen for Physical Therapy evaluation  Patient admitted with Pneumonia  Comorbidities affecting patient's physical performance include: ovarian cancer> chemo  Mets to liver, HTN, migraine  Personal factors affecting patient at time of initial evaluation include: lives in single story house, stairs to enter home, inability to navigate community distances, inability to perform ADLS and inability to perform IADLS   Prior to admission, patient was independent with functional mobility without assistive device, independent with ADLS, requiring assist for IADLS, living with  in a single level home with 1 steps to enter, ambulating household distance and home with family assist   Please find objective findings from Physical Therapy assessment regarding body systems outlined above with impairments and limitations including impaired balance, decreased endurance, decreased activity tolerance and fall risk  The Barthel Index was used as a functional outcome tool presenting with a score of Barthel Index Score: 65 today indicating moderate limitations of functional mobility and ADLS  Patient's clinical presentation is currently evolving as seen in patient's presentation of changing level of pain, increased fall risk and decreased endurance  Pt would benefit from continued Physical Therapy treatment to address deficits as defined above and maximize level of functional mobility  As demonstrated by objective findings, the assigned level of complexity for this evaluation is moderate  The patient's AM-PAC Basic Mobility Inpatient Short Form Raw Score is 21  A Raw score of greater than 16 suggests the patient may benefit from discharge to home  Please also refer to the recommendation of the Physical Therapist for safe discharge planning     Goals   Patient Goals to return home   STG Expiration Date 04/04/23   Short Term Goal #1 Indep sit <> stand without AD with good balance  Short Term Goal #2 Supervision for amb  without AD for functional household distances with fair + balance   LTG Expiration Date 04/11/23   Long Term Goal #1 Indep amb without AD for community distances wtih good balance   Long Term Goal #2 Indep with HEP   Plan   Treatment/Interventions Functional transfer training;LE strengthening/ROM; Therapeutic exercise; Endurance training;Patient/family training;Gait training   PT Frequency 2-3x/wk   Recommendation   PT Discharge Recommendation No rehabilitation needs   Additional Comments Pt is close to her baseline of functional status  Would benefit from PT during hospital course to prevent loss of strength, endurance and mobility  AM-PAC Basic Mobility Inpatient   Turning in Flat Bed Without Bedrails 4   Lying on Back to Sitting on Edge of Flat Bed Without Bedrails 4   Moving Bed to Chair 3   Standing Up From Chair Using Arms 4   Walk in Room 3   Climb 3-5 Stairs With Railing 3   Basic Mobility Inpatient Raw Score 21   Basic Mobility Standardized Score 45 55   Highest Level Of Mobility   JH-HLM Goal 6: Walk 10 steps or more   JH-HLM Achieved 6: Walk 10 steps or more   Barthel Index   Feeding 10   Bathing 0   Grooming Score 0   Dressing Score 5   Bladder Score 10   Bowels Score 10   Toilet Use Score 5   Transfers (Bed/Chair) Score 10   Mobility (Level Surface) Score 10   Stairs Score 5   Barthel Index Score 65   Additional Treatment Session   Start Time 1425   End Time 1435   Treatment Assessment Pt ambulated from bathroom to bed with supervision and assist with IV pole  Pt sat at EOB and able to complete: seated hip flexion, LAQs and APs all bilaterally 10 reps each  Pt returned to supine with supervision   A: Tolerated well  Expect pt to return home with family support as prior to admit  No rehab needs  P: Will cont   to see pt to prevent loss of mobility, endurance and strength during hospital stay  End of Consult   Patient Position at End of Consult Supine; All needs within reach   Licensure   [address] Number  Rajwinder Gosupriya Rice PT  [id number]

## 2023-03-28 NOTE — ASSESSMENT & PLAN NOTE
· Diagnosed with ovarian cancer and peritoneal carcinomatosis in 8/2021  Status post ex lap, hysterectomy with bilateral salpingoophorectomy, sigmoidectomy, low rectal reanastamosis, peritoneal stripping, cystotomy repair, small bowel resection with reanastomosis, radical omentectomy, splenectomy and appendectomy  Received chemotherapy postop  · Found to have  Liver metastasis in November 2022  Patient underwent aborted hepatic ablation  · Started chemotherapy in January 2023  Today is cycle 3   Patient did not receive chemo today due to fever  · CT chest abdomen pelvis with IV contrast today showed - Interval increase in size and number of pulmonary hepatic metastases  Significant interval decrease in size of right anterior abdominal wall collection  · IV Solu-Medrol, IV Benadryl in ED due to diffuse rash on ED arrival    · Followed by Dr Roscoe Posey women's oncologist: Hold Cytoxan from home  · Under palliative care as outpatient

## 2023-03-28 NOTE — ASSESSMENT & PLAN NOTE
· Reports chronic pain in epigastric, right hip, right sided sciatic pain  On oxycodone IR as needed and oxycodone extended release at bedtime at home  · We will continue oxycodone as needed  · We will order OxyContin at bedtime  · Verified at [facility] website

## 2023-03-28 NOTE — PLAN OF CARE
Problem: RESPIRATORY - ADULT  Goal: Achieves optimal ventilation and oxygenation  Description: INTERVENTIONS:  - Assess for changes in respiratory status  - Assess for changes in mentation and behavior  - Position to facilitate oxygenation and minimize respiratory effort  - Oxygen administered by appropriate delivery if ordered  - Initiate smoking cessation education as indicated  - Encourage broncho-pulmonary hygiene including cough, deep breathe, Incentive Spirometry  - Assess the need for suctioning and aspirate as needed  - Assess and instruct to report SOB or any respiratory difficulty  - Respiratory Therapy support as indicated  Outcome: Progressing     Problem: Nutrition/Hydration-ADULT  Goal: Nutrient/Hydration intake appropriate for improving, restoring or maintaining nutritional needs  Description: Monitor and assess patient's nutrition/hydration status for malnutrition  Collaborate with interdisciplinary team and initiate plan and interventions as ordered  Monitor patient's weight and dietary intake as ordered or per policy  Utilize nutrition screening tool and intervene as necessary  Determine patient's food preferences and provide high-protein, high-caloric foods as appropriate       INTERVENTIONS:  - Monitor oral intake, urinary output, labs, and treatment plans  - Assess nutrition and hydration status and recommend course of action  - Evaluate amount of meals eaten  - Assist patient with eating if necessary   - Allow adequate time for meals  - Recommend/ encourage appropriate diets, oral nutritional supplements, and vitamin/mineral supplements  - Order, calculate, and assess calorie counts as needed  - Recommend, monitor, and adjust tube feedings and TPN/PPN based on assessed needs  - Assess need for intravenous fluids  - Provide specific nutrition/hydration education as appropriate  - Include patient/family/caregiver in decisions related to nutrition  Outcome: Progressing     Problem: PAIN - ADULT  Goal: Verbalizes/displays adequate comfort level or baseline comfort level  Description: Interventions:  - Encourage patient to monitor pain and request assistance  - Assess pain using appropriate pain scale  - Administer analgesics based on type and severity of pain and evaluate response  - Implement non-pharmacological measures as appropriate and evaluate response  - Consider cultural and social influences on pain and pain management  - Notify physician/advanced practitioner if interventions unsuccessful or patient reports new pain  Outcome: Progressing     Problem: INFECTION - ADULT  Goal: Absence or prevention of progression during hospitalization  Description: INTERVENTIONS:  - Assess and monitor for signs and symptoms of infection  - Monitor lab/diagnostic results  - Monitor all insertion sites, i e  indwelling lines, tubes, and drains  - Monitor endotracheal if appropriate and nasal secretions for changes in amount and color  - [place] appropriate cooling/warming therapies per order  - Administer medications as ordered  - Instruct and encourage patient and family to use good hand hygiene technique  - Identify and instruct in appropriate isolation precautions for identified infection/condition  Outcome: Progressing     Problem: SAFETY ADULT  Goal: Patient will remain free of falls  Description: INTERVENTIONS:  - Educate patient/family on patient safety including physical limitations  - Instruct patient to call for assistance with activity   - Consult OT/PT to assist with strengthening/mobility   - Keep Call bell within reach  - Keep bed low and locked with side rails adjusted as appropriate  - Keep care items and personal belongings within reach  - Initiate and maintain comfort rounds  - Make Fall Risk Sign visible to staff  - Offer Toileting every  Hours, in advance of need  - Initiate/Maintain alarm  - Obtain necessary fall risk management equipment:  - Apply yellow socks and bracelet for high fall risk patients  - Consider moving patient to room near nurses station  Outcome: Progressing

## 2023-03-28 NOTE — ASSESSMENT & PLAN NOTE
Patient presents with low-grade fever on and off, feeling tired for about 1 week  Denies cough from congestion, reports chronic postnasal drip  Reports SOB at times  · History of recurrent ovarian cancer, currently on chemotherapy  Patient was sent to ED from infusion center prior to starting chemo due to fever and nausea  Tmax 102 8 in infusion center  · Chest x-ray showed - Bilateral lung metastases  Right base opacity, at least in part due to atelectasis with elevation of the right hemidiaphragm  · CT chest showed - Innumerable pulmonary nodules throughout the lungs, increased in size and number since the previous exam   Largest measuring up to 1 cm  Right lower lobe partial atelectasis and airspace consolidation  Small right pleural effusion  · WBC 11, no bands, temperature 100 5 in ED  Patient tachycardic  Meet sepsis criteria  See below  · Will treat as CAP  · Patient is immunocompromised from chemotherapy and status post splenectomy  · Patient received Vanco, cefepime in ED  Will de-escalate to Rocephin    · Check urine antigens  · Check sputum Gram stain and culture if able  · Check procalcitonin  · Robitussin as needed  · Consult speech for swallow eval  · Repeat  CBC with differential, procalcitonin in the morning

## 2023-03-28 NOTE — ASSESSMENT & PLAN NOTE
· On Flovent as needed, albuterol as needed at home  · Will order Flovent twice daily  · DuoNeb as needed      · No wheezing on auscultation

## 2023-03-28 NOTE — CONSULTS
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 admitted for fever and pneumonia  CT on admission revealed patient's pulmonary and hepatic metastases have increased in size and number as compared to imaging two months ago  Current chemotherapy regimen includes Keytruda and Avastin with oral cyclophosphamide once daily at home  She follows with palliative care for ongoing abdominal pain discomfort  Plan:  No acute intervention indicated  Continue Ensure protein drink supplementation  Will have patient hold off on returning to chemotherapy until after seeing Dr Peggy Friedman in the office  Discussed with Dr Frankey Baumann      History of Present Illness   Physician Requesting Consult: Chrystal Flowers MD  Reason for Consult / Principal Problem: Metastatic ovarian cancer on chemotherapy    HPI: Sweta Valero is a 71 y o  female with 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  Patient is status post ex lap, hysterectomy with bilateral salpingoophorectomy, sigmoidectomy, low rectal reanastamosis, peritoneal stripping, cystotomy repair, small bowel resection with reanastomosis, radical omentectomy, splenectomy and appendectomy  Patient states when she reported for day one of her third cycle of chemotherapy yesterday she was noted to have 102 degree fever  She was sent to hospital for evaluation where workup revealed right lower lobe consolidation concerning for pneumonia  Also noted on CT, patient's pulmonary and hepatic metastases have increased in size and number  Current chemotherapy regimen includes Keytruda and Avastin with oral cyclophosphamide once daily at home  Keytruda added this past cycle  Plan was to reimage after this third cycle of chemo    If stable disease, will proceed with current regimen  However, if progression, another line would be discussed  Patient states since she started the new chemotherapy regimen last cycle she has been nauseous most days with decreased appetite and low energy  Denies any productive cough or shortness of breath  Inpatient consult to Gynecologic Oncology  Consult performed by: Amanda Palmer PA-C  Consult ordered by: JOSI Powell          Review of Systems   Constitutional: Positive for appetite change (anorexia), fatigue and fever  Negative for chills  HENT: Negative  Eyes: Negative for visual disturbance  Respiratory: Positive for cough (mild, non productive)  Negative for chest tightness, shortness of breath and wheezing  Cardiovascular: Negative for chest pain, palpitations and leg swelling  Gastrointestinal: Positive for nausea and vomiting  Negative for blood in stool and diarrhea  Genitourinary: Negative for decreased urine volume, difficulty urinating, dysuria, flank pain and hematuria  Musculoskeletal: Negative  Skin: Negative for rash and wound  Neurological: Negative for dizziness, syncope, weakness, light-headedness and headaches  Hematological: Negative  Psychiatric/Behavioral: Negative          Historical Information     Past Medical History:   Diagnosis Date   • Acid reflux    • Asthma    • Cancer (HCC)     ovarian, peritoneal carcinomatosis   • GERD (gastroesophageal reflux disease)    • Hypercholesteremia    • Hypertension    • Migraine    • Ovarian cancer [facility])      Past Surgical History:   Procedure Laterality Date   • APPENDECTOMY N/A 8/6/2021    Procedure: APPENDECTOMY;  Surgeon: Olena Chu MD;  [facility];  Service: Gynecology Oncology   • CHOLECYSTECTOMY     • COLONOSCOPY     • FL CYSTOGRAM  8/18/2021   • GALLBLADDER SURGERY     • HYSTERECTOMY N/A 8/6/2021    Procedure: ENBLOCK HYSTERECTOMY, BILATERAL SALPINGO-OOPHORECTOMY, SIGMOIDECTOMY WITH LOW RECTAL REANASTAMOSIS, BLADDER PERITONEAL STRIPPING AND CYSTOTOMY REPAIR, DIAPHRAGM STRIPPING, SMALL BOWEL RESECTION WITH RE-ANASTAMOSIS;  Surgeon: Ciara Donald MD;  [facility];  Service: Gynecology Oncology   • IR ASPIRATION ONLY  8/31/2021   • IR DRAINAGE TUBE CHECK AND/OR REMOVAL  9/17/2021   • IR DRAINAGE TUBE CHECK/CHANGE/REPOSITION/REINSERTION/UPSIZE  8/27/2021   • IR DRAINAGE TUBE CHECK/CHANGE/REPOSITION/REINSERTION/UPSIZE  9/3/2021   • IR DRAINAGE TUBE PLACEMENT  8/18/2021   • IR PORT PLACEMENT  9/17/2021   • IR THORACENTESIS  8/18/2021   • IR THORACENTESIS  9/3/2021   • LAPAROTOMY N/A 8/6/2021    Procedure: LAPAROTOMY EXPLORATORY; OVER SEW PANCREAS TAIL;  Surgeon: Ciara Donald MD;  [facility];  Service: Gynecology Oncology   • LAPAROTOMY N/A 11/4/2022    Procedure: LAPAROTOMY EXPLORATORY WITH ULTRASOUND GUIDANCE;  Surgeon: Meet Nava MD;  [facility];  Service: Surgical Oncology   • OMENTECTOMY N/A 8/6/2021    Procedure: RADICAL OMENTECTOMY;  Surgeon: Ciara Donald MD;  [facility];  Service: Gynecology Oncology   • AK LAPS ABD PRTM&OMENTUM DX W/WO [address] [facility] [address] [facility] N/A 8/6/2021    Procedure: LAPAROSCOPY DIAGNOSTIC;  Surgeon: Ciara Donald MD;  [facility];  Service: Gynecology Oncology   • RIGHT OOPHORECTOMY  1986   • SPLENECTOMY, TOTAL N/A 8/6/2021    Procedure: SPLENECTOMY;  Surgeon: Ciara Donald MD;  [facility];  Service: Gynecology Oncology   • TONSILLECTOMY       No family history on file    Social History   Social History     Substance and Sexual Activity   Alcohol Use Yes    Comment: socially     Social History     Substance and Sexual Activity   Drug Use Yes   • Frequency: 7 0 times per week   • Types: Marijuana    Comment: edible     E-Cigarette/Vaping   • E-Cigarette Use Never User      E-Cigarette/Vaping Substances   • Nicotine No    • THC No    • CBD No    • Flavoring No    • Other No    • Unknown No      Social History Tobacco Use   Smoking Status Never   Smokeless Tobacco Never         Meds/Allergies   all current active meds have been reviewed    Allergies   Allergen Reactions   • Erythromycin Nausea Only   • Morphine Headache       Objective     Intake/Output Summary (Last 24 hours) at 3/28/2023 1155  Last data filed at 3/28/2023 0600  Gross per 24 hour   Intake 1185 ml   Output --   Net 1185 ml     Invasive Devices     Central Venous Catheter Line  Duration           Port A Cath Right Subclavian -- days          Peripheral Intravenous Line  Duration           Peripheral IV 03/27/23 Left;Ventral (anterior) External Jugular 1 day              Physical Exam  Vitals reviewed  Constitutional:       General: She is awake  She is not in acute distress  Appearance: Normal appearance  She is well-developed, well-groomed and normal weight  She is ill-appearing  She is not toxic-appearing or diaphoretic  Interventions: She is not intubated  HENT:      Head: Normocephalic and atraumatic  Not macrocephalic and not microcephalic  No raccoon eyes, Mercer's sign, right periorbital erythema or left periorbital erythema  Right Ear: External ear normal       Left Ear: External ear normal       Nose: Nose normal    Eyes:      General: No scleral icterus  Right eye: No discharge  Left eye: No discharge  Conjunctiva/sclera: Conjunctivae normal       Right eye: Right conjunctiva is not injected  No hemorrhage  Left eye: Left conjunctiva is not injected  No hemorrhage  Pupils: Pupils are equal, round, and reactive to light  Cardiovascular:      Rate and Rhythm: Normal rate and regular rhythm  Heart sounds: Normal heart sounds  Pulmonary:      Effort: Pulmonary effort is normal  No tachypnea, bradypnea or respiratory distress  She is not intubated  Breath sounds: Normal breath sounds  No stridor  No decreased breath sounds, wheezing or rhonchi     Abdominal:      General: A surgical scar is present  There is no distension  Palpations: Abdomen is soft  Abdomen is not rigid  Tenderness: There is abdominal tenderness (very mild) in the epigastric area  There is no guarding or rebound  Skin:     General: Skin is warm and dry  Capillary Refill: Capillary refill takes less than 2 seconds  Coloration: Skin is not cyanotic, jaundiced or mottled  Findings: Rash present  Rash is macular  Rash is not purpuric, pustular, scaling or vesicular  Comments: Rash on face and chest    Neurological:      General: No focal deficit present  Mental Status: She is alert and oriented to person, place, and time  She is not disoriented  GCS: GCS eye subscore is 4  GCS verbal subscore is 5  GCS motor subscore is 6  Cranial Nerves: No cranial nerve deficit  Psychiatric:         Speech: Speech normal          Behavior: Behavior normal  Behavior is cooperative  Lab Results: I have personally reviewed pertinent reports  Imaging Studies: I have personally reviewed pertinent reports  EKG, Pathology, and Other Studies: I have personally reviewed pertinent reports  Code Status: Level 1 - Full Code  Advance Directive and Living Will:      Power of :    POLST:      Counseling / Coordination of Care  Total floor / unit time spent today 40 minutes  Greater than 50% of total time was spent with the patient and / or family counseling and / or coordination of care  A description of the counseling / coordination of care: Obtaining patient history, performing physical exam, reviewing pertinent labs and imaging, discussed management with attending physician

## 2023-03-28 NOTE — UTILIZATION REVIEW
NOTIFICATION OF INPATIENT ADMISSION   AUTHORIZATION REQUEST   SERVICING FACILITY:   [place] [address]  [address] [place] [address]  Tax ID: [id number]  NPI: [id number] ATTENDING PROVIDER:  Attending Name and NPI#: Frank Abel [[id number]]  Address: [address] [place] [address]  Phone: [phone]   ADMISSION INFORMATION:  Place of Service: Inpatient [address] [facility]  [address]  Place of Service Code: 21  Inpatient Admission Date/Time: 3/27/23  6:35 PM  Discharge Date/Time: No discharge date for patient encounter  Admitting Diagnosis Code/Description:  Hypokalemia [E87 6]  Hyponatremia [E87 1]  Pneumonia [J18 9]     UTILIZATION REVIEW CONTACT:  [place] Utilization   Network Utilization Review Department  Phone: [phone]  Fax [phone]  Email: [email or web address]  Contact for approvals/pending authorizations, clinical reviews, and discharge  PHYSICIAN ADVISORY SERVICES:  Medical Necessity Denial & [id number]-pu-[id number] Review  Phone: [phone]  Fax: [phone]  Email: [email or web address]

## 2023-03-28 NOTE — ASSESSMENT & PLAN NOTE
· Reports nausea intermittent vomiting since January after chemo started  · CT showed no evidence of bowel obstruction, colitis or diverticulitis    · Symptom management Phenergan

## 2023-03-28 NOTE — ASSESSMENT & PLAN NOTE
· Reports chronic pain in epigastric, right hip, right sided sciatic pain  On oxycodone IR as needed and oxycodone extended release at bedtime at home  · continue oxycodone as needed  · order OxyContin at bedtime  · Verified at [facility] website

## 2023-03-28 NOTE — ASSESSMENT & PLAN NOTE
· Continue gabapentin pt arrives by ambulance after transferred from [facility] ED with reports of SBO, c/o nausea and vomiting. pt with recent hiatal hernia repair 1 mo ago.

## 2023-03-28 NOTE — ASSESSMENT & PLAN NOTE
· Reports nausea intermittent vomiting since January after chemo started  · CT showed no evidence of bowel obstruction, colitis or diverticulitis    · Symptom management

## 2023-03-28 NOTE — ASSESSMENT & PLAN NOTE
· Sodium 128  Baseline sodium appears to be around 130-133 since February this year  · Suspect SIADH from cancer and N/V  · Check uric acid  · Received NS 1 L in ED  Repeat sodium 128  · Will order NS 50 mill per hour for 10 hours    · Repeat BMP in the morning

## 2023-03-28 NOTE — OCCUPATIONAL THERAPY NOTE
Occupational Therapy Evaluation       03/28/23 1415   Note Type   Note type Evaluation   Pain Assessment   Pain Assessment Tool 0-10   Pain Score No Pain   Home Living   Type of [address] One level;Performs ADLs on one level  (1 small step to enter)   Bathroom Shower/Tub Tub/shower unit  (and walk in)   [address] [place] [address] Center  chair   Home Equipment Other (Comment)  (None)   Prior Function   Level of [place] Independent with ADLs; Independent with functional mobility; Needs assistance with IADLS   Lives With Spouse   Receives Help From Family   IADLs Family/Friend/Other provides transportation; Independent with medication management; Family/Friend/Other provides meals  ( assists with most ADLs, patient does not drive)   Comments Patient reports PTA independent in ADLs and mobility without AD; gets assist with iADLs   ADL   Eating Assistance 7  Independent   Grooming Assistance 5  Supervision/Setup   Grooming Deficit Wash/dry hands  (Standing to sink)   UB Bathing Assistance 5  Supervision/Setup   LB Bathing Assistance 4  Minimal Assistance   [address] 5  Supervision/Setup   LB Dressing Assistance 4  Minimal Assistance   LB Dressing Deficit Don/doff R sock; Don/doff L sock   Toileting Assistance  5  Supervision/Setup   Toileting Deficit Perineal hygiene   Bed Mobility   Sit to Supine 7  Independent   Transfers   Sit to Stand 5  Supervision   Stand to Sit 5  Supervision   Toilet transfer 5  Supervision   Additional items Standard toilet  (Ambulatory transfer, hand hold assist)   Functional Mobility   Functional Mobility 5  Supervision   Additional Comments Patient ambulated short household distance to/from bathroom with hand hold assist   Balance   Static Sitting Fair +   Dynamic Sitting Fair +   Static Standing Fair   Dynamic Standing Fair   Activity Tolerance   Activity Tolerance Patient limited by fatigue; Other (Comment)  (Limited by generalized weakness)   RUE Assessment   RUE Assessment WFL   LUE Assessment   LUE Assessment WFL   Cognition   Overall Cognitive Status WFL   Arousal/Participation Alert; Cooperative   Attention Within functional limits   Orientation Level Oriented X4   Following Commands Follows all commands and directions without difficulty   Assessment   Limitation Decreased ADL status; Decreased UE strength;Decreased Safe judgement during ADL;Decreased endurance;Decreased self-care trans;Decreased high-level ADLs   Prognosis Good   Assessment Patient evaluated by Occupational Therapy  Patient admitted with Pneumonia  The patients occupational profile, medical and therapy history includes a expanded review of medical and/or therapy records and additional review of physical, cognitive, or psychosocial history related to current functional performance  Comorbidities affecting functional mobility and ADLS include: HTN, hypercholesterolemia, ovarian cancer, GERD, migraine  Prior to admission, patient was independent with functional mobility without assistive device, independent with ADLS, requiring assist for IADLS and home with family assist   The evaluation identifies the following performance deficits: weakness, impaired balance, decreased endurance, increased fall risk, new onset of impairment of functional mobility, decreased ADLS, decreased IADLS, decreased activity tolerance, decreased safety awareness, impaired judgement and decreased strength, that result in activity limitations and/or participation restrictions  This evaluation requires clinical decision making of moderate complexity, because the patient may present with comorbidities that affect occupational performance and required minimal or moderate modification of tasks or assistance with the consideration of several treatment options    The Barthel Index was used as a functional outcome tool presenting with a score of Barthel Index Score: 60, indicating moderate limitations of functional mobility and ADLS  The patient's raw score on the AM-PAC Daily Activity Inpatient Short Form is 21  A raw score of greater than or equal to 19 suggests the patient may benefit from discharge to home  Please refer to the recommendation of the Occupational Therapist for safe discharge planning  Please refer to the recommendation of the Occupational Therapist for safe DC planning  Patient will benefit from skilled Occupational Therapy services to address above deficits and facilitate a safe return to prior level of function  Goals   Patient Goals To go home   STG Time Frame   (1-7 days)   Short Term Goal  Goals established to promote Patient Goals: To go home:  Eating: independent; Grooming: independent seated; Bathing: supervision; Upper Body Dressing independent; Lower Body Dressing: supervision; Toileting: independent; Patient will increase ambulatory standard toilet transfer to supervision with no assistive device to increase performance and safety with ADLS and functional mobility;  Patient will increase standing tolerance to 10 minutes during ADL task to decrease assistance level and decrease fall risk; Patient will increase functional mobility to and from bathroom with no assistive device with supervision to increase performance with ADLS and to use a toilet; Patient will tolerate 10 minutes of UE ROM/strengthening to increase general activity tolerance and performance in ADLS/IADLS; Patient will improve functional activity tolerance to 10 minutes of sustained functional tasks to increase participation in basic self-care and decrease assistance level;  Patient will be able to to verbalize understanding and perform energy conservation/proper body mechanics during ADLS and functional mobility at least 75% of the time with minimal cueing to decrease signs of fatigue and increase stamina to return to prior level of function; Patient will increase static/dynamic sitting balance to good to improve the ability to sit at edge of bed or on a chair for ADLS;  Patient will increase static/dynamic standing balance to fair+ to improve postural stability and decrease fall risk during standing ADLS and transfers  LTG Time Frame   (8-14 days)   Long Term Goal Grooming: independent standing at sink; Bathing: independent; Lower Body Dressing: independent; Patient will increase ambulatory standard toilet transfer to independent with no assistive device to increase performance and safety with ADLS and functional mobility; Patient will increase standing tolerance to 15 minutes during ADL task to decrease assistance level and decrease fall risk; Patient will increase functional mobility to and from bathroom with no assistive device independently to increase performance with ADLS and to use a toilet; Patient will tolerate 15 minutes of UE ROM/strengthening to increase general activity tolerance and performance in ADLS/IADLS; Patient will improve functional activity tolerance to 15 minutes of sustained functional tasks to increase participation in basic self-care and decrease assistance level;  Patient will be able to to verbalize understanding and perform energy conservation/proper body mechanics during ADLS and functional mobility at least 90% of the time with no cueing to decrease signs of fatigue and increase stamina to return to prior level of function; Patient will increase static/dynamic standing balance to good to improve postural stability and decrease fall risk during standing ADLS and transfers  Pt will score >/= 24/24 on AM-PAC Daily Activity Inpatient scale to promote safe independence with ADLs and functional mobility; Pt will score >/= 100/100 on Barthel Index in order to decrease caregiver assistance needed and increase ability to perform ADLs and functional mobility  Plan   Treatment Interventions ADL retraining;Functional transfer training;UE strengthening/ROM; Endurance training;Patient/family training;Equipment evaluation/education; Compensatory technique education;Continued evaluation; Energy conservation; Activityengagement   Goal Expiration Date 04/04/23   OT Frequency 2-3x/wk   Recommendation   OT Discharge Recommendation No rehabilitation needs   AM-PAC Daily Activity Inpatient   Lower Body Dressing 3   Bathing 3   Toileting 3   Upper Body Dressing 4   Grooming 4   Eating 4   Daily Activity Raw Score 21   Daily Activity Standardized Score (Calc for Raw Score >=11) 44 27   AM-PAC Applied Cognition Inpatient   Following a Speech/Presentation 4   Understanding Ordinary Conversation 4   Taking Medications 4   Remembering Where Things Are Placed or Put Away 4   Remembering List of 4-5 Errands 4   Taking Care of Complicated Tasks 4   Applied Cognition Raw Score 24   Applied Cognition Standardized Score 62 21   Barthel Index   Feeding 10   Bathing 0   Grooming Score 0   Dressing Score 5   Bladder Score 10   Bowels Score 10   Toilet Use Score 5   Transfers (Bed/Chair) Score 10   Mobility (Level Surface) Score 10   Stairs Score 0   Barthel Index Score [address] [facility] License Number  Britney Stanton, OTR/L [id number]

## 2023-03-28 NOTE — ASSESSMENT & PLAN NOTE
· Diagnosed with ovarian cancer and peritoneal carcinomatosis in 8/2021  Status post ex lap, hysterectomy with bilateral salpingoophorectomy, sigmoidectomy, low rectal reanastamosis, peritoneal stripping, cystotomy repair, small bowel resection with reanastomosis, radical omentectomy, splenectomy and appendectomy  Received chemotherapy postop  · Found to have  Liver metastasis in November 2022  Patient underwent aborted hepatic ablation  · Started chemotherapy in January 2023  Today is cycle 3   Patient did not receive chemo today due to fever  · CT chest abdomen pelvis with IV contrast today showed - Interval increase in size and number of pulmonary hepatic metastases  Significant interval decrease in size of right anterior abdominal wall collection  · Continue Cytoxan from home  · Patient follows with women's oncologist   Will consult  · Under palliative care as outpatient

## 2023-03-29 PROBLEM — E87.6 HYPOKALEMIA: Status: RESOLVED | Noted: 2023-03-27 | Resolved: 2023-03-29

## 2023-03-29 PROBLEM — E87.20 LACTIC ACIDOSIS: Status: ACTIVE | Noted: 2023-03-29

## 2023-03-29 LAB
ANION GAP SERPL CALCULATED.3IONS-SCNC: 10 MMOL/L (ref 4–13)
BASOPHILS # BLD AUTO: 0.01 THOUSANDS/ÂΜL (ref 0–0.1)
BASOPHILS NFR BLD AUTO: 0 % (ref 0–1)
BUN SERPL-MCNC: 14 MG/DL (ref 5–25)
CALCIUM SERPL-MCNC: 7.9 MG/DL (ref 8.4–10.2)
CHLORIDE SERPL-SCNC: 99 MMOL/L (ref 96–108)
CO2 SERPL-SCNC: 22 MMOL/L (ref 21–32)
CREAT SERPL-MCNC: 0.41 MG/DL (ref 0.6–1.3)
EOSINOPHIL # BLD AUTO: 0.01 THOUSAND/ÂΜL (ref 0–0.61)
EOSINOPHIL NFR BLD AUTO: 0 % (ref 0–6)
ERYTHROCYTE [DISTWIDTH] IN BLOOD BY AUTOMATED COUNT: 23.8 % (ref 11.6–15.1)
GFR SERPL CREATININE-BSD FRML MDRD: 104 ML/MIN/1.73SQ M
GLUCOSE SERPL-MCNC: 91 MG/DL (ref 65–140)
HCT VFR BLD AUTO: 34.3 % (ref 34.8–46.1)
HGB BLD-MCNC: 11.2 G/DL (ref 11.5–15.4)
IMM GRANULOCYTES # BLD AUTO: 0.08 THOUSAND/UL (ref 0–0.2)
IMM GRANULOCYTES NFR BLD AUTO: 1 % (ref 0–2)
LACTATE SERPL-SCNC: 2.9 MMOL/L (ref 0.5–2)
LACTATE SERPL-SCNC: 3.4 MMOL/L (ref 0.5–2)
LYMPHOCYTES # BLD AUTO: 1.83 THOUSANDS/ÂΜL (ref 0.6–4.47)
LYMPHOCYTES NFR BLD AUTO: 16 % (ref 14–44)
MCH RBC QN AUTO: 31 PG (ref 26.8–34.3)
MCHC RBC AUTO-ENTMCNC: 32.7 G/DL (ref 31.4–37.4)
MCV RBC AUTO: 95 FL (ref 82–98)
MONOCYTES # BLD AUTO: 0.85 THOUSAND/ÂΜL (ref 0.17–1.22)
MONOCYTES NFR BLD AUTO: 8 % (ref 4–12)
NEUTROPHILS # BLD AUTO: 8.52 THOUSANDS/ÂΜL (ref 1.85–7.62)
NEUTS SEG NFR BLD AUTO: 75 % (ref 43–75)
NRBC BLD AUTO-RTO: 1 /100 WBCS
PLATELET # BLD AUTO: 394 THOUSANDS/UL (ref 149–390)
PMV BLD AUTO: 10.9 FL (ref 8.9–12.7)
POTASSIUM SERPL-SCNC: 3.7 MMOL/L (ref 3.5–5.3)
RBC # BLD AUTO: 3.61 MILLION/UL (ref 3.81–5.12)
SODIUM SERPL-SCNC: 131 MMOL/L (ref 135–147)
WBC # BLD AUTO: 11.3 THOUSAND/UL (ref 4.31–10.16)

## 2023-03-29 RX ADMIN — ACETAMINOPHEN 975 MG: 325 TABLET, FILM COATED ORAL at 06:13

## 2023-03-29 RX ADMIN — GABAPENTIN 300 MG: 300 CAPSULE ORAL at 10:00

## 2023-03-29 RX ADMIN — DOCUSATE SODIUM 100 MG: 100 CAPSULE, LIQUID FILLED ORAL at 10:00

## 2023-03-29 RX ADMIN — FLUTICASONE PROPIONATE 1 SPRAY: 50 SPRAY, METERED NASAL at 22:01

## 2023-03-29 RX ADMIN — LORAZEPAM 0.5 MG: 0.5 TABLET ORAL at 21:57

## 2023-03-29 RX ADMIN — BUTALBITAL, ACETAMINOPHEN AND CAFFEINE 1 TABLET: 50; 325; 40 TABLET ORAL at 23:58

## 2023-03-29 RX ADMIN — FLUTICASONE PROPIONATE 2 PUFF: 44 AEROSOL, METERED RESPIRATORY (INHALATION) at 10:07

## 2023-03-29 RX ADMIN — DOCUSATE SODIUM 100 MG: 100 CAPSULE, LIQUID FILLED ORAL at 17:50

## 2023-03-29 RX ADMIN — PRAVASTATIN SODIUM 10 MG: 10 TABLET ORAL at 21:57

## 2023-03-29 RX ADMIN — ENOXAPARIN SODIUM 40 MG: 40 INJECTION SUBCUTANEOUS at 10:00

## 2023-03-29 RX ADMIN — GABAPENTIN 300 MG: 300 CAPSULE ORAL at 15:47

## 2023-03-29 RX ADMIN — CEFTRIAXONE 2000 MG: 2 INJECTION, SOLUTION INTRAVENOUS at 21:57

## 2023-03-29 RX ADMIN — DULOXETINE HYDROCHLORIDE 20 MG: 20 CAPSULE, DELAYED RELEASE ORAL at 10:06

## 2023-03-29 RX ADMIN — GABAPENTIN 300 MG: 300 CAPSULE ORAL at 21:57

## 2023-03-29 RX ADMIN — PANTOPRAZOLE SODIUM 40 MG: 40 TABLET, DELAYED RELEASE ORAL at 15:47

## 2023-03-29 RX ADMIN — ALUMINUM HYDROXIDE, MAGNESIUM HYDROXIDE, AND DIMETHICONE 10 ML: 200; 20; 200 SUSPENSION ORAL at 15:52

## 2023-03-29 RX ADMIN — ACETAMINOPHEN 975 MG: 325 TABLET, FILM COATED ORAL at 15:47

## 2023-03-29 RX ADMIN — ACETAMINOPHEN 975 MG: 325 TABLET, FILM COATED ORAL at 21:57

## 2023-03-29 RX ADMIN — OXYCODONE HYDROCHLORIDE 10 MG: 10 TABLET, FILM COATED, EXTENDED RELEASE ORAL at 21:57

## 2023-03-29 RX ADMIN — PANTOPRAZOLE SODIUM 40 MG: 40 TABLET, DELAYED RELEASE ORAL at 06:15

## 2023-03-29 RX ADMIN — LIDOCAINE 5% 1 PATCH: 700 PATCH TOPICAL at 10:00

## 2023-03-29 NOTE — ASSESSMENT & PLAN NOTE
Trending down    · LA-2 9  · Likely secondary to underlying malignancy with mets, inhibiting clearance of lactic acid  · IVF  · No need to trend

## 2023-03-29 NOTE — ASSESSMENT & PLAN NOTE
· Likely response to infusion, underlying malignancy elevates sepsis markers  ·   · POA, as evidenced by fever, tachycardia, with source of infection right lower lobe pneumonia  · Initial lactic acid 4 6  Suspect partially due to cancer  BP stable  Received NS 1000ml in ED  Repeat improving  · procalcitonin-elevated follow-up blood cultures  · Antibiotic as above  · Gentle IV hydration, in view of sepsis and elevated lactic acid

## 2023-03-29 NOTE — ASSESSMENT & PLAN NOTE
Asymptomatic    · HH-10 3/31 2  · Based hemoglobin appears to be around 8-11's  · Hemoglobin stable  · Monitor

## 2023-03-29 NOTE — ASSESSMENT & PLAN NOTE
"Unresolved, asymptomatic\"    · [id number] February 2023  · Suspect SIADH from cancer vs SSRI vs N/V    · Received NS 1 L in ED, bolus 500, maintenance IVF  · BMP labs on discharge follow-up with PCP  "

## 2023-03-29 NOTE — ASSESSMENT & PLAN NOTE
Improving,     Patient presents with low-grade fever on and off, feeling tired for about 1 week  Denies cough from congestion, reports chronic postnasal drip  Reports SOB at times  · History of recurrent ovarian cancer, currently on chemotherapy  Patient was sent to ED from infusion center prior to starting chemo due to fever and nausea  Tmax 102 8 in infusion center  · Chest x-ray showed - Bilateral lung metastases  Right base opacity, at least in part due to atelectasis with elevation of the right hemidiaphragm  · CT chest showed - Innumerable pulmonary nodules throughout the lungs, increased in size and number since the previous exam   Largest measuring up to 1 cm  Right lower lobe partial atelectasis and airspace consolidation  Small right pleural effusion  · WBC 11, no bands, temperature 100 5 in ED  Patient tachycardic  Meet sepsis criteria  See below  · Initially treated as CAP  · Currently immunocompromised from chemotherapy and s/p splenectomy  · Vanco, cefepime in ED  de-escalate to Rocephin, stopped with no signs of infection, or respiratory demand    · No urine antigens / Check sputum Gram stain and culture if able/ procalcitonin-likely elevated due to underlying malignancy  · Blood cultures-NGTD 4 days

## 2023-03-29 NOTE — ASSESSMENT & PLAN NOTE
Reports nausea intermittent vomiting since January after chemo started  · CT showed no evidence of bowel obstruction, colitis or diverticulitis    · Continue home Phenergan 12 5 mg

## 2023-03-29 NOTE — PROGRESS NOTES
Tessa U  67   Progress Note  Name: Bob Pike  MRN: [id number]  Unit/Bed#: 18 [facility] 216 I Date of Admission: 3/27/2023   Date of Service: 3/29/2023 I Hospital Day: 2    Assessment/Plan   * Pneumonia  Assessment & Plan  Improving,     Patient presents with low-grade fever on and off, feeling tired for about 1 week  Denies cough from congestion, reports chronic postnasal drip  Reports SOB at times  · History of recurrent ovarian cancer, currently on chemotherapy  Patient was sent to ED from infusion center prior to starting chemo due to fever and nausea  Tmax 102 8 in infusion center  · Chest x-ray showed - Bilateral lung metastases  Right base opacity, at least in part due to atelectasis with elevation of the right hemidiaphragm  · CT chest showed - Innumerable pulmonary nodules throughout the lungs, increased in size and number since the previous exam   Largest measuring up to 1 cm  Right lower lobe partial atelectasis and airspace consolidation  Small right pleural effusion  · WBC 11, no bands, temperature 100 5 in ED  Patient tachycardic  Meet sepsis criteria  See below  · Will treat as CAP  · Patient is immunocompromised from chemotherapy and status post splenectomy  · Patient received Vanco, cefepime in ED  Will de-escalate to Rocephin  · Check urine antigens / Check sputum Gram stain and culture if able/ Check procalcitonin  · Robitussin as needed  · speech for swallow eval      Lactic acidosis  Assessment & Plan  Trending down    · LA-2 9  · Likely secondary to underlying malignancy with mets, inhibiting clearance of lactic acid  · IVF    Severe sepsis (HCC)  Assessment & Plan  · POA, as evidenced by fever, tachycardia, with source of infection right lower lobe pneumonia  · Initial lactic acid 4 6  Suspect partially due to cancer  BP stable  Received NS 1000ml in ED  Repeat improving    · procalcitonin-elevated follow-up blood cultures  · Antibiotic as above  · Gentle IV hydration, in view of sepsis and elevated lactic acid  Asthma  Assessment & Plan  Asymptomatic  · On Flovent as needed, albuterol as needed at home  · Flovent twice daily  · DuoNeb as needed  Anemia  Assessment & Plan  HH- 11 2/34 3  ·   · Based hemoglobin appears to be around 8-11's  · Hemoglobin stable  · Monitor    Esophageal reflux  Assessment & Plan  · Continue PPI twice daily    Chemotherapy-induced peripheral neuropathy (HCC)  Assessment & Plan  · Continue gabapentin    Chemotherapy-induced nausea  Assessment & Plan  · Reports nausea intermittent vomiting since January after chemo started  · CT showed no evidence of bowel obstruction, colitis or diverticulitis  · Symptom management Phenergan    Hyponatremia  Assessment & Plan  · Sodium 131  [id number] February this year  · Suspect SIADH from cancer vs SSRI vs N/V    · Received NS 1 L in ED, bolus 500  · BMP in the morning    Cancer related pain  Assessment & Plan  · Reports chronic pain in epigastric, right hip, right sided sciatic pain  On oxycodone IR as needed and oxycodone extended release at bedtime at home  · continue oxycodone as needed  · order OxyContin at bedtime  · Verified at [facility] website  Hypertension  Assessment & Plan  · On Lotrel at home  · Will order Norvasc 5mg p o  daily with holding parameter in view of sepsis  · BP stable currently               VTE Pharmacologic Prophylaxis:   Moderate Risk (Score 3-4) - Pharmacological DVT Prophylaxis Ordered: enoxaparin (Lovenox)  Patient Centered Rounds: I performed bedside rounds with nursing staff today  Discussions with Specialists or Other Care Team Provider:Gyn-Onco    Education and Discussions with Family / Patient: Updated  (friend) at bedside      Total Time Spent on Date of Encounter in care of patient: 45 minutes This time was spent on one or more of the following: performing physical exam; counseling and coordination of care; obtaining or reviewing history; documenting in the medical record; reviewing/ordering tests, medications or procedures; communicating with other healthcare professionals and discussing with patient's family/caregivers  Current Length of Stay: 2 day(s)  Current Patient Status: Inpatient   Certification Statement: The patient will continue to require additional inpatient hospital stay due to Clincal course  Discharge Plan: Anticipate discharge in 24-48 hrs to discharge location to be determined pending rehab evaluations  Code Status: Level 1 - Full Code    Subjective:   Patient seen and examined at bedside, patient reported no headache, just focal to breathing, chest pain  Patient reported blotchiness on face likely secondary to magnesium at infusion center prior to arrival   Patient has no itching or associated symptoms and did not receive chemo that day due to response after magnesium infusion  Patient reported being a hard stick, stated that she did not want to use her port for anything besides chemotherapy, wanted IJ removed informed try to obtain a peripheral line prior to removing IJ  No other concerns at this time  Objective:     Vitals:   Temp (24hrs), Av °F (36 7 °C), Min:97 9 °F (36 6 °C), Max:98 1 °F (36 7 °C)    Temp:  [97 9 °F (36 6 °C)-98 1 °F (36 7 °C)] 98 1 °F (36 7 °C)  HR:  [77-87] 77  Resp:  [18] 18  BP: (111-119)/(71-76) 111/76  SpO2:  [95 %-96 %] 95 %  Body mass index is 30 15 kg/m²  Input and Output Summary (last 24 hours):   No intake or output data in the 24 hours ending 23 1616    Physical Exam:   Physical Exam  Vitals and nursing note reviewed  Constitutional:       General: She is not in acute distress  Appearance: She is well-developed  HENT:      Head: Normocephalic and atraumatic  Ears:      Comments: IJ left-sided neck  Eyes:      Conjunctiva/sclera: Conjunctivae normal    Cardiovascular:      Rate and Rhythm: Normal rate and regular rhythm  Heart sounds: No murmur heard  No friction rub  No gallop  Pulmonary:      Effort: Pulmonary effort is normal  No respiratory distress  Breath sounds: Normal breath sounds  No stridor  No wheezing, rhonchi or rales  Abdominal:      Palpations: Abdomen is soft  Tenderness: There is no abdominal tenderness  There is no guarding or rebound  Musculoskeletal:         General: No swelling or tenderness  Cervical back: Neck supple  Right lower leg: No edema  Left lower leg: No edema  Skin:     General: Skin is warm and dry  Capillary Refill: Capillary refill takes less than 2 seconds  Findings: No bruising  Comments: Diffuse facial blotchiness, posterior back blotchiness, blanching   Neurological:      Mental Status: She is alert and oriented to person, place, and time  Motor: No weakness  Psychiatric:         Mood and Affect: Mood normal          Behavior: Behavior normal           Additional Data:     Labs:  Results from last 7 days   Lab Units 03/29/23  0622   WBC Thousand/uL 11 30*   HEMOGLOBIN g/dL 11 2*   HEMATOCRIT % 34 3*   PLATELETS Thousands/uL 394*   NEUTROS PCT % 75   LYMPHS PCT % 16   MONOS PCT % 8   EOS PCT % 0     Results from last 7 days   Lab Units 03/29/23  0622 03/28/23  0657   SODIUM mmol/L 131* 132*   POTASSIUM mmol/L 3 7 4 1   CHLORIDE mmol/L 99 100   CO2 mmol/L 22 21   BUN mg/dL 14 12   CREATININE mg/dL 0 41* 0 39*   ANION GAP mmol/L 10 11   CALCIUM mg/dL 7 9* 7 8*   ALBUMIN g/dL  --  2 6*   TOTAL BILIRUBIN mg/dL  --  0 77   ALK PHOS U/L  --  809*   ALT U/L  --  32   AST U/L  --  95*   GLUCOSE RANDOM mg/dL 91 118                 Results from last 7 days   Lab Units 03/29/23  0959 03/29/23  0622 03/28/23  1308 03/28/23  0813 03/28/23  0657 03/28/23  0029 03/27/23  2110   LACTIC ACID mmol/L 3 4* 2 9* 5 5* 3 5*  --    < > 2 4*   PROCALCITONIN ng/ml  --   --   --   --  2 82*  --  2 79*    < > = values in this interval not displayed  Lines/Drains:  Invasive Devices     Central Venous Catheter Line  Duration           Port A Cath Right Subclavian -- days          Peripheral Intravenous Line  Duration           Peripheral IV 03/27/23 Left;Ventral (anterior) External Jugular 2 days                Central Line:  Goal for removal: Will discontinue when meds requiring line are completed  Imaging: Reviewed radiology reports from this admission including: abdominal/pelvic CT    Recent Cultures (last 7 days):   Results from last 7 days   Lab Units 03/27/23  1605 03/27/23  1453   BLOOD CULTURE   --  No Growth at 24 hrs  No Growth at 24 hrs     LEGIONELLA URINARY ANTIGEN  Negative  --        Last 24 Hours Medication List:   Current Facility-Administered Medications   Medication Dose Route Frequency Provider Last Rate   • acetaminophen  975 mg Oral Q8H [address] JOSI Barney     • al mag oxide-diphenhydramine-lidocaine viscous  10 mL Swish [employer] Spit Q4H PRN JOSI Barney     • amLODIPine  5 mg Oral Daily JOSI Barney     • butalbital-acetaminophen-caffeine  1 tablet Oral Q4H PRN JOSI Barney     • cefTRIAXone  2,000 mg Intravenous Q24H Katiuska Morrison MD 2,000 mg (03/28/23 2017)   • dextromethorphan-guaiFENesin  10 mL Oral Q4H PRN JOSI Barney     • docusate sodium  100 mg Oral BID JOSI Barney     • DULoxetine  20 mg Oral Daily JOSI Barney     • enoxaparin  40 mg Subcutaneous Daily JOSI Barney     • fluticasone  1 spray Each Nare HS JOSI Barney     • fluticasone  2 puff Inhalation BID JOSI Barney     • gabapentin  300 mg Oral TID JOSI Barney     • ipratropium-albuterol  3 mL Nebulization Q6H PRN JOSI Barney     • lidocaine  1 patch Topical Daily JOSI Barney     • LORazepam  0 5 mg Oral Q8H PRN JOSI Barney     • ondansetron  4 mg Intravenous Q6H PRN JOSI Barney     • oxyCODONE  10 mg Oral HS JOSI Barney     • oxyCODONE  5 mg Oral Q4H PRN Gill JOSI Waters     • pantoprazole  40 mg Oral BID AC JOSI Barney     • pneumococcal 20-kyara conj vacc  0 5 mL Intramuscular Prior to discharge Randolph Santos MD     • pravastatin  10 mg Oral HS JOSI Barney       Facility-Administered Medications Ordered in Other Encounters   Medication Dose Route Frequency Provider Last Rate   • [MAR Hold] alteplase  2 mg Intracatheter Q1MIN PRN Kellen Baez MD     • Darcy Schaeferical Hold] bevacizumab-awwb (MVASI) IVPB  1,000 mg Intravenous Once Kellen Baez MD     • Darcy Bunn Hold] pembrolizumab [facility]) IVPB  200 mg Intravenous Once Kellen Baez MD          Today, Patient Was Seen By: Kirstin Christopher MD    **Please Note: This note may have been constructed using a voice recognition system  **

## 2023-03-29 NOTE — ASSESSMENT & PLAN NOTE
Reports chronic pain in epigastric, right hip, right sided sciatic pain  On oxycodone IR as needed and oxycodone extended release at bedtime at home  · continue home oxycodone as needed, order OxyContin at bedtime  · Verified at [facility] website

## 2023-03-29 NOTE — PLAN OF CARE
Problem: RESPIRATORY - ADULT  Goal: Achieves optimal ventilation and oxygenation  Description: INTERVENTIONS:  - Assess for changes in respiratory status  - Assess for changes in mentation and behavior  - Position to facilitate oxygenation and minimize respiratory effort  - Oxygen administered by appropriate delivery if ordered  - Initiate smoking cessation education as indicated  - Encourage broncho-pulmonary hygiene including cough, deep breathe, Incentive Spirometry  - Assess the need for suctioning and aspirate as needed  - Assess and instruct to report SOB or any respiratory difficulty  - Respiratory Therapy support as indicated  Outcome: Progressing     Problem: Nutrition/Hydration-ADULT  Goal: Nutrient/Hydration intake appropriate for improving, restoring or maintaining nutritional needs  Description: Monitor and assess patient's nutrition/hydration status for malnutrition  Collaborate with interdisciplinary team and initiate plan and interventions as ordered  Monitor patient's weight and dietary intake as ordered or per policy  Utilize nutrition screening tool and intervene as necessary  Determine patient's food preferences and provide high-protein, high-caloric foods as appropriate       INTERVENTIONS:  - Monitor oral intake, urinary output, labs, and treatment plans  - Assess nutrition and hydration status and recommend course of action  - Evaluate amount of meals eaten  - Assist patient with eating if necessary   - Allow adequate time for meals  - Recommend/ encourage appropriate diets, oral nutritional supplements, and vitamin/mineral supplements  - Order, calculate, and assess calorie counts as needed  - Recommend, monitor, and adjust tube feedings and TPN/PPN based on assessed needs  - Assess need for intravenous fluids  - Provide specific nutrition/hydration education as appropriate  - Include patient/family/caregiver in decisions related to nutrition  Outcome: Progressing     Problem: PAIN - ADULT  Goal: Verbalizes/displays adequate comfort level or baseline comfort level  Description: Interventions:  - Encourage patient to monitor pain and request assistance  - Assess pain using appropriate pain scale  - Administer analgesics based on type and severity of pain and evaluate response  - Implement non-pharmacological measures as appropriate and evaluate response  - Consider cultural and social influences on pain and pain management  - Notify physician/advanced practitioner if interventions unsuccessful or patient reports new pain  Outcome: Progressing     Problem: INFECTION - ADULT  Goal: Absence or prevention of progression during hospitalization  Description: INTERVENTIONS:  - Assess and monitor for signs and symptoms of infection  - Monitor lab/diagnostic results  - Monitor all insertion sites, i e  indwelling lines, tubes, and drains  - Monitor endotracheal if appropriate and nasal secretions for changes in amount and color  - [place] appropriate cooling/warming therapies per order  - Administer medications as ordered  - Instruct and encourage patient and family to use good hand hygiene technique  - Identify and instruct in appropriate isolation precautions for identified infection/condition  Outcome: Progressing     Problem: SAFETY ADULT  Goal: Patient will remain free of falls  Description: INTERVENTIONS:  - Educate patient/family on patient safety including physical limitations  - Instruct patient to call for assistance with activity   - Consult OT/PT to assist with strengthening/mobility   - Keep Call bell within reach  - Keep bed low and locked with side rails adjusted as appropriate  - Keep care items and personal belongings within reach  - Initiate and maintain comfort rounds  - Make Fall Risk Sign visible to staff  - Offer Toileting every 2 Hours, in advance of need  - Initiate/Maintain bed alarm  - Obtain necessary fall risk management equipment: walker  - Apply yellow socks and bracelet for high fall risk patients  - Consider moving patient to room near nurses station  Outcome: Progressing

## 2023-03-29 NOTE — PLAN OF CARE
Problem: RESPIRATORY - ADULT  Goal: Achieves optimal ventilation and oxygenation  Description: INTERVENTIONS:  - Assess for changes in respiratory status  - Assess for changes in mentation and behavior  - Position to facilitate oxygenation and minimize respiratory effort  - Oxygen administered by appropriate delivery if ordered  - Initiate smoking cessation education as indicated  - Encourage broncho-pulmonary hygiene including cough, deep breathe, Incentive Spirometry  - Assess the need for suctioning and aspirate as needed  - Assess and instruct to report SOB or any respiratory difficulty  - Respiratory Therapy support as indicated  Outcome: Progressing     Problem: Nutrition/Hydration-ADULT  Goal: Nutrient/Hydration intake appropriate for improving, restoring or maintaining nutritional needs  Description: Monitor and assess patient's nutrition/hydration status for malnutrition  Collaborate with interdisciplinary team and initiate plan and interventions as ordered  Monitor patient's weight and dietary intake as ordered or per policy  Utilize nutrition screening tool and intervene as necessary  Determine patient's food preferences and provide high-protein, high-caloric foods as appropriate       INTERVENTIONS:  - Monitor oral intake, urinary output, labs, and treatment plans  - Assess nutrition and hydration status and recommend course of action  - Evaluate amount of meals eaten  - Assist patient with eating if necessary   - Allow adequate time for meals  - Recommend/ encourage appropriate diets, oral nutritional supplements, and vitamin/mineral supplements  - Order, calculate, and assess calorie counts as needed  - Recommend, monitor, and adjust tube feedings and TPN/PPN based on assessed needs  - Assess need for intravenous fluids  - Provide specific nutrition/hydration education as appropriate  - Include patient/family/caregiver in decisions related to nutrition  Outcome: Progressing     Problem: PAIN - ADULT  Goal: Verbalizes/displays adequate comfort level or baseline comfort level  Description: Interventions:  - Encourage patient to monitor pain and request assistance  - Assess pain using appropriate pain scale  - Administer analgesics based on type and severity of pain and evaluate response  - Implement non-pharmacological measures as appropriate and evaluate response  - Consider cultural and social influences on pain and pain management  - Notify physician/advanced practitioner if interventions unsuccessful or patient reports new pain  Outcome: Progressing     Problem: INFECTION - ADULT  Goal: Absence or prevention of progression during hospitalization  Description: INTERVENTIONS:  - Assess and monitor for signs and symptoms of infection  - Monitor lab/diagnostic results  - Monitor all insertion sites, i e  indwelling lines, tubes, and drains  - Monitor endotracheal if appropriate and nasal secretions for changes in amount and color  - [place] appropriate cooling/warming therapies per order  - Administer medications as ordered  - Instruct and encourage patient and family to use good hand hygiene technique  - Identify and instruct in appropriate isolation precautions for identified infection/condition  Outcome: Progressing     Problem: SAFETY ADULT  Goal: Patient will remain free of falls  Description: INTERVENTIONS:  - Educate patient/family on patient safety including physical limitations  - Instruct patient to call for assistance with activity   - Consult OT/PT to assist with strengthening/mobility   - Keep Call bell within reach  - Keep bed low and locked with side rails adjusted as appropriate  - Keep care items and personal belongings within reach  - Initiate and maintain comfort rounds  - Make Fall Risk Sign visible to staff  - Offer Toileting   - Initiate/Maintain   - Obtain necessary fall risk management equipment  - Apply yellow socks and bracelet for high fall risk patients  - Consider moving patient to room near nurses station  Outcome: Progressing

## 2023-03-29 NOTE — ASSESSMENT & PLAN NOTE
Asymptomatic  · On Flovent as needed, albuterol as needed at home  · Flovent twice daily  · DuoNeb as needed

## 2023-03-30 ENCOUNTER — HOSPITAL ENCOUNTER (OUTPATIENT)
Dept: INFUSION CENTER | Facility: HOSPITAL | Age: 73
Discharge: HOME | End: 2023-03-30

## 2023-03-30 LAB
ANION GAP SERPL CALCULATED.3IONS-SCNC: 10 MMOL/L (ref 4–13)
BASOPHILS # BLD AUTO: 0.02 THOUSANDS/ÂΜL (ref 0–0.1)
BASOPHILS NFR BLD AUTO: 0 % (ref 0–1)
BUN SERPL-MCNC: 13 MG/DL (ref 5–25)
CALCIUM SERPL-MCNC: 7.8 MG/DL (ref 8.4–10.2)
CHLORIDE SERPL-SCNC: 98 MMOL/L (ref 96–108)
CO2 SERPL-SCNC: 24 MMOL/L (ref 21–32)
CREAT SERPL-MCNC: 0.41 MG/DL (ref 0.6–1.3)
EOSINOPHIL # BLD AUTO: 0.03 THOUSAND/ÂΜL (ref 0–0.61)
EOSINOPHIL NFR BLD AUTO: 0 % (ref 0–6)
ERYTHROCYTE [DISTWIDTH] IN BLOOD BY AUTOMATED COUNT: 23.7 % (ref 11.6–15.1)
GFR SERPL CREATININE-BSD FRML MDRD: 104 ML/MIN/1.73SQ M
GLUCOSE SERPL-MCNC: 93 MG/DL (ref 65–140)
HCT VFR BLD AUTO: 33.4 % (ref 34.8–46.1)
HGB BLD-MCNC: 11 G/DL (ref 11.5–15.4)
IMM GRANULOCYTES # BLD AUTO: 0.08 THOUSAND/UL (ref 0–0.2)
IMM GRANULOCYTES NFR BLD AUTO: 1 % (ref 0–2)
LYMPHOCYTES # BLD AUTO: 1.44 THOUSANDS/ÂΜL (ref 0.6–4.47)
LYMPHOCYTES NFR BLD AUTO: 15 % (ref 14–44)
MAGNESIUM SERPL-MCNC: 1.6 MG/DL (ref 1.9–2.7)
MCH RBC QN AUTO: 31.3 PG (ref 26.8–34.3)
MCHC RBC AUTO-ENTMCNC: 32.9 G/DL (ref 31.4–37.4)
MCV RBC AUTO: 95 FL (ref 82–98)
MONOCYTES # BLD AUTO: 0.64 THOUSAND/ÂΜL (ref 0.17–1.22)
MONOCYTES NFR BLD AUTO: 7 % (ref 4–12)
NEUTROPHILS # BLD AUTO: 7.41 THOUSANDS/ÂΜL (ref 1.85–7.62)
NEUTS SEG NFR BLD AUTO: 77 % (ref 43–75)
NRBC BLD AUTO-RTO: 1 /100 WBCS
PLATELET # BLD AUTO: 383 THOUSANDS/UL (ref 149–390)
PMV BLD AUTO: 10.6 FL (ref 8.9–12.7)
POTASSIUM SERPL-SCNC: 3.4 MMOL/L (ref 3.5–5.3)
RBC # BLD AUTO: 3.52 MILLION/UL (ref 3.81–5.12)
SODIUM SERPL-SCNC: 132 MMOL/L (ref 135–147)
WBC # BLD AUTO: 9.62 THOUSAND/UL (ref 4.31–10.16)

## 2023-03-30 RX ORDER — LANOLIN ALCOHOL/MO/W.PET/CERES
400 CREAM (GRAM) TOPICAL 2 TIMES DAILY
Status: DISCONTINUED | OUTPATIENT
Start: 2023-03-30 | End: 2023-03-31

## 2023-03-30 RX ADMIN — ACETAMINOPHEN 975 MG: 325 TABLET, FILM COATED ORAL at 06:17

## 2023-03-30 RX ADMIN — PANTOPRAZOLE SODIUM 40 MG: 40 TABLET, DELAYED RELEASE ORAL at 06:11

## 2023-03-30 RX ADMIN — LIDOCAINE 5% 1 PATCH: 700 PATCH TOPICAL at 10:23

## 2023-03-30 RX ADMIN — FLUTICASONE PROPIONATE 1 SPRAY: 50 SPRAY, METERED NASAL at 21:11

## 2023-03-30 RX ADMIN — PANTOPRAZOLE SODIUM 40 MG: 40 TABLET, DELAYED RELEASE ORAL at 18:05

## 2023-03-30 RX ADMIN — LORAZEPAM 0.5 MG: 0.5 TABLET ORAL at 21:09

## 2023-03-30 RX ADMIN — BUTALBITAL, ACETAMINOPHEN AND CAFFEINE 1 TABLET: 50; 325; 40 TABLET ORAL at 10:23

## 2023-03-30 RX ADMIN — ACETAMINOPHEN 975 MG: 325 TABLET, FILM COATED ORAL at 21:09

## 2023-03-30 RX ADMIN — ENOXAPARIN SODIUM 40 MG: 40 INJECTION SUBCUTANEOUS at 10:23

## 2023-03-30 RX ADMIN — MAGNESIUM OXIDE TAB 400 MG (241.3 MG ELEMENTAL MG) 400 MG: 400 (241.3 MG) TAB at 13:29

## 2023-03-30 RX ADMIN — FLUTICASONE PROPIONATE 2 PUFF: 44 AEROSOL, METERED RESPIRATORY (INHALATION) at 10:27

## 2023-03-30 RX ADMIN — PRAVASTATIN SODIUM 10 MG: 10 TABLET ORAL at 22:06

## 2023-03-30 RX ADMIN — OXYCODONE HYDROCHLORIDE 10 MG: 10 TABLET, FILM COATED, EXTENDED RELEASE ORAL at 21:09

## 2023-03-30 RX ADMIN — GABAPENTIN 300 MG: 300 CAPSULE ORAL at 21:09

## 2023-03-30 RX ADMIN — DOCUSATE SODIUM 100 MG: 100 CAPSULE, LIQUID FILLED ORAL at 10:23

## 2023-03-30 RX ADMIN — OXYCODONE HYDROCHLORIDE 5 MG: 5 TABLET ORAL at 18:05

## 2023-03-30 RX ADMIN — MAGNESIUM OXIDE TAB 400 MG (241.3 MG ELEMENTAL MG) 400 MG: 400 (241.3 MG) TAB at 18:05

## 2023-03-30 RX ADMIN — OXYCODONE HYDROCHLORIDE 5 MG: 5 TABLET ORAL at 13:33

## 2023-03-30 RX ADMIN — CEFTRIAXONE 2000 MG: 2 INJECTION, SOLUTION INTRAVENOUS at 21:09

## 2023-03-30 RX ADMIN — ONDANSETRON 4 MG: 2 INJECTION INTRAMUSCULAR; INTRAVENOUS at 18:12

## 2023-03-30 RX ADMIN — ALUMINUM HYDROXIDE, MAGNESIUM HYDROXIDE, AND DIMETHICONE 10 ML: 200; 20; 200 SUSPENSION ORAL at 10:23

## 2023-03-30 RX ADMIN — BUTALBITAL, ACETAMINOPHEN AND CAFFEINE 1 TABLET: 50; 325; 40 TABLET ORAL at 18:13

## 2023-03-30 RX ADMIN — DOCUSATE SODIUM 100 MG: 100 CAPSULE, LIQUID FILLED ORAL at 18:05

## 2023-03-30 RX ADMIN — DULOXETINE HYDROCHLORIDE 20 MG: 20 CAPSULE, DELAYED RELEASE ORAL at 10:27

## 2023-03-30 RX ADMIN — GABAPENTIN 300 MG: 300 CAPSULE ORAL at 10:23

## 2023-03-30 RX ADMIN — GABAPENTIN 300 MG: 300 CAPSULE ORAL at 18:05

## 2023-03-30 NOTE — PLAN OF CARE
Problem: RESPIRATORY - ADULT  Goal: Achieves optimal ventilation and oxygenation  Description: INTERVENTIONS:  - Assess for changes in respiratory status  - Assess for changes in mentation and behavior  - Position to facilitate oxygenation and minimize respiratory effort  - Oxygen administered by appropriate delivery if ordered  - Initiate smoking cessation education as indicated  - Encourage broncho-pulmonary hygiene including cough, deep breathe, Incentive Spirometry  - Assess the need for suctioning and aspirate as needed  - Assess and instruct to report SOB or any respiratory difficulty  - Respiratory Therapy support as indicated  Outcome: Progressing     Problem: Nutrition/Hydration-ADULT  Goal: Nutrient/Hydration intake appropriate for improving, restoring or maintaining nutritional needs  Description: Monitor and assess patient's nutrition/hydration status for malnutrition  Collaborate with interdisciplinary team and initiate plan and interventions as ordered  Monitor patient's weight and dietary intake as ordered or per policy  Utilize nutrition screening tool and intervene as necessary  Determine patient's food preferences and provide high-protein, high-caloric foods as appropriate       INTERVENTIONS:  - Monitor oral intake, urinary output, labs, and treatment plans  - Assess nutrition and hydration status and recommend course of action  - Evaluate amount of meals eaten  - Assist patient with eating if necessary   - Allow adequate time for meals  - Recommend/ encourage appropriate diets, oral nutritional supplements, and vitamin/mineral supplements  - Order, calculate, and assess calorie counts as needed  - Recommend, monitor, and adjust tube feedings and TPN/PPN based on assessed needs  - Assess need for intravenous fluids  - Provide specific nutrition/hydration education as appropriate  - Include patient/family/caregiver in decisions related to nutrition  Outcome: Progressing     Problem: PAIN - ADULT  Goal: Verbalizes/displays adequate comfort level or baseline comfort level  Description: Interventions:  - Encourage patient to monitor pain and request assistance  - Assess pain using appropriate pain scale  - Administer analgesics based on type and severity of pain and evaluate response  - Implement non-pharmacological measures as appropriate and evaluate response  - Consider cultural and social influences on pain and pain management  - Notify physician/advanced practitioner if interventions unsuccessful or patient reports new pain  Outcome: Progressing     Problem: INFECTION - ADULT  Goal: Absence or prevention of progression during hospitalization  Description: INTERVENTIONS:  - Assess and monitor for signs and symptoms of infection  - Monitor lab/diagnostic results  - Monitor all insertion sites, i e  indwelling lines, tubes, and drains  - Monitor endotracheal if appropriate and nasal secretions for changes in amount and color  - [place] appropriate cooling/warming therapies per order  - Administer medications as ordered  - Instruct and encourage patient and family to use good hand hygiene technique  - Identify and instruct in appropriate isolation precautions for identified infection/condition  Outcome: Progressing     Problem: SAFETY ADULT  Goal: Patient will remain free of falls  Description: INTERVENTIONS:  - Educate patient/family on patient safety including physical limitations  - Instruct patient to call for assistance with activity   - Consult OT/PT to assist with strengthening/mobility   - Keep Call bell within reach  - Keep bed low and locked with side rails adjusted as appropriate  - Keep care items and personal belongings within reach  - Initiate and maintain comfort rounds  - Make Fall Risk Sign visible to staff  - Offer Toileting every 2 Hours, in advance of need  - Initiate/Maintain bed alarm  - Obtain necessary fall risk management equipment: yellow socks  - Apply yellow socks and bracelet for high fall risk patients  - Consider moving patient to room near nurses station  Outcome: Progressing

## 2023-03-30 NOTE — PROGRESS NOTES
[facility] 46  Progress Note  Name: Bob Pike  MRN: [id number]  Unit/Bed#: 18 [facility] 216 I Date of Admission: 3/27/2023   Date of Service: 3/30/2023 I Hospital Day: 3    Assessment/Plan   * Pneumonia  Assessment & Plan  Improving,     Patient presents with low-grade fever on and off, feeling tired for about 1 week  Denies cough from congestion, reports chronic postnasal drip  Reports SOB at times  · History of recurrent ovarian cancer, currently on chemotherapy  Patient was sent to ED from infusion center prior to starting chemo due to fever and nausea  Tmax 102 8 in infusion center  · Chest x-ray showed - Bilateral lung metastases  Right base opacity, at least in part due to atelectasis with elevation of the right hemidiaphragm  · CT chest showed - Innumerable pulmonary nodules throughout the lungs, increased in size and number since the previous exam   Largest measuring up to 1 cm  Right lower lobe partial atelectasis and airspace consolidation  Small right pleural effusion  · WBC 11, no bands, temperature 100 5 in ED  Patient tachycardic  Meet sepsis criteria  See below  · Will treat as CAP  · Patient is immunocompromised from chemotherapy and status post splenectomy  · Patient received Vanco, cefepime in ED  Will de-escalate to Rocephin  · Check urine antigens / Check sputum Gram stain and culture if able/ Check procalcitonin  · Robitussin as needed  · speech for swallow eval      Lactic acidosis  Assessment & Plan  Trending down    · LA-2 9  · Likely secondary to underlying malignancy with mets, inhibiting clearance of lactic acid  · IVF    Severe sepsis (HCC)  Assessment & Plan  · POA, as evidenced by fever, tachycardia, with source of infection right lower lobe pneumonia  · Initial lactic acid 4 6  Suspect partially due to cancer  BP stable  Received NS 1000ml in ED  Repeat improving    · procalcitonin-elevated follow-up blood cultures  · Antibiotic as above  · Gentle IV hydration, in view of sepsis and elevated lactic acid  Asthma  Assessment & Plan  Asymptomatic  · On Flovent as needed, albuterol as needed at home  · Flovent twice daily  · DuoNeb as needed  Anemia  Assessment & Plan  HH- 11 2/34 3  ·   · Based hemoglobin appears to be around 8-11's  · Hemoglobin stable  · Monitor    Esophageal reflux  Assessment & Plan  · Continue PPI twice daily    Chemotherapy-induced peripheral neuropathy (HCC)  Assessment & Plan  · Continue gabapentin    Chemotherapy-induced nausea  Assessment & Plan  · Reports nausea intermittent vomiting since January after chemo started  · CT showed no evidence of bowel obstruction, colitis or diverticulitis  · Symptom management Phenergan    Hyponatremia  Assessment & Plan  · Sodium 131  [id number] February this year  · Suspect SIADH from cancer vs SSRI vs N/V    · Received NS 1 L in ED, bolus 500  · BMP in the morning    Cancer related pain  Assessment & Plan  · Reports chronic pain in epigastric, right hip, right sided sciatic pain  On oxycodone IR as needed and oxycodone extended release at bedtime at home  · continue oxycodone as needed  · order OxyContin at bedtime  · Verified at [facility] website  Hypertension  Assessment & Plan  · On Lotrel at home  · Will order Norvasc 5mg p o  daily with holding parameter in view of sepsis  · BP stable currently               VTE Pharmacologic Prophylaxis:   Moderate Risk (Score 3-4) - Pharmacological DVT Prophylaxis Ordered: enoxaparin (Lovenox)  Patient Centered Rounds: I performed bedside rounds with nursing staff today  Discussions with Specialists or Other Care Team Provider:Gyn-Onco    Education and Discussions with Family / Patient: Updated  (friend) at bedside      Total Time Spent on Date of Encounter in care of patient: 45 minutes This time was spent on one or more of the following: performing physical exam; counseling and coordination of care; obtaining or reviewing history; documenting in the medical record; reviewing/ordering tests, medications or procedures; communicating with other healthcare professionals and discussing with patient's family/caregivers  Current Length of Stay: 3 day(s)  Current Patient Status: Inpatient   Certification Statement: The patient will continue to require additional inpatient hospital stay due to Clincal course  Discharge Plan: Anticipate discharge in 24-48 hrs to discharge location to be determined pending rehab evaluations  Code Status: Level 1 - Full Code    Subjective:   Patient seen and examined at bedside, patient reported no chest pain, improvement in respiration and and no skin concerns at this time  Patient's family at bedside, discussed giving a trial of magnesium due to hypomagnesia and seeing if there is a similar reaction with as needed Benadryl  Family agreed  Objective:     Vitals:   Temp (24hrs), Av 4 °F (36 9 °C), Min:97 5 °F (36 4 °C), Max:98 8 °F (37 1 °C)    Temp:  [97 5 °F (36 4 °C)-98 8 °F (37 1 °C)] 98 6 °F (37 °C)  HR:  [] 94  Resp:  [16-18] 18  BP: (118-144)/(71-80) 144/80  SpO2:  [94 %-96 %] 95 %  Body mass index is 30 15 kg/m²  Input and Output Summary (last 24 hours):   No intake or output data in the 24 hours ending 23 1750    Physical Exam:   Physical Exam  Vitals and nursing note reviewed  Constitutional:       General: She is not in acute distress  Appearance: She is well-developed  HENT:      Head: Normocephalic and atraumatic  Eyes:      Conjunctiva/sclera: Conjunctivae normal    Cardiovascular:      Rate and Rhythm: Normal rate and regular rhythm  Heart sounds: No murmur heard  No friction rub  No gallop  Pulmonary:      Effort: Pulmonary effort is normal  No respiratory distress  Breath sounds: Normal breath sounds  No stridor  No wheezing, rhonchi or rales  Abdominal:      Palpations: Abdomen is soft  Tenderness:  There is no abdominal tenderness  There is no guarding or rebound  Musculoskeletal:         General: No swelling or tenderness  Cervical back: Neck supple  Right lower leg: No edema  Left lower leg: No edema  Skin:     General: Skin is warm and dry  Capillary Refill: Capillary refill takes less than 2 seconds  Findings: Rash (Back and facial improving erythema and blotchiness, nontender) present  No bruising  Neurological:      Mental Status: She is alert and oriented to person, place, and time  Motor: No weakness  Psychiatric:         Mood and Affect: Mood normal          Behavior: Behavior normal           Additional Data:     Labs:  Results from last 7 days   Lab Units 03/30/23  0616   WBC Thousand/uL 9 62   HEMOGLOBIN g/dL 11 0*   HEMATOCRIT % 33 4*   PLATELETS Thousands/uL 383   NEUTROS PCT % 77*   LYMPHS PCT % 15   MONOS PCT % 7   EOS PCT % 0     Results from last 7 days   Lab Units 03/30/23  0616 03/29/23  0622 03/28/23  0657   SODIUM mmol/L 132*   < > 132*   POTASSIUM mmol/L 3 4*   < > 4 1   CHLORIDE mmol/L 98   < > 100   CO2 mmol/L 24   < > 21   BUN mg/dL 13   < > 12   CREATININE mg/dL 0 41*   < > 0 39*   ANION GAP mmol/L 10   < > 11   CALCIUM mg/dL 7 8*   < > 7 8*   ALBUMIN g/dL  --   --  2 6*   TOTAL BILIRUBIN mg/dL  --   --  0 77   ALK PHOS U/L  --   --  809*   ALT U/L  --   --  32   AST U/L  --   --  95*   GLUCOSE RANDOM mg/dL 93   < > 118    < > = values in this interval not displayed  Results from last 7 days   Lab Units 03/29/23  0959 03/29/23  0622 03/28/23  1308 03/28/23  0813 03/28/23  0657 03/28/23  0029 03/27/23  2110   LACTIC ACID mmol/L 3 4* 2 9* 5 5* 3 5*  --    < > 2 4*   PROCALCITONIN ng/ml  --   --   --   --  2 82*  --  2 79*    < > = values in this interval not displayed         Lines/Drains:  Invasive Devices     Central Venous Catheter Line  Duration           Port A Cath Right Subclavian -- days          Peripheral Intravenous Line Duration           Peripheral IV 03/27/23 Left;Ventral (anterior) External Jugular 3 days    Peripheral IV 03/29/23 Left Antecubital 1 day                Central Line:  Goal for removal: Will discontinue when meds requiring line are completed  Imaging: Reviewed radiology reports from this admission including: abdominal/pelvic CT    Recent Cultures (last 7 days):   Results from last 7 days   Lab Units 03/27/23  1605 03/27/23  1453   BLOOD CULTURE   --  No Growth at 48 hrs  No Growth at 48 hrs     LEGIONELLA URINARY ANTIGEN  Negative  --        Last 24 Hours Medication List:   Current Facility-Administered Medications   Medication Dose Route Frequency Provider Last Rate   • acetaminophen  975 mg Oral Q8H [address] CuiJOSI Wilkinson     • al mag oxide-diphenhydramine-lidocaine viscous  10 mL Swish & Spit Q4H PRN JOSI Barney     • amLODIPine  5 mg Oral Daily JOSI Barney     • butalbital-acetaminophen-caffeine  1 tablet Oral Q4H PRN JOSI Barney     • cefTRIAXone  2,000 mg Intravenous Q24H Jose Luis Lind MD 2,000 mg (03/29/23 4070)   • dextromethorphan-guaiFENesin  10 mL Oral Q4H PRN JOSI Barney     • docusate sodium  100 mg Oral BID JOSI Barney     • DULoxetine  20 mg Oral Daily JOSI Barney     • enoxaparin  40 mg Subcutaneous Daily JOSI Barney     • fluticasone  1 spray Each Nare HS JOSI Barney     • fluticasone  2 puff Inhalation BID JOSI Barney     • gabapentin  300 mg Oral TID JOSI Barney     • ipratropium-albuterol  3 mL Nebulization Q6H PRN JOSI Barney     • lidocaine  1 patch Topical Daily JOSI Barney     • LORazepam  0 5 mg Oral Q8H PRN JOSI Barney     • magnesium Oxide  400 mg Oral BID Jose Luis Lind MD     • ondansetron  4 mg Intravenous Q6H PRN JOSI Barney     • oxyCODONE  10 mg Oral HS JOSI Barney     • oxyCODONE  5 mg Oral Q4H PRN JOSI Barney     • pantoprazole  40 mg Oral BID AC Gill JOSI Waters     • pneumococcal 20-kyara conj vacc  0 5 mL Intramuscular Prior to discharge Yoon Goins MD     • pravastatin  10 mg Oral HS JOSI Becerra          Today, Patient Was Seen By: Krishna Barajas MD    **Please Note: This note may have been constructed using a voice recognition system  **

## 2023-03-31 PROBLEM — T50.905A MEDICATION REACTION: Status: ACTIVE | Noted: 2023-03-31

## 2023-03-31 LAB
ANION GAP SERPL CALCULATED.3IONS-SCNC: 10 MMOL/L (ref 4–13)
BASOPHILS # BLD AUTO: 0.04 THOUSANDS/ÂΜL (ref 0–0.1)
BASOPHILS NFR BLD AUTO: 0 % (ref 0–1)
BUN SERPL-MCNC: 14 MG/DL (ref 5–25)
CALCIUM SERPL-MCNC: 7.8 MG/DL (ref 8.4–10.2)
CHLORIDE SERPL-SCNC: 96 MMOL/L (ref 96–108)
CO2 SERPL-SCNC: 24 MMOL/L (ref 21–32)
CREAT SERPL-MCNC: 0.42 MG/DL (ref 0.6–1.3)
EOSINOPHIL # BLD AUTO: 0.07 THOUSAND/ÂΜL (ref 0–0.61)
EOSINOPHIL NFR BLD AUTO: 1 % (ref 0–6)
ERYTHROCYTE [DISTWIDTH] IN BLOOD BY AUTOMATED COUNT: 23.6 % (ref 11.6–15.1)
GFR SERPL CREATININE-BSD FRML MDRD: 103 ML/MIN/1.73SQ M
GLUCOSE SERPL-MCNC: 77 MG/DL (ref 65–140)
HCT VFR BLD AUTO: 34.7 % (ref 34.8–46.1)
HGB BLD-MCNC: 11.5 G/DL (ref 11.5–15.4)
IMM GRANULOCYTES # BLD AUTO: 0.14 THOUSAND/UL (ref 0–0.2)
IMM GRANULOCYTES NFR BLD AUTO: 1 % (ref 0–2)
LYMPHOCYTES # BLD AUTO: 1.99 THOUSANDS/ÂΜL (ref 0.6–4.47)
LYMPHOCYTES NFR BLD AUTO: 19 % (ref 14–44)
MAGNESIUM SERPL-MCNC: 1.6 MG/DL (ref 1.9–2.7)
MCH RBC QN AUTO: 31.5 PG (ref 26.8–34.3)
MCHC RBC AUTO-ENTMCNC: 33.1 G/DL (ref 31.4–37.4)
MCV RBC AUTO: 95 FL (ref 82–98)
MONOCYTES # BLD AUTO: 0.77 THOUSAND/ÂΜL (ref 0.17–1.22)
MONOCYTES NFR BLD AUTO: 7 % (ref 4–12)
NEUTROPHILS # BLD AUTO: 7.59 THOUSANDS/ÂΜL (ref 1.85–7.62)
NEUTS SEG NFR BLD AUTO: 72 % (ref 43–75)
NRBC BLD AUTO-RTO: 1 /100 WBCS
PLATELET # BLD AUTO: 358 THOUSANDS/UL (ref 149–390)
PMV BLD AUTO: 11.4 FL (ref 8.9–12.7)
POTASSIUM SERPL-SCNC: 3.3 MMOL/L (ref 3.5–5.3)
RBC # BLD AUTO: 3.65 MILLION/UL (ref 3.81–5.12)
SODIUM SERPL-SCNC: 130 MMOL/L (ref 135–147)
WBC # BLD AUTO: 10.6 THOUSAND/UL (ref 4.31–10.16)

## 2023-03-31 RX ADMIN — GABAPENTIN 300 MG: 300 CAPSULE ORAL at 08:46

## 2023-03-31 RX ADMIN — GABAPENTIN 300 MG: 300 CAPSULE ORAL at 16:59

## 2023-03-31 RX ADMIN — ENOXAPARIN SODIUM 40 MG: 40 INJECTION SUBCUTANEOUS at 08:46

## 2023-03-31 RX ADMIN — OXYCODONE HYDROCHLORIDE 10 MG: 10 TABLET, FILM COATED, EXTENDED RELEASE ORAL at 21:17

## 2023-03-31 RX ADMIN — LORAZEPAM 0.5 MG: 0.5 TABLET ORAL at 21:17

## 2023-03-31 RX ADMIN — FLUTICASONE PROPIONATE 2 PUFF: 44 AEROSOL, METERED RESPIRATORY (INHALATION) at 17:00

## 2023-03-31 RX ADMIN — ACETAMINOPHEN 975 MG: 325 TABLET, FILM COATED ORAL at 06:05

## 2023-03-31 RX ADMIN — PRAVASTATIN SODIUM 10 MG: 10 TABLET ORAL at 21:44

## 2023-03-31 RX ADMIN — ACETAMINOPHEN 975 MG: 325 TABLET, FILM COATED ORAL at 21:17

## 2023-03-31 RX ADMIN — ACETAMINOPHEN 975 MG: 325 TABLET, FILM COATED ORAL at 13:34

## 2023-03-31 RX ADMIN — PANTOPRAZOLE SODIUM 40 MG: 40 TABLET, DELAYED RELEASE ORAL at 16:59

## 2023-03-31 RX ADMIN — PANTOPRAZOLE SODIUM 40 MG: 40 TABLET, DELAYED RELEASE ORAL at 06:05

## 2023-03-31 RX ADMIN — FLUTICASONE PROPIONATE 1 SPRAY: 50 SPRAY, METERED NASAL at 21:16

## 2023-03-31 RX ADMIN — DULOXETINE HYDROCHLORIDE 20 MG: 20 CAPSULE, DELAYED RELEASE ORAL at 08:45

## 2023-03-31 RX ADMIN — FLUTICASONE PROPIONATE 2 PUFF: 44 AEROSOL, METERED RESPIRATORY (INHALATION) at 08:49

## 2023-03-31 RX ADMIN — GABAPENTIN 300 MG: 300 CAPSULE ORAL at 21:17

## 2023-03-31 RX ADMIN — LIDOCAINE 5% 1 PATCH: 700 PATCH TOPICAL at 08:49

## 2023-03-31 RX ADMIN — DOCUSATE SODIUM 100 MG: 100 CAPSULE, LIQUID FILLED ORAL at 08:45

## 2023-03-31 RX ADMIN — DOCUSATE SODIUM 100 MG: 100 CAPSULE, LIQUID FILLED ORAL at 17:00

## 2023-03-31 RX ADMIN — AMLODIPINE BESYLATE 5 MG: 5 TABLET ORAL at 08:48

## 2023-03-31 RX ADMIN — OXYCODONE HYDROCHLORIDE 5 MG: 5 TABLET ORAL at 18:18

## 2023-03-31 NOTE — PLAN OF CARE
Problem: RESPIRATORY - ADULT  Goal: Achieves optimal ventilation and oxygenation  Description: INTERVENTIONS:  - Assess for changes in respiratory status  - Assess for changes in mentation and behavior  - Position to facilitate oxygenation and minimize respiratory effort  - Oxygen administered by appropriate delivery if ordered  - Initiate smoking cessation education as indicated  - Encourage broncho-pulmonary hygiene including cough, deep breathe, Incentive Spirometry  - Assess the need for suctioning and aspirate as needed  - Assess and instruct to report SOB or any respiratory difficulty  - Respiratory Therapy support as indicated  Outcome: Progressing     Problem: Nutrition/Hydration-ADULT  Goal: Nutrient/Hydration intake appropriate for improving, restoring or maintaining nutritional needs  Description: Monitor and assess patient's nutrition/hydration status for malnutrition  Collaborate with interdisciplinary team and initiate plan and interventions as ordered  Monitor patient's weight and dietary intake as ordered or per policy  Utilize nutrition screening tool and intervene as necessary  Determine patient's food preferences and provide high-protein, high-caloric foods as appropriate       INTERVENTIONS:  - Monitor oral intake, urinary output, labs, and treatment plans  - Assess nutrition and hydration status and recommend course of action  - Evaluate amount of meals eaten  - Assist patient with eating if necessary   - Allow adequate time for meals  - Recommend/ encourage appropriate diets, oral nutritional supplements, and vitamin/mineral supplements  - Order, calculate, and assess calorie counts as needed  - Recommend, monitor, and adjust tube feedings and TPN/PPN based on assessed needs  - Assess need for intravenous fluids  - Provide specific nutrition/hydration education as appropriate  - Include patient/family/caregiver in decisions related to nutrition  Outcome: Progressing     Problem: PAIN - ADULT  Goal: Verbalizes/displays adequate comfort level or baseline comfort level  Description: Interventions:  - Encourage patient to monitor pain and request assistance  - Assess pain using appropriate pain scale  - Administer analgesics based on type and severity of pain and evaluate response  - Implement non-pharmacological measures as appropriate and evaluate response  - Consider cultural and social influences on pain and pain management  - Notify physician/advanced practitioner if interventions unsuccessful or patient reports new pain  Outcome: Progressing     Problem: INFECTION - ADULT  Goal: Absence or prevention of progression during hospitalization  Description: INTERVENTIONS:  - Assess and monitor for signs and symptoms of infection  - Monitor lab/diagnostic results  - Monitor all insertion sites, i e  indwelling lines, tubes, and drains  - Monitor endotracheal if appropriate and nasal secretions for changes in amount and color  - [place] appropriate cooling/warming therapies per order  - Administer medications as ordered  - Instruct and encourage patient and family to use good hand hygiene technique  - Identify and instruct in appropriate isolation precautions for identified infection/condition  Outcome: Progressing     Problem: SAFETY ADULT  Goal: Patient will remain free of falls  Description: INTERVENTIONS:  - Educate patient/family on patient safety including physical limitations  - Instruct patient to call for assistance with activity   - Consult OT/PT to assist with strengthening/mobility   - Keep Call bell within reach  - Keep bed low and locked with side rails adjusted as appropriate  - Keep care items and personal belongings within reach  - Initiate and maintain comfort rounds  - Make Fall Risk Sign visible to staff  - Offer Toileting every 2 Hours, in advance of need  - Initiate/Maintain bed alarm  - Obtain necessary fall risk management equipment: alarm  - Apply yellow socks and bracelet for high fall risk patients  - Consider moving patient to room near nurses station  Outcome: Progressing

## 2023-03-31 NOTE — UTILIZATION REVIEW
Continued Stay Review    Date: 3/31                          Current Patient Class: Inpatient   Current Level of Care: MEd/surg    HPI:72 y o  female initially admitted on 3/27     Assessment/Plan:  COntinue with IV rocephin  GCS 15   Pain free currently  Lungs clear  She is immunocompromised s/p splenectomy and on chemo  Lactic acidosis continues  Likely due to underlying malignancy with mets     Vital Signs:   Date/Time Temp Pulse Resp BP MAP (mmHg) SpO2 O2 Device Patient Position - Orthostatic VS   03/31/23 08:44:18 -- 95 -- 135/70 92 94 % -- --   03/31/23 0804 -- 96 -- -- -- 95 % None (Room air) --   03/31/23 07:32:52 98 4 °F (36 9 °C) 95 18 93/69 77 94 % None (Room air) --   03/31/23 06:07:40 98 2 °F (36 8 °C) 100 -- -- -- 95 % --         Pertinent Labs/Diagnostic Results:   Results from last 7 days   Lab Units 03/27/23  1453   SARS-COV-2  Negative     Results from last 7 days   Lab Units 03/31/23  0605 03/30/23  0616 03/29/23  0622 03/28/23  0813 03/27/23  1453   WBC Thousand/uL 10 60* 9 62 11 30* 9 16 9 97   HEMOGLOBIN g/dL 11 5 11 0* 11 2* 10 3* 10 3*   HEMATOCRIT % 34 7* 33 4* 34 3* 31 0* 30 9*   PLATELETS Thousands/uL 358 383 394* 337 384   NEUTROS ABS Thousands/µL 7 59 7 41 8 52* 6 30 7 87*         Results from last 7 days   Lab Units 03/31/23  0605 03/30/23  0616 03/29/23  0622 03/28/23  0657 03/27/23  2110 03/27/23  1453   SODIUM mmol/L 130* 132* 131* 132* 128* 128*   POTASSIUM mmol/L 3 3* 3 4* 3 7 4 1 3 4* 2 9*   CHLORIDE mmol/L 96 98 99 100 96 92*   CO2 mmol/L 24 24 22 21 22 22   ANION GAP mmol/L 10 10 10 11 10 14*   BUN mg/dL 14 13 14 12 12 14   CREATININE mg/dL 0 42* 0 41* 0 41* 0 39* 0 37* 0 47*   EGFR ml/min/1 73sq m 103 104 104 106 107 99   CALCIUM mg/dL 7 8* 7 8* 7 9* 7 8* 7 6* 7 9*   MAGNESIUM mg/dL  --  1 6*  --  2 1  --  2 3     Results from last 7 days   Lab Units 03/28/23  0657 03/27/23  1453   AST U/L 95* 106*   ALT U/L 32 34   ALK PHOS U/L 809* 923*   TOTAL PROTEIN g/dL 5 5* 5 7* ALBUMIN g/dL 2 6* 2 6*   TOTAL BILIRUBIN mg/dL 0 77 0 92         Results from last 7 days   Lab Units 03/31/23  0605 03/30/23  0616 03/29/23  0622 03/28/23  0657 03/27/23  2110 03/27/23  1453   GLUCOSE RANDOM mg/dL 77 93 91 118 133 128       Results from last 7 days   Lab Units 03/27/23  1728 03/27/23  1453   HS TNI 0HR ng/L  --  37   HS TNI 2HR ng/L 35  --    HSTNI D2 ng/L -2  --        Results from last 7 days   Lab Units 03/28/23  0657 03/27/23  2110   PROCALCITONIN ng/ml 2 82* 2 79*     Results from last 7 days   Lab Units 03/29/23  0959 03/29/23  0622 03/28/23  1308 03/28/23  0813 03/28/23  0029   LACTIC ACID mmol/L 3 4* 2 9* 5 5* 3 5* 2 9*         Results from last 7 days   Lab Units 03/27/23  1605   CLARITY UA  Slightly Cloudy   COLOR UA  Anastasiia   SPEC GRAV UA  >=1 030   PH UA  6 0   GLUCOSE UA mg/dl Negative   KETONES UA mg/dl Negative   BLOOD UA  Negative   PROTEIN UA mg/dl 30 (1+)*   NITRITE UA  Negative   BILIRUBIN UA  Small*   UROBILINOGEN UA E U /dl 4 0*   LEUKOCYTES UA  Negative   WBC UA /hpf 2-4   RBC UA /hpf 1-2   BACTERIA UA /hpf Occasional   EPITHELIAL CELLS WET PREP /hpf Occasional   MUCUS THREADS  Moderate*     Results from last 7 days   Lab Units 03/27/23  1605 03/27/23  1453   STREP PNEUMONIAE ANTIGEN, URINE  Negative  --    LEGIONELLA URINARY ANTIGEN  Negative  --    INFLUENZA A PCR   --  Negative   INFLUENZA B PCR   --  Negative   RSV PCR   --  Negative       Results from last 7 days   Lab Units 03/27/23  1453   BLOOD CULTURE  No Growth at 72 hrs  No Growth at 72 hrs           Medications:   Scheduled Medications:  acetaminophen, 975 mg, Oral, Q8H ANA MARIA  amLODIPine, 5 mg, Oral, Daily  cefTRIAXone, 2,000 mg, Intravenous, Q24H  docusate sodium, 100 mg, Oral, BID  DULoxetine, 20 mg, Oral, Daily  enoxaparin, 40 mg, Subcutaneous, Daily  fluticasone, 1 spray, Each Nare, HS  fluticasone, 2 puff, Inhalation, BID  gabapentin, 300 mg, Oral, TID  lidocaine, 1 patch, Topical, Daily  oxyCODONE, 10 mg, Oral, HS  pantoprazole, 40 mg, Oral, BID AC  pravastatin, 10 mg, Oral, HS      Continuous IV Infusions:     PRN Meds:  al mag oxide-diphenhydramine-lidocaine viscous, 10 mL, Swish & Spit, Q4H PRN  butalbital-acetaminophen-caffeine, 1 tablet, Oral, Q4H PRN  dextromethorphan-guaiFENesin, 10 mL, Oral, Q4H PRN  ipratropium-albuterol, 3 mL, Nebulization, Q6H PRN  LORazepam, 0 5 mg, Oral, Q8H PRN  ondansetron, 4 mg, Intravenous, Q6H PRN  oxyCODONE, 5 mg, Oral, Q4H PRN  pneumococcal 20-kyara conj vacc, 0 5 mL, Intramuscular, Prior to discharge        Discharge Plan: [facility]    Network Utilization Review Department  ATTENTION: Please call with any questions or concerns to [phone] and carefully listen to the prompts so that you are directed to the right person  All voicemails are confidential   Kelsey Arreaga all requests for admission clinical reviews, approved or denied determinations and any other requests to dedicated fax number below belonging to the campus where the patient is receiving treatment   List of dedicated fax numbers for the Facilities:  [address] DENIALS (Administrative/Medical Necessity) [phone]   [address] (Maternity/NICU/Pediatrics) [phone]   912 Velma Lara [phone]   [facility] [phone]   [address] [address] Cecy Tono Baker 28 [phone]   [address] [place] [address] [facility] 134 815 [facility] [phone]

## 2023-03-31 NOTE — PHYSICAL THERAPY NOTE
"   PT TREATMENT       03/31/23 1048   PT Last Visit   PT Visit Date 03/31/23   Note Type   Note Type Treatment   Pain Assessment   Pain Assessment Tool 0-10   Pain Score No Pain   Patient's Stated Pain Goal No pain   Hospital Pain Intervention(s) Repositioned; Ambulation/increased activity   Multiple Pain Sites No   Restrictions/Precautions   Weight Bearing Precautions Per Order No   Other Precautions Contact/isolation   General   Chart Reviewed Yes   Family/Caregiver Present Yes  ( at bedside)   Cognition   Overall Cognitive Status WFL   Arousal/Participation Cooperative   Attention Within functional limits   Orientation Level Oriented X4   Following Commands Follows all commands and directions without difficulty   Subjective   Subjective \"I need to use the bathroom\"   Bed Mobility   Rolling R 7  Independent   Rolling L 7  Independent   Supine to Sit 7  Independent   Sit to Supine 7  Independent   Transfers   Sit to Stand 5  Supervision   Additional items Verbal cues   Stand to Sit 5  Supervision   Additional items Verbal cues   Stand pivot 5  Supervision   Additional items Verbal cues   Toilet transfer 5  Supervision   Additional items Verbal cues;Standard toilet   Ambulation/Elevation   Gait pattern Step through pattern   Gait Assistance 5  Supervision   Additional items Verbal cues   Assistive Device None   Distance 25 + 100 feet   Stair Management Assistance Not tested   Balance   Static Sitting Good   Dynamic Sitting Fair +   Static Standing Fair   Dynamic Standing Fair   Ambulatory Fair   Activity Tolerance   Activity Tolerance Patient tolerated treatment well;Patient limited by fatigue   Nurse Made Aware yes   Assessment   Prognosis Good   Problem List Decreased strength;Decreased endurance; Impaired balance;Decreased mobility   Assessment Pt agreeable to PT session this AM  Requesting need to use bathroom - able to ambulate without AD with supervision + perform pericare IND on standard toilet   Able to " progress ambulation x 100 feet with supervision without AD, presents with intermittent mild LOB with no falls + able to sefl correct with hip vs  stepping strategy  Repositioned in bedside chair with all needs within reach  The patient's AM-PAC Basic Mobility Inpatient Short Form Raw Score is 22  A Raw score of greater than 16 suggests the patient may benefit from discharge to home  Please also refer to the recommendation of the Physical Therapist for safe discharge planning  Goals   Patient Goals to go home   Plan   Treatment/Interventions Functional transfer training;LE strengthening/ROM; Therapeutic exercise; Endurance training;Gait training;Spoke to nursing   Progress Progressing toward goals   PT Frequency 2-3x/wk   Recommendation   PT Discharge Recommendation No rehabilitation needs   AM-PAC Basic Mobility Inpatient   Turning in Flat Bed Without Bedrails 4   Lying on Back to Sitting on Edge of Flat Bed Without Bedrails 4   Moving Bed to Chair 4   Standing Up From Chair Using Arms 4   Walk in Room 3   Climb 3-5 Stairs With Railing 3   Basic Mobility Inpatient Raw Score 22   Basic Mobility Standardized Score 47 4   Highest Level Of Mobility   JH-HLM Goal 7: Walk 25 feet or more   JH-HLM Achieved 7: Walk 25 feet or more   End of Consult   Patient Position at End of Consult Bedside chair; All needs within reach   [facility] Insurance Number  Bianca Rao [id number]

## 2023-03-31 NOTE — PROGRESS NOTES
[address]  Progress Note  Name: Jody Mcneal  MRN: [id number]  Unit/Bed#: 18 [facility] 216 I Date of Admission: 3/27/2023   Date of Service: 3/31/2023 I Hospital Day: 4    Assessment/Plan   * Pneumonia  Assessment & Plan  Improving,     Patient presents with low-grade fever on and off, feeling tired for about 1 week  Denies cough from congestion, reports chronic postnasal drip  Reports SOB at times  · History of recurrent ovarian cancer, currently on chemotherapy  Patient was sent to ED from infusion center prior to starting chemo due to fever and nausea  Tmax 102 8 in infusion center  · Chest x-ray showed - Bilateral lung metastases  Right base opacity, at least in part due to atelectasis with elevation of the right hemidiaphragm  · CT chest showed - Innumerable pulmonary nodules throughout the lungs, increased in size and number since the previous exam   Largest measuring up to 1 cm  Right lower lobe partial atelectasis and airspace consolidation  Small right pleural effusion  · WBC 11, no bands, temperature 100 5 in ED  Patient tachycardic  Meet sepsis criteria  See below  · Will treat as CAP  · Patient is immunocompromised from chemotherapy and status post splenectomy  · Patient received Vanco, cefepime in ED  Will de-escalate to Rocephin    · No urine antigens / Check sputum Gram stain and culture if able/ procalcitonin-likely elevated due to underlying malignancy  · Blood cultures-NGTD 72 hours  · Robitussin as needed  · speech for swallow eval      Medication reaction  Assessment & Plan  IV magnesium likely responsible for erythema on skin, temperature    Patient immunocompromised with underlying malignancy, may be more susceptible to adverse reactions  · After repeat oral magnesium due to hypomagnesia patient demonstrated with increased febrile temperature and tachycardia just like initial presentation with IV magnesium at infusion center  · Will look for alternate sources of replenishment      Lactic acidosis  Assessment & Plan  Trending down    · LA-2 9  · Likely secondary to underlying malignancy with mets, inhibiting clearance of lactic acid  · IVF  · No need to trend    Severe sepsis (HCC)  Assessment & Plan  · Likely response to infusion, underlying malignancy elevates sepsis markers  ·   · POA, as evidenced by fever, tachycardia, with source of infection right lower lobe pneumonia  · Initial lactic acid 4 6  Suspect partially due to cancer  BP stable  Received NS 1000ml in ED  Repeat improving  · procalcitonin-elevated follow-up blood cultures  · Antibiotic as above  · Gentle IV hydration, in view of sepsis and elevated lactic acid  Asthma  Assessment & Plan  Asymptomatic  · On Flovent as needed, albuterol as needed at home  · Flovent twice daily  · DuoNeb as needed  Anemia  Assessment & Plan  HH- 11 5/34 7  ·   · Based hemoglobin appears to be around 8-11's  · Hemoglobin stable  · Monitor    Esophageal reflux  Assessment & Plan  · Continue PPI twice daily    Chemotherapy-induced peripheral neuropathy (HCC)  Assessment & Plan  · Continue gabapentin    Chemotherapy-induced nausea  Assessment & Plan  · Reports nausea intermittent vomiting since January after chemo started  · CT showed no evidence of bowel obstruction, colitis or diverticulitis  · Symptom management Phenergan    Hyponatremia  Assessment & Plan  · Sodium 130  [id number] February this year  · Suspect SIADH from cancer vs SSRI vs N/V    · Received NS 1 L in ED, bolus 500  · BMP  trending    Cancer related pain  Assessment & Plan  · Reports chronic pain in epigastric, right hip, right sided sciatic pain  On oxycodone IR as needed and oxycodone extended release at bedtime at home  · continue oxycodone as needed  · order OxyContin at bedtime  · Verified at [facility] website  Hypertension  Assessment & Plan  · On Lotrel at home    · Will order Norvasc 5mg p o  daily with holding parameter in view of sepsis  · BP stable currently               VTE Pharmacologic Prophylaxis:   Moderate Risk (Score 3-4) - Pharmacological DVT Prophylaxis Ordered: enoxaparin (Lovenox)  Patient Centered Rounds: I performed bedside rounds with nursing staff today  Discussions with Specialists or Other Care Team Provider:Gyn-Onco    Education and Discussions with Family / Patient: Updated  (friend) at bedside  Total Time Spent on Date of Encounter in care of patient: 45 minutes This time was spent on one or more of the following: performing physical exam; counseling and coordination of care; obtaining or reviewing history; documenting in the medical record; reviewing/ordering tests, medications or procedures; communicating with other healthcare professionals and discussing with patient's family/caregivers  Current Length of Stay: 4 day(s)  Current Patient Status: Inpatient   Certification Statement: The patient will continue to require additional inpatient hospital stay due to Clincal course  Discharge Plan: Anticipate discharge in 24-48 hrs to discharge location to be determined pending rehab evaluations  Code Status: Level 1 - Full Code    Subjective:   Patient seen and examined at bedside, patient reported that she had a temperature overnight and felt febrile, vital signs demonstrated tachycardia and Tmax 101 2, patient reported the same exact presentation PTA upon obtaining magnesium infusion and this same presentation occurred after replenishment oral magnesium  Patient denied any headache, blurry vision, chest pain, shortness of, pruritus    Objective:     Vitals:   Temp (24hrs), Av 8 °F (37 1 °C), Min:98 1 °F (36 7 °C), Max:100 3 °F (37 9 °C)    Temp:  [98 1 °F (36 7 °C)-100 3 °F (37 9 °C)] 98 8 °F (37 1 °C)  HR:  [] 94  Resp:  [18] 18  BP: ()/(67-76) 121/73  SpO2:  [94 %-95 %] 94 %  Body mass index is 30 15 kg/m²       Input and Output Summary (last 24 hours): No intake or output data in the 24 hours ending 03/31/23 1942    Physical Exam:   Physical Exam  Vitals and nursing note reviewed  Constitutional:       General: She is not in acute distress  Appearance: She is well-developed  HENT:      Head: Normocephalic and atraumatic  Eyes:      Conjunctiva/sclera: Conjunctivae normal    Cardiovascular:      Rate and Rhythm: Normal rate and regular rhythm  Heart sounds: No murmur heard  No friction rub  No gallop  Pulmonary:      Effort: Pulmonary effort is normal  No respiratory distress  Breath sounds: Normal breath sounds  No stridor  No wheezing, rhonchi or rales  Chest:      Comments: Right-sided port  Abdominal:      Palpations: Abdomen is soft  Tenderness: There is no abdominal tenderness  There is no guarding or rebound  Musculoskeletal:         General: No swelling or tenderness  Cervical back: Neck supple  Right lower leg: No edema  Left lower leg: No edema  Skin:     General: Skin is warm and dry  Capillary Refill: Capillary refill takes less than 2 seconds  Findings: Rash (Erythema bilateral cheeks, right-sided forearm anterior chest wall, nontender) present  No bruising  Neurological:      Mental Status: She is alert and oriented to person, place, and time  Motor: No weakness     Psychiatric:         Mood and Affect: Mood normal          Behavior: Behavior normal           Additional Data:     Labs:  Results from last 7 days   Lab Units 03/31/23  0605   WBC Thousand/uL 10 60*   HEMOGLOBIN g/dL 11 5   HEMATOCRIT % 34 7*   PLATELETS Thousands/uL 358   NEUTROS PCT % 72   LYMPHS PCT % 19   MONOS PCT % 7   EOS PCT % 1     Results from last 7 days   Lab Units 03/31/23  0605 03/29/23  0622 03/28/23  0657   SODIUM mmol/L 130*   < > 132*   POTASSIUM mmol/L 3 3*   < > 4 1   CHLORIDE mmol/L 96   < > 100   CO2 mmol/L 24   < > 21   BUN mg/dL 14   < > 12   CREATININE mg/dL 0 42*   < > 0 39*   ANION GAP mmol/L 10   < > 11   CALCIUM mg/dL 7 8*   < > 7 8*   ALBUMIN g/dL  --   --  2 6*   TOTAL BILIRUBIN mg/dL  --   --  0 77   ALK PHOS U/L  --   --  809*   ALT U/L  --   --  32   AST U/L  --   --  95*   GLUCOSE RANDOM mg/dL 77   < > 118    < > = values in this interval not displayed  Results from last 7 days   Lab Units 03/29/23  0959 03/29/23  0622 03/28/23  1308 03/28/23  0813 03/28/23  0657 03/28/23  0029 03/27/23  2110   LACTIC ACID mmol/L 3 4* 2 9* 5 5* 3 5*  --    < > 2 4*   PROCALCITONIN ng/ml  --   --   --   --  2 82*  --  2 79*    < > = values in this interval not displayed  Lines/Drains:  Invasive Devices     Central Venous Catheter Line  Duration           Port A Cath Right Subclavian -- days          Peripheral Intravenous Line  Duration           Peripheral IV 03/29/23 Left Antecubital 2 days                Central Line:  Goal for removal: Will discontinue when meds requiring line are completed  Removed             Imaging: Reviewed radiology reports from this admission including: abdominal/pelvic CT    Recent Cultures (last 7 days):   Results from last 7 days   Lab Units 03/27/23  1605 03/27/23  1453   BLOOD CULTURE   --  No Growth at 72 hrs  No Growth at 72 hrs     LEGIONELLA URINARY ANTIGEN  Negative  --        Last 24 Hours Medication List:   Current Facility-Administered Medications   Medication Dose Route Frequency Provider Last Rate   • acetaminophen  975 mg Oral Q8H [facility] JOSI Barney     • amLODIPine  5 mg Oral Daily JOSI Barney     • butalbital-acetaminophen-caffeine  1 tablet Oral Q4H PRN JOSI Barney     • dextromethorphan-guaiFENesin  10 mL Oral Q4H PRN JOSI Barney     • docusate sodium  100 mg Oral BID JOSI Barney     • DULoxetine  20 mg Oral Daily JOSI Barney     • enoxaparin  40 mg Subcutaneous Daily JOSI Barney     • fluticasone  1 spray Each Nare HS JOSI Barney     • fluticasone  2 puff Inhalation BID HUSSEIN LopezNP • gabapentin  300 mg Oral TID JOSI Barney     • ipratropium-albuterol  3 mL Nebulization Q6H PRN JOSI Barney     • lidocaine  1 patch Topical Daily JOSI Barney     • LORazepam  0 5 mg Oral Q8H PRN JOSI Barney     • ondansetron  4 mg Intravenous Q6H PRN JOSI Barney     • oxyCODONE  10 mg Oral HS JOSI Barney     • oxyCODONE  5 mg Oral Q4H PRN JOSI Barney     • pantoprazole  40 mg Oral BID AC JOSI Barney     • pneumococcal 20-kyara conj vacc  0 5 mL Intramuscular Prior to discharge Shanique Guaman MD     • pravastatin  10 mg Oral HS JOSI Salguero          Today, Patient Was Seen By: Jose Luis Lind MD    **Please Note: This note may have been constructed using a voice recognition system  **

## 2023-03-31 NOTE — ASSESSMENT & PLAN NOTE
IV magnesium likely responsible for erythema on skin, temperature elevation and tachycardia    Patient immunocompromised with underlying malignancy, may be more susceptible to adverse reactions    · After repeat oral magnesium due to hypomagnesia patient demonstrated with increased febrile temperature and tachycardia just like initial presentation with IV magnesium at infusion center  · Given patient handout for organic magnesium alternate sources of replenishment  · Listed as allergy due to intolerance  · Follow-up possibly for allergy testing if feasible

## 2023-03-31 NOTE — CASE MANAGEMENT
Case Management Discharge Planning Note    Patient name Yadi Mention  Location 18 [facility] [address] [address] MRN [id number]  : 1950 Date 3/31/2023       Current Admission Date: 3/27/2023  Current Admission Diagnosis:Pneumonia   Patient Active Problem List    Diagnosis Date Noted   • Lactic acidosis 2023   • Asthma 2023   • Pneumonia 2023   • Anemia 2023   • Severe sepsis ([address] ) 2023   • Liver metastases ([address] ) 2022   • Personal history of COVID-19 2022   • Sciatica of left side 2022   • Oral mucositis (ulcerative) due to antineoplastic therapy 2022   • Neoplastic malignant related fatigue 03/15/2022   • Chemotherapy-induced peripheral neuropathy ([address] ) 2022   • Chemotherapy induced neutropenia ([facility] ) 2021   • Palliative care patient 2021   • Joint pain following chemotherapy 2021   • Chemotherapy-induced nausea 2021   • Anxiousness 2021   • Insomnia due to medical condition 2021   • Drug induced constipation 10/13/2021   • Encounter for central line care 2021   • Hyponatremia 2021   • Ovarian cancer ([address] ) 2021   • S/P splenectomy 2021   • S/P bladder repair 2021   • Status post small bowel resection 2021   • Unspecified protein-calorie malnutrition ([address] ) 08/10/2021   • Migraine 2021   • Cancer related pain 2021   • Hypertension 2021   • Esophageal reflux 2013      LOS (days): 4  Geometric Mean LOS (GMLOS) (days): 5 00  Days to GMLOS:1 3     OBJECTIVE:  Risk of Unplanned Readmission Score: 25 61     Current admission status: Inpatient   Preferred Pharmacy:   [id number] Kymberly Browning, [address] [facility] [place] [address] [facility]  [address] [facility],  [address] [address]  Phone: [phone] Fax: [address] [place] [address] [facility]  Phone: [phone] Fax: 2901 [facility], [address] [facility] [place]  [place] [address]  Phone: [phone] Fax: [facility]70 [facility], [address]  [address] [facility]  [address]  [address] [place] [address]  Phone: [phone] Fax: [phone]    Primary Care Provider: Alessandra Jenkins MD    Primary Insurance: BLUE CROSS  Secondary Insurance: MEDICARE    DISCHARGE DETAILS:    Discharge planning discussed with[de-identified] Patient  Freedom of Choice: Yes  Comments - Freedom of Choice: SW met with pt to review plan and IMM  Pt's plan remains to return home with   No discharge needs anticipated by pt  Per pt her discharge has been delayed because she has a fever  Pt is hoping to be able to discharge home soon  Requested [address]         Is the patient interested in [address] at discharge?: No    DME Referral Provided  Referral made for DME?: No    Other Referral/Resources/Interventions Provided:  Interventions: None Indicated    Treatment Team Recommendation: Home  Discharge Destination Plan[de-identified] Home  Transport at Discharge : Family    IMM Given (Date):: 03/31/23  IMM Given to[de-identified] Patient (IMM reviewed with pt  Pt verbalized understanding  Pt signed IMM and copy given    Copy also placed in scan bin for chart )

## 2023-04-01 ENCOUNTER — TELEPHONE (OUTPATIENT)
Dept: FAMILY MEDICINE CLINIC | Facility: CLINIC | Age: 73
End: 2023-04-01

## 2023-04-01 VITALS
DIASTOLIC BLOOD PRESSURE: 74 MMHG | SYSTOLIC BLOOD PRESSURE: 116 MMHG | TEMPERATURE: 98.5 F | WEIGHT: 149.25 LBS | BODY MASS INDEX: 30.09 KG/M2 | OXYGEN SATURATION: 94 % | HEART RATE: 96 BPM | HEIGHT: 59 IN | RESPIRATION RATE: 19 BRPM

## 2023-04-01 PROBLEM — E87.6 HYPOKALEMIA: Status: RESOLVED | Noted: 2023-03-27 | Resolved: 2023-04-01

## 2023-04-01 PROBLEM — D63.8 ANEMIA IN OTHER CHRONIC DISEASES CLASSIFIED ELSEWHERE: Status: ACTIVE | Noted: 2023-03-27

## 2023-04-01 PROBLEM — J18.9 PNEUMONIA: Status: RESOLVED | Noted: 2023-03-27 | Resolved: 2023-04-01

## 2023-04-01 LAB
25(OH)D3 SERPL-MCNC: 13.2 NG/ML (ref 30–100)
ANION GAP SERPL CALCULATED.3IONS-SCNC: 10 MMOL/L (ref 4–13)
BACTERIA BLD CULT: NORMAL
BACTERIA BLD CULT: NORMAL
BASOPHILS # BLD AUTO: 0.05 THOUSANDS/ÂΜL (ref 0–0.1)
BASOPHILS NFR BLD AUTO: 0 % (ref 0–1)
BUN SERPL-MCNC: 13 MG/DL (ref 5–25)
CALCIUM SERPL-MCNC: 7.6 MG/DL (ref 8.4–10.2)
CHLORIDE SERPL-SCNC: 96 MMOL/L (ref 96–108)
CO2 SERPL-SCNC: 22 MMOL/L (ref 21–32)
CREAT SERPL-MCNC: 0.37 MG/DL (ref 0.6–1.3)
EOSINOPHIL # BLD AUTO: 0.1 THOUSAND/ÂΜL (ref 0–0.61)
EOSINOPHIL NFR BLD AUTO: 1 % (ref 0–6)
ERYTHROCYTE [DISTWIDTH] IN BLOOD BY AUTOMATED COUNT: 23.9 % (ref 11.6–15.1)
GFR SERPL CREATININE-BSD FRML MDRD: 107 ML/MIN/1.73SQ M
GLUCOSE SERPL-MCNC: 115 MG/DL (ref 65–140)
HCT VFR BLD AUTO: 31.2 % (ref 34.8–46.1)
HGB BLD-MCNC: 10.3 G/DL (ref 11.5–15.4)
IMM GRANULOCYTES # BLD AUTO: 0.19 THOUSAND/UL (ref 0–0.2)
IMM GRANULOCYTES NFR BLD AUTO: 2 % (ref 0–2)
LYMPHOCYTES # BLD AUTO: 1.95 THOUSANDS/ÂΜL (ref 0.6–4.47)
LYMPHOCYTES NFR BLD AUTO: 16 % (ref 14–44)
MAGNESIUM SERPL-MCNC: 1.6 MG/DL (ref 1.9–2.7)
MCH RBC QN AUTO: 31.5 PG (ref 26.8–34.3)
MCHC RBC AUTO-ENTMCNC: 33 G/DL (ref 31.4–37.4)
MCV RBC AUTO: 95 FL (ref 82–98)
MONOCYTES # BLD AUTO: 1.06 THOUSAND/ÂΜL (ref 0.17–1.22)
MONOCYTES NFR BLD AUTO: 9 % (ref 4–12)
NEUTROPHILS # BLD AUTO: 9.12 THOUSANDS/ÂΜL (ref 1.85–7.62)
NEUTS SEG NFR BLD AUTO: 72 % (ref 43–75)
NRBC BLD AUTO-RTO: 1 /100 WBCS
PLATELET # BLD AUTO: 347 THOUSANDS/UL (ref 149–390)
PMV BLD AUTO: 11.2 FL (ref 8.9–12.7)
POTASSIUM SERPL-SCNC: 3.5 MMOL/L (ref 3.5–5.3)
RBC # BLD AUTO: 3.27 MILLION/UL (ref 3.81–5.12)
SODIUM SERPL-SCNC: 128 MMOL/L (ref 135–147)
TSH SERPL DL<=0.05 MIU/L-ACNC: 3.28 UIU/ML (ref 0.45–4.5)
WBC # BLD AUTO: 12.47 THOUSAND/UL (ref 4.31–10.16)

## 2023-04-01 RX ADMIN — PANTOPRAZOLE SODIUM 40 MG: 40 TABLET, DELAYED RELEASE ORAL at 06:00

## 2023-04-01 RX ADMIN — BUTALBITAL, ACETAMINOPHEN AND CAFFEINE 1 TABLET: 50; 325; 40 TABLET ORAL at 15:47

## 2023-04-01 RX ADMIN — GABAPENTIN 300 MG: 300 CAPSULE ORAL at 15:47

## 2023-04-01 RX ADMIN — PANTOPRAZOLE SODIUM 40 MG: 40 TABLET, DELAYED RELEASE ORAL at 15:47

## 2023-04-01 RX ADMIN — DULOXETINE HYDROCHLORIDE 20 MG: 20 CAPSULE, DELAYED RELEASE ORAL at 08:13

## 2023-04-01 RX ADMIN — ACETAMINOPHEN 975 MG: 325 TABLET, FILM COATED ORAL at 05:45

## 2023-04-01 RX ADMIN — AMLODIPINE BESYLATE 5 MG: 5 TABLET ORAL at 08:13

## 2023-04-01 RX ADMIN — LIDOCAINE 5% 1 PATCH: 700 PATCH TOPICAL at 08:13

## 2023-04-01 RX ADMIN — FLUTICASONE PROPIONATE 2 PUFF: 44 AEROSOL, METERED RESPIRATORY (INHALATION) at 08:12

## 2023-04-01 RX ADMIN — GABAPENTIN 300 MG: 300 CAPSULE ORAL at 08:13

## 2023-04-01 RX ADMIN — ENOXAPARIN SODIUM 40 MG: 40 INJECTION SUBCUTANEOUS at 08:13

## 2023-04-01 RX ADMIN — DOCUSATE SODIUM 100 MG: 100 CAPSULE, LIQUID FILLED ORAL at 08:13

## 2023-04-01 NOTE — ASSESSMENT & PLAN NOTE
Diagnosed with ovarian cancer and peritoneal carcinomatosis in 8/2021  Status post ex lap, hysterectomy with bilateral salpingoophorectomy, sigmoidectomy, low rectal reanastamosis, peritoneal stripping, cystotomy repair, small bowel resection with reanastomosis, radical omentectomy, splenectomy and appendectomy  Received chemotherapy postop  · Liver metastasis in November 2022  Patient underwent aborted hepatic ablation  · Started chemotherapy in January 2023  Today is cycle 3   Patient did not receive chemo today due to fever  · CT chest abdomen pelvis with IV contrast today showed - Interval increase in size and number of pulmonary hepatic metastases  Significant interval decrease in size of right anterior abdominal wall collection  · IV Solu-Medrol, IV Benadryl in ED due to diffuse rash on ED arrival    · Followed by Dr Josue Humphrey women's oncologist: Hold Cytoxan from restart on DC  · Under palliative care as outpatient

## 2023-04-01 NOTE — ASSESSMENT & PLAN NOTE
Potassium 3 5    · KCl 20 IV in ED  · Repeat potassium 3 4    Will order K-Dur 40 p o   · In future if hypokalemia replenish only potassium, contraindicated magnesium replenishment

## 2023-04-01 NOTE — DISCHARGE SUMMARY
Juice 46  Discharge- [facility] 1950, 71 y o  female MRN: [id number]  Unit/Bed#: [address] [place] [address] Encounter: [id number]  Primary Care Provider: Amy Snyder MD   Date and time admitted to hospital: 3/27/2023  1:32 PM    Medication reaction  Assessment & Plan  IV magnesium likely responsible for erythema on skin, temperature elevation and tachycardia    Patient immunocompromised with underlying malignancy, may be more susceptible to adverse reactions    · After repeat oral magnesium due to hypomagnesia patient demonstrated with increased febrile temperature and tachycardia just like initial presentation with IV magnesium at infusion center  · Given patient handout for organic magnesium alternate sources of replenishment  · Listed as allergy due to intolerance  · Follow-up possibly for allergy testing if feasible      * Pneumonia-resolved as of 4/1/2023  Assessment & Plan  Improving,     Patient presents with low-grade fever on and off, feeling tired for about 1 week  Denies cough from congestion, reports chronic postnasal drip  Reports SOB at times  · History of recurrent ovarian cancer, currently on chemotherapy  Patient was sent to ED from infusion center prior to starting chemo due to fever and nausea  Tmax 102 8 in infusion center  · Chest x-ray showed - Bilateral lung metastases  Right base opacity, at least in part due to atelectasis with elevation of the right hemidiaphragm  · CT chest showed - Innumerable pulmonary nodules throughout the lungs, increased in size and number since the previous exam   Largest measuring up to 1 cm  Right lower lobe partial atelectasis and airspace consolidation  Small right pleural effusion  · WBC 11, no bands, temperature 100 5 in ED  Patient tachycardic  Meet sepsis criteria  See below  · Initially treated as CAP  · Currently immunocompromised from chemotherapy and s/p splenectomy  · Vanco, cefepime in ED    de-escalate to Rocephin, stopped with no signs of infection, or respiratory demand  · No urine antigens / Check sputum Gram stain and culture if able/ procalcitonin-likely elevated due to underlying malignancy  · Blood cultures-NGTD 4 days        Ovarian cancer [facility])  Assessment & Plan  Diagnosed with ovarian cancer and peritoneal carcinomatosis in 8/2021  Status post ex lap, hysterectomy with bilateral salpingoophorectomy, sigmoidectomy, low rectal reanastamosis, peritoneal stripping, cystotomy repair, small bowel resection with reanastomosis, radical omentectomy, splenectomy and appendectomy  Received chemotherapy postop  · Liver metastasis in November 2022  Patient underwent aborted hepatic ablation  · Started chemotherapy in January 2023  Today is cycle 3   Patient did not receive chemo today due to fever  · CT chest abdomen pelvis with IV contrast today showed - Interval increase in size and number of pulmonary hepatic metastases  Significant interval decrease in size of right anterior abdominal wall collection  · IV Solu-Medrol, IV Benadryl in ED due to diffuse rash on ED arrival    · Followed by Dr Javier Lui women's oncologist: Hold Cytoxan from restart on DC  · Under palliative care as outpatient  Lactic acidosis  Assessment & Plan  Trending down    · LA-2 9  · Likely secondary to underlying malignancy with mets, inhibiting clearance of lactic acid  · IVF  · No need to trend    Severe sepsis (HCC)  Assessment & Plan  · Likely response to infusion, underlying malignancy elevates sepsis markers  ·   · POA, as evidenced by fever, tachycardia, with source of infection right lower lobe pneumonia  · Initial lactic acid 4 6  Suspect partially due to cancer  BP stable  Received NS 1000ml in ED  Repeat improving  · procalcitonin-elevated follow-up blood cultures  · Antibiotic as above  · Gentle IV hydration, in view of sepsis and elevated lactic acid      Asthma  Assessment & Plan  Asymptomatic  · On Flovent as needed, "albuterol as needed at home  · Flovent twice daily  · DuoNeb as needed  Anemia  Assessment & Plan  Asymptomatic    · HH-10 3/31 2  · Based hemoglobin appears to be around 8-11's  · Hemoglobin stable  · Monitor    Esophageal reflux  Assessment & Plan  · Continue PPI twice daily    Chemotherapy-induced peripheral neuropathy (HCC)  Assessment & Plan  · Continue gabapentin 300 mg times daily    Chemotherapy-induced nausea  Assessment & Plan  Reports nausea intermittent vomiting since January after chemo started  · CT showed no evidence of bowel obstruction, colitis or diverticulitis  · Continue home Phenergan 12 5 mg    Palliative care patient  Assessment & Plan  · Followed by OP Palliative care    Hyponatremia  Assessment & Plan  Unresolved, asymptomatic\"    · [id number] February 2023  · Suspect SIADH from cancer vs SSRI vs N/V    · Received NS 1 L in ED, bolus 500, maintenance IVF  · BMP labs on discharge follow-up with PCP    Cancer related pain  Assessment & Plan  Reports chronic pain in epigastric, right hip, right sided sciatic pain  On oxycodone IR as needed and oxycodone extended release at bedtime at home  · continue home oxycodone as needed, order OxyContin at bedtime  · Verified at [facility] website  Hypertension  Assessment & Plan  · On Lotrel at home  · Will order Norvasc 5mg p o  daily with holding parameter in view of sepsis  · BP stable currently      Hypokalemia-resolved as of 4/1/2023  Assessment & Plan  Potassium 3 5    · KCl 20 IV in ED  · Repeat potassium 3 4  Will order K-Dur 40 p o   · In future if hypokalemia replenish only potassium, contraindicated magnesium replenishment    Medical Problems     Resolved Problems  Date Reviewed: 4/1/2023          Resolved    * (Principal) Pneumonia 4/1/2023     Resolved by  Shiv Petit MD    Hypokalemia 4/1/2023     Resolved by  Shiv Petit MD        Discharging Physician / Practitioner:  Shiv Petit MD  PCP: Faith Schneider, " MD  Admission Date:   Admission Orders (From admission, onward)     Ordered        03/27/23 [address]  Once            03/27/23 1829  Place in Observation  Once,   Status:  Canceled                      Discharge Date: 04/01/23    Consultations During Hospital Stay:  · Gyn oncology  ·   Procedures Performed:   · N/A    Significant Findings / Test Results:   · N/A    Incidental Findings:   · N/A    Test Results Pending at Discharge (will require follow up):   · N/A     Outpatient Tests Requested:  · CBC, CMP, magnesium-asymptomatic, possible allergy testing    Complications: N/A    Reason for Admission: Adverse medication reaction    Hospital Course:   Estrella Alatorre is a 71 y o  female patient who originally presented to the hospital on 3/27/2023 due to adverse medication reaction  PTA patient was receiving outpatient chemoinfusion, prior to infusion patient obtained 2 g of magnesium with immediate reaction of diffuse erythema, you to cardia, tachycardia and becoming febrile with associated nausea and vomiting  Patient met severe sepsis criteria upon arrival given boluses and maintenance fluids, IV antibiotics to cover for possible pneumonia  Patient had significant elevation of lactic acid- trended during inpatient stay noted to be elevated likely secondary to underlying malignancy  Patient noted to have electrolyte abnormalities, hypokalemia which resolved prior to discharge and hypomagnesia  Upon reintroducing oral magnesium and patient, patient presented with the same admission symptoms of tachycardia, Tmax 101 2, worsening over/you Urticaria; after thorough evaluation determined patient may have had a hypersensitivity medication reaction to magnesium versus septic infection  At this time recommending no magnesium replenishment outpatient    Patient also noted to have hyponatremia, asymptomatic likely secondary to underlying malignancy versus possible SIADH from psych meds, patient to "follow-up PCP for labs  Patient has history of ovarian cancer and currently undergoing outpatient chemotherapy infusions Keytruda and oral cyclophosphamide; which was held per Dr Tete Edwards patient Gyn/oncologist recommendations while being treated inpatient  Patient to resume follow-up care with specialists on discharge and oral medications  Patient to continue chemo induced nausea and pain with outpatient antiemetics and pain medications  Patient being followed outpatient by palliative care physician Dr Arturo Dang  All other chronic conditions controlled by home medication or inpatient equivalent  Patient prefers to establish care with local provider versus driving to [facility], arrange appointment for patient with Dr Rober Longoria at [facility] to establish care on 4/3/2023  Please see above list of diagnoses and related plan for additional information  Condition at Discharge: fair    Discharge Day Visit / Exam:   Subjective: Patient seen and examined at bedside with sister present, patient denied any shortness of breath, chest pain, palpitations, anaphylactic reaction or any other concerns at this time  Patient and sister informed of possible hypersensitivity to magnesium and recommend avoidance and follow-up labs  Patient has now established care with local provider at [facility]   Family had no other concerns at this time  Vitals: Blood Pressure: 116/74 (04/01/23 0751)  Pulse: 96 (04/01/23 0751)  Temperature: 98 5 °F (36 9 °C) (04/01/23 0751)  Temp Source: Oral (03/31/23 0732)  Respirations: 19 (03/31/23 2329)  Height: 4' 11\" (149 9 cm) (03/27/23 2130)  Weight - Scale: 67 7 kg (149 lb 4 oz) (03/27/23 2130)  SpO2: 94 % (04/01/23 0751)  Exam:   Physical Exam  Vitals and nursing note reviewed  Constitutional:       General: She is not in acute distress  Appearance: She is well-developed  HENT:      Head: Normocephalic and atraumatic     Eyes:      Conjunctiva/sclera: Conjunctivae " normal    Cardiovascular:      Rate and Rhythm: Normal rate and regular rhythm  Heart sounds: No murmur heard  No friction rub  No gallop  Pulmonary:      Effort: Pulmonary effort is normal  No respiratory distress  Breath sounds: Normal breath sounds  No stridor  No wheezing, rhonchi or rales  Abdominal:      Palpations: Abdomen is soft  Tenderness: There is no abdominal tenderness  There is no guarding or rebound  Musculoskeletal:         General: No swelling or tenderness  Cervical back: Neck supple  Right lower leg: No edema  Left lower leg: No edema  Skin:     General: Skin is warm and dry  Capillary Refill: Capillary refill takes less than 2 seconds  Findings: No bruising  Neurological:      Mental Status: She is alert and oriented to person, place, and time  Motor: No weakness  Psychiatric:         Mood and Affect: Mood normal          Behavior: Behavior normal           Discussion with Family: Updated  (sister) at bedside  Discharge instructions/Information to patient and family:   See after visit summary for information provided to patient and family  Provisions for Follow-Up Care:  See after visit summary for information related to follow-up care and any pertinent home health orders  Disposition:   Home    Planned Readmission: No     Discharge Statement:  I spent 45 minutes discharging the patient  This time was spent on the day of discharge  I had direct contact with the patient on the day of discharge  Greater than 50% of the total time was spent examining patient, answering all patient questions, arranging and discussing plan of care with patient as well as directly providing post-discharge instructions  Additional time then spent on discharge activities  Discharge Medications:  See after visit summary for reconciled discharge medications provided to patient and/or family        **Please Note: This note may have been constructed using a voice recognition system**

## 2023-04-01 NOTE — PLAN OF CARE
Problem: RESPIRATORY - ADULT  Goal: Achieves optimal ventilation and oxygenation  Description: INTERVENTIONS:  - Assess for changes in respiratory status  - Assess for changes in mentation and behavior  - Position to facilitate oxygenation and minimize respiratory effort  - Oxygen administered by appropriate delivery if ordered  - Initiate smoking cessation education as indicated  - Encourage broncho-pulmonary hygiene including cough, deep breathe, Incentive Spirometry  - Assess the need for suctioning and aspirate as needed  - Assess and instruct to report SOB or any respiratory difficulty  - Respiratory Therapy support as indicated  Outcome: Progressing     Problem: Nutrition/Hydration-ADULT  Goal: Nutrient/Hydration intake appropriate for improving, restoring or maintaining nutritional needs  Description: Monitor and assess patient's nutrition/hydration status for malnutrition  Collaborate with interdisciplinary team and initiate plan and interventions as ordered  Monitor patient's weight and dietary intake as ordered or per policy  Utilize nutrition screening tool and intervene as necessary  Determine patient's food preferences and provide high-protein, high-caloric foods as appropriate       INTERVENTIONS:  - Monitor oral intake, urinary output, labs, and treatment plans  - Assess nutrition and hydration status and recommend course of action  - Evaluate amount of meals eaten  - Assist patient with eating if necessary   - Allow adequate time for meals  - Recommend/ encourage appropriate diets, oral nutritional supplements, and vitamin/mineral supplements  - Order, calculate, and assess calorie counts as needed  - Recommend, monitor, and adjust tube feedings and TPN/PPN based on assessed needs  - Assess need for intravenous fluids  - Provide specific nutrition/hydration education as appropriate  - Include patient/family/caregiver in decisions related to nutrition  Outcome: Progressing     Problem: PAIN - ADULT  Goal: Verbalizes/displays adequate comfort level or baseline comfort level  Description: Interventions:  - Encourage patient to monitor pain and request assistance  - Assess pain using appropriate pain scale  - Administer analgesics based on type and severity of pain and evaluate response  - Implement non-pharmacological measures as appropriate and evaluate response  - Consider cultural and social influences on pain and pain management  - Notify physician/advanced practitioner if interventions unsuccessful or patient reports new pain  Outcome: Progressing     Problem: INFECTION - ADULT  Goal: Absence or prevention of progression during hospitalization  Description: INTERVENTIONS:  - Assess and monitor for signs and symptoms of infection  - Monitor lab/diagnostic results  - Monitor all insertion sites, i e  indwelling lines, tubes, and drains  - Monitor endotracheal if appropriate and nasal secretions for changes in amount and color  - [place] appropriate cooling/warming therapies per order  - Administer medications as ordered  - Instruct and encourage patient and family to use good hand hygiene technique  - Identify and instruct in appropriate isolation precautions for identified infection/condition  Outcome: Progressing     Problem: SAFETY ADULT  Goal: Patient will remain free of falls  Description: INTERVENTIONS:  - Educate patient/family on patient safety including physical limitations  - Instruct patient to call for assistance with activity   - Consult OT/PT to assist with strengthening/mobility   - Keep Call bell within reach  - Keep bed low and locked with side rails adjusted as appropriate  - Keep care items and personal belongings within reach  - Initiate and maintain comfort rounds  - Make Fall Risk Sign visible to staff  - Initiate/Maintain bed alarm  - Apply yellow socks and bracelet for high fall risk patients  - Consider moving patient to room near nurses station  Outcome: Progressing

## 2023-04-01 NOTE — TELEPHONE ENCOUNTER
----- Message from Joana Gimenez MD sent at 4/1/2023  3:38 PM EDT -----  Thank you for allowing us to participate in the care of your patient, Althea Mae, who was hospitalized from 3/27/2023 through 4/1/2023 with the admitting diagnosis of  adverse medication reaction  PTA patient was receiving outpatient chemoinfusion, prior to infusion patient obtained 2 g of magnesium with immediate reaction of diffuse erythema, you to cardia, tachycardia and becoming febrile with associated nausea and vomiting  Patient met severe sepsis criteria upon arrival given boluses and maintenance fluids, IV antibiotics to cover for possible pneumonia  Patient had significant elevation of lactic acid- trended during inpatient stay noted to be elevated likely secondary to underlying malignancy  Patient noted to have electrolyte abnormalities, hypokalemia which resolved prior to discharge and hypomagnesia  Upon reintroducing oral magnesium and patient, patient presented with the same admission symptoms of tachycardia, Tmax 101 2, worsening over/you Urticaria; after thorough evaluation determined patient may have had a hypersensitivity medication reaction to magnesium versus septic infection  At this time recommending no magnesium replenishment outpatient  Patient also noted to have hyponatremia, asymptomatic likely secondary to underlying malignancy versus possible SIADH from psych meds, patient to follow-up PCP for labs  Patient has history of ovarian cancer and currently undergoing outpatient chemotherapy infusions Keytruda and oral cyclophosphamide; which was held per Dr Pam Kaye patient Gyn/oncologist recommendations while being treated inpatient  Patient to resume follow-up care with specialists on discharge and oral medications  Patient to continue chemo induced nausea and pain with outpatient antiemetics and pain medications  Patient being followed outpatient by palliative care physician Dr Carli Perez    All other chronic conditions controlled by home medication or inpatient equivalent  Patient prefers to establish care with local provider versus driving to [facility], arrange appointment for patient with Dr Ramses Mcgarry at [facility] to establish care on 4/3/2023  If you have any additional questions or would like to discuss further, please feel free to contact me      Katiuska Morrison MD  [place] [address] Internal Medicine, Hospitalist  [phone]

## 2023-04-02 LAB
EST. AVERAGE GLUCOSE BLD GHB EST-MCNC: 100 MG/DL
HBA1C MFR BLD: 5.1 %

## 2023-04-03 ENCOUNTER — OFFICE VISIT (OUTPATIENT)
Dept: FAMILY MEDICINE CLINIC | Facility: CLINIC | Age: 73
End: 2023-04-03

## 2023-04-03 ENCOUNTER — TELEPHONE (OUTPATIENT)
Dept: GYNECOLOGIC ONCOLOGY | Facility: CLINIC | Age: 73
End: 2023-04-03

## 2023-04-03 VITALS
SYSTOLIC BLOOD PRESSURE: 124 MMHG | WEIGHT: 156 LBS | DIASTOLIC BLOOD PRESSURE: 60 MMHG | OXYGEN SATURATION: 97 % | TEMPERATURE: 97.9 F | RESPIRATION RATE: 16 BRPM | HEART RATE: 98 BPM | HEIGHT: 59 IN | BODY MASS INDEX: 31.45 KG/M2

## 2023-04-03 DIAGNOSIS — T45.1X5A CHEMOTHERAPY-INDUCED PERIPHERAL NEUROPATHY (HCC): ICD-10-CM

## 2023-04-03 DIAGNOSIS — C56.2 MALIGNANT NEOPLASM OF LEFT OVARY (HCC): Primary | ICD-10-CM

## 2023-04-03 DIAGNOSIS — E87.1 HYPONATREMIA: ICD-10-CM

## 2023-04-03 DIAGNOSIS — C78.02 MALIGNANT NEOPLASM METASTATIC TO BOTH LUNGS (HCC): ICD-10-CM

## 2023-04-03 DIAGNOSIS — F11.20 CONTINUOUS OPIOID DEPENDENCE (HCC): ICD-10-CM

## 2023-04-03 DIAGNOSIS — Z90.81 S/P SPLENECTOMY: ICD-10-CM

## 2023-04-03 DIAGNOSIS — G62.0 CHEMOTHERAPY-INDUCED PERIPHERAL NEUROPATHY (HCC): ICD-10-CM

## 2023-04-03 DIAGNOSIS — E46 PROTEIN-CALORIE MALNUTRITION, UNSPECIFIED SEVERITY (HCC): ICD-10-CM

## 2023-04-03 DIAGNOSIS — T50.905A ADVERSE EFFECT OF DRUG, INITIAL ENCOUNTER: ICD-10-CM

## 2023-04-03 DIAGNOSIS — E66.9 OBESITY (BMI 30.0-34.9): ICD-10-CM

## 2023-04-03 DIAGNOSIS — I10 PRIMARY HYPERTENSION: ICD-10-CM

## 2023-04-03 DIAGNOSIS — E78.2 MIXED HYPERLIPIDEMIA: ICD-10-CM

## 2023-04-03 DIAGNOSIS — C78.7 MALIGNANT NEOPLASM METASTATIC TO LIVER (HCC): ICD-10-CM

## 2023-04-03 DIAGNOSIS — C78.01 MALIGNANT NEOPLASM METASTATIC TO BOTH LUNGS (HCC): ICD-10-CM

## 2023-04-03 DIAGNOSIS — C76.2 ABDOMINAL CARCINOMATOSIS (HCC): ICD-10-CM

## 2023-04-03 NOTE — TELEPHONE ENCOUNTER
Patient called into the office regarding her CT scan  Patient states that she had the same one done on 3/27  Call placed to Cs to cancel the appt for today

## 2023-04-03 NOTE — PATIENT INSTRUCTIONS
Obesity   AMBULATORY CARE:   Obesity  means your body mass index (BMI) is greater than 30  Your healthcare provider will use your age, height, and weight to measure your BMI  The risks of obesity include  many health problems, including injuries or physical disability  • Diabetes (high blood sugar level)    • High blood pressure or high cholesterol    • Heart disease    • Stroke    • Gallbladder or liver disease    • Cancer of the colon, breast, prostate, liver, or kidney    • Sleep apnea    • Arthritis or gout    Screening  is done to check for health conditions before you have signs or symptoms  If you are 28to 79years old, your blood sugar level may be checked every 3 years for signs of prediabetes or diabetes  Your healthcare provider will check your blood pressure at each visit  High blood pressure can lead to a stroke or other problems  Your provider may check for signs of heart disease, cancer, or other health problems  Seek care immediately if:   • You have a severe headache, confusion, or difficulty speaking  • You have weakness on one side of your body  • You have chest pain, sweating, or shortness of breath  Call your doctor if:   • You have symptoms of gallbladder or liver disease, such as pain in your upper abdomen  • You have knee or hip pain and discomfort while walking  • You have symptoms of diabetes, such as intense hunger and thirst, and frequent urination  • You have symptoms of sleep apnea, such as snoring or daytime sleepiness  • You have questions or concerns about your condition or care  Treatment for obesity  focuses on helping you lose weight to improve your health  Even a small decrease in BMI can reduce the risk for many health problems  Your healthcare provider will help you set a weight-loss goal   • Lifestyle changes  are the first step in treating obesity  These include making healthy food choices and getting regular physical activity   Your healthcare provider may suggest a weight-loss program that involves coaching, education, and therapy  • Medicine  may help you lose weight when it is used with a healthy foods and physical activity  • Surgery  can help you lose weight if you have obesity along with other health problems  Several types of weight-loss surgery are available  Ask your healthcare provider for more information  Tips for safe weight loss:   • Set small, realistic goals  An example of a small goal is to walk for 20 minutes 5 days a week  Anther goal is to lose 5% of your body weight  • Ask for support  Tell friends, family members, and coworkers about your goals  Ask someone to lose weight with you  You may also want to join a weight-loss support group  • Identify foods or triggers that may cause you to overeat  Remove tempting high-calorie foods from your home and workplace  Place a bowl of fresh fruit on your kitchen counter  If stress causes you to eat, find other ways to cope with stress  A counselor or therapist may be able to help you  • Track your daily calories and activity  Write down what you eat and drink  Also write down how many minutes of physical activity you do each day  • Track your weekly weight  Weigh yourself in the morning, before you eat or drink anything but after you use the bathroom  Use the same scale, in the same place, and in similar clothing each time  Only weigh yourself 1 to 2 times each week, or as directed  You may become discouraged if you weigh yourself every day  Eating changes: You will need to eat 500 to 1,000 fewer calories each day than you currently eat to lose 1 to 2 pounds a week  The following changes will help you cut calories:  • Eat smaller portions  Use small plates, no larger than 9 inches in diameter  Fill your plate half full of fruits and vegetables  Measure your food using measuring cups until you know what a serving size looks like           • Eat 3 meals and 1 or 2 snacks each day  Plan your meals in advance  Joe Olivares and eat at home most of the time  Eat slowly  Do not skip meals  Skipping meals can lead to overeating later in the day  This can make it harder for you to lose weight  Talk with a dietitian to help you make a meal plan and schedule that is right for you  • Eat fruits and vegetables at every meal   They are low in calories and high in fiber, which makes you feel full  Do not add butter, margarine, or cream sauce to vegetables  Use herbs to season steamed vegetables  • Eat less fat and fewer fried foods  Eat more baked or grilled chicken and fish  These protein sources are lower in calories and fat than red meat  Limit fast food  Dress your salads with olive oil and vinegar instead of bottled dressing  • Limit the amount of sugar you eat  Do not drink sugary beverages  Limit alcohol  Activity changes:  Physical activity is good for your body in many ways  It helps you burn calories and build strong muscles  It decreases stress and depression, and improves your mood  It can also help you sleep better  Talk to your healthcare provider before you begin an exercise program   • Exercise for at least 30 minutes 5 days a week  Start slowly  Set aside time each day for physical activity that you enjoy and that is convenient for you  It is best to do both weight training and an activity that increases your heart rate, such as walking, bicycling, or swimming  • Find ways to be more active  Do yard work and housecleaning  Walk up the stairs instead of using elevators  Spend your leisure time going to events that require walking, such as outdoor festivals or fairs  This extra physical activity can help you lose weight and keep it off  Follow up with your doctor as directed: You may need to meet with a dietitian  Write down your questions so you remember to ask them during your visits    © Copyright [employer] 2022 Information is for End User's use only and may not be sold, redistributed or otherwise used for commercial purposes  The above information is an  only  It is not intended as medical advice for individual conditions or treatments  Talk to your doctor, nurse or pharmacist before following any medical regimen to see if it is safe and effective for you

## 2023-04-03 NOTE — UTILIZATION REVIEW
NOTIFICATION OF ADMISSION DISCHARGE   This is a Notification of Discharge from [address]  Please be advised that this patient has been discharge from our facility  Below you will find the admission and discharge date and time including the patient’s disposition  UTILIZATION REVIEW CONTACT:  Lucille Mccoy  Utilization   Network Utilization Review Department  Phone: [phone] x carefully listen to the prompts  All voicemails are confidential   Email: [email or web address]     ADMISSION INFORMATION  PRESENTATION DATE: 3/27/2023  1:32 PM  OBERVATION ADMISSION DATE:   INPATIENT ADMISSION DATE: 3/27/23  6:35 PM   DISCHARGE DATE: 4/1/2023  5:19 PM   DISPOSITION:Home/Self Care    IMPORTANT INFORMATION:  Send all requests for admission clinical reviews, approved or denied determinations and any other requests to dedicated fax number below belonging to the campus where the patient is receiving treatment   List of dedicated fax numbers:  [address] DENIALS (Administrative/Medical Necessity) [phone]   [address] (Maternity/NICU/Pediatrics) [phone]   [facility] [phone]   [place] [address] [phone]   [address] [phone]   220 [facility] [phone]   90 [facility] [phone]   [address] [place] [address] 119 [phone]   [facility]  [phone] 405[address] [facility] [phone]   412 [facility] [address] [facility] [phone]

## 2023-04-03 NOTE — PROGRESS NOTES
Assessment/Plan:    1  Malignant neoplasm of left ovary (HCC)    2  Abdominal carcinomatosis ([facility] )    3  Malignant neoplasm metastatic to liver ([place] [address] )    4  Malignant neoplasm metastatic to both lungs ([place] [address] )    5  Primary hypertension  Assessment & Plan:  Currently stable - pty did not take her BP{ pill today as she woke up late    Orders:  -     CBC; Future  -     Comprehensive metabolic panel; Future    6  Chemotherapy-induced peripheral neuropathy ([place] [address] )    7  Hyponatremia  -     CBC; Future  -     Comprehensive metabolic panel; Future    8  S/P splenectomy    9  Continuous opioid dependence ([place] [address] )    10  Protein-calorie malnutrition, unspecified severity ([place] [address] )    11  Adverse effect of drug, initial encounter  Assessment & Plan:  Pt checo had reaction to magnesium on her last infusion      12  Obesity (BMI 30 0-34 9)    13  BMI 31 0-31 9,adult    14  Mixed hyperlipidemia  -     Lipid Panel with Direct LDL reflex; Future        Pt will get labs for me in two weeks  Pt wants to hold off on ana and dxa screen  Pt has her cancer treatment at [facility] so wants to switch her pcp to [facility]  F/U labs ordered  Pt gets labs every thursday      Patient Instructions     Obesity   AMBULATORY CARE:   Obesity  means your body mass index (BMI) is greater than 30  Your healthcare provider will use your age, height, and weight to measure your BMI  The risks of obesity include  many health problems, including injuries or physical disability  Diabetes (high blood sugar level)    High blood pressure or high cholesterol    Heart disease    Stroke    Gallbladder or liver disease    Cancer of the colon, breast, prostate, liver, or kidney    Sleep apnea    Arthritis or gout    Screening  is done to check for health conditions before you have signs or symptoms  If you are 28to 79years old, your blood sugar level may be checked every 3 years for signs of prediabetes or diabetes   Your healthcare provider will check your blood pressure at each visit  High blood pressure can lead to a stroke or other problems  Your provider may check for signs of heart disease, cancer, or other health problems  Seek care immediately if:   You have a severe headache, confusion, or difficulty speaking  You have weakness on one side of your body  You have chest pain, sweating, or shortness of breath  Call your doctor if:   You have symptoms of gallbladder or liver disease, such as pain in your upper abdomen  You have knee or hip pain and discomfort while walking  You have symptoms of diabetes, such as intense hunger and thirst, and frequent urination  You have symptoms of sleep apnea, such as snoring or daytime sleepiness  You have questions or concerns about your condition or care  Treatment for obesity  focuses on helping you lose weight to improve your health  Even a small decrease in BMI can reduce the risk for many health problems  Your healthcare provider will help you set a weight-loss goal   Lifestyle changes  are the first step in treating obesity  These include making healthy food choices and getting regular physical activity  Your healthcare provider may suggest a weight-loss program that involves coaching, education, and therapy  Medicine  may help you lose weight when it is used with a healthy foods and physical activity  Surgery  can help you lose weight if you have obesity along with other health problems  Several types of weight-loss surgery are available  Ask your healthcare provider for more information  Tips for safe weight loss:   Set small, realistic goals  An example of a small goal is to walk for 20 minutes 5 days a week  [place] goal is to lose 5% of your body weight  Ask for support  Tell friends, family members, and coworkers about your goals  Ask someone to lose weight with you  You may also want to join a weight-loss support group  Identify foods or triggers that may cause you to overeat  Remove tempting high-calorie foods from your home and workplace  Place a bowl of fresh fruit on your kitchen counter  If stress causes you to eat, find other ways to cope with stress  A counselor or therapist may be able to help you  Track your daily calories and activity  Write down what you eat and drink  Also write down how many minutes of physical activity you do each day  Track your weekly weight  Weigh yourself in the morning, before you eat or drink anything but after you use the bathroom  Use the same scale, in the same place, and in similar clothing each time  Only weigh yourself 1 to 2 times each week, or as directed  You may become discouraged if you weigh yourself every day  Eating changes: You will need to eat 500 to 1,000 fewer calories each day than you currently eat to lose 1 to 2 pounds a week  The following changes will help you cut calories:  Eat smaller portions  Use small plates, no larger than 9 inches in diameter  Fill your plate half full of fruits and vegetables  Measure your food using measuring cups until you know what a serving size looks like  Eat 3 meals and 1 or 2 snacks each day  Plan your meals in advance  Maria Teresa Khanh and eat at home most of the time  Eat slowly  Do not skip meals  Skipping meals can lead to overeating later in the day  This can make it harder for you to lose weight  Talk with a dietitian to help you make a meal plan and schedule that is right for you  Eat fruits and vegetables at every meal   They are low in calories and high in fiber, which makes you feel full  Do not add butter, margarine, or cream sauce to vegetables  Use herbs to season steamed vegetables  Eat less fat and fewer fried foods  Eat more baked or grilled chicken and fish  These protein sources are lower in calories and fat than red meat  Limit fast food  Dress your salads with olive oil and vinegar instead of bottled dressing  Limit the amount of sugar you eat    Do not drink sugary beverages  Limit alcohol  Activity changes:  Physical activity is good for your body in many ways  It helps you burn calories and build strong muscles  It decreases stress and depression, and improves your mood  It can also help you sleep better  Talk to your healthcare provider before you begin an exercise program   Exercise for at least 30 minutes 5 days a week  Start slowly  Set aside time each day for physical activity that you enjoy and that is convenient for you  It is best to do both weight training and an activity that increases your heart rate, such as walking, bicycling, or swimming  Find ways to be more active  Do yard work and housecleaning  Walk up the stairs instead of using elevators  Spend your leisure time going to events that require walking, such as outdoor festivals or fairs  This extra physical activity can help you lose weight and keep it off  Follow up with your doctor as directed: You may need to meet with a dietitian  Write down your questions so you remember to ask them during your visits  © Copyright [facility] 2022 Information is for End User's use only and may not be sold, redistributed or otherwise used for commercial purposes  The above information is an  only  It is not intended as medical advice for individual conditions or treatments  Talk to your doctor, nurse or pharmacist before following any medical regimen to see if it is safe and effective for you  No follow-ups on file  Subjective:      Patient ID: Terry Booth is a 68 y o  female  Chief Complaint   Patient presents with   • [address] [facility]    • Follow-up     Follow up from [facility]    Rebecca Chowdhury CMA        Pt here for the first time  Online appt - establish  Pt used to see a PCP in [place] [facility] she deals with her cancer here so wants a [facility] PCP      Pt states she just got out of the hospital on Saturday, went Monday for chemo "treatment - she was given fluids and magnesium the was to get [place] and avastin  PT states she had a reaction to the magnesium - took her from infusion to the ED  Pt states she was dx with pneumonia - states her main problem was an allergic reaction to the mag  In the middle of the week theyu gave her mag and she spiked a fever again and got red and blotchy  Ultimately she did not get her chemo this week and states she was also advised to stop her oral  Chemo as well    Pt states she is still a little off   States she has days where she has no apptetite  Some days she does not eat  Pt has Ovarian cancer and carcinomatosis    PT states at [facility] - she had surgery and \"they took out everything that she did not need to live\"  Pt states she still has lesions on her liver that get larger  Also she has inumberaly lesions in her lungs that are spreading    Last chemo treatment she had she states they found it did not work at all    Thios current regimine is new  Pt's last colon 8 years ago        The following portions of the patient's history were reviewed and updated as appropriate: allergies, current medications, past family history, past medical history, past social history, past surgical history and problem list     Review of Systems   Musculoskeletal: Positive for arthralgias and myalgias           Current Outpatient Medications   Medication Sig Dispense Refill   • acetaminophen (TYLENOL) 325 mg tablet Take 3 tablets (975 mg total) by mouth every 8 (eight) hours     • albuterol (PROVENTIL HFA,VENTOLIN HFA) 90 mcg/act inhaler Inhale 2 puffs every 6 (six) hours as needed for wheezing 18 g 1   • amLODIPine-benazepril (LOTREL) 10-20 MG per capsule Take 1 capsule by mouth daily     • butalbital-aspirin-caffeine (FIORINAL) -40 mg per capsule Take 1 capsule by mouth every 4 (four) hours as needed for headaches or migraine 30 capsule 0   • docusate sodium (COLACE) 100 mg capsule Take 1 capsule (100 mg " total) by mouth 2 (two) times a day     • DULoxetine (CYMBALTA) 20 mg capsule TAKE ONE CAPSULE BY MOUTH EVERY DAY 30 capsule 0   • fluticasone (FLONASE) 50 mcg/act nasal spray 1 spray into each nostril daily     • fluticasone (Flovent HFA) 44 mcg/act inhaler Inhale 2 puffs 2 (two) times a day Rinse mouth after use   10 6 g 0   • gabapentin (Neurontin) 300 mg capsule Take 1 capsule (300 mg total) by mouth 3 (three) times a day 270 capsule 0   • Lidocaine 4 % PTCH Apply 1 patch topically     • LORazepam (ATIVAN) 1 mg tablet Take 0 5 tablets (0 5 mg total) by mouth every 8 (eight) hours as needed (nausea or anxiety or insomnia) 45 tablet 0   • omeprazole (PriLOSEC) 20 mg delayed release capsule Take 20 mg by mouth 2 (two) times a day     • ondansetron (ZOFRAN) 8 mg tablet Take 1 tablet (8 mg total) by mouth every 8 (eight) hours as needed for nausea or vomiting 40 tablet 2   • oxyCODONE (Roxicodone) 5 immediate release tablet Take 1-2 tablets (5-10 mg total) by mouth every 4 (four) hours as needed for moderate pain or severe pain Max Daily Amount: 60 mg 180 tablet 0   • oxyCODONE ER (Xtampza ER) 9 mg extended release capsule Take 1 tablet (9 mg total) by mouth daily at bedtime Max Daily Amount: 9 mg 30 tablet 0   • polyethylene glycol (MIRALAX) 17 g packet Take 17 g by mouth daily for 7 days 119 g 0   • pravastatin (PRAVACHOL) 10 mg tablet Take 10 mg by mouth daily at bedtime As needed     • promethazine (PHENERGAN) 12 5 MG tablet Take 1 tablet (12 5 mg total) by mouth every 6 (six) hours as needed for nausea or vomiting 30 tablet 0   • senna (SENOKOT) 8 6 mg Take 1 tablet (8 6 mg total) by mouth daily as needed for constipation for up to 7 days (Patient taking differently: Take 8 6 mg by mouth daily as needed for constipation PRN) 7 tablet 0   • cyclophosphamide (CYTOXAN) 50 mg capsule Take 1 capsule (50 mg total) by mouth daily (Patient not taking: Reported on 4/3/2023) 30 capsule 3   • potassium chloride (KLOR-CON) "20 mEq packet Take 20 mEq by mouth 2 (two) times a day (Patient not taking: Reported on 11/29/2022) 60 each 1     No current facility-administered medications for this visit  Objective:    /60   Pulse 98   Temp 97 9 °F (36 6 °C)   Resp 16   Ht 4' 11\" (1 499 m)   Wt 70 8 kg (156 lb)   SpO2 97%   BMI 31 51 kg/m²        Physical Exam  Vitals and nursing note reviewed  Constitutional:       General: She is not in acute distress  Appearance: She is well-developed  She is not diaphoretic  HENT:      Head: Normocephalic and atraumatic  Right Ear: External ear normal       Left Ear: External ear normal       Nose: Nose normal       Mouth/Throat:      Pharynx: No oropharyngeal exudate  Eyes:      General: No scleral icterus  Right eye: No discharge  Left eye: No discharge  Pupils: Pupils are equal, round, and reactive to light  Neck:      Thyroid: No thyromegaly  Cardiovascular:      Rate and Rhythm: Normal rate  Heart sounds: Normal heart sounds  No murmur heard  Pulmonary:      Effort: Pulmonary effort is normal  No respiratory distress  Breath sounds: Normal breath sounds  No wheezing  Abdominal:      General: Bowel sounds are normal  There is no distension  Palpations: Abdomen is soft  There is no mass  Tenderness: There is no abdominal tenderness  There is no guarding or rebound  Musculoskeletal:         General: Normal range of motion  Skin:     General: Skin is warm and dry  Findings: No erythema or rash  Comments: Post op surgical scars   Neurological:      Mental Status: She is alert  Coordination: Coordination normal       Deep Tendon Reflexes: Reflexes normal    Psychiatric:         Behavior: Behavior normal                 Tameka Mazariegos DO  BMI Counseling: Body mass index is 31 51 kg/m²  The BMI is above normal  Nutrition recommendations include decreasing overall calorie intake    "

## 2023-04-04 ENCOUNTER — TELEPHONE (OUTPATIENT)
Dept: ADMINISTRATIVE | Facility: OTHER | Age: 73
End: 2023-04-04

## 2023-04-04 NOTE — TELEPHONE ENCOUNTER
----- Message from Joni Chance DO sent at 4/3/2023 12:57 PM EDT -----  Regarding: colon  Pt states she had her last colon done at [facility] within the last ten years, I cannot find in chart bvut she is sure

## 2023-04-04 NOTE — TELEPHONE ENCOUNTER
Upon review of the In Basket request we found a colonoscopy from 2010 which is outside the acceptable date range for this measure  Any additional questions or concerns should be emailed to the Practice Liaisons via the appropriate education email address, please do not reply via In Basket      Thank you  Dora Dia

## 2023-04-07 PROBLEM — R74.8 ABNORMAL LIVER ENZYMES: Status: ACTIVE | Noted: 2023-04-07

## 2023-04-08 PROBLEM — E83.42 HYPOMAGNESEMIA: Status: ACTIVE | Noted: 2023-04-08

## 2023-04-08 NOTE — ADDENDUM NOTE
Encounter addended by: Muriel Whitlock MD on: 4/8/2023 12:25 PM   Actions taken: Problem List modified, Clinical Note Signed

## 2023-04-08 NOTE — PROGRESS NOTES
Patient with hypomagnesemia requiring magnesium infusion    Problem added to problem list   CLARIFY DX RESPONSE NOTE

## 2023-04-10 PROBLEM — M79.89 LEG SWELLING: Status: ACTIVE | Noted: 2023-04-10

## 2023-04-10 PROBLEM — R06.09 DOE (DYSPNEA ON EXERTION): Chronic | Status: ACTIVE | Noted: 2023-04-10

## 2023-04-10 PROBLEM — R06.09 DOE (DYSPNEA ON EXERTION): Status: ACTIVE | Noted: 2023-04-10

## 2023-04-13 ENCOUNTER — APPOINTMENT (EMERGENCY)
Dept: CT IMAGING | Facility: HOSPITAL | Age: 73
End: 2023-04-13

## 2023-04-13 ENCOUNTER — APPOINTMENT (EMERGENCY)
Dept: VASCULAR ULTRASOUND | Facility: HOSPITAL | Age: 73
End: 2023-04-13

## 2023-04-13 ENCOUNTER — APPOINTMENT (EMERGENCY)
Dept: RADIOLOGY | Facility: HOSPITAL | Age: 73
End: 2023-04-13

## 2023-04-13 ENCOUNTER — HOSPITAL ENCOUNTER (INPATIENT)
Facility: HOSPITAL | Age: 73
LOS: 5 days | End: 2023-04-18
Attending: EMERGENCY MEDICINE | Admitting: OBSTETRICS & GYNECOLOGY

## 2023-04-13 ENCOUNTER — APPOINTMENT (EMERGENCY)
Dept: ULTRASOUND IMAGING | Facility: HOSPITAL | Age: 73
End: 2023-04-13

## 2023-04-13 DIAGNOSIS — T45.1X5A CHEMOTHERAPY-INDUCED PERIPHERAL NEUROPATHY (HCC): ICD-10-CM

## 2023-04-13 DIAGNOSIS — F41.9 ANXIOUSNESS: ICD-10-CM

## 2023-04-13 DIAGNOSIS — C78.7 MALIGNANT NEOPLASM METASTATIC TO LIVER (HCC): Primary | ICD-10-CM

## 2023-04-13 DIAGNOSIS — C78.6 PERITONEAL CARCINOMATOSIS (HCC): ICD-10-CM

## 2023-04-13 DIAGNOSIS — G89.18 JOINT PAIN FOLLOWING CHEMOTHERAPY: ICD-10-CM

## 2023-04-13 DIAGNOSIS — K12.31 ORAL MUCOSITIS (ULCERATIVE) DUE TO ANTINEOPLASTIC THERAPY: ICD-10-CM

## 2023-04-13 DIAGNOSIS — C56.3 MALIGNANT NEOPLASM OF BOTH OVARIES (HCC): ICD-10-CM

## 2023-04-13 DIAGNOSIS — G47.01 INSOMNIA DUE TO MEDICAL CONDITION: ICD-10-CM

## 2023-04-13 DIAGNOSIS — C56.2 MALIGNANT NEOPLASM OF LEFT OVARY (HCC): ICD-10-CM

## 2023-04-13 DIAGNOSIS — M25.50 JOINT PAIN FOLLOWING CHEMOTHERAPY: ICD-10-CM

## 2023-04-13 DIAGNOSIS — G62.0 CHEMOTHERAPY-INDUCED PERIPHERAL NEUROPATHY (HCC): ICD-10-CM

## 2023-04-13 DIAGNOSIS — M54.32 SCIATICA OF LEFT SIDE: ICD-10-CM

## 2023-04-13 DIAGNOSIS — Z51.5 PALLIATIVE CARE PATIENT: ICD-10-CM

## 2023-04-13 DIAGNOSIS — G89.18 POST-OP PAIN: ICD-10-CM

## 2023-04-13 DIAGNOSIS — Z51.5 HOSPICE CARE PATIENT: ICD-10-CM

## 2023-04-13 DIAGNOSIS — I10 PRIMARY HYPERTENSION: ICD-10-CM

## 2023-04-13 DIAGNOSIS — Z86.16 PERSONAL HISTORY OF COVID-19: ICD-10-CM

## 2023-04-13 DIAGNOSIS — G89.3 CANCER RELATED PAIN: ICD-10-CM

## 2023-04-13 DIAGNOSIS — F11.90 OPIOID USE: ICD-10-CM

## 2023-04-13 PROBLEM — E83.42 HYPOMAGNESEMIA: Status: RESOLVED | Noted: 2023-04-08 | Resolved: 2023-04-13

## 2023-04-13 PROBLEM — D72.829 ELEVATED WBC COUNT: Status: RESOLVED | Noted: 2021-08-08 | Resolved: 2023-04-13

## 2023-04-13 PROBLEM — D72.829 LEUKOCYTOSIS: Status: ACTIVE | Noted: 2023-04-13

## 2023-04-13 PROBLEM — N17.9 AKI (ACUTE KIDNEY INJURY) (HCC): Status: ACTIVE | Noted: 2023-04-13

## 2023-04-13 LAB
ALBUMIN SERPL BCP-MCNC: 2.4 G/DL (ref 3.5–5)
ALP SERPL-CCNC: 942 U/L (ref 34–104)
ALT SERPL W P-5'-P-CCNC: 46 U/L (ref 7–52)
ANION GAP SERPL CALCULATED.3IONS-SCNC: 11 MMOL/L (ref 4–13)
AST SERPL W P-5'-P-CCNC: 95 U/L (ref 13–39)
BACTERIA UR QL AUTO: ABNORMAL /HPF
BASOPHILS # BLD AUTO: 0.07 THOUSANDS/ΜL (ref 0–0.1)
BASOPHILS NFR BLD AUTO: 0 % (ref 0–1)
BILIRUB SERPL-MCNC: 3.46 MG/DL (ref 0.2–1)
BILIRUB UR QL STRIP: ABNORMAL
BNP SERPL-MCNC: 95 PG/ML (ref 0–100)
BUN SERPL-MCNC: 50 MG/DL (ref 5–25)
CALCIUM ALBUM COR SERPL-MCNC: 9.4 MG/DL (ref 8.3–10.1)
CALCIUM SERPL-MCNC: 8.1 MG/DL (ref 8.4–10.2)
CARDIAC TROPONIN I PNL SERPL HS: 24 NG/L (ref 8–18)
CHLORIDE SERPL-SCNC: 93 MMOL/L (ref 96–108)
CLARITY UR: ABNORMAL
CO2 SERPL-SCNC: 23 MMOL/L (ref 21–32)
COLOR UR: ABNORMAL
CREAT SERPL-MCNC: 1.37 MG/DL (ref 0.6–1.3)
D DIMER PPP FEU-MCNC: 3.8 UG/ML FEU
EOSINOPHIL # BLD AUTO: 0.01 THOUSAND/ΜL (ref 0–0.61)
EOSINOPHIL NFR BLD AUTO: 0 % (ref 0–6)
ERYTHROCYTE [DISTWIDTH] IN BLOOD BY AUTOMATED COUNT: 25.2 % (ref 11.6–15.1)
GFR SERPL CREATININE-BSD FRML MDRD: 38 ML/MIN/1.73SQ M
GLUCOSE SERPL-MCNC: 100 MG/DL (ref 65–140)
GLUCOSE UR STRIP-MCNC: NEGATIVE MG/DL
HCT VFR BLD AUTO: 28.6 % (ref 34.8–46.1)
HGB BLD-MCNC: 9.3 G/DL (ref 11.5–15.4)
HGB UR QL STRIP.AUTO: NEGATIVE
HYALINE CASTS #/AREA URNS LPF: ABNORMAL /LPF
IMM GRANULOCYTES # BLD AUTO: 0.15 THOUSAND/UL (ref 0–0.2)
IMM GRANULOCYTES NFR BLD AUTO: 1 % (ref 0–2)
KETONES UR STRIP-MCNC: NEGATIVE MG/DL
LEUKOCYTE ESTERASE UR QL STRIP: ABNORMAL
LYMPHOCYTES # BLD AUTO: 2.58 THOUSANDS/ΜL (ref 0.6–4.47)
LYMPHOCYTES NFR BLD AUTO: 12 % (ref 14–44)
MCH RBC QN AUTO: 31.7 PG (ref 26.8–34.3)
MCHC RBC AUTO-ENTMCNC: 32.5 G/DL (ref 31.4–37.4)
MCV RBC AUTO: 98 FL (ref 82–98)
MONOCYTES # BLD AUTO: 4.56 THOUSAND/ΜL (ref 0.17–1.22)
MONOCYTES NFR BLD AUTO: 21 % (ref 4–12)
MUCOUS THREADS UR QL AUTO: ABNORMAL
NEUTROPHILS # BLD AUTO: 14.82 THOUSANDS/ΜL (ref 1.85–7.62)
NEUTS SEG NFR BLD AUTO: 66 % (ref 43–75)
NITRITE UR QL STRIP: NEGATIVE
NON-SQ EPI CELLS URNS QL MICRO: ABNORMAL /HPF
NRBC BLD AUTO-RTO: 1 /100 WBCS
PH UR STRIP.AUTO: 5 [PH]
PLATELET # BLD AUTO: 237 THOUSANDS/UL (ref 149–390)
PMV BLD AUTO: 11.4 FL (ref 8.9–12.7)
POTASSIUM SERPL-SCNC: 4.1 MMOL/L (ref 3.5–5.3)
PROT SERPL-MCNC: 5.9 G/DL (ref 6.4–8.4)
PROT UR STRIP-MCNC: ABNORMAL MG/DL
RBC # BLD AUTO: 2.93 MILLION/UL (ref 3.81–5.12)
RBC #/AREA URNS AUTO: ABNORMAL /HPF
SODIUM SERPL-SCNC: 127 MMOL/L (ref 135–147)
SP GR UR STRIP.AUTO: 1.02 (ref 1–1.03)
UROBILINOGEN UR STRIP-ACNC: 3 MG/DL
WBC # BLD AUTO: 22.19 THOUSAND/UL (ref 4.31–10.16)
WBC #/AREA URNS AUTO: ABNORMAL /HPF

## 2023-04-13 RX ORDER — GABAPENTIN 300 MG/1
300 CAPSULE ORAL 3 TIMES DAILY
Status: DISCONTINUED | OUTPATIENT
Start: 2023-04-13 | End: 2023-04-18 | Stop reason: HOSPADM

## 2023-04-13 RX ORDER — ONDANSETRON 2 MG/ML
4 INJECTION INTRAMUSCULAR; INTRAVENOUS EVERY 6 HOURS PRN
Status: DISCONTINUED | OUTPATIENT
Start: 2023-04-13 | End: 2023-04-18 | Stop reason: HOSPADM

## 2023-04-13 RX ORDER — OXYCODONE HYDROCHLORIDE 5 MG/1
5 TABLET ORAL EVERY 4 HOURS PRN
Status: DISCONTINUED | OUTPATIENT
Start: 2023-04-13 | End: 2023-04-18 | Stop reason: HOSPADM

## 2023-04-13 RX ORDER — OXYCODONE HYDROCHLORIDE 10 MG/1
10 TABLET ORAL
Status: DISCONTINUED | OUTPATIENT
Start: 2023-04-13 | End: 2023-04-18 | Stop reason: HOSPADM

## 2023-04-13 RX ORDER — SENNOSIDES 8.6 MG
8.6 TABLET ORAL DAILY PRN
Status: DISCONTINUED | OUTPATIENT
Start: 2023-04-13 | End: 2023-04-18 | Stop reason: HOSPADM

## 2023-04-13 RX ORDER — PANTOPRAZOLE SODIUM 40 MG/1
40 TABLET, DELAYED RELEASE ORAL
Status: DISCONTINUED | OUTPATIENT
Start: 2023-04-14 | End: 2023-04-18 | Stop reason: HOSPADM

## 2023-04-13 RX ORDER — LIDOCAINE 50 MG/G
1 PATCH TOPICAL EVERY 12 HOURS PRN
Status: DISCONTINUED | OUTPATIENT
Start: 2023-04-13 | End: 2023-04-17

## 2023-04-13 RX ORDER — DOCUSATE SODIUM 100 MG/1
100 CAPSULE, LIQUID FILLED ORAL 2 TIMES DAILY
Status: DISCONTINUED | OUTPATIENT
Start: 2023-04-13 | End: 2023-04-18 | Stop reason: HOSPADM

## 2023-04-13 RX ORDER — ALBUTEROL SULFATE 90 UG/1
2 AEROSOL, METERED RESPIRATORY (INHALATION) EVERY 6 HOURS PRN
Status: DISCONTINUED | OUTPATIENT
Start: 2023-04-13 | End: 2023-04-18 | Stop reason: HOSPADM

## 2023-04-13 RX ORDER — FLUTICASONE PROPIONATE 44 UG/1
2 AEROSOL, METERED RESPIRATORY (INHALATION) 2 TIMES DAILY
Status: DISCONTINUED | OUTPATIENT
Start: 2023-04-13 | End: 2023-04-18 | Stop reason: HOSPADM

## 2023-04-13 RX ORDER — PRAVASTATIN SODIUM 10 MG
10 TABLET ORAL
Status: DISCONTINUED | OUTPATIENT
Start: 2023-04-13 | End: 2023-04-18

## 2023-04-13 RX ORDER — BUTALBITAL, ACETAMINOPHEN AND CAFFEINE 50; 325; 40 MG/1; MG/1; MG/1
1 TABLET ORAL EVERY 6 HOURS PRN
Status: DISCONTINUED | OUTPATIENT
Start: 2023-04-13 | End: 2023-04-18 | Stop reason: HOSPADM

## 2023-04-13 RX ORDER — SODIUM CHLORIDE 9 MG/ML
50 INJECTION, SOLUTION INTRAVENOUS CONTINUOUS
Status: DISCONTINUED | OUTPATIENT
Start: 2023-04-13 | End: 2023-04-18

## 2023-04-13 RX ORDER — PANTOPRAZOLE SODIUM 40 MG/1
40 TABLET, DELAYED RELEASE ORAL DAILY
Status: DISCONTINUED | OUTPATIENT
Start: 2023-04-14 | End: 2023-04-13 | Stop reason: SDUPTHER

## 2023-04-13 RX ORDER — DULOXETIN HYDROCHLORIDE 20 MG/1
20 CAPSULE, DELAYED RELEASE ORAL DAILY
Status: DISCONTINUED | OUTPATIENT
Start: 2023-04-14 | End: 2023-04-18 | Stop reason: HOSPADM

## 2023-04-13 RX ORDER — AMLODIPINE BESYLATE 10 MG/1
10 TABLET ORAL DAILY
Status: DISCONTINUED | OUTPATIENT
Start: 2023-04-14 | End: 2023-04-18 | Stop reason: HOSPADM

## 2023-04-13 RX ORDER — LORAZEPAM 0.5 MG/1
0.5 TABLET ORAL EVERY 8 HOURS PRN
Status: DISCONTINUED | OUTPATIENT
Start: 2023-04-13 | End: 2023-04-18 | Stop reason: HOSPADM

## 2023-04-13 RX ORDER — CALCIUM CARBONATE 200(500)MG
1000 TABLET,CHEWABLE ORAL DAILY PRN
Status: DISCONTINUED | OUTPATIENT
Start: 2023-04-13 | End: 2023-04-18 | Stop reason: HOSPADM

## 2023-04-13 RX ORDER — FLUTICASONE PROPIONATE 50 MCG
1 SPRAY, SUSPENSION (ML) NASAL DAILY
Status: DISCONTINUED | OUTPATIENT
Start: 2023-04-14 | End: 2023-04-18 | Stop reason: HOSPADM

## 2023-04-13 RX ORDER — POLYETHYLENE GLYCOL 3350 17 G/17G
17 POWDER, FOR SOLUTION ORAL DAILY
Status: DISCONTINUED | OUTPATIENT
Start: 2023-04-14 | End: 2023-04-18 | Stop reason: HOSPADM

## 2023-04-13 RX ORDER — LISINOPRIL 20 MG/1
20 TABLET ORAL DAILY
Status: DISCONTINUED | OUTPATIENT
Start: 2023-04-14 | End: 2023-04-18 | Stop reason: HOSPADM

## 2023-04-13 RX ADMIN — PRAVASTATIN SODIUM 10 MG: 10 TABLET ORAL at 23:44

## 2023-04-13 RX ADMIN — SENNOSIDES 8.6 MG: 8.6 TABLET, FILM COATED ORAL at 23:50

## 2023-04-13 RX ADMIN — LORAZEPAM 0.5 MG: 0.5 TABLET ORAL at 23:50

## 2023-04-13 RX ADMIN — LIDOCAINE 5% 1 PATCH: 700 PATCH TOPICAL at 23:50

## 2023-04-13 RX ADMIN — IOHEXOL 50 ML: 240 INJECTION, SOLUTION INTRATHECAL; INTRAVASCULAR; INTRAVENOUS; ORAL at 22:35

## 2023-04-13 RX ADMIN — OXYCODONE HYDROCHLORIDE 10 MG: 5 TABLET ORAL at 23:56

## 2023-04-13 RX ADMIN — IOHEXOL 100 ML: 350 INJECTION, SOLUTION INTRAVENOUS at 22:42

## 2023-04-13 NOTE — ASSESSMENT & PLAN NOTE
Trend:  Recent Labs     04/14/23  0453 04/15/23  0637 04/16/23  0429   ALKPHOS 809* 759* 793*   TBILI 3 28* 3 27* 3 61*     Continue to trend  RUQ US without acute bile duct dilation  S/p GI consultation 4/14  MRCP 4/14 - no biliary duct dilation

## 2023-04-13 NOTE — H&P
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 presenting with SOB, abdominal pain and lower extremity swelling      Plan:  RAYMOND (acute kidney injury) ([facility] [address] )  Assessment & Plan  Cr 1 37 on admission   cc bolus followed by  cc/h  F/u AM labs    Leukocytosis  Assessment & Plan  WBC 24k --> 22k --> continue to trend  If febrile, consider sepsis workup    Leg swelling  Assessment & Plan  Assess for DVT with bilateral dopplers of lower extremities    JOHNSON (dyspnea on exertion)  Assessment & Plan  F/u CXR and CT PE  D-dimer elevated  Satting 94-95% on admission    Abnormal liver enzymes  Assessment & Plan  Alk phos elevated  Bilirubin trending up, 1 29 --> 3 39 --> continue to trend  F/u RUQ US for any signs of worsening biliary obstruction, consider MRCP based on US results  GI consulted, appreciate recommendations      Medication reaction  Assessment & Plan  Reaction to magnesium PO and IV with hives, tachycardia and fever  Magnesium currently 1 8, hold off on repletion at this time    Asthma  Assessment & Plan  Albuterol prn    Malignant neoplasm metastatic to liver [facility])  Assessment & Plan  Last CT on 3/27 shows \"interval increase in size and number of pulmonary hepatic metastases\"  This correlates with increasing bilirubin level  Repeat CT to assess disease burden    Palliative care patient  Assessment & Plan  Palliative care consult placed for pain control    Drug induced constipation  Assessment & Plan  Colace and Miralax ordered    Ovarian cancer [facility])  Assessment & Plan  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  S/p en block hysterectomy, BSO, sigmoidectomy with low rectal re-anastamosis, bladder peritoneal stripping and cystotomy repair, diaphragm stripping, small bowel resection with re-anastamosis on 8/6/2021  Recent imaging " shows liver metastases  S/p 2 cycles of Pembro    Hyponatremia  Assessment & Plan  Na 127 --> f/u in AM    cc bolus followed by  cc/h    Cancer related pain  Assessment & Plan  Tylenol held at this time  Roxicodone prn ordered  Ativan prn    Migraine  Assessment & Plan  Fioricet prn    Primary hypertension  Assessment & Plan  Home medication ordered      Diet: NPO at midnight  FEN: NS 100cc/h  DVT ppx: SCDs    Subjective:    Rogers Ballard presented to the outpatient lab for her bloodwork today and after finding an elevated bilirubin and creatinine was called by the office and told to come to the ER for further workup and assessment  She reports that she has been doing well overall since her last round of chemotherapy  Her pain is well controlled, though she does have more discomfort in her epigastric/RUQ area, especially when sitting  She also states that she had been intermittently febrile at home last week but hasn't had a fever in 4-5 days  She denies any chills or chest pain  She does reports SOB on exertion which has worsened over the past few weeks  She denies any nausea and is able to eat without issue  She denies any constipation and is having regular bowel movements  /62   Pulse 85   Temp 97 5 °F (36 4 °C) (Oral)   Resp 18   Wt 76 2 kg (168 lb)   SpO2 98%   BMI 33 93 kg/m²     No intake/output data recorded  No intake/output data recorded  Lab Results   Component Value Date    WBC 22 19 (H) 04/13/2023    HGB 9 3 (L) 04/13/2023    HCT 28 6 (L) 04/13/2023    MCV 98 04/13/2023     04/13/2023       Lab Results   Component Value Date    GLUCOSE 195 (H) 08/06/2021    CALCIUM 8 1 (L) 04/13/2023    K 4 1 04/13/2023    CO2 23 04/13/2023    CL 93 (L) 04/13/2023    BUN 50 (H) 04/13/2023    CREATININE 1 37 (H) 04/13/2023           Physical Exam  Constitutional:       Appearance: Normal appearance  HENT:      Head: Normocephalic and atraumatic        Mouth/Throat:      Mouth: Mucous membranes are moist    Eyes:      General: No scleral icterus  Extraocular Movements: Extraocular movements intact  Conjunctiva/sclera: Conjunctivae normal    Cardiovascular:      Rate and Rhythm: Normal rate and regular rhythm  Pulmonary:      Effort: Pulmonary effort is normal       Breath sounds: Normal breath sounds  Abdominal:      Palpations: Abdomen is soft  Tenderness: There is no abdominal tenderness  There is no guarding  Comments: Liver edge palpated at 1 cm above umbilicus    Skin:     General: Skin is warm and dry  Comments: +2 pitting edema in bilateral lower extremities   Neurological:      General: No focal deficit present  Mental Status: She is alert  Mental status is at baseline     Psychiatric:         Mood and Affect: Mood normal          Behavior: Behavior normal            Nara Guajardo MD  4/13/2023  10:10 PM

## 2023-04-13 NOTE — ASSESSMENT & PLAN NOTE
D-dimer elevated on admission  CXR and CT PE negative for acute pathology  Continue to monitor SpO2 and clinical condition

## 2023-04-13 NOTE — ASSESSMENT & PLAN NOTE
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  S/p en block hysterectomy, BSO, sigmoidectomy with low rectal re-anastamosis, bladder peritoneal stripping and cystotomy repair, diaphragm stripping, small bowel resection with re-anastamosis on 8/6/2021  S/p 2 cycles of Keytruda  MRI with evidence of extensive hepatic metastases   S/p palliative care and GI consultations   Following [address] discussion, patient elected to for hospice management  Case management: Home hospice care in place, plan for discharge at [facility] 04/18/23

## 2023-04-14 ENCOUNTER — APPOINTMENT (INPATIENT)
Dept: MRI IMAGING | Facility: HOSPITAL | Age: 73
End: 2023-04-14

## 2023-04-14 PROBLEM — J90 PLEURAL EFFUSION: Status: ACTIVE | Noted: 2023-04-14

## 2023-04-14 LAB
ALBUMIN SERPL BCP-MCNC: 2.2 G/DL (ref 3.5–5)
ALP SERPL-CCNC: 809 U/L (ref 34–104)
ALT SERPL W P-5'-P-CCNC: 38 U/L (ref 7–52)
ANION GAP SERPL CALCULATED.3IONS-SCNC: 8 MMOL/L (ref 4–13)
AST SERPL W P-5'-P-CCNC: 74 U/L (ref 13–39)
ATRIAL RATE: 93 BPM
BASOPHILS # BLD AUTO: 0.05 THOUSANDS/ΜL (ref 0–0.1)
BASOPHILS NFR BLD AUTO: 0 % (ref 0–1)
BILIRUB SERPL-MCNC: 3.28 MG/DL (ref 0.2–1)
BUN SERPL-MCNC: 46 MG/DL (ref 5–25)
CALCIUM ALBUM COR SERPL-MCNC: 9.2 MG/DL (ref 8.3–10.1)
CALCIUM SERPL-MCNC: 7.8 MG/DL (ref 8.4–10.2)
CHLORIDE SERPL-SCNC: 95 MMOL/L (ref 96–108)
CO2 SERPL-SCNC: 24 MMOL/L (ref 21–32)
CREAT SERPL-MCNC: 0.99 MG/DL (ref 0.6–1.3)
EOSINOPHIL # BLD AUTO: 0.03 THOUSAND/ΜL (ref 0–0.61)
EOSINOPHIL NFR BLD AUTO: 0 % (ref 0–6)
ERYTHROCYTE [DISTWIDTH] IN BLOOD BY AUTOMATED COUNT: 23.9 % (ref 11.6–15.1)
GFR SERPL CREATININE-BSD FRML MDRD: 57 ML/MIN/1.73SQ M
GLUCOSE SERPL-MCNC: 77 MG/DL (ref 65–140)
HCT VFR BLD AUTO: 27.5 % (ref 34.8–46.1)
HGB BLD-MCNC: 9.4 G/DL (ref 11.5–15.4)
IMM GRANULOCYTES # BLD AUTO: 0.12 THOUSAND/UL (ref 0–0.2)
IMM GRANULOCYTES NFR BLD AUTO: 1 % (ref 0–2)
LYMPHOCYTES # BLD AUTO: 2.12 THOUSANDS/ΜL (ref 0.6–4.47)
LYMPHOCYTES NFR BLD AUTO: 11 % (ref 14–44)
MAGNESIUM SERPL-MCNC: 1.9 MG/DL (ref 1.9–2.7)
MCH RBC QN AUTO: 32.5 PG (ref 26.8–34.3)
MCHC RBC AUTO-ENTMCNC: 34.2 G/DL (ref 31.4–37.4)
MCV RBC AUTO: 95 FL (ref 82–98)
MONOCYTES # BLD AUTO: 3.78 THOUSAND/ΜL (ref 0.17–1.22)
MONOCYTES NFR BLD AUTO: 19 % (ref 4–12)
NEUTROPHILS # BLD AUTO: 13.72 THOUSANDS/ΜL (ref 1.85–7.62)
NEUTS SEG NFR BLD AUTO: 69 % (ref 43–75)
NRBC BLD AUTO-RTO: 1 /100 WBCS
P AXIS: 26 DEGREES
PHOSPHATE SERPL-MCNC: 4.3 MG/DL (ref 2.3–4.1)
PLATELET # BLD AUTO: 253 THOUSANDS/UL (ref 149–390)
PMV BLD AUTO: 11 FL (ref 8.9–12.7)
POTASSIUM SERPL-SCNC: 3.9 MMOL/L (ref 3.5–5.3)
PR INTERVAL: 150 MS
PROT SERPL-MCNC: 5.4 G/DL (ref 6.4–8.4)
QRS AXIS: 86 DEGREES
QRSD INTERVAL: 66 MS
QT INTERVAL: 352 MS
QTC INTERVAL: 437 MS
RBC # BLD AUTO: 2.89 MILLION/UL (ref 3.81–5.12)
SODIUM SERPL-SCNC: 127 MMOL/L (ref 135–147)
T WAVE AXIS: 40 DEGREES
VENTRICULAR RATE: 93 BPM
WBC # BLD AUTO: 19.82 THOUSAND/UL (ref 4.31–10.16)

## 2023-04-14 RX ORDER — BENZONATATE 100 MG/1
100 CAPSULE ORAL 3 TIMES DAILY PRN
Status: DISCONTINUED | OUTPATIENT
Start: 2023-04-14 | End: 2023-04-18 | Stop reason: HOSPADM

## 2023-04-14 RX ORDER — HEPARIN SODIUM 5000 [USP'U]/ML
5000 INJECTION, SOLUTION INTRAVENOUS; SUBCUTANEOUS EVERY 8 HOURS SCHEDULED
Status: DISCONTINUED | OUTPATIENT
Start: 2023-04-14 | End: 2023-04-18

## 2023-04-14 RX ORDER — OXYCODONE HCL 10 MG/1
10 TABLET, FILM COATED, EXTENDED RELEASE ORAL
Status: DISCONTINUED | OUTPATIENT
Start: 2023-04-14 | End: 2023-04-16

## 2023-04-14 RX ADMIN — GABAPENTIN 300 MG: 300 CAPSULE ORAL at 02:15

## 2023-04-14 RX ADMIN — DULOXETINE HYDROCHLORIDE 20 MG: 20 CAPSULE, DELAYED RELEASE ORAL at 09:36

## 2023-04-14 RX ADMIN — OXYCODONE HYDROCHLORIDE 10 MG: 10 TABLET, FILM COATED, EXTENDED RELEASE ORAL at 21:33

## 2023-04-14 RX ADMIN — SODIUM CHLORIDE 500 ML: 0.9 INJECTION, SOLUTION INTRAVENOUS at 00:36

## 2023-04-14 RX ADMIN — LIDOCAINE 5% 1 PATCH: 700 PATCH TOPICAL at 17:26

## 2023-04-14 RX ADMIN — DOCUSATE SODIUM 100 MG: 100 CAPSULE, LIQUID FILLED ORAL at 02:15

## 2023-04-14 RX ADMIN — GABAPENTIN 300 MG: 300 CAPSULE ORAL at 17:22

## 2023-04-14 RX ADMIN — DOCUSATE SODIUM 100 MG: 100 CAPSULE, LIQUID FILLED ORAL at 09:36

## 2023-04-14 RX ADMIN — FLUTICASONE PROPIONATE 2 PUFF: 44 AEROSOL, METERED RESPIRATORY (INHALATION) at 09:45

## 2023-04-14 RX ADMIN — PANTOPRAZOLE SODIUM 40 MG: 40 TABLET, DELAYED RELEASE ORAL at 05:59

## 2023-04-14 RX ADMIN — HEPARIN SODIUM 5000 UNITS: 5000 INJECTION INTRAVENOUS; SUBCUTANEOUS at 21:34

## 2023-04-14 RX ADMIN — HEPARIN SODIUM 5000 UNITS: 5000 INJECTION INTRAVENOUS; SUBCUTANEOUS at 13:58

## 2023-04-14 RX ADMIN — DOCUSATE SODIUM 100 MG: 100 CAPSULE, LIQUID FILLED ORAL at 17:22

## 2023-04-14 RX ADMIN — FLUTICASONE PROPIONATE 2 PUFF: 44 AEROSOL, METERED RESPIRATORY (INHALATION) at 02:18

## 2023-04-14 RX ADMIN — GABAPENTIN 300 MG: 300 CAPSULE ORAL at 09:36

## 2023-04-14 RX ADMIN — GABAPENTIN 300 MG: 300 CAPSULE ORAL at 21:33

## 2023-04-14 RX ADMIN — PRAVASTATIN SODIUM 10 MG: 10 TABLET ORAL at 21:33

## 2023-04-14 RX ADMIN — OXYCODONE HYDROCHLORIDE 5 MG: 5 TABLET ORAL at 17:22

## 2023-04-14 RX ADMIN — GADOBUTROL 7 ML: 604.72 INJECTION INTRAVENOUS at 21:06

## 2023-04-14 RX ADMIN — FLUTICASONE PROPIONATE 2 PUFF: 44 AEROSOL, METERED RESPIRATORY (INHALATION) at 17:26

## 2023-04-14 RX ADMIN — FLUTICASONE PROPIONATE 1 SPRAY: 50 SPRAY, METERED NASAL at 09:44

## 2023-04-14 RX ADMIN — HEPARIN SODIUM 5000 UNITS: 5000 INJECTION INTRAVENOUS; SUBCUTANEOUS at 09:31

## 2023-04-14 RX ADMIN — SODIUM CHLORIDE 100 ML/HR: 0.9 INJECTION, SOLUTION INTRAVENOUS at 17:22

## 2023-04-14 RX ADMIN — SODIUM CHLORIDE 100 ML/HR: 0.9 INJECTION, SOLUTION INTRAVENOUS at 02:12

## 2023-04-14 NOTE — QUICK NOTE
Discussed with patient the result of her workup thus far  We reviewed that she does not have a DVT or PE at this time  There is no increased biliary tree dilation  The plan is to remain NPO at this time  Will have GI and palliative care consults in the morning  Will hold heparin at this time in setting of possible procedure today  All questions answered       Helen Maya MD  PGY-3 OB/GYN   4/14/2023 2:32 AM

## 2023-04-14 NOTE — ASSESSMENT & PLAN NOTE
Reaction to magnesium PO and IV with hives, tachycardia and fever  Magnesium 1 8 on admission, 1 9 this am  Hold off on repletion at this time

## 2023-04-14 NOTE — PROGRESS NOTES
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 presenting with SOB, abdominal pain and lower extremity swelling  Initial workup negative for DVT or acute PE   Plan by problem:    * Ovarian cancer [facility])  Assessment & Plan  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  S/p en block hysterectomy, BSO, sigmoidectomy with low rectal re-anastamosis, bladder peritoneal stripping and cystotomy repair, diaphragm stripping, small bowel resection with re-anastamosis on 8/6/2021  Recent imaging shows liver metastases  S/p 2 cycles of Pembro  Poor prognosis  Plan for palliative consultation this morning  Continue [address] discussions    Pleural effusion  Assessment & Plan  Small right pleural effusion and associated atelectasis  Decreased breath sounds in RLL field this am  SpO2 91% during conversation in room  Consider IR drainage if plausible    RAYMOND (acute kidney injury) ([facility] [address] )  Assessment & Plan  Cr 1 37->0 99, continue to trend  Continue IV fluid hydration  Continue to monitor    Leukocytosis  Assessment & Plan  WBC 24k --> 22k --> 19k this am  If febrile, consider sepsis workup    Leg swelling  Assessment & Plan  Assess for DVT with bilateral dopplers of lower extremities    JOHNSON (dyspnea on exertion)  Assessment & Plan  D-dimer elevated on admission  CXR and CT PE negative for acute pathology  Continue to monitor SpO2 and clinical condition    Abnormal liver enzymes  Assessment & Plan  Alk phos elevated  Bilirubin trending up, 1 29->3 36->3 46->3 28 continue to trend  RUQ US without acute bile duct dilation  GI consulted, appreciate recommendations    Medication reaction  Assessment & Plan  Reaction to magnesium PO and IV with hives, tachycardia and fever  Magnesium 1 8 on admission, 1 9 this am  Hold off on repletion at this "time    Asthma  Assessment & Plan  Albuterol prn    Malignant neoplasm metastatic to liver [facility])  Assessment & Plan  Last CT on 3/27 shows \"interval increase in size and number of pulmonary hepatic metastases\"  This correlates with increasing bilirubin level  Repeat CT c/w stable hepatic metastasis    Palliative care patient  Assessment & Plan  Palliative care consult placed for pain control    Drug induced constipation  Assessment & Plan  Colace and Miralax ordered    Hyponatremia  Assessment & Plan  Na 127, unchanged this am   cc/h    Cancer related pain  Assessment & Plan  Tylenol held at this time  Roxicodone prn ordered  Ativan prn    Migraine  Assessment & Plan  Fioricet prn    Primary hypertension  Assessment & Plan  Home medication ordered           Subjective:    Gladystine Hard has no current complaints  Pain is well controlled this morning and she has no acute conerns  Patient is currently voiding  She is ambulating  Patient is currently passing flatus and has had bowel movement  She is tolerating PO, and denies nausea or vomitting  Patient denies fever, chills, chest pain, shortness of breath, or calf tenderness  Objective:  BP (!) 88/55   Pulse (!) 115   Temp 100 °F (37 8 °C)   Resp 21   Wt 76 2 kg (168 lb)   SpO2 (!) 89%   BMI 33 93 kg/m²     No intake/output data recorded  I/O this shift: In: 500 [I V :500]  Out: -     Lab Results   Component Value Date    WBC 19 82 (H) 04/14/2023    HGB 9 4 (L) 04/14/2023    HCT 27 5 (L) 04/14/2023    MCV 95 04/14/2023     04/14/2023       Lab Results   Component Value Date    GLUCOSE 195 (H) 08/06/2021    CALCIUM 7 8 (L) 04/14/2023    K 3 9 04/14/2023    CO2 24 04/14/2023    CL 95 (L) 04/14/2023    BUN 46 (H) 04/14/2023    CREATININE 0 99 04/14/2023           Physical Exam  Vitals and nursing note reviewed  Exam conducted with a chaperone present  Constitutional:       General: She is not in acute distress    HENT:      Head: " Normocephalic  Right Ear: External ear normal       Left Ear: External ear normal    Eyes:      General: No scleral icterus  Right eye: No discharge  Left eye: No discharge  Conjunctiva/sclera: Conjunctivae normal    Cardiovascular:      Rate and Rhythm: Normal rate and regular rhythm  Pulses: Normal pulses  Heart sounds: Normal heart sounds  Pulmonary:      Effort: Pulmonary effort is normal  No respiratory distress  Breath sounds: Examination of the right-lower field reveals decreased breath sounds  Decreased breath sounds present  Abdominal:      General: There is no distension  Palpations: Abdomen is rigid  Tenderness: There is no abdominal tenderness  There is no guarding  Comments: Well-healed midline incision  Rigid, non-tender upper abdomen c/w prior surgery and disease   Musculoskeletal:         General: No swelling or tenderness  Normal range of motion  Cervical back: Normal range of motion  Right lower leg: Edema present  Left lower leg: Edema present  Skin:     General: Skin is warm and dry  Capillary Refill: Capillary refill takes 2 to 3 seconds  Neurological:      Mental Status: She is alert and oriented to person, place, and time  Mental status is at baseline     Psychiatric:         Mood and Affect: Mood normal          Behavior: Behavior normal            Bre Greene MD  4/14/2023  7:01 AM

## 2023-04-14 NOTE — PLAN OF CARE
Problem: OCCUPATIONAL THERAPY ADULT  Goal: Performs self-care activities at highest level of function for planned discharge setting  See evaluation for individualized goals  Description: Treatment Interventions: ADL retraining, Functional transfer training, Patient/family training, Equipment evaluation/education, Fine motor coordination activities, Compensatory technique education, Activityengagement, Energy conservation, UE strengthening/ROM          See flowsheet documentation for full assessment, interventions and recommendations  Outcome: Progressing  Note: Limitation: Decreased ADL status, Decreased high-level ADLs, Decreased self-care trans, Decreased fine motor control, Decreased endurance, Decreased cognition, Decreased Safe judgement during ADL  Prognosis: Fair  Assessment: Pt is a 68 y o  female seen for OT evaluation s/p admission to [address] [place] [address] on 4/13/2023  with diagnoses Ovarian cancer ([facility] [address] )  Pt has a significant PMH impacting occupational performance, which is listed above  PTA pt living with spouse in a [facility] with no KAROL and reports self to be completely (I) PTA with ADLs and light IADLs  Pt reports spouse works during the week and therefore is home alone often  Pt is motivated to return to home  Personal and environmental factors supporting pt at time of IE include attitude towards recovery and accessible home environment  Personal and environmental factors inhibiting engagement in occupations include limited social support, difficulty completing ADLs, difficulty completing IADLs, and dec'd insight to deficits   During evaluation pt performed as is outlined above in flowsheet  Pt required VC for safety, seated rest breaks, and occasional minimal assistance to recover LOB x2 with standing ADL tasks and functional mobility  Pt may benefit from DME to increase safety with mobility and therefore recommend PT to further assess this   Standardized assessments used to assist in identifying performance deficits include AMPAC 6-Clicks  Performance deficits that affect the pt’s occupational performance during the initial evaluation include impaired balance, functional mobility, endurance, activity tolerance, fine motor coordination, forward functional reach, functional standing tolerance, and overall strength, direction following, safety awareness, insight into deficits, orientation, problem solving, learning/remembering new tasks, and appropriate responses , response time  Based on pt’s functional performance and deficits the following occupations will be addressed in OT treatments in order to maximize pt’s independence and overall occupational performance: grooming, bathing/showering, toileting and toilet hygiene, dressing, functional mobility, community mobility, household preparation, and leisure participation   Goals are listed below  Upon discharge from acute care setting recommend d/c to  [facility] vs  [address] [place] [address] pending progress with higher level mobility (given 2 LOB during session, pt is a fall risk at this time)   This evaluation required an extensive review of medical and/or therapy records and additional review of physical, cognitive and psychosocial history related to functional performance  Based upon functional performance deficits and assessments, this evaluation has been identified as a high complexity evaluation  OT Discharge Recommendation:  ([facility] vs  [facility] pending progress with mobility   Recommend trial with DME as deemed appropriate by PT )

## 2023-04-14 NOTE — ASSESSMENT & PLAN NOTE
Cr Trend:  Recent Labs     04/14/23  0453 04/15/23  0637 04/16/23  0429   CREATININE 0 99 1 04 1 04     Continue IV fluid hydration

## 2023-04-14 NOTE — CONSULTS
Consultation - [address] 68 y o  female MRN: [id number]  Unit/Bed#: S -01 Encounter: [id number]      Assessment/Plan   Patient Active Problem List   Diagnosis   • Primary hypertension   • Migraine   • Cancer related pain   • Unspecified protein-calorie malnutrition ([facility] )   • S/P bladder repair   • Status post small bowel resection   • S/P splenectomy   • Hyponatremia   • Ovarian cancer ([place] [address] )   • Encounter for central line care   • Drug induced constipation   • Palliative care patient   • Joint pain following chemotherapy   • Chemotherapy-induced nausea   • Anxiousness   • Insomnia due to medical condition   • Chemotherapy induced neutropenia (HCC)   • Chemotherapy-induced peripheral neuropathy (HCC)   • Esophageal reflux   • Neoplastic malignant related fatigue   • Oral mucositis (ulcerative) due to antineoplastic therapy   • Sciatica of left side   • Personal history of COVID-19   • Malignant neoplasm metastatic to liver (HCC)   • Anemia in other chronic diseases classified elsewhere   • Severe sepsis (HCC)   • Asthma   • Lactic acidosis   • Medication reaction   • Malignant neoplasm metastatic to both lungs (HCC)   • Continuous opioid dependence ([place] [address] )   • Mixed hyperlipidemia   • Abnormal liver enzymes   • JOHNSON (dyspnea on exertion)   • Leg swelling   • Leukocytosis   • RAYMOND (acute kidney injury) ([place] [address] )   • Pleural effusion     Active issues specifically addressed today include:   - progression of stage IIIC ovarian cancer s/p en block hysterectomy + BSO + sigmoidectomy, s/p pembrolizumab x 2 cycles; new hepatic metastases on current imaging   - R-sided pleural effusion   - cancer-related pain   - goals of care support    Plan:  #symptom management  #cancer-related pain  #neuropathic pain   - continue lidocaine patch   - continue gabapentin 300 mg PO TID   - continue duloxetine therapy   - add oxy-ER 10 mg PO QHS   - patient's home regimen   - continue oxy-IR 5 mg PO Q4H PRN [mod pain] or 10 mg PO Q4H PRN [severe pain]   - total OME usage yesterday: 15 mg    #anxiety/depression   - continue duloxetine 20 mg PO QDaily   - continue lorazepam 0 5 mg PO Q8H PRN    #OIC   - continue senna 1 tab PO QDaily PRN   - continue miralax 17 g PO QDaily   - continue docusate 100 mg PO BID    #nausea   - continue ondansetron 4 mg IV Q6H PRN    #excerpt [employer]      #psychosocial support   - emotional support provided   - Reddy Lowe [phone]   Kurt Pickens [daughter] [phone]    We appreciate the opportunity to participate in this patient's care  We will continue to follow  Please do not hesitate to contact our on-call provider through our clinic answering service at [phone] should you have acute symptom control concerns  Controlled Substance Review    [place]MP or [place]  reviewed: No red flags were identified; safe to proceed with prescription  Erica Marti PDMP Review       Value Time User    PDMP Reviewed  Yes 4/14/2023 10:38 AM Chuckie Gosselin, MD          History of Present Illness   Physician Requesting Consult: Teagan Whitaker MD  Reason for Consult / Principal Problem: pain management  Hx and PE limited by: none  HPI: Isaac Leventhal is a 68y o  year old female known [facility] team with a PMH of stage IIIC ovarian cancer s/p hysterectomy/BSO + sigmoidectomy s/p immunotherapy x 2 cycle who presented yesterday after  Gyn/Onc recommendation to the ED given elevated bilirubin on recent OP lab work  Initial imaging with stable pulmonary metastases, small R pleural effusion with RLL atelectasis, no e/o PE, and stable hepatic metastases  Initial labs significant for Na 127, K 4 3, corrected Ca 8 9, Mg 1 8, BUN/SCr 47/1 22 [eGFR 44]; AST//52, ALP 1004, albumin 2 4, TBili 3 36  WBC 24 2, Hgb 9 6, Plt 233   on 04/06 was 1927  [facility] consulted for symptoms management and goals of care support      Patient currently reports adequately managed pain on current regimen, will restart QHS oxy-ER for improved overnight coverage, may not require any short-acting PRN  Denies nausea, vomiting  Tolerating PO intake without difficulty  Last BM yesterday, passing flatus  Adequate sleep overnight  Oncology History   Ovarian cancer ([facility] )   8/6/2021 Surgery    PROMISE/BSO/tumor debulking to optimal cytoreduction     8/25/2021 Initial Diagnosis    Ovarian cancer ([facility] )     9/16/2021 -  Cancer Staged    Staging form: Ovary, Fallopian Tube, Primary Peritoneal, AJCC 8th Edition  - Clinical: FIGO Stage IIIC (cT3c) - Signed by Lori Garay MD on 9/16/2021  Stage prefix: Initial diagnosis  Cancer antigen 125 () (U/mL): 543       9/28/2021 - 1/11/2022 Chemotherapy    Carbo AUC6, Taxol 175mg/m2 q3 weeks  Avastin 15mg/m2 added cycle 3    Toxicities:  Cycle4: carbo AUC 5 for neutropenia, added neulasta     10/2021 Genomic Testing    SEAN high  PDL1+ CPS >2  IHC ER/DE weak to moderate      12/9/2021 Genetic Testing    POLD VUS     2/22/2022 - 11/2022 Chemotherapy    Maintenance: Avastin 15mg/m2  Olaparib 300mg BID    Toxicity:  3/28/22: dose-reduce avastin to 10 mg/kg every 21 days as well as dose-reduction of olaparib to 150 mg AM and 300 mg PM  5/9/22: decrease olaparib to 200mg BID  6/7/2022: stopped avastin for bone pain       11/18/2022 - 1/30/2023 Chemotherapy    Carbo AUC 5, Doxil 30 mg/m2, Avastin    Progression after 3 cycles      2/15/2023 -  Chemotherapy    Avastin 15 mg/kg and keytruda 200 mg IV every 21 days with daily oral cyclophosphamide 50 mg daily  Toxicities/delays:  Irene White was held for cycle 1 due to insurance authorization/free drug program enrollment  Inpatient consult to Palliative Care  Consult performed by: Iva Champion MD  Consult ordered by: Delta Smith MD          Review of Systems   Constitutional: Negative for activity change, appetite change, chills, fatigue, fever and unexpected weight change  HENT: Negative for congestion      Eyes: Negative for visual disturbance  Respiratory: Negative for shortness of breath  Cardiovascular: Negative for chest pain  Gastrointestinal: Negative for abdominal pain, constipation, nausea and vomiting  Genitourinary: Negative for frequency  Musculoskeletal: Negative for back pain  Neurological: Negative for syncope  Psychiatric/Behavioral: Negative for sleep disturbance  All other systems reviewed and are negative        Historical Information   Past Medical History:   Diagnosis Date   • Acid reflux    • Asthma    • Cancer (HCC)     ovarian, peritoneal carcinomatosis   • GERD (gastroesophageal reflux disease)    • Hypercholesteremia    • Hypertension    • Migraine    • Ovarian cancer [facility])      Past Surgical History:   Procedure Laterality Date   • APPENDECTOMY N/A 8/6/2021    Procedure: APPENDECTOMY;  Surgeon: Chata Retana MD;  [facility];  Service: Gynecology Oncology   • CHOLECYSTECTOMY     • COLONOSCOPY     • FL CYSTOGRAM  8/18/2021   • GALLBLADDER SURGERY     • HYSTERECTOMY N/A 8/6/2021    Procedure: ENBLOCK HYSTERECTOMY, BILATERAL SALPINGO-OOPHORECTOMY, SIGMOIDECTOMY WITH LOW RECTAL REANASTAMOSIS, BLADDER PERITONEAL STRIPPING AND CYSTOTOMY REPAIR, DIAPHRAGM STRIPPING, SMALL BOWEL RESECTION WITH RE-ANASTAMOSIS;  Surgeon: Chata Retana MD;  [facility];  Service: Gynecology Oncology   • IR ASPIRATION ONLY  8/31/2021   • IR DRAINAGE TUBE CHECK AND/OR REMOVAL  9/17/2021   • IR DRAINAGE TUBE CHECK/CHANGE/REPOSITION/REINSERTION/UPSIZE  8/27/2021   • IR DRAINAGE TUBE CHECK/CHANGE/REPOSITION/REINSERTION/UPSIZE  9/3/2021   • IR DRAINAGE TUBE PLACEMENT  8/18/2021   • IR PORT PLACEMENT  9/17/2021   • IR THORACENTESIS  8/18/2021   • IR THORACENTESIS  9/3/2021   • LAPAROTOMY N/A 8/6/2021    Procedure: LAPAROTOMY EXPLORATORY; OVER SEW PANCREAS TAIL;  Surgeon: Chata Retana MD;  [facility];  Service: Gynecology Oncology   • LAPAROTOMY N/A 11/4/2022    Procedure: LAPAROTOMY EXPLORATORY WITH ULTRASOUND GUIDANCE;  Surgeon: Miguel A Rojas MD;  [facility];  Service: Surgical Oncology   • OMENTECTOMY N/A 8/6/2021    Procedure: RADICAL OMENTECTOMY;  Surgeon: Best Kothari MD;  [facility];  Service: Gynecology Oncology   • [place] LAPS ABD PRTM&OMENTUM DX W/WO SPEC BR/ [facility] N/A 8/6/2021    Procedure: LAPAROSCOPY DIAGNOSTIC;  Surgeon: Best Kothari MD;  [facility];  Service: Gynecology Oncology   • RIGHT OOPHORECTOMY  1986   • SPLENECTOMY, TOTAL N/A 8/6/2021    Procedure: SPLENECTOMY;  Surgeon: Best Kothari MD;  [facility];  Service: Gynecology Oncology   • TONSILLECTOMY       E-Cigarette/Vaping   • E-Cigarette Use Never User      E-Cigarette/Vaping Substances   • Nicotine No    • THC No    • CBD No    • Flavoring No    • Other No    • Unknown No      Social History     Socioeconomic History   • Marital status: /Civil Union     Spouse name: None   • Number of children: None   • Years of education: None   • Highest education level: None   Occupational History   • None   Tobacco Use   • Smoking status: Never   • Smokeless tobacco: Never   Vaping Use   • Vaping Use: Never used   Substance and Sexual Activity   • Alcohol use: Yes     Comment: socially   • Drug use: Yes     Frequency: 7 0 times per week     Types: Marijuana     Comment: edible   • Sexual activity: Not Currently   Other Topics Concern   • None   Social History Narrative    Patient has been  to Shantal Card for over 50 years  They have one 35yo daughter  Patient has 3 brothers and 1 sister; she had lost 2 other sisters  Family is supportive  Social Determinants of Health     Financial Resource Strain: Not on file   Food Insecurity: No Food Insecurity   • Worried About [address] in the Last Year: Never true   • Ran Out of Food in the Last Year: Never true   Transportation Needs: No Transportation Needs   • Lack of Transportation (Medical): No   • Lack of Transportation (Non-Medical):  No Physical Activity: Not on file   Stress: Not on file   Social Connections: Not on file   Intimate Partner Violence: Not on file   Housing Stability: Low Risk    • Unable to Pay for Housing in the Last Year: No   • Number of Places Lived in the Last Year: 1   • Unstable Housing in the Last Year: No     History reviewed  No pertinent family history      Meds/Allergies   current meds:   Current Facility-Administered Medications   Medication Dose Route Frequency   • albuterol (PROVENTIL HFA,VENTOLIN HFA) inhaler 2 puff  2 puff Inhalation Q6H PRN   • amLODIPine (NORVASC) tablet 10 mg  10 mg Oral Daily    And   • lisinopril (ZESTRIL) tablet 20 mg  20 mg Oral Daily   • benzonatate (TESSALON PERLES) capsule 100 mg  100 mg Oral TID PRN   • butalbital-acetaminophen-caffeine (FIORICET,ESGIC) -40 mg per tablet 1 tablet  1 tablet Oral Q6H PRN   • calcium carbonate (TUMS) chewable tablet 1,000 mg  1,000 mg Oral Daily PRN   • docusate sodium (COLACE) capsule 100 mg  100 mg Oral BID   • DULoxetine (CYMBALTA) delayed release capsule 20 mg  20 mg Oral Daily   • fluticasone (FLONASE) 50 mcg/act nasal spray 1 spray  1 spray Nasal Daily   • fluticasone (FLOVENT HFA) 44 mcg/act inhaler 2 puff  2 puff Inhalation BID   • gabapentin (NEURONTIN) capsule 300 mg  300 mg Oral TID   • heparin (porcine) subcutaneous injection 5,000 Units  5,000 Units Subcutaneous Q8H [address]   • lidocaine (LIDODERM) 5 % patch 1 patch  1 patch Topical Q12H PRN   • LORazepam (ATIVAN) tablet 0 5 mg  0 5 mg Oral Q8H PRN   • ondansetron (ZOFRAN) injection 4 mg  4 mg Intravenous Q6H PRN   • oxyCODONE (ROXICODONE) immediate release tablet 10 mg  10 mg Oral HS PRN   • oxyCODONE (ROXICODONE) IR tablet 5 mg  5 mg Oral Q4H PRN   • pantoprazole (PROTONIX) EC tablet 40 mg  40 mg Oral Early Morning   • polyethylene glycol (MIRALAX) packet 17 g  17 g Oral Daily   • pravastatin (PRAVACHOL) tablet 10 mg  10 mg Oral HS   • senna (SENOKOT) tablet 8 6 mg  8 6 mg Oral Daily PRN • sodium chloride 0 9 % infusion  100 mL/hr Intravenous Continuous     Facility-Administered Medications Ordered in Other Encounters   Medication Dose Route Frequency   • [MAR Hold] alteplase (CATHFLO) injection 2 mg  2 mg Intracatheter Q1MIN PRN         Allergies   Allergen Reactions   • Erythromycin Nausea Only   • Magnesium Hives, Tachycardia and Fever     Noted on IV magnesium and oral mag oxide supplements   • Morphine Headache       Objective     Physical Exam  Vitals and nursing note reviewed  Constitutional:       General: She is awake  Appearance: She is not diaphoretic  Comments: Lying in bed comfortably in NAD  BMI 33 9  Non-toxic appearing   HENT:      Head: Normocephalic and atraumatic  Right Ear: External ear normal       Left Ear: External ear normal       Nose: No rhinorrhea  Eyes:      Comments: No gaze preference   Cardiovascular:      Rate and Rhythm: Normal rate  Pulmonary:      Effort: No tachypnea or respiratory distress  Comments: Completes full sentences without difficulty  Musculoskeletal:      Cervical back: Normal range of motion  Neurological:      General: No focal deficit present  Mental Status: She is alert and oriented to person, place, and time  Psychiatric:         Attention and Perception: Attention normal          Mood and Affect: Mood and affect normal          Speech: Speech normal          Cognition and Memory: Cognition and memory normal          Lab Results:   I have personally reviewed pertinent labs  , CBC:   Lab Results   Component Value Date    WBC 19 82 (H) 04/14/2023    HGB 9 4 (L) 04/14/2023    HCT 27 5 (L) 04/14/2023    MCV 95 04/14/2023     04/14/2023    MCH 32 5 04/14/2023    MCHC 34 2 04/14/2023    RDW 23 9 (H) 04/14/2023    MPV 11 0 04/14/2023    NRBC 1 04/14/2023   , CMP:   Lab Results   Component Value Date    SODIUM 127 (L) 04/14/2023    K 3 9 04/14/2023    CL 95 (L) 04/14/2023    CO2 24 04/14/2023    BUN 46 (H) 04/14/2023    CREATININE 0 99 04/14/2023    CALCIUM 7 8 (L) 04/14/2023    AST 74 (H) 04/14/2023    ALT 38 04/14/2023    ALKPHOS 809 (H) 04/14/2023    EGFR 57 04/14/2023   , PT/PTT:  No results found for: PT, PTT  Imaging Studies: I have personally reviewed pertinent reports  EKG, Pathology, and Other Studies: I have personally reviewed pertinent reports  Code Status: Level 1 - Full Code  Advance Directive and Living Will:   completed, on file  Power of :   n/a  POLST:   n/a    Counseling / Coordination of Care  Total floor / unit time spent today 35 minutes  Greater than 50% of total time was spent with the patient and / or family counseling and / or coordination of care  A description of the counseling / coordination of care: provided medical updates, discussed palliative care, determined competency, determined goals of care, determined POA, determined social/family support, discussed plans of care, discussed symptom management, provided psychosocial support  PDMP Reviewed   Coordinated plan of care with primary team

## 2023-04-14 NOTE — ASSESSMENT & PLAN NOTE
WBC trend:   Recent Labs     04/13/23  1906 04/14/23  0453 04/15/23  0637 04/16/23  0429   WBC 22 19* 19 82* 20 77* 21 46*

## 2023-04-14 NOTE — ASSESSMENT & PLAN NOTE
Tylenol held at this time  S/p palliative care consultation 4/14; see note for full recommendations regarding pain regimen; appreciate input

## 2023-04-14 NOTE — OCCUPATIONAL THERAPY NOTE
Occupational Therapy Evaluation      Last [place]    4/14/2023    Principal Problem:    Ovarian cancer ([facility] )  Active Problems:    Primary hypertension    Migraine    Cancer related pain    Hyponatremia    Drug induced constipation    Palliative care patient    Malignant neoplasm metastatic to liver (HCC)    Asthma    Medication reaction    Abnormal liver enzymes    JOHNSON (dyspnea on exertion)    Leg swelling    Leukocytosis    RAYMOND (acute kidney injury) ([facility] )    Pleural effusion      Past Medical History:   Diagnosis Date    Acid reflux     Asthma     Cancer (HCC)     ovarian, peritoneal carcinomatosis    GERD (gastroesophageal reflux disease)     Hypercholesteremia     Hypertension     Migraine     Ovarian cancer ([facility] )        Past Surgical History:   Procedure Laterality Date    APPENDECTOMY N/A 8/6/2021    Procedure: APPENDECTOMY;  Surgeon: Ciara Donald MD;  [facility];  Service: Gynecology Oncology    CHOLECYSTECTOMY      COLONOSCOPY      FL CYSTOGRAM  8/18/2021    GALLBLADDER SURGERY      HYSTERECTOMY N/A 8/6/2021    Procedure: ENBLOCK HYSTERECTOMY, BILATERAL SALPINGO-OOPHORECTOMY, SIGMOIDECTOMY WITH LOW RECTAL REANASTAMOSIS, BLADDER PERITONEAL STRIPPING AND CYSTOTOMY REPAIR, DIAPHRAGM STRIPPING, SMALL BOWEL RESECTION WITH RE-ANASTAMOSIS;  Surgeon: Ciara Donald MD;  [facility];  Service: Gynecology Oncology    IR ASPIRATION ONLY  8/31/2021    IR DRAINAGE TUBE CHECK AND/OR REMOVAL  9/17/2021    IR DRAINAGE TUBE CHECK/CHANGE/REPOSITION/REINSERTION/UPSIZE  8/27/2021    IR DRAINAGE TUBE CHECK/CHANGE/REPOSITION/REINSERTION/UPSIZE  9/3/2021    IR DRAINAGE TUBE PLACEMENT  8/18/2021    IR PORT PLACEMENT  9/17/2021    IR THORACENTESIS  8/18/2021    IR THORACENTESIS  9/3/2021    LAPAROTOMY N/A 8/6/2021    Procedure: LAPAROTOMY EXPLORATORY; OVER SEW PANCREAS TAIL;  Surgeon: Ciara Donald MD;  [facility]: [facility];  Service: Gynecology Oncology    LAPAROTOMY N/A 11/4/2022    Procedure: Hollis Olivares "EXPLORATORY WITH ULTRASOUND GUIDANCE;  Surgeon: Marly Noble MD;  Location: [facility];  Service: Surgical Oncology    OMENTECTOMY N/A 8/6/2021    Procedure: RADICAL OMENTECTOMY;  Surgeon: Pam Kaye MD;  [facility]: [facility];  Service: Gynecology Oncology    SC LAPS ABD PRTM&OMENTUM DX W/WO SPEC BR/ [facility] N/A 8/6/2021    Procedure: LAPAROSCOPY DIAGNOSTIC;  Surgeon: Pam Kaye MD;  [facility]: [facility];  Service: Gynecology Oncology    RIGHT OOPHORECTOMY  1986    SPLENECTOMY, TOTAL N/A 8/6/2021    Procedure: SPLENECTOMY;  Surgeon: Pam Kaye MD;  [facility]: [facility];  Service: Gynecology Oncology    TONSILLECTOMY          04/14/23 1115   OT Last Visit   OT Visit Date 04/14/23   Note Type   Note type Evaluation   Pain Assessment   Pain Assessment Tool 0-10   Pain Score No Pain   Restrictions/Precautions   Other Precautions O2;Cognitive  (2L O2 via NC)   Home Living   Type of [address] [place] [address] One level  (no KAROL)   Bathroom Shower/Tub Walk-in shower   Bathroom Toilet Standard   Bathroom Equipment Shower chair;Grab bars in shower  (questionable grab bars in shower)   [address] [facility]  (standard walker)   Prior Function   Level of [place] Independent with ADLs; Independent with functional mobility   Lives With Spouse   Receives Help From Family   IADLs Family/Friend/Other provides transportation  (shares responsibilities)   Falls in the last 6 months 0   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\")   Lifestyle   Autonomy PTA, pt reporst being (I) with basic self cares  Pt reports has not driven since being put in Oxycodone  \"everything is a struggle but I get it done  \"   Reciprocal Relationships Spouse works during the week except for on Mondays  Sister drives pt to any necessary appointments etc  Pt shares cooking and laundry with spouse     Intrinsic Gratification \"Hang out with friends\", \"   General   Family/Caregiver Present Yes  (at the end of session, " "sister arrived )   Subjective   Subjective \"I keep pushing through everything  \"   ADL   Grooming Assistance 4  Minimal Assistance  (CGA and occasional close (S))   Grooming Deficit Wash/dry hands   UB Dressing Assistance 5  Supervision/Setup   LB Dressing Assistance 4  Minimal Assistance   LB Dressing Deficit Steadying   Toileting Assistance  4  Minimal Assistance   Toileting Deficit Steadying;Clothing management down  (1 posterior LOB when managing clothing)   Bed Mobility   Supine to Sit 5  Supervision   Additional items Bedrails;HOB elevated   Transfers   Sit to Stand 5  Supervision  (hand held (A))   Stand to Sit 5  Supervision   Toilet transfer 5  Supervision   Additional items Assist x 1   Functional Mobility   Functional Mobility 4  Minimal assistance   Additional Comments hand held (A) to mobilize from bed>bathroom>chair   Balance   Static Sitting Good   Dynamic Sitting Good   Static Standing Fair -  ((+) LOB x2 during session with min (A) to recover  Reports (+)occasional dizziness while mobilizing to/from restroom )   Dynamic Standing   (did not challenge dyn balance on this date)   Activity Tolerance   Activity Tolerance Patient limited by fatigue   Nurse Made Aware RN cleared pt for OT evaluation   RUE Assessment   RUE Assessment X  (ROM and strength grossly WFL  DId not formally assess due to pt with urgent need to use restroom   Assessed through observation of participation in functional tasks )   LUE Assessment   LUE Assessment X  (same as above )   Hand Function   Gross Motor Coordination Functional   Fine Motor Coordination Impaired  (observed pt with difficulty opening containers, frequently dropping ADL items )   Hand Function Comments +   Vision-Basic Assessment   Current Vision Wears glasses all the time   Vision - Complex Assessment   Acuity Able to read clock/calendar on wall without difficulty   Cognition   Overall Cognitive Status Impaired   Arousal/Participation Cooperative   Orientation " "Level Oriented to person;Oriented to place  (When asked what th)   Following Commands Follows one step commands with increased time or repetition   Comments Pt able to verify ID by stating name and   Pt required repeat of questions at times and was unaware giving inappropriate responses to some questions- for example: when asked what the current month and year were, pt responded, [employer]  \" Pt did not self correct  OT repeated the question and pt states, \"\" but was unaware of not providing month  OT asked for the month again and pt was able to produce appropriate response  Assessment   Limitation Decreased ADL status; Decreased high-level ADLs; Decreased self-care trans;Decreased fine motor control;Decreased endurance;Decreased cognition;Decreased Safe judgement during ADL   Prognosis Fair   Goals   Patient Goals \"To go home when I am ready  \"    \"I would like to go back to work\"   LTG Time Frame 7-10   Long Term Goal #1 see goals listed below   Plan   Treatment Interventions ADL retraining;Functional transfer training;Patient/family training;Equipment evaluation/education; Fine motor coordination activities; Compensatory technique education; Activityengagement; Energy conservation;UE strengthening/ROM   Goal Expiration Date 23   OT Frequency 2-3x/wk   Recommendation   OT Discharge Recommendation   ([facility] vs  [facility] pending progress with mobility   Recommend trial with DME as deemed appropriate by PT )   AM-PAC Daily Activity Inpatient   Lower Body Dressing 3   Bathing 3   Toileting 3   Upper Body Dressing 4   Grooming 4   Eating 4   Daily Activity Raw Score 21   Daily Activity Standardized Score (Calc for Raw Score >=11) 44 27   AM-PAC Applied Cognition Inpatient   Following a Speech/Presentation 2   Understanding Ordinary Conversation 3   Taking Medications 3   Remembering Where Things Are Placed or Put Away 3   Remembering List of 4-5 Errands 2   Taking Care of Complicated Tasks 2   Applied Cognition " Raw Score 15   Applied Cognition Standardized Score 33 54   End of Consult   Education Provided Yes;Family or social support of family present for education by provider  (sister present at the end of session )   Patient Position at End of Consult All needs within reach   Nurse Communication Nurse aware of consult   End of Consult Comments Pt educated on importance of using call bell prior to mobilizing  Pt verbalized understanding  Assessment  Pt is a 68 y o  female seen for OT evaluation s/p admission to [address] [place] [address] on 4/13/2023  with diagnoses Ovarian cancer ([facility] )  Pt has a significant PMH impacting occupational performance, which is listed above  PTA pt living with spouse in a [facility] with no KAROL and reports self to be completely (I) PTA with ADLs and light IADLs  Pt reports spouse works during the week and therefore is home alone often  Pt is motivated to return to home  Personal and environmental factors supporting pt at time of IE include attitude towards recovery and accessible home environment  Personal and environmental factors inhibiting engagement in occupations include limited social support, difficulty completing ADLs, difficulty completing IADLs, and dec'd insight to deficits   During evaluation pt performed as is outlined above in flowsheet  Pt required VC for safety, seated rest breaks, and occasional minimal assistance to recover LOB x2 with standing ADL tasks and functional mobility  Pt may benefit from DME to increase safety with mobility and therefore recommend PT to further assess this  Standardized assessments used to assist in identifying performance deficits include AMPAC 6-Clicks   Performance deficits that affect the pt’s occupational performance during the initial evaluation include impaired balance, functional mobility, endurance, activity tolerance, fine motor coordination, forward functional reach, functional standing tolerance, and overall strength, direction following, safety awareness, insight into deficits, orientation, problem solving, learning/remembering new tasks, and appropriate responses , response time  Based on pt’s functional performance and deficits the following occupations will be addressed in OT treatments in order to maximize pt’s independence and overall occupational performance: grooming, bathing/showering, toileting and toilet hygiene, dressing, functional mobility, community mobility, household preparation, and leisure participation   Goals are listed below  Upon discharge from acute care setting recommend d/c to  [facility] vs  [facility] pending progress with higher level mobility (given 2 LOB during session, pt is a fall risk at this time)   This evaluation required an extensive review of medical and/or therapy records and additional review of physical, cognitive and psychosocial history related to functional performance  Based upon functional performance deficits and assessments, this evaluation has been identified as a high complexity evaluation      GOALS:    Pt will achieve the following within specified time frame:  *Perform ADL transfers with mod (I) for inc'd independence with ADLs/purposeful tasks    *Perform UB ADL (I)ly for inc'd independence with self cares    *Perform LB ADL (I)ly using AE prn for inc'd independence with self cares    *Increase static stand balance to Fair+ and dyn stand balance to Fair for inc'd safety with standing purposeful tasks    *Increase stand tolerance x5-7 m for inc'd tolerance with standing purposeful tasks    *Perform clothing management/hygiene for toileting mod (I)/(I) for inc'd independence with self cares    *Participate in further cognitive testing to assist with safe d/c planning    *Perform sinkside G&H mod (I)/(I)ly, with good safety and Fair+ balance for inc'd independence with self cares    *Pt will verbalize 2-3 energy conservation techniques to utilize in order to complete purposeful/ADL tasks safety, at highest level of performance and independence and with self report of dec'd fatigue          Johnie Fried, OT

## 2023-04-14 NOTE — CONSULTS
Consultation - [facility]  Lelia Bamberger 68 y o  female MRN: [id number]  Unit/Bed#: S -01 Encounter: [id number]        Inpatient consult to gastroenterology  Consult performed by: Graciela Kaye PA-C  Consult ordered by: Tosha Hinton MD          Reason for Consult / Principal Problem: Elevated LFTs    ASSESSMENT and PLAN:    Principal Problem:    Ovarian cancer ([facility] )  Active Problems:    Primary hypertension    Migraine    Cancer related pain    Hyponatremia    Drug induced constipation    Palliative care patient    Malignant neoplasm metastatic to liver ([facility] )    Asthma    Medication reaction    Abnormal liver enzymes    JOHNSON (dyspnea on exertion)    Leg swelling    Leukocytosis    RAYMOND (acute kidney injury) ([place] [id number] )    Pleural effusion    #1  Elevated LFTs in the setting of hepatic metastasis: Patient has a history of metastatic ovarian cancer  CT scan this admission was notable for worsening hepatic metastasis  There was no obvious ductal dilation on recent ultrasound or CT scan  Suspect that her worsening LFTs are more related to tumor burden than an obstruction  Patient has had a chronically highly elevated alkaline phosphatase in the past up in the 8-900s  Labs upon admission were notable for a bilirubin of 3 3, alkaline phosphatase of 1004, AST of 116, and ALT of 52  These have slightly down trended since admission  -Discussed the image findings with the patient in detail  Discussed that my concern is is that this is related to the tumor burden itself and not necessarily an obstruction of the bile ducts    Advised that we can get an MRI to further assess and confirm whether or not an ERCP would be indicated but at this time it is likely not going to be indicated  -Monitor LFTs  -Agree with palliative care consultation   -Can resume heparin and advance diet as tolerated  -------------------------------------------------------------------------------------------------------------------    HPI: This is a 81-year-old female with a history of metastatic ovarian cancer, GERD, hypertension, and migraine who presented to the hospital secondary to abnormal outpatient labs  She reports that she has chronic right-sided abdominal pain  Denies any significant nausea or vomiting  Appetite has been relatively good  Denies any change in bowel habits  Denies any diarrhea, constipation, blood in the stool  She had outpatient lab work and was noted to have significantly elevated LFTs and was prompted to come to the emergency room for further evaluation  Her CAT scan was notable for worsening tumor burden within the liver  She denies seeing any jaundice, scleral icterus, or darkening urine herself at home  REVIEW OF SYSTEMS:    CONSTITUTIONAL: Denies any fever, chills, or rigors  Good appetite, and no recent weight loss  HEENT: No earache or tinnitus  Denies hearing loss or visual disturbances  CARDIOVASCULAR: No chest pain or palpitations  RESPIRATORY: Denies any cough, hemoptysis, dyspnea on exertion  +SOB  GASTROINTESTINAL: As noted in the History of Present Illness  GENITOURINARY: No problems with urination  Denies any hematuria or dysuria  NEUROLOGIC: No dizziness or vertigo, denies headaches  MUSCULOSKELETAL: Denies any muscle or joint pain  SKIN: Denies skin rashes or itching  ENDOCRINE: Denies excessive thirst  Denies intolerance to heat or cold  PSYCHOSOCIAL: Denies depression or anxiety  Denies any recent memory loss         Historical Information   Past Medical History:   Diagnosis Date   • Acid reflux    • Asthma    • Cancer (HCC)     ovarian, peritoneal carcinomatosis   • GERD (gastroesophageal reflux disease)    • Hypercholesteremia    • Hypertension    • Migraine    • Ovarian cancer [facility])      Past Surgical History:   Procedure Laterality Date   • APPENDECTOMY N/A 8/6/2021    Procedure: APPENDECTOMY;  Surgeon: Rome Root MD;  [facility]: [facility];  Service: Gynecology Oncology   • CHOLECYSTECTOMY     • COLONOSCOPY     • FL CYSTOGRAM  8/18/2021   • GALLBLADDER SURGERY     • HYSTERECTOMY N/A 8/6/2021    Procedure: ENBLOCK HYSTERECTOMY, BILATERAL SALPINGO-OOPHORECTOMY, SIGMOIDECTOMY WITH LOW RECTAL REANASTAMOSIS, BLADDER PERITONEAL STRIPPING AND CYSTOTOMY REPAIR, DIAPHRAGM STRIPPING, SMALL BOWEL RESECTION WITH RE-ANASTAMOSIS;  Surgeon: Rome Root MD;  Location: [facility];  Service: Gynecology Oncology   • IR ASPIRATION ONLY  8/31/2021   • IR DRAINAGE TUBE CHECK AND/OR REMOVAL  9/17/2021   • IR DRAINAGE TUBE CHECK/CHANGE/REPOSITION/REINSERTION/UPSIZE  8/27/2021   • IR DRAINAGE TUBE CHECK/CHANGE/REPOSITION/REINSERTION/UPSIZE  9/3/2021   • IR DRAINAGE TUBE PLACEMENT  8/18/2021   • IR PORT PLACEMENT  9/17/2021   • IR THORACENTESIS  8/18/2021   • IR THORACENTESIS  9/3/2021   • LAPAROTOMY N/A 8/6/2021    Procedure: LAPAROTOMY EXPLORATORY; OVER SEW PANCREAS TAIL;  Surgeon: Rome Root MD;  [facility]: [facility];  Service: Gynecology Oncology   • LAPAROTOMY N/A 11/4/2022    Procedure: LAPAROTOMY EXPLORATORY WITH ULTRASOUND GUIDANCE;  Surgeon: Dwaine Wolfe MD;  Location: [facility];  Service: Surgical Oncology   • OMENTECTOMY N/A 8/6/2021    Procedure: RADICAL OMENTECTOMY;  Surgeon: Rome Root MD;  Location: [facility];  Service: Gynecology Oncology   • [place] LAPS ABD PRTM&OMENTUM DX W/WO [address]/ [facility] N/A 8/6/2021    Procedure: LAPAROSCOPY DIAGNOSTIC;  Surgeon: Rome Root MD;  Location: [facility];  Service: Gynecology Oncology   • RIGHT OOPHORECTOMY  1986   • SPLENECTOMY, TOTAL N/A 8/6/2021    Procedure: SPLENECTOMY;  Surgeon: Rome Root MD;  Location: [facility];  Service: Gynecology Oncology   • TONSILLECTOMY       Social History   Social History     Substance and Sexual Activity   Alcohol Use Yes    Comment: socially     Social History Substance and Sexual Activity   Drug Use Yes   • Frequency: 7 0 times per week   • Types: Marijuana    Comment: edible     Social History     Tobacco Use   Smoking Status Never   Smokeless Tobacco Never     History reviewed  No pertinent family history      Meds/Allergies     Medications Prior to Admission   Medication   • acetaminophen (TYLENOL) 325 mg tablet   • albuterol (PROVENTIL HFA,VENTOLIN HFA) 90 mcg/act inhaler   • amLODIPine-benazepril (LOTREL) 10-20 MG per capsule   • butalbital-aspirin-caffeine (FIORINAL) -40 mg per capsule   • cyclophosphamide (CYTOXAN) 50 mg capsule   • docusate sodium (COLACE) 100 mg capsule   • DULoxetine (CYMBALTA) 20 mg capsule   • fluticasone (FLONASE) 50 mcg/act nasal spray   • fluticasone (Flovent HFA) 44 mcg/act inhaler   • gabapentin (Neurontin) 300 mg capsule   • Lidocaine 4 % PTCH   • LORazepam (ATIVAN) 1 mg tablet   • omeprazole (PriLOSEC) 20 mg delayed release capsule   • ondansetron (ZOFRAN) 8 mg tablet   • oxyCODONE (Roxicodone) 5 immediate release tablet   • oxyCODONE ER (Xtampza ER) 9 mg extended release capsule   • polyethylene glycol (MIRALAX) 17 g packet   • potassium chloride (KLOR-CON) 20 mEq packet   • pravastatin (PRAVACHOL) 10 mg tablet   • promethazine (PHENERGAN) 12 5 MG tablet   • senna (SENOKOT) 8 6 mg     Current Facility-Administered Medications   Medication Dose Route Frequency   • albuterol (PROVENTIL HFA,VENTOLIN HFA) inhaler 2 puff  2 puff Inhalation Q6H PRN   • amLODIPine (NORVASC) tablet 10 mg  10 mg Oral Daily    And   • lisinopril (ZESTRIL) tablet 20 mg  20 mg Oral Daily   • benzonatate (TESSALON PERLES) capsule 100 mg  100 mg Oral TID PRN   • butalbital-acetaminophen-caffeine (FIORICET,ESGIC) -40 mg per tablet 1 tablet  1 tablet Oral Q6H PRN   • calcium carbonate (TUMS) chewable tablet 1,000 mg  1,000 mg Oral Daily PRN   • docusate sodium (COLACE) capsule 100 mg  100 mg Oral BID   • DULoxetine (CYMBALTA) delayed release capsule 20 mg  20 mg Oral Daily   • fluticasone (FLONASE) 50 mcg/act nasal spray 1 spray  1 spray Nasal Daily   • fluticasone (FLOVENT HFA) 44 mcg/act inhaler 2 puff  2 puff Inhalation BID   • gabapentin (NEURONTIN) capsule 300 mg  300 mg Oral TID   • heparin (porcine) subcutaneous injection 5,000 Units  5,000 Units Subcutaneous Q8H [address]   • lidocaine (LIDODERM) 5 % patch 1 patch  1 patch Topical Q12H PRN   • LORazepam (ATIVAN) tablet 0 5 mg  0 5 mg Oral Q8H PRN   • ondansetron (ZOFRAN) injection 4 mg  4 mg Intravenous Q6H PRN   • oxyCODONE (ROXICODONE) immediate release tablet 10 mg  10 mg Oral HS PRN   • oxyCODONE (ROXICODONE) IR tablet 5 mg  5 mg Oral Q4H PRN   • pantoprazole (PROTONIX) EC tablet 40 mg  40 mg Oral Early Morning   • polyethylene glycol (MIRALAX) packet 17 g  17 g Oral Daily   • pravastatin (PRAVACHOL) tablet 10 mg  10 mg Oral HS   • senna (SENOKOT) tablet 8 6 mg  8 6 mg Oral Daily PRN   • sodium chloride 0 9 % infusion  100 mL/hr Intravenous Continuous     Facility-Administered Medications Ordered in Other Encounters   Medication Dose Route Frequency   • [MAR Hold] alteplase (CATHFLO) injection 2 mg  2 mg Intracatheter Q1MIN PRN       Allergies   Allergen Reactions   • Erythromycin Nausea Only   • Magnesium Hives, Tachycardia and Fever     Noted on IV magnesium and oral mag oxide supplements   • Morphine Headache           Objective     Blood pressure 102/50, pulse 90, temperature 100 3 °F (37 9 °C), temperature source Oral, resp  rate 18, weight 76 2 kg (168 lb), SpO2 91 %        Intake/Output Summary (Last 24 hours) at 4/14/2023 1053  Last data filed at 4/14/2023 0212  Gross per 24 hour   Intake 500 ml   Output --   Net 500 ml         PHYSICAL EXAM:      General Appearance:   Alert, cooperative, no distress, appears stated age    HEENT:   Normocephalic, atraumatic, scleral icterus present, no oropharyngeal thrush present      Neck:  Supple, symmetrical, trachea midline, no adenopathy;    thyroid: no enlargement/tenderness/nodules; no carotid  bruit or JVD    Lungs:   Clear to auscultation bilaterally; no rales, rhonchi or wheezing; respirations unlabored    Heart[de-identified]   S1 and S2 normal; regular rate and rhythm; no murmur, rub, or gallop  Abdomen:   Soft, non-tender, non-distended; normal bowel sounds; no masses; hepatomegaly noted    Genitalia:   Deferred    Rectal:   Deferred    Extremities:  No cyanosis, clubbing; LE edema present   Pulses:  2+ and symmetric all extremities    Skin:  Skin color, texture, turgor normal, no rashes or lesions    Lymph nodes:  No palpable cervical, axillary or inguinal lymphadenopathy        Lab Results:   Results from last 7 days   Lab Units 04/14/23  0453   WBC Thousand/uL 19 82*   HEMOGLOBIN g/dL 9 4*   HEMATOCRIT % 27 5*   PLATELETS Thousands/uL 253   NEUTROS PCT % 69   LYMPHS PCT % 11*   MONOS PCT % 19*   EOS PCT % 0     Results from last 7 days   Lab Units 04/14/23  0453   POTASSIUM mmol/L 3 9   CHLORIDE mmol/L 95*   CO2 mmol/L 24   BUN mg/dL 46*   CREATININE mg/dL 0 99   CALCIUM mg/dL 7 8*   ALK PHOS U/L 809*   ALT U/L 38   AST U/L 74*               Imaging Studies: I have personally reviewed pertinent imaging studies  CT pe study w abdomen pelvis w contrast    Result Date: 4/13/2023  Impression: 1  Stable pulmonary metastases  2   Stable small right pleural effusion  Right lower lobe atelectasis  3   No evidence of acute pulmonary embolus  4   Stable hepatic metastases and hepatomegaly  Workstation performed: [id number]     US right upper quadrant    Result Date: 4/13/2023  Impression: Diffuse liver metastases  No intrahepatic biliary ductal dilatation identified  Workstation performed: [id number]           Patient was seen and examined by Dr Paola Muñiz  All [facility] medical decisions were made by Dr Paola Muñiz  Thank you for allowing us to participate in the care of this present patient  We will follow-up with you closely

## 2023-04-14 NOTE — RESPIRATORY THERAPY NOTE
Home Oxygen Qualifying Test     Patient name: Katy Eugene        : 1950   Date of Test:  2023  Diagnosis:    Home Oxygen Test:    **Medicare Guidelines require item(s) 1-5 on all ambulatory patients or 1 and 2 on non-ambulatory patients  1  Baseline SPO2 on Room Air at rest 90 %     1  SPO2 during exertion on Room Air 88  %  a  During exertion monitor SPO2  If SPO2 increases >=89%, do not add supplemental oxygen    2  SPO2 on Oxygen at Rest 96 % at 2 LPM    3  SPO2 during exertion on Oxygen 93 % at 2 LPM    4  Test performed during exertion activity  [x]  Supplemental Home Oxygen is indicated  []  Client does not qualify for home oxygen  Respiratory Additional Notes- pt tolerated well  Needs 2 LPM with exertion      Carol Clifford, RT

## 2023-04-14 NOTE — UTILIZATION REVIEW
Initial Clinical Review    Admission: Date/Time/Statement:   Admission Orders (From admission, onward)     Ordered        04/13/23 2033  Inpatient Admission  Once                      Orders Placed This Encounter   Procedures   • Inpatient Admission     Standing Status:   Standing     Number of Occurrences:   1     Order Specific Question:   Level of Care     Answer:   Med Surg [16]     Order Specific Question:   Estimated length of stay     Answer:   More than 2 Midnights     Order Specific Question:   Certification     Answer:   I certify that inpatient services are medically necessary for this patient for a duration of greater than two midnights  See H&P and MD Progress Notes for additional information about the patient's course of treatment  ED Arrival Information     Expected   4/13/2023     Arrival   4/13/2023 14:24    Acuity   Urgent            Means of arrival   Walk-In    Escorted by   [address] [place] [address] Oncology    Admission type   Emergency            Arrival complaint   Abdominal pain           Chief Complaint   Patient presents with   • Abdominal Pain     Pt with history of ovarian CA presents with right sided mid abdominal pain  Also reports SOB  Denies n/v/d  Also reports bilateral feet swelling       Initial Presentation: 68 y o  female with 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 presenting with SOB, abdominal pain and lower extremity swelling  Pt  presented to the outpatient lab for her bloodwork today and after finding an elevated bilirubin and creatinine was called by the office and told to come to the ER for further workup and assessment  She reports that she has been doing well overall since her last round of chemotherapy  Her pain is well controlled, though she does have more discomfort in her epigastric/RUQ area, especially when sitting   She also states that she had been intermittently febrile at home last week but hasn't had a fever in 4-5 days  Plan: Inpatient admission for evaluation and treatment of ovarian cancer, RAYMOND with creatinine of 1 37, leukocytosis, leg swelling, dyspnea on exertion, abnormal liver enzymes, malignant neoplasm metastatic to liver, hyponatremia, HTN: IV fluids, trend WBCs, BLE dopplers, CT PE, trend bilirubin, RUQ US, GI consult, home antihypertensives  Date: 4/14   Day 2:     GYN Oncology: Pt with poor prognosis  Plan for palliative consult this morning  Small right pleural effusion and associated atelectasis  Consider IR drainage if plausible  Continue IV fluids  BLE dopplers  Bilirubin continues to trend up  RUQ US without acute bile duct dilation  GI consult  GI consult: Discussed that my concern is is that this is related to the tumor burden itself and not necessarily an obstruction of the bile ducts  Can resume heparin and advance diet as tolerated  Monitor LFTs       ED Triage Vitals   Temperature Pulse Respirations Blood Pressure SpO2   04/13/23 1447 04/13/23 1447 04/13/23 1447 04/13/23 1447 04/13/23 1447   97 5 °F (36 4 °C) 99 18 116/57 94 %      Temp Source Heart Rate Source Patient Position - Orthostatic VS BP Location FiO2 (%)   04/13/23 1447 04/13/23 1447 04/13/23 1841 04/13/23 1447 --   Oral Monitor Sitting Right arm       Pain Score       04/13/23 2356       7          Wt Readings from Last 1 Encounters:   04/13/23 76 2 kg (168 lb)     Additional Vital Signs:     Date/Time Temp Pulse Resp BP MAP (mmHg) SpO2 Calculated FIO2 (%) - Nasal Cannula Nasal Cannula O2 Flow Rate (L/min) O2 Device   04/14/23 1115 -- -- -- -- -- 95 % 28 2 L/min Nasal cannula   04/14/23 0756 100 3 °F (37 9 °C) 90 18 102/50 -- 91 % -- -- None (Room air)   04/14/23 0650 100 °F (37 8 °C) 115 Abnormal  21 88/55 Abnormal  66 89 % Abnormal  -- -- --   04/13/23 23:13:47 97 8 °F (36 6 °C) 102 20 136/70 92 95 % -- -- --   04/13/23 2045 -- 85 18 108/62 -- 98 % -- -- None (Room air)   04/13/23 1841 -- 87 18 110/57 -- 95 % -- -- None (Room air) Pertinent Labs/Diagnostic Test Results:   CT pe study w abdomen pelvis w contrast   Final Result by Kisha Jovel MD (04/13 2349)         1  Stable pulmonary metastases  2   Stable small right pleural effusion  Right lower lobe atelectasis  3   No evidence of acute pulmonary embolus  4   Stable hepatic metastases and hepatomegaly  Workstation performed: [id number]         VAS lower limb venous duplex study, complete bilateral   Final Result by Deanne Roth DO (04/13 2208)      US right upper quadrant   Final Result by Lindia Barthel, MD (04/13 1956)      Diffuse liver metastases  No intrahepatic biliary ductal dilatation identified        Workstation performed: [id number]         XR chest 1 view portable    (Results Pending)   MRI inpatient order    (Results Pending)         Results from last 7 days   Lab Units 04/14/23 0453 04/13/23 1906 04/13/23  1126   WBC Thousand/uL 19 82* 22 19* 24 20*   HEMOGLOBIN g/dL 9 4* 9 3* 9 6*   HEMATOCRIT % 27 5* 28 6* 29 3*   PLATELETS Thousands/uL 253 237 233   NEUTROS ABS Thousands/µL 13 72* 14 82* 15 99*         Results from last 7 days   Lab Units 04/14/23 0453 04/13/23 1906 04/13/23  1126   SODIUM mmol/L 127* 127* 127*   POTASSIUM mmol/L 3 9 4 1 4 3   CHLORIDE mmol/L 95* 93* 93*   CO2 mmol/L 24 23 20*   ANION GAP mmol/L 8 11 14*   BUN mg/dL 46* 50* 47*   CREATININE mg/dL 0 99 1 37* 1 22   EGFR ml/min/1 73sq m 57 38 44   CALCIUM mg/dL 7 8* 8 1* 7 6*   MAGNESIUM mg/dL 1 9  --  1 8*   PHOSPHORUS mg/dL 4 3*  --   --      Results from last 7 days   Lab Units 04/14/23 0453 04/13/23 1906 04/13/23  1126   AST U/L 74* 95* 116*   ALT U/L 38 46 52   ALK PHOS U/L 809* 942* 1,004*   TOTAL PROTEIN g/dL 5 4* 5 9* 5 6*   ALBUMIN g/dL 2 2* 2 4* 2 4*   TOTAL BILIRUBIN mg/dL 3 28* 3 46* 3 36*         Results from last 7 days   Lab Units 04/14/23 0453 04/13/23  1906 04/13/23  1126   GLUCOSE RANDOM mg/dL 77 100 85         Results from last 7 days Lab Units 04/13/23  1906   D-DIMER QUANTITATIVE ug/ml FEU 3 80*         Results from last 7 days   Lab Units 04/13/23  1126   TSH 3RD GENERATON uIU/mL 2 034         Results from last 7 days   Lab Units 04/13/23  1906   BNP pg/mL 95         Results from last 7 days   Lab Units 04/13/23  2041 04/13/23  1127   CLARITY UA  Turbid  --    COLOR UA  Dark Yellow  --    SPEC GRAV UA  1 025  --    PH UA  5 0  --    GLUCOSE UA mg/dl Negative  --    KETONES UA mg/dl Negative  --    BLOOD UA  Negative  --    PROTEIN UA mg/dl 30 (1+)*  --    NITRITE UA  Negative  --    BILIRUBIN UA  Small*  --    UROBILINOGEN UA (BE) mg/dl 3 0*  --    LEUKOCYTES UA  Trace*  --    WBC UA /hpf 1-2  --    RBC UA /hpf 1-2  --    BACTERIA UA /hpf Occasional  --    EPITHELIAL CELLS WET PREP /hpf Occasional  --    MUCUS THREADS  Occasional*  --    CREATININE UR mg/dL  --  196 1   PROTEIN UR mg/dL  --  102   PROT/CREAT RATIO UR   --  0 52*         ED Treatment:   Medication Administration from 04/13/2023 1309 to 04/13/2023 2207     None        Past Medical History:   Diagnosis Date   • Acid reflux    • Asthma    • Cancer (HCC)     ovarian, peritoneal carcinomatosis   • GERD (gastroesophageal reflux disease)    • Hypercholesteremia    • Hypertension    • Migraine    • Ovarian cancer ([facility] [address] )      Present on Admission:  • Leg swelling  • Abnormal liver enzymes  • Malignant neoplasm metastatic to liver (HCC)  • JOHNSON (dyspnea on exertion)  • Ovarian cancer (HCC)  • Primary hypertension  • Migraine  • Cancer related pain  • Hyponatremia  • Asthma  • Medication reaction  • Drug induced constipation      Admitting Diagnosis: Abdominal pain [R10 9]  Cancer related pain [G89 3]  Malignant neoplasm metastatic to liver (HCC) [C78 7]  Age/Sex: 68 y o  female  Admission Orders:  Scheduled Medications:  amLODIPine, 10 mg, Oral, Daily   And  lisinopril, 20 mg, Oral, Daily  docusate sodium, 100 mg, Oral, BID  DULoxetine, 20 mg, Oral, Daily  fluticasone, 1 spray, Nasal, Daily  fluticasone, 2 puff, Inhalation, BID  gabapentin, 300 mg, Oral, TID  heparin (porcine), 5,000 Units, Subcutaneous, Q8H ANA MARIA  oxyCODONE, 10 mg, Oral, HS  pantoprazole, 40 mg, Oral, Early Morning  polyethylene glycol, 17 g, Oral, Daily  pravastatin, 10 mg, Oral, HS      Continuous IV Infusions:  sodium chloride, 100 mL/hr, Intravenous, Continuous      PRN Meds:  albuterol, 2 puff, Inhalation, Q6H PRN  benzonatate, 100 mg, Oral, TID PRN  butalbital-acetaminophen-caffeine, 1 tablet, Oral, Q6H PRN  calcium carbonate, 1,000 mg, Oral, Daily PRN  lidocaine, 1 patch, Topical, Q12H PRN  LORazepam, 0 5 mg, Oral, Q8H PRN  ondansetron, 4 mg, Intravenous, Q6H PRN  oxyCODONE, 10 mg, Oral, HS PRN  oxyCODONE, 5 mg, Oral, Q4H PRN  senna, 8 6 mg, Oral, Daily PRN        IP CONSULT TO GASTROENTEROLOGY  IP CONSULT TO PALLIATIVE CARE    Network Utilization Review Department  ATTENTION: Please call with any questions or concerns to [phone] and carefully listen to the prompts so that you are directed to the right person  All voicemails are confidential   Percy Mcmullen all requests for admission clinical reviews, approved or denied determinations and any other requests to dedicated fax number below belonging to the campus where the patient is receiving treatment   List of dedicated fax numbers for the Facilities:  [address] DENIALS (Administrative/Medical Necessity) [phone]   [address] (Maternity/NICU/Pediatrics) [phone]   [address] [phone]   [facility]  [phone]   [facility] [address] [place] [address] [address] [place] [id number] [facility] [address] [place] [address] [facility] [phone]

## 2023-04-14 NOTE — DISCHARGE SUMMARY
[facility]  Discharge Summary - Last Longoria 1950, 68 y o  female MRN: [id number]  Unit/Bed#: S -01 Encounter: [id number]  Primary Care Provider: Victor M Pradhan DO   Date and time admitted to hospital: 4/13/2023  6:24 PM    * Ovarian cancer [facility])  Assessment & Plan  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  S/p en block hysterectomy, BSO, sigmoidectomy with low rectal re-anastamosis, bladder peritoneal stripping and cystotomy repair, diaphragm stripping, small bowel resection with re-anastamosis on 8/6/2021  S/p 2 cycles of Keytruda  MRI with evidence of extensive hepatic metastases   S/p palliative care and GI consultations   Following [address] discussion, patient elected to for hospice management  Case management: Home hospice care in place, plan for discharge at 1PM 04/18/23    Pleural effusion  Assessment & Plan  Small right pleural effusion and associated atelectasis  Continued decreased breath sounds in RLL field this am  Currently maintaining 97% saturation on 1 5 L supplemental O2 via nasal cannula  IR consulted for potential palliative thoracentesis, too small for drainage       RAYMOND (acute kidney injury) [facility])  Assessment & Plan  Cr Trend:  Recent Labs     04/14/23  0453 04/15/23  0637 04/16/23  0429   CREATININE 0 99 1 04 1 04     Continue IV fluid hydration      Leukocytosis  Assessment & Plan  WBC trend:   Recent Labs     04/13/23  1906 04/14/23  0453 04/15/23  0637 04/16/23  0429   WBC 22 19* 19 82* 20 77* 21 46*         Leg swelling  Assessment & Plan  Bilateral lower extremity Doppler study negative 4/13    JOHNSON (dyspnea on exertion)  Assessment & Plan  D-dimer elevated on admission  CXR and CT PE negative for acute pathology  Continue to monitor SpO2 and clinical condition    Abnormal liver enzymes  Assessment & Plan  Trend:  Recent Labs     04/14/23  0453 04/15/23  0637 04/16/23  0429   ALKPHOS 809* 759* 793*   TBILI 3 28* 3 27* 3 61*     Continue to trend  RUQ US without acute bile duct dilation  S/p GI consultation 4/14  MRCP 4/14 - no biliary duct dilation      Medication reaction  Assessment & Plan  Reaction to magnesium PO and IV with hives, tachycardia and fever  Magnesium 1 8 on admission, 1 9 this am  Hold off on repletion at this time    Asthma  Assessment & Plan  Albuterol prn    Malignant neoplasm metastatic to liver [facility])  Assessment & Plan  Increasing T-bili with progression of hepatic metastases on MRI  No biliary duct dilation on MRI     Palliative care patient  Assessment & Plan  Palliative care consult placed for pain control  Comfort care ordered    Drug induced constipation  Assessment & Plan  Colace and Miralax ordered    Hyponatremia  Assessment & Plan  Last labs:   Lab Results   Component Value Date    SODIUM 127 (L) 04/16/2023    K 4 2 04/16/2023    CL 98 04/16/2023    CO2 19 (L) 04/16/2023    BUN 44 (H) 04/16/2023    CREATININE 1 04 04/16/2023    GLUC 93 04/16/2023    CALCIUM 8 1 (L) 04/16/2023      cc/h    Cancer related pain  Assessment & Plan  Tylenol held at this time  S/p palliative care consultation 4/14; see note for full recommendations regarding pain regimen; appreciate input    Migraine  Assessment & Plan  Fioricet prn    Primary hypertension  Assessment & Plan  Home amlodipine and lisinopril ordered       Admitting Diagnosis: Abdominal pain [R10 9]  Cancer related pain [G89 3]  Malignant neoplasm metastatic to liver ([facility] [address] ) [C78 7]    HPI: Pari Vang is a 68yo with progressive stage IIIc ovarian cancer who presented to the outpatient lab for her bloodwork and after finding an elevated bilirubin and creatinine was called by the office and told to come to the ER for further workup and assessment  She reports that she has been doing well overall since her last round of chemotherapy  Her pain is well controlled, though she does have more discomfort in her epigastric/RUQ area, especially when sitting   She also states that she had been intermittently febrile at home last week but hasn't had a fever in 4-5 days  She denies any chills or chest pain  She does reports SOB on exertion which has worsened over the past few weeks  She denies any nausea and is able to eat without issue  She denies any constipation and is having regular bowel movements  Procedures Performed: MRCP    Consult services: Gastroenterology, Palliative     Hospital Course: Patient underwent workup for worsening shortness of breath, fatigue, bilateral lower extremity swelling and worsening bilirubin  Gastroenterology and palliative care consults were obtained  CT PE was negative as was bilateral lower extremity venous duplexes for VTE  Diffuse liver metastases were seen on RUQ ultrasound  There was no evidence for intrahepatic biliary ductal dilation  Patient was recommended for MRCP per GI which also did not show evidence of biliary ductal dilation, but progression of extensive hepatic metastases  IR consultation was obtained for evaluation of pulmonary effusion for possible thoracentesis, but was not large enough for drainage  After extensive goals of care discussion, patient was counseled that she was not a good candidate for ongoing cancer-directed treatment  Patient expressed desire to transition to hospice care  Case management and hospice consultations were placed  She was discharge with home hospice care on     Significant Findings, Care, Treatment and Services Provided: see above    Complications: none apparent    Discharge Diagnosis: 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    Condition at Discharge: stable     Discharge instructions/Information to patient and family:   See after visit summary for information provided to patient and family  Provisions for Follow-Up Care:  See after visit summary for information related to follow-up care and any pertinent home health orders        Disposition: Home with home hospice care        Planned Readmission: No    Discharge Statement   I spent 20 minutes discharging the patient  This time was spent on the day of discharge  I had direct contact with the patient on the day of discharge  Additional documentation is required if more than 30 minutes were spent on discharge  Discharge Medications:  See after visit summary for reconciled discharge medications provided to patient and family

## 2023-04-14 NOTE — ED PROVIDER NOTES
History  Chief Complaint   Patient presents with   • Abdominal Pain     Pt with history of ovarian CA presents with right sided mid abdominal pain  Also reports SOB  Denies n/v/d  Also reports bilateral feet swelling     80-year-old female with history of ovarian CA presents for evaluation of generalized weakness, bilateral lower extremity swelling, shortness of breath for the past few weeks  Patient was told she has abnormal labs and was referred to the ER  She denies any fevers or chills  No cough  No nausea vomiting  No headache  Abdominal Pain  Associated symptoms: no anorexia, no belching, no chest pain, no chills, no constipation, no diarrhea, no dysuria, no fatigue, no fever, no flatus, no hematemesis, no hematochezia, no hematuria and no nausea        Prior to Admission Medications   Prescriptions Last Dose Informant Patient Reported? Taking?    DULoxetine (CYMBALTA) 20 mg capsule   No No   Sig: TAKE ONE CAPSULE BY MOUTH EVERY DAY   LORazepam (ATIVAN) 1 mg tablet   No No   Sig: Take 0 5 tablets (0 5 mg total) by mouth every 8 (eight) hours as needed (nausea or anxiety or insomnia)   Lidocaine 4 % PTCH   Yes No   Sig: Apply 1 patch topically   acetaminophen (TYLENOL) 325 mg tablet   No No   Sig: Take 3 tablets (975 mg total) by mouth every 8 (eight) hours   albuterol (PROVENTIL HFA,VENTOLIN HFA) 90 mcg/act inhaler   No No   Sig: Inhale 2 puffs every 6 (six) hours as needed for wheezing   amLODIPine-benazepril (LOTREL) 10-20 MG per capsule   Yes No   Sig: Take 1 capsule by mouth daily   butalbital-aspirin-caffeine (FIORINAL) -40 mg per capsule   No No   Sig: Take 1 capsule by mouth every 4 (four) hours as needed for headaches or migraine   cyclophosphamide (CYTOXAN) 50 mg capsule   No No   Sig: Take 1 capsule (50 mg total) by mouth daily   Patient not taking: Reported on 4/3/2023   docusate sodium (COLACE) 100 mg capsule   No No   Sig: Take 1 capsule (100 mg total) by mouth 2 (two) times a day fluticasone (FLONASE) 50 mcg/act nasal spray   Yes No   Si spray into each nostril daily   fluticasone (Flovent HFA) 44 mcg/act inhaler   No No   Sig: Inhale 2 puffs 2 (two) times a day Rinse mouth after use    gabapentin (Neurontin) 300 mg capsule   No No   Sig: Take 1 capsule (300 mg total) by mouth 3 (three) times a day   omeprazole (PriLOSEC) 20 mg delayed release capsule   Yes No   Sig: Take 20 mg by mouth 2 (two) times a day   ondansetron (ZOFRAN) 8 mg tablet   No No   Sig: Take 1 tablet (8 mg total) by mouth every 8 (eight) hours as needed for nausea or vomiting   oxyCODONE (Roxicodone) 5 immediate release tablet   No No   Sig: Take 1-2 tablets (5-10 mg total) by mouth every 4 (four) hours as needed for moderate pain or severe pain Max Daily Amount: 60 mg   oxyCODONE ER (Xtampza ER) 9 mg extended release capsule   No No   Sig: Take 1 tablet (9 mg total) by mouth daily at bedtime Max Daily Amount: 9 mg   polyethylene glycol (MIRALAX) 17 g packet   No No   Sig: Take 17 g by mouth daily for 7 days   potassium chloride (KLOR-CON) 20 mEq packet   No No   Sig: Take 20 mEq by mouth 2 (two) times a day   pravastatin (PRAVACHOL) 10 mg tablet   Yes No   Sig: Take 10 mg by mouth daily at bedtime As needed   promethazine (PHENERGAN) 12 5 MG tablet   No No   Sig: Take 1 tablet (12 5 mg total) by mouth every 6 (six) hours as needed for nausea or vomiting   senna (SENOKOT) 8 6 mg   No No   Sig: Take 1 tablet (8 6 mg total) by mouth daily as needed for constipation for up to 7 days   Patient taking differently: Take 8 6 mg by mouth daily as needed for constipation PRN      Facility-Administered Medications: None       Past Medical History:   Diagnosis Date   • Acid reflux    • Asthma    • Cancer (HCC)     ovarian, peritoneal carcinomatosis   • GERD (gastroesophageal reflux disease)    • Hypercholesteremia    • Hypertension    • Migraine    • Ovarian cancer [facility])        Past Surgical History:   Procedure Laterality Date • APPENDECTOMY N/A 8/6/2021    Procedure: APPENDECTOMY;  Surgeon: Emily Nunez MD;  [facility];  Service: Gynecology Oncology   • CHOLECYSTECTOMY     • COLONOSCOPY     • FL CYSTOGRAM  8/18/2021   • GALLBLADDER SURGERY     • HYSTERECTOMY N/A 8/6/2021    Procedure: ENBLOCK HYSTERECTOMY, BILATERAL SALPINGO-OOPHORECTOMY, SIGMOIDECTOMY WITH LOW RECTAL REANASTAMOSIS, BLADDER PERITONEAL STRIPPING AND CYSTOTOMY REPAIR, DIAPHRAGM STRIPPING, SMALL BOWEL RESECTION WITH RE-ANASTAMOSIS;  Surgeon: Emily Nunez MD;  [facility];  Service: Gynecology Oncology   • IR ASPIRATION ONLY  8/31/2021   • IR DRAINAGE TUBE CHECK AND/OR REMOVAL  9/17/2021   • IR DRAINAGE TUBE CHECK/CHANGE/REPOSITION/REINSERTION/UPSIZE  8/27/2021   • IR DRAINAGE TUBE CHECK/CHANGE/REPOSITION/REINSERTION/UPSIZE  9/3/2021   • IR DRAINAGE TUBE PLACEMENT  8/18/2021   • IR PORT PLACEMENT  9/17/2021   • IR THORACENTESIS  8/18/2021   • IR THORACENTESIS  9/3/2021   • LAPAROTOMY N/A 8/6/2021    Procedure: LAPAROTOMY EXPLORATORY; OVER SEW PANCREAS TAIL;  Surgeon: Emily Nunez MD;  [facility];  Service: Gynecology Oncology   • LAPAROTOMY N/A 11/4/2022    Procedure: LAPAROTOMY EXPLORATORY WITH ULTRASOUND GUIDANCE;  Surgeon: Zara Bunn MD;  [facility];  Service: Surgical Oncology   • OMENTECTOMY N/A 8/6/2021    Procedure: RADICAL OMENTECTOMY;  Surgeon: Emily Nunez MD;  [facility];  Service: Gynecology Oncology   • NC LAPS ABD PRTM&OMENTUM DX W/WO [facility] N/A 8/6/2021    Procedure: LAPAROSCOPY DIAGNOSTIC;  Surgeon: Emily Nunez MD;  [facility];  Service: Gynecology Oncology   • RIGHT OOPHORECTOMY  1986   • SPLENECTOMY, TOTAL N/A 8/6/2021    Procedure: SPLENECTOMY;  Surgeon: Emily Nunez MD;  [facility];  Service: Gynecology Oncology   • TONSILLECTOMY         History reviewed  No pertinent family history  I have reviewed and agree with the history as documented      E-Cigarette/Vaping • E-Cigarette Use Never User      E-Cigarette/Vaping Substances   • Nicotine No    • THC No    • CBD No    • Flavoring No    • Other No    • Unknown No      Social History     Tobacco Use   • Smoking status: Never   • Smokeless tobacco: Never   Vaping Use   • Vaping Use: Never used   Substance Use Topics   • Alcohol use: Yes     Comment: socially   • Drug use: Yes     Frequency: 7 0 times per week     Types: Marijuana     Comment: edible       Review of Systems   Constitutional: Negative for activity change, chills, fatigue and fever  HENT: Negative for congestion, ear discharge, ear pain, facial swelling and nosebleeds  Eyes: Negative for photophobia, pain, discharge, redness and itching  Respiratory: Negative for apnea, choking and chest tightness  Cardiovascular: Negative for chest pain  Gastrointestinal: Positive for abdominal pain  Negative for anorexia, blood in stool, constipation, diarrhea, flatus, hematemesis, hematochezia and nausea  Endocrine: Negative for cold intolerance, heat intolerance, polydipsia, polyphagia and polyuria  Genitourinary: Negative for difficulty urinating, dysuria, flank pain, frequency, genital sores, hematuria and pelvic pain  Musculoskeletal: Negative for arthralgias, back pain, gait problem and joint swelling  Skin: Negative for color change, pallor and rash  Allergic/Immunologic: Negative for environmental allergies, food allergies and immunocompromised state  Neurological: Negative for dizziness, facial asymmetry, light-headedness, numbness and headaches  Hematological: Negative for adenopathy  Does not bruise/bleed easily  Psychiatric/Behavioral: Negative for agitation, behavioral problems and confusion  Physical Exam  Physical Exam  Vitals and nursing note reviewed  Constitutional:       General: She is not in acute distress  Appearance: She is well-developed  She is not ill-appearing or toxic-appearing     HENT:      Head: Normocephalic and atraumatic  Mouth/Throat:      Mouth: Mucous membranes are moist    Eyes:      Extraocular Movements: Extraocular movements intact  Conjunctiva/sclera: Conjunctivae normal       Pupils: Pupils are equal, round, and reactive to light  Cardiovascular:      Rate and Rhythm: Normal rate and regular rhythm  Heart sounds: No murmur heard  Pulmonary:      Effort: Pulmonary effort is normal  No respiratory distress  Breath sounds: Normal breath sounds  Abdominal:      General: Bowel sounds are normal  There is no distension  There are no signs of injury  Palpations: Abdomen is soft  Tenderness: There is no abdominal tenderness  There is no right CVA tenderness, left CVA tenderness, guarding or rebound  Negative signs include Babb's sign and McBurney's sign  Hernia: No hernia is present  Musculoskeletal:         General: No swelling  Cervical back: Neck supple  Skin:     General: Skin is warm and dry  Capillary Refill: Capillary refill takes less than 2 seconds  Neurological:      General: No focal deficit present  Mental Status: She is alert     Psychiatric:         Mood and Affect: Mood normal          Vital Signs  ED Triage Vitals   Temperature Pulse Respirations Blood Pressure SpO2   04/13/23 1447 04/13/23 1447 04/13/23 1447 04/13/23 1447 04/13/23 1447   97 5 °F (36 4 °C) 99 18 116/57 94 %      Temp Source Heart Rate Source Patient Position - Orthostatic VS BP Location FiO2 (%)   04/13/23 1447 04/13/23 1447 04/13/23 1841 04/13/23 1447 --   Oral Monitor Sitting Right arm       Pain Score       --                  Vitals:    04/13/23 1447 04/13/23 1841   BP: 116/57 110/57   Pulse: 99 87   Patient Position - Orthostatic VS:  Sitting         Visual Acuity      ED Medications  Medications   iohexol (OMNIPAQUE) 240 MG/ML solution 50 mL (has no administration in time range)   albuterol (PROVENTIL HFA,VENTOLIN HFA) inhaler 2 puff (has no administration in time range)   amLODIPine (NORVASC) tablet 10 mg (has no administration in time range)     And   lisinopril (ZESTRIL) tablet 20 mg (has no administration in time range)   butalbital-acetaminophen-caffeine (FIORICET,ESGIC) -40 mg per tablet 1 tablet (has no administration in time range)   docusate sodium (COLACE) capsule 100 mg (has no administration in time range)   DULoxetine (CYMBALTA) delayed release capsule 20 mg (has no administration in time range)   fluticasone (FLONASE) 50 mcg/act nasal spray 1 spray (has no administration in time range)   fluticasone (FLOVENT HFA) 44 mcg/act inhaler 2 puff (has no administration in time range)   gabapentin (NEURONTIN) capsule 300 mg (has no administration in time range)   lidocaine (LIDODERM) 5 % patch 1 patch (has no administration in time range)   LORazepam (ATIVAN) tablet 0 5 mg (has no administration in time range)   pantoprazole (PROTONIX) EC tablet 40 mg (has no administration in time range)   oxyCODONE (ROXICODONE) IR tablet 5 mg (has no administration in time range)   oxyCODONE (ROXICODONE) immediate release tablet 10 mg (has no administration in time range)   polyethylene glycol (MIRALAX) packet 17 g (has no administration in time range)   pravastatin (PRAVACHOL) tablet 10 mg (has no administration in time range)   senna (SENOKOT) tablet 8 6 mg (has no administration in time range)   ondansetron (ZOFRAN) injection 4 mg (has no administration in time range)   calcium carbonate (TUMS) chewable tablet 1,000 mg (has no administration in time range)   sodium chloride 0 9 % infusion (has no administration in time range)   sodium chloride 0 9 % bolus 500 mL (has no administration in time range)       Diagnostic Studies  Results Reviewed     Procedure Component Value Units Date/Time    Urinalysis with microscopic [[id number]]     Lab Status: No result Specimen: Urine     D-dimer, quantitative [[id number]]  (Abnormal) Collected: 04/13/23 5028    Lab Status: Final result Specimen: Blood from Arm, Left Updated: 04/13/23 1950     D-Dimer, Quant 3 80 ug/ml FEU     Narrative: In the evaluation for possible pulmonary embolism, in the appropriate (Well's Score of 4 or less) patient, the age adjusted d-dimer cutoff for this patient can be calculated as:    Age x 0 01 (in ug/mL) for Age-adjusted D-dimer exclusion threshold for a patient over 50 years      High Sensitivity Troponin I Random [[id number]]  (Abnormal) Collected: 04/13/23 1906    Lab Status: Final result Specimen: Blood from Arm, Left Updated: 04/13/23 1943     HS TnI random 24 ng/L     B-Type Natriuretic Peptide(BNP) [[id number]]  (Normal) Collected: 04/13/23 1906    Lab Status: Final result Specimen: Blood from Arm, Left Updated: 04/13/23 1943     BNP 95 pg/mL     Comprehensive metabolic panel [[id number]]  (Abnormal) Collected: 04/13/23 1906    Lab Status: Final result Specimen: Blood from Arm, Left Updated: 04/13/23 1936     Sodium 127 mmol/L      Potassium 4 1 mmol/L      Chloride 93 mmol/L      CO2 23 mmol/L      ANION GAP 11 mmol/L      BUN 50 mg/dL      Creatinine 1 37 mg/dL      Glucose 100 mg/dL      Calcium 8 1 mg/dL      Corrected Calcium 9 4 mg/dL      AST 95 U/L      ALT 46 U/L      Alkaline Phosphatase 942 U/L      Total Protein 5 9 g/dL      Albumin 2 4 g/dL      Total Bilirubin 3 46 mg/dL      eGFR 38 ml/min/1 73sq m     Narrative:      [facility] guidelines for Chronic Kidney Disease (CKD):   •  Stage 1 with normal or high GFR (GFR > 90 mL/min/1 73 square meters)  •  Stage 2 Mild CKD (GFR = 60-89 mL/min/1 73 square meters)  •  Stage 3A Moderate CKD (GFR = 45-59 mL/min/1 73 square meters)  •  Stage 3B Moderate CKD (GFR = 30-44 mL/min/1 73 square meters)  •  Stage 4 Severe CKD (GFR = 15-29 mL/min/1 73 square meters)  •  Stage 5 End Stage CKD (GFR <15 mL/min/1 73 square meters)  Note: GFR calculation is accurate only with a steady state creatinine    CBC and differential [[id number]]  (Abnormal) Collected: 04/13/23 1906    Lab Status: Final result Specimen: Blood from Arm, Left Updated: 04/13/23 1920     WBC 22 19 Thousand/uL      RBC 2 93 Million/uL      Hemoglobin 9 3 g/dL      Hematocrit 28 6 %      MCV 98 fL      MCH 31 7 pg      MCHC 32 5 g/dL      RDW 25 2 %      MPV 11 4 fL      Platelets 087 Thousands/uL      nRBC 1 /100 WBCs      Neutrophils Relative 66 %      Immat GRANS % 1 %      Lymphocytes Relative 12 %      Monocytes Relative 21 %      Eosinophils Relative 0 %      Basophils Relative 0 %      Neutrophils Absolute 14 82 Thousands/µL      Immature Grans Absolute 0 15 Thousand/uL      Lymphocytes Absolute 2 58 Thousands/µL      Monocytes Absolute 4 56 Thousand/µL      Eosinophils Absolute 0 01 Thousand/µL      Basophils Absolute 0 07 Thousands/µL                  US right upper quadrant   Final Result by Donta Mtatson MD (04/13 1956)      Diffuse liver metastases  No intrahepatic biliary ductal dilatation identified  Workstation performed: [id number]         XR chest 1 view portable    (Results Pending)   VAS lower limb venous duplex study, complete bilateral    (Results Pending)   CT pe study w abdomen pelvis w contrast    (Results Pending)              Procedures  Procedures         ED Course      Patient presents with generalized weakness, shortness of breath  Patient has hyponatremia on labs  Patient also has leukocytosis  CT PE pending  Per Dr Genoveva Gannon patient can be admitted to his service and they will follow-up the reads  SBIRT 22yo+    Flowsheet Row Most Recent Value   Initial Alcohol Screen: US AUDIT-C     1  How often do you have a drink containing alcohol? 0 Filed at: 04/13/2023 1915   2  How many drinks containing alcohol do you have on a typical day you are drinking? 0 Filed at: 04/13/2023 1915   3b  FEMALE Any Age, or MALE 65+: How often do you have 4 or more drinks on one occassion?  0 Filed at: 04/13/2023 1915   Audit-C Score 0 Filed at: 04/13/2023 1915   LY: How many times in the past year have you    Used an illegal drug or used a prescription medication for non-medical reasons? Never Filed at: 04/13/2023 4927                    Medical Decision Making  Patient presents with generalized weakness, shortness of breath  Patient has hyponatremia on labs  Patient also has leukocytosis  CT PE pending  Per Dr Jayson Jackson patient can be admitted to his service and they will follow-up the reads  Amount and/or Complexity of Data Reviewed  External Data Reviewed: labs and radiology  Labs: ordered  Risk  Prescription drug management  Disposition  Final diagnoses:   Malignant neoplasm metastatic to liver [facility])   Cancer related pain     Time reflects when diagnosis was documented in both MDM as applicable and the Disposition within this note     Time User Action Codes Description Comment    4/13/2023  8:35 PM Jackye Police Add [C78 01,  C78 02] Malignant neoplasm metastatic to both lungs ([facility] [address] )     4/13/2023  8:35 PM Buelah Ewen [C78 01,  C78 02] Malignant neoplasm metastatic to both lungs ([facility] [address] )     4/13/2023  8:35 PM Jackye Police Add [C78 7] [facility]     4/13/2023  8:35 PM Jackye Police Add [G89 3] Cancer related pain       ED Disposition     None      Follow-up Information    None         Patient's Medications   Discharge Prescriptions    No medications on file       No discharge procedures on file      PDMP Review       Value Time User    PDMP Reviewed  Yes 2/20/2023  3:20 PM Milagro Weeks MD          ED Provider  Electronically Signed by           Paty Benjamin DO  04/13/23 2046

## 2023-04-14 NOTE — ASSESSMENT & PLAN NOTE
Last labs:   Lab Results   Component Value Date    SODIUM 127 (L) 04/16/2023    K 4 2 04/16/2023    CL 98 04/16/2023    CO2 19 (L) 04/16/2023    BUN 44 (H) 04/16/2023    CREATININE 1 04 04/16/2023    GLUC 93 04/16/2023    CALCIUM 8 1 (L) 04/16/2023      cc/h

## 2023-04-14 NOTE — ASSESSMENT & PLAN NOTE
Small right pleural effusion and associated atelectasis  Continued decreased breath sounds in RLL field this am  Currently maintaining 97% saturation on 1 5 L supplemental O2 via nasal cannula  IR consulted for potential palliative thoracentesis, too small for drainage

## 2023-04-14 NOTE — PLAN OF CARE
Problem: PAIN - ADULT  Goal: Verbalizes/displays adequate comfort level or baseline comfort level  Description: Interventions:  - Encourage patient to monitor pain and request assistance  - Assess pain using appropriate pain scale  - Administer analgesics based on type and severity of pain and evaluate response  - Implement non-pharmacological measures as appropriate and evaluate response  - Consider cultural and social influences on pain and pain management  - Notify physician/advanced practitioner if interventions unsuccessful or patient reports new pain  Outcome: Progressing     Problem: SAFETY ADULT  Goal: Patient will remain free of falls  Description: INTERVENTIONS:  - Educate patient/family on patient safety including physical limitations  - Instruct patient to call for assistance with activity   - Consult OT/PT to assist with strengthening/mobility   - Keep Call bell within reach  - Keep bed low and locked with side rails adjusted as appropriate  - Keep care items and personal belongings within reach  - Initiate and maintain comfort rounds  - Make Fall Risk Sign visible to staff  - Offer Toileting every 2 Hours, in advance of need  - Initiate/Maintain bed alarm  - Apply yellow socks and bracelet for high fall risk patients  - Consider moving patient to room near nurses station  Outcome: Progressing

## 2023-04-15 LAB
ALBUMIN SERPL BCP-MCNC: 2.1 G/DL (ref 3.5–5)
ALP SERPL-CCNC: 759 U/L (ref 34–104)
ALT SERPL W P-5'-P-CCNC: 30 U/L (ref 7–52)
ANION GAP SERPL CALCULATED.3IONS-SCNC: 8 MMOL/L (ref 4–13)
ANISOCYTOSIS BLD QL SMEAR: PRESENT
AST SERPL W P-5'-P-CCNC: 59 U/L (ref 13–39)
BASOPHILS # BLD MANUAL: 0 THOUSAND/UL (ref 0–0.1)
BASOPHILS NFR MAR MANUAL: 0 % (ref 0–1)
BILIRUB SERPL-MCNC: 3.27 MG/DL (ref 0.2–1)
BUN SERPL-MCNC: 49 MG/DL (ref 5–25)
CALCIUM ALBUM COR SERPL-MCNC: 9.3 MG/DL (ref 8.3–10.1)
CALCIUM SERPL-MCNC: 7.8 MG/DL (ref 8.4–10.2)
CHLORIDE SERPL-SCNC: 96 MMOL/L (ref 96–108)
CO2 SERPL-SCNC: 22 MMOL/L (ref 21–32)
CREAT SERPL-MCNC: 1.04 MG/DL (ref 0.6–1.3)
EOSINOPHIL # BLD MANUAL: 0 THOUSAND/UL (ref 0–0.4)
EOSINOPHIL NFR BLD MANUAL: 0 % (ref 0–6)
ERYTHROCYTE [DISTWIDTH] IN BLOOD BY AUTOMATED COUNT: 24.3 % (ref 11.6–15.1)
GFR SERPL CREATININE-BSD FRML MDRD: 53 ML/MIN/1.73SQ M
GLUCOSE SERPL-MCNC: 83 MG/DL (ref 65–140)
HCT VFR BLD AUTO: 26.3 % (ref 34.8–46.1)
HGB BLD-MCNC: 8.6 G/DL (ref 11.5–15.4)
HYPERCHROMIA BLD QL SMEAR: PRESENT
LYMPHOCYTES # BLD AUTO: 1.87 THOUSAND/UL (ref 0.6–4.47)
LYMPHOCYTES # BLD AUTO: 9 % (ref 14–44)
MAGNESIUM SERPL-MCNC: 1.8 MG/DL (ref 1.9–2.7)
MCH RBC QN AUTO: 31.5 PG (ref 26.8–34.3)
MCHC RBC AUTO-ENTMCNC: 32.7 G/DL (ref 31.4–37.4)
MCV RBC AUTO: 96 FL (ref 82–98)
MONOCYTES # BLD AUTO: 3.12 THOUSAND/UL (ref 0–1.22)
MONOCYTES NFR BLD: 15 % (ref 4–12)
NEUTROPHILS # BLD MANUAL: 15.79 THOUSAND/UL (ref 1.85–7.62)
NEUTS SEG NFR BLD AUTO: 76 % (ref 43–75)
PLATELET # BLD AUTO: 243 THOUSANDS/UL (ref 149–390)
PLATELET BLD QL SMEAR: ADEQUATE
PMV BLD AUTO: 11.3 FL (ref 8.9–12.7)
POLYCHROMASIA BLD QL SMEAR: PRESENT
POTASSIUM SERPL-SCNC: 4 MMOL/L (ref 3.5–5.3)
PROT SERPL-MCNC: 5.4 G/DL (ref 6.4–8.4)
RBC # BLD AUTO: 2.73 MILLION/UL (ref 3.81–5.12)
RBC MORPH BLD: PRESENT
SODIUM SERPL-SCNC: 126 MMOL/L (ref 135–147)
TARGETS BLD QL SMEAR: PRESENT
WBC # BLD AUTO: 20.77 THOUSAND/UL (ref 4.31–10.16)

## 2023-04-15 RX ADMIN — FLUTICASONE PROPIONATE 1 SPRAY: 50 SPRAY, METERED NASAL at 08:56

## 2023-04-15 RX ADMIN — HEPARIN SODIUM 5000 UNITS: 5000 INJECTION INTRAVENOUS; SUBCUTANEOUS at 21:22

## 2023-04-15 RX ADMIN — SODIUM CHLORIDE 100 ML/HR: 0.9 INJECTION, SOLUTION INTRAVENOUS at 14:58

## 2023-04-15 RX ADMIN — FLUTICASONE PROPIONATE 2 PUFF: 44 AEROSOL, METERED RESPIRATORY (INHALATION) at 16:25

## 2023-04-15 RX ADMIN — AMLODIPINE BESYLATE 10 MG: 10 TABLET ORAL at 08:52

## 2023-04-15 RX ADMIN — FLUTICASONE PROPIONATE 2 PUFF: 44 AEROSOL, METERED RESPIRATORY (INHALATION) at 08:56

## 2023-04-15 RX ADMIN — GABAPENTIN 300 MG: 300 CAPSULE ORAL at 21:22

## 2023-04-15 RX ADMIN — GABAPENTIN 300 MG: 300 CAPSULE ORAL at 08:52

## 2023-04-15 RX ADMIN — DULOXETINE HYDROCHLORIDE 20 MG: 20 CAPSULE, DELAYED RELEASE ORAL at 08:52

## 2023-04-15 RX ADMIN — ALBUTEROL SULFATE 2 PUFF: 90 AEROSOL, METERED RESPIRATORY (INHALATION) at 08:54

## 2023-04-15 RX ADMIN — ALBUTEROL SULFATE 2 PUFF: 90 AEROSOL, METERED RESPIRATORY (INHALATION) at 21:44

## 2023-04-15 RX ADMIN — HEPARIN SODIUM 5000 UNITS: 5000 INJECTION INTRAVENOUS; SUBCUTANEOUS at 13:21

## 2023-04-15 RX ADMIN — DOCUSATE SODIUM 100 MG: 100 CAPSULE, LIQUID FILLED ORAL at 08:54

## 2023-04-15 RX ADMIN — LISINOPRIL 20 MG: 20 TABLET ORAL at 08:52

## 2023-04-15 RX ADMIN — HEPARIN SODIUM 5000 UNITS: 5000 INJECTION INTRAVENOUS; SUBCUTANEOUS at 06:38

## 2023-04-15 RX ADMIN — PRAVASTATIN SODIUM 10 MG: 10 TABLET ORAL at 21:21

## 2023-04-15 RX ADMIN — DOCUSATE SODIUM 100 MG: 100 CAPSULE, LIQUID FILLED ORAL at 16:25

## 2023-04-15 RX ADMIN — PANTOPRAZOLE SODIUM 40 MG: 40 TABLET, DELAYED RELEASE ORAL at 06:39

## 2023-04-15 RX ADMIN — OXYCODONE HYDROCHLORIDE 5 MG: 5 TABLET ORAL at 09:02

## 2023-04-15 RX ADMIN — GABAPENTIN 300 MG: 300 CAPSULE ORAL at 16:25

## 2023-04-15 RX ADMIN — OXYCODONE HYDROCHLORIDE 10 MG: 10 TABLET, FILM COATED, EXTENDED RELEASE ORAL at 21:20

## 2023-04-15 NOTE — PROGRESS NOTES
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 presenting with SOB, abdominal pain and lower extremity swelling  Initial workup negative for DVT or acute PE   Plan by problem:    * Ovarian cancer [facility])  Assessment & Plan  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  S/p en block hysterectomy, BSO, sigmoidectomy with low rectal re-anastamosis, bladder peritoneal stripping and cystotomy repair, diaphragm stripping, small bowel resection with re-anastamosis on 8/6/2021  Recent imaging shows liver metastases  S/p 2 cycles of Pembro  Poor prognosis  S/p palliative care and GI consultations on 4/14  Patient continues to desire all treatment efforts   Continue [address] discussions    Pleural effusion  Assessment & Plan  Small right pleural effusion and associated atelectasis  Continued decreased breath sounds in RLL field this am  Currently maintaining saturations on 2L supplemental O2 via nasal cannula  Consider IR drainage if plausible    RAYMOND (acute kidney injury) [facility])  Assessment & Plan  Cr Trend:  Recent Labs     04/13/23  1906 04/14/23  0453 04/15/23  0637   CREATININE 1 37* 0 99 1 04     Continue IV fluid hydration  Continue to monitor    Leukocytosis  Assessment & Plan  WBC trend:   Recent Labs     04/13/23  1126 04/13/23  1906 04/14/23  0453 04/15/23  0637   WBC 24 20* 22 19* 19 82* 20 77*     If febrile, consider sepsis workup    Leg swelling  Assessment & Plan  Bilateral lower extremity Doppler study negative 4/13    JOHNSON (dyspnea on exertion)  Assessment & Plan  D-dimer elevated on admission  CXR and CT PE negative for acute pathology  Continue to monitor SpO2 and clinical condition    Abnormal liver enzymes  Assessment & Plan  Trend:  Recent Labs     04/13/23  1906 04/14/23  0453 04/15/23  0637   ALKPHOS 942* 809* 759*   TBILI "3 46* 3 28* 3 27*     Continue to trend  RUQ US without acute bile duct dilation  S/p GI consultation 4/14  MRCP 4/14 report pending, f/u final results     Medication reaction  Assessment & Plan  Reaction to magnesium PO and IV with hives, tachycardia and fever  Magnesium 1 8 on admission, 1 9 this am  Hold off on repletion at this time    Asthma  Assessment & Plan  Albuterol prn  Will give PRN dose this AM due to cough symptoms     Malignant neoplasm metastatic to liver [facility])  Assessment & Plan  Last CT on 3/27 shows \"interval increase in size and number of pulmonary hepatic metastases\"  This correlates with increasing bilirubin level  Repeat CT c/w stable hepatic metastasis    Palliative care patient  Assessment & Plan  Palliative care consult placed for pain control    Drug induced constipation  Assessment & Plan  Colace and Miralax ordered    Hyponatremia  Assessment & Plan  Last labs:   Lab Results   Component Value Date    SODIUM 126 (L) 04/15/2023    K 4 0 04/15/2023    CL 96 04/15/2023    CO2 22 04/15/2023    BUN 49 (H) 04/15/2023    CREATININE 1 04 04/15/2023    GLUC 83 04/15/2023    CALCIUM 7 8 (L) 04/15/2023      cc/h    Cancer related pain  Assessment & Plan  Tylenol held at this time  S/p palliative care consultation 4/14; see note for full recommendations regarding pain regimen; appreciate input    Migraine  Assessment & Plan  Fioricet prn    Primary hypertension  Assessment & Plan  Home amlodipine and lisinopril ordered          Subjective:    Mason Hurt states that she has some increased pain on her right side  Otherwise she has no acute conerns  Patient is currently voiding  She is ambulating  Patient is currently passing flatus and has had bowel movement  She is tolerating PO, and denies nausea or vomitting  Patient denies fever, chills, chest pain, shortness of breath, or calf tenderness  During visit at bedside, appears to have a persistent slightly productive cough   State she has " had this for a while  She is requesting her albuterol inhaler  Objective:  /58   Pulse 104   Temp 98 3 °F (36 8 °C)   Resp 17   Wt 76 2 kg (168 lb)   SpO2 92%   BMI 33 93 kg/m²     I/O last 3 completed shifts: In: 2000 [I V :2000]  Out: -   No intake/output data recorded  Lab Results   Component Value Date    WBC 20 77 (H) 04/15/2023    HGB 8 6 (L) 04/15/2023    HCT 26 3 (L) 04/15/2023    MCV 96 04/15/2023     04/15/2023       Lab Results   Component Value Date    GLUCOSE 195 (H) 08/06/2021    CALCIUM 7 8 (L) 04/15/2023    K 4 0 04/15/2023    CO2 22 04/15/2023    CL 96 04/15/2023    BUN 49 (H) 04/15/2023    CREATININE 1 04 04/15/2023           Physical Exam  Vitals and nursing note reviewed  Exam conducted with a chaperone present  Constitutional:       General: She is not in acute distress  HENT:      Head: Normocephalic  Right Ear: External ear normal       Left Ear: External ear normal    Eyes:      General: No scleral icterus  Right eye: No discharge  Left eye: No discharge  Conjunctiva/sclera: Conjunctivae normal    Cardiovascular:      Rate and Rhythm: Normal rate and regular rhythm  Pulses: Normal pulses  Heart sounds: Normal heart sounds  Pulmonary:      Effort: Pulmonary effort is normal  No respiratory distress  Breath sounds: Examination of the right-lower field reveals decreased breath sounds  Decreased breath sounds present  Abdominal:      General: There is no distension  Palpations: Abdomen is rigid  Tenderness: There is no abdominal tenderness  There is no guarding  Comments: Well-healed midline incision  Rigid, non-tender upper abdomen c/w prior surgery and disease   Musculoskeletal:         General: No swelling or tenderness  Normal range of motion  Cervical back: Normal range of motion  Right lower leg: Edema present  Left lower leg: Edema present        Comments: +1 bilateral pitting edema up to the knees   Skin:     General: Skin is warm and dry  Capillary Refill: Capillary refill takes 2 to 3 seconds  Neurological:      Mental Status: She is alert and oriented to person, place, and time  Mental status is at baseline     Psychiatric:         Mood and Affect: Mood normal          Behavior: Behavior normal            Rogerio Banuelos MD  4/15/2023  8:48 AM

## 2023-04-15 NOTE — PLAN OF CARE
Problem: MOBILITY - ADULT  Goal: Maintain or return to baseline ADL function  Description: INTERVENTIONS:  -  Assess patient's ability to carry out ADLs; assess patient's baseline for ADL function and identify physical deficits which impact ability to perform ADLs (bathing, care of mouth/teeth, toileting, grooming, dressing, etc )  - Assess/evaluate cause of self-care deficits   - Assess range of motion  - Assess patient's mobility; develop plan if impaired  - Assess patient's need for assistive devices and provide as appropriate  - Encourage maximum independence but intervene and supervise when necessary  - Involve family in performance of ADLs  - Assess for home care needs following discharge   - Consider OT consult to assist with ADL evaluation and planning for discharge  - Provide patient education as appropriate  Outcome: Progressing  Goal: Maintains/Returns to pre admission functional level  Description: INTERVENTIONS:  - Perform BMAT or MOVE assessment daily    - Set and communicate daily mobility goal to care team and patient/family/caregiver  - Collaborate with rehabilitation services on mobility goals if consulted  - Perform Range of Motion times a day  - Reposition patient every  hours    - Dangle patient  times a day  - Stand patient  times a day  - Ambulate patient  times a day  - Out of bed to chair  times a day   - Out of bed for meals  times a day  - Out of bed for toileting  - Record patient progress and toleration of activity level   Outcome: Progressing     Problem: PAIN - ADULT  Goal: Verbalizes/displays adequate comfort level or baseline comfort level  Description: Interventions:  - Encourage patient to monitor pain and request assistance  - Assess pain using appropriate pain scale  - Administer analgesics based on type and severity of pain and evaluate response  - Implement non-pharmacological measures as appropriate and evaluate response  - Consider cultural and social influences on pain and pain management  - Notify physician/advanced practitioner if interventions unsuccessful or patient reports new pain  Outcome: Progressing     Problem: SAFETY ADULT  Goal: Patient will remain free of falls  Description: INTERVENTIONS:  - Educate patient/family on patient safety including physical limitations  - Instruct patient to call for assistance with activity   - Consult OT/PT to assist with strengthening/mobility   - Keep Call bell within reach  - Keep bed low and locked with side rails adjusted as appropriate  - Keep care items and personal belongings within reach  - Initiate and maintain comfort rounds  - Make Fall Risk Sign visible to staff  - Offer Toileting every  Hours, in advance of need  - Initiate/Maintain alarm  - Obtain necessary fall risk management equipment:   - Apply yellow socks and bracelet for high fall risk patients  - Consider moving patient to room near nurses station  Outcome: Progressing

## 2023-04-15 NOTE — UTILIZATION REVIEW
Continued Stay Review    Date: 4/15                         Current Patient Class: inpatient  Current Level of Care: med surg     HPI:69 y o  female initially admitted on 4/13     Assessment/Plan:   GYN Onco Attending  Follow-up MRI of liver with MRCP to determine whether there is any therapeutic intervention available to improve liver function  2   Given hyperbilirubinemia, poor performance status it is unlikely that subsequent therapy was able to be administered  3   We had an extensive discussion regarding transitioning to hospice care  4   If her liver function and performance status can improve, she is interested in continuing palliative Keytruda therapy  We will follow-up MRI of liver and readdress transitioning to comfort care tomorrow with her family      Vital Signs:   Date/Time Temp Pulse Resp BP MAP (mmHg) SpO2 Calculated FIO2 (%) - Nasal Cannula Nasal Cannula O2 Flow Rate (L/min) O2 Device Patient Position - Orthostatic VS   04/15/23 15:09:34 99 °F (37 2 °C) 101 18 106/64 78 87 % Abnormal  -- -- -- --   04/15/23 06:50:11 98 3 °F (36 8 °C) 104 17 109/58 75 92 % -- -- -- --   04/14/23 22:01:09 98 9 °F (37 2 °C) 97 -- 107/53 71 96 % -- -- -- --   04/14/23 14:38:35 99 °F (37 2 °C) 98 18 96/60 72 91 % -- -- -- --   04/14/23 1115 -- -- -- -- -- 95 % 28 2 L/min Nasal cannula --       Pertinent Labs/Diagnostic Results:       Results from last 7 days   Lab Units 04/15/23  0637 04/14/23  0453 04/13/23  1906 04/13/23  1126   WBC Thousand/uL 20 77* 19 82* 22 19* 24 20*   HEMOGLOBIN g/dL 8 6* 9 4* 9 3* 9 6*   HEMATOCRIT % 26 3* 27 5* 28 6* 29 3*   PLATELETS Thousands/uL 243 253 237 233   NEUTROS ABS Thousands/µL  --  13 72* 14 82* 15 99*         Results from last 7 days   Lab Units 04/15/23  0637 04/14/23 0453 04/13/23 1906 04/13/23  1126   SODIUM mmol/L 126* 127* 127* 127*   POTASSIUM mmol/L 4 0 3 9 4 1 4 3   CHLORIDE mmol/L 96 95* 93* 93*   CO2 mmol/L 22 24 23 20*   ANION GAP mmol/L 8 8 11 14*   BUN mg/dL 49* 46* 50* 47*   CREATININE mg/dL 1 04 0 99 1 37* 1 22   EGFR ml/min/1 73sq m 53 57 38 44   CALCIUM mg/dL 7 8* 7 8* 8 1* 7 6*   MAGNESIUM mg/dL 1 8* 1 9  --  1 8*   PHOSPHORUS mg/dL  --  4 3*  --   --      Results from last 7 days   Lab Units 04/15/23  0637 04/14/23  0453 04/13/23  1906 04/13/23  1126   AST U/L 59* 74* 95* 116*   ALT U/L 30 38 46 52   ALK PHOS U/L 759* 809* 942* 1,004*   TOTAL PROTEIN g/dL 5 4* 5 4* 5 9* 5 6*   ALBUMIN g/dL 2 1* 2 2* 2 4* 2 4*   TOTAL BILIRUBIN mg/dL 3 27* 3 28* 3 46* 3 36*         Results from last 7 days   Lab Units 04/15/23  0637 04/14/23  0453 04/13/23  1906 04/13/23  1126   GLUCOSE RANDOM mg/dL 83 77 100 85             No results found for: BETA-HYDROXYBUTYRATE                       Results from last 7 days   Lab Units 04/13/23  1906   D-DIMER QUANTITATIVE ug/ml FEU 3 80*         Results from last 7 days   Lab Units 04/13/23  1126   TSH 3RD GENERATON uIU/mL 2 034                     Results from last 7 days   Lab Units 04/13/23  1906   BNP pg/mL 95                             Results from last 7 days   Lab Units 04/13/23  2041 04/13/23  1127   CLARITY UA  Turbid  --    COLOR UA  Dark Yellow  --    SPEC GRAV UA  1 025  --    PH UA  5 0  --    GLUCOSE UA mg/dl Negative  --    KETONES UA mg/dl Negative  --    BLOOD UA  Negative  --    PROTEIN UA mg/dl 30 (1+)*  --    NITRITE UA  Negative  --    BILIRUBIN UA  Small*  --    UROBILINOGEN UA (BE) mg/dl 3 0*  --    LEUKOCYTES UA  Trace*  --    WBC UA /hpf 1-2  --    RBC UA /hpf 1-2  --    BACTERIA UA /hpf Occasional  --    EPITHELIAL CELLS WET PREP /hpf Occasional  --    MUCUS THREADS  Occasional*  --    CREATININE UR mg/dL  --  196 1   PROTEIN UR mg/dL  --  102   PROT/CREAT RATIO UR   --  0 52*                                                 Medications:   Scheduled Medications:  amLODIPine, 10 mg, Oral, Daily   And  lisinopril, 20 mg, Oral, Daily  docusate sodium, 100 mg, Oral, BID  DULoxetine, 20 mg, Oral, Daily  fluticasone, 1 spray, Nasal, Daily  fluticasone, 2 puff, Inhalation, BID  gabapentin, 300 mg, Oral, TID  heparin (porcine), 5,000 Units, Subcutaneous, Q8H ANA MARIA  oxyCODONE, 10 mg, Oral, HS  pantoprazole, 40 mg, Oral, Early Morning  polyethylene glycol, 17 g, Oral, Daily  pravastatin, 10 mg, Oral, HS      Continuous IV Infusions:  sodium chloride, 100 mL/hr, Intravenous, Continuous      PRN Meds:  albuterol, 2 puff, Inhalation, Q6H PRN  benzonatate, 100 mg, Oral, TID PRN  butalbital-acetaminophen-caffeine, 1 tablet, Oral, Q6H PRN  calcium carbonate, 1,000 mg, Oral, Daily PRN  lidocaine, 1 patch, Topical, Q12H PRN  LORazepam, 0 5 mg, Oral, Q8H PRN  ondansetron, 4 mg, Intravenous, Q6H PRN  oxyCODONE, 10 mg, Oral, HS PRN  oxyCODONE, 5 mg, Oral, Q4H PRN  senna, 8 6 mg, Oral, Daily PRN        Discharge Plan: [facility]    Network Utilization Review Department  ATTENTION: Please call with any questions or concerns to [phone] and carefully listen to the prompts so that you are directed to the right person  All voicemails are confidential   Aman Dailey all requests for admission clinical reviews, approved or denied determinations and any other requests to dedicated fax number below belonging to the campus where the patient is receiving treatment   List of dedicated fax numbers for the Facilities:  [address] DENIALS (Administrative/Medical Necessity) [phone]   [address] [place] [address] (Maternity/NICU/Pediatrics) [phone]   [address] [phone]   [facility] [phone]   [facility] [phone]   [address] [place] [address] [facility] [address] - [address] [phone]

## 2023-04-16 ENCOUNTER — APPOINTMENT (INPATIENT)
Dept: RADIOLOGY | Facility: HOSPITAL | Age: 73
End: 2023-04-16

## 2023-04-16 LAB
ALBUMIN SERPL BCP-MCNC: 2.2 G/DL (ref 3.5–5)
ALP SERPL-CCNC: 793 U/L (ref 34–104)
ALT SERPL W P-5'-P-CCNC: 28 U/L (ref 7–52)
ANION GAP SERPL CALCULATED.3IONS-SCNC: 10 MMOL/L (ref 4–13)
ANISOCYTOSIS BLD QL SMEAR: PRESENT
AST SERPL W P-5'-P-CCNC: 60 U/L (ref 13–39)
BASOPHILS # BLD MANUAL: 0 THOUSAND/UL (ref 0–0.1)
BASOPHILS NFR MAR MANUAL: 0 % (ref 0–1)
BILIRUB SERPL-MCNC: 3.61 MG/DL (ref 0.2–1)
BUN SERPL-MCNC: 44 MG/DL (ref 5–25)
CALCIUM ALBUM COR SERPL-MCNC: 9.5 MG/DL (ref 8.3–10.1)
CALCIUM SERPL-MCNC: 8.1 MG/DL (ref 8.4–10.2)
CHLORIDE SERPL-SCNC: 98 MMOL/L (ref 96–108)
CO2 SERPL-SCNC: 19 MMOL/L (ref 21–32)
CREAT SERPL-MCNC: 1.04 MG/DL (ref 0.6–1.3)
EOSINOPHIL # BLD MANUAL: 0 THOUSAND/UL (ref 0–0.4)
EOSINOPHIL NFR BLD MANUAL: 0 % (ref 0–6)
ERYTHROCYTE [DISTWIDTH] IN BLOOD BY AUTOMATED COUNT: 24.2 % (ref 11.6–15.1)
GFR SERPL CREATININE-BSD FRML MDRD: 53 ML/MIN/1.73SQ M
GLUCOSE SERPL-MCNC: 93 MG/DL (ref 65–140)
HCT VFR BLD AUTO: 27.3 % (ref 34.8–46.1)
HGB BLD-MCNC: 8.8 G/DL (ref 11.5–15.4)
HOWELL-JOLLY BOD BLD QL SMEAR: PRESENT
LYMPHOCYTES # BLD AUTO: 16 % (ref 14–44)
LYMPHOCYTES # BLD AUTO: 3.43 THOUSAND/UL (ref 0.6–4.47)
MAGNESIUM SERPL-MCNC: 1.9 MG/DL (ref 1.9–2.7)
MCH RBC QN AUTO: 31.7 PG (ref 26.8–34.3)
MCHC RBC AUTO-ENTMCNC: 32.2 G/DL (ref 31.4–37.4)
MCV RBC AUTO: 98 FL (ref 82–98)
MONOCYTES # BLD AUTO: 3.86 THOUSAND/UL (ref 0–1.22)
MONOCYTES NFR BLD: 18 % (ref 4–12)
NEUTROPHILS # BLD MANUAL: 14.16 THOUSAND/UL (ref 1.85–7.62)
NEUTS BAND NFR BLD MANUAL: 1 % (ref 0–8)
NEUTS SEG NFR BLD AUTO: 65 % (ref 43–75)
NRBC BLD AUTO-RTO: 1 /100 WBC (ref 0–2)
PLATELET # BLD AUTO: 268 THOUSANDS/UL (ref 149–390)
PLATELET BLD QL SMEAR: ADEQUATE
PMV BLD AUTO: 11.2 FL (ref 8.9–12.7)
POLYCHROMASIA BLD QL SMEAR: PRESENT
POTASSIUM SERPL-SCNC: 4.2 MMOL/L (ref 3.5–5.3)
PROT SERPL-MCNC: 5.6 G/DL (ref 6.4–8.4)
RBC # BLD AUTO: 2.78 MILLION/UL (ref 3.81–5.12)
RBC MORPH BLD: PRESENT
SODIUM SERPL-SCNC: 127 MMOL/L (ref 135–147)
TARGETS BLD QL SMEAR: PRESENT
WBC # BLD AUTO: 21.46 THOUSAND/UL (ref 4.31–10.16)

## 2023-04-16 PROCEDURE — 0WJ93ZZ INSPECTION OF RIGHT PLEURAL CAVITY, PERCUTANEOUS APPROACH: ICD-10-PCS | Performed by: RADIOLOGY

## 2023-04-16 RX ORDER — DEXAMETHASONE 4 MG/1
4 TABLET ORAL EVERY 12 HOURS SCHEDULED
Status: DISCONTINUED | OUTPATIENT
Start: 2023-04-16 | End: 2023-04-18 | Stop reason: HOSPADM

## 2023-04-16 RX ORDER — OXYCODONE HCL 20 MG/1
20 TABLET, FILM COATED, EXTENDED RELEASE ORAL EVERY 12 HOURS SCHEDULED
Status: DISCONTINUED | OUTPATIENT
Start: 2023-04-16 | End: 2023-04-18 | Stop reason: HOSPADM

## 2023-04-16 RX ADMIN — FLUTICASONE PROPIONATE 2 PUFF: 44 AEROSOL, METERED RESPIRATORY (INHALATION) at 07:29

## 2023-04-16 RX ADMIN — SODIUM CHLORIDE 100 ML/HR: 0.9 INJECTION, SOLUTION INTRAVENOUS at 10:44

## 2023-04-16 RX ADMIN — DOCUSATE SODIUM 100 MG: 100 CAPSULE, LIQUID FILLED ORAL at 16:00

## 2023-04-16 RX ADMIN — HEPARIN SODIUM 5000 UNITS: 5000 INJECTION INTRAVENOUS; SUBCUTANEOUS at 06:07

## 2023-04-16 RX ADMIN — OXYCODONE HYDROCHLORIDE 5 MG: 5 TABLET ORAL at 04:28

## 2023-04-16 RX ADMIN — OXYCODONE HYDROCHLORIDE 5 MG: 5 TABLET ORAL at 21:17

## 2023-04-16 RX ADMIN — PRAVASTATIN SODIUM 10 MG: 10 TABLET ORAL at 21:10

## 2023-04-16 RX ADMIN — HEPARIN SODIUM 5000 UNITS: 5000 INJECTION INTRAVENOUS; SUBCUTANEOUS at 14:03

## 2023-04-16 RX ADMIN — FLUTICASONE PROPIONATE 1 SPRAY: 50 SPRAY, METERED NASAL at 07:29

## 2023-04-16 RX ADMIN — GABAPENTIN 300 MG: 300 CAPSULE ORAL at 15:59

## 2023-04-16 RX ADMIN — DOCUSATE SODIUM 100 MG: 100 CAPSULE, LIQUID FILLED ORAL at 07:29

## 2023-04-16 RX ADMIN — FLUTICASONE PROPIONATE 2 PUFF: 44 AEROSOL, METERED RESPIRATORY (INHALATION) at 15:59

## 2023-04-16 RX ADMIN — GABAPENTIN 300 MG: 300 CAPSULE ORAL at 21:10

## 2023-04-16 RX ADMIN — SODIUM CHLORIDE 100 ML/HR: 0.9 INJECTION, SOLUTION INTRAVENOUS at 01:03

## 2023-04-16 RX ADMIN — PANTOPRAZOLE SODIUM 40 MG: 40 TABLET, DELAYED RELEASE ORAL at 06:08

## 2023-04-16 RX ADMIN — DEXAMETHASONE 4 MG: 4 TABLET ORAL at 14:04

## 2023-04-16 RX ADMIN — AMLODIPINE BESYLATE 10 MG: 10 TABLET ORAL at 07:29

## 2023-04-16 RX ADMIN — OXYCODONE HYDROCHLORIDE 20 MG: 20 TABLET, FILM COATED, EXTENDED RELEASE ORAL at 14:04

## 2023-04-16 RX ADMIN — HEPARIN SODIUM 5000 UNITS: 5000 INJECTION INTRAVENOUS; SUBCUTANEOUS at 21:10

## 2023-04-16 RX ADMIN — LIDOCAINE 5% 1 PATCH: 700 PATCH TOPICAL at 04:36

## 2023-04-16 RX ADMIN — LIDOCAINE 5% 1 PATCH: 700 PATCH TOPICAL at 16:00

## 2023-04-16 RX ADMIN — LISINOPRIL 20 MG: 20 TABLET ORAL at 07:29

## 2023-04-16 RX ADMIN — GABAPENTIN 300 MG: 300 CAPSULE ORAL at 07:29

## 2023-04-16 RX ADMIN — DULOXETINE HYDROCHLORIDE 20 MG: 20 CAPSULE, DELAYED RELEASE ORAL at 07:29

## 2023-04-16 NOTE — CASE MANAGEMENT
Case Management Discharge Planning Note    Patient name Sweta GERARDO /S -01 MRN [id number]  : 1950 Date 2023       Current Admission Date: 2023  Current Admission Diagnosis:Ovarian cancer [facility])   Patient Active Problem List    Diagnosis Date Noted   • Pleural effusion 2023   • Leukocytosis 2023   • RAYMOND (acute kidney injury) ([address] ) 2023   • JOHNSON (dyspnea on exertion) 04/10/2023   • Leg swelling 04/10/2023   • Abnormal liver enzymes 2023   • Malignant neoplasm metastatic to both lungs ([address] ) 2023   • Continuous opioid dependence ([address] ) 2023   • Mixed hyperlipidemia 2023   • Medication reaction 2023   • Lactic acidosis 2023   • Asthma 2023   • Anemia in other chronic diseases classified elsewhere 2023   • Severe sepsis ([address] ) 2023   • Malignant neoplasm metastatic to liver ([address] ) 2022   • Personal history of COVID-19 2022   • Sciatica of left side 2022   • Oral mucositis (ulcerative) due to antineoplastic therapy 2022   • Neoplastic malignant related fatigue 03/15/2022   • Chemotherapy-induced peripheral neuropathy ([address] ) 2022   • Chemotherapy induced neutropenia ([address] ) 2021   • Palliative care patient 2021   • Joint pain following chemotherapy 2021   • Chemotherapy-induced nausea 2021   • Anxiousness 2021   • Insomnia due to medical condition 2021   • Drug induced constipation 10/13/2021   • Encounter for central line care 2021   • Hyponatremia 2021   • Ovarian cancer ([address] ) 2021   • S/P splenectomy 2021   • S/P bladder repair 2021   • Status post small bowel resection 2021   • Unspecified protein-calorie malnutrition ([address] ) 08/10/2021   • Migraine 2021   • Cancer related pain 2021   • Primary hypertension 2021   • Esophageal reflux 2013      LOS (days): 3  Geometric Mean LOS (GMLOS) (days): 3 10  Days to GMLOS:0 3     OBJECTIVE:  Risk of Unplanned Readmission Score: 45 46         Current admission status: Inpatient   Preferred Pharmacy:   [facility] [address], [address]  [address], [address] [address]  Phone: [phone] Fax: [phone]    [address] [place], [address] [place] [address]  Phone: [phone] Fax: [phone]    Linette Lowe, [address] [facility] [address]  Phone: [phone] Fax: 870 [facility], [address]  [address] [facility]  [address]  [address] [facility] [address]  Phone: [phone] Fax: [phone]    Primary Care Provider: Caroline Atkins DO    Primary Insurance: MEDICARE  Secondary Insurance: BLUE CROSS    DISCHARGE DETAILS:  CM spoke with patient and family at the bedside  CM introduced self and role  CM discussed hospice services  Patient and family goal is home hospice  Patient plans to return home at [address] [place] [address] with her spouse  CM discussed with patient and spouse, [facility] does not service [place] area for home hospice  Patient stated she had [facility] services in the past and requesting CM send referral to them  CM also discussed [facility] as an option if [facility] is unable to accept  All questions/concerns answered at this time  [facility]  Waiting for response  CM provided contact number to daughter for any further questions/concerns

## 2023-04-16 NOTE — PROGRESS NOTES
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)  Assessment & Plan  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  S/p en block hysterectomy, BSO, sigmoidectomy with low rectal re-anastamosis, bladder peritoneal stripping and cystotomy repair, diaphragm stripping, small bowel resection with re-anastamosis on 8/6/2021  Recent imaging shows liver metastases  S/p 2 cycles of Pembro  Poor prognosis  S/p palliative care and GI consultations on 4/14  Patient continues to desire all treatment efforts   Family planning meeting today to continue [address] discussions    Pleural effusion  Assessment & Plan  Small right pleural effusion and associated atelectasis  Continued decreased breath sounds in RLL field this am  Currently maintaining saturations on 2L supplemental O2 via nasal cannula  Consider IR drainage if plausible    RAYMOND (acute kidney injury) [facility])  Assessment & Plan  Cr Trend:  Recent Labs     04/14/23  0453 04/15/23  0637 04/16/23  0429   CREATININE 0 99 1 04 1 04     Continue IV fluid hydration  Continue to monitor    Leukocytosis  Assessment & Plan  WBC trend:   Recent Labs     04/13/23  1906 04/14/23  0453 04/15/23  0637 04/16/23  0429   WBC 22 19* 19 82* 20 77* 21 46*     If febrile, consider sepsis workup    Leg swelling  Assessment & Plan  Bilateral lower extremity Doppler study negative 4/13    JOHNSON (dyspnea on exertion)  Assessment & Plan  D-dimer elevated on admission  CXR and CT PE negative for acute pathology  Continue to monitor SpO2 and clinical condition    Abnormal liver enzymes  Assessment & Plan  Trend:  Recent Labs     04/14/23  0453 04/15/23  0637 04/16/23  0429 "  ALKPHOS 809* 759* 793*   TBILI 3 28* 3 27* 3 61*     Continue to trend  RUQ US without acute bile duct dilation  S/p GI consultation 4/14  MRCP 4/14 report pending, f/u final results     Medication reaction  Assessment & Plan  Reaction to magnesium PO and IV with hives, tachycardia and fever  Magnesium 1 8 on admission, 1 9 this am  Hold off on repletion at this time    Asthma  Assessment & Plan  Albuterol prn    Malignant neoplasm metastatic to liver [facility])  Assessment & Plan  Last CT on 3/27 shows \"interval increase in size and number of pulmonary hepatic metastases\"  This correlates with increasing bilirubin level  Repeat CT c/w stable hepatic metastasis    Palliative care patient  Assessment & Plan  Palliative care consult placed for pain control    Drug induced constipation  Assessment & Plan  Colace and Miralax ordered    Hyponatremia  Assessment & Plan  Last labs:   Lab Results   Component Value Date    SODIUM 127 (L) 04/16/2023    K 4 2 04/16/2023    CL 98 04/16/2023    CO2 19 (L) 04/16/2023    BUN 44 (H) 04/16/2023    CREATININE 1 04 04/16/2023    GLUC 93 04/16/2023    CALCIUM 8 1 (L) 04/16/2023      cc/h    Cancer related pain  Assessment & Plan  Tylenol held at this time  S/p palliative care consultation 4/14; see note for full recommendations regarding pain regimen; appreciate input    Migraine  Assessment & Plan  Fioricet prn    Primary hypertension  Assessment & Plan  Home amlodipine and lisinopril ordered          Subjective:    Rene Lang continues to have pain on the right side, but is relieved with pain medication  Otherwise she has no acute conerns  Patient is currently voiding  She is ambulating  Patient is currently passing flatus and has had bowel movement  She is tolerating PO, and denies nausea or vomitting  Patient denies fever, chills, chest pain, shortness of breath, or calf tenderness  Planning for family meeting today   Patient still struggling with the idea of hospice " care but understands that goals are to allow to her be comfortable  Objective:  /62   Pulse (!) 107   Temp 98 8 °F (37 1 °C)   Resp 17   Wt 76 2 kg (168 lb)   SpO2 91%   BMI 33 93 kg/m²     I/O last 3 completed shifts: In: 2500 [I V :2500]  Out: -   I/O this shift:  In: -   Out: 250 [Urine:250]    Lab Results   Component Value Date    WBC 21 46 (H) 04/16/2023    HGB 8 8 (L) 04/16/2023    HCT 27 3 (L) 04/16/2023    MCV 98 04/16/2023     04/16/2023       Lab Results   Component Value Date    GLUCOSE 195 (H) 08/06/2021    CALCIUM 8 1 (L) 04/16/2023    K 4 2 04/16/2023    CO2 19 (L) 04/16/2023    CL 98 04/16/2023    BUN 44 (H) 04/16/2023    CREATININE 1 04 04/16/2023           Physical Exam  Vitals and nursing note reviewed  Exam conducted with a chaperone present  Constitutional:       General: She is not in acute distress  HENT:      Head: Normocephalic  Right Ear: External ear normal       Left Ear: External ear normal    Eyes:      General: No scleral icterus  Right eye: No discharge  Left eye: No discharge  Conjunctiva/sclera: Conjunctivae normal    Cardiovascular:      Rate and Rhythm: Normal rate and regular rhythm  Pulses: Normal pulses  Heart sounds: Normal heart sounds  Pulmonary:      Effort: Pulmonary effort is normal  No respiratory distress  Breath sounds: Examination of the right-lower field reveals decreased breath sounds  Decreased breath sounds present  Abdominal:      General: There is no distension  Palpations: Abdomen is rigid  Tenderness: There is no abdominal tenderness  There is no guarding  Comments: Well-healed midline incision  Rigid, non-tender upper abdomen c/w prior surgery and disease   Musculoskeletal:         General: No swelling or tenderness  Normal range of motion  Cervical back: Normal range of motion  Right lower leg: Edema present  Left lower leg: Edema present        Comments: +1 bilateral pitting edema up to the knees   Skin:     General: Skin is warm and dry  Capillary Refill: Capillary refill takes 2 to 3 seconds  Neurological:      Mental Status: She is alert and oriented to person, place, and time  Mental status is at baseline     Psychiatric:         Mood and Affect: Mood normal          Behavior: Behavior normal            Chelita Cunha MD  4/16/2023  9:12 AM

## 2023-04-16 NOTE — PLAN OF CARE
Problem: MOBILITY - ADULT  Goal: Maintain or return to baseline ADL function  Description: INTERVENTIONS:  -  Assess patient's ability to carry out ADLs; assess patient's baseline for ADL function and identify physical deficits which impact ability to perform ADLs (bathing, care of mouth/teeth, toileting, grooming, dressing, etc )  - Assess/evaluate cause of self-care deficits   - Assess range of motion  - Assess patient's mobility; develop plan if impaired  - Assess patient's need for assistive devices and provide as appropriate  - Encourage maximum independence but intervene and supervise when necessary  - Involve family in performance of ADLs  - Assess for home care needs following discharge   - Consider OT consult to assist with ADL evaluation and planning for discharge  - Provide patient education as appropriate  Outcome: Progressing  Goal: Maintains/Returns to pre admission functional level  Description: INTERVENTIONS:  - Perform BMAT or MOVE assessment daily    - Set and communicate daily mobility goal to care team and patient/family/caregiver  - Collaborate with rehabilitation services on mobility goals if consulted  - Perform Range of Motion 4 times a day  - Reposition patient every 2 hours    - Dangle patient 3 times a day  - Stand patient 3 times a day  - Ambulate patient 3 times a day  - Out of bed to chair 3 times a day   - Out of bed for meals 3 times a day  - Out of bed for toileting  - Record patient progress and toleration of activity level   Outcome: Progressing     Problem: PAIN - ADULT  Goal: Verbalizes/displays adequate comfort level or baseline comfort level  Description: Interventions:  - Encourage patient to monitor pain and request assistance  - Assess pain using appropriate pain scale  - Administer analgesics based on type and severity of pain and evaluate response  - Implement non-pharmacological measures as appropriate and evaluate response  - Consider cultural and social influences on pain and pain management  - Notify physician/advanced practitioner if interventions unsuccessful or patient reports new pain  Outcome: Progressing     Problem: SAFETY ADULT  Goal: Patient will remain free of falls  Description: INTERVENTIONS:  - Educate patient/family on patient safety including physical limitations  - Instruct patient to call for assistance with activity   - Consult OT/PT to assist with strengthening/mobility   - Keep Call bell within reach  - Keep bed low and locked with side rails adjusted as appropriate  - Keep care items and personal belongings within reach  - Initiate and maintain comfort rounds  - Consider moving patient to room near nurses station  Outcome: Progressing

## 2023-04-16 NOTE — UTILIZATION REVIEW
NOTIFICATION OF INPATIENT ADMISSION   AUTHORIZATION REQUEST   SERVICING FACILITY:   [address] [place]  [place], [address] [place] [address]  Tax ID: [id number]  NPI: [id number]   ATTENDING PROVIDER:  Attending Name and NPI#: Jj Madera Md [[id number]]  Address: [address] [place] [address] [place]  [place], [address] [place] [address]  Phone: [phone]     ADMISSION INFORMATION:  [facility]  Place of Service Code: 21  Inpatient Admission Date/Time: 4/13/23  8:33 PM  Discharge Date/Time: No discharge date for patient encounter  Admitting Diagnosis Code/Description:  Abdominal pain [R10 9]  Cancer related pain [G89 3]  Malignant neoplasm metastatic to liver [facility]) [C78 7]     UTILIZATION REVIEW CONTACT:  Rich Casarez Utilization   Network Utilization Review Department  Phone: [phone]  Fax: [phone]  Email: [email or web address]  Contact for approvals/pending authorizations, clinical reviews, and discharge  PHYSICIAN ADVISORY SERVICES:  Medical Necessity Denial & [employer] Review  Phone: [phone]  Fax: [phone]  Email: [email or web address]

## 2023-04-17 ENCOUNTER — APPOINTMENT (INPATIENT)
Dept: RADIOLOGY | Facility: HOSPITAL | Age: 73
End: 2023-04-17

## 2023-04-17 LAB
ALBUMIN SERPL BCP-MCNC: 2.1 G/DL (ref 3.5–5)
ALP SERPL-CCNC: 747 U/L (ref 34–104)
ALT SERPL W P-5'-P-CCNC: 25 U/L (ref 7–52)
ANION GAP SERPL CALCULATED.3IONS-SCNC: 9 MMOL/L (ref 4–13)
ANISOCYTOSIS BLD QL SMEAR: PRESENT
AST SERPL W P-5'-P-CCNC: 49 U/L (ref 13–39)
BASOPHILS # BLD MANUAL: 0 THOUSAND/UL (ref 0–0.1)
BASOPHILS NFR MAR MANUAL: 0 % (ref 0–1)
BILIRUB SERPL-MCNC: 3.14 MG/DL (ref 0.2–1)
BUN SERPL-MCNC: 26 MG/DL (ref 5–25)
CALCIUM ALBUM COR SERPL-MCNC: 9.7 MG/DL (ref 8.3–10.1)
CALCIUM SERPL-MCNC: 8.2 MG/DL (ref 8.4–10.2)
CHLORIDE SERPL-SCNC: 100 MMOL/L (ref 96–108)
CO2 SERPL-SCNC: 20 MMOL/L (ref 21–32)
CREAT SERPL-MCNC: 0.56 MG/DL (ref 0.6–1.3)
EOSINOPHIL # BLD MANUAL: 0 THOUSAND/UL (ref 0–0.4)
EOSINOPHIL NFR BLD MANUAL: 0 % (ref 0–6)
ERYTHROCYTE [DISTWIDTH] IN BLOOD BY AUTOMATED COUNT: 24.2 % (ref 11.6–15.1)
GFR SERPL CREATININE-BSD FRML MDRD: 93 ML/MIN/1.73SQ M
GLUCOSE SERPL-MCNC: 151 MG/DL (ref 65–140)
HCT VFR BLD AUTO: 29.1 % (ref 34.8–46.1)
HGB BLD-MCNC: 9.2 G/DL (ref 11.5–15.4)
HOWELL-JOLLY BOD BLD QL SMEAR: PRESENT
LYMPHOCYTES # BLD AUTO: 1.34 THOUSAND/UL (ref 0.6–4.47)
LYMPHOCYTES # BLD AUTO: 9 % (ref 14–44)
MAGNESIUM SERPL-MCNC: 1.7 MG/DL (ref 1.9–2.7)
MCH RBC QN AUTO: 31.3 PG (ref 26.8–34.3)
MCHC RBC AUTO-ENTMCNC: 31.6 G/DL (ref 31.4–37.4)
MCV RBC AUTO: 99 FL (ref 82–98)
METAMYELOCYTES NFR BLD MANUAL: 3 % (ref 0–1)
MONOCYTES # BLD AUTO: 1.79 THOUSAND/UL (ref 0–1.22)
MONOCYTES NFR BLD: 12 % (ref 4–12)
MYELOCYTES NFR BLD MANUAL: 3 % (ref 0–1)
NEUTROPHILS # BLD MANUAL: 10.88 THOUSAND/UL (ref 1.85–7.62)
NEUTS SEG NFR BLD AUTO: 73 % (ref 43–75)
NRBC BLD AUTO-RTO: 2 /100 WBC (ref 0–2)
PLATELET # BLD AUTO: 251 THOUSANDS/UL (ref 149–390)
PLATELET BLD QL SMEAR: ADEQUATE
PMV BLD AUTO: 10.8 FL (ref 8.9–12.7)
POIKILOCYTOSIS BLD QL SMEAR: PRESENT
POLYCHROMASIA BLD QL SMEAR: PRESENT
POTASSIUM SERPL-SCNC: 4.1 MMOL/L (ref 3.5–5.3)
PROT SERPL-MCNC: 5.6 G/DL (ref 6.4–8.4)
RBC # BLD AUTO: 2.94 MILLION/UL (ref 3.81–5.12)
RBC MORPH BLD: PRESENT
SODIUM SERPL-SCNC: 129 MMOL/L (ref 135–147)
TARGETS BLD QL SMEAR: PRESENT
WBC # BLD AUTO: 14.91 THOUSAND/UL (ref 4.31–10.16)

## 2023-04-17 RX ORDER — LIDOCAINE 50 MG/G
2 PATCH TOPICAL EVERY 12 HOURS PRN
Status: DISCONTINUED | OUTPATIENT
Start: 2023-04-17 | End: 2023-04-18 | Stop reason: HOSPADM

## 2023-04-17 RX ADMIN — FLUTICASONE PROPIONATE 1 SPRAY: 50 SPRAY, METERED NASAL at 08:56

## 2023-04-17 RX ADMIN — HEPARIN SODIUM 5000 UNITS: 5000 INJECTION INTRAVENOUS; SUBCUTANEOUS at 21:43

## 2023-04-17 RX ADMIN — SODIUM CHLORIDE 50 ML/HR: 0.9 INJECTION, SOLUTION INTRAVENOUS at 00:40

## 2023-04-17 RX ADMIN — OXYCODONE HYDROCHLORIDE 20 MG: 20 TABLET, FILM COATED, EXTENDED RELEASE ORAL at 08:55

## 2023-04-17 RX ADMIN — OXYCODONE HYDROCHLORIDE 5 MG: 5 TABLET ORAL at 16:15

## 2023-04-17 RX ADMIN — DOCUSATE SODIUM 100 MG: 100 CAPSULE, LIQUID FILLED ORAL at 17:08

## 2023-04-17 RX ADMIN — FLUTICASONE PROPIONATE 2 PUFF: 44 AEROSOL, METERED RESPIRATORY (INHALATION) at 08:55

## 2023-04-17 RX ADMIN — PANTOPRAZOLE SODIUM 40 MG: 40 TABLET, DELAYED RELEASE ORAL at 05:44

## 2023-04-17 RX ADMIN — HEPARIN SODIUM 5000 UNITS: 5000 INJECTION INTRAVENOUS; SUBCUTANEOUS at 05:44

## 2023-04-17 RX ADMIN — AMLODIPINE BESYLATE 10 MG: 10 TABLET ORAL at 08:54

## 2023-04-17 RX ADMIN — GABAPENTIN 300 MG: 300 CAPSULE ORAL at 21:43

## 2023-04-17 RX ADMIN — FLUTICASONE PROPIONATE 2 PUFF: 44 AEROSOL, METERED RESPIRATORY (INHALATION) at 17:08

## 2023-04-17 RX ADMIN — DULOXETINE HYDROCHLORIDE 20 MG: 20 CAPSULE, DELAYED RELEASE ORAL at 08:56

## 2023-04-17 RX ADMIN — OXYCODONE HYDROCHLORIDE 20 MG: 20 TABLET, FILM COATED, EXTENDED RELEASE ORAL at 21:43

## 2023-04-17 RX ADMIN — DEXAMETHASONE 4 MG: 4 TABLET ORAL at 08:55

## 2023-04-17 RX ADMIN — SODIUM CHLORIDE 50 ML/HR: 0.9 INJECTION, SOLUTION INTRAVENOUS at 14:46

## 2023-04-17 RX ADMIN — LIDOCAINE 5% 1 PATCH: 700 PATCH TOPICAL at 05:50

## 2023-04-17 RX ADMIN — GABAPENTIN 300 MG: 300 CAPSULE ORAL at 16:14

## 2023-04-17 RX ADMIN — DOCUSATE SODIUM 100 MG: 100 CAPSULE, LIQUID FILLED ORAL at 08:55

## 2023-04-17 RX ADMIN — PRAVASTATIN SODIUM 10 MG: 10 TABLET ORAL at 21:48

## 2023-04-17 RX ADMIN — GABAPENTIN 300 MG: 300 CAPSULE ORAL at 08:55

## 2023-04-17 RX ADMIN — DEXAMETHASONE 4 MG: 4 TABLET ORAL at 21:43

## 2023-04-17 RX ADMIN — LISINOPRIL 20 MG: 20 TABLET ORAL at 08:55

## 2023-04-17 NOTE — CASE MANAGEMENT
Case Management Discharge Planning Note    Patient name José Miguelekaterina Choudhary  Location S /S -01 MRN [id number]  : 1950 Date 2023       Current Admission Date: 2023  Current Admission Diagnosis:Ovarian cancer [facility])   Patient Active Problem List    Diagnosis Date Noted   • Pleural effusion 2023   • Leukocytosis 2023   • RAYMOND (acute kidney injury) ([address] ) 2023   • JOHNSON (dyspnea on exertion) 04/10/2023   • Leg swelling 04/10/2023   • Abnormal liver enzymes 2023   • Malignant neoplasm metastatic to both lungs ([address] ) 2023   • Continuous opioid dependence ([address] ) 2023   • Mixed hyperlipidemia 2023   • Medication reaction 2023   • Lactic acidosis 2023   • Asthma 2023   • Anemia in other chronic diseases classified elsewhere 2023   • Severe sepsis ([address] ) 2023   • Malignant neoplasm metastatic to liver ([address] ) 2022   • Personal history of COVID-19 2022   • Sciatica of left side 2022   • Oral mucositis (ulcerative) due to antineoplastic therapy 2022   • Neoplastic malignant related fatigue 03/15/2022   • Chemotherapy-induced peripheral neuropathy ([address] ) 2022   • Chemotherapy induced neutropenia ([address] ) 2021   • Palliative care patient 2021   • Joint pain following chemotherapy 2021   • Chemotherapy-induced nausea 2021   • Anxiousness 2021   • Insomnia due to medical condition 2021   • Drug induced constipation 10/13/2021   • Encounter for central line care 2021   • Hyponatremia 2021   • Ovarian cancer ([address] ) 2021   • S/P splenectomy 2021   • S/P bladder repair 2021   • Status post small bowel resection 2021   • Unspecified protein-calorie malnutrition ([address] ) 08/10/2021   • Migraine 2021   • Cancer related pain 2021   • Primary hypertension 2021   • Esophageal reflux 2013      LOS (days): 4  Geometric Mean LOS (GMLOS) (days): 3 10  Days to GMLOS:-0 4     OBJECTIVE:  Risk of Unplanned Readmission Score: 43 08         Current admission status: Inpatient   Preferred Pharmacy:   [facility] [address] Ramona Blanco, [address]  [address],  [address] [address]  Phone: [phone] Fax: [phone] [address] [place], [address] [place] [address] [place] [address]  Phone: [phone] Fax: [phone]    Bharathi Cool, [address] [facility] [address]  Phone: [phone] Fax: [facility], [address] [place] [address]  [address]  [address] [place] [address]  Phone: [phone] Fax: [phone]    Primary Care Provider: Greyson Proctor DO    Primary Insurance: MEDICARE  Secondary Insurance: BLUE CROSS    DISCHARGE DETAILS:       Freedom of Choice: Yes  Comments - Freedom of Choice: Discussed home hospice agencies                Contacts  Patient Contacts: Patient  Relationship to Patient[de-identified] Other (Comment)  Contact Method: In Person  Reason/Outcome: Discharge Planning, Referral    [facility]         Is the patient interested in [facility] at discharge?: No [facility])    DME Referral Provided  Referral made for DME?: No (DME will be coordinated by hospice agency as needed)    Other Referral/Resources/Interventions Provided:  Interventions: Hospice         Treatment Team Recommendation: Home, Hospice  Discharge Destination Plan[de-identified] Home, Hospice                                            CM received a message from [facility] that they do not provide hospice services  CM met with patient at bedside and discussed making a referral to [facility] which had also been discussed over the weekend  Patient is agreeable with this referral being made  CM department will continue to follow to assist with discharge coordination

## 2023-04-17 NOTE — CASE MANAGEMENT
Case Management Discharge Planning Note    Patient name Lucille Wyaconda  Location S /S -01 MRN [id number]  : 1950 Date 2023       Current Admission Date: 2023  Current Admission Diagnosis:Ovarian cancer [facility])   Patient Active Problem List    Diagnosis Date Noted   • Pleural effusion 2023   • Leukocytosis 2023   • RAYMOND (acute kidney injury) ([address] ) 2023   • JOHNSON (dyspnea on exertion) 04/10/2023   • Leg swelling 04/10/2023   • Abnormal liver enzymes 2023   • Malignant neoplasm metastatic to both lungs ([address] ) 2023   • Continuous opioid dependence ([address] ) 2023   • Mixed hyperlipidemia 2023   • Medication reaction 2023   • Lactic acidosis 2023   • Asthma 2023   • Anemia in other chronic diseases classified elsewhere 2023   • Severe sepsis ([address] ) 2023   • Malignant neoplasm metastatic to liver ([address] ) 2022   • Personal history of COVID-19 2022   • Sciatica of left side 2022   • Oral mucositis (ulcerative) due to antineoplastic therapy 2022   • Neoplastic malignant related fatigue 03/15/2022   • Chemotherapy-induced peripheral neuropathy ([address] ) 2022   • Chemotherapy induced neutropenia ([address] ) 2021   • Palliative care patient 2021   • Joint pain following chemotherapy 2021   • Chemotherapy-induced nausea 2021   • Anxiousness 2021   • Insomnia due to medical condition 2021   • Drug induced constipation 10/13/2021   • Encounter for central line care 2021   • Hyponatremia 2021   • Ovarian cancer ([address] ) 2021   • S/P splenectomy 2021   • S/P bladder repair 2021   • Status post small bowel resection 2021   • Unspecified protein-calorie malnutrition ([address] ) 08/10/2021   • Migraine 2021   • Cancer related pain 2021   • Primary hypertension 2021   • Esophageal reflux 2013      LOS (days): 4  Geometric Mean LOS (GMLOS) (days): 3 10  Days to GMLOS:-0 7     OBJECTIVE:  Risk of Unplanned Readmission Score: 43 15         Current admission status: Inpatient   Preferred Pharmacy:   [facility] [address] Sona Treviño, [address] [facility] [address] [facility] [address], [address] [address]  Phone: [phone] Fax: [phone] [address] [place], [address]  Phone: [phone] Fax: [phone]    [facility], [address] [place] [address]  Phone: [phone] Fax: [phone] [facility], [address]  [address] [place] [address]  [address]  [address] [place] [address]  Phone: [phone] Fax: [phone]    Primary Care Provider: Tameka Mazariegos DO    Primary Insurance: MEDICARE  Secondary Insurance: BLUE CROSS    DISCHARGE DETAILS:    Discharge planning discussed with[de-identified] Patient and her brother  Freedom of Choice: Yes  Comments - Freedom of Choice: Home with [facility] home hospice  CM contacted family/caregiver?: Yes  Were Treatment Team discharge recommendations reviewed with patient/caregiver?: Yes  Did patient/caregiver verbalize understanding of patient care needs?: Yes  Were patient/caregiver advised of the risks associated with not following Treatment Team discharge recommendations?: Yes    Contacts  Patient Contacts: Patient and brother  Relationship to Patient[de-identified] Family  Contact Method: In Person  Reason/Outcome: Discharge Planning, Continuity of Care              Other Referral/Resources/Interventions Provided:  Interventions: Hospice         Treatment Team Recommendation: Hospice, Home  Discharge Destination Plan[de-identified] Hospice, Home  Transport at Discharge : Family                                         CM received back from [address] [place] [address] that they are able to accept patient for home hospice services starting tomorrow    They asked that DC be set up for after 12pm     CM met with patient and her brother at bedside to discuss the DCP  Family will  patient tomorrow around 1pm     CM department will continue to follow to assist with discharge coordination

## 2023-04-17 NOTE — NURSING NOTE
Discussed with patient the order for irving catheter placement  At this time, patient refusing irving to be placed  Patient aware that option will be available while here

## 2023-04-17 NOTE — PROGRESS NOTES
Progress Note - Palliative & Supportive Care  José Miguel Kenrick  68 y o   female  S /S -01   MRN: [id number]  Encounter: [id number]     Assessment  Stage IIIC ovarian cancer s/p hysterectomy, BSO, sigmoidectomy, s/p pembrolizumab x 2 cycles, new hepatic metastases on current imaging  Right sided pleural effusion  Cancer-related pain  Goals of care counseling  Palliative care encounter    Plan  1  Symptom Management  Cancer-related pain  · Continue lidocaine patch - increased to 2 patches today  · Continue gabapentin 300 mg PO TID  · Continue duloxetine therapy  · Continue oxy-ER 20 mg PO BID - increased by gyn-onc yesterday  · Continue oxy-IR 5 mg PO Q4H PRN [mod-severe pain] or 10 mg PO daily qHS PRN severe pain - changed by gyn-onc yesterday              - total OME usage yesterday: 45 mg    Anxiety/depression  ·  Continue duloxetine 20 mg PO QDaily  ·  Continue lorazepam 0 5 mg PO Q8H PRN    Bowel Regimen  · Continue senna 1 tab PO QDaily PRN  · Continue miralax 17 g PO QDaily  · Continue docusate 100 mg PO BID    Nausea  · Continue ondansetron 4 mg IV Q6H PRN    2  Goals of Care  • Level 4 code status  • Per chart review, patient and her family had goals of care discussion with Dr Manuel Lopez yesterday and she expressed her desire to pursue comfort care and hospice care  • Patient reiterates her desire for hospice care and no further cancer-directed therapies at today's visit  She has questions regarding hospice which are answered appropriately  • Her goal is home hospice and  has placed referrals for hospice services within [place], [place]  Currently awaiting response  3  Social Support  • Patient well-supported by her , daughter, and siblings  • Emotional support provided    4  Follow-up  • Palliative care will continue to follow and goals of care conversations will be ongoing  Please reach out with any questions or concerns  24 Hour History  Chart reviewed before visit      Per chart review, patient and her family had goals of care discussion with Dr Hattie Overton yesterday and she expressed her desire to pursue comfort care and hospice care  Her goal is home hospice and CM has placed referrals for hospice services within [place], [place]  Currently awaiting response  Patient has used 5 mg PRN oxycodone and 20 mg oxycontin ER dose in past 24 hrs  Per gyn-onc, dose increased to 20 mg oxycontin ER q12 hrs  Upon my encounter today, patient appears comfortable and in no acute distress  She is not lethargic, confused, or hallucinating  She reports pain is 5/10 currently in left abdomen, but has been better controlled with current regimen  She is agreeable to adding second lidocaine patch to the area  Long acting medication increased yesterday by gyn-onc  Patient aware of plan for discharge with hospice  She has questions regarding hospice care which are answered appropriately  Review of Systems   All other systems reviewed and are negative        Medications    Current Facility-Administered Medications:   •  albuterol (PROVENTIL HFA,VENTOLIN HFA) inhaler 2 puff, 2 puff, Inhalation, Q6H PRN, Maria R Maldonado MD, 2 puff at 04/15/23 2144  •  amLODIPine (NORVASC) tablet 10 mg, 10 mg, Oral, Daily, 10 mg at 04/17/23 0854 **AND** lisinopril (ZESTRIL) tablet 20 mg, 20 mg, Oral, Daily, Maria R Maldonado MD, 20 mg at 04/17/23 0855  •  benzonatate (TESSALON PERLES) capsule 100 mg, 100 mg, Oral, TID PRN, Santos Amin MD  •  butalbital-acetaminophen-caffeine (FIORICET,ESGIC) -40 mg per tablet 1 tablet, 1 tablet, Oral, Q6H PRN, Maria R Maldonado MD  •  calcium carbonate (TUMS) chewable tablet 1,000 mg, 1,000 mg, Oral, Daily PRN, Maria R Maldonado MD  •  dexamethasone (DECADRON) tablet 4 mg, 4 mg, Oral, Q12H [address], Emy Dumont MD, 4 mg at 04/17/23 0855  •  docusate sodium (COLACE) capsule 100 mg, 100 mg, Oral, BID, Maria R Maldonado MD, 100 mg at 04/17/23 0855  •  DULoxetine (CYMBALTA) delayed release capsule 20 mg, 20 mg, Oral, Daily, Bryanna Bermudez MD, 20 mg at 04/17/23 0856  •  fluticasone (FLONASE) 50 mcg/act nasal spray 1 spray, 1 spray, Nasal, Daily, Bryanna Bermudez MD, 1 spray at 04/17/23 0856  •  fluticasone (FLOVENT HFA) 44 mcg/act inhaler 2 puff, 2 puff, Inhalation, BID, Bryanna Bermudez MD, 2 puff at 04/17/23 0855  •  gabapentin (NEURONTIN) capsule 300 mg, 300 mg, Oral, TID, Bryanna Bermudez MD, 300 mg at 04/17/23 0855  •  heparin (porcine) subcutaneous injection 5,000 Units, 5,000 Units, Subcutaneous, Q8H [address], John Andrea MD, 5,000 Units at 04/17/23 0544  •  lidocaine (LIDODERM) 5 % patch 1 patch, 1 patch, Topical, Q12H PRN, Bryanna Bermudez MD, 1 patch at 04/17/23 0550  •  LORazepam (ATIVAN) tablet 0 5 mg, 0 5 mg, Oral, Q8H PRN, Bryanna Bermudez MD, 0 5 mg at 04/13/23 2350  •  ondansetron (ZOFRAN) injection 4 mg, 4 mg, Intravenous, Q6H PRN, Bryanna Bermudez MD  •  oxyCODONE (OxyCONTIN) 12 hr tablet 20 mg, 20 mg, Oral, Q12H [address], Adelaide Álvarez MD, 20 mg at 04/17/23 0855  •  oxyCODONE (ROXICODONE) immediate release tablet 10 mg, 10 mg, Oral, HS PRN, Bryanna Bermudez MD, 10 mg at 04/13/23 2356  •  oxyCODONE (ROXICODONE) IR tablet 5 mg, 5 mg, Oral, Q4H PRN, Bryanna Bermudez MD, 5 mg at 04/16/23 2117  •  pantoprazole (PROTONIX) EC tablet 40 mg, 40 mg, Oral, Early Morning, Bryanna Bermudez MD, 40 mg at 04/17/23 0544  •  polyethylene glycol (MIRALAX) packet 17 g, 17 g, Oral, Daily, Bryanna Bermudez MD  •  pravastatin (PRAVACHOL) tablet 10 mg, 10 mg, Oral, HS, Bryanna Bermudez MD, 10 mg at 04/16/23 2110  •  senna (SENOKOT) tablet 8 6 mg, 8 6 mg, Oral, Daily PRN, Bryanna Bermudez MD, 8 6 mg at 04/13/23 2350  •  sodium chloride 0 9 % infusion, 50 mL/hr, Intravenous, Continuous, Adelaide Álvarez MD, Last Rate: 50 mL/hr at 04/17/23 0040, 50 mL/hr at 04/17/23 0040    Objective  /77   Pulse 76   Temp 97 8 °F (36 6 °C)   Resp 14   Wt 76 2 kg (168 lb)   SpO2 95%   BMI 33 93 kg/m²   Physical Exam: "  Constitutional: Appears well-developed and well-nourished  Comfortable and in no acute distress  Head: Normocephalic and atraumatic  Eyes: EOM are normal  No ocular discharge  No scleral icterus  Neck: no visible adenopathy or masses  Cardiac: Normal rate  Respiratory: Effort normal  No stridor  No respiratory distress  No cough  1 5L NC O2 requirements  Gastrointestinal: No abdominal distension  Musculoskeletal: No edema  Neurological: Alert, oriented x3 and appropriately conversant  Skin: Dry, no diaphoresis  Psychiatric: Displays a normal mood and affect  Behavior, judgement and thought content appear normal      Lab Results:   I have personally reviewed pertinent labs  , CBC:   Lab Results   Component Value Date    WBC 14 91 (H) 04/17/2023    HGB 9 2 (L) 04/17/2023    HCT 29 1 (L) 04/17/2023    MCV 99 (H) 04/17/2023     04/17/2023    MCH 31 3 04/17/2023    MCHC 31 6 04/17/2023    RDW 24 2 (H) 04/17/2023    MPV 10 8 04/17/2023   , CMP:   Lab Results   Component Value Date    SODIUM 129 (L) 04/17/2023    K 4 1 04/17/2023     04/17/2023    CO2 20 (L) 04/17/2023    BUN 26 (H) 04/17/2023    CREATININE 0 56 (L) 04/17/2023    CALCIUM 8 2 (L) 04/17/2023    AST 49 (H) 04/17/2023    ALT 25 04/17/2023    ALKPHOS 747 (H) 04/17/2023    EGFR 93 04/17/2023     Imaging Studies: I have personally reviewed pertinent reports  EKG, Pathology, and Other Studies: I have personally reviewed pertinent reports  Counseling / Coordination of Care  Total floor / unit time spent today 40 minutes  Greater than 50% of total time was spent with the patient and / or family counseling and / or coordinating of care  Jannette Spears PA-C  Palliative & Supportive Care    Portions of this document may have been created using dictation software and as such some \"sound alike\" terms may have been generated by the system  Do not hesitate to contact me with any questions or clarifications        "

## 2023-04-17 NOTE — PROGRESS NOTES
-- Patient:  -- MRN: [id number]  -- Aidin Request ID: [id number]  -- Level of care reserved: Hospice  -- Partner Reserved: [address] [place], [facility], [address] [facility] [phone]  -- Clinical needs requested:  -- Geography searched: [phone]  -- Start of Service:  -- Request sent: 4:10pm EDT on 4/16/2023 by Mary James  -- Partner reserved: 10:58am EDT on 4/17/2023 by Antonietta Mccarty  -- Choice list shared: 9:39am EDT on 4/17/2023 by Antonietta Mccarty

## 2023-04-17 NOTE — UTILIZATION REVIEW
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;  S/p en block hysterectomy, BSO, sigmoidectomy with low rectal re-anastamosis, bladder peritoneal stripping and cystotomy repair, diaphragm stripping, small bowel resection with re-anastamosis on 8/6/2021  Recent imaging shows liver metastases  S/p 2 cycles of Pembro    Sent to ER on 4/13 for workup of  Rising liver enzymes and RAYMOND  PER  GYN/ONC    Given her worsening performance status at home we discussed continuation on hospice  Patient declines at this time  Pt states would like to continue with chemotherapy if her function and labs allow  Acute hypoxic respiratory failure: Patient is satting in the high 80s low 90s on rm air  Started on supplemental oxygen NC  (most likely 2/2 tumor burden in the lungs as her pleural effusion is small)       4/15      RAYMOND: Improving with IV fluid hydration  RUQ US w/o biliary dilation  Lower extremity swelling: VTE work-up negative  Most likely secondary to malnutrition, cancer diagnosis, immobility    4/16   progressive stage IIIc ovarian cancer with extensive hepatic metastases causing hyperbilirubinemia, edema  She also has multiple lung metastases  Her performance status is poor at 3-4     liver is almost entirely replaced by malignancy  recommended either best supportive care or hospice  Based on quality of life concerns and goals, she is interested in hospice care  Consultation was placed  Dyspnea with cough  She is on 2 L of oxygen supplementation with a sat of approximately 92%  We will repeat chest x-ray today to evaluate size of right pleural effusion  If the pleural effusion is large, she can undergo thoracentesis prior to discharge home with hospice    Will increase narcotic dose-plan to start OxyContin at 20 mg twice daily which will assist with cough suppression  We will also add dexamethasone 4 mg twice daily for palliation  4/17   Not enough fluid seen on ultrasound to preform right thoracentesis  Cont pain mgt  Assessing for home supplemental oxygen            Vital Signs:   04/17/23 13:59:37 -- 92 -- 130/73 97 % -- --   04/17/23 08:51:35 -- -- -- 127/77 -- -- --   04/17/23 0752 -- 76 14 -- 95 % 1 5 L/min Nasal cannula   04/16/23 22:28:20 97 8 °F (36 6 °C) 88 18 125/69 91 % 2 L/min  None (Room air)   04/16/23 06:17:46 98 8 °F (37 1 °C) 107 Abnormal  17 132/62 91 % -- --   04/15/23 22:04:50 98 7 °F (37 1 °C) 98 18 115/59 93 % -- --   04/15/23 2120 -- 99 -- -- 94 % -- None (Room air)   04/15/23 15:09:34 99 °F (37 2 °C) 101 18 106/64 87 % Abnormal  -- --   04/15/23 06:50:11 98 3 °F (36 8 °C) 104 17 109/58 92 % -- --       Pertinent Labs/Diagnostic Results:       Results from last 7 days   Lab Units 04/17/23  0545 04/16/23  0429 04/15/23  0637 04/14/23 0453 04/13/23 1906 04/13/23  1126   WBC Thousand/uL 14 91* 21 46* 20 77* 19 82* 22 19* 24 20*   HEMOGLOBIN g/dL 9 2* 8 8* 8 6* 9 4* 9 3* 9 6*   HEMATOCRIT % 29 1* 27 3* 26 3* 27 5* 28 6* 29 3*   PLATELETS Thousands/uL 251 268 243 253 237 233   NEUTROS ABS Thousands/µL  --   --   --  13 72* 14 82* 15 99*   BANDS PCT %  --  1  --   --   --   --          Results from last 7 days   Lab Units 04/17/23  0545 04/16/23  0429 04/15/23  0637 04/14/23  0453 04/13/23  1906 04/13/23  1126   SODIUM mmol/L 129* 127* 126* 127* 127* 127*   POTASSIUM mmol/L 4 1 4 2 4 0 3 9 4 1 4 3   CHLORIDE mmol/L 100 98 96 95* 93* 93*   CO2 mmol/L 20* 19* 22 24 23 20*   ANION GAP mmol/L 9 10 8 8 11 14*   BUN mg/dL 26* 44* 49* 46* 50* 47*   CREATININE mg/dL 0 56* 1 04 1 04 0 99 1 37* 1 22   EGFR ml/min/1 73sq m 93 53 53 57 38 44   CALCIUM mg/dL 8 2* 8 1* 7 8* 7 8* 8 1* 7 6*   MAGNESIUM mg/dL 1 7* 1 9 1 8* 1 9  --  1 8*   PHOSPHORUS mg/dL  --   --   --  4 3*  --   --      Results from last 7 days   Lab Units 04/17/23  0545 04/16/23  0429 04/15/23  0637 04/14/23 0453 04/13/23  1906   AST U/L 49* 60* 59* 74* 95*   ALT U/L 25 28 30 38 46   ALK PHOS U/L 747* 793* 759* 809* 942*   TOTAL PROTEIN g/dL 5 6* 5 6* 5 4* 5 4* 5 9*   ALBUMIN g/dL 2 1* 2 2* 2 1* 2 2* 2 4*   TOTAL BILIRUBIN mg/dL 3 14* 3 61* 3 27* 3 28* 3 46*         Results from last 7 days   Lab Units 04/17/23  0545 04/16/23 0429 04/15/23  0637 04/14/23 0453 04/13/23 1906 04/13/23  1126   GLUCOSE RANDOM mg/dL 151* 93 83 77 100 85             Medications:   Scheduled Medications:  amLODIPine, 10 mg, Oral, Daily  lisinopril, 20 mg, Oral, Daily  dexamethasone, 4 mg, Oral, Q12H ANA MARIA  docusate sodium, 100 mg, Oral, BID  DULoxetine, 20 mg, Oral, Daily  fluticasone, 1 spray, Nasal, Daily  fluticasone, 2 puff, Inhalation, BID  gabapentin, 300 mg, Oral, TID  heparin (porcine), 5,000 Units, Subcutaneous, Q8H ANA MARIA  oxyCODONE, 20 mg, Oral, Q12H ANA MARIA  pantoprazole, 40 mg, Oral, Early Morning  polyethylene glycol, 17 g, Oral, Daily  pravastatin, 10 mg, Oral, HS      Continuous IV Infusions:  sodium chloride, 50 mL/hr, Intravenous, Continuous      PRN Meds:  albuterol, 2 puff, Inhalation, Q6H PRN  benzonatate, 100 mg, Oral, TID PRN  butalbital-acetaminophen-caffeine, 1 tablet, Oral, Q6H PRN  calcium carbonate, 1,000 mg, Oral, Daily PRN  lidocaine, 2 patch, Topical, Q12H PRN    x3 in past 24 hrs  LORazepam, 0 5 mg, Oral, Q8H PRN  ondansetron, 4 mg, Intravenous, Q6H PRN  oxyCODONE, 10 mg, Oral, HS PRN  oxyCODONE, 5 mg, Oral, Q4H PRN    x3 in past 24 hrs  senna, 8 6 mg, Oral, Daily PRN        Discharge Plan: tbd    Network Utilization Review Department  ATTENTION: Please call with any questions or concerns to [phone] and carefully listen to the prompts so that you are directed to the right person   All voicemails are confidential   Jovan Lowe all requests for admission clinical reviews, approved or denied determinations and any other requests to dedicated fax number below belonging to the campus where the patient is receiving treatment   List of dedicated fax numbers for the Facilities:  [address] [place] [address] DENIALS (Administrative/Medical Necessity) [phone]   [address] (Maternity/NICU/Pediatrics) [phone]   [address] [phone]   [facility] [address] [phone]   [address] [address] [address] [place] [address] [phone]   [facility] [address] [facility] [address] [facility] [phone]

## 2023-04-17 NOTE — PLAN OF CARE
Problem: MOBILITY - ADULT  Goal: Maintain or return to baseline ADL function  Description: INTERVENTIONS:  -  Assess patient's ability to carry out ADLs; assess patient's baseline for ADL function and identify physical deficits which impact ability to perform ADLs (bathing, care of mouth/teeth, toileting, grooming, dressing, etc )  - Assess/evaluate cause of self-care deficits   - Assess range of motion  - Assess patient's mobility; develop plan if impaired  - Assess patient's need for assistive devices and provide as appropriate  - Encourage maximum independence but intervene and supervise when necessary  - Involve family in performance of ADLs  - Assess for home care needs following discharge   - Consider OT consult to assist with ADL evaluation and planning for discharge  - Provide patient education as appropriate  Outcome: Progressing  Goal: Maintains/Returns to pre admission functional level  Description: INTERVENTIONS:  - Perform BMAT or MOVE assessment daily    - Set and communicate daily mobility goal to care team and patient/family/caregiver  - Collaborate with rehabilitation services on mobility goals if consulted  - Perform Range of Motion 4 times a day  - Reposition patient every 2 hours    - Dangle patient 3 times a day  - Stand patient 3 times a day  - Ambulate patient 3 times a day  - Out of bed to chair 3 times a day   - Out of bed for meals 3 times a day  - Out of bed for toileting  - Record patient progress and toleration of activity level   Outcome: Progressing     Problem: PAIN - ADULT  Goal: Verbalizes/displays adequate comfort level or baseline comfort level  Description: Interventions:  - Encourage patient to monitor pain and request assistance  - Assess pain using appropriate pain scale  - Administer analgesics based on type and severity of pain and evaluate response  - Implement non-pharmacological measures as appropriate and evaluate response  - Consider cultural and social influences on pain and pain management  - Notify physician/advanced practitioner if interventions unsuccessful or patient reports new pain  Outcome: Progressing     Problem: SAFETY ADULT  Goal: Patient will remain free of falls  Description: INTERVENTIONS:  - Educate patient/family on patient safety including physical limitations  - Instruct patient to call for assistance with activity   - Consult OT/PT to assist with strengthening/mobility   - Keep Call bell within reach  - Keep bed low and locked with side rails adjusted as appropriate  - Keep care items and personal belongings within reach  - Initiate and maintain comfort rounds  - Make Fall Risk Sign visible to staff  - Obtain necessary fall risk management equipment  - Apply yellow socks and bracelet for high fall risk patients  - Consider moving patient to room near nurses station  Outcome: Progressing

## 2023-04-17 NOTE — PHYSICAL THERAPY NOTE
PHYSICAL THERAPY NOTE    Patient Name: Estrella Alatorre  JIJXE'C Date: 4/17/2023 04/17/23 1830   PT Last Visit   PT Visit Date 04/17/23   Note Type   Note type Screen   Cancel Reasons Other   Additional Comments Chart reviewed, and spoke to BODØ from case management earlier in the day  Patient is reportedly being discharged tomorrow afternoon with hospice services  As per OT consult on 4/14/2023, would continue to provide physical assistance to patient via nursing for out of bed mobility as appropriate    As definitive plan appears to be in place, and mobility recommendations were made earlier in her stay, will screen patient from physical therapy services      Janki Alex, PT

## 2023-04-17 NOTE — PROGRESS NOTES
Gyn Oncology Progress note   Last Longoria 68 y o  female MRN: [id number]  Unit/Bed#: S -01 Encounter: [id number]    Assessment/Plan:    68 y o  w/ 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 presenting with SOB, abdominal pain and lower extremity swelling  Patient currently desires hospice care and is pending Hospice palcement     Plan by problem:    Pleural effusion  Assessment & Plan  Small right pleural effusion and associated atelectasis  Continued decreased breath sounds in RLL field this am  Currently maintaining 91% saturation on 2L supplemental O2 via nasal cannula  Consider Palliative Thoracentesis , F/U with IR     Vitals:    04/16/23 2228   BP: 125/69   Pulse: 88   Resp: 18   Temp: 97 8 °F (36 6 °C)   SpO2: 91%         RAYMOND (acute kidney injury) ([facility] [address] )  Assessment & Plan  Cr Trend:  Recent Labs     04/14/23  0453 04/15/23  0637 04/16/23  0429   CREATININE 0 99 1 04 1 04     Continue IV fluid hydration      Leukocytosis  Assessment & Plan  WBC trend:   Recent Labs     04/13/23  1906 04/14/23  0453 04/15/23  0637 04/16/23  0429   WBC 22 19* 19 82* 20 77* 21 46*         Leg swelling  Assessment & Plan  Bilateral lower extremity Doppler study negative 4/13    JOHNSON (dyspnea on exertion)  Assessment & Plan  D-dimer elevated on admission  CXR and CT PE negative for acute pathology  Continue to monitor SpO2 and clinical condition    Abnormal liver enzymes  Assessment & Plan  Trend:  Recent Labs     04/14/23  0453 04/15/23  0637 04/16/23  0429   ALKPHOS 809* 759* 793*   TBILI 3 28* 3 27* 3 61*     Continue to trend  RUQ US without acute bile duct dilation  S/p GI consultation 4/14  MRCP 4/14 report pending, f/u final results     Medication reaction  Assessment & Plan  Reaction to magnesium PO and IV with hives, tachycardia and fever  Magnesium 1 8 on admission, 1 9 this am  Hold off on repletion at this time    Asthma  Assessment & Plan  Albuterol prn    Malignant "neoplasm metastatic to liver [facility])  Assessment & Plan  Last CT on 3/27 shows \"interval increase in size and number of pulmonary hepatic metastases\"  This correlates with increasing bilirubin level  Repeat CT c/w stable hepatic metastasis    Palliative care patient  Assessment & Plan  Palliative care consult placed for pain control  Comfort care ordered    Drug induced constipation  Assessment & Plan  Colace and Miralax ordered    Hyponatremia  Assessment & Plan  Last labs:   Lab Results   Component Value Date    SODIUM 127 (L) 04/16/2023    K 4 2 04/16/2023    CL 98 04/16/2023    CO2 19 (L) 04/16/2023    BUN 44 (H) 04/16/2023    CREATININE 1 04 04/16/2023    GLUC 93 04/16/2023    CALCIUM 8 1 (L) 04/16/2023      cc/h    Cancer related pain  Assessment & Plan  Tylenol held at this time  S/p palliative care consultation 4/14; see note for full recommendations regarding pain regimen; appreciate input    Migraine  Assessment & Plan  Fioricet prn    Primary hypertension  Assessment & Plan  Home amlodipine and lisinopril ordered     * Ovarian cancer [facility])  Assessment & Plan  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  S/p en block hysterectomy, BSO, sigmoidectomy with low rectal re-anastamosis, bladder peritoneal stripping and cystotomy repair, diaphragm stripping, small bowel resection with re-anastamosis on 8/6/2021  Recent imaging shows liver metastases  S/p 2 cycles of Pembro  Poor prognosis  S/p palliative care and GI consultations on 4/14  GOC discussion with Dr Dc Escalante on 04/16/23  Patient desires Hospice Care  F/U Case Management about placement         Subjective:    Madhuri Sanford and family had goals of care discussion with Dr Dc Escalante yesterday and she expressed desire to persue hospice care  This morning she was asleep with nasal cannula in place  Per nursing, patient had minimal complaints overnight         Objective:  /69   Pulse 88   Temp 97 8 °F (36 6 °C)   Resp 18   Wt 76 2 kg (168 " lb)   SpO2 91%   BMI 33 93 kg/m²     I/O last 3 completed shifts: In: 1000 [I V :1000]  Out: 250 [Urine:250]  I/O this shift:  In: 1000 [I V :1000]  Out: -     Lab Results   Component Value Date    WBC 14 91 (H) 04/17/2023    HGB 9 2 (L) 04/17/2023    HCT 29 1 (L) 04/17/2023    MCV 99 (H) 04/17/2023     04/17/2023       Lab Results   Component Value Date    GLUCOSE 195 (H) 08/06/2021    CALCIUM 8 2 (L) 04/17/2023    K 4 1 04/17/2023    CO2 20 (L) 04/17/2023     04/17/2023    BUN 26 (H) 04/17/2023    CREATININE 0 56 (L) 04/17/2023           Physical Exam  Vitals and nursing note reviewed  Exam conducted with a chaperone present  Constitutional:       General: She is not in acute distress  HENT:      Head: Normocephalic  Right Ear: External ear normal       Left Ear: External ear normal    Eyes:      General: No scleral icterus  Right eye: No discharge  Left eye: No discharge  Conjunctiva/sclera: Conjunctivae normal    Cardiovascular:      Rate and Rhythm: Normal rate and regular rhythm  Pulses: Normal pulses  Heart sounds: Normal heart sounds  Pulmonary:      Effort: Pulmonary effort is normal  No respiratory distress  Breath sounds: Examination of the right-lower field reveals decreased breath sounds  Decreased breath sounds present  Abdominal:      General: There is no distension  Palpations: Abdomen is rigid  Tenderness: There is no abdominal tenderness  There is no guarding  Comments: Well-healed midline incision  Rigid, non-tender upper abdomen c/w prior surgery and disease   Musculoskeletal:         General: No swelling or tenderness  Normal range of motion  Cervical back: Normal range of motion  Right lower leg: Edema present  Left lower leg: Edema present  Comments: +1 bilateral pitting edema up to the knees   Skin:     General: Skin is warm and dry  Capillary Refill: Capillary refill takes 2 to 3 seconds  Neurological:      Mental Status: She is alert and oriented to person, place, and time  Mental status is at baseline     Psychiatric:         Mood and Affect: Mood normal          Behavior: Behavior normal            Sherrell Gavin MD  4/17/2023  6:53 AM

## 2023-04-18 VITALS
HEART RATE: 94 BPM | OXYGEN SATURATION: 96 % | BODY MASS INDEX: 33.93 KG/M2 | TEMPERATURE: 97.6 F | WEIGHT: 168 LBS | SYSTOLIC BLOOD PRESSURE: 141 MMHG | RESPIRATION RATE: 18 BRPM | DIASTOLIC BLOOD PRESSURE: 84 MMHG

## 2023-04-18 RX ORDER — OXYCODONE HYDROCHLORIDE 5 MG/1
5 TABLET ORAL EVERY 4 HOURS PRN
Qty: 20 TABLET | Refills: 0 | Status: SHIPPED | OUTPATIENT
Start: 2023-04-18 | End: 2023-04-18

## 2023-04-18 RX ORDER — NALOXONE HYDROCHLORIDE 4 MG/.1ML
SPRAY NASAL
Qty: 1 EACH | Refills: 0 | Status: SHIPPED | OUTPATIENT
Start: 2023-04-18 | End: 2024-04-17

## 2023-04-18 RX ORDER — ACETAMINOPHEN 325 MG/1
975 TABLET ORAL ONCE
Status: COMPLETED | OUTPATIENT
Start: 2023-04-18 | End: 2023-04-18

## 2023-04-18 RX ORDER — GABAPENTIN 300 MG/1
300 CAPSULE ORAL 3 TIMES DAILY
Qty: 9 CAPSULE | Refills: 0 | Status: SHIPPED | OUTPATIENT
Start: 2023-04-18 | End: 2023-04-21

## 2023-04-18 RX ORDER — OXYCODONE HYDROCHLORIDE 5 MG/1
5-10 TABLET ORAL EVERY 4 HOURS PRN
Qty: 30 TABLET | Refills: 0 | Status: SHIPPED | OUTPATIENT
Start: 2023-04-18 | End: 2023-04-20

## 2023-04-18 RX ORDER — BENZONATATE 100 MG/1
100 CAPSULE ORAL 3 TIMES DAILY PRN
Qty: 20 CAPSULE | Refills: 0 | Status: SHIPPED | OUTPATIENT
Start: 2023-04-18

## 2023-04-18 RX ORDER — LIDOCAINE 50 MG/G
2 PATCH TOPICAL DAILY PRN
Qty: 12 PATCH | Refills: 0 | Status: SHIPPED | OUTPATIENT
Start: 2023-04-18 | End: 2023-04-18

## 2023-04-18 RX ORDER — LORAZEPAM 0.5 MG/1
0.25 TABLET ORAL EVERY 6 HOURS PRN
Qty: 10 TABLET | Refills: 0 | Status: SHIPPED | OUTPATIENT
Start: 2023-04-18

## 2023-04-18 RX ORDER — MORPHINE SULFATE 20 MG/5ML
0.25 SOLUTION ORAL
Qty: 5 ML | Refills: 0 | Status: CANCELLED | OUTPATIENT
Start: 2023-04-18 | End: 2023-04-28

## 2023-04-18 RX ORDER — MORPHINE SULFATE 20 MG/ML
20 SOLUTION ORAL
Qty: 20 ML | Refills: 0 | Status: CANCELLED | OUTPATIENT
Start: 2023-04-18 | End: 2023-04-28

## 2023-04-18 RX ORDER — OXYCODONE HCL 20 MG/1
20 TABLET, FILM COATED, EXTENDED RELEASE ORAL EVERY 12 HOURS SCHEDULED
Qty: 6 TABLET | Refills: 0 | Status: SHIPPED | OUTPATIENT
Start: 2023-04-18 | End: 2023-04-18

## 2023-04-18 RX ORDER — ACETAMINOPHEN 500 MG
1000 TABLET ORAL EVERY 12 HOURS SCHEDULED
Refills: 0
Start: 2023-04-18

## 2023-04-18 RX ORDER — LORAZEPAM 1 MG/1
0.5 TABLET ORAL EVERY 6 HOURS PRN
Qty: 45 TABLET | Refills: 0 | Status: CANCELLED | OUTPATIENT
Start: 2023-04-18 | End: 2023-04-28

## 2023-04-18 RX ORDER — LIDOCAINE 50 MG/G
2 PATCH TOPICAL DAILY
Qty: 6 PATCH | Refills: 0 | Status: SHIPPED | OUTPATIENT
Start: 2023-04-18

## 2023-04-18 RX ORDER — OXYCODONE HCL 20 MG/1
20 TABLET, FILM COATED, EXTENDED RELEASE ORAL
Qty: 6 TABLET | Refills: 0 | Status: SHIPPED | OUTPATIENT
Start: 2023-04-18 | End: 2023-04-18

## 2023-04-18 RX ADMIN — DOCUSATE SODIUM 100 MG: 100 CAPSULE, LIQUID FILLED ORAL at 08:33

## 2023-04-18 RX ADMIN — LISINOPRIL 20 MG: 20 TABLET ORAL at 08:33

## 2023-04-18 RX ADMIN — ACETAMINOPHEN 975 MG: 325 TABLET ORAL at 10:21

## 2023-04-18 RX ADMIN — AMLODIPINE BESYLATE 10 MG: 10 TABLET ORAL at 08:33

## 2023-04-18 RX ADMIN — DULOXETINE HYDROCHLORIDE 20 MG: 20 CAPSULE, DELAYED RELEASE ORAL at 08:34

## 2023-04-18 RX ADMIN — OXYCODONE HYDROCHLORIDE 20 MG: 20 TABLET, FILM COATED, EXTENDED RELEASE ORAL at 08:33

## 2023-04-18 RX ADMIN — DEXAMETHASONE 4 MG: 4 TABLET ORAL at 08:33

## 2023-04-18 RX ADMIN — FLUTICASONE PROPIONATE 2 PUFF: 44 AEROSOL, METERED RESPIRATORY (INHALATION) at 08:33

## 2023-04-18 RX ADMIN — GABAPENTIN 300 MG: 300 CAPSULE ORAL at 08:33

## 2023-04-18 RX ADMIN — FLUTICASONE PROPIONATE 1 SPRAY: 50 SPRAY, METERED NASAL at 08:33

## 2023-04-18 RX ADMIN — HEPARIN SODIUM 5000 UNITS: 5000 INJECTION INTRAVENOUS; SUBCUTANEOUS at 06:14

## 2023-04-18 RX ADMIN — OXYCODONE HYDROCHLORIDE 10 MG: 5 TABLET ORAL at 04:26

## 2023-04-18 RX ADMIN — PANTOPRAZOLE SODIUM 40 MG: 40 TABLET, DELAYED RELEASE ORAL at 06:14

## 2023-04-18 NOTE — NURSING NOTE
Patient A+Ox4  Patient given morning meds and breakfast  Ambulated to the bathroom with a standby assist  Patient c/o of pain in RLQ  Pain meds administered  Patient has generalized swelling  2L NC on for patient comfort  Patient up in chair with legs elevated  Patient aware of plan to go home on hospice in the early afternoon

## 2023-04-18 NOTE — PROGRESS NOTES
Gyn Oncology Progress note   Last Longoria 68 y o  female MRN: [id number]  Unit/Bed#: S -01 Encounter: [id number]    Assessment/Plan:    68 y o  w/ 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 presenting with SOB, abdominal pain and lower extremity swelling  Patient currently desires home hospice care       Plan by problem:    Pleural effusion  Assessment & Plan  Small right pleural effusion and associated atelectasis  Continued decreased breath sounds in RLL field this am  Currently maintaining 97% saturation on 1 5 L supplemental O2 via nasal cannula  Consider Palliative Thoracentesis , F/U with IR     Vitals:    04/17/23 1359   BP: 130/73   Pulse: 92   Resp:    Temp:    SpO2: 97%         RAYMOND (acute kidney injury) [facility])  Assessment & Plan  Cr Trend:  Recent Labs     04/14/23  0453 04/15/23  0637 04/16/23  0429   CREATININE 0 99 1 04 1 04     Continue IV fluid hydration      Leukocytosis  Assessment & Plan  WBC trend:   Recent Labs     04/13/23  1906 04/14/23  0453 04/15/23  0637 04/16/23  0429   WBC 22 19* 19 82* 20 77* 21 46*         Leg swelling  Assessment & Plan  Bilateral lower extremity Doppler study negative 4/13    JOHNSON (dyspnea on exertion)  Assessment & Plan  D-dimer elevated on admission  CXR and CT PE negative for acute pathology  Continue to monitor SpO2 and clinical condition    Abnormal liver enzymes  Assessment & Plan  Trend:  Recent Labs     04/14/23  0453 04/15/23  0637 04/16/23  0429   ALKPHOS 809* 759* 793*   TBILI 3 28* 3 27* 3 61*     Continue to trend  RUQ US without acute bile duct dilation  S/p GI consultation 4/14  MRCP 4/14 report pending, f/u final results     Medication reaction  Assessment & Plan  Reaction to magnesium PO and IV with hives, tachycardia and fever  Magnesium 1 8 on admission, 1 9 this am  Hold off on repletion at this time    Asthma  Assessment & Plan  Albuterol prn    Malignant neoplasm metastatic to liver [facility])  Assessment & "Plan  Last CT on 3/27 shows \"interval increase in size and number of pulmonary hepatic metastases\"  This correlates with increasing bilirubin level  Repeat CT c/w stable hepatic metastasis    Palliative care patient  Assessment & Plan  Palliative care consult placed for pain control  Comfort care ordered    Drug induced constipation  Assessment & Plan  Colace and Miralax ordered    Hyponatremia  Assessment & Plan  Last labs:   Lab Results   Component Value Date    SODIUM 127 (L) 04/16/2023    K 4 2 04/16/2023    CL 98 04/16/2023    CO2 19 (L) 04/16/2023    BUN 44 (H) 04/16/2023    CREATININE 1 04 04/16/2023    GLUC 93 04/16/2023    CALCIUM 8 1 (L) 04/16/2023      cc/h    Cancer related pain  Assessment & Plan  Tylenol held at this time  S/p palliative care consultation 4/14; see note for full recommendations regarding pain regimen; appreciate input    Migraine  Assessment & Plan  Fioricet prn    Primary hypertension  Assessment & Plan  Home amlodipine and lisinopril ordered     * Ovarian cancer [facility])  Assessment & Plan  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 discussion with Dr Kimani Hirsch on 04/16/23  Patient desires Hospice Care  Case management: Home hospice care in place, plan for discharge at 1PM 04/18/23         Subjective:    Shyla Reaves   Reports that her pain is well controlled with the pain medication provided  She has been ambulating to the restroom and able to void and have BM on her own  She has a nasal cannula in place  She desires to persue home hospice care and states that she anticipates her family will be able to pick her up this morning   " Objective:  /73   Pulse 92   Temp 97 8 °F (36 6 °C)   Resp 14   Wt 76 2 kg (168 lb)   SpO2 97%   BMI 33 93 kg/m²     I/O last 3 completed shifts: In: 1000 [I V :1000]  Out: 250 [Urine:250]  No intake/output data recorded  Lab Results   Component Value Date    WBC 14 91 (H) 04/17/2023    HGB 9 2 (L) 04/17/2023    HCT 29 1 (L) 04/17/2023    MCV 99 (H) 04/17/2023     04/17/2023       Lab Results   Component Value Date    GLUCOSE 195 (H) 08/06/2021    CALCIUM 8 2 (L) 04/17/2023    K 4 1 04/17/2023    CO2 20 (L) 04/17/2023     04/17/2023    BUN 26 (H) 04/17/2023    CREATININE 0 56 (L) 04/17/2023       Physical Exam  Constitutional:       Appearance: She is obese  HENT:      Head: Normocephalic and atraumatic  Eyes:      Extraocular Movements: Extraocular movements intact  Cardiovascular:      Rate and Rhythm: Normal rate  Pulses: Normal pulses  Pulmonary:      Effort: Pulmonary effort is normal    Abdominal:      General: Abdomen is flat  There is no distension  Tenderness: There is no abdominal tenderness  There is no guarding  Musculoskeletal:      Cervical back: Normal range of motion  Right lower leg: Swelling present  3+ Edema present  Left lower leg: Swelling present  3+ Edema present  Skin:     General: Skin is warm and dry  Neurological:      General: No focal deficit present  Mental Status: She is alert     Psychiatric:         Mood and Affect: Mood normal              Brianne Lorenzo MD  4/18/2023  6:21 AM

## 2023-04-18 NOTE — PLAN OF CARE
PATIENT GOING HOME ON HOSPICE    Problem: MOBILITY - ADULT  Goal: Maintain or return to baseline ADL function  Description: INTERVENTIONS:  -  Assess patient's ability to carry out ADLs; assess patient's baseline for ADL function and identify physical deficits which impact ability to perform ADLs (bathing, care of mouth/teeth, toileting, grooming, dressing, etc )  - Assess/evaluate cause of self-care deficits   - Assess range of motion  - Assess patient's mobility; develop plan if impaired  - Assess patient's need for assistive devices and provide as appropriate  - Encourage maximum independence but intervene and supervise when necessary  - Involve family in performance of ADLs  - Assess for home care needs following discharge   - Consider OT consult to assist with ADL evaluation and planning for discharge  - Provide patient education as appropriate  Outcome: Completed  Goal: Maintains/Returns to pre admission functional level  Description: INTERVENTIONS:  - Perform BMAT or MOVE assessment daily    - Set and communicate daily mobility goal to care team and patient/family/caregiver  - Collaborate with rehabilitation services on mobility goals if consulted  - Perform Range of Motion  times a day  - Reposition patient every  hours    - Dangle patient  times a day  - Stand patient  times a day  - Ambulate patient  times a day  - Out of bed to chair  times a day   - Out of bed for meals times a day  - Out of bed for toileting  - Record patient progress and toleration of activity level   Outcome: Completed     Problem: PAIN - ADULT  Goal: Verbalizes/displays adequate comfort level or baseline comfort level  Description: Interventions:  - Encourage patient to monitor pain and request assistance  - Assess pain using appropriate pain scale  - Administer analgesics based on type and severity of pain and evaluate response  - Implement non-pharmacological measures as appropriate and evaluate response  - Consider cultural and social influences on pain and pain management  - Notify physician/advanced practitioner if interventions unsuccessful or patient reports new pain  Outcome: Completed     Problem: SAFETY ADULT  Goal: Patient will remain free of falls  Description: INTERVENTIONS:  - Educate patient/family on patient safety including physical limitations  - Instruct patient to call for assistance with activity   - Consult OT/PT to assist with strengthening/mobility   - Keep Call bell within reach  - Keep bed low and locked with side rails adjusted as appropriate  - Keep care items and personal belongings within reach  - Initiate and maintain comfort rounds  - Make Fall Risk Sign visible to staff  - Offer Toileting every  Hours, in advance of need  - Initiate/Maintain alarm  - Obtain necessary fall risk management equipment:   - Apply yellow socks and bracelet for high fall risk patients  - Consider moving patient to room near nurses station  Outcome: Completed

## 2023-04-18 NOTE — PROGRESS NOTES
Progress Note - Palliative & Supportive Care  Tatum Campuzano  68 y o   female  S /S -01   MRN: [id number]  Encounter: [id number]     Assessment  Stage IIIC ovarian cancer s/p hysterectomy, BSO, sigmoidectomy, s/p pembrolizumab x 2 cycles, new hepatic metastases on current imaging  Right sided pleural effusion  Cancer-related pain  Goals of care counseling  Palliative care encounter    Plan  1  Symptom Management  Cancer-related pain  • Can take 1000 mg acetaminophen q12 hrs at home  • One time dose of 975 mg given today  • Do not exceed 2000 mg daily due to LFTs  • Continue lidocaine patch 2 patches daily - script sent to [employer] [facility] for 3 day supply  • Continue gabapentin 300 mg PO TID - discussed with primary gyn-onc Dr Ely Emanuel and they will send script if needed  • Continue duloxetine therapy - discussed with primary gyn-onc Dr Ely Emanuel and they will send script if needed  • Continue oxy-ER 20 mg PO BID - will not be covered by insurance without prior authorization so will resume home Xtampza 9 mg q12 hrs scheduled and STOP Oxycontin (dose is lower than OxyContin during hospitalization as patient is somnolent on exam today and long-acting opioids had been escalated over the past few days by primary gyn-onc team) - script sent to [address] [place] [address],[address] (as they have it in stock)  • Continue oxy-IR 5-10 mg PO Q4H PRN [mod-severe pain] - script sent to [employer] [facility] for 3 day supply               - total OME usage yesterday: 67 5 mg  · Patient thoroughly educated about opioid use and her recommended regimen  She expresses understanding of the difference between long acting (Elen Miles) and short acting (Oxycodone IR) medications  She is aware not to drive under the influence of opioids, store in safe location in lock box, monitor for side effects including constipation, lethargy, confusion, hallucinations, respiratory depression   She will not use alcohol or additional opioids while "taking oxycodone IR and Xtampza  Narcan script and education provided       Anxiety/depression  • Continue duloxetine 20 mg PO QDaily  • Continue lorazepam 0 5 mg PO Q8H PRN - patient has only used 1 dose during hospitalization - script sent to [employer] for 0 25 mg q6 hrs PRN anxiety or dyspnea for 3 day supply     Bowel Regimen  • Continue senna 1 tab PO QDaily PRN  • Continue miralax 17 g PO QDaily  • Continue docusate 100 mg PO BID  • Patient educated to continue bowel regimen upon discharge due to risk of opioid induced constipation      Nausea  • Continue ondansetron 4 mg IV Q6H PRN - patient has not needed during hospitalization    2  Goals of Care  • Level 4 code status  • Per chart review, patient and her family had goals of care discussion with Dr Heriberto Alfred over the weekend and she expressed her desire to pursue comfort care and hospice care  • Patient appears to not fully understand hospice services at today's visit  She is educated about hospice and wishes to continue with plan for discharge with hospice services  • Her goal is home hospice and  has placed referral for Irene Leon  • Discharge scheduled for 1:00 pm today    3  Social Support  • Patient well-supported by her , daughter, and siblings  • Emotional support provided    4  Follow-up  • Palliative care will continue to follow and goals of care conversations will be ongoing  Please reach out with any questions or concerns  24 Hour History  Chart reviewed before visit  Per chart review, no acute events overnight  Patient has used two doses of PRN oxycodone (5 mg and 10 mg) in past 24 hrs  She had two doses of long-acting Oxycontin yesterday  Previous days only received 10 mg qHS and one day of 20 mg qHS  Upon my encounter today, patient appears comfortable and in no acute distress  She is not lethargic, confused, or hallucinating   She does appear more somnolent and reports feeling \"sleepy\" today, " but not significantly changed from baseline  Reports 4/10 pain in abdomen currently  Feels current regimen is effective  She has used 1 lidocaine patch since yesterday and feels it is effective  She is thoroughly educated about home regimen of xtampza 9 mg q12 hrs scheduled and oxycodone IR 5 mg q4 hrs PRN moderate-severe pain  See details above for education provided  Spent significant amount of time ensuring patient understands pain regimen  She denies nausea, vomiting, constipation, diarrhea, any other symptoms  She has good appetite and is eating breakfast        Review of Systems   All other systems reviewed and are negative        Medications    Current Facility-Administered Medications:   •  albuterol (PROVENTIL HFA,VENTOLIN HFA) inhaler 2 puff, 2 puff, Inhalation, Q6H PRN, Sharon Gamino MD, 2 puff at 04/15/23 2144  •  amLODIPine (NORVASC) tablet 10 mg, 10 mg, Oral, Daily, 10 mg at 04/18/23 6941 **AND** lisinopril (ZESTRIL) tablet 20 mg, 20 mg, Oral, Daily, Sharon Gamino MD, 20 mg at 04/18/23 0831  •  benzonatate (TESSALON PERLES) capsule 100 mg, 100 mg, Oral, TID PRN, Yasmin Cuevas MD  •  butalbital-acetaminophen-caffeine (FIORICET,ESGIC) -40 mg per tablet 1 tablet, 1 tablet, Oral, Q6H PRN, Sharon Gamino MD  •  calcium carbonate (TUMS) chewable tablet 1,000 mg, 1,000 mg, Oral, Daily PRN, Sharon Gamino MD  •  dexamethasone (DECADRON) tablet 4 mg, 4 mg, Oral, Q12H [facility], Chichi Malcolm MD, 4 mg at 04/18/23 5027  •  docusate sodium (COLACE) capsule 100 mg, 100 mg, Oral, BID, Sharon Gamino MD, 100 mg at 04/18/23 8865  •  DULoxetine (CYMBALTA) delayed release capsule 20 mg, 20 mg, Oral, Daily, Sharon Gamino MD, 20 mg at 04/18/23 0834  •  fluticasone (FLONASE) 50 mcg/act nasal spray 1 spray, 1 spray, Nasal, Daily, hSaron Gamino MD, 1 spray at 04/18/23 0032  •  fluticasone (FLOVENT HFA) 44 mcg/act inhaler 2 puff, 2 puff, Inhalation, BID, Sharon Gamino MD, 2 puff at 04/18/23 0986  • gabapentin (NEURONTIN) capsule 300 mg, 300 mg, Oral, TID, Nara Guajardo MD, 300 mg at 04/18/23 3712  •  heparin (porcine) subcutaneous injection 5,000 Units, 5,000 Units, Subcutaneous, Q8H [address], Audie Morrison MD, 5,000 Units at 04/18/23 9337  •  lidocaine (LIDODERM) 5 % patch 2 patch, 2 patch, Topical, Q12H PRN, Blanche Ferraro PA-C  •  LORazepam (ATIVAN) tablet 0 5 mg, 0 5 mg, Oral, Q8H PRN, Nara Guajardo MD, 0 5 mg at 04/13/23 2350  •  ondansetron (ZOFRAN) injection 4 mg, 4 mg, Intravenous, Q6H PRN, Nara Guajardo MD  •  oxyCODONE (OxyCONTIN) 12 hr tablet 20 mg, 20 mg, Oral, Q12H [address], Anneliese Levine MD, 20 mg at 04/18/23 5592  •  oxyCODONE (ROXICODONE) immediate release tablet 10 mg, 10 mg, Oral, HS PRN, Nara Guajardo MD, 10 mg at 04/18/23 0426  •  oxyCODONE (ROXICODONE) IR tablet 5 mg, 5 mg, Oral, Q4H PRN, Nara Guajardo MD, 5 mg at 04/17/23 1615  •  pantoprazole (PROTONIX) EC tablet 40 mg, 40 mg, Oral, Early Morning, Nara Guajardo MD, 40 mg at 04/18/23 1324  •  polyethylene glycol (MIRALAX) packet 17 g, 17 g, Oral, Daily, Nara Guajardo MD  •  pravastatin (PRAVACHOL) tablet 10 mg, 10 mg, Oral, HS, Nara Guajardo MD, 10 mg at 04/17/23 2148  •  senna (SENOKOT) tablet 8 6 mg, 8 6 mg, Oral, Daily PRN, Nara Guajardo MD, 8 6 mg at 04/13/23 2350  •  sodium chloride 0 9 % infusion, 50 mL/hr, Intravenous, Continuous, Anneliese Levine MD, Last Rate: 50 mL/hr at 04/17/23 1446, 50 mL/hr at 04/17/23 1446    Objective  /84   Pulse 94   Temp 97 6 °F (36 4 °C)   Resp 18   Wt 76 2 kg (168 lb)   SpO2 96%   BMI 33 93 kg/m²   Physical Exam:   Constitutional: Appears well-developed and well-nourished  Comfortable and in no acute distress  Head: Normocephalic and atraumatic  Eyes: EOM are normal  No ocular discharge  No scleral icterus  Neck: no visible adenopathy or masses  Cardiac: Normal rate  Respiratory: Effort normal  No stridor  No respiratory distress  No cough   1 5 L NC O2 "requirements  Gastrointestinal: No abdominal distension  Musculoskeletal: No edema  Neurological: Alert, oriented x3 and appropriately conversant  Skin: Dry, no diaphoresis  Psychiatric: Somnolent  Displays a normal mood and affect  Behavior, judgement and thought content appear normal      Lab Results: I have personally reviewed pertinent labs  , CBC: No results found for: WBC, HGB, HCT, MCV, PLT, ADJUSTEDWBC, MCH, MCHC, RDW, MPV, NRBC, CMP: No results found for: SODIUM, K, CL, CO2, ANIONGAP, BUN, CREATININE, GLUCOSE, CALCIUM, AST, ALT, ALKPHOS, PROT, BILITOT, EGFR  Imaging Studies: I have personally reviewed pertinent reports  EKG, Pathology, and Other Studies: I have personally reviewed pertinent reports  Counseling / Coordination of Care  Total floor / unit time spent today 90 minutes  Greater than 50% of total time was spent with the patient and / or family counseling and / or coordinating of care  A description of the counseling / coordination of care: Chart reviewed, provided medical updates, determined goals of care, discussed palliative care and symptom management, discussed comfort care and hospice, determined competency and POA/HCA, determined social/family support, provided psychosocial support  Reviewed with [facility] resident Dr Liv De Jesus and KOLBY Low, [place]  Palliative & Supportive Care    Portions of this document may have been created using dictation software and as such some \"sound alike\" terms may have been generated by the system  Do not hesitate to contact me with any questions or clarifications        "

## 2023-04-18 NOTE — CASE MANAGEMENT
Case Management Discharge Planning Note    Patient name Arsh GERARDO /S -01 MRN [id number]  : 1950 Date 2023       Current Admission Date: 2023  Current Admission Diagnosis:Ovarian cancer [facility])   Patient Active Problem List    Diagnosis Date Noted   • Pleural effusion 2023   • Leukocytosis 2023   • RAYMOND (acute kidney injury) ([address] ) 2023   • JOHNSON (dyspnea on exertion) 04/10/2023   • Leg swelling 04/10/2023   • Abnormal liver enzymes 2023   • Malignant neoplasm metastatic to both lungs ([address] ) 2023   • Continuous opioid dependence ([address] ) 2023   • Mixed hyperlipidemia 2023   • Medication reaction 2023   • Lactic acidosis 2023   • Asthma 2023   • Anemia in other chronic diseases classified elsewhere 2023   • Severe sepsis ([address] ) 2023   • Malignant neoplasm metastatic to liver ([address] ) 2022   • Personal history of COVID-19 2022   • Sciatica of left side 2022   • Oral mucositis (ulcerative) due to antineoplastic therapy 2022   • Neoplastic malignant related fatigue 03/15/2022   • Chemotherapy-induced peripheral neuropathy ([address] ) 2022   • Chemotherapy induced neutropenia ([address] ) 2021   • Palliative care patient 2021   • Joint pain following chemotherapy 2021   • Chemotherapy-induced nausea 2021   • Anxiousness 2021   • Insomnia due to medical condition 2021   • Drug induced constipation 10/13/2021   • Encounter for central line care 2021   • Hyponatremia 2021   • Ovarian cancer ([address] ) 2021   • S/P splenectomy 2021   • S/P bladder repair 2021   • Status post small bowel resection 2021   • Unspecified protein-calorie malnutrition ([address] ) 08/10/2021   • Migraine 2021   • Cancer related pain 2021   • Primary hypertension 2021   • Esophageal reflux 2013      LOS (days): 5  Geometric Mean LOS (GMLOS) "(days): 3 10  Days to LOS:-1 6     OBJECTIVE:  Risk of Unplanned Readmission Score: 43 11         Current admission status: Inpatient   Preferred Pharmacy:   [facility] [place], [address] [facility] [address], [address] [address]  Phone: [phone] Fax: [phone] [address] [place], [place] - [address] [facility] [address] [place] [address]  Phone: [phone] Fax: [phone]    [facility], [address] [facility] [address]  Phone: [phone] Fax: 870 [facility], [address]  [address] [facility]  [address]  [address] [facility] [address]  Phone: [phone] Fax: [phone]    [facility] - [facility] [address]  100 hospitals [address]  Phone: [phone] Fax: [phone]    Primary Care Provider: Jose Alejandro Murphy DO    Primary Insurance: MEDICARE  Secondary Insurance: BLUE CROSS    DISCHARGE DETAILS:                           Contacts  Patient Contacts: Patient  Relationship to Patient[de-identified] Other (Comment)  Contact Method:  In Person  Reason/Outcome: Discharge Planning, Continuity of [address] [place] [address]         Is the patient interested in [address] at discharge?: No (Home Hospice with N[facility])    DME Referral Provided  Referral made for DME?: No (DME will be coordinated by the hospice agency as needed)    Other Referral/Resources/Interventions Provided:  Interventions: Hospice         Treatment Team Recommendation: Hospice, Home  Discharge Destination Plan[de-identified] Home, Hospice  Transport at Discharge : Family                                         Nya Bates team reached out to [facility] and requested for the following to be ordered for her at DC:    \"morphine sulfate 20mg/ml take 0 25ml by mouth q3h PRN for severe pain or breathlessness and lorazepam 0 5mg q6h prn " "for agitation anxiety\"    CM forwarded the request to both primary Gyn-onc team and Palliative Care so that they could coordinate who would write the orders  Patient's  is here and will be transporting her home  They are aware that the nurse from [address] [place] [address] will be reaching out to coordinate his visit  No further CM DC needs were discussed or identified at this time                            "

## 2023-04-19 DIAGNOSIS — G89.18 JOINT PAIN FOLLOWING CHEMOTHERAPY: ICD-10-CM

## 2023-04-19 DIAGNOSIS — G89.3 CANCER RELATED PAIN: ICD-10-CM

## 2023-04-19 DIAGNOSIS — F11.90 OPIOID USE: ICD-10-CM

## 2023-04-19 DIAGNOSIS — R60.9 EDEMA, UNSPECIFIED TYPE: Primary | ICD-10-CM

## 2023-04-19 DIAGNOSIS — M54.32 SCIATICA OF LEFT SIDE: ICD-10-CM

## 2023-04-19 DIAGNOSIS — C56.3 MALIGNANT NEOPLASM OF BOTH OVARIES (HCC): ICD-10-CM

## 2023-04-19 DIAGNOSIS — K12.31 ORAL MUCOSITIS (ULCERATIVE) DUE TO ANTINEOPLASTIC THERAPY: ICD-10-CM

## 2023-04-19 DIAGNOSIS — G89.18 POST-OP PAIN: ICD-10-CM

## 2023-04-19 DIAGNOSIS — Z51.5 PALLIATIVE CARE PATIENT: ICD-10-CM

## 2023-04-19 DIAGNOSIS — T45.1X5A CHEMOTHERAPY-INDUCED PERIPHERAL NEUROPATHY: ICD-10-CM

## 2023-04-19 DIAGNOSIS — M25.50 JOINT PAIN FOLLOWING CHEMOTHERAPY: ICD-10-CM

## 2023-04-19 DIAGNOSIS — C78.6 PERITONEAL CARCINOMATOSIS (HCC): ICD-10-CM

## 2023-04-19 DIAGNOSIS — G62.0 CHEMOTHERAPY-INDUCED PERIPHERAL NEUROPATHY: ICD-10-CM

## 2023-04-19 RX ORDER — SPIRONOLACTONE 25 MG/1
25 TABLET ORAL DAILY
Qty: 30 TABLET | Refills: 5 | Status: SHIPPED | OUTPATIENT
Start: 2023-04-19

## 2023-04-19 NOTE — UTILIZATION REVIEW
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;  S/p en block hysterectomy, BSO, sigmoidectomy with low rectal re-anastamosis, bladder peritoneal stripping and cystotomy repair, diaphragm stripping, small bowel resection with re-anastamosis on 8/6/2021  Recent imaging shows liver metastases  S/p 2 cycles of Pembro    Sent to ER on 4/13 for workup of  Rising liver enzymes and RAYMOND  PER  GYN/ONC    Given her worsening performance status at home we discussed continuation on hospice  Patient declines at this time  Pt states would like to continue with chemotherapy if her function and labs allow  Acute hypoxic respiratory failure: Patient is satting in the high 80s low 90s on rm air  Started on supplemental oxygen NC  (most likely 2/2 tumor burden in the lungs as her pleural effusion is small)       4/15      RAYMOND: Improving with IV fluid hydration  RUQ US w/o biliary dilation  Lower extremity swelling: VTE work-up negative  Most likely secondary to malnutrition, cancer diagnosis, immobility    4/16   progressive stage IIIc ovarian cancer with extensive hepatic metastases causing hyperbilirubinemia, edema  She also has multiple lung metastases  Her performance status is poor at 3-4     liver is almost entirely replaced by malignancy  recommended either best supportive care or hospice  Based on quality of life concerns and goals, she is interested in hospice care  Consultation was placed  Dyspnea with cough  She is on 2 L of oxygen supplementation with a sat of approximately 92%  We will repeat chest x-ray today to evaluate size of right pleural effusion  If the pleural effusion is large, she can undergo thoracentesis prior to discharge home with hospice    Will increase narcotic dose-plan to start OxyContin at 20 mg twice daily which will assist with cough suppression  We will also add dexamethasone 4 mg twice daily for palliation  4/17   Not enough fluid seen on ultrasound to preform right thoracentesis  Cont pain mgt  Assessing for home supplemental oxygen            Vital Signs:   04/17/23 13:59:37 -- 92 -- 130/73 97 % -- --   04/17/23 08:51:35 -- -- -- 127/77 -- -- --   04/17/23 0752 -- 76 14 -- 95 % 1 5 L/min Nasal cannula   04/16/23 22:28:20 97 8 °F (36 6 °C) 88 18 125/69 91 % 2 L/min  None (Room air)   04/16/23 06:17:46 98 8 °F (37 1 °C) 107 Abnormal  17 132/62 91 % -- --   04/15/23 22:04:50 98 7 °F (37 1 °C) 98 18 115/59 93 % -- --   04/15/23 2120 -- 99 -- -- 94 % -- None (Room air)   04/15/23 15:09:34 99 °F (37 2 °C) 101 18 106/64 87 % Abnormal  -- --   04/15/23 06:50:11 98 3 °F (36 8 °C) 104 17 109/58 92 % -- --       Pertinent Labs/Diagnostic Results:       Results from last 7 days   Lab Units 04/17/23  0545 04/16/23  0429 04/15/23  0637 04/14/23 0453 04/13/23 1906 04/13/23  1126   WBC Thousand/uL 14 91* 21 46* 20 77* 19 82* 22 19* 24 20*   HEMOGLOBIN g/dL 9 2* 8 8* 8 6* 9 4* 9 3* 9 6*   HEMATOCRIT % 29 1* 27 3* 26 3* 27 5* 28 6* 29 3*   PLATELETS Thousands/uL 251 268 243 253 237 233   NEUTROS ABS Thousands/µL  --   --   --  13 72* 14 82* 15 99*   BANDS PCT %  --  1  --   --   --   --          Results from last 7 days   Lab Units 04/17/23  0545 04/16/23  0429 04/15/23  0637 04/14/23  0453 04/13/23  1906 04/13/23  1126   SODIUM mmol/L 129* 127* 126* 127* 127* 127*   POTASSIUM mmol/L 4 1 4 2 4 0 3 9 4 1 4 3   CHLORIDE mmol/L 100 98 96 95* 93* 93*   CO2 mmol/L 20* 19* 22 24 23 20*   ANION GAP mmol/L 9 10 8 8 11 14*   BUN mg/dL 26* 44* 49* 46* 50* 47*   CREATININE mg/dL 0 56* 1 04 1 04 0 99 1 37* 1 22   EGFR ml/min/1 73sq m 93 53 53 57 38 44   CALCIUM mg/dL 8 2* 8 1* 7 8* 7 8* 8 1* 7 6*   MAGNESIUM mg/dL 1 7* 1 9 1 8* 1 9  --  1 8*   PHOSPHORUS mg/dL  --   --   --  4 3*  --   --      Results from last 7 days   Lab Units 04/17/23  0545 04/16/23  0429 04/15/23  0637 04/14/23 0453 04/13/23  1906   AST U/L 49* 60* 59* 74* 95*   ALT U/L 25 28 30 38 46   ALK PHOS U/L 747* 793* 759* 809* 942*   TOTAL PROTEIN g/dL 5 6* 5 6* 5 4* 5 4* 5 9*   ALBUMIN g/dL 2 1* 2 2* 2 1* 2 2* 2 4*   TOTAL BILIRUBIN mg/dL 3 14* 3 61* 3 27* 3 28* 3 46*         Results from last 7 days   Lab Units 04/17/23  0545 04/16/23  0429 04/15/23  0637 04/14/23 0453 04/13/23 1906 04/13/23  1126   GLUCOSE RANDOM mg/dL 151* 93 83 77 100 85             Medications:   Scheduled Medications:  amLODIPine, 10 mg, Oral, Daily  lisinopril, 20 mg, Oral, Daily  dexamethasone, 4 mg, Oral, Q12H ANA MARIA  docusate sodium, 100 mg, Oral, BID  DULoxetine, 20 mg, Oral, Daily  fluticasone, 1 spray, Nasal, Daily  fluticasone, 2 puff, Inhalation, BID  gabapentin, 300 mg, Oral, TID  heparin (porcine), 5,000 Units, Subcutaneous, Q8H ANA MARIA  oxyCODONE, 20 mg, Oral, Q12H ANA MARIA  pantoprazole, 40 mg, Oral, Early Morning  polyethylene glycol, 17 g, Oral, Daily  pravastatin, 10 mg, Oral, HS      Continuous IV Infusions:  No current facility-administered medications for this encounter  PRN Meds:  albuterol, 2 puff, Inhalation, Q6H PRN  benzonatate, 100 mg, Oral, TID PRN  butalbital-acetaminophen-caffeine, 1 tablet, Oral, Q6H PRN  calcium carbonate, 1,000 mg, Oral, Daily PRN  lidocaine, 2 patch, Topical, Q12H PRN    x3 in past 24 hrs  LORazepam, 0 5 mg, Oral, Q8H PRN  ondansetron, 4 mg, Intravenous, Q6H PRN  oxyCODONE, 10 mg, Oral, HS PRN  oxyCODONE, 5 mg, Oral, Q4H PRN    x3 in past 24 hrs  senna, 8 6 mg, Oral, Daily PRN        Discharge Plan: tbd    Network Utilization Review Department  ATTENTION: Please call with any questions or concerns to [phone] and carefully listen to the prompts so that you are directed to the right person   All voicemails are confidential   Arun Cope all requests for admission clinical reviews, approved or denied determinations and any other requests to dedicated fax number below belonging to the campus where the patient is receiving treatment   List of dedicated fax numbers for the Facilities:  [address] [place] [address] DENIALS (Administrative/Medical Necessity) [phone]   [address]  (Maternity/NICU/Pediatrics) [phone] [address] [phone]   [address] [phone]   [address] [place] [address] [address] [address] [phone]   [facility] 134 655 [facility] [phone]

## 2023-04-19 NOTE — UTILIZATION REVIEW
NOTIFICATION OF ADMISSION DISCHARGE   This is a Notification of Discharge from [address]  Please be advised that this patient has been discharge from our facility  Below you will find the admission and discharge date and time including the patient’s disposition  UTILIZATION REVIEW CONTACT:  Sascha Perales MA  Utilization   Network Utilization Review Department  Phone: [phone] x carefully listen to the prompts  All voicemails are confidential   Email: [email or web address]     ADMISSION INFORMATION  PRESENTATION DATE: 4/13/2023  6:24 PM  OBERVATION ADMISSION DATE:   INPATIENT ADMISSION DATE: 4/13/23  8:33 PM   DISCHARGE DATE: 4/18/2023  2:07 PM   [facility]    IMPORTANT INFORMATION:  Send all requests for admission clinical reviews, approved or denied determinations and any other requests to dedicated fax number below belonging to the campus where the patient is receiving treatment   List of dedicated fax numbers:  [address] DENIALS (Administrative/Medical Necessity) [phone]   [address] (Maternity/NICU/Pediatrics) [phone]   [facility] [phone]   [place] [address] [phone]   [facility] [place]  [phone]   [address] [facility] [phone] 90 Located within [facility] [phone]   [address] [place], [place] [address] [address] [phone]   [facility]  [phone]   4055 [facility] [phone]   412 [facility] [address] [facility] [phone]

## 2023-05-05 ENCOUNTER — TELEPHONE (OUTPATIENT)
Dept: FAMILY MEDICINE CLINIC | Facility: CLINIC | Age: 73
End: 2023-05-05

## 2023-05-05 NOTE — TELEPHONE ENCOUNTER
Brayan Cotton from [facility] returned my call asking for a verbal from Dr Sandra Rivera to increase fentanyl patch  She is asking to use two 12 mcg patches  Dr Sandra Rivera did give a verbal approval for this    L Pinky/ANDRIY

## 2023-05-05 NOTE — TELEPHONE ENCOUNTER
Returned [place] [employer] [facility] call, she wanted patients fentanyl patch 12 mcg increased  I informed her we do not have fentanyl patch on patients chart  She asked me to hold on, came back on the phone and apologized, then said she would call me back after she spoke with the provider (medical director) that did order the fentanyl patch

## 2023-05-05 NOTE — TELEPHONE ENCOUNTER
Lul Dunn, from [facility] called [phone]  She needs to speak with someone about Evi's pain management concerns   We need to call her back to inquire about the concerns and then forward to Dr Valentino Baltimore

## 2023-05-16 DIAGNOSIS — C78.02 MALIGNANT NEOPLASM METASTATIC TO BOTH LUNGS (HCC): ICD-10-CM

## 2023-05-16 DIAGNOSIS — C78.01 MALIGNANT NEOPLASM METASTATIC TO BOTH LUNGS (HCC): ICD-10-CM

## 2023-05-16 DIAGNOSIS — C78.7 MALIGNANT NEOPLASM METASTATIC TO LIVER (HCC): ICD-10-CM

## 2023-05-16 DIAGNOSIS — C78.7 MALIGNANT NEOPLASM METASTATIC TO LIVER (HCC): Primary | ICD-10-CM

## 2023-05-16 RX ORDER — FENTANYL 25 UG/H
1 PATCH TRANSDERMAL
Qty: 5 PATCH | Refills: 0 | Status: SHIPPED | OUTPATIENT
Start: 2023-05-16

## 2023-05-16 RX ORDER — FENTANYL 25 UG/H
1 PATCH TRANSDERMAL
Qty: 5 PATCH | Refills: 0 | Status: SHIPPED | OUTPATIENT
Start: 2023-05-16 | End: 2023-05-16 | Stop reason: SDUPTHER

## 2023-05-16 NOTE — PROGRESS NOTES
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

## 2023-05-16 NOTE — PROGRESS NOTES
Inadvertently vitamin was sent to [facility]  It should have gone to order is being placed as for the pharmacy    So blood pharmacy will be notified to cancel order

## 2023-05-27 PROBLEM — A41.9 SEVERE SEPSIS (HCC): Status: RESOLVED | Noted: 2023-03-27 | Resolved: 2023-05-27

## 2023-05-27 PROBLEM — R65.20 SEVERE SEPSIS (HCC): Status: RESOLVED | Noted: 2023-03-27 | Resolved: 2023-05-27

## 2023-06-01 NOTE — TELEPHONE ENCOUNTER
LMOM to schedule appt with Dr Charles Lazcano next week  Please connect her to me when she returns the call  PAST SURGICAL HISTORY:  No significant past surgical history

## 2024-05-21 NOTE — PROGRESS NOTES
----- Message from RITESH Rosenthal sent at 5/21/2024 11:46 AM EDT -----  Let her know that her urine drug screen was good.    Pt completed Magnesium infusion without incident  Upon completion of Zofran infusion pt became dizzy and diaphoretic  Vital signs taken, pt is diaphoretic with a fever, tachycardic, tachypnea, and has a rash on her face  Pt denies any recent fevers at home  Pt c/o dizziness and nausea  Pt has NOT received her Keytruda or MVASI infusions  Adeline Larson RN made aware of situation, recommends pt be evaluated in the emergency department  Pt transported to emergency department via wheelchair  Report given to Jennifer Cortes RN  Pt resting on stretcher

## (undated) DEVICE — 3M™ TEGADERM™ TRANSPARENT FILM DRESSING FRAME STYLE, 1628, 6 IN X 8 IN (15 CM X 20 CM), 10/CT 8CT/CASE: Brand: 3M™ TEGADERM™

## (undated) DEVICE — MAYO STAND COVER: Brand: CONVERTORS

## (undated) DEVICE — INTENDED FOR TISSUE SEPARATION, AND OTHER PROCEDURES THAT REQUIRE A SHARP SURGICAL BLADE TO PUNCTURE OR CUT.: Brand: BARD-PARKER SAFETY BLADES SIZE 15, STERILE

## (undated) DEVICE — PLUMEPEN PRO 10FT

## (undated) DEVICE — GLOVE SRG BIOGEL 6.5

## (undated) DEVICE — PROXIMATE RELOADABLE LINEAR CUTTER WITH SAFETY LOCK-OUT, 75MM: Brand: PROXIMATE

## (undated) DEVICE — SPONGE LAP 18 X 18 IN STRL RFD

## (undated) DEVICE — ECHELON CIRCULAR POWERED STAPLER: Brand: ECHELON CIRCULAR

## (undated) DEVICE — ELECTRODE LAP J HOOK E-Z CLEAN 33CM-0021

## (undated) DEVICE — 3000CC GUARDIAN II: Brand: GUARDIAN

## (undated) DEVICE — PROXIMATE SKIN STAPLERS (35 WIDE) CONTAINS 35 STAINLESS STEEL STAPLES (FIXED HEAD): Brand: PROXIMATE

## (undated) DEVICE — BETHLEHEM MAJOR GENERAL PACK: Brand: CARDINAL HEALTH

## (undated) DEVICE — TRAY FOLEY 16FR URIMETER SILICONE SURESTEP

## (undated) DEVICE — SUT CHROMIC 0 BP-1 27 IN 47T

## (undated) DEVICE — NEEDLE 25G X 1 1/2

## (undated) DEVICE — ABC HANDPIECE 45° FOOTSWITCHING HANDPIECE: Brand: ABC

## (undated) DEVICE — TROCAR: Brand: KII FIOS FIRST ENTRY

## (undated) DEVICE — TROCAR: Brand: KII® SLEEVE

## (undated) DEVICE — MEDI-VAC NON-CONDUCTIVE SUCTION TUBING 6MM X 1.8M (6FT.) L: Brand: CARDINAL HEALTH

## (undated) DEVICE — CONTOUR CURVED CUTTER STAPLER: Brand: CONTOUR

## (undated) DEVICE — SUT POLYESTER TAPE D-G 8618-00

## (undated) DEVICE — ENDOPATH PNEUMONEEDLE INSUFFLATION NEEDLES WITH LUER LOCK CONNECTORS 120MM: Brand: ENDOPATH

## (undated) DEVICE — ADHESIVE SKIN HIGH VISCOSITY EXOFIN 1ML

## (undated) DEVICE — INSUFFLATION TUBING PRIMFLO

## (undated) DEVICE — COVER ROLL 8 IN X 2 YD STRETCH TAPE

## (undated) DEVICE — GLOVE SRG LF STRL BGL SKNSNS 6.5 PF

## (undated) DEVICE — SUT VICRYL 3-0 SH 27 IN J416H

## (undated) DEVICE — SURGICEL 4 X 8

## (undated) DEVICE — TRAY FOLEY 16FR URIMETER SURESTEP

## (undated) DEVICE — INTENDED FOR TISSUE SEPARATION, AND OTHER PROCEDURES THAT REQUIRE A SHARP SURGICAL BLADE TO PUNCTURE OR CUT.: Brand: BARD-PARKER SAFETY BLADES SIZE 10, STERILE

## (undated) DEVICE — TUBING SMOKE EVAC W/FILTRATION DEVICE PLUMEPORT ACTIV

## (undated) DEVICE — INTENDED FOR TISSUE SEPARATION, AND OTHER PROCEDURES THAT REQUIRE A SHARP SURGICAL BLADE TO PUNCTURE OR CUT.: Brand: BARD-PARKER SAFETY BLADES SIZE 11, STERILE

## (undated) DEVICE — SUT SILK 0 SH 30 IN K834H

## (undated) DEVICE — SUT MONOCRYL 4-0 PS-2 27 IN Y426H

## (undated) DEVICE — SUT SILK 3-0 18 IN A184H

## (undated) DEVICE — SUT VICRYL 2-0 D-SPECIAL 27 IN D8114

## (undated) DEVICE — SUT SILK 2-0 18 IN A185H

## (undated) DEVICE — PREMIUM DRY TRAY LF: Brand: MEDLINE INDUSTRIES, INC.

## (undated) DEVICE — PAD GROUNDING ADULT

## (undated) DEVICE — SUT VICRYL 0 54 IN J207G

## (undated) DEVICE — 3M™ IOBAN™ 2 ANTIMICROBIAL INCISE DRAPE 6650EZ: Brand: IOBAN™ 2

## (undated) DEVICE — GLOVE PI ULTRA TOUCH SZ.6.5

## (undated) DEVICE — SUT PDS II 1 XLH 96 IN LOOPED Z881G

## (undated) DEVICE — ELECTRODE BLADE MOD  E-Z CLEAN 6.5IN -0014M

## (undated) DEVICE — ANTI-FOG SOLUTION WITH FOAM PAD: Brand: DEVON

## (undated) DEVICE — THE ECHELON FLEX POWERED PLUS ARTICULATING ENDOSCOPIC LINEAR CUTTERS ARE STERILE, SINGLE PATIENT USE INSTRUMENTS THAT SIMULTANEOUSLYCUT AND STAPLE TISSUE. THERE ARE SIX STAGGERED ROWS OF STAPLES, THREE ON EITHER SIDE OF THE CUT LINE. THE ECHELON FLEX 45 POWERED PLUSINSTRUMENTS HAVE A STAPLE LINE THAT IS APPROXIMATELY 45 MM LONG AND A CUT LINE THAT IS APPROXIMATELY 42 MM LONG. THE SHAFT CAN ROTATE FREELYIN BOTH DIRECTIONS AND AN ARTICULATION MECHANISM ENABLES THE DISTAL PORTION OF THE SHAFT TO PIVOT TO FACILITATE LATERAL ACCESS TO THE OPERATIVESITE.THE INSTRUMENTS ARE PACKAGED WITH A PRIMARY LITHIUM BATTERY PACK THAT MUST BE INSTALLED PRIOR TO USE. THERE ARE SPECIFIC REQUIREMENTS FORDISPOSING OF THE BATTERY PACK. REFER TO THE BATTERY PACK DISPOSAL SECTION.THE INSTRUMENTS ARE PACKAGED WITHOUT A RELOAD AND MUST BE LOADED PRIOR TO USE. A STAPLE RETAINING CAP ON THE RELOAD PROTECTS THE STAPLE LEGPOINTS DURING SHIPPING AND TRANSPORTATION. THE INSTRUMENTS’ LOCK-OUT FEATURE IS DESIGNED TO PREVENT A USED OR IMPROPERLY INSTALLED RELOADFROM BEING REFIRED OR AN INSTRUMENT FROM BEING FIRED WITHOUT A RELOAD.: Brand: ECHELON FLEX

## (undated) DEVICE — LAPAROSCOPIC TROCAR SLEEVE/SINGLE USE: Brand: KII® OPTICAL ACCESS SYSTEM

## (undated) DEVICE — ABDOMINAL PAD: Brand: DERMACEA

## (undated) DEVICE — GAUZE SPONGES,16 PLY: Brand: CURITY

## (undated) DEVICE — IRRIG ENDO FLO TUBING

## (undated) DEVICE — SYRINGE 10ML LL

## (undated) DEVICE — TELFA NON-ADHERENT ABSORBENT DRESSING: Brand: TELFA

## (undated) DEVICE — PROXIMATE LINEAR CUTTER RELOAD, BLUE, 75MM: Brand: PROXIMATE

## (undated) DEVICE — Device

## (undated) DEVICE — LIGACLIP MCA MULTIPLE CLIP APPLIERS, 20 MEDIUM CLIPS: Brand: LIGACLIP

## (undated) DEVICE — MEDI-VAC YANK SUCT HNDL W/TPRD BULBOUS TIP: Brand: CARDINAL HEALTH

## (undated) DEVICE — SUT VICRYL 2-0 CT-1 27 IN J259H

## (undated) DEVICE — JP CHAN DRN SIL HUBLESS 19FR W/TRO: Brand: CARDINAL HEALTH

## (undated) DEVICE — CONTOUR CURVED CUTTER STAPLER RELOAD: Brand: CONTOUR

## (undated) DEVICE — CHLORAPREP HI-LITE 26ML ORANGE

## (undated) DEVICE — ENSEAL 20 CM SHAFT, LARGE JAW: Brand: ENSEAL X1

## (undated) DEVICE — PROXIMATE RELOADABLE LINEAR STAPLER, 60MM: Brand: PROXIMATE

## (undated) DEVICE — SUT SILK 3-0 SH 30 IN K832H

## (undated) DEVICE — SUT VICRYL PLUS 0 UR-6 27IN VCP603H

## (undated) DEVICE — JACKSON-PRATT 100CC BULB RESERVOIR: Brand: CARDINAL HEALTH

## (undated) DEVICE — SUT SILK 2-0 SH CR/8 18 IN C012D

## (undated) DEVICE — ANTIBACTERIAL UNDYED BRAIDED (POLYGLACTIN 910), SYNTHETIC ABSORBABLE SUTURE: Brand: COATED VICRYL

## (undated) DEVICE — THE ECHELON, ECHELON ENDOPATH™ AND ECHELON FLEX™ FAMILIES OF ENDOSCOPIC LINEAR CUTTERS AND RELOADS ARE STERILE, SINGLE PATIENT USE INSTRUMENTS THAT SIMULTANEOUSLY CUT AND STAPLE TISSUE. THERE ARE SIX STAGGERED ROWS OF STAPLES, THREE ON EITHER SIDE OF THE CUT LINE. THE 45 MM INSTRUMENTS HAVE A STAPLE LINE THATIS APPROXIMATELY 45 MM LONG AND A CUT LINE THAT IS APPROXIMATELY 42 MM LONG. THE SHAFT CAN ROTATE FREELY IN BOTH DIRECTIONS AND AN ARTICULATION MECHANISM ON ARTICULATING INSTRUMENTS ENABLES BENDING THE DISTAL PORTIONOF THE SHAFT TO FACILITATE LATERAL ACCESS OF THE OPERATIVE SITE.THE INSTRUMENTS ARE SHIPPED WITHOUT A RELOAD AND MUST BE LOADED PRIOR TO USE. A STAPLE RETAINING CAP ON THE RELOAD PROTECTS THE STAPLE LEG POINTS DURING SHIPPING AND TRANSPORTATION. THE INSTRUMENTS’ LOCK-OUT FEATURE IS DESIGNED TO PREVENT A USED RELOAD FROM BEING REFIRED.: Brand: ECHELON ENDOPATH

## (undated) DEVICE — SUT VICRYL 0 REEL 54 IN J287G

## (undated) DEVICE — SUT MONOCRYL 4-0 PS-2 18 IN Y496G

## (undated) DEVICE — CHLORHEXIDINE 4PCT 4 OZ

## (undated) DEVICE — SUT SILK 3-0 SH CR/8 18 IN C013D

## (undated) DEVICE — SPONGE STICK WITH PVP-I: Brand: KENDALL

## (undated) DEVICE — PENCIL ELECTROSURG E-Z CLEAN -0035H

## (undated) DEVICE — SUT PROLENE 3-0 SH 36 IN 8522H

## (undated) DEVICE — TELFA ADHESIVE ISLAND DRESSING: Brand: TELFA

## (undated) DEVICE — SUT VICRYL 0 CT-1 CR/8 27 IN JJ41G

## (undated) DEVICE — MEDI-VAC YANKAUER SUCTION HANDLE W/STRAIGHT TIP & CONTROL VENT: Brand: CARDINAL HEALTH

## (undated) DEVICE — SYRINGE 50ML LL

## (undated) DEVICE — ELECTRODE BLADE MOD E-Z CLEAN 2.5IN 6.4CM -0012M

## (undated) DEVICE — SUT STRATAFIX SPIRAL 3-0 PGA/PCL 30 X 30 CM SXMD2B408

## (undated) DEVICE — GLOVE INDICATOR PI UNDERGLOVE SZ 6.5 BLUE

## (undated) DEVICE — SUT SILK 0 30 IN A306H

## (undated) DEVICE — NEEDLE 18 G X 1 1/2 SAFETY

## (undated) DEVICE — PACK PBDS STERILE LAP LITHOTOMY RF